# Patient Record
Sex: FEMALE | Race: BLACK OR AFRICAN AMERICAN | NOT HISPANIC OR LATINO | URBAN - METROPOLITAN AREA
[De-identification: names, ages, dates, MRNs, and addresses within clinical notes are randomized per-mention and may not be internally consistent; named-entity substitution may affect disease eponyms.]

---

## 2024-01-01 ENCOUNTER — HOSPITAL ENCOUNTER (INPATIENT)
Facility: HOSPITAL | Age: 74
LOS: 17 days | Discharge: HOSPICE | DRG: 028 | End: 2024-11-18
Attending: STUDENT IN AN ORGANIZED HEALTH CARE EDUCATION/TRAINING PROGRAM | Admitting: STUDENT IN AN ORGANIZED HEALTH CARE EDUCATION/TRAINING PROGRAM
Payer: COMMERCIAL

## 2024-01-01 ENCOUNTER — HOSPITAL ENCOUNTER (INPATIENT)
Facility: HOSPITAL | Age: 74
LOS: 2 days | DRG: 951 | End: 2024-11-20
Attending: HOSPITALIST | Admitting: HOSPITALIST
Payer: COMMERCIAL

## 2024-01-01 VITALS
RESPIRATION RATE: 16 BRPM | HEART RATE: 56 BPM | OXYGEN SATURATION: 93 % | DIASTOLIC BLOOD PRESSURE: 41 MMHG | TEMPERATURE: 96 F | SYSTOLIC BLOOD PRESSURE: 86 MMHG

## 2024-01-01 VITALS
RESPIRATION RATE: 8 BRPM | TEMPERATURE: 98 F | HEART RATE: 67 BPM | WEIGHT: 185.88 LBS | HEIGHT: 67 IN | DIASTOLIC BLOOD PRESSURE: 48 MMHG | OXYGEN SATURATION: 99 % | BODY MASS INDEX: 29.17 KG/M2 | SYSTOLIC BLOOD PRESSURE: 85 MMHG

## 2024-01-01 DIAGNOSIS — G95.9 MYELOPATHY: ICD-10-CM

## 2024-01-01 DIAGNOSIS — G82.50 QUADRIPARESIS: ICD-10-CM

## 2024-01-01 DIAGNOSIS — I48.0 PAROXYSMAL ATRIAL FIBRILLATION: ICD-10-CM

## 2024-01-01 DIAGNOSIS — R07.9 CHEST PAIN: ICD-10-CM

## 2024-01-01 DIAGNOSIS — J96.11 CHRONIC RESPIRATORY FAILURE WITH HYPOXIA: ICD-10-CM

## 2024-01-01 DIAGNOSIS — Z98.1 S/P CERVICAL SPINAL FUSION: ICD-10-CM

## 2024-01-01 DIAGNOSIS — S14.129A CENTRAL CORD SYNDROME: ICD-10-CM

## 2024-01-01 DIAGNOSIS — S14.129D CENTRAL CORD SYNDROME, SUBSEQUENT ENCOUNTER: ICD-10-CM

## 2024-01-01 DIAGNOSIS — N18.4 CKD (CHRONIC KIDNEY DISEASE), STAGE IV: ICD-10-CM

## 2024-01-01 DIAGNOSIS — R55 SYNCOPE AND COLLAPSE: ICD-10-CM

## 2024-01-01 DIAGNOSIS — I48.91 ATRIAL FIBRILLATION: ICD-10-CM

## 2024-01-01 DIAGNOSIS — I27.20 PULMONARY HYPERTENSION: ICD-10-CM

## 2024-01-01 DIAGNOSIS — S14.129A CENTRAL CORD SYNDROME, INITIAL ENCOUNTER: Primary | ICD-10-CM

## 2024-01-01 DIAGNOSIS — Z51.5 END OF LIFE CARE: ICD-10-CM

## 2024-01-01 DIAGNOSIS — I63.9 STROKE: ICD-10-CM

## 2024-01-01 DIAGNOSIS — I63.89 CEREBROVASCULAR ACCIDENT (CVA) DUE TO OTHER MECHANISM: ICD-10-CM

## 2024-01-01 DIAGNOSIS — G93.89 BRAIN MASS: ICD-10-CM

## 2024-01-01 DIAGNOSIS — I48.91 ATRIAL FIBRILLATION WITH RAPID VENTRICULAR RESPONSE: ICD-10-CM

## 2024-01-01 DIAGNOSIS — R55 SYNCOPE: ICD-10-CM

## 2024-01-01 DIAGNOSIS — S14.129A CENTRAL CORD SYNDROME, INITIAL ENCOUNTER: ICD-10-CM

## 2024-01-01 DIAGNOSIS — I49.9 CARDIAC RHYTHM DISORDER OR DISTURBANCE OR CHANGE: ICD-10-CM

## 2024-01-01 LAB
ALBUMIN SERPL BCP-MCNC: 2.2 G/DL (ref 3.5–5.2)
ALBUMIN SERPL BCP-MCNC: 2.3 G/DL (ref 3.5–5.2)
ALBUMIN SERPL BCP-MCNC: 2.4 G/DL (ref 3.5–5.2)
ALBUMIN SERPL BCP-MCNC: 2.5 G/DL (ref 3.5–5.2)
ALBUMIN SERPL BCP-MCNC: 2.6 G/DL (ref 3.5–5.2)
ALBUMIN SERPL BCP-MCNC: 2.7 G/DL (ref 3.5–5.2)
ALBUMIN SERPL BCP-MCNC: 2.8 G/DL (ref 3.5–5.2)
ALBUMIN SERPL BCP-MCNC: 2.8 G/DL (ref 3.5–5.2)
ALBUMIN SERPL BCP-MCNC: 2.9 G/DL (ref 3.5–5.2)
ALLENS TEST: ABNORMAL
ALP SERPL-CCNC: 101 U/L (ref 55–135)
ALP SERPL-CCNC: 103 U/L (ref 55–135)
ALP SERPL-CCNC: 107 U/L (ref 55–135)
ALP SERPL-CCNC: 110 U/L (ref 55–135)
ALP SERPL-CCNC: 115 U/L (ref 55–135)
ALP SERPL-CCNC: 76 U/L (ref 40–150)
ALP SERPL-CCNC: 79 U/L (ref 55–135)
ALP SERPL-CCNC: 81 U/L (ref 55–135)
ALP SERPL-CCNC: 81 U/L (ref 55–135)
ALP SERPL-CCNC: 85 U/L (ref 40–150)
ALP SERPL-CCNC: 90 U/L (ref 55–135)
ALP SERPL-CCNC: 90 U/L (ref 55–135)
ALT SERPL W/O P-5'-P-CCNC: 11 U/L (ref 10–44)
ALT SERPL W/O P-5'-P-CCNC: 11 U/L (ref 10–44)
ALT SERPL W/O P-5'-P-CCNC: 14 U/L (ref 10–44)
ALT SERPL W/O P-5'-P-CCNC: 14 U/L (ref 10–44)
ALT SERPL W/O P-5'-P-CCNC: 26 U/L (ref 10–44)
ALT SERPL W/O P-5'-P-CCNC: 37 U/L (ref 10–44)
ALT SERPL W/O P-5'-P-CCNC: 39 U/L (ref 10–44)
ALT SERPL W/O P-5'-P-CCNC: 5 U/L (ref 10–44)
ALT SERPL W/O P-5'-P-CCNC: 51 U/L (ref 10–44)
ALT SERPL W/O P-5'-P-CCNC: 6 U/L (ref 10–44)
ALT SERPL W/O P-5'-P-CCNC: 62 U/L (ref 10–44)
ALT SERPL W/O P-5'-P-CCNC: 7 U/L (ref 10–44)
AMPHET+METHAMPHET UR QL: NEGATIVE
ANION GAP SERPL CALC-SCNC: 11 MMOL/L (ref 8–16)
ANION GAP SERPL CALC-SCNC: 11 MMOL/L (ref 8–16)
ANION GAP SERPL CALC-SCNC: 5 MMOL/L (ref 8–16)
ANION GAP SERPL CALC-SCNC: 6 MMOL/L (ref 8–16)
ANION GAP SERPL CALC-SCNC: 7 MMOL/L (ref 8–16)
ANION GAP SERPL CALC-SCNC: 8 MMOL/L (ref 8–16)
AORTIC ROOT ANNULUS: 3.02 CM
AORTIC VALVE CUSP SEPERATION: 2.17 CM
APICAL FOUR CHAMBER EJECTION FRACTION: 53 %
APICAL FOUR CHAMBER EJECTION FRACTION: 67 %
APICAL TWO CHAMBER EJECTION FRACTION: 47 %
APICAL TWO CHAMBER EJECTION FRACTION: 71 %
APTT PPP: 27.7 SEC (ref 21–32)
ASCENDING AORTA: 3.32 CM
AST SERPL-CCNC: 10 U/L (ref 10–40)
AST SERPL-CCNC: 108 U/L (ref 10–40)
AST SERPL-CCNC: 13 U/L (ref 10–40)
AST SERPL-CCNC: 16 U/L (ref 10–40)
AST SERPL-CCNC: 189 U/L (ref 10–40)
AST SERPL-CCNC: 25 U/L (ref 10–40)
AST SERPL-CCNC: 53 U/L (ref 10–40)
AST SERPL-CCNC: 57 U/L (ref 10–40)
AST SERPL-CCNC: 57 U/L (ref 10–40)
AST SERPL-CCNC: 67 U/L (ref 10–40)
AST SERPL-CCNC: 87 U/L (ref 10–40)
AST SERPL-CCNC: 9 U/L (ref 10–40)
AV INDEX (PROSTH): 0.81
AV MEAN GRADIENT: 4 MMHG
AV PEAK GRADIENT: 6.8 MMHG
AV VALVE AREA BY VELOCITY RATIO: 2.7 CM²
AV VALVE AREA: 2.8 CM²
AV VELOCITY RATIO: 0.77
BACTERIA BLD CULT: NORMAL
BARBITURATES UR QL SCN>200 NG/ML: NEGATIVE
BASOPHILS # BLD AUTO: 0.01 K/UL (ref 0–0.2)
BASOPHILS # BLD AUTO: 0.02 K/UL (ref 0–0.2)
BASOPHILS # BLD AUTO: 0.02 K/UL (ref 0–0.2)
BASOPHILS # BLD AUTO: 0.03 K/UL (ref 0–0.2)
BASOPHILS # BLD AUTO: 0.04 K/UL (ref 0–0.2)
BASOPHILS NFR BLD: 0.3 % (ref 0–1.9)
BASOPHILS NFR BLD: 0.3 % (ref 0–1.9)
BASOPHILS NFR BLD: 0.5 % (ref 0–1.9)
BASOPHILS NFR BLD: 0.6 % (ref 0–1.9)
BASOPHILS NFR BLD: 0.7 % (ref 0–1.9)
BASOPHILS NFR BLD: 0.7 % (ref 0–1.9)
BASOPHILS NFR BLD: 0.8 % (ref 0–1.9)
BENZODIAZ UR QL SCN>200 NG/ML: NEGATIVE
BILIRUB SERPL-MCNC: 0.2 MG/DL (ref 0.1–1)
BILIRUB SERPL-MCNC: 0.4 MG/DL (ref 0.1–1)
BILIRUB SERPL-MCNC: 0.5 MG/DL (ref 0.1–1)
BILIRUB SERPL-MCNC: 0.6 MG/DL (ref 0.1–1)
BILIRUB SERPL-MCNC: 0.6 MG/DL (ref 0.1–1)
BILIRUB UR QL STRIP: NEGATIVE
BNP SERPL-MCNC: 39 PG/ML (ref 0–99)
BSA FOR ECHO PROCEDURE: 1.83 M2
BSA FOR ECHO PROCEDURE: 1.85 M2
BUN SERPL-MCNC: 28 MG/DL (ref 8–23)
BUN SERPL-MCNC: 29 MG/DL (ref 8–23)
BUN SERPL-MCNC: 30 MG/DL (ref 8–23)
BUN SERPL-MCNC: 31 MG/DL (ref 8–23)
BUN SERPL-MCNC: 32 MG/DL (ref 8–23)
BUN SERPL-MCNC: 33 MG/DL (ref 8–23)
BUN SERPL-MCNC: 34 MG/DL (ref 8–23)
BUN SERPL-MCNC: 39 MG/DL (ref 8–23)
BUN SERPL-MCNC: 43 MG/DL (ref 8–23)
BZE UR QL SCN: NEGATIVE
CALCIUM SERPL-MCNC: 7.2 MG/DL (ref 8.7–10.5)
CALCIUM SERPL-MCNC: 7.4 MG/DL (ref 8.7–10.5)
CALCIUM SERPL-MCNC: 7.5 MG/DL (ref 8.7–10.5)
CALCIUM SERPL-MCNC: 7.5 MG/DL (ref 8.7–10.5)
CALCIUM SERPL-MCNC: 7.6 MG/DL (ref 8.7–10.5)
CALCIUM SERPL-MCNC: 7.7 MG/DL (ref 8.7–10.5)
CALCIUM SERPL-MCNC: 7.7 MG/DL (ref 8.7–10.5)
CALCIUM SERPL-MCNC: 7.8 MG/DL (ref 8.7–10.5)
CALCIUM SERPL-MCNC: 8.1 MG/DL (ref 8.7–10.5)
CALCIUM SERPL-MCNC: 8.2 MG/DL (ref 8.7–10.5)
CALCIUM SERPL-MCNC: 8.2 MG/DL (ref 8.7–10.5)
CALCIUM SERPL-MCNC: 8.3 MG/DL (ref 8.7–10.5)
CALCIUM SERPL-MCNC: 8.5 MG/DL (ref 8.7–10.5)
CANNABINOIDS UR QL SCN: NEGATIVE
CHLORIDE SERPL-SCNC: 103 MMOL/L (ref 95–110)
CHLORIDE SERPL-SCNC: 103 MMOL/L (ref 95–110)
CHLORIDE SERPL-SCNC: 104 MMOL/L (ref 95–110)
CHLORIDE SERPL-SCNC: 105 MMOL/L (ref 95–110)
CHLORIDE SERPL-SCNC: 105 MMOL/L (ref 95–110)
CHLORIDE SERPL-SCNC: 106 MMOL/L (ref 95–110)
CHLORIDE SERPL-SCNC: 106 MMOL/L (ref 95–110)
CHLORIDE SERPL-SCNC: 107 MMOL/L (ref 95–110)
CHLORIDE SERPL-SCNC: 108 MMOL/L (ref 95–110)
CHLORIDE SERPL-SCNC: 108 MMOL/L (ref 95–110)
CHLORIDE SERPL-SCNC: 109 MMOL/L (ref 95–110)
CHLORIDE SERPL-SCNC: 110 MMOL/L (ref 95–110)
CHLORIDE SERPL-SCNC: 111 MMOL/L (ref 95–110)
CHLORIDE SERPL-SCNC: 112 MMOL/L (ref 95–110)
CHLORIDE SERPL-SCNC: 97 MMOL/L (ref 95–110)
CHLORIDE SERPL-SCNC: 98 MMOL/L (ref 95–110)
CHLORIDE SERPL-SCNC: 99 MMOL/L (ref 95–110)
CHOLEST SERPL-MCNC: 125 MG/DL (ref 120–199)
CHOLEST/HDLC SERPL: 2.5 {RATIO} (ref 2–5)
CLARITY UR: CLEAR
CO2 SERPL-SCNC: 20 MMOL/L (ref 23–29)
CO2 SERPL-SCNC: 21 MMOL/L (ref 23–29)
CO2 SERPL-SCNC: 22 MMOL/L (ref 23–29)
CO2 SERPL-SCNC: 23 MMOL/L (ref 23–29)
CO2 SERPL-SCNC: 25 MMOL/L (ref 23–29)
COLOR UR: COLORLESS
CORTIS SERPL-MCNC: 12.9 UG/DL
CREAT SERPL-MCNC: 1.5 MG/DL (ref 0.5–1.4)
CREAT SERPL-MCNC: 1.6 MG/DL (ref 0.5–1.4)
CREAT SERPL-MCNC: 1.7 MG/DL (ref 0.5–1.4)
CREAT SERPL-MCNC: 1.7 MG/DL (ref 0.5–1.4)
CREAT SERPL-MCNC: 1.8 MG/DL (ref 0.5–1.4)
CREAT SERPL-MCNC: 1.9 MG/DL (ref 0.5–1.4)
CREAT SERPL-MCNC: 2 MG/DL (ref 0.5–1.4)
CREAT SERPL-MCNC: 2.2 MG/DL (ref 0.5–1.4)
CREAT SERPL-MCNC: 2.3 MG/DL (ref 0.5–1.4)
CREAT UR-MCNC: 33.2 MG/DL (ref 15–325)
CV ECHO LV RWT: 0.34 CM
CV ECHO LV RWT: 0.36 CM
DELSYS: ABNORMAL
DIFFERENTIAL METHOD BLD: ABNORMAL
DOP CALC AO PEAK VEL: 1.3 M/S
DOP CALC AO VTI: 26.6 CM
DOP CALC LVOT AREA: 3.1 CM2
DOP CALC LVOT AREA: 3.5 CM2
DOP CALC LVOT DIAMETER: 2 CM
DOP CALC LVOT DIAMETER: 2.1 CM
DOP CALC LVOT PEAK VEL: 1 M/S
DOP CALC LVOT STROKE VOLUME: 74.8 CM3
DOP CALC MV VTI: 18.9 CM
DOP CALCLVOT PEAK VEL VTI: 21.6 CM
E WAVE DECELERATION TIME: 223.26 MSEC
E/E' RATIO: 7.47 M/S
ECHO LV POSTERIOR WALL: 0.8 CM (ref 0.6–1.1)
ECHO LV POSTERIOR WALL: 0.8 CM (ref 0.6–1.1)
EOSINOPHIL # BLD AUTO: 0 K/UL (ref 0–0.5)
EOSINOPHIL # BLD AUTO: 0.1 K/UL (ref 0–0.5)
EOSINOPHIL # BLD AUTO: 0.2 K/UL (ref 0–0.5)
EOSINOPHIL NFR BLD: 0.6 % (ref 0–8)
EOSINOPHIL NFR BLD: 0.9 % (ref 0–8)
EOSINOPHIL NFR BLD: 1.3 % (ref 0–8)
EOSINOPHIL NFR BLD: 1.4 % (ref 0–8)
EOSINOPHIL NFR BLD: 1.5 % (ref 0–8)
EOSINOPHIL NFR BLD: 1.7 % (ref 0–8)
EOSINOPHIL NFR BLD: 1.8 % (ref 0–8)
EOSINOPHIL NFR BLD: 1.9 % (ref 0–8)
EOSINOPHIL NFR BLD: 2.6 % (ref 0–8)
EOSINOPHIL NFR BLD: 4.6 % (ref 0–8)
EOSINOPHIL NFR BLD: 4.7 % (ref 0–8)
EOSINOPHIL NFR BLD: 5.8 % (ref 0–8)
ERYTHROCYTE [DISTWIDTH] IN BLOOD BY AUTOMATED COUNT: 14.5 % (ref 11.5–14.5)
ERYTHROCYTE [DISTWIDTH] IN BLOOD BY AUTOMATED COUNT: 14.6 % (ref 11.5–14.5)
ERYTHROCYTE [DISTWIDTH] IN BLOOD BY AUTOMATED COUNT: 14.7 % (ref 11.5–14.5)
ERYTHROCYTE [DISTWIDTH] IN BLOOD BY AUTOMATED COUNT: 14.8 % (ref 11.5–14.5)
ERYTHROCYTE [DISTWIDTH] IN BLOOD BY AUTOMATED COUNT: 15 % (ref 11.5–14.5)
ERYTHROCYTE [DISTWIDTH] IN BLOOD BY AUTOMATED COUNT: 15.3 % (ref 11.5–14.5)
ERYTHROCYTE [DISTWIDTH] IN BLOOD BY AUTOMATED COUNT: 15.4 % (ref 11.5–14.5)
ERYTHROCYTE [DISTWIDTH] IN BLOOD BY AUTOMATED COUNT: 15.5 % (ref 11.5–14.5)
ERYTHROCYTE [DISTWIDTH] IN BLOOD BY AUTOMATED COUNT: 15.6 % (ref 11.5–14.5)
ERYTHROCYTE [DISTWIDTH] IN BLOOD BY AUTOMATED COUNT: 15.8 % (ref 11.5–14.5)
ERYTHROCYTE [DISTWIDTH] IN BLOOD BY AUTOMATED COUNT: 15.8 % (ref 11.5–14.5)
ERYTHROCYTE [DISTWIDTH] IN BLOOD BY AUTOMATED COUNT: 15.9 % (ref 11.5–14.5)
ERYTHROCYTE [DISTWIDTH] IN BLOOD BY AUTOMATED COUNT: 16.6 % (ref 11.5–14.5)
ERYTHROCYTE [DISTWIDTH] IN BLOOD BY AUTOMATED COUNT: 16.7 % (ref 11.5–14.5)
ERYTHROCYTE [DISTWIDTH] IN BLOOD BY AUTOMATED COUNT: 16.8 % (ref 11.5–14.5)
ERYTHROCYTE [DISTWIDTH] IN BLOOD BY AUTOMATED COUNT: 16.8 % (ref 11.5–14.5)
ERYTHROCYTE [DISTWIDTH] IN BLOOD BY AUTOMATED COUNT: 17 % (ref 11.5–14.5)
ERYTHROCYTE [DISTWIDTH] IN BLOOD BY AUTOMATED COUNT: 17.1 % (ref 11.5–14.5)
ERYTHROCYTE [SEDIMENTATION RATE] IN BLOOD BY WESTERGREN METHOD: 13 MM/H
ERYTHROCYTE [SEDIMENTATION RATE] IN BLOOD BY WESTERGREN METHOD: 15 MM/H
EST. GFR  (NO RACE VARIABLE): 21.9 ML/MIN/1.73 M^2
EST. GFR  (NO RACE VARIABLE): 23.1 ML/MIN/1.73 M^2
EST. GFR  (NO RACE VARIABLE): 25.9 ML/MIN/1.73 M^2
EST. GFR  (NO RACE VARIABLE): 27.5 ML/MIN/1.73 M^2
EST. GFR  (NO RACE VARIABLE): 29.4 ML/MIN/1.73 M^2
EST. GFR  (NO RACE VARIABLE): 31 ML/MIN/1.73 M^2
EST. GFR  (NO RACE VARIABLE): 31 ML/MIN/1.73 M^2
EST. GFR  (NO RACE VARIABLE): 33.8 ML/MIN/1.73 M^2
EST. GFR  (NO RACE VARIABLE): 36.6 ML/MIN/1.73 M^2
ESTIMATED AVG GLUCOSE: 103 MG/DL (ref 68–131)
ETHANOL SERPL-MCNC: <10 MG/DL
FIO2: 100
FIO2: 75
FIO2: 75
FIO2: 85
FIO2: 85
FLOW: 10
FLOW: 10
FLOW: 14
FLOW: 14
FLOW: 16
FLOW: 25
FLOW: 25
FLOW: 3
FLOW: 30
FLOW: 40
FLOW: 5
FRACTIONAL SHORTENING: 27.7 % (ref 28–44)
FRACTIONAL SHORTENING: 31.1 % (ref 28–44)
GIANT PLATELETS BLD QL SMEAR: PRESENT
GLUCOSE SERPL-MCNC: 100 MG/DL (ref 70–110)
GLUCOSE SERPL-MCNC: 101 MG/DL (ref 70–110)
GLUCOSE SERPL-MCNC: 102 MG/DL (ref 70–110)
GLUCOSE SERPL-MCNC: 104 MG/DL (ref 70–110)
GLUCOSE SERPL-MCNC: 106 MG/DL (ref 70–110)
GLUCOSE SERPL-MCNC: 109 MG/DL (ref 70–110)
GLUCOSE SERPL-MCNC: 149 MG/DL (ref 70–110)
GLUCOSE SERPL-MCNC: 171 MG/DL (ref 70–110)
GLUCOSE SERPL-MCNC: 63 MG/DL (ref 70–110)
GLUCOSE SERPL-MCNC: 72 MG/DL (ref 70–110)
GLUCOSE SERPL-MCNC: 72 MG/DL (ref 70–110)
GLUCOSE SERPL-MCNC: 73 MG/DL (ref 70–110)
GLUCOSE SERPL-MCNC: 74 MG/DL (ref 70–110)
GLUCOSE SERPL-MCNC: 74 MG/DL (ref 70–110)
GLUCOSE SERPL-MCNC: 75 MG/DL (ref 70–110)
GLUCOSE SERPL-MCNC: 78 MG/DL (ref 70–110)
GLUCOSE SERPL-MCNC: 78 MG/DL (ref 70–110)
GLUCOSE SERPL-MCNC: 80 MG/DL (ref 70–110)
GLUCOSE SERPL-MCNC: 83 MG/DL (ref 70–110)
GLUCOSE SERPL-MCNC: 84 MG/DL (ref 70–110)
GLUCOSE SERPL-MCNC: 92 MG/DL (ref 70–110)
GLUCOSE SERPL-MCNC: 92 MG/DL (ref 70–110)
GLUCOSE SERPL-MCNC: 93 MG/DL (ref 70–110)
GLUCOSE SERPL-MCNC: 94 MG/DL (ref 70–110)
GLUCOSE SERPL-MCNC: 95 MG/DL (ref 70–110)
GLUCOSE SERPL-MCNC: 95 MG/DL (ref 70–110)
GLUCOSE SERPL-MCNC: 96 MG/DL (ref 70–110)
GLUCOSE SERPL-MCNC: 97 MG/DL (ref 70–110)
GLUCOSE SERPL-MCNC: 99 MG/DL (ref 70–110)
GLUCOSE UR QL STRIP: NEGATIVE
HBA1C MFR BLD: 5.2 % (ref 4.5–6.2)
HCO3 UR-SCNC: 19.2 MMOL/L (ref 24–28)
HCO3 UR-SCNC: 20 MMOL/L (ref 24–28)
HCO3 UR-SCNC: 20.7 MMOL/L (ref 24–28)
HCO3 UR-SCNC: 21.2 MMOL/L (ref 24–28)
HCO3 UR-SCNC: 21.6 MMOL/L (ref 24–28)
HCO3 UR-SCNC: 21.8 MMOL/L (ref 24–28)
HCO3 UR-SCNC: 21.9 MMOL/L (ref 24–28)
HCO3 UR-SCNC: 22.1 MMOL/L (ref 24–28)
HCO3 UR-SCNC: 22.3 MMOL/L (ref 24–28)
HCO3 UR-SCNC: 22.5 MMOL/L (ref 24–28)
HCO3 UR-SCNC: 22.8 MMOL/L (ref 24–28)
HCO3 UR-SCNC: 22.9 MMOL/L (ref 24–28)
HCO3 UR-SCNC: 23.1 MMOL/L (ref 24–28)
HCO3 UR-SCNC: 23.4 MMOL/L (ref 24–28)
HCO3 UR-SCNC: 24.8 MMOL/L (ref 24–28)
HCT VFR BLD AUTO: 25.4 % (ref 37–48.5)
HCT VFR BLD AUTO: 26.8 % (ref 37–48.5)
HCT VFR BLD AUTO: 27 % (ref 37–48.5)
HCT VFR BLD AUTO: 27.2 % (ref 37–48.5)
HCT VFR BLD AUTO: 27.5 % (ref 37–48.5)
HCT VFR BLD AUTO: 27.9 % (ref 37–48.5)
HCT VFR BLD AUTO: 28.2 % (ref 37–48.5)
HCT VFR BLD AUTO: 28.2 % (ref 37–48.5)
HCT VFR BLD AUTO: 28.6 % (ref 37–48.5)
HCT VFR BLD AUTO: 29.1 % (ref 37–48.5)
HCT VFR BLD AUTO: 30.7 % (ref 37–48.5)
HCT VFR BLD AUTO: 30.8 % (ref 37–48.5)
HCT VFR BLD AUTO: 31.1 % (ref 37–48.5)
HCT VFR BLD AUTO: 31.5 % (ref 37–48.5)
HCT VFR BLD AUTO: 33.4 % (ref 37–48.5)
HCT VFR BLD AUTO: 33.8 % (ref 37–48.5)
HCT VFR BLD AUTO: 34.4 % (ref 37–48.5)
HCT VFR BLD AUTO: 38.3 % (ref 37–48.5)
HCT VFR BLD AUTO: 41.9 % (ref 37–48.5)
HCT VFR BLD AUTO: 43.3 % (ref 37–48.5)
HCT VFR BLD CALC: 29 %PCV (ref 36–54)
HCT VFR BLD CALC: 32 %PCV (ref 36–54)
HCT VFR BLD CALC: 34 %PCV (ref 36–54)
HCT VFR BLD CALC: 34 %PCV (ref 36–54)
HCT VFR BLD CALC: 35 %PCV (ref 36–54)
HCT VFR BLD CALC: 35 %PCV (ref 36–54)
HCT VFR BLD CALC: 36 %PCV (ref 36–54)
HCT VFR BLD CALC: 37 %PCV (ref 36–54)
HCT VFR BLD CALC: 41 %PCV (ref 36–54)
HDLC SERPL-MCNC: 51 MG/DL (ref 40–75)
HDLC SERPL: 40.8 % (ref 20–50)
HGB BLD-MCNC: 10.2 G/DL (ref 12–16)
HGB BLD-MCNC: 10.2 G/DL (ref 12–16)
HGB BLD-MCNC: 10.3 G/DL (ref 12–16)
HGB BLD-MCNC: 10.8 G/DL (ref 12–16)
HGB BLD-MCNC: 11 G/DL (ref 12–16)
HGB BLD-MCNC: 11.2 G/DL (ref 12–16)
HGB BLD-MCNC: 12 G/DL (ref 12–16)
HGB BLD-MCNC: 13.1 G/DL (ref 12–16)
HGB BLD-MCNC: 13.4 G/DL (ref 12–16)
HGB BLD-MCNC: 8.9 G/DL (ref 12–16)
HGB BLD-MCNC: 9 G/DL (ref 12–16)
HGB BLD-MCNC: 9.2 G/DL (ref 12–16)
HGB BLD-MCNC: 9.2 G/DL (ref 12–16)
HGB BLD-MCNC: 9.3 G/DL (ref 12–16)
HGB BLD-MCNC: 9.4 G/DL (ref 12–16)
HGB BLD-MCNC: 9.5 G/DL (ref 12–16)
HGB BLD-MCNC: 9.7 G/DL (ref 12–16)
HGB UR QL STRIP: NEGATIVE
IMM GRANULOCYTES # BLD AUTO: 0.03 K/UL (ref 0–0.04)
IMM GRANULOCYTES # BLD AUTO: 0.04 K/UL (ref 0–0.04)
IMM GRANULOCYTES # BLD AUTO: 0.05 K/UL (ref 0–0.04)
IMM GRANULOCYTES # BLD AUTO: 0.06 K/UL (ref 0–0.04)
IMM GRANULOCYTES # BLD AUTO: 0.06 K/UL (ref 0–0.04)
IMM GRANULOCYTES # BLD AUTO: 0.07 K/UL (ref 0–0.04)
IMM GRANULOCYTES NFR BLD AUTO: 0.4 % (ref 0–0.5)
IMM GRANULOCYTES NFR BLD AUTO: 0.5 % (ref 0–0.5)
IMM GRANULOCYTES NFR BLD AUTO: 0.5 % (ref 0–0.5)
IMM GRANULOCYTES NFR BLD AUTO: 0.6 % (ref 0–0.5)
IMM GRANULOCYTES NFR BLD AUTO: 0.7 % (ref 0–0.5)
IMM GRANULOCYTES NFR BLD AUTO: 0.7 % (ref 0–0.5)
IMM GRANULOCYTES NFR BLD AUTO: 0.8 % (ref 0–0.5)
IMM GRANULOCYTES NFR BLD AUTO: 0.9 % (ref 0–0.5)
IMM GRANULOCYTES NFR BLD AUTO: 1 % (ref 0–0.5)
IMM GRANULOCYTES NFR BLD AUTO: 1.1 % (ref 0–0.5)
IMM GRANULOCYTES NFR BLD AUTO: 1.1 % (ref 0–0.5)
IMM GRANULOCYTES NFR BLD AUTO: 1.2 % (ref 0–0.5)
INR PPP: 1.2 (ref 0.8–1.2)
INTERVENTRICULAR SEPTUM: 0.8 CM (ref 0.6–1.1)
INTERVENTRICULAR SEPTUM: 1 CM (ref 0.6–1.1)
IVC DIAMETER: 2.7 CM
IVRT: 129.4 MSEC
KETONES UR QL STRIP: NEGATIVE
LA MAJOR: 5.58 CM
LACTATE SERPL-SCNC: 0.6 MMOL/L (ref 0.5–1.9)
LACTATE SERPL-SCNC: 0.8 MMOL/L (ref 0.5–1.9)
LACTATE SERPL-SCNC: 2 MMOL/L (ref 0.5–1.9)
LACTATE SERPL-SCNC: 2.4 MMOL/L (ref 0.5–1.9)
LACTATE SERPL-SCNC: 2.9 MMOL/L (ref 0.5–1.9)
LACTATE SERPL-SCNC: 3.4 MMOL/L (ref 0.5–2.2)
LDLC SERPL CALC-MCNC: 56.6 MG/DL (ref 63–159)
LEFT ATRIUM AREA SYSTOLIC (APICAL 2 CHAMBER): 21.57 CM2
LEFT ATRIUM AREA SYSTOLIC (APICAL 4 CHAMBER): 28.9 CM2
LEFT ATRIUM AREA SYSTOLIC (APICAL 4 CHAMBER): 30.1 CM2
LEFT ATRIUM SIZE: 4.1 CM
LEFT ATRIUM VOLUME INDEX MOD: 48.2 ML/M2
LEFT ATRIUM VOLUME MOD: 87.79 ML
LEFT INTERNAL DIMENSION IN SYSTOLE: 3.1 CM (ref 2.1–4)
LEFT INTERNAL DIMENSION IN SYSTOLE: 3.4 CM (ref 2.1–4)
LEFT VENTRICLE DIASTOLIC VOLUME INDEX: 50.22 ML/M2
LEFT VENTRICLE DIASTOLIC VOLUME INDEX: 56.96 ML/M2
LEFT VENTRICLE DIASTOLIC VOLUME: 103.67 ML
LEFT VENTRICLE DIASTOLIC VOLUME: 92.4 ML
LEFT VENTRICLE END DIASTOLIC VOLUME APICAL 2 CHAMBER: 47.16 ML
LEFT VENTRICLE END DIASTOLIC VOLUME APICAL 2 CHAMBER: 60.6 ML
LEFT VENTRICLE END DIASTOLIC VOLUME APICAL 4 CHAMBER: 59.3 ML
LEFT VENTRICLE END DIASTOLIC VOLUME APICAL 4 CHAMBER: 68.98 ML
LEFT VENTRICLE END SYSTOLIC VOLUME APICAL 2 CHAMBER: 62.86 ML
LEFT VENTRICLE END SYSTOLIC VOLUME APICAL 4 CHAMBER: 122.15 ML
LEFT VENTRICLE END SYSTOLIC VOLUME APICAL 4 CHAMBER: 88.7 ML
LEFT VENTRICLE MASS INDEX: 67.2 G/M2
LEFT VENTRICLE MASS INDEX: 72.2 G/M2
LEFT VENTRICLE SYSTOLIC VOLUME INDEX: 20.6 ML/M2
LEFT VENTRICLE SYSTOLIC VOLUME INDEX: 25.4 ML/M2
LEFT VENTRICLE SYSTOLIC VOLUME: 37.9 ML
LEFT VENTRICLE SYSTOLIC VOLUME: 46.18 ML
LEFT VENTRICULAR INTERNAL DIMENSION IN DIASTOLE: 4.5 CM (ref 3.5–6)
LEFT VENTRICULAR INTERNAL DIMENSION IN DIASTOLE: 4.7 CM (ref 3.5–6)
LEFT VENTRICULAR MASS: 122.3 G
LEFT VENTRICULAR MASS: 132.8 G
LEUKOCYTE ESTERASE UR QL STRIP: NEGATIVE
LV LATERAL E/E' RATIO: 5.92 M/S
LV SEPTAL E/E' RATIO: 10.14 M/S
LVED V (TEICH): 103.67 ML
LVED V (TEICH): 92.4 ML
LVES V (TEICH): 37.9 ML
LVES V (TEICH): 46.18 ML
LVOT MG: 2.45 MMHG
LVOT MV: 0.74 CM/S
LYMPHOCYTES # BLD AUTO: 0.5 K/UL (ref 1–4.8)
LYMPHOCYTES # BLD AUTO: 0.6 K/UL (ref 1–4.8)
LYMPHOCYTES # BLD AUTO: 0.7 K/UL (ref 1–4.8)
LYMPHOCYTES # BLD AUTO: 0.7 K/UL (ref 1–4.8)
LYMPHOCYTES # BLD AUTO: 0.8 K/UL (ref 1–4.8)
LYMPHOCYTES # BLD AUTO: 0.8 K/UL (ref 1–4.8)
LYMPHOCYTES # BLD AUTO: 1.1 K/UL (ref 1–4.8)
LYMPHOCYTES NFR BLD: 10.1 % (ref 18–48)
LYMPHOCYTES NFR BLD: 12.5 % (ref 18–48)
LYMPHOCYTES NFR BLD: 12.6 % (ref 18–48)
LYMPHOCYTES NFR BLD: 13.4 % (ref 18–48)
LYMPHOCYTES NFR BLD: 14.7 % (ref 18–48)
LYMPHOCYTES NFR BLD: 15 % (ref 18–48)
LYMPHOCYTES NFR BLD: 31 % (ref 18–48)
LYMPHOCYTES NFR BLD: 7.8 % (ref 18–48)
LYMPHOCYTES NFR BLD: 8 % (ref 18–48)
LYMPHOCYTES NFR BLD: 9.3 % (ref 18–48)
MAGNESIUM SERPL-MCNC: 1.7 MG/DL (ref 1.6–2.6)
MAGNESIUM SERPL-MCNC: 1.8 MG/DL (ref 1.6–2.6)
MAGNESIUM SERPL-MCNC: 1.9 MG/DL (ref 1.6–2.6)
MAGNESIUM SERPL-MCNC: 1.9 MG/DL (ref 1.6–2.6)
MAGNESIUM SERPL-MCNC: 2 MG/DL (ref 1.6–2.6)
MAGNESIUM SERPL-MCNC: 2.1 MG/DL (ref 1.6–2.6)
MCH RBC QN AUTO: 26.7 PG (ref 27–31)
MCH RBC QN AUTO: 26.8 PG (ref 27–31)
MCH RBC QN AUTO: 27 PG (ref 27–31)
MCH RBC QN AUTO: 27.1 PG (ref 27–31)
MCH RBC QN AUTO: 27.3 PG (ref 27–31)
MCH RBC QN AUTO: 27.5 PG (ref 27–31)
MCH RBC QN AUTO: 27.6 PG (ref 27–31)
MCH RBC QN AUTO: 27.7 PG (ref 27–31)
MCH RBC QN AUTO: 27.7 PG (ref 27–31)
MCH RBC QN AUTO: 27.8 PG (ref 27–31)
MCH RBC QN AUTO: 27.8 PG (ref 27–31)
MCH RBC QN AUTO: 27.9 PG (ref 27–31)
MCH RBC QN AUTO: 27.9 PG (ref 27–31)
MCH RBC QN AUTO: 28.1 PG (ref 27–31)
MCH RBC QN AUTO: 28.2 PG (ref 27–31)
MCHC RBC AUTO-ENTMCNC: 30.9 G/DL (ref 32–36)
MCHC RBC AUTO-ENTMCNC: 31.3 G/DL (ref 32–36)
MCHC RBC AUTO-ENTMCNC: 31.3 G/DL (ref 32–36)
MCHC RBC AUTO-ENTMCNC: 31.5 G/DL (ref 32–36)
MCHC RBC AUTO-ENTMCNC: 32 G/DL (ref 32–36)
MCHC RBC AUTO-ENTMCNC: 32.3 G/DL (ref 32–36)
MCHC RBC AUTO-ENTMCNC: 32.4 G/DL (ref 32–36)
MCHC RBC AUTO-ENTMCNC: 32.6 G/DL (ref 32–36)
MCHC RBC AUTO-ENTMCNC: 32.7 G/DL (ref 32–36)
MCHC RBC AUTO-ENTMCNC: 32.9 G/DL (ref 32–36)
MCHC RBC AUTO-ENTMCNC: 32.9 G/DL (ref 32–36)
MCHC RBC AUTO-ENTMCNC: 33.1 G/DL (ref 32–36)
MCHC RBC AUTO-ENTMCNC: 33.2 G/DL (ref 32–36)
MCHC RBC AUTO-ENTMCNC: 33.3 G/DL (ref 32–36)
MCHC RBC AUTO-ENTMCNC: 33.3 G/DL (ref 32–36)
MCHC RBC AUTO-ENTMCNC: 34.1 G/DL (ref 32–36)
MCHC RBC AUTO-ENTMCNC: 34.1 G/DL (ref 32–36)
MCHC RBC AUTO-ENTMCNC: 34.2 G/DL (ref 32–36)
MCHC RBC AUTO-ENTMCNC: 34.3 G/DL (ref 32–36)
MCHC RBC AUTO-ENTMCNC: 35 G/DL (ref 32–36)
MCV RBC AUTO: 78 FL (ref 82–98)
MCV RBC AUTO: 79 FL (ref 82–98)
MCV RBC AUTO: 80 FL (ref 82–98)
MCV RBC AUTO: 81 FL (ref 82–98)
MCV RBC AUTO: 81 FL (ref 82–98)
MCV RBC AUTO: 83 FL (ref 82–98)
MCV RBC AUTO: 83 FL (ref 82–98)
MCV RBC AUTO: 84 FL (ref 82–98)
MCV RBC AUTO: 85 FL (ref 82–98)
MCV RBC AUTO: 86 FL (ref 82–98)
MCV RBC AUTO: 87 FL (ref 82–98)
METHADONE UR QL SCN>300 NG/ML: NEGATIVE
MODE: ABNORMAL
MONOCYTES # BLD AUTO: 0.3 K/UL (ref 0.3–1)
MONOCYTES # BLD AUTO: 0.3 K/UL (ref 0.3–1)
MONOCYTES # BLD AUTO: 0.5 K/UL (ref 0.3–1)
MONOCYTES # BLD AUTO: 0.5 K/UL (ref 0.3–1)
MONOCYTES # BLD AUTO: 0.6 K/UL (ref 0.3–1)
MONOCYTES # BLD AUTO: 0.7 K/UL (ref 0.3–1)
MONOCYTES # BLD AUTO: 0.8 K/UL (ref 0.3–1)
MONOCYTES NFR BLD: 10 % (ref 4–15)
MONOCYTES NFR BLD: 10.3 % (ref 4–15)
MONOCYTES NFR BLD: 10.6 % (ref 4–15)
MONOCYTES NFR BLD: 10.8 % (ref 4–15)
MONOCYTES NFR BLD: 11.1 % (ref 4–15)
MONOCYTES NFR BLD: 11.3 % (ref 4–15)
MONOCYTES NFR BLD: 8.5 % (ref 4–15)
MONOCYTES NFR BLD: 8.8 % (ref 4–15)
MONOCYTES NFR BLD: 9 % (ref 4–15)
MONOCYTES NFR BLD: 9.3 % (ref 4–15)
MONOCYTES NFR BLD: 9.8 % (ref 4–15)
MONOCYTES NFR BLD: 9.9 % (ref 4–15)
MV MEAN GRADIENT: 1 MMHG
MV PEAK E VEL: 0.71 M/S
MV PEAK GRADIENT: 4 MMHG
MV STENOSIS PRESSURE HALF TIME: 41.79 MS
MV VALVE AREA BY CONTINUITY EQUATION: 3.96 CM2
MV VALVE AREA P 1/2 METHOD: 5.26 CM2
NEUTROPHILS # BLD AUTO: 2 K/UL (ref 1.8–7.7)
NEUTROPHILS # BLD AUTO: 2.2 K/UL (ref 1.8–7.7)
NEUTROPHILS # BLD AUTO: 2.9 K/UL (ref 1.8–7.7)
NEUTROPHILS # BLD AUTO: 3.9 K/UL (ref 1.8–7.7)
NEUTROPHILS # BLD AUTO: 4.4 K/UL (ref 1.8–7.7)
NEUTROPHILS # BLD AUTO: 4.5 K/UL (ref 1.8–7.7)
NEUTROPHILS # BLD AUTO: 4.8 K/UL (ref 1.8–7.7)
NEUTROPHILS # BLD AUTO: 4.8 K/UL (ref 1.8–7.7)
NEUTROPHILS # BLD AUTO: 4.9 K/UL (ref 1.8–7.7)
NEUTROPHILS # BLD AUTO: 5 K/UL (ref 1.8–7.7)
NEUTROPHILS # BLD AUTO: 5.4 K/UL (ref 1.8–7.7)
NEUTROPHILS # BLD AUTO: 5.6 K/UL (ref 1.8–7.7)
NEUTROPHILS NFR BLD: 53.8 % (ref 38–73)
NEUTROPHILS NFR BLD: 67.9 % (ref 38–73)
NEUTROPHILS NFR BLD: 67.9 % (ref 38–73)
NEUTROPHILS NFR BLD: 72.2 % (ref 38–73)
NEUTROPHILS NFR BLD: 74.4 % (ref 38–73)
NEUTROPHILS NFR BLD: 75.8 % (ref 38–73)
NEUTROPHILS NFR BLD: 76.7 % (ref 38–73)
NEUTROPHILS NFR BLD: 77.2 % (ref 38–73)
NEUTROPHILS NFR BLD: 77.7 % (ref 38–73)
NEUTROPHILS NFR BLD: 79 % (ref 38–73)
NEUTROPHILS NFR BLD: 79.2 % (ref 38–73)
NEUTROPHILS NFR BLD: 79.2 % (ref 38–73)
NITRITE UR QL STRIP: NEGATIVE
NONHDLC SERPL-MCNC: 74 MG/DL
NRBC BLD-RTO: 0 /100 WBC
OHS CV RV/LV RATIO: 0.85 CM
OHS CV RV/LV RATIO: 0.96 CM
OHS LV EJECTION FRACTION SIMPSONS BIPLANE MOD: 53 %
OHS LV EJECTION FRACTION SIMPSONS BIPLANE MOD: 68 %
OHS QRS DURATION: 76 MS
OHS QRS DURATION: 78 MS
OHS QTC CALCULATION: 460 MS
OHS QTC CALCULATION: 486 MS
OPIATES UR QL SCN: ABNORMAL
OVALOCYTES BLD QL SMEAR: ABNORMAL
PCO2 BLDA: 37.5 MMHG (ref 35–45)
PCO2 BLDA: 38.2 MMHG (ref 35–45)
PCO2 BLDA: 38.8 MMHG (ref 35–45)
PCO2 BLDA: 39.2 MMHG (ref 35–45)
PCO2 BLDA: 39.4 MMHG (ref 35–45)
PCO2 BLDA: 39.5 MMHG (ref 35–45)
PCO2 BLDA: 39.5 MMHG (ref 35–45)
PCO2 BLDA: 39.8 MMHG (ref 35–45)
PCO2 BLDA: 40.2 MMHG (ref 35–45)
PCO2 BLDA: 40.5 MMHG (ref 35–45)
PCO2 BLDA: 41.2 MMHG (ref 35–45)
PCO2 BLDA: 41.4 MMHG (ref 35–45)
PCO2 BLDA: 42.6 MMHG (ref 35–45)
PCO2 BLDA: 44.4 MMHG (ref 35–45)
PCO2 BLDA: 46.7 MMHG (ref 35–45)
PCP UR QL SCN>25 NG/ML: NEGATIVE
PH SMN: 7.31 [PH] (ref 7.35–7.45)
PH SMN: 7.33 [PH] (ref 7.35–7.45)
PH SMN: 7.33 [PH] (ref 7.35–7.45)
PH SMN: 7.34 [PH] (ref 7.35–7.45)
PH SMN: 7.35 [PH] (ref 7.35–7.45)
PH SMN: 7.35 [PH] (ref 7.35–7.45)
PH SMN: 7.36 [PH] (ref 7.35–7.45)
PH UR STRIP: 5 [PH] (ref 5–8)
PHOSPHATE SERPL-MCNC: 2.7 MG/DL (ref 2.7–4.5)
PHOSPHATE SERPL-MCNC: 3.1 MG/DL (ref 2.7–4.5)
PHOSPHATE SERPL-MCNC: 3.6 MG/DL (ref 2.7–4.5)
PHOSPHATE SERPL-MCNC: 3.6 MG/DL (ref 2.7–4.5)
PHOSPHATE SERPL-MCNC: 3.8 MG/DL (ref 2.7–4.5)
PHOSPHATE SERPL-MCNC: 3.8 MG/DL (ref 2.7–4.5)
PHOSPHATE SERPL-MCNC: 4 MG/DL (ref 2.7–4.5)
PISA MRMAX VEL: 5 M/S
PISA TR MAX VEL: 3.14 M/S
PISA TR MAX VEL: 3.87 M/S
PLATELET # BLD AUTO: 109 K/UL (ref 150–450)
PLATELET # BLD AUTO: 113 K/UL (ref 150–450)
PLATELET # BLD AUTO: 119 K/UL (ref 150–450)
PLATELET # BLD AUTO: 121 K/UL (ref 150–450)
PLATELET # BLD AUTO: 134 K/UL (ref 150–450)
PLATELET # BLD AUTO: 139 K/UL (ref 150–450)
PLATELET # BLD AUTO: 144 K/UL (ref 150–450)
PLATELET # BLD AUTO: 147 K/UL (ref 150–450)
PLATELET # BLD AUTO: 148 K/UL (ref 150–450)
PLATELET # BLD AUTO: 153 K/UL (ref 150–450)
PLATELET # BLD AUTO: 158 K/UL (ref 150–450)
PLATELET # BLD AUTO: 160 K/UL (ref 150–450)
PLATELET # BLD AUTO: 163 K/UL (ref 150–450)
PLATELET # BLD AUTO: 165 K/UL (ref 150–450)
PLATELET # BLD AUTO: 172 K/UL (ref 150–450)
PLATELET # BLD AUTO: 172 K/UL (ref 150–450)
PLATELET # BLD AUTO: 185 K/UL (ref 150–450)
PLATELET # BLD AUTO: 187 K/UL (ref 150–450)
PLATELET # BLD AUTO: 198 K/UL (ref 150–450)
PLATELET # BLD AUTO: 97 K/UL (ref 150–450)
PLATELET BLD QL SMEAR: ABNORMAL
PLATELET BLD QL SMEAR: NORMAL
PMV BLD AUTO: 10.4 FL (ref 9.2–12.9)
PMV BLD AUTO: 10.4 FL (ref 9.2–12.9)
PMV BLD AUTO: 10.5 FL (ref 9.2–12.9)
PMV BLD AUTO: 10.5 FL (ref 9.2–12.9)
PMV BLD AUTO: 10.7 FL (ref 9.2–12.9)
PMV BLD AUTO: 10.9 FL (ref 9.2–12.9)
PMV BLD AUTO: 10.9 FL (ref 9.2–12.9)
PMV BLD AUTO: 11 FL (ref 9.2–12.9)
PMV BLD AUTO: 11.1 FL (ref 9.2–12.9)
PMV BLD AUTO: 11.3 FL (ref 9.2–12.9)
PMV BLD AUTO: 11.4 FL (ref 9.2–12.9)
PMV BLD AUTO: 11.4 FL (ref 9.2–12.9)
PMV BLD AUTO: 11.5 FL (ref 9.2–12.9)
PMV BLD AUTO: 11.5 FL (ref 9.2–12.9)
PMV BLD AUTO: 11.6 FL (ref 9.2–12.9)
PMV BLD AUTO: 11.6 FL (ref 9.2–12.9)
PMV BLD AUTO: 12 FL (ref 9.2–12.9)
PO2 BLDA: 100 MMHG (ref 80–100)
PO2 BLDA: 43 MMHG (ref 80–100)
PO2 BLDA: 44 MMHG (ref 80–100)
PO2 BLDA: 48 MMHG (ref 80–100)
PO2 BLDA: 53 MMHG (ref 80–100)
PO2 BLDA: 54 MMHG (ref 80–100)
PO2 BLDA: 58 MMHG (ref 80–100)
PO2 BLDA: 60 MMHG (ref 80–100)
PO2 BLDA: 60 MMHG (ref 80–100)
PO2 BLDA: 65 MMHG (ref 80–100)
PO2 BLDA: 68 MMHG (ref 80–100)
PO2 BLDA: 71 MMHG (ref 80–100)
PO2 BLDA: 74 MMHG (ref 80–100)
PO2 BLDA: 79 MMHG (ref 80–100)
PO2 BLDA: 86 MMHG (ref 80–100)
POC BE: -1 MMOL/L
POC BE: -2 MMOL/L
POC BE: -3 MMOL/L
POC BE: -4 MMOL/L
POC BE: -4 MMOL/L
POC BE: -5 MMOL/L
POC BE: -6 MMOL/L
POC BE: -7 MMOL/L
POC IONIZED CALCIUM: 1.11 MMOL/L (ref 1.06–1.42)
POC IONIZED CALCIUM: 1.17 MMOL/L (ref 1.06–1.42)
POC IONIZED CALCIUM: 1.2 MMOL/L (ref 1.06–1.42)
POC IONIZED CALCIUM: 1.21 MMOL/L (ref 1.06–1.42)
POC IONIZED CALCIUM: 1.23 MMOL/L (ref 1.06–1.42)
POC IONIZED CALCIUM: 1.24 MMOL/L (ref 1.06–1.42)
POC IONIZED CALCIUM: 1.25 MMOL/L (ref 1.06–1.42)
POC SATURATED O2: 76 % (ref 95–100)
POC SATURATED O2: 78 % (ref 95–100)
POC SATURATED O2: 79 % (ref 95–100)
POC SATURATED O2: 85 % (ref 95–100)
POC SATURATED O2: 86 % (ref 95–100)
POC SATURATED O2: 88 % (ref 95–100)
POC SATURATED O2: 89 % (ref 95–100)
POC SATURATED O2: 90 % (ref 95–100)
POC SATURATED O2: 91 % (ref 95–100)
POC SATURATED O2: 93 % (ref 95–100)
POC SATURATED O2: 93 % (ref 95–100)
POC SATURATED O2: 94 % (ref 95–100)
POC SATURATED O2: 94 % (ref 95–100)
POC SATURATED O2: 96 % (ref 95–100)
POC SATURATED O2: 97 % (ref 95–100)
POC TCO2: 20 MMOL/L (ref 23–27)
POC TCO2: 21 MMOL/L (ref 23–27)
POC TCO2: 22 MMOL/L (ref 23–27)
POC TCO2: 22 MMOL/L (ref 23–27)
POC TCO2: 23 MMOL/L (ref 23–27)
POC TCO2: 24 MMOL/L (ref 23–27)
POC TCO2: 25 MMOL/L (ref 23–27)
POC TCO2: 26 MMOL/L (ref 23–27)
POCT GLUCOSE: 106 MG/DL (ref 70–110)
POCT GLUCOSE: 115 MG/DL (ref 70–110)
POIKILOCYTOSIS BLD QL SMEAR: SLIGHT
POTASSIUM BLD-SCNC: 3.4 MMOL/L (ref 3.5–5.1)
POTASSIUM BLD-SCNC: 3.7 MMOL/L (ref 3.5–5.1)
POTASSIUM BLD-SCNC: 3.9 MMOL/L (ref 3.5–5.1)
POTASSIUM BLD-SCNC: 3.9 MMOL/L (ref 3.5–5.1)
POTASSIUM BLD-SCNC: 4 MMOL/L (ref 3.5–5.1)
POTASSIUM BLD-SCNC: 4 MMOL/L (ref 3.5–5.1)
POTASSIUM BLD-SCNC: 4.1 MMOL/L (ref 3.5–5.1)
POTASSIUM BLD-SCNC: 4.2 MMOL/L (ref 3.5–5.1)
POTASSIUM SERPL-SCNC: 3.8 MMOL/L (ref 3.5–5.1)
POTASSIUM SERPL-SCNC: 3.8 MMOL/L (ref 3.5–5.1)
POTASSIUM SERPL-SCNC: 3.9 MMOL/L (ref 3.5–5.1)
POTASSIUM SERPL-SCNC: 4 MMOL/L (ref 3.5–5.1)
POTASSIUM SERPL-SCNC: 4.1 MMOL/L (ref 3.5–5.1)
POTASSIUM SERPL-SCNC: 4.2 MMOL/L (ref 3.5–5.1)
POTASSIUM SERPL-SCNC: 4.3 MMOL/L (ref 3.5–5.1)
POTASSIUM SERPL-SCNC: 4.3 MMOL/L (ref 3.5–5.1)
POTASSIUM SERPL-SCNC: 4.6 MMOL/L (ref 3.5–5.1)
PROCALCITONIN SERPL IA-MCNC: 0.17 NG/ML (ref 0–0.5)
PROT SERPL-MCNC: 4.7 G/DL (ref 6–8.4)
PROT SERPL-MCNC: 4.8 G/DL (ref 6–8.4)
PROT SERPL-MCNC: 4.8 G/DL (ref 6–8.4)
PROT SERPL-MCNC: 5 G/DL (ref 6–8.4)
PROT SERPL-MCNC: 5.2 G/DL (ref 6–8.4)
PROT SERPL-MCNC: 5.2 G/DL (ref 6–8.4)
PROT SERPL-MCNC: 5.3 G/DL (ref 6–8.4)
PROT SERPL-MCNC: 5.3 G/DL (ref 6–8.4)
PROT SERPL-MCNC: 5.6 G/DL (ref 6–8.4)
PROT SERPL-MCNC: 5.6 G/DL (ref 6–8.4)
PROT SERPL-MCNC: 5.7 G/DL (ref 6–8.4)
PROT SERPL-MCNC: 5.8 G/DL (ref 6–8.4)
PROT UR QL STRIP: NEGATIVE
PROTHROMBIN TIME: 13 SEC (ref 9–12.5)
PV MV: 0.75 M/S
PV PEAK GRADIENT: 4 MMHG
PV PEAK VELOCITY: 0.94 M/S
RA MAJOR: 5.87 CM
RA MAJOR: 6.3 CM
RA PRESSURE ESTIMATED: 15 MMHG
RA PRESSURE ESTIMATED: 3 MMHG
RA WIDTH: 4.3 CM
RBC # BLD AUTO: 3.2 M/UL (ref 4–5.4)
RBC # BLD AUTO: 3.26 M/UL (ref 4–5.4)
RBC # BLD AUTO: 3.34 M/UL (ref 4–5.4)
RBC # BLD AUTO: 3.36 M/UL (ref 4–5.4)
RBC # BLD AUTO: 3.4 M/UL (ref 4–5.4)
RBC # BLD AUTO: 3.4 M/UL (ref 4–5.4)
RBC # BLD AUTO: 3.42 M/UL (ref 4–5.4)
RBC # BLD AUTO: 3.43 M/UL (ref 4–5.4)
RBC # BLD AUTO: 3.44 M/UL (ref 4–5.4)
RBC # BLD AUTO: 3.45 M/UL (ref 4–5.4)
RBC # BLD AUTO: 3.53 M/UL (ref 4–5.4)
RBC # BLD AUTO: 3.67 M/UL (ref 4–5.4)
RBC # BLD AUTO: 3.69 M/UL (ref 4–5.4)
RBC # BLD AUTO: 3.71 M/UL (ref 4–5.4)
RBC # BLD AUTO: 3.87 M/UL (ref 4–5.4)
RBC # BLD AUTO: 3.91 M/UL (ref 4–5.4)
RBC # BLD AUTO: 3.97 M/UL (ref 4–5.4)
RBC # BLD AUTO: 4.43 M/UL (ref 4–5.4)
RBC # BLD AUTO: 4.86 M/UL (ref 4–5.4)
RBC # BLD AUTO: 5 M/UL (ref 4–5.4)
RIGHT ATRIUM VOLUME AREA LENGTH APICAL 4 CHAMBER: 35.9 ML
RIGHT VENTRICLE DIASTOLIC BASEL DIMENSION: 4 CM
RIGHT VENTRICLE DIASTOLIC BASEL DIMENSION: 4.3 CM
RIGHT VENTRICLE DIASTOLIC LENGTH: 5.8 CM
RIGHT VENTRICLE DIASTOLIC LENGTH: 8.2 CM
RIGHT VENTRICLE DIASTOLIC MID DIMENSION: 3.7 CM
RIGHT VENTRICLE FREE WALL STRAIN: -23.1 %
RIGHT VENTRICULAR END-DIASTOLIC DIMENSION: 3.7 CM
RIGHT VENTRICULAR END-DIASTOLIC DIMENSION: 3.97 CM
RIGHT VENTRICULAR LENGTH IN DIASTOLE (APICAL 4-CHAMBER VIEW): 5.78 CM
RIGHT VENTRICULAR LENGTH IN DIASTOLE (APICAL 4-CHAMBER VIEW): 8.2 CM
RV MID DIAMA: 3.7 CM
RV TB RVSP: 18 MMHG
RV TB RVSP: 7 MMHG
RV TISSUE DOPPLER FREE WALL SYSTOLIC VELOCITY 1 (APICAL 4 CHAMBER VIEW): 10.9 CM/S
SAMPLE: ABNORMAL
SITE: ABNORMAL
SODIUM BLD-SCNC: 127 MMOL/L (ref 136–145)
SODIUM BLD-SCNC: 128 MMOL/L (ref 136–145)
SODIUM BLD-SCNC: 130 MMOL/L (ref 136–145)
SODIUM BLD-SCNC: 132 MMOL/L (ref 136–145)
SODIUM BLD-SCNC: 133 MMOL/L (ref 136–145)
SODIUM BLD-SCNC: 134 MMOL/L (ref 136–145)
SODIUM BLD-SCNC: 134 MMOL/L (ref 136–145)
SODIUM BLD-SCNC: 135 MMOL/L (ref 136–145)
SODIUM BLD-SCNC: 136 MMOL/L (ref 136–145)
SODIUM SERPL-SCNC: 126 MMOL/L (ref 136–145)
SODIUM SERPL-SCNC: 127 MMOL/L (ref 136–145)
SODIUM SERPL-SCNC: 128 MMOL/L (ref 136–145)
SODIUM SERPL-SCNC: 128 MMOL/L (ref 136–145)
SODIUM SERPL-SCNC: 131 MMOL/L (ref 136–145)
SODIUM SERPL-SCNC: 133 MMOL/L (ref 136–145)
SODIUM SERPL-SCNC: 133 MMOL/L (ref 136–145)
SODIUM SERPL-SCNC: 134 MMOL/L (ref 136–145)
SODIUM SERPL-SCNC: 134 MMOL/L (ref 136–145)
SODIUM SERPL-SCNC: 135 MMOL/L (ref 136–145)
SODIUM SERPL-SCNC: 137 MMOL/L (ref 136–145)
SODIUM SERPL-SCNC: 138 MMOL/L (ref 136–145)
SODIUM SERPL-SCNC: 141 MMOL/L (ref 136–145)
SODIUM SERPL-SCNC: 142 MMOL/L (ref 136–145)
SODIUM SERPL-SCNC: 142 MMOL/L (ref 136–145)
SODIUM SERPL-SCNC: 143 MMOL/L (ref 136–145)
SP GR UR STRIP: 1.01 (ref 1–1.03)
SP02: 100
SP02: 91
SP02: 92
SP02: 92
SP02: 96
SP02: 96
STJ: 2.92 CM
TARGETS BLD QL SMEAR: ABNORMAL
TDI LATERAL: 0.12 M/S
TDI SEPTAL: 0.07 M/S
TDI: 0.1 M/S
TOXICOLOGY INFORMATION: ABNORMAL
TR MAX PG: 39 MMHG
TR MAX PG: 60 MMHG
TRICUSPID ANNULAR PLANE SYSTOLIC EXCURSION: 1.82 CM
TRICUSPID ANNULAR PLANE SYSTOLIC EXCURSION: 2.15 CM
TRIGL SERPL-MCNC: 87 MG/DL (ref 30–150)
TROPONIN I SERPL DL<=0.01 NG/ML-MCNC: 0.01 NG/ML (ref 0–0.03)
TROPONIN I SERPL DL<=0.01 NG/ML-MCNC: 0.02 NG/ML (ref 0–0.03)
TROPONIN I SERPL DL<=0.01 NG/ML-MCNC: 0.03 NG/ML (ref 0–0.03)
TSH SERPL DL<=0.005 MIU/L-ACNC: 0.47 UIU/ML (ref 0.4–4)
TV REST PULMONARY ARTERY PRESSURE: 54 MMHG
TV REST PULMONARY ARTERY PRESSURE: 63 MMHG
URN SPEC COLLECT METH UR: ABNORMAL
UROBILINOGEN UR STRIP-ACNC: NEGATIVE EU/DL
WBC # BLD AUTO: 3.27 K/UL (ref 3.9–12.7)
WBC # BLD AUTO: 3.68 K/UL (ref 3.9–12.7)
WBC # BLD AUTO: 3.78 K/UL (ref 3.9–12.7)
WBC # BLD AUTO: 4.23 K/UL (ref 3.9–12.7)
WBC # BLD AUTO: 4.23 K/UL (ref 3.9–12.7)
WBC # BLD AUTO: 5.36 K/UL (ref 3.9–12.7)
WBC # BLD AUTO: 5.49 K/UL (ref 3.9–12.7)
WBC # BLD AUTO: 5.67 K/UL (ref 3.9–12.7)
WBC # BLD AUTO: 5.68 K/UL (ref 3.9–12.7)
WBC # BLD AUTO: 5.83 K/UL (ref 3.9–12.7)
WBC # BLD AUTO: 6.02 K/UL (ref 3.9–12.7)
WBC # BLD AUTO: 6.25 K/UL (ref 3.9–12.7)
WBC # BLD AUTO: 6.33 K/UL (ref 3.9–12.7)
WBC # BLD AUTO: 6.58 K/UL (ref 3.9–12.7)
WBC # BLD AUTO: 6.78 K/UL (ref 3.9–12.7)
WBC # BLD AUTO: 7.05 K/UL (ref 3.9–12.7)
WBC # BLD AUTO: 7.26 K/UL (ref 3.9–12.7)
WBC # BLD AUTO: 7.7 K/UL (ref 3.9–12.7)
WBC # BLD AUTO: 7.87 K/UL (ref 3.9–12.7)
WBC # BLD AUTO: 8.94 K/UL (ref 3.9–12.7)
Z-SCORE OF LEFT VENTRICULAR DIMENSION IN END DIASTOLE: -0.7
Z-SCORE OF LEFT VENTRICULAR DIMENSION IN END DIASTOLE: -1.26
Z-SCORE OF LEFT VENTRICULAR DIMENSION IN END SYSTOLE: -0.13
Z-SCORE OF LEFT VENTRICULAR DIMENSION IN END SYSTOLE: 0.7

## 2024-01-01 PROCEDURE — 12000002 HC ACUTE/MED SURGE SEMI-PRIVATE ROOM

## 2024-01-01 PROCEDURE — 20000000 HC ICU ROOM

## 2024-01-01 PROCEDURE — 27100171 HC OXYGEN HIGH FLOW UP TO 24 HOURS

## 2024-01-01 PROCEDURE — 84295 ASSAY OF SERUM SODIUM: CPT

## 2024-01-01 PROCEDURE — 82330 ASSAY OF CALCIUM: CPT

## 2024-01-01 PROCEDURE — 63600175 PHARM REV CODE 636 W HCPCS: Performed by: INTERNAL MEDICINE

## 2024-01-01 PROCEDURE — 99232 SBSQ HOSP IP/OBS MODERATE 35: CPT | Mod: ,,, | Performed by: INTERNAL MEDICINE

## 2024-01-01 PROCEDURE — 85027 COMPLETE CBC AUTOMATED: CPT

## 2024-01-01 PROCEDURE — 94761 N-INVAS EAR/PLS OXIMETRY MLT: CPT | Mod: XB

## 2024-01-01 PROCEDURE — 99900035 HC TECH TIME PER 15 MIN (STAT)

## 2024-01-01 PROCEDURE — 25000003 PHARM REV CODE 250: Performed by: NEUROLOGICAL SURGERY

## 2024-01-01 PROCEDURE — 92610 EVALUATE SWALLOWING FUNCTION: CPT

## 2024-01-01 PROCEDURE — 63600175 PHARM REV CODE 636 W HCPCS: Mod: JG | Performed by: INTERNAL MEDICINE

## 2024-01-01 PROCEDURE — 93005 ELECTROCARDIOGRAM TRACING: CPT | Performed by: INTERNAL MEDICINE

## 2024-01-01 PROCEDURE — 95819 EEG AWAKE AND ASLEEP: CPT

## 2024-01-01 PROCEDURE — 82803 BLOOD GASES ANY COMBINATION: CPT

## 2024-01-01 PROCEDURE — 80048 BASIC METABOLIC PNL TOTAL CA: CPT | Performed by: HOSPITALIST

## 2024-01-01 PROCEDURE — 94664 DEMO&/EVAL PT USE INHALER: CPT

## 2024-01-01 PROCEDURE — 25000003 PHARM REV CODE 250: Performed by: INTERNAL MEDICINE

## 2024-01-01 PROCEDURE — 97530 THERAPEUTIC ACTIVITIES: CPT | Mod: CQ

## 2024-01-01 PROCEDURE — 37799 UNLISTED PX VASCULAR SURGERY: CPT

## 2024-01-01 PROCEDURE — 99900031 HC PATIENT EDUCATION (STAT)

## 2024-01-01 PROCEDURE — 97167 OT EVAL HIGH COMPLEX 60 MIN: CPT

## 2024-01-01 PROCEDURE — 84132 ASSAY OF SERUM POTASSIUM: CPT

## 2024-01-01 PROCEDURE — C1751 CATH, INF, PER/CENT/MIDLINE: HCPCS

## 2024-01-01 PROCEDURE — 99291 CRITICAL CARE FIRST HOUR: CPT | Mod: ,,, | Performed by: INTERNAL MEDICINE

## 2024-01-01 PROCEDURE — 99223 1ST HOSP IP/OBS HIGH 75: CPT | Mod: ,,, | Performed by: INTERNAL MEDICINE

## 2024-01-01 PROCEDURE — 83605 ASSAY OF LACTIC ACID: CPT | Performed by: STUDENT IN AN ORGANIZED HEALTH CARE EDUCATION/TRAINING PROGRAM

## 2024-01-01 PROCEDURE — 87040 BLOOD CULTURE FOR BACTERIA: CPT | Performed by: STUDENT IN AN ORGANIZED HEALTH CARE EDUCATION/TRAINING PROGRAM

## 2024-01-01 PROCEDURE — 25000003 PHARM REV CODE 250: Performed by: HEALTH CARE PROVIDER

## 2024-01-01 PROCEDURE — 85014 HEMATOCRIT: CPT

## 2024-01-01 PROCEDURE — 22614 ARTHRD PST TQ 1NTRSPC EA ADD: CPT | Mod: ,,, | Performed by: NEUROLOGICAL SURGERY

## 2024-01-01 PROCEDURE — 25000003 PHARM REV CODE 250: Performed by: FAMILY MEDICINE

## 2024-01-01 PROCEDURE — 27000221 HC OXYGEN, UP TO 24 HOURS

## 2024-01-01 PROCEDURE — A4216 STERILE WATER/SALINE, 10 ML: HCPCS | Performed by: NEUROLOGICAL SURGERY

## 2024-01-01 PROCEDURE — 84100 ASSAY OF PHOSPHORUS: CPT | Performed by: INTERNAL MEDICINE

## 2024-01-01 PROCEDURE — 99285 EMERGENCY DEPT VISIT HI MDM: CPT | Mod: 25

## 2024-01-01 PROCEDURE — 97110 THERAPEUTIC EXERCISES: CPT | Mod: CQ

## 2024-01-01 PROCEDURE — 63600175 PHARM REV CODE 636 W HCPCS: Performed by: NEUROLOGICAL SURGERY

## 2024-01-01 PROCEDURE — 85730 THROMBOPLASTIN TIME PARTIAL: CPT

## 2024-01-01 PROCEDURE — 25500020 PHARM REV CODE 255: Performed by: INTERNAL MEDICINE

## 2024-01-01 PROCEDURE — 94799 UNLISTED PULMONARY SVC/PX: CPT

## 2024-01-01 PROCEDURE — 97110 THERAPEUTIC EXERCISES: CPT

## 2024-01-01 PROCEDURE — 25000003 PHARM REV CODE 250: Performed by: HOSPITALIST

## 2024-01-01 PROCEDURE — 25000003 PHARM REV CODE 250: Performed by: STUDENT IN AN ORGANIZED HEALTH CARE EDUCATION/TRAINING PROGRAM

## 2024-01-01 PROCEDURE — 99233 SBSQ HOSP IP/OBS HIGH 50: CPT | Mod: ,,, | Performed by: INTERNAL MEDICINE

## 2024-01-01 PROCEDURE — 63600175 PHARM REV CODE 636 W HCPCS: Performed by: HOSPITALIST

## 2024-01-01 PROCEDURE — 97164 PT RE-EVAL EST PLAN CARE: CPT

## 2024-01-01 PROCEDURE — C1713 ANCHOR/SCREW BN/BN,TIS/BN: HCPCS | Performed by: NEUROLOGICAL SURGERY

## 2024-01-01 PROCEDURE — 93010 ELECTROCARDIOGRAM REPORT: CPT | Mod: ,,, | Performed by: INTERNAL MEDICINE

## 2024-01-01 PROCEDURE — 97530 THERAPEUTIC ACTIVITIES: CPT

## 2024-01-01 PROCEDURE — 36415 COLL VENOUS BLD VENIPUNCTURE: CPT | Performed by: STUDENT IN AN ORGANIZED HEALTH CARE EDUCATION/TRAINING PROGRAM

## 2024-01-01 PROCEDURE — 99024 POSTOP FOLLOW-UP VISIT: CPT | Mod: ,,, | Performed by: HEALTH CARE PROVIDER

## 2024-01-01 PROCEDURE — 25000003 PHARM REV CODE 250

## 2024-01-01 PROCEDURE — 63600175 PHARM REV CODE 636 W HCPCS: Performed by: FAMILY MEDICINE

## 2024-01-01 PROCEDURE — 20930 SP BONE ALGRFT MORSEL ADD-ON: CPT | Mod: ,,, | Performed by: NEUROLOGICAL SURGERY

## 2024-01-01 PROCEDURE — 81003 URINALYSIS AUTO W/O SCOPE: CPT | Mod: 59 | Performed by: STUDENT IN AN ORGANIZED HEALTH CARE EDUCATION/TRAINING PROGRAM

## 2024-01-01 PROCEDURE — 51798 US URINE CAPACITY MEASURE: CPT

## 2024-01-01 PROCEDURE — 94799 UNLISTED PULMONARY SVC/PX: CPT | Mod: XB

## 2024-01-01 PROCEDURE — 36415 COLL VENOUS BLD VENIPUNCTURE: CPT | Performed by: FAMILY MEDICINE

## 2024-01-01 PROCEDURE — 36415 COLL VENOUS BLD VENIPUNCTURE: CPT

## 2024-01-01 PROCEDURE — 80053 COMPREHEN METABOLIC PANEL: CPT

## 2024-01-01 PROCEDURE — 63015 REMOVE SPINE LAMINA >2 CRVCL: CPT | Mod: ,,, | Performed by: NEUROLOGICAL SURGERY

## 2024-01-01 PROCEDURE — 37000009 HC ANESTHESIA EA ADD 15 MINS: Performed by: NEUROLOGICAL SURGERY

## 2024-01-01 PROCEDURE — 83036 HEMOGLOBIN GLYCOSYLATED A1C: CPT

## 2024-01-01 PROCEDURE — 82533 TOTAL CORTISOL: CPT | Performed by: INTERNAL MEDICINE

## 2024-01-01 PROCEDURE — 94761 N-INVAS EAR/PLS OXIMETRY MLT: CPT

## 2024-01-01 PROCEDURE — 80053 COMPREHEN METABOLIC PANEL: CPT | Performed by: INTERNAL MEDICINE

## 2024-01-01 PROCEDURE — 85025 COMPLETE CBC W/AUTO DIFF WBC: CPT | Performed by: INTERNAL MEDICINE

## 2024-01-01 PROCEDURE — 36000711: Performed by: NEUROLOGICAL SURGERY

## 2024-01-01 PROCEDURE — 85610 PROTHROMBIN TIME: CPT

## 2024-01-01 PROCEDURE — 20936 SP BONE AGRFT LOCAL ADD-ON: CPT | Mod: ,,, | Performed by: NEUROLOGICAL SURGERY

## 2024-01-01 PROCEDURE — 25000003 PHARM REV CODE 250: Performed by: NURSE PRACTITIONER

## 2024-01-01 PROCEDURE — 80048 BASIC METABOLIC PNL TOTAL CA: CPT | Performed by: INTERNAL MEDICINE

## 2024-01-01 PROCEDURE — 85025 COMPLETE CBC W/AUTO DIFF WBC: CPT | Performed by: STUDENT IN AN ORGANIZED HEALTH CARE EDUCATION/TRAINING PROGRAM

## 2024-01-01 PROCEDURE — 83735 ASSAY OF MAGNESIUM: CPT | Mod: 91 | Performed by: INTERNAL MEDICINE

## 2024-01-01 PROCEDURE — 63600175 PHARM REV CODE 636 W HCPCS: Performed by: STUDENT IN AN ORGANIZED HEALTH CARE EDUCATION/TRAINING PROGRAM

## 2024-01-01 PROCEDURE — 99233 SBSQ HOSP IP/OBS HIGH 50: CPT | Mod: 57,,, | Performed by: NEUROLOGICAL SURGERY

## 2024-01-01 PROCEDURE — 85027 COMPLETE CBC AUTOMATED: CPT | Performed by: INTERNAL MEDICINE

## 2024-01-01 PROCEDURE — 84100 ASSAY OF PHOSPHORUS: CPT | Performed by: STUDENT IN AN ORGANIZED HEALTH CARE EDUCATION/TRAINING PROGRAM

## 2024-01-01 PROCEDURE — 80307 DRUG TEST PRSMV CHEM ANLYZR: CPT | Performed by: STUDENT IN AN ORGANIZED HEALTH CARE EDUCATION/TRAINING PROGRAM

## 2024-01-01 PROCEDURE — 83735 ASSAY OF MAGNESIUM: CPT | Performed by: INTERNAL MEDICINE

## 2024-01-01 PROCEDURE — 83605 ASSAY OF LACTIC ACID: CPT | Mod: 91 | Performed by: FAMILY MEDICINE

## 2024-01-01 PROCEDURE — G0378 HOSPITAL OBSERVATION PER HR: HCPCS

## 2024-01-01 PROCEDURE — 83735 ASSAY OF MAGNESIUM: CPT | Performed by: HOSPITALIST

## 2024-01-01 PROCEDURE — 84295 ASSAY OF SERUM SODIUM: CPT | Performed by: INTERNAL MEDICINE

## 2024-01-01 PROCEDURE — 97535 SELF CARE MNGMENT TRAINING: CPT

## 2024-01-01 PROCEDURE — 83605 ASSAY OF LACTIC ACID: CPT | Performed by: INTERNAL MEDICINE

## 2024-01-01 PROCEDURE — 85027 COMPLETE CBC AUTOMATED: CPT | Performed by: NEUROLOGICAL SURGERY

## 2024-01-01 PROCEDURE — 00NW0ZZ RELEASE CERVICAL SPINAL CORD, OPEN APPROACH: ICD-10-PCS | Performed by: NEUROLOGICAL SURGERY

## 2024-01-01 PROCEDURE — 99232 SBSQ HOSP IP/OBS MODERATE 35: CPT | Mod: ,,, | Performed by: NURSE PRACTITIONER

## 2024-01-01 PROCEDURE — 36573 INSJ PICC RS&I 5 YR+: CPT

## 2024-01-01 PROCEDURE — 37000008 HC ANESTHESIA 1ST 15 MINUTES: Performed by: NEUROLOGICAL SURGERY

## 2024-01-01 PROCEDURE — 94760 N-INVAS EAR/PLS OXIMETRY 1: CPT

## 2024-01-01 PROCEDURE — 3E043XZ INTRODUCTION OF VASOPRESSOR INTO CENTRAL VEIN, PERCUTANEOUS APPROACH: ICD-10-PCS | Performed by: INTERNAL MEDICINE

## 2024-01-01 PROCEDURE — 80061 LIPID PANEL: CPT

## 2024-01-01 PROCEDURE — 22600 ARTHRD PST TQ 1NTRSPC CRV: CPT | Mod: ,,, | Performed by: NEUROLOGICAL SURGERY

## 2024-01-01 PROCEDURE — 99223 1ST HOSP IP/OBS HIGH 75: CPT | Mod: ,,, | Performed by: NEUROLOGICAL SURGERY

## 2024-01-01 PROCEDURE — 83735 ASSAY OF MAGNESIUM: CPT

## 2024-01-01 PROCEDURE — 93005 ELECTROCARDIOGRAM TRACING: CPT | Performed by: GENERAL PRACTICE

## 2024-01-01 PROCEDURE — 80048 BASIC METABOLIC PNL TOTAL CA: CPT | Mod: 91 | Performed by: INTERNAL MEDICINE

## 2024-01-01 PROCEDURE — 93010 ELECTROCARDIOGRAM REPORT: CPT | Mod: 76,,, | Performed by: INTERNAL MEDICINE

## 2024-01-01 PROCEDURE — 84484 ASSAY OF TROPONIN QUANT: CPT | Performed by: STUDENT IN AN ORGANIZED HEALTH CARE EDUCATION/TRAINING PROGRAM

## 2024-01-01 PROCEDURE — 83735 ASSAY OF MAGNESIUM: CPT | Performed by: STUDENT IN AN ORGANIZED HEALTH CARE EDUCATION/TRAINING PROGRAM

## 2024-01-01 PROCEDURE — 82077 ASSAY SPEC XCP UR&BREATH IA: CPT | Performed by: STUDENT IN AN ORGANIZED HEALTH CARE EDUCATION/TRAINING PROGRAM

## 2024-01-01 PROCEDURE — 97168 OT RE-EVAL EST PLAN CARE: CPT

## 2024-01-01 PROCEDURE — 27201423 OPTIME MED/SURG SUP & DEVICES STERILE SUPPLY: Performed by: NEUROLOGICAL SURGERY

## 2024-01-01 PROCEDURE — 93010 ELECTROCARDIOGRAM REPORT: CPT | Mod: ,,, | Performed by: GENERAL PRACTICE

## 2024-01-01 PROCEDURE — 80048 BASIC METABOLIC PNL TOTAL CA: CPT | Performed by: NEUROLOGICAL SURGERY

## 2024-01-01 PROCEDURE — 85025 COMPLETE CBC W/AUTO DIFF WBC: CPT | Performed by: NEUROLOGICAL SURGERY

## 2024-01-01 PROCEDURE — 84484 ASSAY OF TROPONIN QUANT: CPT | Mod: 91

## 2024-01-01 PROCEDURE — 84100 ASSAY OF PHOSPHORUS: CPT

## 2024-01-01 PROCEDURE — 85027 COMPLETE CBC AUTOMATED: CPT | Performed by: HOSPITALIST

## 2024-01-01 PROCEDURE — 85025 COMPLETE CBC W/AUTO DIFF WBC: CPT | Performed by: HOSPITALIST

## 2024-01-01 PROCEDURE — 84443 ASSAY THYROID STIM HORMONE: CPT

## 2024-01-01 PROCEDURE — 80053 COMPREHEN METABOLIC PANEL: CPT | Performed by: STUDENT IN AN ORGANIZED HEALTH CARE EDUCATION/TRAINING PROGRAM

## 2024-01-01 PROCEDURE — 36410 VNPNXR 3YR/> PHY/QHP DX/THER: CPT

## 2024-01-01 PROCEDURE — 99231 SBSQ HOSP IP/OBS SF/LOW 25: CPT | Mod: GT,,, | Performed by: NURSE PRACTITIONER

## 2024-01-01 PROCEDURE — 93005 ELECTROCARDIOGRAM TRACING: CPT

## 2024-01-01 PROCEDURE — 27800903 OPTIME MED/SURG SUP & DEVICES OTHER IMPLANTS: Performed by: NEUROLOGICAL SURGERY

## 2024-01-01 PROCEDURE — 02HV33Z INSERTION OF INFUSION DEVICE INTO SUPERIOR VENA CAVA, PERCUTANEOUS APPROACH: ICD-10-PCS | Performed by: INTERNAL MEDICINE

## 2024-01-01 PROCEDURE — C1769 GUIDE WIRE: HCPCS | Performed by: NEUROLOGICAL SURGERY

## 2024-01-01 PROCEDURE — 97162 PT EVAL MOD COMPLEX 30 MIN: CPT

## 2024-01-01 PROCEDURE — 84145 PROCALCITONIN (PCT): CPT | Performed by: FAMILY MEDICINE

## 2024-01-01 PROCEDURE — 83880 ASSAY OF NATRIURETIC PEPTIDE: CPT | Performed by: STUDENT IN AN ORGANIZED HEALTH CARE EDUCATION/TRAINING PROGRAM

## 2024-01-01 PROCEDURE — 99497 ADVNCD CARE PLAN 30 MIN: CPT | Mod: 25,,, | Performed by: INTERNAL MEDICINE

## 2024-01-01 PROCEDURE — 80048 BASIC METABOLIC PNL TOTAL CA: CPT | Performed by: STUDENT IN AN ORGANIZED HEALTH CARE EDUCATION/TRAINING PROGRAM

## 2024-01-01 PROCEDURE — 36000710: Performed by: NEUROLOGICAL SURGERY

## 2024-01-01 PROCEDURE — 0RG2071 FUSION OF 2 OR MORE CERVICAL VERTEBRAL JOINTS WITH AUTOLOGOUS TISSUE SUBSTITUTE, POSTERIOR APPROACH, POSTERIOR COLUMN, OPEN APPROACH: ICD-10-PCS | Performed by: NEUROLOGICAL SURGERY

## 2024-01-01 PROCEDURE — 22842 INSERT SPINE FIXATION DEVICE: CPT | Mod: ,,, | Performed by: NEUROLOGICAL SURGERY

## 2024-01-01 PROCEDURE — 92523 SPEECH SOUND LANG COMPREHEN: CPT

## 2024-01-01 DEVICE — TAP SYMPHONY STD THRD 3.5MM: Type: IMPLANTABLE DEVICE | Site: SPINE CERVICAL | Status: FUNCTIONAL

## 2024-01-01 DEVICE — SCREW SYMPHONY PA 3.5X14MM: Type: IMPLANTABLE DEVICE | Site: SPINE CERVICAL | Status: FUNCTIONAL

## 2024-01-01 DEVICE — PUTTY DBX 5CC: Type: IMPLANTABLE DEVICE | Site: SPINE CERVICAL | Status: FUNCTIONAL

## 2024-01-01 DEVICE — ROD SYMPHONY PRE-CUT 3.5X50MM: Type: IMPLANTABLE DEVICE | Site: SPINE CERVICAL | Status: FUNCTIONAL

## 2024-01-01 DEVICE — SET SYMPHONY SCREW NS: Type: IMPLANTABLE DEVICE | Site: SPINE CERVICAL | Status: FUNCTIONAL

## 2024-01-01 RX ORDER — MIDODRINE HYDROCHLORIDE 10 MG/1
10 TABLET ORAL 3 TIMES DAILY
Status: DISCONTINUED | OUTPATIENT
Start: 2024-01-01 | End: 2024-01-01

## 2024-01-01 RX ORDER — MIDODRINE HYDROCHLORIDE 10 MG/1
20 TABLET ORAL
Status: DISCONTINUED | OUTPATIENT
Start: 2024-01-01 | End: 2024-01-01

## 2024-01-01 RX ORDER — SCOLOPAMINE TRANSDERMAL SYSTEM 1 MG/1
1 PATCH, EXTENDED RELEASE TRANSDERMAL
Status: DISCONTINUED | OUTPATIENT
Start: 2024-01-01 | End: 2024-01-01 | Stop reason: HOSPADM

## 2024-01-01 RX ORDER — BISACODYL 10 MG/1
10 SUPPOSITORY RECTAL DAILY
Status: DISCONTINUED | OUTPATIENT
Start: 2024-01-01 | End: 2024-01-01

## 2024-01-01 RX ORDER — GABAPENTIN 100 MG/1
200 CAPSULE ORAL EVERY 12 HOURS
Status: DISCONTINUED | OUTPATIENT
Start: 2024-01-01 | End: 2024-01-01

## 2024-01-01 RX ORDER — SODIUM,POTASSIUM PHOSPHATES 280-250MG
2 POWDER IN PACKET (EA) ORAL
Status: DISCONTINUED | OUTPATIENT
Start: 2024-01-01 | End: 2024-01-01

## 2024-01-01 RX ORDER — NALOXONE HCL 0.4 MG/ML
0.02 VIAL (ML) INJECTION
Status: DISCONTINUED | OUTPATIENT
Start: 2024-01-01 | End: 2024-01-01 | Stop reason: HOSPADM

## 2024-01-01 RX ORDER — SODIUM CHLORIDE 0.9 % (FLUSH) 0.9 %
10 SYRINGE (ML) INJECTION
Status: CANCELLED | OUTPATIENT
Start: 2024-01-01

## 2024-01-01 RX ORDER — METHOCARBAMOL 500 MG/1
500 TABLET, FILM COATED ORAL 3 TIMES DAILY PRN
Status: CANCELLED | OUTPATIENT
Start: 2024-01-01

## 2024-01-01 RX ORDER — NALOXONE HCL 0.4 MG/ML
0.02 VIAL (ML) INJECTION
Status: CANCELLED | OUTPATIENT
Start: 2024-01-01

## 2024-01-01 RX ORDER — AMOXICILLIN 250 MG
2 CAPSULE ORAL NIGHTLY PRN
Status: CANCELLED | OUTPATIENT
Start: 2024-01-01

## 2024-01-01 RX ORDER — BUPIVACAINE HCL/EPINEPHRINE 0.25-.0005
VIAL (ML) INJECTION
Status: DISCONTINUED | OUTPATIENT
Start: 2024-01-01 | End: 2024-01-01 | Stop reason: HOSPADM

## 2024-01-01 RX ORDER — SODIUM CHLORIDE 0.9 % (FLUSH) 0.9 %
10 SYRINGE (ML) INJECTION
Status: DISCONTINUED | OUTPATIENT
Start: 2024-01-01 | End: 2024-01-01

## 2024-01-01 RX ORDER — SODIUM,POTASSIUM PHOSPHATES 280-250MG
2 POWDER IN PACKET (EA) ORAL
Status: CANCELLED | OUTPATIENT
Start: 2024-01-01

## 2024-01-01 RX ORDER — LANOLIN ALCOHOL/MO/W.PET/CERES
800 CREAM (GRAM) TOPICAL
Status: DISCONTINUED | OUTPATIENT
Start: 2024-01-01 | End: 2024-01-01

## 2024-01-01 RX ORDER — PROCHLORPERAZINE EDISYLATE 5 MG/ML
5 INJECTION INTRAMUSCULAR; INTRAVENOUS EVERY 6 HOURS PRN
Status: DISCONTINUED | OUTPATIENT
Start: 2024-01-01 | End: 2024-01-01 | Stop reason: HOSPADM

## 2024-01-01 RX ORDER — TIZANIDINE 4 MG/1
4 TABLET ORAL EVERY 12 HOURS PRN
Status: DISCONTINUED | OUTPATIENT
Start: 2024-01-01 | End: 2024-01-01 | Stop reason: HOSPADM

## 2024-01-01 RX ORDER — SELEXIPAG 1600 UG/1
1 TABLET, COATED ORAL 2 TIMES DAILY
COMMUNITY

## 2024-01-01 RX ORDER — METHOCARBAMOL 500 MG/1
500 TABLET, FILM COATED ORAL 3 TIMES DAILY PRN
Status: DISCONTINUED | OUTPATIENT
Start: 2024-01-01 | End: 2024-01-01

## 2024-01-01 RX ORDER — MONTELUKAST SODIUM 10 MG/1
10 TABLET ORAL DAILY
COMMUNITY
Start: 2024-01-01

## 2024-01-01 RX ORDER — ALBUTEROL SULFATE 0.83 MG/ML
2.5 SOLUTION RESPIRATORY (INHALATION) EVERY 6 HOURS PRN
Status: DISCONTINUED | OUTPATIENT
Start: 2024-01-01 | End: 2024-01-01 | Stop reason: HOSPADM

## 2024-01-01 RX ORDER — ALLOPURINOL 100 MG/1
100 TABLET ORAL NIGHTLY
Status: DISCONTINUED | OUTPATIENT
Start: 2024-01-01 | End: 2024-01-01 | Stop reason: HOSPADM

## 2024-01-01 RX ORDER — NOREPINEPHRINE BITARTRATE/D5W 4MG/250ML
0-3 PLASTIC BAG, INJECTION (ML) INTRAVENOUS CONTINUOUS
Status: DISCONTINUED | OUTPATIENT
Start: 2024-01-01 | End: 2024-01-01

## 2024-01-01 RX ORDER — LORAZEPAM 2 MG/ML
2 INJECTION INTRAMUSCULAR
Status: DISCONTINUED | OUTPATIENT
Start: 2024-01-01 | End: 2024-01-01 | Stop reason: HOSPADM

## 2024-01-01 RX ORDER — PROCHLORPERAZINE EDISYLATE 5 MG/ML
5 INJECTION INTRAMUSCULAR; INTRAVENOUS EVERY 6 HOURS PRN
Status: CANCELLED | OUTPATIENT
Start: 2024-01-01

## 2024-01-01 RX ORDER — MACITENTAN 10 MG/1
10 TABLET, FILM COATED ORAL DAILY
COMMUNITY

## 2024-01-01 RX ORDER — IBUPROFEN 200 MG
24 TABLET ORAL
Status: DISCONTINUED | OUTPATIENT
Start: 2024-01-01 | End: 2024-01-01 | Stop reason: HOSPADM

## 2024-01-01 RX ORDER — ENOXAPARIN SODIUM 100 MG/ML
30 INJECTION SUBCUTANEOUS EVERY 24 HOURS
Status: DISCONTINUED | OUTPATIENT
Start: 2024-01-01 | End: 2024-01-01

## 2024-01-01 RX ORDER — BACLOFEN 5 MG/1
5 TABLET ORAL EVERY 8 HOURS PRN
Status: DISCONTINUED | OUTPATIENT
Start: 2024-01-01 | End: 2024-01-01 | Stop reason: HOSPADM

## 2024-01-01 RX ORDER — TORSEMIDE 10 MG/1
40 TABLET ORAL DAILY
Status: CANCELLED | OUTPATIENT
Start: 2024-01-01

## 2024-01-01 RX ORDER — FUROSEMIDE 10 MG/ML
40 INJECTION INTRAMUSCULAR; INTRAVENOUS DAILY
Status: DISCONTINUED | OUTPATIENT
Start: 2024-01-01 | End: 2024-01-01

## 2024-01-01 RX ORDER — IBUPROFEN 200 MG
16 TABLET ORAL
Status: CANCELLED | OUTPATIENT
Start: 2024-01-01

## 2024-01-01 RX ORDER — GABAPENTIN 100 MG/1
200 CAPSULE ORAL EVERY 12 HOURS
Status: CANCELLED | OUTPATIENT
Start: 2024-01-01

## 2024-01-01 RX ORDER — TADALAFIL 20 MG/1
40 TABLET ORAL DAILY
Status: DISCONTINUED | OUTPATIENT
Start: 2024-01-01 | End: 2024-01-01

## 2024-01-01 RX ORDER — IBUPROFEN 200 MG
16 TABLET ORAL
Status: DISCONTINUED | OUTPATIENT
Start: 2024-01-01 | End: 2024-01-01

## 2024-01-01 RX ORDER — METHOCARBAMOL 750 MG/1
750 TABLET, FILM COATED ORAL 3 TIMES DAILY
Status: DISCONTINUED | OUTPATIENT
Start: 2024-01-01 | End: 2024-01-01

## 2024-01-01 RX ORDER — PANTOPRAZOLE SODIUM 40 MG/1
40 TABLET, DELAYED RELEASE ORAL DAILY
Status: CANCELLED | OUTPATIENT
Start: 2024-01-01

## 2024-01-01 RX ORDER — ATORVASTATIN CALCIUM 40 MG/1
40 TABLET, FILM COATED ORAL NIGHTLY
Status: DISCONTINUED | OUTPATIENT
Start: 2024-01-01 | End: 2024-01-01

## 2024-01-01 RX ORDER — ERGOCALCIFEROL (VITAMIN D2) 200 MCG/ML
2000 DROPS ORAL DAILY
Status: DISCONTINUED | OUTPATIENT
Start: 2024-01-01 | End: 2024-01-01

## 2024-01-01 RX ORDER — DULOXETIN HYDROCHLORIDE 60 MG/1
60 CAPSULE, DELAYED RELEASE ORAL DAILY
COMMUNITY

## 2024-01-01 RX ORDER — ENOXAPARIN SODIUM 100 MG/ML
40 INJECTION SUBCUTANEOUS EVERY 24 HOURS
Status: DISCONTINUED | OUTPATIENT
Start: 2024-01-01 | End: 2024-01-01

## 2024-01-01 RX ORDER — NAPROXEN SODIUM 220 MG/1
81 TABLET, FILM COATED ORAL DAILY
Status: DISCONTINUED | OUTPATIENT
Start: 2024-01-01 | End: 2024-01-01

## 2024-01-01 RX ORDER — MUPIROCIN 20 MG/G
OINTMENT TOPICAL 2 TIMES DAILY
Status: DISPENSED | OUTPATIENT
Start: 2024-01-01 | End: 2024-01-01

## 2024-01-01 RX ORDER — DICLOFENAC SODIUM 10 MG/G
2 GEL TOPICAL 3 TIMES DAILY
COMMUNITY
Start: 2024-01-01

## 2024-01-01 RX ORDER — CLOPIDOGREL BISULFATE 75 MG/1
75 TABLET ORAL DAILY
Status: DISCONTINUED | OUTPATIENT
Start: 2024-01-01 | End: 2024-01-01 | Stop reason: HOSPADM

## 2024-01-01 RX ORDER — ONDANSETRON HYDROCHLORIDE 2 MG/ML
4 INJECTION, SOLUTION INTRAVENOUS EVERY 8 HOURS PRN
Status: CANCELLED | OUTPATIENT
Start: 2024-01-01

## 2024-01-01 RX ORDER — AMIODARONE HYDROCHLORIDE 200 MG/1
200 TABLET ORAL 2 TIMES DAILY
Status: DISCONTINUED | OUTPATIENT
Start: 2024-01-01 | End: 2024-01-01

## 2024-01-01 RX ORDER — LINACLOTIDE 290 UG/1
290 CAPSULE, GELATIN COATED ORAL
COMMUNITY
Start: 2024-01-01

## 2024-01-01 RX ORDER — MIDODRINE HYDROCHLORIDE 10 MG/1
20 TABLET ORAL
Status: CANCELLED | OUTPATIENT
Start: 2024-01-01

## 2024-01-01 RX ORDER — TORSEMIDE 20 MG/1
40 TABLET ORAL DAILY
COMMUNITY
Start: 2024-01-01

## 2024-01-01 RX ORDER — DIAZEPAM 2 MG/1
2 TABLET ORAL ONCE
Status: COMPLETED | OUTPATIENT
Start: 2024-01-01 | End: 2024-01-01

## 2024-01-01 RX ORDER — HYDROMORPHONE HYDROCHLORIDE 2 MG/1
2 TABLET ORAL EVERY 4 HOURS PRN
Status: DISCONTINUED | OUTPATIENT
Start: 2024-01-01 | End: 2024-01-01 | Stop reason: HOSPADM

## 2024-01-01 RX ORDER — BISACODYL 10 MG/1
10 SUPPOSITORY RECTAL DAILY
Status: CANCELLED | OUTPATIENT
Start: 2024-01-01

## 2024-01-01 RX ORDER — TADALAFIL 20 MG/1
40 TABLET ORAL DAILY
Status: DISCONTINUED | OUTPATIENT
Start: 2024-01-01 | End: 2024-01-01 | Stop reason: HOSPADM

## 2024-01-01 RX ORDER — ACETAMINOPHEN 325 MG/1
650 TABLET ORAL EVERY 4 HOURS PRN
Status: DISCONTINUED | OUTPATIENT
Start: 2024-01-01 | End: 2024-01-01 | Stop reason: HOSPADM

## 2024-01-01 RX ORDER — CEFAZOLIN SODIUM 1 G/3ML
INJECTION, POWDER, FOR SOLUTION INTRAMUSCULAR; INTRAVENOUS
Status: DISCONTINUED | OUTPATIENT
Start: 2024-01-01 | End: 2024-01-01 | Stop reason: HOSPADM

## 2024-01-01 RX ORDER — MONTELUKAST SODIUM 10 MG/1
10 TABLET ORAL DAILY
Status: DISCONTINUED | OUTPATIENT
Start: 2024-01-01 | End: 2024-01-01

## 2024-01-01 RX ORDER — CLOPIDOGREL BISULFATE 75 MG/1
75 TABLET ORAL DAILY
Status: DISCONTINUED | OUTPATIENT
Start: 2024-01-01 | End: 2024-01-01

## 2024-01-01 RX ORDER — LANOLIN ALCOHOL/MO/W.PET/CERES
800 CREAM (GRAM) TOPICAL
Status: CANCELLED | OUTPATIENT
Start: 2024-01-01

## 2024-01-01 RX ORDER — TORSEMIDE 20 MG/1
40 TABLET ORAL DAILY
Status: DISCONTINUED | OUTPATIENT
Start: 2024-01-01 | End: 2024-01-01

## 2024-01-01 RX ORDER — FLUTICASONE PROPIONATE 50 MCG
1 SPRAY, SUSPENSION (ML) NASAL DAILY
COMMUNITY
Start: 2024-01-01

## 2024-01-01 RX ORDER — HYDROMORPHONE HYDROCHLORIDE 2 MG/1
2 TABLET ORAL EVERY 4 HOURS PRN
Status: CANCELLED | OUTPATIENT
Start: 2024-01-01

## 2024-01-01 RX ORDER — TADALAFIL 20 MG/1
20 TABLET ORAL EVERY OTHER DAY
Status: DISCONTINUED | OUTPATIENT
Start: 2024-01-01 | End: 2024-01-01

## 2024-01-01 RX ORDER — DICLOFENAC SODIUM 10 MG/G
2 GEL TOPICAL 3 TIMES DAILY
Status: CANCELLED | OUTPATIENT
Start: 2024-01-01

## 2024-01-01 RX ORDER — ENOXAPARIN SODIUM 100 MG/ML
1 INJECTION SUBCUTANEOUS EVERY 24 HOURS
Status: DISCONTINUED | OUTPATIENT
Start: 2024-01-01 | End: 2024-01-01

## 2024-01-01 RX ORDER — COLCHICINE 0.6 MG/1
0.6 TABLET, FILM COATED ORAL 2 TIMES DAILY
Status: DISCONTINUED | OUTPATIENT
Start: 2024-01-01 | End: 2024-01-01

## 2024-01-01 RX ORDER — AMIODARONE HYDROCHLORIDE 200 MG/1
200 TABLET ORAL 3 TIMES DAILY
Status: DISPENSED | OUTPATIENT
Start: 2024-01-01 | End: 2024-01-01

## 2024-01-01 RX ORDER — CLOPIDOGREL BISULFATE 75 MG/1
75 TABLET ORAL DAILY
Status: CANCELLED | OUTPATIENT
Start: 2024-01-01

## 2024-01-01 RX ORDER — SODIUM,POTASSIUM PHOSPHATES 280-250MG
2 POWDER IN PACKET (EA) ORAL
Status: DISCONTINUED | OUTPATIENT
Start: 2024-01-01 | End: 2024-01-01 | Stop reason: HOSPADM

## 2024-01-01 RX ORDER — ERGOCALCIFEROL (VITAMIN D2) 200 MCG/ML
2000 DROPS ORAL DAILY
Status: CANCELLED | OUTPATIENT
Start: 2024-01-01

## 2024-01-01 RX ORDER — GLUCAGON 1 MG
1 KIT INJECTION
Status: CANCELLED | OUTPATIENT
Start: 2024-01-01

## 2024-01-01 RX ORDER — TADALAFIL 20 MG/1
20 TABLET ORAL EVERY OTHER DAY
Status: CANCELLED | OUTPATIENT
Start: 2024-01-01

## 2024-01-01 RX ORDER — HYDROMORPHONE HYDROCHLORIDE 1 MG/ML
1 INJECTION, SOLUTION INTRAMUSCULAR; INTRAVENOUS; SUBCUTANEOUS
Status: DISCONTINUED | OUTPATIENT
Start: 2024-01-01 | End: 2024-01-01 | Stop reason: HOSPADM

## 2024-01-01 RX ORDER — AMOXICILLIN 250 MG
2 CAPSULE ORAL NIGHTLY PRN
Status: DISCONTINUED | OUTPATIENT
Start: 2024-01-01 | End: 2024-01-01 | Stop reason: HOSPADM

## 2024-01-01 RX ORDER — SODIUM CHLORIDE, SODIUM LACTATE, POTASSIUM CHLORIDE, CALCIUM CHLORIDE 600; 310; 30; 20 MG/100ML; MG/100ML; MG/100ML; MG/100ML
INJECTION, SOLUTION INTRAVENOUS CONTINUOUS
Status: DISCONTINUED | OUTPATIENT
Start: 2024-01-01 | End: 2024-01-01

## 2024-01-01 RX ORDER — ALUMINUM HYDROXIDE, MAGNESIUM HYDROXIDE, AND SIMETHICONE 1200; 120; 1200 MG/30ML; MG/30ML; MG/30ML
30 SUSPENSION ORAL EVERY 4 HOURS PRN
Status: DISCONTINUED | OUTPATIENT
Start: 2024-01-01 | End: 2024-01-01 | Stop reason: HOSPADM

## 2024-01-01 RX ORDER — ATORVASTATIN CALCIUM 40 MG/1
40 TABLET, FILM COATED ORAL DAILY
Status: CANCELLED | OUTPATIENT
Start: 2024-01-01

## 2024-01-01 RX ORDER — ALUMINUM HYDROXIDE, MAGNESIUM HYDROXIDE, AND SIMETHICONE 1200; 120; 1200 MG/30ML; MG/30ML; MG/30ML
30 SUSPENSION ORAL EVERY 4 HOURS PRN
Status: CANCELLED | OUTPATIENT
Start: 2024-01-01

## 2024-01-01 RX ORDER — AMIODARONE HYDROCHLORIDE 200 MG/1
200 TABLET ORAL 2 TIMES DAILY
Status: CANCELLED | OUTPATIENT
Start: 2024-01-01 | End: 2024-12-14

## 2024-01-01 RX ORDER — HYDROCODONE BITARTRATE AND ACETAMINOPHEN 10; 325 MG/1; MG/1
1 TABLET ORAL
COMMUNITY
Start: 2024-01-01

## 2024-01-01 RX ORDER — ALBUTEROL SULFATE 0.83 MG/ML
2.5 SOLUTION RESPIRATORY (INHALATION) EVERY 6 HOURS PRN
Status: CANCELLED | OUTPATIENT
Start: 2024-01-01

## 2024-01-01 RX ORDER — ALLOPURINOL 100 MG/1
100 TABLET ORAL NIGHTLY
COMMUNITY
Start: 2024-01-01

## 2024-01-01 RX ORDER — ENOXAPARIN SODIUM 100 MG/ML
1 INJECTION SUBCUTANEOUS EVERY 24 HOURS
Status: DISCONTINUED | OUTPATIENT
Start: 2024-01-01 | End: 2024-01-01 | Stop reason: HOSPADM

## 2024-01-01 RX ORDER — DULOXETIN HYDROCHLORIDE 30 MG/1
60 CAPSULE, DELAYED RELEASE ORAL DAILY
Status: CANCELLED | OUTPATIENT
Start: 2024-01-01

## 2024-01-01 RX ORDER — METHOCARBAMOL 750 MG/1
750 TABLET, FILM COATED ORAL ONCE
Status: COMPLETED | OUTPATIENT
Start: 2024-01-01 | End: 2024-01-01

## 2024-01-01 RX ORDER — LORAZEPAM 2 MG/ML
1 INJECTION INTRAMUSCULAR EVERY 4 HOURS PRN
Status: DISCONTINUED | OUTPATIENT
Start: 2024-01-01 | End: 2024-01-01 | Stop reason: HOSPADM

## 2024-01-01 RX ORDER — DULOXETIN HYDROCHLORIDE 30 MG/1
60 CAPSULE, DELAYED RELEASE ORAL DAILY
Status: DISCONTINUED | OUTPATIENT
Start: 2024-01-01 | End: 2024-01-01

## 2024-01-01 RX ORDER — SODIUM CHLORIDE 9 MG/ML
INJECTION, SOLUTION INTRAVENOUS CONTINUOUS
Status: DISCONTINUED | OUTPATIENT
Start: 2024-01-01 | End: 2024-01-01

## 2024-01-01 RX ORDER — COLCHICINE 0.6 MG/1
0.6 TABLET, FILM COATED ORAL 2 TIMES DAILY
Status: CANCELLED | OUTPATIENT
Start: 2024-01-01

## 2024-01-01 RX ORDER — ALLOPURINOL 100 MG/1
100 TABLET ORAL NIGHTLY
Status: CANCELLED | OUTPATIENT
Start: 2024-01-01

## 2024-01-01 RX ORDER — ENOXAPARIN SODIUM 100 MG/ML
1 INJECTION SUBCUTANEOUS EVERY 12 HOURS
Status: DISCONTINUED | OUTPATIENT
Start: 2024-01-01 | End: 2024-01-01

## 2024-01-01 RX ORDER — IBUPROFEN 200 MG
24 TABLET ORAL
Status: DISCONTINUED | OUTPATIENT
Start: 2024-01-01 | End: 2024-01-01

## 2024-01-01 RX ORDER — FUROSEMIDE 10 MG/ML
40 INJECTION INTRAMUSCULAR; INTRAVENOUS ONCE
Status: COMPLETED | OUTPATIENT
Start: 2024-01-01 | End: 2024-01-01

## 2024-01-01 RX ORDER — SODIUM CHLORIDE 0.9 % (FLUSH) 0.9 %
10 SYRINGE (ML) INJECTION
Status: DISCONTINUED | OUTPATIENT
Start: 2024-01-01 | End: 2024-01-01 | Stop reason: HOSPADM

## 2024-01-01 RX ORDER — TIZANIDINE 4 MG/1
4 TABLET ORAL EVERY 12 HOURS PRN
Status: DISCONTINUED | OUTPATIENT
Start: 2024-01-01 | End: 2024-01-01

## 2024-01-01 RX ORDER — CLOTRIMAZOLE AND BETAMETHASONE DIPROPIONATE 10; .64 MG/G; MG/G
1 CREAM TOPICAL DAILY
COMMUNITY
Start: 2024-01-01

## 2024-01-01 RX ORDER — COLCHICINE 0.6 MG/1
0.6 TABLET ORAL 2 TIMES DAILY
COMMUNITY
Start: 2024-01-01

## 2024-01-01 RX ORDER — HYDROCODONE BITARTRATE AND ACETAMINOPHEN 5; 325 MG/1; MG/1
1 TABLET ORAL EVERY 6 HOURS PRN
Status: DISCONTINUED | OUTPATIENT
Start: 2024-01-01 | End: 2024-01-01

## 2024-01-01 RX ORDER — FLUTICASONE PROPIONATE 50 MCG
1 SPRAY, SUSPENSION (ML) NASAL DAILY
Status: DISCONTINUED | OUTPATIENT
Start: 2024-01-01 | End: 2024-01-01

## 2024-01-01 RX ORDER — GABAPENTIN 100 MG/1
2 CAPSULE ORAL EVERY 12 HOURS
COMMUNITY
Start: 2024-01-01

## 2024-01-01 RX ORDER — ASPIRIN 325 MG
325 TABLET ORAL DAILY
Status: DISCONTINUED | OUTPATIENT
Start: 2024-01-01 | End: 2024-01-01

## 2024-01-01 RX ORDER — ACETAMINOPHEN 500 MG
1000 TABLET ORAL
Status: COMPLETED | OUTPATIENT
Start: 2024-01-01 | End: 2024-01-01

## 2024-01-01 RX ORDER — ACETAMINOPHEN 325 MG/1
650 TABLET ORAL EVERY 4 HOURS PRN
Status: CANCELLED | OUTPATIENT
Start: 2024-01-01

## 2024-01-01 RX ORDER — HYDROMORPHONE HYDROCHLORIDE 1 MG/ML
2 INJECTION, SOLUTION INTRAMUSCULAR; INTRAVENOUS; SUBCUTANEOUS
Status: DISCONTINUED | OUTPATIENT
Start: 2024-01-01 | End: 2024-01-01 | Stop reason: HOSPADM

## 2024-01-01 RX ORDER — MIDODRINE HYDROCHLORIDE 10 MG/1
20 TABLET ORAL
Status: DISCONTINUED | OUTPATIENT
Start: 2024-01-01 | End: 2024-01-01 | Stop reason: HOSPADM

## 2024-01-01 RX ORDER — FLUTICASONE PROPIONATE 50 MCG
1 SPRAY, SUSPENSION (ML) NASAL DAILY
Status: CANCELLED | OUTPATIENT
Start: 2024-01-01

## 2024-01-01 RX ORDER — FLUDROCORTISONE ACETATE 0.1 MG/1
100 TABLET ORAL DAILY
Status: DISCONTINUED | OUTPATIENT
Start: 2024-01-01 | End: 2024-01-01

## 2024-01-01 RX ORDER — HYDROCODONE BITARTRATE AND ACETAMINOPHEN 10; 325 MG/1; MG/1
1 TABLET ORAL EVERY 4 HOURS PRN
Status: DISCONTINUED | OUTPATIENT
Start: 2024-01-01 | End: 2024-01-01 | Stop reason: HOSPADM

## 2024-01-01 RX ORDER — HYDROMORPHONE HYDROCHLORIDE 1 MG/ML
1 INJECTION, SOLUTION INTRAMUSCULAR; INTRAVENOUS; SUBCUTANEOUS EVERY 6 HOURS PRN
Status: DISCONTINUED | OUTPATIENT
Start: 2024-01-01 | End: 2024-01-01 | Stop reason: HOSPADM

## 2024-01-01 RX ORDER — ATORVASTATIN CALCIUM 20 MG/1
20 TABLET, FILM COATED ORAL DAILY
Status: DISCONTINUED | OUTPATIENT
Start: 2024-01-01 | End: 2024-01-01

## 2024-01-01 RX ORDER — CLOTRIMAZOLE AND BETAMETHASONE DIPROPIONATE 10; .64 MG/G; MG/G
1 CREAM TOPICAL DAILY
Status: CANCELLED | OUTPATIENT
Start: 2024-01-01

## 2024-01-01 RX ORDER — TIZANIDINE 2 MG/1
4 TABLET ORAL EVERY 12 HOURS PRN
Status: CANCELLED | OUTPATIENT
Start: 2024-01-01

## 2024-01-01 RX ORDER — DICLOFENAC SODIUM 10 MG/G
2 GEL TOPICAL 3 TIMES DAILY
Status: DISCONTINUED | OUTPATIENT
Start: 2024-01-01 | End: 2024-01-01

## 2024-01-01 RX ORDER — HYDROMORPHONE HYDROCHLORIDE 1 MG/ML
1 INJECTION, SOLUTION INTRAMUSCULAR; INTRAVENOUS; SUBCUTANEOUS EVERY 6 HOURS PRN
Status: CANCELLED | OUTPATIENT
Start: 2024-01-01

## 2024-01-01 RX ORDER — OMEPRAZOLE 40 MG/1
40 CAPSULE, DELAYED RELEASE ORAL DAILY
COMMUNITY
Start: 2024-01-01

## 2024-01-01 RX ORDER — DIPHENHYDRAMINE HCL 25 MG
25 CAPSULE ORAL EVERY 6 HOURS PRN
Status: CANCELLED | OUTPATIENT
Start: 2024-01-01

## 2024-01-01 RX ORDER — DIPHENHYDRAMINE HCL 25 MG
25 CAPSULE ORAL EVERY 6 HOURS PRN
Status: DISCONTINUED | OUTPATIENT
Start: 2024-01-01 | End: 2024-01-01 | Stop reason: HOSPADM

## 2024-01-01 RX ORDER — ALLOPURINOL 100 MG/1
100 TABLET ORAL NIGHTLY
Status: DISCONTINUED | OUTPATIENT
Start: 2024-01-01 | End: 2024-01-01

## 2024-01-01 RX ORDER — ERGOCALCIFEROL 1.25 MG/1
50000 CAPSULE ORAL
COMMUNITY

## 2024-01-01 RX ORDER — LANOLIN ALCOHOL/MO/W.PET/CERES
800 CREAM (GRAM) TOPICAL
Status: DISCONTINUED | OUTPATIENT
Start: 2024-01-01 | End: 2024-01-01 | Stop reason: HOSPADM

## 2024-01-01 RX ORDER — HYDROCODONE BITARTRATE AND ACETAMINOPHEN 10; 325 MG/1; MG/1
1 TABLET ORAL EVERY 6 HOURS
Status: DISCONTINUED | OUTPATIENT
Start: 2024-01-01 | End: 2024-01-01

## 2024-01-01 RX ORDER — SODIUM CHLORIDE 0.9 % (FLUSH) 0.9 %
10 SYRINGE (ML) INJECTION EVERY 12 HOURS PRN
Status: CANCELLED | OUTPATIENT
Start: 2024-01-01

## 2024-01-01 RX ORDER — IBUPROFEN 200 MG
16 TABLET ORAL
Status: DISCONTINUED | OUTPATIENT
Start: 2024-01-01 | End: 2024-01-01 | Stop reason: HOSPADM

## 2024-01-01 RX ORDER — FLUDROCORTISONE ACETATE 0.1 MG/1
100 TABLET ORAL DAILY
Status: CANCELLED | OUTPATIENT
Start: 2024-01-01

## 2024-01-01 RX ORDER — OXYCODONE AND ACETAMINOPHEN 7.5; 325 MG/1; MG/1
1 TABLET ORAL EVERY 6 HOURS
Status: DISCONTINUED | OUTPATIENT
Start: 2024-01-01 | End: 2024-01-01

## 2024-01-01 RX ORDER — METHOCARBAMOL 750 MG/1
750 TABLET, FILM COATED ORAL 3 TIMES DAILY
Status: CANCELLED | OUTPATIENT
Start: 2024-01-01

## 2024-01-01 RX ORDER — ATORVASTATIN CALCIUM 40 MG/1
40 TABLET, FILM COATED ORAL DAILY
Status: DISCONTINUED | OUTPATIENT
Start: 2024-01-01 | End: 2024-01-01 | Stop reason: HOSPADM

## 2024-01-01 RX ORDER — HYDROCODONE BITARTRATE AND ACETAMINOPHEN 10; 325 MG/1; MG/1
1 TABLET ORAL EVERY 6 HOURS PRN
Status: DISCONTINUED | OUTPATIENT
Start: 2024-01-01 | End: 2024-01-01

## 2024-01-01 RX ORDER — TIZANIDINE 4 MG/1
4 TABLET ORAL EVERY 12 HOURS PRN
COMMUNITY
Start: 2024-01-01

## 2024-01-01 RX ORDER — PANTOPRAZOLE SODIUM 40 MG/1
40 TABLET, DELAYED RELEASE ORAL DAILY
Status: DISCONTINUED | OUTPATIENT
Start: 2024-01-01 | End: 2024-01-01

## 2024-01-01 RX ORDER — HYDROCODONE BITARTRATE AND ACETAMINOPHEN 10; 325 MG/1; MG/1
1 TABLET ORAL EVERY 6 HOURS PRN
Status: CANCELLED | OUTPATIENT
Start: 2024-01-01

## 2024-01-01 RX ORDER — SODIUM CHLORIDE 0.9 % (FLUSH) 0.9 %
10 SYRINGE (ML) INJECTION EVERY 12 HOURS PRN
Status: DISCONTINUED | OUTPATIENT
Start: 2024-01-01 | End: 2024-01-01 | Stop reason: HOSPADM

## 2024-01-01 RX ORDER — SILDENAFIL CITRATE 20 MG/1
20 TABLET ORAL 2 TIMES DAILY
Status: DISCONTINUED | OUTPATIENT
Start: 2024-01-01 | End: 2024-01-01

## 2024-01-01 RX ORDER — CLOPIDOGREL BISULFATE 75 MG/1
300 TABLET ORAL ONCE
Status: COMPLETED | OUTPATIENT
Start: 2024-01-01 | End: 2024-01-01

## 2024-01-01 RX ORDER — METHOCARBAMOL 750 MG/1
750 TABLET, FILM COATED ORAL 3 TIMES DAILY
Status: DISCONTINUED | OUTPATIENT
Start: 2024-01-01 | End: 2024-01-01 | Stop reason: HOSPADM

## 2024-01-01 RX ORDER — IBUPROFEN 200 MG
24 TABLET ORAL
Status: CANCELLED | OUTPATIENT
Start: 2024-01-01

## 2024-01-01 RX ORDER — HYDROMORPHONE HYDROCHLORIDE 2 MG/1
2 TABLET ORAL EVERY 4 HOURS PRN
Status: DISCONTINUED | OUTPATIENT
Start: 2024-01-01 | End: 2024-01-01

## 2024-01-01 RX ORDER — TADALAFIL 20 MG/1
40 TABLET ORAL DAILY
Status: CANCELLED | OUTPATIENT
Start: 2024-01-01

## 2024-01-01 RX ORDER — AMIODARONE HYDROCHLORIDE 200 MG/1
200 TABLET ORAL 2 TIMES DAILY
Status: DISCONTINUED | OUTPATIENT
Start: 2024-01-01 | End: 2024-01-01 | Stop reason: HOSPADM

## 2024-01-01 RX ORDER — GLUCAGON 1 MG
1 KIT INJECTION
Status: DISCONTINUED | OUTPATIENT
Start: 2024-01-01 | End: 2024-01-01 | Stop reason: HOSPADM

## 2024-01-01 RX ORDER — PANTOPRAZOLE SODIUM 40 MG/1
40 TABLET, DELAYED RELEASE ORAL DAILY
Status: DISCONTINUED | OUTPATIENT
Start: 2024-01-01 | End: 2024-01-01 | Stop reason: HOSPADM

## 2024-01-01 RX ORDER — HYDROMORPHONE HYDROCHLORIDE 1 MG/ML
1 INJECTION, SOLUTION INTRAMUSCULAR; INTRAVENOUS; SUBCUTANEOUS EVERY 6 HOURS PRN
Status: DISCONTINUED | OUTPATIENT
Start: 2024-01-01 | End: 2024-01-01

## 2024-01-01 RX ORDER — ALBUTEROL SULFATE 2.5 MG/.5ML
2.5 SOLUTION RESPIRATORY (INHALATION) EVERY 6 HOURS PRN
Status: DISCONTINUED | OUTPATIENT
Start: 2024-01-01 | End: 2024-01-01 | Stop reason: HOSPADM

## 2024-01-01 RX ORDER — PANTOPRAZOLE SODIUM 40 MG/1
40 TABLET, DELAYED RELEASE ORAL
Status: DISCONTINUED | OUTPATIENT
Start: 2024-01-01 | End: 2024-01-01

## 2024-01-01 RX ORDER — ONDANSETRON HYDROCHLORIDE 2 MG/ML
4 INJECTION, SOLUTION INTRAVENOUS EVERY 8 HOURS PRN
Status: DISCONTINUED | OUTPATIENT
Start: 2024-01-01 | End: 2024-01-01 | Stop reason: HOSPADM

## 2024-01-01 RX ORDER — METHOCARBAMOL 500 MG/1
500 TABLET, FILM COATED ORAL 3 TIMES DAILY PRN
Status: DISCONTINUED | OUTPATIENT
Start: 2024-01-01 | End: 2024-01-01 | Stop reason: HOSPADM

## 2024-01-01 RX ORDER — MONTELUKAST SODIUM 10 MG/1
10 TABLET ORAL DAILY
Status: DISCONTINUED | OUTPATIENT
Start: 2024-01-01 | End: 2024-01-01 | Stop reason: HOSPADM

## 2024-01-01 RX ORDER — FLUDROCORTISONE ACETATE 0.1 MG/1
100 TABLET ORAL DAILY
Status: DISCONTINUED | OUTPATIENT
Start: 2024-01-01 | End: 2024-01-01 | Stop reason: HOSPADM

## 2024-01-01 RX ORDER — TADALAFIL 20 MG/1
20 TABLET ORAL EVERY OTHER DAY
COMMUNITY

## 2024-01-01 RX ORDER — BISACODYL 10 MG/1
10 SUPPOSITORY RECTAL DAILY
Status: DISCONTINUED | OUTPATIENT
Start: 2024-01-01 | End: 2024-01-01 | Stop reason: HOSPADM

## 2024-01-01 RX ORDER — MONTELUKAST SODIUM 10 MG/1
10 TABLET ORAL DAILY
Status: CANCELLED | OUTPATIENT
Start: 2024-01-01

## 2024-01-01 RX ORDER — METHOCARBAMOL 750 MG/1
750 TABLET, FILM COATED ORAL 4 TIMES DAILY
Status: DISCONTINUED | OUTPATIENT
Start: 2024-01-01 | End: 2024-01-01

## 2024-01-01 RX ORDER — HYDROCODONE BITARTRATE AND ACETAMINOPHEN 10; 325 MG/1; MG/1
1 TABLET ORAL EVERY 4 HOURS PRN
Status: CANCELLED | OUTPATIENT
Start: 2024-01-01

## 2024-01-01 RX ORDER — CLOTRIMAZOLE AND BETAMETHASONE DIPROPIONATE 10; .64 MG/G; MG/G
1 CREAM TOPICAL DAILY
Status: DISCONTINUED | OUTPATIENT
Start: 2024-01-01 | End: 2024-01-01

## 2024-01-01 RX ORDER — ENOXAPARIN SODIUM 100 MG/ML
1 INJECTION SUBCUTANEOUS EVERY 24 HOURS
Status: CANCELLED | OUTPATIENT
Start: 2024-01-01

## 2024-01-01 RX ORDER — SIMVASTATIN 40 MG/1
40 TABLET, FILM COATED ORAL DAILY
COMMUNITY
Start: 2024-01-01

## 2024-01-01 RX ORDER — ATORVASTATIN CALCIUM 40 MG/1
40 TABLET, FILM COATED ORAL NIGHTLY
Status: CANCELLED | OUTPATIENT
Start: 2024-01-01

## 2024-01-01 RX ADMIN — AMIODARONE HYDROCHLORIDE 200 MG: 200 TABLET ORAL at 02:11

## 2024-01-01 RX ADMIN — HYDROMORPHONE HYDROCHLORIDE 1 MG: 1 INJECTION, SOLUTION INTRAMUSCULAR; INTRAVENOUS; SUBCUTANEOUS at 10:11

## 2024-01-01 RX ADMIN — METHOCARBAMOL 750 MG: 750 TABLET ORAL at 09:11

## 2024-01-01 RX ADMIN — MIDODRINE HYDROCHLORIDE 15 MG: 10 TABLET ORAL at 10:11

## 2024-01-01 RX ADMIN — DEXTROSE MONOHYDRATE 3 MG/HR: 50 INJECTION, SOLUTION INTRAVENOUS at 10:11

## 2024-01-01 RX ADMIN — HYDROCODONE BITARTRATE AND ACETAMINOPHEN 1 TABLET: 10; 325 TABLET ORAL at 07:11

## 2024-01-01 RX ADMIN — AMIODARONE HYDROCHLORIDE 200 MG: 200 TABLET ORAL at 08:11

## 2024-01-01 RX ADMIN — PIPERACILLIN SODIUM AND TAZOBACTAM SODIUM 3.38 G: 3; .375 INJECTION, POWDER, LYOPHILIZED, FOR SOLUTION INTRAVENOUS at 06:11

## 2024-01-01 RX ADMIN — SELEXIPAG 1600 MCG: 1600 TABLET, COATED ORAL at 09:11

## 2024-01-01 RX ADMIN — HYDROCODONE BITARTRATE AND ACETAMINOPHEN 1 TABLET: 10; 325 TABLET ORAL at 11:11

## 2024-01-01 RX ADMIN — NOREPINEPHRINE BITARTRATE 0.22 MCG/KG/MIN: 1 INJECTION, SOLUTION, CONCENTRATE INTRAVENOUS at 09:11

## 2024-01-01 RX ADMIN — NOREPINEPHRINE BITARTRATE 0.06 MCG/KG/MIN: 4 INJECTION, SOLUTION INTRAVENOUS at 03:11

## 2024-01-01 RX ADMIN — COLCHICINE 0.3 MG: 0.6 TABLET, FILM COATED ORAL at 08:11

## 2024-01-01 RX ADMIN — MONTELUKAST 10 MG: 10 TABLET, FILM COATED ORAL at 09:11

## 2024-01-01 RX ADMIN — HYDROMORPHONE HYDROCHLORIDE 1 MG: 1 INJECTION, SOLUTION INTRAMUSCULAR; INTRAVENOUS; SUBCUTANEOUS at 04:11

## 2024-01-01 RX ADMIN — SELEXIPAG 1600 MCG: 1600 TABLET, COATED ORAL at 08:11

## 2024-01-01 RX ADMIN — BISACODYL 10 MG: 10 SUPPOSITORY RECTAL at 07:11

## 2024-01-01 RX ADMIN — HYDROMORPHONE HYDROCHLORIDE 2 MG: 2 TABLET ORAL at 12:11

## 2024-01-01 RX ADMIN — ATORVASTATIN CALCIUM 40 MG: 40 TABLET, FILM COATED ORAL at 09:11

## 2024-01-01 RX ADMIN — NOREPINEPHRINE BITARTRATE 0.04 MCG/KG/MIN: 4 INJECTION, SOLUTION INTRAVENOUS at 08:11

## 2024-01-01 RX ADMIN — HYDROCODONE BITARTRATE AND ACETAMINOPHEN 1 TABLET: 10; 325 TABLET ORAL at 05:11

## 2024-01-01 RX ADMIN — NOREPINEPHRINE BITARTRATE 0.28 MCG/KG/MIN: 1 INJECTION, SOLUTION, CONCENTRATE INTRAVENOUS at 09:11

## 2024-01-01 RX ADMIN — AMIODARONE HYDROCHLORIDE 0.5 MG/MIN: 1.8 INJECTION, SOLUTION INTRAVENOUS at 03:11

## 2024-01-01 RX ADMIN — HYDROMORPHONE HYDROCHLORIDE 2 MG: 2 TABLET ORAL at 07:11

## 2024-01-01 RX ADMIN — CLOPIDOGREL BISULFATE 75 MG: 75 TABLET, FILM COATED ORAL at 09:11

## 2024-01-01 RX ADMIN — ALLOPURINOL 100 MG: 100 TABLET ORAL at 09:11

## 2024-01-01 RX ADMIN — NOREPINEPHRINE BITARTRATE 0.1 MCG/KG/MIN: 4 INJECTION, SOLUTION INTRAVENOUS at 05:11

## 2024-01-01 RX ADMIN — MIDODRINE HYDROCHLORIDE 15 MG: 10 TABLET ORAL at 05:11

## 2024-01-01 RX ADMIN — NOREPINEPHRINE BITARTRATE 0.36 MCG/KG/MIN: 1 INJECTION, SOLUTION, CONCENTRATE INTRAVENOUS at 07:11

## 2024-01-01 RX ADMIN — COLCHICINE 0.3 MG: 0.6 TABLET, FILM COATED ORAL at 10:11

## 2024-01-01 RX ADMIN — ALLOPURINOL 100 MG: 100 TABLET ORAL at 08:11

## 2024-01-01 RX ADMIN — MACITENTAN 10 MG: 10 TABLET, FILM COATED ORAL at 08:11

## 2024-01-01 RX ADMIN — COLCHICINE 0.6 MG: 0.6 TABLET, FILM COATED ORAL at 09:11

## 2024-01-01 RX ADMIN — AMIODARONE HYDROCHLORIDE 200 MG: 200 TABLET ORAL at 03:11

## 2024-01-01 RX ADMIN — PIPERACILLIN SODIUM AND TAZOBACTAM SODIUM 3.38 G: 3; .375 INJECTION, POWDER, LYOPHILIZED, FOR SOLUTION INTRAVENOUS at 03:11

## 2024-01-01 RX ADMIN — MUPIROCIN 1 G: 20 OINTMENT TOPICAL at 08:11

## 2024-01-01 RX ADMIN — VASOPRESSIN 0.04 UNITS/MIN: 20 INJECTION INTRAVENOUS at 05:11

## 2024-01-01 RX ADMIN — ACETAMINOPHEN 1000 MG: 500 TABLET ORAL at 03:10

## 2024-01-01 RX ADMIN — TADALAFIL 40 MG: 20 TABLET ORAL at 08:11

## 2024-01-01 RX ADMIN — MUPIROCIN 1 G: 20 OINTMENT TOPICAL at 07:11

## 2024-01-01 RX ADMIN — MIDODRINE HYDROCHLORIDE 15 MG: 10 TABLET ORAL at 12:11

## 2024-01-01 RX ADMIN — PANTOPRAZOLE SODIUM 40 MG: 40 TABLET, DELAYED RELEASE ORAL at 08:11

## 2024-01-01 RX ADMIN — MORPHINE SULFATE 1 MG/HR: 10 INJECTION INTRAVENOUS at 09:11

## 2024-01-01 RX ADMIN — COLCHICINE 0.3 MG: 0.6 TABLET, FILM COATED ORAL at 09:11

## 2024-01-01 RX ADMIN — HYDROMORPHONE HYDROCHLORIDE 2 MG: 2 TABLET ORAL at 01:11

## 2024-01-01 RX ADMIN — ENOXAPARIN SODIUM 70 MG: 80 INJECTION SUBCUTANEOUS at 05:11

## 2024-01-01 RX ADMIN — CLOPIDOGREL BISULFATE 300 MG: 75 TABLET, FILM COATED ORAL at 11:10

## 2024-01-01 RX ADMIN — CLOPIDOGREL BISULFATE 75 MG: 75 TABLET, FILM COATED ORAL at 08:11

## 2024-01-01 RX ADMIN — HYDROMORPHONE HYDROCHLORIDE 2 MG: 2 TABLET ORAL at 10:11

## 2024-01-01 RX ADMIN — TADALAFIL 40 MG: 20 TABLET, FILM COATED ORAL at 09:11

## 2024-01-01 RX ADMIN — HYDROMORPHONE HYDROCHLORIDE 1 MG: 1 INJECTION, SOLUTION INTRAMUSCULAR; INTRAVENOUS; SUBCUTANEOUS at 09:11

## 2024-01-01 RX ADMIN — MACITENTAN 10 MG: 10 TABLET, FILM COATED ORAL at 10:11

## 2024-01-01 RX ADMIN — MIDODRINE HYDROCHLORIDE 10 MG: 10 TABLET ORAL at 05:11

## 2024-01-01 RX ADMIN — HYDROCODONE BITARTRATE AND ACETAMINOPHEN 1 TABLET: 10; 325 TABLET ORAL at 04:11

## 2024-01-01 RX ADMIN — METHOCARBAMOL 750 MG: 750 TABLET ORAL at 04:11

## 2024-01-01 RX ADMIN — PIPERACILLIN SODIUM AND TAZOBACTAM SODIUM 3.38 G: 3; .375 INJECTION, POWDER, LYOPHILIZED, FOR SOLUTION INTRAVENOUS at 07:11

## 2024-01-01 RX ADMIN — AMIODARONE HYDROCHLORIDE 200 MG: 200 TABLET ORAL at 09:11

## 2024-01-01 RX ADMIN — MIDODRINE HYDROCHLORIDE 15 MG: 10 TABLET ORAL at 11:11

## 2024-01-01 RX ADMIN — METHOCARBAMOL 750 MG: 750 TABLET ORAL at 10:11

## 2024-01-01 RX ADMIN — HYDROMORPHONE HYDROCHLORIDE 1 MG: 1 INJECTION, SOLUTION INTRAMUSCULAR; INTRAVENOUS; SUBCUTANEOUS at 02:11

## 2024-01-01 RX ADMIN — MONTELUKAST 10 MG: 10 TABLET, FILM COATED ORAL at 08:11

## 2024-01-01 RX ADMIN — DEXTROSE MONOHYDRATE 0.04 UNITS/MIN: 50 INJECTION, SOLUTION INTRAVENOUS at 10:11

## 2024-01-01 RX ADMIN — PIPERACILLIN SODIUM AND TAZOBACTAM SODIUM 3.38 G: 3; .375 INJECTION, POWDER, LYOPHILIZED, FOR SOLUTION INTRAVENOUS at 12:11

## 2024-01-01 RX ADMIN — IOHEXOL 100 ML: 350 INJECTION, SOLUTION INTRAVENOUS at 10:11

## 2024-01-01 RX ADMIN — HYDROMORPHONE HYDROCHLORIDE 1 MG: 1 INJECTION, SOLUTION INTRAMUSCULAR; INTRAVENOUS; SUBCUTANEOUS at 01:11

## 2024-01-01 RX ADMIN — PANTOPRAZOLE SODIUM 40 MG: 40 TABLET, DELAYED RELEASE ORAL at 09:11

## 2024-01-01 RX ADMIN — METHOCARBAMOL 750 MG: 750 TABLET ORAL at 02:11

## 2024-01-01 RX ADMIN — MIDODRINE HYDROCHLORIDE 15 MG: 10 TABLET ORAL at 03:11

## 2024-01-01 RX ADMIN — NOREPINEPHRINE BITARTRATE 0.03 MCG/KG/MIN: 4 INJECTION, SOLUTION INTRAVENOUS at 05:11

## 2024-01-01 RX ADMIN — DEXTROSE MONOHYDRATE 0.04 UNITS/MIN: 50 INJECTION, SOLUTION INTRAVENOUS at 07:11

## 2024-01-01 RX ADMIN — DEXTROSE MONOHYDRATE 5 MG/HR: 50 INJECTION, SOLUTION INTRAVENOUS at 12:11

## 2024-01-01 RX ADMIN — ATORVASTATIN CALCIUM 40 MG: 40 TABLET, FILM COATED ORAL at 08:11

## 2024-01-01 RX ADMIN — HYDROMORPHONE HYDROCHLORIDE 2 MG: 2 TABLET ORAL at 08:11

## 2024-01-01 RX ADMIN — FUROSEMIDE 40 MG: 10 INJECTION, SOLUTION INTRAVENOUS at 10:11

## 2024-01-01 RX ADMIN — MIDODRINE HYDROCHLORIDE 10 MG: 10 TABLET ORAL at 11:11

## 2024-01-01 RX ADMIN — METHOCARBAMOL 750 MG: 750 TABLET ORAL at 08:11

## 2024-01-01 RX ADMIN — ENOXAPARIN SODIUM 30 MG: 30 INJECTION SUBCUTANEOUS at 05:11

## 2024-01-01 RX ADMIN — FUROSEMIDE 40 MG: 10 INJECTION, SOLUTION INTRAVENOUS at 08:11

## 2024-01-01 RX ADMIN — AMIODARONE HYDROCHLORIDE 0.5 MG/MIN: 1.8 INJECTION, SOLUTION INTRAVENOUS at 07:11

## 2024-01-01 RX ADMIN — SILDENAFIL 20 MG: 20 TABLET ORAL at 04:11

## 2024-01-01 RX ADMIN — MIDODRINE HYDROCHLORIDE 10 MG: 10 TABLET ORAL at 12:11

## 2024-01-01 RX ADMIN — MIDODRINE HYDROCHLORIDE 15 MG: 10 TABLET ORAL at 04:11

## 2024-01-01 RX ADMIN — MIDODRINE HYDROCHLORIDE 10 MG: 10 TABLET ORAL at 06:11

## 2024-01-01 RX ADMIN — DIPHENHYDRAMINE HYDROCHLORIDE 25 MG: 25 CAPSULE ORAL at 09:11

## 2024-01-01 RX ADMIN — FUROSEMIDE 40 MG: 10 INJECTION, SOLUTION INTRAVENOUS at 09:11

## 2024-01-01 RX ADMIN — MIDODRINE HYDROCHLORIDE 15 MG: 10 TABLET ORAL at 06:11

## 2024-01-01 RX ADMIN — NOREPINEPHRINE BITARTRATE 0.3 MCG/KG/MIN: 1 INJECTION, SOLUTION, CONCENTRATE INTRAVENOUS at 09:11

## 2024-01-01 RX ADMIN — SODIUM CHLORIDE 1000 ML: 9 INJECTION, SOLUTION INTRAVENOUS at 12:11

## 2024-01-01 RX ADMIN — HYDROCODONE BITARTRATE AND ACETAMINOPHEN 1 TABLET: 10; 325 TABLET ORAL at 12:11

## 2024-01-01 RX ADMIN — DIAZEPAM 2 MG: 2 TABLET ORAL at 10:11

## 2024-01-01 RX ADMIN — METHOCARBAMOL 750 MG: 750 TABLET ORAL at 05:11

## 2024-01-01 RX ADMIN — DEXTROSE MONOHYDRATE 0.04 UNITS/MIN: 50 INJECTION, SOLUTION INTRAVENOUS at 03:11

## 2024-01-01 RX ADMIN — ENOXAPARIN SODIUM 70 MG: 80 INJECTION SUBCUTANEOUS at 01:11

## 2024-01-01 RX ADMIN — METHOCARBAMOL 500 MG: 750 TABLET ORAL at 04:11

## 2024-01-01 RX ADMIN — HYDROMORPHONE HYDROCHLORIDE 2 MG: 2 TABLET ORAL at 04:11

## 2024-01-01 RX ADMIN — DEXTROSE MONOHYDRATE 0.04 UNITS/MIN: 50 INJECTION, SOLUTION INTRAVENOUS at 05:11

## 2024-01-01 RX ADMIN — HYDROCODONE BITARTRATE AND ACETAMINOPHEN 1 TABLET: 10; 325 TABLET ORAL at 06:11

## 2024-01-01 RX ADMIN — METHOCARBAMOL 500 MG: 750 TABLET ORAL at 09:11

## 2024-01-01 RX ADMIN — HYDROCODONE BITARTRATE AND ACETAMINOPHEN 1 TABLET: 10; 325 TABLET ORAL at 02:11

## 2024-01-01 RX ADMIN — MIDODRINE HYDROCHLORIDE 10 MG: 10 TABLET ORAL at 04:11

## 2024-01-01 RX ADMIN — PIPERACILLIN SODIUM AND TAZOBACTAM SODIUM 3.38 G: 3; .375 INJECTION, POWDER, LYOPHILIZED, FOR SOLUTION INTRAVENOUS at 11:11

## 2024-01-01 RX ADMIN — HYDROMORPHONE HYDROCHLORIDE 1 MG: 1 INJECTION, SOLUTION INTRAMUSCULAR; INTRAVENOUS; SUBCUTANEOUS at 12:11

## 2024-01-01 RX ADMIN — PANTOPRAZOLE SODIUM 40 MG: 40 TABLET, DELAYED RELEASE ORAL at 10:11

## 2024-01-01 RX ADMIN — HYDROMORPHONE HYDROCHLORIDE 2 MG: 2 TABLET ORAL at 09:11

## 2024-01-01 RX ADMIN — ENOXAPARIN SODIUM 70 MG: 80 INJECTION SUBCUTANEOUS at 04:11

## 2024-01-01 RX ADMIN — MUPIROCIN 1 G: 20 OINTMENT TOPICAL at 09:11

## 2024-01-01 RX ADMIN — SELEXIPAG 1600 MCG: 1600 TABLET, COATED ORAL at 07:11

## 2024-01-01 RX ADMIN — PIPERACILLIN SODIUM AND TAZOBACTAM SODIUM 3.38 G: 3; .375 INJECTION, POWDER, LYOPHILIZED, FOR SOLUTION INTRAVENOUS at 02:11

## 2024-01-01 RX ADMIN — BISACODYL 10 MG: 10 SUPPOSITORY RECTAL at 08:11

## 2024-01-01 RX ADMIN — MACITENTAN 10 MG: 10 TABLET, FILM COATED ORAL at 09:11

## 2024-01-01 RX ADMIN — ENOXAPARIN SODIUM 70 MG: 80 INJECTION SUBCUTANEOUS at 02:11

## 2024-01-01 RX ADMIN — MIDODRINE HYDROCHLORIDE 15 MG: 10 TABLET ORAL at 02:11

## 2024-01-01 RX ADMIN — METHOCARBAMOL 750 MG: 750 TABLET ORAL at 12:11

## 2024-01-01 RX ADMIN — HYDROCODONE BITARTRATE AND ACETAMINOPHEN 1 TABLET: 10; 325 TABLET ORAL at 03:11

## 2024-01-01 RX ADMIN — METHOCARBAMOL 750 MG: 750 TABLET ORAL at 03:11

## 2024-01-01 RX ADMIN — MIDODRINE HYDROCHLORIDE 15 MG: 10 TABLET ORAL at 07:11

## 2024-01-01 RX ADMIN — SODIUM CHLORIDE, POTASSIUM CHLORIDE, SODIUM LACTATE AND CALCIUM CHLORIDE: 600; 310; 30; 20 INJECTION, SOLUTION INTRAVENOUS at 06:11

## 2024-01-01 RX ADMIN — ENOXAPARIN SODIUM 70 MG: 80 INJECTION SUBCUTANEOUS at 03:11

## 2024-01-01 RX ADMIN — SODIUM CHLORIDE, POTASSIUM CHLORIDE, SODIUM LACTATE AND CALCIUM CHLORIDE: 600; 310; 30; 20 INJECTION, SOLUTION INTRAVENOUS at 10:11

## 2024-01-01 RX ADMIN — SODIUM CHLORIDE, POTASSIUM CHLORIDE, SODIUM LACTATE AND CALCIUM CHLORIDE: 600; 310; 30; 20 INJECTION, SOLUTION INTRAVENOUS at 05:11

## 2024-01-01 RX ADMIN — DEXTROSE MONOHYDRATE 0.04 UNITS/MIN: 50 INJECTION, SOLUTION INTRAVENOUS at 12:11

## 2024-01-01 RX ADMIN — HYDROMORPHONE HYDROCHLORIDE 2 MG: 2 TABLET ORAL at 02:11

## 2024-01-01 RX ADMIN — ASPIRIN 81 MG CHEWABLE TABLET 81 MG: 81 TABLET CHEWABLE at 08:11

## 2024-01-01 RX ADMIN — HYDROMORPHONE HYDROCHLORIDE 2 MG: 2 TABLET ORAL at 03:11

## 2024-01-01 RX ADMIN — MIDODRINE HYDROCHLORIDE 15 MG: 10 TABLET ORAL at 09:11

## 2024-01-01 RX ADMIN — AMIODARONE HYDROCHLORIDE 200 MG: 200 TABLET ORAL at 04:11

## 2024-01-01 RX ADMIN — HYDROCODONE BITARTRATE AND ACETAMINOPHEN 1 TABLET: 10; 325 TABLET ORAL at 08:11

## 2024-01-01 RX ADMIN — MIDODRINE HYDROCHLORIDE 10 MG: 10 TABLET ORAL at 03:11

## 2024-01-01 RX ADMIN — METHOCARBAMOL 500 MG: 750 TABLET ORAL at 02:11

## 2024-01-01 RX ADMIN — METHOCARBAMOL 750 MG: 750 TABLET ORAL at 01:11

## 2024-01-01 RX ADMIN — COLCHICINE 0.6 MG: 0.6 TABLET, FILM COATED ORAL at 08:11

## 2024-01-01 RX ADMIN — SCOPOLAMINE 1 PATCH: 1.5 PATCH, EXTENDED RELEASE TRANSDERMAL at 09:11

## 2024-01-01 RX ADMIN — ATORVASTATIN CALCIUM 40 MG: 40 TABLET, FILM COATED ORAL at 10:11

## 2024-01-01 RX ADMIN — GABAPENTIN 200 MG: 100 CAPSULE ORAL at 09:11

## 2024-01-01 RX ADMIN — ENOXAPARIN SODIUM 70 MG: 80 INJECTION SUBCUTANEOUS at 12:11

## 2024-01-01 RX ADMIN — HYDROCODONE BITARTRATE AND ACETAMINOPHEN 1 TABLET: 10; 325 TABLET ORAL at 09:11

## 2024-01-01 RX ADMIN — HYDROCODONE BITARTRATE AND ACETAMINOPHEN 1 TABLET: 10; 325 TABLET ORAL at 01:11

## 2024-01-01 RX ADMIN — TADALAFIL 40 MG: 20 TABLET, FILM COATED ORAL at 07:11

## 2024-01-01 RX ADMIN — HYDROMORPHONE HYDROCHLORIDE 1 MG: 1 INJECTION, SOLUTION INTRAMUSCULAR; INTRAVENOUS; SUBCUTANEOUS at 07:11

## 2024-01-01 RX ADMIN — POTASSIUM BICARBONATE 35 MEQ: 391 TABLET, EFFERVESCENT ORAL at 05:11

## 2024-01-01 RX ADMIN — NOREPINEPHRINE BITARTRATE 0.2 MCG/KG/MIN: 1 INJECTION, SOLUTION, CONCENTRATE INTRAVENOUS at 11:11

## 2024-01-01 RX ADMIN — SODIUM CHLORIDE: 9 INJECTION, SOLUTION INTRAVENOUS at 01:11

## 2024-01-01 RX ADMIN — HYDROMORPHONE HYDROCHLORIDE 1 MG: 1 INJECTION, SOLUTION INTRAMUSCULAR; INTRAVENOUS; SUBCUTANEOUS at 03:11

## 2024-01-01 RX ADMIN — MACITENTAN 10 MG: 10 TABLET, FILM COATED ORAL at 07:11

## 2024-01-01 RX ADMIN — ENOXAPARIN SODIUM 70 MG: 80 INJECTION SUBCUTANEOUS at 06:11

## 2024-01-01 RX ADMIN — NOREPINEPHRINE BITARTRATE 0.18 MCG/KG/MIN: 4 INJECTION, SOLUTION INTRAVENOUS at 02:11

## 2024-01-01 RX ADMIN — AMIODARONE HYDROCHLORIDE 1 MG/MIN: 1.8 INJECTION, SOLUTION INTRAVENOUS at 01:11

## 2024-01-01 RX ADMIN — SODIUM CHLORIDE, PRESERVATIVE FREE 10 ML: 5 INJECTION INTRAVENOUS at 01:11

## 2024-01-01 RX ADMIN — NOREPINEPHRINE BITARTRATE 0.37 MCG/KG/MIN: 1 INJECTION, SOLUTION, CONCENTRATE INTRAVENOUS at 12:11

## 2024-01-01 RX ADMIN — SELEXIPAG 1600 MCG: 1600 TABLET, COATED ORAL at 10:11

## 2024-01-01 RX ADMIN — AMIODARONE HYDROCHLORIDE 0.5 MG/MIN: 1.8 INJECTION, SOLUTION INTRAVENOUS at 04:11

## 2024-01-01 RX ADMIN — DEXTROSE MONOHYDRATE 0.04 UNITS/MIN: 50 INJECTION, SOLUTION INTRAVENOUS at 02:11

## 2024-01-01 RX ADMIN — Medication 800 MG: at 05:11

## 2024-01-01 RX ADMIN — HYDROMORPHONE HYDROCHLORIDE 1 MG: 1 INJECTION, SOLUTION INTRAMUSCULAR; INTRAVENOUS; SUBCUTANEOUS at 11:11

## 2024-01-01 RX ADMIN — MACITENTAN 10 MG: 10 TABLET, FILM COATED ORAL at 11:11

## 2024-01-01 RX ADMIN — OXYCODONE HYDROCHLORIDE AND ACETAMINOPHEN 1 TABLET: 7.5; 325 TABLET ORAL at 12:11

## 2024-01-01 RX ADMIN — Medication 0.02 MCG/KG/MIN: at 05:11

## 2024-01-01 RX ADMIN — SILDENAFIL 20 MG: 20 TABLET ORAL at 09:11

## 2024-01-01 RX ADMIN — TADALAFIL 40 MG: 20 TABLET ORAL at 09:11

## 2024-01-01 RX ADMIN — SELEXIPAG 1600 MCG: 1600 TABLET, COATED ORAL at 11:11

## 2024-01-01 RX ADMIN — TADALAFIL 40 MG: 20 TABLET, FILM COATED ORAL at 10:11

## 2024-01-01 RX ADMIN — MONTELUKAST 10 MG: 10 TABLET, FILM COATED ORAL at 10:11

## 2024-01-01 RX ADMIN — NOREPINEPHRINE BITARTRATE 0.14 MCG/KG/MIN: 1 INJECTION, SOLUTION, CONCENTRATE INTRAVENOUS at 09:11

## 2024-01-01 RX ADMIN — NOREPINEPHRINE BITARTRATE 0.26 MCG/KG/MIN: 1 INJECTION, SOLUTION, CONCENTRATE INTRAVENOUS at 08:11

## 2024-01-01 RX ADMIN — DEXTROSE MONOHYDRATE 0.04 UNITS/MIN: 50 INJECTION, SOLUTION INTRAVENOUS at 04:11

## 2024-01-01 RX ADMIN — NOREPINEPHRINE BITARTRATE 0.2 MCG/KG/MIN: 4 INJECTION, SOLUTION INTRAVENOUS at 07:11

## 2024-01-01 RX ADMIN — NOREPINEPHRINE BITARTRATE 0.06 MCG/KG/MIN: 4 INJECTION, SOLUTION INTRAVENOUS at 12:11

## 2024-01-01 RX ADMIN — LORAZEPAM 1 MG/HR: 2 INJECTION INTRAMUSCULAR; INTRAVENOUS at 11:11

## 2024-01-01 RX ADMIN — COLCHICINE 0.3 MG: 0.6 TABLET, FILM COATED ORAL at 12:11

## 2024-01-01 RX ADMIN — ATORVASTATIN CALCIUM 40 MG: 40 TABLET, FILM COATED ORAL at 07:11

## 2024-01-01 RX ADMIN — POTASSIUM BICARBONATE 35 MEQ: 391 TABLET, EFFERVESCENT ORAL at 04:11

## 2024-01-01 RX ADMIN — NOREPINEPHRINE BITARTRATE 0.4 MCG/KG/MIN: 1 INJECTION, SOLUTION, CONCENTRATE INTRAVENOUS at 02:11

## 2024-01-01 RX ADMIN — HYDROMORPHONE HYDROCHLORIDE 2 MG: 2 TABLET ORAL at 06:11

## 2024-01-01 RX ADMIN — METHOCARBAMOL TABLETS 750 MG: 750 TABLET, COATED ORAL at 01:11

## 2024-01-01 RX ADMIN — Medication: at 09:11

## 2024-01-01 RX ADMIN — ASPIRIN 325 MG: 325 TABLET ORAL at 11:10

## 2024-01-01 RX ADMIN — NOREPINEPHRINE BITARTRATE 0.2 MCG/KG/MIN: 1 INJECTION, SOLUTION, CONCENTRATE INTRAVENOUS at 08:11

## 2024-01-01 RX ADMIN — FUROSEMIDE 40 MG: 10 INJECTION, SOLUTION INTRAMUSCULAR; INTRAVENOUS at 08:11

## 2024-01-01 RX ADMIN — ASPIRIN 81 MG CHEWABLE TABLET 81 MG: 81 TABLET CHEWABLE at 09:11

## 2024-01-01 RX ADMIN — DEXTROSE MONOHYDRATE 0.04 UNITS/MIN: 50 INJECTION, SOLUTION INTRAVENOUS at 08:11

## 2024-01-01 RX ADMIN — MONTELUKAST 10 MG: 10 TABLET, FILM COATED ORAL at 07:11

## 2024-01-01 RX ADMIN — HYDROMORPHONE HYDROCHLORIDE 1 MG: 1 INJECTION, SOLUTION INTRAMUSCULAR; INTRAVENOUS; SUBCUTANEOUS at 06:11

## 2024-01-01 RX ADMIN — SODIUM CHLORIDE 1000 ML: 9 INJECTION, SOLUTION INTRAVENOUS at 03:11

## 2024-01-01 RX ADMIN — PANTOPRAZOLE SODIUM 40 MG: 40 TABLET, DELAYED RELEASE ORAL at 07:11

## 2024-10-31 PROBLEM — N25.81 SECONDARY HYPERPARATHYROIDISM: Status: ACTIVE | Noted: 2024-10-31

## 2024-10-31 PROBLEM — G93.89 BRAIN MASS: Status: ACTIVE | Noted: 2024-10-31

## 2024-10-31 PROBLEM — J96.11 CHRONIC HYPOXIC RESPIRATORY FAILURE, ON HOME OXYGEN THERAPY: Status: ACTIVE | Noted: 2024-10-31

## 2024-10-31 PROBLEM — R29.898 WEAKNESS OF BOTH LOWER EXTREMITIES: Status: ACTIVE | Noted: 2024-10-31

## 2024-10-31 PROBLEM — R33.8 ACUTE URINARY RETENTION: Status: ACTIVE | Noted: 2024-10-31

## 2024-10-31 PROBLEM — R26.9 GAIT ABNORMALITY: Status: ACTIVE | Noted: 2024-10-31

## 2024-10-31 PROBLEM — I63.9 STROKE: Status: ACTIVE | Noted: 2024-10-31

## 2024-10-31 PROBLEM — I27.20 PULMONARY HYPERTENSION: Status: ACTIVE | Noted: 2024-10-31

## 2024-10-31 PROBLEM — R29.898 WEAKNESS OF BOTH ARMS: Status: ACTIVE | Noted: 2024-10-31

## 2024-10-31 PROBLEM — N18.5 CKD (CHRONIC KIDNEY DISEASE), STAGE V: Status: ACTIVE | Noted: 2024-10-31

## 2024-10-31 PROBLEM — R20.8 DECREASED SENSATION: Status: ACTIVE | Noted: 2024-10-31

## 2024-10-31 PROBLEM — R55 SYNCOPE AND COLLAPSE: Status: ACTIVE | Noted: 2024-10-31

## 2024-10-31 PROBLEM — Z99.81 CHRONIC HYPOXIC RESPIRATORY FAILURE, ON HOME OXYGEN THERAPY: Status: ACTIVE | Noted: 2024-10-31

## 2024-10-31 NOTE — ED PROVIDER NOTES
"Encounter Date: 10/31/2024       History     Chief Complaint   Patient presents with    Loss of Consciousness     Ems Reports patient "Passed out" and had an unwitnessed fall outside of the Pain management clinic, when fire arrived they stated she seemed sleepy and altered mental status , when ems arrived she was given 1mg narcan and patient is now aox4      HPI    Shanell Mahmood is a 74 y.o. female with a past medical history of COPD, pulmonary arterial hypertension, CKD 5 and chronic back pain that presents emergency department following a syncopal episode.  Patient was currently undergoing evaluation for dialysis fistula in the left upper extremity.  She did have a recent CT scan on 08/16 which showed a 1 x 1.7 cm pleural-based nodule in the periphery of the right lung as well as a follow up MRI on 10/08 which showed a dural-based mass over the left frontal lobe.  There is some concern for possible malignancy with metastatic disease.  Patient has chronic back pain and was seen at pain management clinic today.  She did take her home pain medication earlier in the day.  She did not receive any treatment at the pain management clinic today but while at the clinic, she had an unwitnessed fall.  She has no recollection of the events.  When she was initially evaluated, her blood pressure was in the 60s over 40s.  She did require constant stimulation to remain awake.  Fell and hit her head.  EMS evaluated her and gave her 1 mg of Narcan as well as 250 cc of fluid.  Her pressure improved and she had become more alert.  Patient was complaining of facial pain, left knee pain, left wrist pain.    Review of patient's allergies indicates:  Not on File  No past medical history on file.  No past surgical history on file.  No family history on file.     Review of Systems   Constitutional:  Negative for fever.   HENT:  Negative for sore throat.    Respiratory:  Negative for cough and shortness of breath.    Cardiovascular:  Negative " for chest pain.   Gastrointestinal:  Negative for abdominal pain, diarrhea, nausea and vomiting.   Genitourinary:  Negative for dysuria, frequency and hematuria.   Musculoskeletal:  Positive for back pain and neck pain.   Skin:  Negative for rash.   Neurological:  Positive for syncope, weakness and numbness. Negative for dizziness and headaches.   Hematological:  Does not bruise/bleed easily.       Physical Exam     Initial Vitals   BP Pulse Resp Temp SpO2   10/31/24 1453 10/31/24 1453 10/31/24 1453 10/31/24 1458 10/31/24 1453   101/67 67 18 97.7 °F (36.5 °C) 100 %      MAP       --                Physical Exam    Nursing note and vitals reviewed.  Constitutional: She appears well-developed and well-nourished.   HENT:   Head: Normocephalic.   Swelling over the left eyelid.    Eyes: EOM are normal. Pupils are equal, round, and reactive to light.   Neck:   Normal range of motion.  Cardiovascular:  Normal rate, regular rhythm and normal heart sounds.           Pulmonary/Chest: Breath sounds normal. No respiratory distress. She has no wheezes. She has no rhonchi. She has no rales.   Abdominal: Abdomen is soft. She exhibits no distension. There is no abdominal tenderness. There is no rebound.   Musculoskeletal:         General: Normal range of motion.      Cervical back: Normal range of motion.     Neurological: She is alert and oriented to person, place, and time. A sensory deficit is present. No cranial nerve deficit. GCS score is 15. GCS eye subscore is 4. GCS verbal subscore is 5. GCS motor subscore is 6.   4/5 strength in bilateral lower extremities. SILT in bilateral lower lower extremities.  No saddle anesthesia.  Moving upper extremities well.   Skin: Capillary refill takes less than 2 seconds. No rash noted.   Psychiatric: She has a normal mood and affect. Thought content normal.         ED Course   Procedures  Labs Reviewed   CBC W/ AUTO DIFFERENTIAL - Abnormal       Result Value    WBC 3.68 (*)     RBC 3.53  (*)     Hemoglobin 9.7 (*)     Hematocrit 30.8 (*)     MCV 87      MCH 27.5      MCHC 31.5 (*)     RDW 17.0 (*)     Platelets 109 (*)     MPV 11.5      Immature Granulocytes 0.8 (*)     Gran # (ANC) 2.0      Immature Grans (Abs) 0.03      Lymph # 1.1      Mono # 0.3      Eos # 0.2      Baso # 0.03      nRBC 0      Gran % 53.8      Lymph % 31.0      Mono % 9.0      Eosinophil % 4.6      Basophil % 0.8      Platelet Estimate Decreased (*)     Poik Slight      Ovalocytes Occasional      Target Cells Occasional      Large/Giant Platelets Present      Differential Method Automated     COMPREHENSIVE METABOLIC PANEL - Abnormal    Sodium 142      Potassium 3.8      Chloride 112 (*)     CO2 22 (*)     Glucose 96      BUN 31 (*)     Creatinine 1.7 (*)     Calcium 8.3 (*)     Total Protein 5.8 (*)     Albumin 2.8 (*)     Total Bilirubin 0.2      Alkaline Phosphatase 76      AST 13      ALT 11      eGFR 31 (*)     Anion Gap 8     URINALYSIS, REFLEX TO URINE CULTURE - Abnormal    Specimen UA Urine, Catheterized      Color, UA Colorless (*)     Appearance, UA Clear      pH, UA 5.0      Specific Gravity, UA 1.010      Protein, UA Negative      Glucose, UA Negative      Ketones, UA Negative      Bilirubin (UA) Negative      Occult Blood UA Negative      Nitrite, UA Negative      Urobilinogen, UA Negative      Leukocytes, UA Negative      Narrative:     Specimen Source->Urine   DRUG SCREEN PANEL, URINE EMERGENCY - Abnormal    Benzodiazepines Negative      Methadone metabolites Negative      Cocaine (Metab.) Negative      Opiate Scrn, Ur Presumptive Positive (*)     Barbiturate Screen, Ur Negative      Amphetamine Screen, Ur Negative      THC Negative      Phencyclidine Negative      Creatinine, Urine 33.2      Toxicology Information SEE COMMENT      Narrative:     Specimen Source->Urine   TROPONIN I    Troponin I 0.011     B-TYPE NATRIURETIC PEPTIDE    BNP 39     ALCOHOL,MEDICAL (ETHANOL)    Alcohol, Serum <10            Imaging  Results              MRI Brain Without Contrast (In process)                      US Carotid Bilateral (In process)                      X-Ray Wrist Complete Left (Final result)  Result time 10/31/24 16:40:18      Final result by Saeid Pérez MD (10/31/24 16:40:18)                   Narrative:    EXAMINATION:  XR WRIST COMPLETE 3 VIEWS LEFT    CLINICAL HISTORY:  Syncope and collapse    TECHNIQUE:  PA, lateral, and oblique views of the left wrist were performed.    COMPARISON:  None    FINDINGS:  No displaced fracture or traumatic malalignment.  Mild carpal degenerative change.  Unremarkable soft tissues.      Electronically signed by: Saeid Pérez  Date:    10/31/2024  Time:    16:40                                     X-Ray Knee 1 or 2 View Left (Final result)  Result time 10/31/24 16:39:40      Final result by Saeid Pérez MD (10/31/24 16:39:40)                   Narrative:    EXAMINATION:  XR KNEE 1 OR 2 VIEW LEFT    CLINICAL HISTORY:  Syncope and Fall;    TECHNIQUE:  One or two views of the left knee were performed.    COMPARISON:  None    FINDINGS:  Left knee total arthroplasty hardware with no periprosthetic fracture or abnormal lucency to suggest loosening or infection.  No malalignment.  Trace knee joint fluid.  Subcutaneous soft tissue edema about the knee and proximal calf.      Electronically signed by: Saeid Pérez  Date:    10/31/2024  Time:    16:39                                     X-Ray Chest AP Portable (Final result)  Result time 10/31/24 16:38:38      Final result by Saeid Pérez MD (10/31/24 16:38:38)                   Impression:      1. Pleuroparenchymal nodule in the right lung base and several other nodules in the right lung apex.  Findings better seen at CT.  These are nonspecific with neoplasm not excluded.  2. Borderline heart enlargement.  3. Prominence of the pulmonary vasculature raising the possibility for elevated pulmonary  pressures.      Electronically signed by: Saeid Pérez  Date:    10/31/2024  Time:    16:38               Narrative:    EXAMINATION:  XR CHEST AP PORTABLE    CLINICAL HISTORY:  Syncope and collapse    TECHNIQUE:  Single frontal view of the chest was performed.    COMPARISON:  Same-day CT    FINDINGS:  There is a nodular airspace opacity at the periphery of the right lung base.  Remaining lungs clear.  Borderline cardiac enlargement with metallic device projecting over the right heart border possibly an intra atrial septal occlusion device.  Prominence of the pulmonary arterial vasculature aortic arch atherosclerosis.  No pleural effusion or pneumothorax.  There are multiple surgical clips and staple lines over the upper abdomen.                                       CT Entire Spine Without Contrast (Final result)  Result time 10/31/24 16:26:15      Final result by Nomi Vasquez MD (10/31/24 16:26:15)                   Impression:      There is degenerative change throughout the spine, most significant in the lower lumbar spine as well as in the cervical spine.  There is however no obvious acute fracture or traumatic malalignment.  The entire spine was obtained in the large field of view.      Electronically signed by: Nomi Vasquez MD  Date:    10/31/2024  Time:    16:26               Narrative:    EXAMINATION:  CT ENTIRE SPINE WITHOUT CONTRAST (XPD)    CLINICAL HISTORY:  Fall, pan spinal tenderness;    TECHNIQUE:  Axial images were obtained through the entire spine.  Sagittal and coronal reformatted images were created.    COMPARISON:  Lumbar spine MRI dated 03/15/2016    FINDINGS:  There is no recent study for comparison.  On the screening study obtained with a large field of view there is no obvious acute fracture.  There is extensive chronic change throughout the cervical spine.  The odontoid process is intact.  The anterior and posterior arches of C1 are intact as well.  There is severe disc  space narrowing at the C2-3, C3-4, C4-5 levels with moderate to marked disc space narrowing at the C5-6 level.  There is trace retrolisthesis of C4 on C5.  There is chronic anterior wedging of C3 and C4.  There is multilevel foraminal stenosis throughout the cervical region due to uncovertebral spurring and/or facet joint arthropathy.  Also, there is spinal stenosis most apparent at the C4-5 level.    In the thoracic spine, there is mild chronic anterior wedging of several midthoracic vertebral bodies but there is no obvious acute fracture or traumatic malalignment.  The facet joints maintain normal articulation.    In the lumbar spine there is a vacuum disc at the L3-4 and L5-S1 levels.  There is severe disc space narrowing at L5-S1.  The lumbar vertebral bodies maintain normal height without obvious acute fracture.  Alignment is grossly normal.  There is extensive facet joint arthropathy in the mid to lower lumbar spine.    There is a dextrocurvature of the lower lumbar spine with gentle broad levocurvature at the thoracolumbar junction.    There is no displaced or depressed rib fracture identified on this limited field of view.  There is no obvious spinous process fracture.    The sacrum is intact.    There is atherosclerosis.  There is no pneumothorax.  There are several scattered nodules in the lungs which are suboptimally evaluated.                                       CT Maxillofacial Without Contrast (Final result)  Result time 10/31/24 16:12:17      Final result by Nomi Vasquez MD (10/31/24 16:12:17)                   Impression:      1. There is mild irregularity of the interior midline mandible with small adjacent bone fragments which could represent acute fracture with overlying soft tissue swelling.  There is no other acute maxillofacial or orbital fracture.  There is suspected old deformities of the anterior nasal bones.      Electronically signed by: Nomi Vasquez  MD  Date:    10/31/2024  Time:    16:12               Narrative:    EXAMINATION:  CT MAXILLOFACIAL WITHOUT CONTRAST    CLINICAL HISTORY:  Facial trauma, blunt;    TECHNIQUE:  Low dose axial images, sagittal and coronal reformations were obtained through the face.  Contrast was not administered.    COMPARISON:  None    FINDINGS:  There is suspected soft tissue swelling over the chin.  There is very subtle cortical irregularity involving the anterior paramedian mandible which could represent subtle acute fracture with minimal adjacent bone fragments.  There is beam hardening artifact related to hardware in the region of the left maxilla.  There is no dislocation at the TMJ.  There are changes of torus mandibularis.    There is old deformity of the anterior nasal bones.  There is no acute displaced or depressed nasal bone or nasal septum fracture.    There is no acute orbital fracture.                                       CT Head Without Contrast (Final result)  Result time 10/31/24 16:07:44      Final result by Nomi Vasquez MD (10/31/24 16:07:44)                   Impression:      There is left temporoparietal soft tissue prominence with demineralization of the underlying bone and suspected extra-axial mass in this region which is nonspecific and incompletely evaluated.  These could potentially represent an atypical meningioma but further evaluation with brain MRI without and with contrast could be obtained.  This is not acute.  There is no hemorrhage.  There is no acute skull fracture.      Electronically signed by: Nomi Vasquez MD  Date:    10/31/2024  Time:    16:07               Narrative:    EXAMINATION:  CT HEAD WITHOUT CONTRAST    CLINICAL HISTORY:  Head trauma, minor (Age >= 65y);    TECHNIQUE:  Routine unenhanced axial images were obtained through the head.  Sagittal and coronal reformatted images were created.  The study is reviewed in bone and soft tissue  windows.    COMPARISON:  None    FINDINGS:  Intracranial contents: There is no acute intracranial abnormality.  There is mild nonspecific white matter change.  There is no hemorrhage, parenchymal mass or mass effect.  The gray-white interface is preserved without acute infarction.  The basilar cisterns are open.  There is a remote lacunar infarction in the right caudate nucleus.  The cerebellar tonsils are normal position.    Extracranial contents, calvarium, soft tissues: The included paranasal sinuses and mastoid air cells are clear.  There is no acute skull fracture.  There is soft tissue prominence of the left temporoparietal region.  There is demineralization of the underlying calvarium with in irregular inner bony margin.  Also, there is suggestion of possible extra-axial dural-based soft tissue mass in this region which could potentially represent an atypical meningioma and further evaluated with brain MRI without and with contrast.                                       Medications   sodium chloride 0.9% flush 10 mL (has no administration in time range)   naloxone 0.4 mg/mL injection 0.02 mg (has no administration in time range)   glucose chewable tablet 16 g (has no administration in time range)   glucose chewable tablet 24 g (has no administration in time range)   glucagon (human recombinant) injection 1 mg (has no administration in time range)   potassium bicarbonate disintegrating tablet 50 mEq (has no administration in time range)   potassium bicarbonate disintegrating tablet 35 mEq (has no administration in time range)   potassium bicarbonate disintegrating tablet 60 mEq (has no administration in time range)   magnesium oxide tablet 800 mg (has no administration in time range)   magnesium oxide tablet 800 mg (has no administration in time range)   potassium, sodium phosphates 280-160-250 mg packet 2 packet (has no administration in time range)   potassium, sodium phosphates 280-160-250 mg packet 2  packet (has no administration in time range)   potassium, sodium phosphates 280-160-250 mg packet 2 packet (has no administration in time range)   acetaminophen tablet 650 mg (has no administration in time range)   ondansetron injection 4 mg (has no administration in time range)   prochlorperazine injection Soln 5 mg (has no administration in time range)   dextrose 10% bolus 125 mL 125 mL (has no administration in time range)   dextrose 10% bolus 250 mL 250 mL (has no administration in time range)   acetaminophen tablet 1,000 mg (1,000 mg Oral Given 10/31/24 1568)     Medical Decision Making  Shanell Mahmood is a 74 y.o. female with a past medical history of COPD, pulmonary arterial hypertension, CKD 5 and chronic back pain that presents emergency department following a syncopal episode. Was initially hypotensive with EMS and received 500 cc of fluid with returned to normal blood pressure.  Exam with nontoxic female.  Drowsy but arousable.  Patient has tenderness in the lower back.  No pain is elicited with palpation of the cervical spine, but patient is complaining of neck pain.  Differential includes but isn't limited to arrhythmia, pneumonia, pneumothorax, dehydration, heart failure, symptomatic anemia, ICH, facial fracture, and electrolyte abnormality.  Considered CVA, but strength was symmetric on both sides as was her sensation to light touch.  She was weak in her bilateral lower extremities symmetrically which was more highly concerning for possible fracture rather than CVA.  Her neurologic exam did not fit a discrete vascular territory.  CT head did not show ICH.  CT max face showed mild irregularity of the anterior midline mandible with small adjacent bone fragments which could represented an acute fracture.  This was examined again following the CT and the point with which she had this midline mandible irregularity was nontender.  Clinically, it was not consistent with a fracture.  CT of the entire spine did  not show acute fracture or traumatic malalignment.  It did show chronic changes throughout the entire spine.  X-rays of the affected extremities were negative.  EKG showed sinus arrhythmia with frequent PVCs.  Troponin was negative.  BNP within normal limits.  Patient was straight cath quickly to facilitate workup and UA did not show evidence of infection.  Drug screen was opiate positive.  Given her history is syncope and new weakness, we will admit to Hospital Medicine for further evaluation.    Amount and/or Complexity of Data Reviewed  Labs: ordered.  Radiology: ordered.    Risk  OTC drugs.                                      Clinical Impression:  Final diagnoses:  [R55] Syncope  [R55] Syncope and collapse          ED Disposition Condition    Observation                 Jose J Sheikh MD  10/31/24 8534

## 2024-11-01 ENCOUNTER — TELEPHONE (OUTPATIENT)
Facility: CLINIC | Age: 74
End: 2024-11-01

## 2024-11-01 PROBLEM — D64.9 ANEMIA: Status: ACTIVE | Noted: 2024-11-01

## 2024-11-01 PROBLEM — I49.8 SINUS ARRHYTHMIA SEEN ON ELECTROCARDIOGRAM: Status: ACTIVE | Noted: 2024-11-01

## 2024-11-01 PROBLEM — I95.9 HYPOTENSION: Status: ACTIVE | Noted: 2024-11-01

## 2024-11-01 PROBLEM — Z87.74 S/P PATENT FORAMEN OVALE CLOSURE: Chronic | Status: ACTIVE | Noted: 2024-11-01

## 2024-11-01 PROBLEM — D69.6 THROMBOCYTOPENIA: Status: ACTIVE | Noted: 2024-11-01

## 2024-11-01 PROBLEM — N18.4 CKD (CHRONIC KIDNEY DISEASE), STAGE IV: Status: ACTIVE | Noted: 2024-10-31

## 2024-11-01 NOTE — ASSESSMENT & PLAN NOTE
EKG read states atrial fibrillation but P waves are present. Repeat EKG after admission confirms this.

## 2024-11-01 NOTE — ASSESSMENT & PLAN NOTE
See stroke  Orthostatic vital signs ordered and patient unable to stand due weakness in bilateral legs

## 2024-11-01 NOTE — ASSESSMENT & PLAN NOTE
The patients 3 most recent labs are listed below.  Recent Labs     10/31/24  1532 11/01/24  0551   * 148*       Plan  - Will transfuse if platelet count is <100k (if undergoing neurosurgery), or otherwise less than 10 K  -trend daily

## 2024-11-01 NOTE — ED NOTES
Shanell Mahmood with MRN#:  [id number] going to room 205 A placed on monitor box [id number] and called to monitor room.  ([phone])

## 2024-11-01 NOTE — NURSING
Alerted by Aishwarya WHITE, pt's blood pressure 68/38 Manually. Dr Zaman notified verbally. Orders placed for 1000 ml bolus of normal saline. Resource nurse Sharee notified, and in room attempting to place 2nd IV. Patient Awake alert and oriented.

## 2024-11-01 NOTE — PT/OT/SLP EVAL
"Physical Therapy Evaluation    Patient Name:  Shanell Mahmood   MRN:  [id number]    Recommendations:     Discharge Recommendations: High Intensity Therapy   Discharge Equipment Recommendations: to be determined by next level of care   Barriers to discharge:  requiring very high levels of assist for any mobility    Assessment:     Shanell Mahmood is a 74 y.o. female admitted with a medical diagnosis of Stroke.  She presents with the following impairments/functional limitations: weakness, impaired endurance, impaired sensation, impaired self care skills, impaired functional mobility, gait instability, impaired balance, decreased coordination, decreased upper extremity function, decreased lower extremity function, decreased safety awareness, pain, decreased ROM, impaired cardiopulmonary response to activity.  Patient found up in chair, alert, and agreeable to PT this morning. She has decreased sensation and muscle activation globally to limbs R>L. BUEs very weak with little muscle activation R>L. She is total assistance of 2-3 for bed mobility including sit to supine, rolling L/R. Total assist of 2-3 for transfers STS and bed to chair with no AD. Patient able to maintain standing with maxA of 2 persons, but limited by orthostatic hypotensive response to being upright. Blood pressure was 98/54 at conclusion of therapeutic activity.  Patient to benefit from skilled care to improve function and safety for all mobility tasks.    Rehab Prognosis: Fair; patient would benefit from acute skilled PT services to address these deficits and reach maximum level of function.    Recent Surgery: * No surgery found *      Plan:     During this hospitalization, patient to be seen daily to address the identified rehab impairments via gait training, therapeutic activities, therapeutic exercises and progress toward the following goals:    Plan of Care Expires:  12/06/24    Subjective     Chief Complaint: weakness  Patient/Family Comments/goals: "to " "get stronger"  Pain/Comfort:  Pain Rating 1:  (not numerically rated, she describes a soreness in L calf musculature)    Patients cultural, spiritual, Anglican conflicts given the current situation:      Living Environment:  Patient currently lives with  in one story home with 3 steps to enter. Patient's  currently undergoing chemotherapy treatment, limited on how much he will be able to assist her physically.  Prior to admission, patients level of function was independent with all mobility.  Equipment used at home: none.  DME owned (not currently used): none.  Upon discharge, patient will have assistance from family/facility.    Objective:     Communicated with MARYA Willard prior to session.  Patient found up in chair with telemetry, chair check, PureWick, oxygen, SCD, blood pressure cuff  upon PT entry to room.    General Precautions: Standard, fall  Orthopedic Precautions:N/A   Braces: N/A  Respiratory Status: Nasal cannula, flow 4 L/min    Exams:  Fine Motor Coordination:    -       Impaired  unable to squeeze PT's fingers when assessing  strength  Gross Motor Coordination:  unable to perform finger to nose or heel to shin coordination testing 2/2 weakness and poor motor activation/control  Sensation:    -       Impaired  light/touch able to feel BLE however states that it does not feel "right"/feels diminished  RLE ROM: PROM WFL, minimal AROM  RLE Strength: grossly 1/5  LLE ROM: PROM WFL, AROM lacking ~10 degrees knee extension  LLE Strength: 2/5    Functional Mobility:  Bed Mobility:     Rolling Left:  maximal assistance and of 2 persons  Rolling Right: maximal assistance and of 2 persons  Sit to Supine: maximal assistance and of 3 persons  Transfers:     Sit to Stand:  total assistance and of 2 persons with no AD  Bed to Chair: total assistance and of 3 persons with  no AD  using  Stand Pivot  Gait: unable to take any steps  Balance: maxA 2-3 persons to maintain standing      AM-PAC 6 CLICK " MOBILITY  Total Score:10       Treatment & Education:  Transfer training as indicated above.    Patient left supine with all lines intact, bed alarm on, nurse notified, and hospital personnel transferring her to [facility] present.    GOALS:   Multidisciplinary Problems       Physical Therapy Goals          Problem: Physical Therapy    Goal Priority Disciplines Outcome Interventions   Physical Therapy Goal     PT, PT/OT Progressing    Description: Goals to be met by: 24     Patient will increase functional independence with mobility by performin. Supine to sit with Moderate Assistance  2. Sit to supine with Moderate Assistance  3. Sit to stand transfer with Moderate Assistance  4. Bed to chair transfer with Moderate Assistance using Rolling Walker  5. Gait  x 50 feet with Moderate Assistance using Rolling Walker.                          History:     No past medical history on file.    No past surgical history on file.    Time Tracking:     PT Received On: 24  PT Start Time: 1054     PT Stop Time: 1122  PT Total Time (min): 28 min     Billable Minutes: Evaluation 15 and Therapeutic Activity 13      2024

## 2024-11-01 NOTE — ASSESSMENT & PLAN NOTE
Patient with Hypoxic Respiratory failure which is Chronic.  she is on home oxygen at 4 LPM. Maintaining oxygen saturation on 4 liters nasal cannula which has been continued.

## 2024-11-01 NOTE — NURSING
1535-pt lying supine in bed, HOB flat. Bruising noted around right eye, slight droop left mouth. Pt states she has a headache, denies c/o nausea. Noted to have cataracts bilaterally but pupils 3mm round and reactive. Able to correctly state name, , open and close eyes to command. Pt with gross motor movement upper extremities but unable to squeeze with hands. Pt unable to move lower extremities to command- moving feet slightly to command. Noted to have PIV right antecubital site. Lungs clear bilaterally- O2 NC 4L/min in use. IV bolus started as ordered to right antecubital site. Pt's sister at bedside.  1620-transferred to room 223 for closer monitoring in Step Down. Bolus continues. Pt without change in c/o headache. Continues to have headache without change. Unable to obtain pulse reading from pt's hands. Pt's hands and feet cold to touch, palpable pulses +.Pulse ox probe placed on pt's earlobe.  170-Dr Zaman updated on pt's bp response after saline bolus completed. Unable to obtain pulse ox reading on pt's earlobe.   - Report called by AP Juares RN- pt transferring to [facility] . Levophed drip infusing to right antecubital site at 0.02 mcg/kg/min. Blood cultures drawn by lab. Medium sized soft cervical collar put into place prophylactically.

## 2024-11-01 NOTE — PLAN OF CARE
Problem: Physical Therapy  Goal: Physical Therapy Goal  Description: Goals to be met by: 24     Patient will increase functional independence with mobility by performin. Supine to sit with Moderate Assistance  2. Sit to supine with Moderate Assistance  3. Sit to stand transfer with Moderate Assistance  4. Bed to chair transfer with Moderate Assistance using Rolling Walker  5. Gait  x 50 feet with Moderate Assistance using Rolling Walker.     Outcome: Progressing

## 2024-11-01 NOTE — ASSESSMENT & PLAN NOTE
Chronic condition that is stable.   -Please restart home medications when medically appropriate. They are currently on hold due to permissive hypertension.

## 2024-11-01 NOTE — ASSESSMENT & PLAN NOTE
Acute CVA right caudate  MRI, MRA, CT, carotid U/S reports reviewed  -DAPT and statin  -neurology following  -neurosurgery and oncology consulted for the brain mass  -PT/OT/SLP  -echo bubble report is positive, follow up Neurology recommendation

## 2024-11-01 NOTE — SUBJECTIVE & OBJECTIVE
No past medical history on file.    No past surgical history on file.    Review of patient's allergies indicates:  Not on File    No current facility-administered medications on file prior to encounter.     Current Outpatient Medications on File Prior to Encounter   Medication Sig    allopurinoL (ZYLOPRIM) 100 MG tablet Take 100 mg by mouth every evening.    clotrimazole-betamethasone 1-0.05% (LOTRISONE) cream Apply 1 g topically Daily.    colchicine (COLCRYS) 0.6 mg tablet Take 0.6 mg by mouth 2 (two) times daily.    diclofenac sodium (VOLTAREN) 1 % Gel Apply 2 g topically 3 (three) times daily.    DULoxetine (CYMBALTA) 60 MG capsule Take 60 mg by mouth once daily.    ergocalciferol (ERGOCALCIFEROL) 50,000 unit Cap Take 50,000 Units by mouth every 7 days.    fluticasone propionate (FLONASE) 50 mcg/actuation nasal spray 1 spray by Each Nostril route once daily.    gabapentin (NEURONTIN) 100 MG capsule Take 2 capsules by mouth every 12 (twelve) hours.    HYDROcodone-acetaminophen (NORCO)  mg per tablet Take 1 tablet by mouth every 4 to 6 hours as needed for Pain.    LINZESS 290 mcg Cap capsule Take 290 mcg by mouth before breakfast.    montelukast (SINGULAIR) 10 mg tablet Take 10 mg by mouth once daily.    omeprazole (PRILOSEC) 40 MG capsule Take 40 mg by mouth Daily.    OPSUMIT 10 mg Tab Take 10 mg by mouth once daily.    simvastatin (ZOCOR) 40 MG tablet Take 40 mg by mouth Daily.    tiZANidine (ZANAFLEX) 4 MG tablet Take 4 mg by mouth every 12 (twelve) hours as needed.    torsemide (DEMADEX) 20 MG Tab Take 40 mg by mouth once daily.    UPTRAVI 1,600 mcg Tab Take 1 tablet by mouth 2 (two) times a day.    tadalafil (ADCIRCA) 20 mg Tab Take 20 mg by mouth every other day.     Family History    None       Tobacco Use    Smoking status: Not on file    Smokeless tobacco: Not on file   Substance and Sexual Activity    Alcohol use: Not on file    Drug use: Not on file    Sexual activity: Not on file     Review of  Systems   Respiratory:  Positive for cough and shortness of breath.         Chronic cough and shortness of breath with exertion only   Genitourinary:  Positive for difficulty urinating.   Musculoskeletal:  Positive for back pain, gait problem and neck pain.   Neurological:  Positive for syncope, weakness and numbness.   All other systems reviewed and are negative.    Objective:     Vital Signs (Most Recent):  Temp: 98.3 °F (36.8 °C) (10/31/24 2257)  Pulse: (!) 55 (10/31/24 2257)  Resp: 17 (10/31/24 2257)  BP: 136/73 (10/31/24 2257)  SpO2: 98 % (10/31/24 2350) Vital Signs (24h Range):  Temp:  [97.7 °F (36.5 °C)-98.7 °F (37.1 °C)] 98.3 °F (36.8 °C)  Pulse:  [55-76] 55  Resp:  [17-18] 17  SpO2:  [96 %-100 %] 98 %  BP: (101-161)/(62-73) 136/73     Weight: 71.2 kg (156 lb 15.5 oz)  Body mass index is 24.58 kg/m².     Physical Exam  Vitals reviewed.   HENT:      Head: Normocephalic and atraumatic.      Nose: Nose normal.      Mouth/Throat:      Mouth: Mucous membranes are dry.      Pharynx: Oropharynx is clear.   Eyes:      Extraocular Movements: Extraocular movements intact.      Pupils: Pupils are equal, round, and reactive to light.   Neck:        Comments: Area of tenderness  Cardiovascular:      Rate and Rhythm: Normal rate. Rhythm irregular.      Pulses: Normal pulses.   Pulmonary:      Effort: Pulmonary effort is normal.      Breath sounds: Normal breath sounds.   Abdominal:      General: Bowel sounds are normal.      Palpations: Abdomen is soft.      Tenderness: There is no abdominal tenderness.   Musculoskeletal:      Cervical back: Normal range of motion. Tenderness present.      Comments: Arthritic changes noted in bilateral hands   Skin:     General: Skin is warm and dry.      Capillary Refill: Capillary refill takes less than 2 seconds.   Neurological:      Mental Status: She is alert and oriented to person, place, and time.      GCS: GCS eye subscore is 4. GCS verbal subscore is 5. GCS motor subscore is 6.  "     Cranial Nerves: Cranial nerves 2-12 are intact.      Sensory: Sensory deficit present.      Motor: Weakness present.      Comments: Patient unable to perform finger to nose and heel to shin due to weakness in all extremities.    Patient unable to stand stating "I feel like I can't get my feet underneath me"   Psychiatric:         Mood and Affect: Mood normal.         Behavior: Behavior normal.         Thought Content: Thought content normal.         Judgment: Judgment normal.              CRANIAL NERVES     CN III, IV, VI   Pupils are equal, round, and reactive to light.       Significant Labs: All pertinent labs within the past 24 hours have been reviewed.    Bilirubin:   Recent Labs   Lab 10/31/24  1532   BILITOT 0.2       BMP:   Recent Labs   Lab 10/31/24  1532   GLU 96      K 3.8   *   CO2 22*   BUN 31*   CREATININE 1.7*   CALCIUM 8.3*     CBC:   Recent Labs   Lab 10/31/24  1532   WBC 3.68*   HGB 9.7*   HCT 30.8*   *     CMP:   Recent Labs   Lab 10/31/24  1532      K 3.8   *   CO2 22*   GLU 96   BUN 31*   CREATININE 1.7*   CALCIUM 8.3*   PROT 5.8*   ALBUMIN 2.8*   BILITOT 0.2   ALKPHOS 76   AST 13   ALT 11   ANIONGAP 8     Cardiac Markers:   Recent Labs   Lab 10/31/24  1532   BNP 39       Troponin:   Recent Labs   Lab 10/31/24  1532   TROPONINI 0.011       Urine Studies:   Recent Labs   Lab 10/31/24  1537   COLORU Colorless*   APPEARANCEUA Clear   PHUR 5.0   SPECGRAV 1.010   PROTEINUA Negative   GLUCUA Negative   KETONESU Negative   BILIRUBINUA Negative   OCCULTUA Negative   NITRITE Negative   UROBILINOGEN Negative   LEUKOCYTESUR Negative       Significant Imaging: I have reviewed all pertinent imaging results/findings within the past 24 hours.  EXAMINATION:  MRI BRAIN WITHOUT CONTRAST     CLINICAL HISTORY:  Neuro deficit, acute, stroke suspected;     TECHNIQUE:  Multiplanar multisequence MR imaging of the brain was performed without intravenous contrast.     COMPARISON:  CT " head from earlier the same date.     FINDINGS:  There is restricted diffusion in the right caudate head, compatible with an acute infarct.  There is associated T2/FLAIR hyperintense signal.  There is a mass centered within the left temporoparietal calvarium measuring approximately 5.0 x 2.9 x 4.0 cm, with extension into the left cerebral convexity extra-axial space and extension into the left temporal scalp.  There is abutment of the left temporal lobe, without evidence of vasogenic edema or evidence of significant mass effect.  There is no midline shift.  Ventricles are normal in size for age without evidence of hydrocephalus.  There is T2/FLAIR hyperintense signal throughout the supratentorial white matter, compatible with chronic microvascular ischemic changes.  There is a small focus of encephalomalacia within the right medial parietal lobe, likely related to a remote infarct.  There is no intracranial hemorrhage.  No extra-axial blood or fluid collections. Normal vascular flow voids.     Left common rim mass as above.  The remaining bone marrow signal intensity is normal. Paranasal sinuses and mastoid air cells are clear.     Impression:     1. Acute infarct in the right caudate head.  2. 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  The mass abuts the left temporal lobe, without resulting in significant mass effect or vasogenic edema.  Differential includes a primary or metastatic osseous lesion, an atypical meningioma, or additional etiologies.  3. Chronic microvascular ischemic changes.  This report was flagged in Epic as abnormal.     Dr. Barrios discussed critical findings with TATIANNA Morales. by [facility] secure chat at 22:23 on 10/31/2024.

## 2024-11-01 NOTE — ASSESSMENT & PLAN NOTE
Straight cath performed when patient arrived in ED.     Bladder scan performed during admit exam and approximately 402 shown and patient reported she felt as if she needed to urinate. Placed on bedpan and unable to urinate. Patient was transported to admit room and then reported she did not have to urinate at that time. Upon return from MRI approximately 528 in bladder and patient still unable to void    -Bladder scan Q 6 hours with PRN straight cath   -Lumbosacral MRI did not read any acute processes

## 2024-11-01 NOTE — ASSESSMENT & PLAN NOTE
Chronic condition that is stable. Followed by nephrology last visit in May 2024 and endocrinology (Last visit 9/4/24)

## 2024-11-01 NOTE — PROGRESS NOTES
[facility] Medicine  Progress Note    Patient Name: Shanell Mahmood  MRN: [id number]  Patient Class: IP- Inpatient   Admission Date: 10/31/2024  Length of Stay: 0 days  Attending Physician: Joe Zaman MD  Primary Care Provider: Nicole, Primary Doctor        Subjective:     Principal Problem:Stroke        HPI:  Shanell Mahmood is a 74 year old female with a previous medical history of anemia, Pulmonary hypertension on 4 liters continuous oxygen at home, arthritis, CKD V, Osteoporosis, secondary hyperprathyroidism, S/P closure of patent foramen ovale in 2011, CVA in 2011 with no residuals, chronic back pain, HLD, Gout, Brain Mass and thyroid diease who presented to the ED after unwitnessed syncopal episode. The patient was at her pain management office for re certification only. There were no procedures performed. She reports taking one hydrocodone 10mg today.  The last thing she remembers was walking down the hallway of the office and looking for something to cover her head from the rain and did not have any adverse symptoms or feelings at that time.  She has no recollection of the syncopal events. When she was initially evaluated by EMS, her blood pressure was in the 60s over 40s. She did require constant stimulation to remain awake. Fell and hit her head. Does not take any blood thinners. EMS evaluated her and gave her 1 mg of Narcan as well as 250 cc of fluid. Her pressure improved and she had become more alert. Initial ED workup was thorough and all imaging showing that included CT of neck, head and entire spine showed no acute processes. CBC showed anemia and CMP showed elevated creatinine consistent with history of CKD V. Drug screen positive for opioids but patient has a hydrocodone prescription. The patient is currently in the process of have a left sided brain mass visualized on MRI 10/8/24 evaluated that was an incidental finding after undergoing a PET scan for a pulmonary nodule on  "9/6/24 with abnormal uptake noted in brain. She has her first appointment on 11/4/24. She is followed by nephrology who is currently monitoring her and there has been one attempt at AV fistual placement but it was unsuccessful due sclerotic changes. Patient reports she awoke this morning feeling well showered and dressed herself. She performs all of her own ADL's. She had one syncopal event in March 2024 and was evaluated by cardiology and informed it was an orthostatic episode. Patient was unable to complete orthostatic vital signs upon admission due to inability to stand stating " I feel as if I can't get my legs under me". When evaluated for admit exam the patient endorses that after the syncopal episode she endorses bilateral leg weakness with decreased sensation in both legs. She reports bilateral  weakness being unable to use her phone and difficulty raising both of her arms. NIH scale performed and total of 9 noted. Patient noted have 400ml of urine in bladder and unable to void. Patient reached a total of 528ml of urine without being able to void.  MRI/MRA of brain and MRI of lumbosacral spine ordered upon admit. MRA and MRI lower back showed no acute process. MRI brain showed Acute infarct in the right caudate head. Dr. Maldonado was called and he recommended MRA of neck in morning and load with aspirin and plavix. Initial EKG read as atrial fibrillation but upon review p waves were noted and this was discussed with the ED. Repeat EKG shows sinus arrhthymias with PACs. Patient admitted by hospital medicine for further evaluation and management.       Overview/Hospital Course:  74-year-old female with history of brain mass, CKD, back pain, pulmonary hypertension on 4 L oxygen is admitted after syncope and collapse found to have a acute CVA in the right caudate on MRI brain.  Has global weakness and decreased sensation to the lower legs.  Multiple CT and x-rays done to rule out trauma ultimately negative other " than the maxillofacial CT reporting mild irregularity of the interior midline mandible with small adjacent bone fragments which could represent acute fracture with overlying soft tissue swelling.  Carotid ultrasound and MRA brain and neck without acute finding.  Neurology is consulted.  Also with underlying brain mass.  Consult Neurosurgery and Oncology.  Given DAPT and statin.  Had hypotension given IVF and midodrine.  Echo shows right heart failure.  BNP is within normal.  Lactic acid pending.  No other sign of infection.    Interval History:  Seen in afternoon after MRI is completed.  She has global weakness of the arms and legs and decreased sensation of the legs.  She is visiting with her sisters.  Nursing report low blood pressure throughout the day.      Objective:     Vital Signs (Most Recent):  Temp: 97.6 °F (36.4 °C) (11/01/24 1500)  Pulse: 71 (11/01/24 1558)  Resp: 15 (11/01/24 1500)  BP: (!) 84/51 (11/01/24 1558)  SpO2: 96 % (11/01/24 1100) Vital Signs (24h Range):  Temp:  [96.5 °F (35.8 °C)-98.7 °F (37.1 °C)] 97.6 °F (36.4 °C)  Pulse:  [53-81] 71  Resp:  [15-18] 15  SpO2:  [96 %-100 %] 96 %  BP: ()/(40-73) 84/51     Weight: 70.8 kg (156 lb)  Body mass index is 24.43 kg/m².    Intake/Output Summary (Last 24 hours) at 11/1/2024 1616  Last data filed at 11/1/2024 1253  Gross per 24 hour   Intake 740 ml   Output 650 ml   Net 90 ml         Physical Exam  Vitals reviewed.   Constitutional:       General: She is not in acute distress.  HENT:      Head: Normocephalic and atraumatic.   Cardiovascular:      Rate and Rhythm: Normal rate and regular rhythm.      Heart sounds: Normal heart sounds.   Pulmonary:      Effort: Pulmonary effort is normal. No respiratory distress.      Breath sounds: Normal breath sounds.   Neurological:      Mental Status: She is alert and oriented to person, place, and time.      Comments: Able to lift arms minimally, unable to make fist  Able to wiggle toes but unable to lift  legs off bed  Decreased sensation bilateral lower extremity   Psychiatric:         Mood and Affect: Affect normal.         Behavior: Behavior normal.         Thought Content: Thought content normal.             Significant Labs: All pertinent labs within the past 24 hours have been reviewed.    Significant Imaging: I have reviewed all pertinent imaging results/findings within the past 24 hours.    Assessment/Plan:      * Stroke  Acute CVA right caudate  MRI, MRA, CT, carotid U/S reports reviewed  -DAPT and statin  -neurology following  -neurosurgery and oncology consulted for the brain mass  -PT/OT/SLP  -echo bubble report is positive, follow up Neurology recommendation    Hypotension  Asymptomatic.  Etiology is unclear but suspected to be related to the stroke or underlying cervical cord pathology  Echo reviewed.  Do not suspect cardiogenic shock.  Do not suspect sepsis  -hold any BP meds or diuretics  -dc gabapentin  -check lactate  -NS 1 L bolus  -start midodrine 10 mg t.i.d.  -MRI cervical spine when able  -we will move to step-down here and monitor blood pressure.  If unable to maintain a map over 60 then will need to be transferred for ICU    Thrombocytopenia  The patients 3 most recent labs are listed below.  Recent Labs     10/31/24  1532 11/01/24  0551   * 148*       Plan  - Will transfuse if platelet count is <100k (if undergoing neurosurgery), or otherwise less than 10 K  -trend daily    Anemia  Anemia is likely due to chronic disease due to Chronic Kidney Disease. Most recent hemoglobin and hematocrit are listed below.  Recent Labs     10/31/24  1532 11/01/24  0551   HGB 9.7* 12.0   HCT 30.8* 38.3       Plan  - Monitor serial CBC: Daily  - Transfuse PRBC if patient becomes hemodynamically unstable, symptomatic or H/H drops below 7/21.  - Patient has not received any PRBC transfusions to date  - Patient's anemia is currently improving    Sinus arrhythmia seen on electrocardiogram  EKG read states  "atrial fibrillation but P waves are present. Repeat EKG after admission confirms this.     Brain mass  Discovered after PET scan of lung  on 9/6/24 revealed abnormal uptake in brain. 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  Unclear if contributing to CVA  She will have her first appointment with Dr. Ray Mcintyre at  [facility]  on 11/4/24  -neurosurgery and oncology follow-up    Secondary hyperparathyroidism  Chronic condition that is stable. Followed by nephrology last visit in May 2024 and endocrinology (Last visit 9/4/24)    Pulmonary hypertension  Chronic condition that is stable.   -holding her torsemide and pulmonary hypertension meds due to the hypotension    Chronic hypoxic respiratory failure, on home oxygen therapy  Patient with Hypoxic Respiratory failure which is Chronic.  she is on home oxygen at 4 LPM. Maintaining oxygen saturation on 4 liters nasal cannula which has been continued.  Due to pulmonary hypertension    CKD (chronic kidney disease), stage IV  Creatine stable for now. BMP reviewed- noted Estimated Creatinine Clearance: 28.7 mL/min (A) (based on SCr of 1.7 mg/dL (H)). according to latest data. Based on current GFR, CKD stage is stage 4 - GFR 15-29.  Monitor UOP and serial BMP and adjust therapy as needed. Renally dose meds. Avoid nephrotoxic medications and procedures.    Followed by nephrology Nayely Clements-FNP at [facility]. Initial attempt at AV fistula placement unsuccessful and currently just being monitored. Last visit 5/2/24    Syncope and collapse  See stroke  Orthostatic vital signs ordered and patient unable to stand due weakness in bilateral legs    Acute urinary retention  Possibly neurogenic from the CVA  -straight cath per protocol   -may need Yeung      VTE Risk Mitigation (From admission, onward)           Ordered     Reason for No " Pharmacological VTE Prophylaxis  Once        Question:  Reasons:  Answer:  Risk of Bleeding    10/31/24 1849     IP VTE HIGH RISK PATIENT  Once         10/31/24 1849     Place sequential compression device  Until discontinued         10/31/24 1849                    Discharge Planning   ELBERT: 11/4/2024     Code Status: Full Code   Is the patient medically ready for discharge?:     Reason for patient still in hospital (select all that apply): Patient new problem, Patient trending condition, Treatment, Imaging, and Consult recommendations  Discharge Plan A: Home                  Joe Zaman MD  Department of Hospital Medicine   [facility]/Surg

## 2024-11-01 NOTE — ASSESSMENT & PLAN NOTE
Anemia is likely due to chronic disease due to Chronic Kidney Disease. Most recent hemoglobin and hematocrit are listed below.  Recent Labs     10/31/24  1532   HGB 9.7*   HCT 30.8*     Plan  - Monitor serial CBC: Daily  - Transfuse PRBC if patient becomes hemodynamically unstable, symptomatic or H/H drops below 7/21.  - Patient has not received any PRBC transfusions to date  - Patient's anemia is currently stable

## 2024-11-01 NOTE — ASSESSMENT & PLAN NOTE
Patient with Hypoxic Respiratory failure which is Chronic.  she is on home oxygen at 4 LPM. Maintaining oxygen saturation on 4 liters nasal cannula which has been continued.  Due to pulmonary hypertension

## 2024-11-01 NOTE — PT/OT/SLP EVAL
Occupational Therapy   Evaluation    Name: Shanell Mahmood  MRN: [id number]  Admitting Diagnosis: Stroke  Recent Surgery: * No surgery found *      Recommendations:     Discharge Recommendations: High Intensity Therapy  Discharge Equipment Recommendations:  to be determined by next level of care  Barriers to discharge:  Decreased caregiver support    Assessment:     Shanell Mahmood is a 74 y.o. female with a medical diagnosis of Stroke.  She presents with global weakness d/t stroke diagnosis. Patient's weakness has significantly impaired patient ADL independence and general mobility. Patient sat EOB requiring Max with support to trunk and BLE. Patient was able to sit EOB requiring intermittent Min A to maintain sit balance. Noted minimal posterior leaning at EOB. Patient transferred bed to chair requiring Max to Total A with no AD. Patient would benefit from inpatient rehab placement to maximize independence and functional mobility. Performance deficits affecting function: weakness, impaired endurance, impaired self care skills, impaired functional mobility, gait instability, impaired balance, decreased lower extremity function, decreased upper extremity function, decreased coordination, decreased ROM, impaired coordination, impaired fine motor, impaired cardiopulmonary response to activity.      Rehab Prognosis: Good; patient would benefit from acute skilled OT services to address these deficits and reach maximum level of function.       Plan:     Patient to be seen 6 x/week to address the above listed problems via self-care/home management, therapeutic activities, therapeutic exercises  Plan of Care Expires: 11/22/24  Plan of Care Reviewed with: patient, spouse    Subjective     Chief Complaint: global weakness  Patient/Family Comments/goals: none    Occupational Profile:  Living Environment: Patient lives with  in a [facility].   Previous level of function: Patient was independent with ADLs and mobility.   Roles and  Routines: Patient was still driving.   Equipment Used at Home: none  Assistance upon Discharge: Patient will receive assistance from , but would benefit from inpatient rehab to return to [facility].     Pain/Comfort:  Pain Rating 1: 0/10  Pain Rating Post-Intervention 1: 0/10    Patients cultural, spiritual, Restorationist conflicts given the current situation:      Objective:     Communicated with: nurse Willard prior to session.  Patient found HOB elevated with blood pressure cuff, bed alarm, oxygen, PureWick, telemetry upon OT entry to room.    General Precautions: Standard, fall  Orthopedic Precautions: N/A  Braces: N/A  Respiratory Status: Nasal cannula, flow 4 L/min    Occupational Performance:    Bed Mobility:    Patient completed Scooting/Bridging with maximal assistance  Patient completed Supine to Sit with maximal assistance    Functional Mobility/Transfers:  Patient completed Bed <> Chair Transfer using Squat Pivot technique with maximal assistance and total assistance with no assistive device    Activities of Daily Living:  Feeding:  total assistance with eating breakfast while seated upright in bed  Grooming: maximal assistance   Lower Body Dressing: total assistance with donning socks in bed  Toileting: dependence with Purewick and adult brief in place    Cognitive/Visual Perceptual:  Cognitive/Psychosocial Skills:     -       Oriented to: Person, Place, Time, and Situation   -       Follows Commands/attention:Follows multistep  commands  -       Communication: clear/fluent  -       Safety awareness/insight to disability: intact   -       Mood/Affect/Coping skills/emotional control: Appropriate to situation and Cooperative  Visual/Perceptual:      -Intact     Physical Exam:  Postural examination/scapula alignment:    -       Rounded shoulders  -       Forward head  Dominant hand:    -       Right  Upper Extremity Range of Motion:     -       Right Upper Extremity: < 45 degrees at shldr; WFL at elbow  -        Left Upper Extremity: < 45 degrees at shldr; WFL at elbow  Upper Extremity Strength:    -       Right Upper Extremity: 2+/5 at shldr; 3/5 at elbow  -       Left Upper Extremity: 2+/5 at shldr; 3/5 at elbow   Strength:    -       Right Upper Extremity: 2-/5  -       Left Upper Extremity: 2-/5  Fine Motor Coordination:    -       Severe impairment due to global weakness  Gross motor coordination:   Severe impairment due to global weakness    AMPAC 6 Click ADL:  AMPAC Total Score: 10    Treatment & Education:  Pt educated on role of OT/POC, importance of time EOB/OOB, safety with ADLs, importance of intermittent mobility, safe techniques for transfers/ambulation, discharge recommendations/options, and use of call light for assistance and fall prevention.       Patient left up in chair with all lines intact, call button in reach, chair alarm on, and nurse and  present    GOALS:   Multidisciplinary Problems       Occupational Therapy Goals          Problem: Occupational Therapy    Goal Priority Disciplines Outcome Interventions   Occupational Therapy Goal     OT, PT/OT Progressing    Description: Goals to be met by: 11/22/2024     Patient will increase functional independence with ADLs by performing:    Feeding with Minimal Assistance.  UE Dressing with Minimal Assistance.  LE Dressing with Moderate Assistance.  Grooming while EOB with Minimal Assistance.  Toileting from bedside commode with Minimal Assistance for hygiene and clothing management.   Sitting at edge of bed x15 minutes with Stand-by Assistance.  Supine to sit with Minimal Assistance.  Toilet transfer to bedside commode with Minimal Assistance.  Upper extremity exercise program, with assistance as needed.                         History:     No past medical history on file.    No past surgical history on file.    Time Tracking:     OT Date of Treatment: 11/01/24  OT Start Time: 0910  OT Stop Time: 0945  OT Total Time (min): 35 min    Billable  Minutes:Evaluation 10  Self Care/Home Management 25    11/1/2024

## 2024-11-01 NOTE — ASSESSMENT & PLAN NOTE
See stroke  Orthostatic vital signs ordered and patient unable to stand due weakness in bilateral legs   Zyprexa Zyprexa Zyprexa for mood stabilization; see summary and interval data for updates. Zyprexa Zyprexa Zyprexa for mood stabilization; see summary and interval data for updates. Zyprexa

## 2024-11-01 NOTE — ASSESSMENT & PLAN NOTE
Chronic condition that is stable.   -holding her torsemide and pulmonary hypertension meds due to the hypotension

## 2024-11-01 NOTE — PLAN OF CARE
[facility] - Med/Surg  Initial Discharge Assessment       Primary Care Provider: Nicole, Primary Doctor    Admission Diagnosis: Syncope [R55]    Admission Date: 10/31/2024  Expected Discharge Date: 11/4/2024    Transition of Care Barriers: None    Payor: [employer] / Plan: [employer] MEDICARE HMO / Product Type: Medicare Advantage /     Extended Emergency Contact Information  Primary Emergency Contact: Shelia Olson  Home Phone: [phone]  Mobile Phone: [phone]  Relation: Sister   needed? No  Secondary Emergency Contact: EneDiane (niece)  Mobile Phone: [phone]  Relation: Relative   needed? No    Discharge Plan A: Home  Discharge Plan B: Home with family      [facility] [place] [address] [place] [address]  Phone: [phone] Fax: [phone]    SW met with patient at bedside to complete discharge planning assessment.  Patient alert and oriented xs 4.  Patient verified all demographic information on facesheet is correct.  Patient verified PCP is Dr. Kaylene Middleton.  Patient verified primary health insurance is [employer].  Patient with NO home health but has listed DME.  Patient has home oxygen with portable tank 4 L through [employer].  Patient with NO POA or Living Will.  Patient not on dialysis or medication coumadin.  Patient with no 30 day admission.  Patient with no financial issues at this time.  Patient family will provide transportation upon discharge from facility.  Patient independent with ADLs, live with spouse, drives self.        Initial Assessment (most recent)       Adult Discharge Assessment - 11/01/24 1517          Discharge Assessment    Assessment Type Discharge Planning Assessment     Confirmed/corrected address, phone number and insurance Yes     Confirmed Demographics Correct on Facesheet     Source of Information patient;family     Communicated ELBERT with patient/caregiver Date not  available/Unable to determine     People in Home spouse     Facility Arrived From: home     Do you expect to return to your current living situation? Yes     Do you have help at home or someone to help you manage your care at home? Yes     Who are your caregiver(s) and their phone number(s)? spouse     Prior to hospitilization cognitive status: Alert/Oriented     Current cognitive status: Alert/Oriented     Walking or Climbing Stairs Difficulty no     Dressing/Bathing Difficulty no     Equipment Currently Used at Home oxygen     Readmission within 30 days? No     Patient currently being followed by outpatient case management? No     Do you currently have service(s) that help you manage your care at home? No     Do you take prescription medications? Yes     Do you have prescription coverage? Yes     Do you have any problems affording any of your prescribed medications? No     Is the patient taking medications as prescribed? yes     Who is going to help you get home at discharge? spouse     How do you get to doctors appointments? car, drives self     Are you on dialysis? No     Do you take coumadin? No     Discharge Plan A Home     Discharge Plan B Home with family     DME Needed Upon Discharge  none     Discharge Plan discussed with: Patient;Sibling     Transition of Care Barriers None

## 2024-11-01 NOTE — CONSULTS
[facility]  Department of Neurology  Neurology Consultation Note        PATIENT NAME: Shanell Mahmood  MRN: [id number]  CSN: [id number]      TODAY'S DATE: 11/01/2024  ADMIT DATE: 10/31/2024                            CONSULTING PROVIDER: Maegan Jesus NP  CONSULT REQUESTED BY: Joe Zaman MD      Reason for consult: Syncope      History obtained from chart review.    HPI per EMR: Shanell Mahmood is a 74 y.o. female with a previous medical history of anemia, Pulmonary hypertension on 4 liters continuous oxygen at home, arthritis, CKD V, Osteoporosis, secondary hyperprathyroidism, S/P closure of patent foramen ovale in 2011, CVA in 2011 with no residuals, chronic back pain, HLD, Gout, Brain Mass and thyroid diease who presented to the ED after unwitnessed syncopal episode. The patient was at her pain management office for re certification only. There were no procedures performed. She reports taking one hydrocodone 10mg today.  The last thing she remembers was walking down the hallway of the office and looking for something to cover her head from the rain and did not have any adverse symptoms or feelings at that time.  She has no recollection of the syncopal events. When she was initially evaluated by EMS, her blood pressure was in the 60s over 40s. She did require constant stimulation to remain awake. Fell and hit her head. Does not take any blood thinners. EMS evaluated her and gave her 1 mg of Narcan as well as 250 cc of fluid. Her pressure improved and she had become more alert. Initial ED workup was thorough and all imaging showing that included CT of neck, head and entire spine showed no acute processes. CBC showed anemia and CMP showed elevated creatinine consistent with history of CKD V. Drug screen positive for opioids but patient has a hydrocodone prescription. The patient is currently in the process of have a left sided brain mass visualized on MRI 10/8/24 evaluated that was an incidental  "finding after undergoing a PET scan for a pulmonary nodule on 9/6/24 with abnormal uptake noted in brain. She has her first appointment on 11/4/24. She is followed by nephrology who is currently monitoring her and there has been one attempt at AV fistual placement but it was unsuccessful due sclerotic changes. Patient reports she awoke this morning feeling well showered and dressed herself. She performs all of her own ADL's. She had one syncopal event in March 2024 and was evaluated by cardiology and informed it was an orthostatic episode. Patient was unable to complete orthostatic vital signs upon admission due to inability to stand stating " I feel as if I can't get my legs under me". When evaluated for admit exam the patient endorses that after the syncopal episode she endorses bilateral leg weakness with decreased sensation in both legs. She reports bilateral  weakness being unable to use her phone and difficulty raising both of her arms. NIH scale performed and total of 9 noted. Patient noted have 400ml of urine in bladder and unable to void. Patient reached a total of 528ml of urine without being able to void.  MRI/MRA of brain and MRI of lumbosacral spine ordered upon admit. MRA and MRI lower back showed no acute process. MRI brain showed Acute infarct in the right caudate head. Dr. Maldonado was called and he recommended MRA of neck in morning and load with aspirin and plavix. Initial EKG read as atrial fibrillation but upon review p waves were noted and this was discussed with the ED. Repeat EKG shows sinus arrhthymias with PACs. Patient admitted by hospital medicine for further evaluation and management.     Neurology consult: Patient seen and examined with DR. Bryant; POC discussed. Patient sitting up in the bed eating breakfast. She reports knowledge of the brain mass and that her legs just gave out beneath her. She states that she was at her pain clinic when this occurred. CT of head with left " temporoparietal soft tissue prominence with demineralization of the underlying bone and suspected extra-axial mass in this region which is nonspecific and incompletely evaluated. She denies any prodromal symptoms.      PREVIOUS MEDICAL HISTORY:  No past medical history on file.  PREVIOUS SURGICAL HISTORY:  No past surgical history on file.  FAMILY MEDICAL HISTORY:  No family history on file.  ALLERGIES:  Review of patient's allergies indicates:  Not on File  HOME MEDICATIONS:  Prior to Admission medications    Medication Sig Start Date End Date Taking? Authorizing Provider   allopurinoL (ZYLOPRIM) 100 MG tablet Take 100 mg by mouth every evening. 10/14/24  Yes Provider, Historical   clotrimazole-betamethasone 1-0.05% (LOTRISONE) cream Apply 1 g topically Daily. 10/14/24  Yes Provider, Historical   colchicine (COLCRYS) 0.6 mg tablet Take 0.6 mg by mouth 2 (two) times daily. 10/14/24  Yes Provider, Historical   diclofenac sodium (VOLTAREN) 1 % Gel Apply 2 g topically 3 (three) times daily. 9/12/24  Yes Provider, Historical   DULoxetine (CYMBALTA) 60 MG capsule Take 60 mg by mouth once daily.   Yes Provider, Historical   ergocalciferol (ERGOCALCIFEROL) 50,000 unit Cap Take 50,000 Units by mouth every 7 days.   Yes Provider, Historical   fluticasone propionate (FLONASE) 50 mcg/actuation nasal spray 1 spray by Each Nostril route once daily. 8/12/24  Yes Provider, Historical   gabapentin (NEURONTIN) 100 MG capsule Take 2 capsules by mouth every 12 (twelve) hours. 8/12/24  Yes Provider, Historical   HYDROcodone-acetaminophen (NORCO)  mg per tablet Take 1 tablet by mouth every 4 to 6 hours as needed for Pain. 10/12/24  Yes Provider, Historical   LINZESS 290 mcg Cap capsule Take 290 mcg by mouth before breakfast. 10/14/24  Yes Provider, Historical   montelukast (SINGULAIR) 10 mg tablet Take 10 mg by mouth once daily. 10/14/24  Yes Provider, Historical   omeprazole (PRILOSEC) 40 MG capsule Take 40 mg by mouth Daily.  10/14/24  Yes Provider, Historical   OPSUMIT 10 mg Tab Take 10 mg by mouth once daily.   Yes Provider, Historical   simvastatin (ZOCOR) 40 MG tablet Take 40 mg by mouth Daily. 8/12/24  Yes Provider, Historical   tiZANidine (ZANAFLEX) 4 MG tablet Take 4 mg by mouth every 12 (twelve) hours as needed. 10/12/24  Yes Provider, Historical   torsemide (DEMADEX) 20 MG Tab Take 40 mg by mouth once daily. 10/12/24  Yes Provider, Historical   UPTRAVI 1,600 mcg Tab Take 1 tablet by mouth 2 (two) times a day.   Yes Provider, Historical   tadalafil (ADCIRCA) 20 mg Tab Take 20 mg by mouth every other day.    Provider, Historical     CURRENT SCHEDULED MEDICATIONS:   aspirin  81 mg Oral Daily    atorvastatin  40 mg Oral Daily    clopidogreL  75 mg Oral Daily     CURRENT INFUSIONS:    CURRENT PRN MEDICATIONS:    Current Facility-Administered Medications:     acetaminophen, 650 mg, Oral, Q4H PRN    albuterol sulfate, 2.5 mg, Nebulization, Q6H PRN    dextrose 10%, 12.5 g, Intravenous, PRN    dextrose 10%, 25 g, Intravenous, PRN    glucagon (human recombinant), 1 mg, Intramuscular, PRN    glucose, 16 g, Oral, PRN    glucose, 24 g, Oral, PRN    magnesium oxide, 800 mg, Oral, PRN    magnesium oxide, 800 mg, Oral, PRN    naloxone, 0.02 mg, Intravenous, PRN    ondansetron, 4 mg, Intravenous, Q8H PRN    potassium bicarbonate, 35 mEq, Oral, PRN    potassium bicarbonate, 50 mEq, Oral, PRN    potassium bicarbonate, 60 mEq, Oral, PRN    potassium, sodium phosphates, 2 packet, Oral, PRN    potassium, sodium phosphates, 2 packet, Oral, PRN    potassium, sodium phosphates, 2 packet, Oral, PRN    prochlorperazine, 5 mg, Intravenous, Q6H PRN    sodium chloride 0.9%, 500 mL, Intravenous, PRN    sodium chloride 0.9%, 10 mL, Intravenous, Q12H PRN    sodium chloride 0.9%, 10 mL, Intravenous, PRN    REVIEW OF SYSTEMS:  Please refer to the HPI for all pertinent positive and negative findings. A comprehensive review of all other systems was  "negative.    PHYSICAL EXAM:  Patient Vitals for the past 24 hrs:   BP Temp Temp src Pulse Resp SpO2 Height Weight   11/01/24 0500 99/64 98.2 °F (36.8 °C) Oral 72 17 99 % -- --   11/01/24 0200 100/72 98.6 °F (37 °C) Oral 69 18 98 % -- --   10/31/24 2350 -- -- -- -- -- 98 % -- --   10/31/24 2257 136/73 98.3 °F (36.8 °C) Oral (!) 55 17 100 % -- --   10/31/24 2104 128/73 98.7 °F (37.1 °C) Oral 66 18 100 % 5' 7" (1.702 m) 71.2 kg (156 lb 15.5 oz)   10/31/24 2022 (!) 145/67 -- -- 69 -- 100 % -- --   10/31/24 2002 (!) 161/69 -- -- (!) 57 -- 100 % -- --   10/31/24 1935 130/70 -- -- 71 -- 99 % -- --   10/31/24 1932 126/71 -- -- 76 -- -- -- --   10/31/24 1930 130/70 -- -- 71 -- -- -- --   10/31/24 1843 134/67 -- -- 67 -- 100 % -- --   10/31/24 1822 135/68 -- -- 66 -- 100 % -- --   10/31/24 1801 136/72 -- -- 71 -- 100 % -- --   10/31/24 1742 138/65 -- -- 65 -- 98 % -- --   10/31/24 1655 124/72 -- -- 68 -- 100 % -- --   10/31/24 1531 106/62 -- -- 63 -- 96 % -- --   10/31/24 1458 -- 97.7 °F (36.5 °C) -- -- -- -- -- --   10/31/24 1455 -- -- -- -- -- -- 5' 7" (1.702 m) 71.7 kg (158 lb)   10/31/24 1453 101/67 -- -- 67 18 100 % -- --       GENERAL APPEARANCE: Alert, well-developed, well-nourished female in no acute distress.  HEENT: Normocephalic and atraumatic. PERRL. Oropharynx unremarkable.  PULM: Normal respiratory effort. No accessory muscle use.  CV: RRR.  ABDOMEN: Soft, nontender.  EXTREMITIES: No obvious signs of vascular compromise. Pulses present. No cyanosis, clubbing or edema.  SKIN: Clear; no rashes, lesions or skin breaks in exposed areas.    NEURO:  MENTAL STATUS: Patient awake and oriented to time, place, and person, recent/remote memory normal, attention span/concentration normal, speech fluent without paraphasic errors, good comprehension with appropriate thought content, and fund of knowledge appropriate for patient's level of education.  Affect euthymic.    CRANIAL NERVES:  CN I: Not tested.  CN II: Fundoscopic " exam deferred.  CN III, IV, VI: Pupils equal, round and reactive to light.  Extraocular movements full and intact.  CN V: Facial sensation normal.  CN VII: Facial asymmetry absent.  CN VIII: Hearing grossly normal and equal bilaterally.  No skew deviation or pathologic nystagmus.  CN IX, X: Palate elevates symmetrically. Speech/articulation is clear without dysarthria.  CN XI: Shoulder shrug and chin rotation equal with good strength.  CN XII: Tongue protrusion midline.    MOTOR:  Bulk normal. Tone normal and symmetric throughout.  Abnormal movements absent.  Tremor: none present.  Strength 5/5 throughout except/unless specified below:.    REFLEXES:  not tested.  Plantar response downgoing bilaterally.  SENSATION: grossly intact throughout.  COORDINATION: normal finger-to-nose.  STATION: not tested.  GAIT: not tested.      NIHSS:  1a      Level of Consciousness (alert, drowsy, etc.):   0=alert; keenly responsive  1b.     Level of Consciousness Questions (month, age):  able to answer both questions  1c.     Level of Consciousness Commands (open, close eyes, make fist, let go):  0=Answers both tasks correctly  2.      Best Gaze (eyes open - patient follows examiner's finger or face):      0=normal  3.      Visual (introduce visual stimulus/threat to patient's visual field quadrants):  0=No visual loss  4.      Facial Palsy (show teeth, raise eyebrows and squeeze eyes shut):        0=Normal symmetric movement  5a.     Motor Arm - Left (elevate extremity 90 degrees and score drift/movement):       0=No drift, limb holds 90 (or 45) degrees for full 10 seconds  5b.     Motor Arm - Right (elevate extremity 90 degrees and score drift/movement):      0=No drift, limb holds 90 (or 45) degrees for full 10 seconds  6a.     Motor Leg - Left (elevate extremity 30 degrees and score drift/movement):       0=No drift, limb holds 90 (or 45) degrees for full 10 seconds  6b      Motor Leg - Right (elevate extremity 30 degrees and score  "drift/movement):      0=No drift, limb holds 90 (or 45) degrees for full 10 seconds  7.      Limb Ataxia (finger-nose, heel down shin):      0=Absent  8.      Sensory (pin prick to face, arm, trunk, and leg - compare side to side):        0=Normal; no sensory loss  9.      Best Language (name items, describe a picture and read sentences):      0=No aphasia, normal  10.     Dysarthria (evaluate speech clarity by patient repeating listed words): 0=Normal  11.     Extinction and Inattention (use prior testing to identify neglect or double simultaneous stimuli testing):      0=No abnormality          NIH Stroke Scale Total:         0      Labs:  Recent Labs   Lab 10/31/24  1532 11/01/24  0551    143   K 3.8 4.3   * 111*   CO2 22* 21*   BUN 31* 30*   CREATININE 1.7* 1.7*   GLU 96 78   CALCIUM 8.3* 8.5*   PHOS  --  3.8   MG  --  1.9     Recent Labs   Lab 10/31/24  1532 11/01/24  0551   WBC 3.68* 3.78*   HGB 9.7* 12.0   HCT 30.8* 38.3   *  --      Recent Labs   Lab 10/31/24  1532 11/01/24  0551   ALBUMIN 2.8* 2.6*   PROT 5.8* 5.7*   BILITOT 0.2 0.4   ALKPHOS 76 85   ALT 11 11   AST 13 16     Lab Results   Component Value Date    INR 1.2 11/01/2024     Lab Results   Component Value Date    TRIG 87 11/01/2024    HDL 51 11/01/2024    CHOLHDL 40.8 11/01/2024     Lab Results   Component Value Date    HGBA1C 5.2 11/01/2024     No results found for: "PROTEINCSF", "GLUCCSF", "WBCCSF"    Imaging:  I have reviewed and interpreted the pertinent imaging and lab results.      US Carotid Bilateral  Narrative: EXAMINATION:  US CAROTID BILATERAL    CLINICAL HISTORY:  syncope;    TECHNIQUE:  Grayscale and color Doppler ultrasound examination of the carotid and vertebral artery systems bilaterally.  Stenosis estimates are per the NASCET measurement criteria.    COMPARISON:  None.    FINDINGS:  Right:    Internal Carotid Artery (ICA) peak systolic velocity 60.6 cm/sec    ICA/CCA peak systolic velocity ratio: 0.9    Plaque " formation: Heterogeneous    Vertebral artery: Antegrade flow and normal waveform.    Left:    Internal Carotid Artery (ICA)  peak systolic velocity 98.1 cm/sec    ICA/CCA peak systolic velocity ratio: 1.4    Plaque formation: Heterogeneous    Vertebral artery: Antegrade flow and normal waveform.  Impression: No evidence of a hemodynamically significant carotid bifurcation stenosis.    Electronically signed by: Dawit Barrios  Date:    10/31/2024  Time:    23:55  MRI Lumbar Spine Without Contrast  Narrative: EXAMINATION:  MRI LUMBAR SPINE WITHOUT CONTRAST    CLINICAL HISTORY:  Low back pain, trauma;    TECHNIQUE:  Multiplanar, multisequence MR images were acquired from the thoracolumbar junction to the sacrum without contrast.    COMPARISON:  CT total spine from 10/31/2024. MRI lumbar spine from 09/15/2016    FINDINGS:  Alignment: Normal.    Vertebrae: Normal marrow signal. No fracture.    Discs: There is moderate disc height loss at L5-S1 and mild disc height loss and disc desiccation at L3-L4 and L4-L5.  Remaining disc heights are preserved.    Cord: Normal.  Conus terminates at L2.    Degenerative findings:    T12-L1: There is mild facet arthropathy bilaterally.  There is no spinal canal stenosis or neural foraminal narrowing.    L1-L2: There is mild facet arthropathy and ligamentum flavum hypertrophy.  There is no spinal canal stenosis or neural foraminal narrowing.    L2-L3: There is mild bilateral facet arthropathy and ligamentum flavum hypertrophy and a small circumferential disc bulge, resulting in mild spinal canal stenosis and moderate bilateral neural foraminal narrowing.    L3-L4: There is moderate bilateral facet arthropathy and ligamentum flavum hypertrophy and a circumferential disc bulge, resulting in moderate spinal canal stenosis and moderate bilateral neural foraminal narrowing.    L4-L5: There is a circumferential disc bulge with moderate bilateral facet arthropathy and ligamentum flavum  hypertrophy resulting in moderate spinal canal stenosis and severe bilateral neural foraminal narrowing.    L5-S1: There is bilateral facet arthropathy, moderate with a posterior disc bulge and an annular fissure.  There is no spinal canal stenosis.  There is moderate bilateral neural narrowing.    Paraspinal muscles & soft tissues: Unremarkable.  Impression: Multilevel degenerative changes of the lumbar spine as detailed above.  There is moderate spinal canal stenosis at L3-L4 and L4-L5 and there is severe bilateral neural foraminal narrowing at L4-L5.    Electronically signed by: Dawit Barrios  Date:    10/31/2024  Time:    22:38  MRA Brain without contrast  Narrative: EXAMINATION:  MRA BRAIN WITHOUT CONTRAST    CLINICAL HISTORY:  Syncope, recurrent;    TECHNIQUE:  Non-contrast 3-D time-of-flight intracranial MR angiography was performed through the [place] with MIP reformatting.    COMPARISON:  None    FINDINGS:  Anterior circulation: The bilateral intracranial ICAs, bilateral ACAs, and bilateral MCAs are patent and unremarkable without high-grade stenosis or occlusion.    Posterior circulation: The bilateral intracranial vertebral arteries, the basilar artery, and the bilateral PCAs are patent and unremarkable without high-grade stenosis or occlusion.    There is no intracranial aneurysm visualized.  Impression: Unremarkable MRA brain.    Electronically signed by: Dawit Barrios  Date:    10/31/2024  Time:    22:29  MRI Brain Without Contrast  Narrative: EXAMINATION:  MRI BRAIN WITHOUT CONTRAST    CLINICAL HISTORY:  Neuro deficit, acute, stroke suspected;    TECHNIQUE:  Multiplanar multisequence MR imaging of the brain was performed without intravenous contrast.    COMPARISON:  CT head from earlier the same date.    FINDINGS:  There is restricted diffusion in the right caudate head, compatible with an acute infarct.  There is associated T2/FLAIR hyperintense signal.  There is a mass centered within the  left temporoparietal calvarium measuring approximately 5.0 x 2.9 x 4.0 cm, with extension into the left cerebral convexity extra-axial space and extension into the left temporal scalp.  There is abutment of the left temporal lobe, without evidence of vasogenic edema or evidence of significant mass effect.  There is no midline shift.  Ventricles are normal in size for age without evidence of hydrocephalus.  There is T2/FLAIR hyperintense signal throughout the supratentorial white matter, compatible with chronic microvascular ischemic changes.  There is a small focus of encephalomalacia within the right medial parietal lobe, likely related to a remote infarct.  There is no intracranial hemorrhage.  No extra-axial blood or fluid collections. Normal vascular flow voids.    Left common rim mass as above.  The remaining bone marrow signal intensity is normal. Paranasal sinuses and mastoid air cells are clear.  Impression: 1. Acute infarct in the right caudate head.  2. 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  The mass abuts the left temporal lobe, without resulting in significant mass effect or vasogenic edema.  Differential includes a primary or metastatic osseous lesion, an atypical meningioma, or additional etiologies.  3. Chronic microvascular ischemic changes.  This report was flagged in Epic as abnormal.    Dr. Barrios discussed critical findings with TATIANNA Morales. by [facility] secure chat at 22:23 on 10/31/2024.    Electronically signed by: Dawit Barrios  Date:    10/31/2024  Time:    22:27  X-Ray Wrist Complete Left  EXAMINATION:  XR WRIST COMPLETE 3 VIEWS LEFT    CLINICAL HISTORY:  Syncope and collapse    TECHNIQUE:  PA, lateral, and oblique views of the left wrist were performed.    COMPARISON:  None    FINDINGS:  No displaced fracture or traumatic malalignment.  Mild carpal degenerative change.  Unremarkable soft tissues.    Electronically signed by: Saeid  Pérez  Date:    10/31/2024  Time:    16:40  X-Ray Knee 1 or 2 View Left  EXAMINATION:  XR KNEE 1 OR 2 VIEW LEFT    CLINICAL HISTORY:  Syncope and Fall;    TECHNIQUE:  One or two views of the left knee were performed.    COMPARISON:  None    FINDINGS:  Left knee total arthroplasty hardware with no periprosthetic fracture or abnormal lucency to suggest loosening or infection.  No malalignment.  Trace knee joint fluid.  Subcutaneous soft tissue edema about the knee and proximal calf.    Electronically signed by: Saeid Pérez  Date:    10/31/2024  Time:    16:39  X-Ray Chest AP Portable  Narrative: EXAMINATION:  XR CHEST AP PORTABLE    CLINICAL HISTORY:  Syncope and collapse    TECHNIQUE:  Single frontal view of the chest was performed.    COMPARISON:  Same-day CT    FINDINGS:  There is a nodular airspace opacity at the periphery of the right lung base.  Remaining lungs clear.  Borderline cardiac enlargement with metallic device projecting over the right heart border possibly an intra atrial septal occlusion device.  Prominence of the pulmonary arterial vasculature aortic arch atherosclerosis.  No pleural effusion or pneumothorax.  There are multiple surgical clips and staple lines over the upper abdomen.  Impression: 1. Pleuroparenchymal nodule in the right lung base and several other nodules in the right lung apex.  Findings better seen at CT.  These are nonspecific with neoplasm not excluded.  2. Borderline heart enlargement.  3. Prominence of the pulmonary vasculature raising the possibility for elevated pulmonary pressures.    Electronically signed by: Saeid Pérez  Date:    10/31/2024  Time:    16:38  CT Entire Spine Without Contrast  Narrative: EXAMINATION:  CT ENTIRE SPINE WITHOUT CONTRAST (XPD)    CLINICAL HISTORY:  Fall, pan spinal tenderness;    TECHNIQUE:  Axial images were obtained through the entire spine.  Sagittal and coronal reformatted images were created.    COMPARISON:  Lumbar spine MRI dated  03/15/2016    FINDINGS:  There is no recent study for comparison.  On the screening study obtained with a large field of view there is no obvious acute fracture.  There is extensive chronic change throughout the cervical spine.  The odontoid process is intact.  The anterior and posterior arches of C1 are intact as well.  There is severe disc space narrowing at the C2-3, C3-4, C4-5 levels with moderate to marked disc space narrowing at the C5-6 level.  There is trace retrolisthesis of C4 on C5.  There is chronic anterior wedging of C3 and C4.  There is multilevel foraminal stenosis throughout the cervical region due to uncovertebral spurring and/or facet joint arthropathy.  Also, there is spinal stenosis most apparent at the C4-5 level.    In the thoracic spine, there is mild chronic anterior wedging of several midthoracic vertebral bodies but there is no obvious acute fracture or traumatic malalignment.  The facet joints maintain normal articulation.    In the lumbar spine there is a vacuum disc at the L3-4 and L5-S1 levels.  There is severe disc space narrowing at L5-S1.  The lumbar vertebral bodies maintain normal height without obvious acute fracture.  Alignment is grossly normal.  There is extensive facet joint arthropathy in the mid to lower lumbar spine.    There is a dextrocurvature of the lower lumbar spine with gentle broad levocurvature at the thoracolumbar junction.    There is no displaced or depressed rib fracture identified on this limited field of view.  There is no obvious spinous process fracture.    The sacrum is intact.    There is atherosclerosis.  There is no pneumothorax.  There are several scattered nodules in the lungs which are suboptimally evaluated.  Impression: There is degenerative change throughout the spine, most significant in the lower lumbar spine as well as in the cervical spine.  There is however no obvious acute fracture or traumatic malalignment.  The entire spine was obtained  in the large field of view.    Electronically signed by: Nomi Vasquez MD  Date:    10/31/2024  Time:    16:26  CT Maxillofacial Without Contrast  Narrative: EXAMINATION:  CT MAXILLOFACIAL WITHOUT CONTRAST    CLINICAL HISTORY:  Facial trauma, blunt;    TECHNIQUE:  Low dose axial images, sagittal and coronal reformations were obtained through the face.  Contrast was not administered.    COMPARISON:  None    FINDINGS:  There is suspected soft tissue swelling over the chin.  There is very subtle cortical irregularity involving the anterior paramedian mandible which could represent subtle acute fracture with minimal adjacent bone fragments.  There is beam hardening artifact related to hardware in the region of the left maxilla.  There is no dislocation at the TMJ.  There are changes of torus mandibularis.    There is old deformity of the anterior nasal bones.  There is no acute displaced or depressed nasal bone or nasal septum fracture.    There is no acute orbital fracture.  Impression: 1. There is mild irregularity of the interior midline mandible with small adjacent bone fragments which could represent acute fracture with overlying soft tissue swelling.  There is no other acute maxillofacial or orbital fracture.  There is suspected old deformities of the anterior nasal bones.    Electronically signed by: Nomi Vasquez MD  Date:    10/31/2024  Time:    16:12  CT Head Without Contrast  Narrative: EXAMINATION:  CT HEAD WITHOUT CONTRAST    CLINICAL HISTORY:  Head trauma, minor (Age >= 65y);    TECHNIQUE:  Routine unenhanced axial images were obtained through the head.  Sagittal and coronal reformatted images were created.  The study is reviewed in bone and soft tissue windows.    COMPARISON:  None    FINDINGS:  Intracranial contents: There is no acute intracranial abnormality.  There is mild nonspecific white matter change.  There is no hemorrhage, parenchymal mass or mass effect.  The gray-white interface is  preserved without acute infarction.  The basilar cisterns are open.  There is a remote lacunar infarction in the right caudate nucleus.  The cerebellar tonsils are normal position.    Extracranial contents, calvarium, soft tissues: The included paranasal sinuses and mastoid air cells are clear.  There is no acute skull fracture.  There is soft tissue prominence of the left temporoparietal region.  There is demineralization of the underlying calvarium with in irregular inner bony margin.  Also, there is suggestion of possible extra-axial dural-based soft tissue mass in this region which could potentially represent an atypical meningioma and further evaluated with brain MRI without and with contrast.  Impression: There is left temporoparietal soft tissue prominence with demineralization of the underlying bone and suspected extra-axial mass in this region which is nonspecific and incompletely evaluated.  These could potentially represent an atypical meningioma but further evaluation with brain MRI without and with contrast could be obtained.  This is not acute.  There is no hemorrhage.  There is no acute skull fracture.    Electronically signed by: Nomi Vasquez MD  Date:    10/31/2024  Time:    16:07         ASSESSMENT & PLAN:    No problems updated.  Neuro  Brain mass  Assessment & Plan  Consult neurosurgery  Consult Heme-Onc    * Stroke  Assessment & Plan  Patient given loading dose of ASA and Plavix onset of initial stroke symptoms  Initiate stroke protocol  Trend NIH stroke scale  STAT CT of head performed left temporoparietal soft tissue prominence with demineralization of the underlying bone and suspected extra-axial mass in this region which is nonspecific and incompletely evaluated.   MRI brain acute infarct of the right caudate head.  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  Mass abuts the left temporal lobe without resulting in significant mass effect or  vasogenic edema.  Chronic microvascular ischemic changes.  MRA unremarkable MRI of brain  Bilateral carotid ultrasound-No evidence of a hemodynamically significant carotid bifurcation stenosis.  2-D echo with bubble study if not done previously  Obtain fasting lipids  Statin therapy: Atorvastatin- 40 mg oral daily  TSH, FT4, RPR, HgA1c, B12, Folate  Continue ASA 81 mg  Seizures precautions  Ativan 1 mg PRN Seizures / call neurologist after first dose  PT/OT/SLP to assess and treat  Hold ACEi, beta-blocker, etc while allowing permissive hypertension per stroke protocol    Orthopedic  Weakness of both lower extremities  Assessment & Plan  MRI lumbar- Multilevel degenerative changes of the lumbar spine as detailed above.  There is moderate spinal canal stenosis at L3-L4 and L4-L5 and there is severe bilateral neural foraminal narrowing at L4-L5.  CT cervical spine- There is degenerative change throughout the spine, most significant in the lower lumbar spine as well as in the cervical spine.  There is however no obvious acute fracture or traumatic malalignment.  The entire spine was obtained in the large field of view.  PT/OT to assess and treat        Thank you kindly for including us in the care of this patient. Please do not hesitate to contact us with any questions.      Maegan Jesus NP  Neurology/vascular Neurology  Date of Service: 11/01/2024  7:26 AM    --------------------------------------------------------------------------------------------------------------------------------------------------------------------------------------------------------------------------------------------------------------  Please note: This note was transcribed using voice recognition software. Because of this technology there are often uinintended grammatical, spelling, and other transcription errors. Please disregard these errors.     I, Dr. Jamil Lopez, have personally seen and examined the patient with my advanced provider  and agree with above. I personally did a focused exam, and reviewed all necessary clinical information. I discussed my management plan with my NP and agree with above.   All side effects of medications were discussed with the patient and/or next of kin including severe mood changes, organ failure, lab abnormalities, rash, passing out, low blood pressure, glaucoma, cognitive changes, life threatening bleeding, passing out, glaucoma, rash, fatigue, weight gain and or weight loss, and death.  No driving until follow up at [facility].  Follow up at [facility] 3 days post discharge. Telehealth available.   Stroke (Including any acute neurological symptoms to return to the ER immediately and call 911, like acute weakness, severe headaches, sensory, visual or speech changes)  and seizure education provided.  My initial exam was generalized weakness in all extremities more pronounced on the Left UE. Patient was eating in bed.  Reflexes and sensation were normal.  CT spine no fracture. Complete stroke work up.  Diego Lopez MD. FAAN.    [facility]  [phone]

## 2024-11-01 NOTE — ASSESSMENT & PLAN NOTE
The patients 3 most recent labs are listed below.  Recent Labs     10/31/24  1532   *     Plan  - Will transfuse if platelet count is <100k (if undergoing neurosurgery).  - Patient loaded with aspirin and plavix after speaking with neurology. Please confirm that this should be continued

## 2024-11-01 NOTE — PT/OT/SLP EVAL
"Speech Language Pathology  Speech, Cognitive Linguistic, and Swallowing Evaluation     Patient Name:  Shanell Mahmood   MRN:  [id number]  Admitting Diagnosis: Syncope [R55]  Final Diagnosis: Acute infarct in the right caudate head  Current length of stay: 0   Expected Discharge:  11/4/2024   Admitting Diet: Cardiac Regular       Recommendations:     General Recommendations: Follow for diet tolerance with upgrade/downgrade as needed  Diet recommendations:  Solid Diet Level: Regular Diet - IDDSI Level 7  Aspiration Precautions:   Standard Precautions  General Precautions: General Precautions: fall   Communication strategies:  none    Assessment:   SPEECH THERAPY ASSESSMENT: Speech Therapy consult received on on: 10/31/2024  for evaluation of Speech, Cognitive Linguistic, and Swallowing due to  Acute infarct in the right caudate head .     Shanell Mahmood presents with functional swallow, no s/s of air way compromise but mildly reduced oral efficiency. Cognitive linguistic evaluation revealed intact speech, memory and language although response time was mildly reduced 2° not feeling well.  Orally bilateral buccal weakness and right sided labial retraction observed, otherwise oral motor exam WFL. Speech Therapy to follow for diet tolerance x 1 session.     Recommend Regular Diet, IDDSI Level 7 textures with thin liquids (IDDSI 0). Medications whole with liquid.  Diet recs communicated to patient's nurse Montse at 1049 in person. Speech Therapy to follow patient for Diet tolerance. Recommendations at discharge:  no therapy indicated. .  History of Present Illness Relevant to Speech Therapy Evaluation     Shanell Mahmood is a 74 y.o. female with a chief complaint of Loss of Consciousness (Ems Reports patient "Passed out" and had an unwitnessed fall outside of the Pain management clinic, when fire arrived they stated she seemed sleepy and altered mental status , when ems arrived she was given 1mg narcan and patient is now aox4 )  Previous " medical history of anemia, Pulmonary hypertension on 4 liters continuous oxygen at home, arthritis, CKD V, Osteoporosis, secondary hyperprathyroidism, S/P closure of patent foramen ovale in 2011, CVA in 2011 with no residuals, chronic back pain, HLD, Gout, Brain Mass and thyroid diease who presented to the ED after unwitnessed syncopal episode. When she was initially evaluated by EMS, her blood pressure was in the 60s over 40s. She did require constant stimulation to remain awake. Fell and hit her head. Does not take any blood thinners. EMS evaluated her and gave her 1 mg of Narcan as well as 250 cc of fluid. Her pressure improved and she had become more alert. Initial ED workup  CT of neck, head and entire spine showed no acute processes. CBC showed anemia and CMP showed elevated creatinine consistent with history of CKD V.  The patient is currently in the process of have a left sided brain mass visualized on MRI 10/8/24 evaluated that was an incidental finding after undergoing a PET scan for a pulmonary nodule on 9/6/24 with abnormal uptake noted in brain. She has her first appointment on 11/4/24.  Patient reports she awoke this morning feeling well showered and dressed herself. She performs all of her own ADL's. She had one syncopal event in March 2024 and was evaluated by cardiology and informed it was an orthostatic episode. Patient admitted by hospital medicine for further evaluation and management.        INITIAL VALUES MOST RECENT      Nasal cannula, flow 4 L/min, O2 Sats: 100 % baseline at home Nasal cannula, flow 4 L/min, O2 Sats: 96 %   97.7 °F (36.5 °C) 98.1 °F (36.7 °C)    Pulse: 67 Pulse: 65   Respiratory Rate:   Respiratory Rate:18    WBC (!) 3.68   WBC (!) 3.78    Hgb: (!) 9.7 Hgb: 12.0 (A 2nd draw was used to confirm Delta H&H/Platelet.)   Albumin (!) 2.8 Albumin (!) 2.6   Creatinine: (!) 1.7 Creatinine: (!) 1.7   .50-1.35)    IMAGING:   MRI Brain (without contrast):  1. Acute infarct in the right  caudate head.  2. 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  The mass abuts the left temporal lobe, without resulting in significant mass effect or vasogenic edema.  Differential includes a primary or metastatic osseous lesion, an atypical meningioma, or additional etiologies.  3. Chronic microvascular ischemic changes.    CHEST:   MRI LUMBER SPINE: Multilevel degenerative changes of the lumbar spine as detailed above. There is moderate spinal canal stenosis at L3-L4 and L4-L5 and there is severe bilateral neural foraminal narrowing at L4-L5.       MODIFIED BARIUM SWALLOW STUDY:  None in [facility]  PRIOR SPEECH THERAPY:  None in Epic     PRIOR DIET:  Regular      SOCIAL HISTORY/HOBBIES: Ms. Mahmood is Unknown and lives in [place] [id number].  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 Upon entering the room Ms. Mahmood was upright in bedside chair.  She was pleasant, cooperative, and lethargic.  Patient reports many hardships lately:  starting chemo Wed, her diagnosis of brain tumbor a few months ago, daughter in law  recently. 3 sons in [place] with no assistance here        Objective:   Orientation:  Person:    WNL-able to recall personal info including medical history/course of hospitalization  Place:    WN- [facility]  Time:    WNL- oriented to month/date/day/year/season  Situation:    WNL- all aspects of hospitalization including diagnosis and course of events    Cognition:  MoCA (Version 8.3) administered with pt achieving a score of 24/25 (could not administer first 3 questions due to bilateral arm weakness). Pt earned na of 5 points on visuospatial/executive functions, 3 of 3 points on naming, 6 of 6 points for attention, 2 of 3 points for language, 2 of 2 points for abstraction, 5 of 5 points for delayed recall and 6 of 6 points  for orientation.  Point missed for language for naming words that start with B. Patient with decreased sustained attention due to not feeling well and being lethargic given abnormal creatinine/abnormal labs. Subjective observations indicate intact cognitive linguistic skills.  Additional questions related to remote and short term memory: no impairments observed  Visual-Spatial:  WFL- no deficits observed in conversation or scanning room  LANGUAGE:   Comprehension:  WNL      Expression:    Conversational speech:  WNL,       Pragmatics:    flat affect    SPEECH:   Motor Speech:    WFL  Speech is slow but precise with no slurring      Voice:   WFL- some impairment in intensity but adequate and believed as a result of overall feeling of ill  Oral Musculature Evaluation  Oral Musculature Evaluation  Oral Musculature: WFL  Dentition: upper dentures  Secretion Management: adequate  Mucosal Quality: good  Mandibular Strength and Mobility: WFL  Oral Labial Strength and Mobility: WFL, impaired retraction (on the right)  Lingual Strength and Mobility: WFL  Velar Elevation: WFL  Buccal Strength and Mobility: WFL, impaired (strength, difficulty bilaterally)  Volitional Cough: weak  Volitional Swallow: rise and tilt palpated  Voice Prior to PO Intake: weak, pt generally weak    Bedside Swallow Eval:   Consistencies Assessed:  Thin liquids .  Mixed consistencies .  Solids .      Oral Phase:   WFL. May be mildly reduced efficiency/slow but probably related to feeling ill    Pharyngeal Phase:   no overt clinical signs/symptoms of aspiration  no overt clinical signs/symptoms of pharyngeal dysphagia    Compensatory Strategies  None    Treatment: education provided re: role of SLP results of evaluation; the patient was receptive to the information        Goals:   Multidisciplinary Problems       SLP Goals          Problem: SLP    Goal Priority Disciplines Outcome   SLP Goal     SLP Progressing   Description: 1) The patient will consume  a IDDSI level 7 regular thin liquids level 0 diet with improved oral efficiency and continued safety.                                  Plan:     Patient to be seen: 2 x/week   Plan of Care expires:     Plan of Care reviewed with:  patient  SLP Follow-Up:  Yes      Discharge recommendations:  Therapy Intensity Recommendations at Discharge: No Therapy Indicated (as of this date)     Barriers to Discharge:  Decreased Care Giver Support    Time Tracking:     SLP Treatment Date: 11/01/24  Speech Start Time:  1020  Speech Stop Time:   1047  Speech Total Time (min):  Speech Total (min): 27 min    Billable Minutes: Eval 17 , Eval Swallow and Oral Function 10, and Total Time 27    11/01/2024

## 2024-11-01 NOTE — ASSESSMENT & PLAN NOTE
Anemia is likely due to chronic disease due to Chronic Kidney Disease. Most recent hemoglobin and hematocrit are listed below.  Recent Labs     10/31/24  1532 11/01/24  0551   HGB 9.7* 12.0   HCT 30.8* 38.3       Plan  - Monitor serial CBC: Daily  - Transfuse PRBC if patient becomes hemodynamically unstable, symptomatic or H/H drops below 7/21.  - Patient has not received any PRBC transfusions to date  - Patient's anemia is currently improving   no

## 2024-11-01 NOTE — HPI
74-year-old female with history of brain mass, CKD, back pain, pulmonary artery hypertension on 4 L oxygen is admitted after syncope and collapse found to have a acute CVA in the right caudate on MRI brain.  She was found to have underlying brain mass.  MRI of the cervical spine shows severe cord compression at C4-5 and C5-6 likely acute with edema.  Patient had surgical decompression  and  patient underwent surgery on : 11/3/24 multilevel C-spine laminectomy.  Later patient continued to have hypotension and needed vasopressor and another vasopressor vasopressin is added  Patient also her DVT of the the proximal right superficial femoral vein. And nonocclusive thrombus within the right popliteal vein,  Also patient developed new onset AFib and started amiodarone drip and in and out of a fib  and later changed to PO amiodarone.   Later patient was more hypoxic and not able to wean vasopressor and PE study was done and found to have segmental PE with right heart strain - no surgical intervention as per vascular surgery.     Patient had acute CVA, remains quadriplegic after cervical cord decompression surgery, also developed pulmonary embolism/ acute DVT, patient developed urinary retention most likely secondary to neurogenic bladder, patient has progressive acute on chronic hypoxic respiratory failure due to pulmonary artery hypertension, patient remains on high-flow oxygen after surgery.   Patient and family decided to move forward to inpatient hospice 11/18.

## 2024-11-01 NOTE — PLAN OF CARE
Patient arrived in ICU stable condition. Patient is on Levophed and BP is improved. NO clear source for hypotension. Will continue antibiotics for now.

## 2024-11-01 NOTE — ASSESSMENT & PLAN NOTE
"Discovered after PET scan of lung  on 9/6/24 revealed abnormal uptake in brain. 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  She will have her first appointment with Dr. Ray Mcintyre at  [place] [facility]  on 11/4/24    "

## 2024-11-01 NOTE — NURSING
Report given to Brigette MALHOTRA, patient transferred to stepdown room 223. Sister and daughter at bedside.

## 2024-11-01 NOTE — ASSESSMENT & PLAN NOTE
"Discovered after PET scan of lung  on 9/6/24 revealed abnormal uptake in brain. 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  Unclear if contributing to CVA  She will have her first appointment with Dr. Ray Mcintyre at  [place] [facility]  on 11/4/24  -neurosurgery and oncology follow-up  "

## 2024-11-01 NOTE — ASSESSMENT & PLAN NOTE
Creatine stable for now. BMP reviewed- noted Estimated Creatinine Clearance: 28.7 mL/min (A) (based on SCr of 1.7 mg/dL (H)). according to latest data. Based on current GFR, CKD stage is stage 5 - GFR < 15.  Monitor UOP and serial BMP and adjust therapy as needed. Renally dose meds. Avoid nephrotoxic medications and procedures.    Followed by nephrology Nayely Jain at [facility]. Initial attempt at AV fistula placement unsuccessful and currently just being monitored. Last visit 5/2/24

## 2024-11-01 NOTE — NURSING
Pt placed on stretcher with O2, CM and soft cervical collar in use. Remains on levophed as documented. Family in attendance- updated on pt's room assignment at [facility]. To [facility] with EMS.

## 2024-11-01 NOTE — PLAN OF CARE
Problem: SLP  Goal: SLP Goal  Description: 1) The patient will consume a IDDSI level 7 regular thin liquids level 0 diet with improved oral efficiency and continued safety.   Outcome: Progressing     Clinical Swallow Exam completed. Patient with functional swallow, no s/s of air way compromise but mildly reduced oral efficiency. Cognitive linguistic evaluation revealed functional speech, memory and language although response time was mildly reduced 2° not feeling well.  Orally bilateral buccal weakness and right sided labial retraction observed, otherwise oral motor exam WFL. Speech Therapy to follow for diet tolerance x 1 session.

## 2024-11-01 NOTE — CARE UPDATE
Patient continues to be hypotensive of unclear etiology despite fluid bolus and midodrine.  We will initiate transfer to ICU.  Start Levophed.  Follow-up lactate and blood culture.  Get MRI C-spine once BP stable. Place in C-collar now out of caution. Start empiric zosyn for now. Discussed with on-call hospitalist at [facility] main Dr. Banks.

## 2024-11-01 NOTE — NURSING
Bladder scanned at the bedside 650ml. In & out cath done and put out 570ml of urine output, patient tolerated well.

## 2024-11-01 NOTE — SUBJECTIVE & OBJECTIVE
Interval History:  Seen in afternoon after MRI is completed.  She has global weakness of the arms and legs and decreased sensation of the legs.  She is visiting with her sisters.  Nursing report low blood pressure throughout the day.      Objective:     Vital Signs (Most Recent):  Temp: 97.6 °F (36.4 °C) (11/01/24 1500)  Pulse: 71 (11/01/24 1558)  Resp: 15 (11/01/24 1500)  BP: (!) 84/51 (11/01/24 1558)  SpO2: 96 % (11/01/24 1100) Vital Signs (24h Range):  Temp:  [96.5 °F (35.8 °C)-98.7 °F (37.1 °C)] 97.6 °F (36.4 °C)  Pulse:  [53-81] 71  Resp:  [15-18] 15  SpO2:  [96 %-100 %] 96 %  BP: ()/(40-73) 84/51     Weight: 70.8 kg (156 lb)  Body mass index is 24.43 kg/m².    Intake/Output Summary (Last 24 hours) at 11/1/2024 1616  Last data filed at 11/1/2024 1253  Gross per 24 hour   Intake 740 ml   Output 650 ml   Net 90 ml         Physical Exam  Vitals reviewed.   Constitutional:       General: She is not in acute distress.  HENT:      Head: Normocephalic and atraumatic.   Cardiovascular:      Rate and Rhythm: Normal rate and regular rhythm.      Heart sounds: Normal heart sounds.   Pulmonary:      Effort: Pulmonary effort is normal. No respiratory distress.      Breath sounds: Normal breath sounds.   Neurological:      Mental Status: She is alert and oriented to person, place, and time.      Comments: Able to lift arms minimally, unable to make fist  Able to wiggle toes but unable to lift legs off bed  Decreased sensation bilateral lower extremity   Psychiatric:         Mood and Affect: Affect normal.         Behavior: Behavior normal.         Thought Content: Thought content normal.             Significant Labs: All pertinent labs within the past 24 hours have been reviewed.    Significant Imaging: I have reviewed all pertinent imaging results/findings within the past 24 hours.

## 2024-11-01 NOTE — H&P
"  [facility] Medicine  History & Physical    Patient Name: Shanell Mahmood  MRN: [id number]  Patient Class: OP- Observation  Admission Date: 10/31/2024  Attending Physician: Joe Zaman MD   Primary Care Provider: No primary care provider on file.         Patient information was obtained from patient, past medical records, and ER records.     Subjective:     Principal Problem:Stroke    Chief Complaint:   Chief Complaint   Patient presents with    Loss of Consciousness     Ems Reports patient "Passed out" and had an unwitnessed fall outside of the Pain management clinic, when fire arrived they stated she seemed sleepy and altered mental status , when ems arrived she was given 1mg narcan and patient is now aox4         HPI: Shanell Mahmood is a 74 year old female with a previous medical history of anemia, Pulmonary hypertension on 4 liters continuous oxygen at home, arthritis, CKD V, Osteoporosis, secondary hyperprathyroidism, S/P closure of patent foramen ovale in 2011, CVA in 2011 with no residuals, chronic back pain, HLD, Gout, Brain Mass and thyroid diease who presented to the ED after unwitnessed syncopal episode. The patient was at her pain management office for re certification only. There were no procedures performed. She reports taking one hydrocodone 10mg today.  The last thing she remembers was walking down the hallway of the office and looking for something to cover her head from the rain and did not have any adverse symptoms or feelings at that time.  She has no recollection of the syncopal events. When she was initially evaluated by EMS, her blood pressure was in the 60s over 40s. She did require constant stimulation to remain awake. Fell and hit her head. Does not take any blood thinners. EMS evaluated her and gave her 1 mg of Narcan as well as 250 cc of fluid. Her pressure improved and she had become more alert. Initial ED workup was thorough and all imaging showing that " "included CT of neck, head and entire spine showed no acute processes. CBC showed anemia and CMP showed elevated creatinine consistent with history of CKD V. Drug screen positive for opioids but patient has a hydrocodone prescription. The patient is currently in the process of have a left sided brain mass visualized on MRI 10/8/24 evaluated that was an incidental finding after undergoing a PET scan for a pulmonary nodule on 9/6/24 with abnormal uptake noted in brain. She has her first appointment on 11/4/24. She is followed by nephrology who is currently monitoring her and there has been one attempt at AV fistual placement but it was unsuccessful due sclerotic changes. Patient reports she awoke this morning feeling well showered and dressed herself. She performs all of her own ADL's. She had one syncopal event in March 2024 and was evaluated by cardiology and informed it was an orthostatic episode. Patient was unable to complete orthostatic vital signs upon admission due to inability to stand stating " I feel as if I can't get my legs under me". When evaluated for admit exam the patient endorses that after the syncopal episode she endorses bilateral leg weakness with decreased sensation in both legs. She reports bilateral  weakness being unable to use her phone and difficulty raising both of her arms. NIH scale performed and total of 9 noted. Patient noted have 400ml of urine in bladder and unable to void. Patient reached a total of 528ml of urine without being able to void.  MRI/MRA of brain and MRI of lumbosacral spine ordered upon admit. MRA and MRI lower back showed no acute process. MRI brain showed Acute infarct in the right caudate head. Dr. Maldonado was called and he recommended MRA of neck in morning and load with aspirin and plavix. Initial EKG read as atrial fibrillation but upon review p waves were noted and this was discussed with the ED. Repeat EKG shows sinus arrhthymias with PACs. Patient admitted by " [facility] medicine for further evaluation and management.       No past medical history on file.    No past surgical history on file.    Review of patient's allergies indicates:  Not on File    No current facility-administered medications on file prior to encounter.     Current Outpatient Medications on File Prior to Encounter   Medication Sig    allopurinoL (ZYLOPRIM) 100 MG tablet Take 100 mg by mouth every evening.    clotrimazole-betamethasone 1-0.05% (LOTRISONE) cream Apply 1 g topically Daily.    colchicine (COLCRYS) 0.6 mg tablet Take 0.6 mg by mouth 2 (two) times daily.    diclofenac sodium (VOLTAREN) 1 % Gel Apply 2 g topically 3 (three) times daily.    DULoxetine (CYMBALTA) 60 MG capsule Take 60 mg by mouth once daily.    ergocalciferol (ERGOCALCIFEROL) 50,000 unit Cap Take 50,000 Units by mouth every 7 days.    fluticasone propionate (FLONASE) 50 mcg/actuation nasal spray 1 spray by Each Nostril route once daily.    gabapentin (NEURONTIN) 100 MG capsule Take 2 capsules by mouth every 12 (twelve) hours.    HYDROcodone-acetaminophen (NORCO)  mg per tablet Take 1 tablet by mouth every 4 to 6 hours as needed for Pain.    LINZESS 290 mcg Cap capsule Take 290 mcg by mouth before breakfast.    montelukast (SINGULAIR) 10 mg tablet Take 10 mg by mouth once daily.    omeprazole (PRILOSEC) 40 MG capsule Take 40 mg by mouth Daily.    OPSUMIT 10 mg Tab Take 10 mg by mouth once daily.    simvastatin (ZOCOR) 40 MG tablet Take 40 mg by mouth Daily.    tiZANidine (ZANAFLEX) 4 MG tablet Take 4 mg by mouth every 12 (twelve) hours as needed.    torsemide (DEMADEX) 20 MG Tab Take 40 mg by mouth once daily.    UPTRAVI 1,600 mcg Tab Take 1 tablet by mouth 2 (two) times a day.    tadalafil (ADCIRCA) 20 mg Tab Take 20 mg by mouth every other day.     Family History    None       Tobacco Use    Smoking status: Not on file    Smokeless tobacco: Not on file   Substance and Sexual Activity    Alcohol use: Not on file    Drug  use: Not on file    Sexual activity: Not on file     Review of Systems   Respiratory:  Positive for cough and shortness of breath.         Chronic cough and shortness of breath with exertion only   Genitourinary:  Positive for difficulty urinating.   Musculoskeletal:  Positive for back pain, gait problem and neck pain.   Neurological:  Positive for syncope, weakness and numbness.   All other systems reviewed and are negative.    Objective:     Vital Signs (Most Recent):  Temp: 98.3 °F (36.8 °C) (10/31/24 2257)  Pulse: (!) 55 (10/31/24 2257)  Resp: 17 (10/31/24 2257)  BP: 136/73 (10/31/24 2257)  SpO2: 98 % (10/31/24 2350) Vital Signs (24h Range):  Temp:  [97.7 °F (36.5 °C)-98.7 °F (37.1 °C)] 98.3 °F (36.8 °C)  Pulse:  [55-76] 55  Resp:  [17-18] 17  SpO2:  [96 %-100 %] 98 %  BP: (101-161)/(62-73) 136/73     Weight: 71.2 kg (156 lb 15.5 oz)  Body mass index is 24.58 kg/m².     Physical Exam  Vitals reviewed.   HENT:      Head: Normocephalic and atraumatic.      Nose: Nose normal.      Mouth/Throat:      Mouth: Mucous membranes are dry.      Pharynx: Oropharynx is clear.   Eyes:      Extraocular Movements: Extraocular movements intact.      Pupils: Pupils are equal, round, and reactive to light.   Neck:        Comments: Area of tenderness  Cardiovascular:      Rate and Rhythm: Normal rate. Rhythm irregular.      Pulses: Normal pulses.   Pulmonary:      Effort: Pulmonary effort is normal.      Breath sounds: Normal breath sounds.   Abdominal:      General: Bowel sounds are normal.      Palpations: Abdomen is soft.      Tenderness: There is no abdominal tenderness.   Musculoskeletal:      Cervical back: Normal range of motion. Tenderness present.      Comments: Arthritic changes noted in bilateral hands   Skin:     General: Skin is warm and dry.      Capillary Refill: Capillary refill takes less than 2 seconds.   Neurological:      Mental Status: She is alert and oriented to person, place, and time.      GCS: GCS eye  "subscore is 4. GCS verbal subscore is 5. GCS motor subscore is 6.      Cranial Nerves: Cranial nerves 2-12 are intact.      Sensory: Sensory deficit present.      Motor: Weakness present.      Comments: Patient unable to perform finger to nose and heel to shin due to weakness in all extremities.    Patient unable to stand stating "I feel like I can't get my feet underneath me"   Psychiatric:         Mood and Affect: Mood normal.         Behavior: Behavior normal.         Thought Content: Thought content normal.         Judgment: Judgment normal.              CRANIAL NERVES     CN III, IV, VI   Pupils are equal, round, and reactive to light.       Significant Labs: All pertinent labs within the past 24 hours have been reviewed.    Bilirubin:   Recent Labs   Lab 10/31/24  1532   BILITOT 0.2       BMP:   Recent Labs   Lab 10/31/24  1532   GLU 96      K 3.8   *   CO2 22*   BUN 31*   CREATININE 1.7*   CALCIUM 8.3*     CBC:   Recent Labs   Lab 10/31/24  1532   WBC 3.68*   HGB 9.7*   HCT 30.8*   *     CMP:   Recent Labs   Lab 10/31/24  1532      K 3.8   *   CO2 22*   GLU 96   BUN 31*   CREATININE 1.7*   CALCIUM 8.3*   PROT 5.8*   ALBUMIN 2.8*   BILITOT 0.2   ALKPHOS 76   AST 13   ALT 11   ANIONGAP 8     Cardiac Markers:   Recent Labs   Lab 10/31/24  1532   BNP 39       Troponin:   Recent Labs   Lab 10/31/24  1532   TROPONINI 0.011       Urine Studies:   Recent Labs   Lab 10/31/24  1537   COLORU Colorless*   APPEARANCEUA Clear   PHUR 5.0   SPECGRAV 1.010   PROTEINUA Negative   GLUCUA Negative   KETONESU Negative   BILIRUBINUA Negative   OCCULTUA Negative   NITRITE Negative   UROBILINOGEN Negative   LEUKOCYTESUR Negative       Significant Imaging: I have reviewed all pertinent imaging results/findings within the past 24 hours.  EXAMINATION:  MRI BRAIN WITHOUT CONTRAST     CLINICAL HISTORY:  Neuro deficit, acute, stroke suspected;     TECHNIQUE:  Multiplanar multisequence MR imaging of the brain " was performed without intravenous contrast.     COMPARISON:  CT head from earlier the same date.     FINDINGS:  There is restricted diffusion in the right caudate head, compatible with an acute infarct.  There is associated T2/FLAIR hyperintense signal.  There is a mass centered within the left temporoparietal calvarium measuring approximately 5.0 x 2.9 x 4.0 cm, with extension into the left cerebral convexity extra-axial space and extension into the left temporal scalp.  There is abutment of the left temporal lobe, without evidence of vasogenic edema or evidence of significant mass effect.  There is no midline shift.  Ventricles are normal in size for age without evidence of hydrocephalus.  There is T2/FLAIR hyperintense signal throughout the supratentorial white matter, compatible with chronic microvascular ischemic changes.  There is a small focus of encephalomalacia within the right medial parietal lobe, likely related to a remote infarct.  There is no intracranial hemorrhage.  No extra-axial blood or fluid collections. Normal vascular flow voids.     Left common rim mass as above.  The remaining bone marrow signal intensity is normal. Paranasal sinuses and mastoid air cells are clear.     Impression:     1. Acute infarct in the right caudate head.  2. 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  The mass abuts the left temporal lobe, without resulting in significant mass effect or vasogenic edema.  Differential includes a primary or metastatic osseous lesion, an atypical meningioma, or additional etiologies.  3. Chronic microvascular ischemic changes.  This report was flagged in Epic as abnormal.     Dr. Barrios discussed critical findings with TATIANNA Morales. by [facility] secure chat at 22:23 on 10/31/2024.     Assessment/Plan:     * Stroke  -Neurology consulted   -On stroke pathway  -Neurochecks Q4 hours  -PT/OT/SLP consulted  -ASA 325mg loading dose and ASA 81mg  daily  -Plavix 300mg loading dose and 75mg daily starting tomorrow  -Permissive HTN  -MRI shows Acute infarct in the right caudate head.   -MRA of neck ordered  -ultrasound of bilateral carotids  -ECHO ordered  -statin continued  -fall precautions        Current NIH Stroke Score Values      Flowsheet Row Most Recent Value    Admit assessment at 2020 by TATIANNA Morales   1a. Level of Consciousness 0-->Alert, keenly responsive   1b. LOC Questions 0-->Answers both questions correctly   1c. LOC Commands 0-->Performs both tasks correctly   2. Best Gaze 0-->Normal   3. Visual 0-->No visual loss   4. Facial Palsy 0-->Normal symmetrical movements   5a. Motor Arm, Left 0-->No drift, limb holds 90 (or 45) degrees for full 10 secs   5b. Motor Arm, Right 0-->No drift, limb holds 90 (or 45) degrees for full 10 secs   6a. Motor Leg, Left 3-->No effort against gravity, leg falls to bed immediately   6b. Motor Leg, Right 3-->No effort against gravity, leg falls to bed immediately   7. Limb Ataxia 2-->Present in two limbs   8. Sensory 1-->Mild-to-moderate sensory loss, patient feels pinprick is less sharp or is dull on the affected side, or there is a loss of superficial pain with pinprick, but patient is aware of being touched   9. Best Language 0-->No aphasia, normal   10. Dysarthria 0-->Normal   11. Extinction and Inattention (formerly Neglect) 0-->No abnormality   Total (NIH Stroke Scale) 9              Thrombocytopenia  The patients 3 most recent labs are listed below.  Recent Labs     10/31/24  1532   *     Plan  - Will transfuse if platelet count is <100k (if undergoing neurosurgery).  - Patient loaded with aspirin and plavix after speaking with neurology. Please confirm that this should be continued      Anemia  Anemia is likely due to chronic disease due to Chronic Kidney Disease. Most recent hemoglobin and hematocrit are listed below.  Recent Labs     10/31/24  1532   HGB 9.7*   HCT 30.8*     Plan  - Monitor serial  "CBC: Daily  - Transfuse PRBC if patient becomes hemodynamically unstable, symptomatic or H/H drops below 7/21.  - Patient has not received any PRBC transfusions to date  - Patient's anemia is currently stable      Sinus arrhythmia seen on electrocardiogram  EKG read states atrial fibrillation but P waves are present. Repeat EKG after admission confirms this.       Brain mass  Discovered after PET scan of lung  on 9/6/24 revealed abnormal uptake in brain. 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  She will have her first appointment with Dr. Ray Mcintyre at  [facility]  on 11/4/24      Secondary hyperparathyroidism  Chronic condition that is stable. Followed by nephrology last visit in May 2024 and endocrinology (Last visit 9/4/24)      Pulmonary hypertension  Chronic condition that is stable.   -Please restart home medications when medically appropriate. They are currently on hold due to permissive hypertension.       Chronic hypoxic respiratory failure, on home oxygen therapy  Patient with Hypoxic Respiratory failure which is Chronic.  she is on home oxygen at 4 LPM. Maintaining oxygen saturation on 4 liters nasal cannula which has been continued.      CKD (chronic kidney disease), stage V  Creatine stable for now. BMP reviewed- noted Estimated Creatinine Clearance: 28.7 mL/min (A) (based on SCr of 1.7 mg/dL (H)). according to latest data. Based on current GFR, CKD stage is stage 5 - GFR < 15.  Monitor UOP and serial BMP and adjust therapy as needed. Renally dose meds. Avoid nephrotoxic medications and procedures.    Followed by nephrology Nayely Clements-MEREDITHP at [facility]. Initial attempt at AV fistula placement unsuccessful and currently just being monitored. Last visit 5/2/24      Syncope and collapse  See stroke  Orthostatic vital signs ordered and patient unable to stand due " weakness in bilateral legs    Acute urinary retention  Straight cath performed when patient arrived in ED.     Bladder scan performed during admit exam and approximately 402 shown and patient reported she felt as if she needed to urinate. Placed on bedpan and unable to urinate. Patient was transported to admit room and then reported she did not have to urinate at that time. Upon return from MRI approximately 528 in bladder and patient still unable to void    -Bladder scan Q 6 hours with PRN straight cath   -Lumbosacral MRI did not read any acute processes      VTE Risk Mitigation (From admission, onward)           Ordered     Reason for No Pharmacological VTE Prophylaxis  Once        Question:  Reasons:  Answer:  Risk of Bleeding    10/31/24 1849     IP VTE HIGH RISK PATIENT  Once         10/31/24 1849     Place sequential compression device  Until discontinued         10/31/24 1849                         On 11/01/2024, patient should be placed in hospital observation services under my care in collaboration with Dr. Joe Zaman MD.           Tiff Morales NP  Department of Hospital Medicine  [facility]/Surg

## 2024-11-01 NOTE — PLAN OF CARE
Problem: Adult Inpatient Plan of Care  Goal: Plan of Care Review  Outcome: Progressing  Goal: Patient-Specific Goal (Individualized)  Outcome: Progressing  Goal: Absence of Hospital-Acquired Illness or Injury  Outcome: Progressing  Goal: Optimal Comfort and Wellbeing  Outcome: Progressing     Problem: Stroke, Ischemic (Includes Transient Ischemic Attack)  Goal: Optimal Coping  Outcome: Progressing  Goal: Effective Bowel Elimination  Outcome: Progressing  Goal: Optimal Cerebral Tissue Perfusion  Outcome: Progressing  Goal: Effective Oxygenation and Ventilation  Outcome: Progressing  Goal: Improved Sensorimotor Function  Outcome: Progressing     Problem: Fall Injury Risk  Goal: Absence of Fall and Fall-Related Injury  Outcome: Progressing

## 2024-11-01 NOTE — PLAN OF CARE
Problem: Adult Inpatient Plan of Care  Goal: Plan of Care Review  Outcome: Progressing  Goal: Patient-Specific Goal (Individualized)  Outcome: Progressing  Goal: Absence of Hospital-Acquired Illness or Injury  Outcome: Progressing  Goal: Optimal Comfort and Wellbeing  Outcome: Progressing  Goal: Readiness for Transition of Care  Outcome: Progressing     Problem: Skin Injury Risk Increased  Goal: Skin Health and Integrity  Outcome: Progressing     Problem: Stroke, Ischemic (Includes Transient Ischemic Attack)  Goal: Optimal Functional Ability  Outcome: Progressing  Goal: Optimal Nutrition Intake  Outcome: Progressing  Goal: Effective Oxygenation and Ventilation  Outcome: Progressing  Goal: Effective Urinary Elimination  Outcome: Progressing

## 2024-11-01 NOTE — ASSESSMENT & PLAN NOTE
Asymptomatic.  Etiology is unclear but suspected to be related to the stroke or underlying cervical cord pathology  Echo reviewed.  Do not suspect cardiogenic shock.  Do not suspect sepsis  -hold any BP meds or diuretics  -check lactate  -NS 1 L bolus  -start midodrine 10 mg t.i.d.  -MRI cervical spine when able  -we will move to step-down here and monitor blood pressure.  If unable to maintain a map over 60 then will need to be transferred for ICU

## 2024-11-01 NOTE — PROGRESS NOTES
Neurosurgery consulted for this patient presenting with acute right caudate ischemic stroke.  Patient also has a posterior left frontal dural-based extra-axial mass without brain edema and without significant mass effect.  The mass extends towards the adjacent calvarium and overlying scalp.  This mass was shown on a recent brain MRI done at [facility] on 10/08/24.    Differential diagnosis include meningioma versus dural-based metastasis.    No emergent surgical intervention needed for the dural-based mass.  Full consult to follow    Kobi Corona MD

## 2024-11-01 NOTE — HOSPITAL COURSE
74-year-old female with history of brain mass, CKD, back pain, pulmonary hypertension on 4 L oxygen is admitted after syncope and collapse found to have a acute CVA in the right caudate on MRI brain.   She Has global weakness and decreased sensation to the lower legs.  Multiple CT and x-rays done to rule out trauma ultimately negative other than the maxillofacial CT reporting mild irregularity of the interior midline mandible with small adjacent bone fragments which could represent acute fracture with overlying soft tissue swelling.    Carotid ultrasound and MRA brain and neck without acute finding.  Neurology is consulted.  Also with underlying brain mass.  Consult Neurosurgery and Oncology.  Given DAPT and statin.  Had hypotension given IVF and midodrine.  Echo shows right heart failure.  BNP is within normal.  Lactic acid pending.  No other sign of infection.  Placed in cervical collar as precaution and MRI of the cervical spine shows severe cord compression at C4-5 and C5-6 likely acute with edema.  Neurosurgery discussed with patient  and plan to proceed with surgical decompression  and  patient underwent surgery on : 11/3/24 multilevel C-spine laminectomy.  Later patient continued to have hypotension and needed vasopressor and another vasopressor vasopressin is added  Patient also her DVT of the the proximal right superficial femoral vein. And nonocclusive thrombus within the right popliteal vein,  Also patient developed new onset AFib and started amiodarone drip and in and out of a fib  and later changed to PO amiodarone   Later patient was more hypoxic and not able to wean vasopressor and PE study was done and found to have segmental PE with right heart strain . Patient was on full dose lovenox . Vascular surgery consulted and reviewed the film and did not recommend thrombectomy intervention .  Patient and the family decided move forward to inpatient hospice on 11/18.

## 2024-11-01 NOTE — PLAN OF CARE
Problem: Occupational Therapy  Goal: Occupational Therapy Goal  Description: Goals to be met by: 11/22/2024     Patient will increase functional independence with ADLs by performing:    Feeding with Minimal Assistance.  UE Dressing with Minimal Assistance.  LE Dressing with Moderate Assistance.  Grooming while EOB with Minimal Assistance.  Toileting from bedside commode with Minimal Assistance for hygiene and clothing management.   Sitting at edge of bed x15 minutes with Stand-by Assistance.  Supine to sit with Minimal Assistance.  Toilet transfer to bedside commode with Minimal Assistance.  Upper extremity exercise program, with assistance as needed.    Outcome: Progressing

## 2024-11-01 NOTE — ASSESSMENT & PLAN NOTE
Creatine stable for now. BMP reviewed- noted Estimated Creatinine Clearance: 28.7 mL/min (A) (based on SCr of 1.7 mg/dL (H)). according to latest data. Based on current GFR, CKD stage is stage 4 - GFR 15-29.  Monitor UOP and serial BMP and adjust therapy as needed. Renally dose meds. Avoid nephrotoxic medications and procedures.    Followed by nephrology Nayely Jain at [facility]. Initial attempt at AV fistula placement unsuccessful and currently just being monitored. Last visit 5/2/24

## 2024-11-01 NOTE — ASSESSMENT & PLAN NOTE
-Neurology consulted   -On stroke pathway  -Neurochecks Q4 hours  -PT/OT/SLP consulted  -ASA 325mg loading dose and ASA 81mg daily  -Plavix 300mg loading dose and 75mg daily starting tomorrow  -Permissive HTN  -MRI shows Acute infarct in the right caudate head.   -MRA of neck ordered  -ultrasound of bilateral carotids  -ECHO ordered  -statin continued  -fall precautions        Current NIH Stroke Score Values      Flowsheet Row Most Recent Value    Admit assessment at 2020 by TATIANNA Morales   1a. Level of Consciousness 0-->Alert, keenly responsive   1b. LOC Questions 0-->Answers both questions correctly   1c. LOC Commands 0-->Performs both tasks correctly   2. Best Gaze 0-->Normal   3. Visual 0-->No visual loss   4. Facial Palsy 0-->Normal symmetrical movements   5a. Motor Arm, Left 0-->No drift, limb holds 90 (or 45) degrees for full 10 secs   5b. Motor Arm, Right 0-->No drift, limb holds 90 (or 45) degrees for full 10 secs   6a. Motor Leg, Left 3-->No effort against gravity, leg falls to bed immediately   6b. Motor Leg, Right 3-->No effort against gravity, leg falls to bed immediately   7. Limb Ataxia 2-->Present in two limbs   8. Sensory 1-->Mild-to-moderate sensory loss, patient feels pinprick is less sharp or is dull on the affected side, or there is a loss of superficial pain with pinprick, but patient is aware of being touched   9. Best Language 0-->No aphasia, normal   10. Dysarthria 0-->Normal   11. Extinction and Inattention (formerly Neglect) 0-->No abnormality   Total (NIH Stroke Scale) 9

## 2024-11-02 NOTE — ASSESSMENT & PLAN NOTE
Asymptomatic.  Etiology is unclear but suspected to be related to the stroke or underlying cervical cord pathology  Echo reviewed.  Do not suspect cardiogenic shock.  Do not suspect sepsis  -hold any BP meds or diuretics  -check lactate  -NS 1 L bolus  -start midodrine 10 mg t.i.d.  -MRI cervical spine: severe cord compression at C4-5 and C5-6 , plan for neurosurgical decompression 11/3.  Hold ASA and Plavix and NPO at midnight.  -transferred to ICU, goal MAP 80 or higher

## 2024-11-02 NOTE — PLAN OF CARE
Patient found lying in bed. Awakes easily to voice. Orientated x4, calm, pleasant. She has generalized weakness. She can move bilat feet and bend at the knees weakly but unable to pick them straight up or hold them up. She has some altered sensation to the right leg she says. She is weak to bilat arms as well. She says the right arm feels weaker. In general she can move her fingers and lift her arms up momentarily (but not with fine control), but can't keep them up or really  her hands.    The cervical collar is in place.    Was given diet, had to feed her, had a few bites of grits and eggs but not very much. Took pills without any issues.    She was on some levophed which is infusing via right midline with no obvious site complications. I was able to titrate it down but now have had to restart it and am higher , as I was told goal map 80 now.    The bed is low and alarm on. Clinical alarms reviewed and armed. Monitor strip printed and reviewed, afib 60s rare pvc. SCDs in place / on. Mepilex bilat heels and sacrum. Turning q2h, lines and extremities padded.    In MRI now (tolerated).  ..  Levophed requirements have basically remained the same. Have not been able to get it off. Especially now that goal is map 80.    Educated patient - npo at midnight.    Family is aware of procedure tomorrow.    Dr Corona got consent over the phone with her and I witnessed it and coworker Melissa penn since she can't sign her name.    She didn't eat much breakfast but she had a fair amount of breakfast and lunch.    I changed the cervical collar to [place] type.    Encourage pulmonary toilet. She has an intermittent fair cough. Lung sounds were clear. Remains on ~2 liters nasal cannula. Denies shortness of breath.    She thought she needed to have a bowel movement and placed on bedpan, but just flatus. With active bowel sounds.

## 2024-11-02 NOTE — ASSESSMENT & PLAN NOTE
Anemia is likely due to chronic disease due to Chronic Kidney Disease. Most recent hemoglobin and hematocrit are listed below.  Recent Labs     10/31/24  1532 11/01/24  0551 11/02/24  0505   HGB 9.7* 12.0 13.4   HCT 30.8* 38.3 43.3       Plan  - Monitor serial CBC: Daily  - Transfuse PRBC if patient becomes hemodynamically unstable, symptomatic or H/H drops below 7/21.  - Patient has not received any PRBC transfusions to date  - Patient's anemia is currently improving

## 2024-11-02 NOTE — NURSING
"Pt supine on YIN bed; AAOx4. Pleasant, cooperative. New midline started on RUE. Alonso well.    Pt able to move BUE, but unable to squeeze hands. States, I haven't been able to since I came to the hospital." BLE weakness with RLE slightly weaker than LUE. Able to perform tiffanie foot dorsi- and plantar fle      "

## 2024-11-02 NOTE — CONSULTS
NEUROSURGICAL INPATIENT CONSULTATION NOTE    DATE OF SERVICE:  11/02/2024    ATTENDING PHYSICIAN:  Kobi Corona MD    CONSULT REQUESTED BY:  ICU    REASON FOR CONSULT:  Brain mass, motor weakness    SUBJECTIVE:    HISTORY OF PRESENT ILLNESS:  This is a very pleasant 74 y.o. female, she has a history of pulmonary hypertension chronic kidney disease stage 5, requiring evaluation for dialysis, osteoporosis status post closure of patent foramen ovale in 2011, CVA in 2011, chronic low back pain, who had a fall from standing and was transferred from [facility] to [facility] for hypotension.  She is followed in [place] for a left posterior frontal/temporal extra-axial mass.  In light of the newly discovered brain mass, a PET scan was ordered.  Differential diagnosis was atypical meningioma versus dural-based metastasis.  Patient had a CT chest without contrast in August 20, 2024 showing pleural-based nodule right lower lobe.  She also had a PET scan showing the long lesion.  The long lesion was not PET avid.    Patient reports that she has been having bilateral upper extremity and lower extremity weakness since the fall.  She is unable to raise her arms or legs against gravity.  She also reports numbness in her upper and lower extremities.              PAST MEDICAL HISTORY:  Active Ambulatory Problems     Diagnosis Date Noted    Gait abnormality 10/31/2024    Weakness of both arms 10/31/2024    Decreased sensation 10/31/2024    S/P patent foramen ovale closure 11/01/2024     Resolved Ambulatory Problems     Diagnosis Date Noted    No Resolved Ambulatory Problems     No Additional Past Medical History       PAST SURGICAL HISTORY:  No past surgical history on file.    SOCIAL HISTORY:   Social History     Socioeconomic History    Marital status: Unknown     Social Drivers of Health     Financial Resource Strain: Patient Declined (11/1/2024)    Overall Financial Resource Strain (CARDIA)      Difficulty of Paying Living Expenses: Patient declined   Food Insecurity: Patient Declined (11/1/2024)    Hunger Vital Sign     Worried About Running Out of Food in the Last Year: Patient declined     Ran Out of Food in the Last Year: Patient declined   Transportation Needs: Patient Unable To Answer (11/1/2024)    TRANSPORTATION NEEDS     Transportation : Patient unable to answer   Stress: Patient Declined (11/1/2024)    [employer] - Occupational Stress Questionnaire     Feeling of Stress : Patient declined   Housing Stability: Patient Declined (11/1/2024)    Housing Stability Vital Sign     Unable to Pay for Housing in the Last Year: Patient declined     Homeless in the Last Year: Patient declined       FAMILY HISTORY:  No family history on file.    CURRENTS MEDICATIONS:    No current facility-administered medications on file prior to encounter.     Current Outpatient Medications on File Prior to Encounter   Medication Sig Dispense Refill    allopurinoL (ZYLOPRIM) 100 MG tablet Take 100 mg by mouth every evening.      clotrimazole-betamethasone 1-0.05% (LOTRISONE) cream Apply 1 g topically Daily.      colchicine (COLCRYS) 0.6 mg tablet Take 0.6 mg by mouth 2 (two) times daily.      diclofenac sodium (VOLTAREN) 1 % Gel Apply 2 g topically 3 (three) times daily.      DULoxetine (CYMBALTA) 60 MG capsule Take 60 mg by mouth once daily.      ergocalciferol (ERGOCALCIFEROL) 50,000 unit Cap Take 50,000 Units by mouth every 7 days.      fluticasone propionate (FLONASE) 50 mcg/actuation nasal spray 1 spray by Each Nostril route once daily.      gabapentin (NEURONTIN) 100 MG capsule Take 2 capsules by mouth every 12 (twelve) hours.      HYDROcodone-acetaminophen (NORCO)  mg per tablet Take 1 tablet by mouth every 4 to 6 hours as needed for Pain.      LINZESS 290 mcg Cap capsule Take 290 mcg by mouth before breakfast.      montelukast (SINGULAIR) 10 mg tablet Take 10 mg by mouth once daily.       omeprazole (PRILOSEC) 40 MG capsule Take 40 mg by mouth Daily.      OPSUMIT 10 mg Tab Take 10 mg by mouth once daily.      simvastatin (ZOCOR) 40 MG tablet Take 40 mg by mouth Daily.      tiZANidine (ZANAFLEX) 4 MG tablet Take 4 mg by mouth every 12 (twelve) hours as needed.      torsemide (DEMADEX) 20 MG Tab Take 40 mg by mouth once daily.      UPTRAVI 1,600 mcg Tab Take 1 tablet by mouth 2 (two) times a day.      tadalafil (ADCIRCA) 20 mg Tab Take 20 mg by mouth every other day.          allopurinoL  100 mg Oral QHS    aspirin  81 mg Oral Daily    atorvastatin  40 mg Oral Daily    clopidogreL  75 mg Oral Daily    colchicine  0.6 mg Oral BID    midodrine  10 mg Oral TID    montelukast  10 mg Oral Daily    pantoprazole  40 mg Oral Daily    piperacillin-tazobactam (Zosyn) IV (PEDS and ADULTS) (extended infusion is not appropriate)  3.375 g Intravenous Q8H       ALLERGIES:  Review of patient's allergies indicates:  Not on File    REVIEW OF SYSTEMS:  Review of Systems   All other systems reviewed and are negative.      OBJECTIVE:    PHYSICAL EXAMINATION:   Vitals:    11/02/24 0845   BP: (!) 98/54   Pulse: 64   Resp: 16   Temp:        Physical Exam:  Vitals reviewed.    Constitutional: She appears well-developed and well-nourished.     Eyes: Pupils are equal, round, and reactive to light. Conjunctivae and EOM are normal.     Cardiovascular: Decreased distal pulses and edema.     Abdominal: Soft.     Skin: Skin displays no rash on trunk and no rash on extremities. Skin displays no lesions on trunk and no lesions on extremities.     Psych/Behavior: She is alert. She is oriented to person, place, and time. She has a normal mood and affect.     Musculoskeletal:        Neck: Range of motion is limited. ROM severity is Rigid collar in place.     Neurological:        DTRs: Tricep reflexes are 0 on the right side and 0 on the left side. Bicep reflexes are 0 on the right side and 0 on the left side. Brachioradialis reflexes  are 0 on the right side and 0 on the left side. Patellar reflexes are 0 on the right side and 0 on the left side. Achilles reflexes are 0 on the right side and 0 on the left side.       Back Exam     Muscle Strength   Right Quadriceps:  2/5   Left Quadriceps:  3/5                 Neurological Exam  Mental Status  Alert. Oriented to person, place, and time. Speech is normal.    Cranial Nerves  CN III, IV, VI: Extraocular movements intact bilaterally. Pupils equal round and reactive to light bilaterally.    Motor   Normal muscle tone.                                               Right                     Left  Deltoid                                   2                          2   Biceps                                   2                          2   Triceps                                  0                          0   Interossei                              0                          0   Iliopsoas                               3                          3   Quadriceps                           2                          3   Gastrocnemius                     4                           4   Anterior tibialis                      4                          4   Posterior tibialis                    4                          4   Peroneal                               4                          4    Sensory  Light touch is normal in upper and lower extremities. Pinprick is normal in upper and lower extremities.     Reflexes                                            Right                      Left  Brachioradialis                    0                         0  Biceps                                 0                         0  Triceps                                0                         0  Patellar                                0                         0  Achilles                                0                         0  Right Plantar: normal  Left Plantar: normal    Right pathological reflexes: Zechariah's  absent. Ankle clonus absent.  Left pathological reflexes: Zechariah's absent. Ankle clonus absent.      DIAGNOSTIC DATA:    Recent Labs     10/31/24  1532 11/01/24  0551 11/02/24  0505   HGB 9.7* 12.0 13.4   HCT 30.8* 38.3 43.3   MCV 87 87 87   WBC 3.68* 3.78* 5.68   * 148* 165    143 142   K 3.8 4.3 4.2   * 111* 110   GLU 96 78 99   BUN 31* 30* 31*   CREATININE 1.7* 1.7* 1.8*   CALCIUM 8.3* 8.5* 8.1*   MG  --  1.9 2.0   ALT 11 11 6*   AST 13 16 10   ALBUMIN 2.8* 2.6* 2.8*   BILITOT 0.2 0.4 0.4   INR  --  1.2  --        Microbiology Results (last 7 days)       Procedure Component Value Units Date/Time    Blood culture [[id number]] Collected: 11/01/24 1729    Order Status: Completed Specimen: Blood from Peripheral, Hand, Right Updated: 11/02/24 0158     Blood Culture, Routine No Growth to date            I personally interpreted the following imaging:  10/31/2024 MRI of the brain without contrast showing an acute right head of the caudate CVA, left posterior frontal/temporal  extra-axial mass involving the skull and scalp, no significant mass effect or brain edema  Lumbar spine MRI reviewed showing moderate-to-severe bilateral L4-5 foraminal stenosis, severe bilateral L5-S1 foraminal stenosis  MRA of the neck is showing severe central canal stenosis at C4-5 and C5-6, no vascular compromise      ASSESSMENT:  This is a 74 y.o. female with known pulmonary nodule and left posterior frontal/temporal extra-axial mass.  Since the patient has fallen 2 days ago she has been having bilateral upper extremity and lower extremity severe weakness.  She also has a right caudate acute CVA.  She appears to have a central cord syndrome.  She did not received a dedicated cervical spine MRI yet but her MRA of the neck is showing severe stenosis at C4-5 and C5-6.  She has multiple comorbidities including chronic kidney disease stage 5.  She is currently in atrial fibrillation.  She also has pulmonary  hypertension.    Problem List Items Addressed This Visit          Neuro    Brain mass    Current Assessment & Plan     Consult neurosurgery  Consult Heme-Onc         * (Principal) Stroke    Current Assessment & Plan     Patient given loading dose of ASA and Plavix onset of initial stroke symptoms  Initiate stroke protocol  Trend NIH stroke scale  STAT CT of head performed left temporoparietal soft tissue prominence with demineralization of the underlying bone and suspected extra-axial mass in this region which is nonspecific and incompletely evaluated.   MRI brain acute infarct of the right caudate head.  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  Mass abuts the left temporal lobe without resulting in significant mass effect or vasogenic edema.  Chronic microvascular ischemic changes.  MRA unremarkable MRI of brain  Bilateral carotid ultrasound-No evidence of a hemodynamically significant carotid bifurcation stenosis.  2-D echo with bubble study if not done previously  Obtain fasting lipids  Statin therapy: Atorvastatin- 40 mg oral daily  TSH, FT4, RPR, HgA1c, B12, Folate  Continue ASA 81 mg  Seizures precautions  Ativan 1 mg PRN Seizures / call neurologist after first dose  PT/OT/SLP to assess and treat  Hold ACEi, beta-blocker, etc while allowing permissive hypertension per stroke protocol            Cardiac/Vascular    Pulmonary hypertension - Primary       Other    Syncope and collapse    Relevant Orders    X-Ray Wrist Complete Left (Completed)    Echo (Completed)     Other Visit Diagnoses       Syncope        Relevant Orders    EKG 12-lead (Completed)    X-Ray Chest AP Portable (Completed)    Chest pain        Relevant Orders    EKG 12-lead    Atrial fibrillation        Relevant Orders    EKG 12-lead (Completed)            PLAN:  Complete workup with cervical spine MRI.  Surgical decompression instrumented fusion is usually indicated to treat a central cord syndrome,  however the patient has multiple comorbidities including acute stroke, atrial fibrillation, pulmonary hypertension and chronic kidney disease stage 5.  The patient had a transthoracic echo yesterday showing right ventricle dilation and reduced function.     We usually recommend to keep the mean arterial pressure above 80-85 following a central cord syndrome to allow increased spinal cord perfusion.   I do not recommend starting steroid.    I would let the ICU team and Cardiology decide whether or not she can tolerate a map above 80.  At this time I do not think that the patient is medically stable to tolerate a cervical decompression and fusion procedure.  I will discuss this further with the primary team    Keep cervical collar  [place] collar ordered      Kobi Corona MD  Cell: [phone]

## 2024-11-02 NOTE — PLAN OF CARE
YORDY noticed a consult for DME ([place] [employer]). YORDY called the [facility] ([phone]) and spoke to Santa. YORDY provided Santa with pt's room number and MRN. Santa shared that she will deliver pt's equipment today and will get a nurse to sign (proof of delivery).

## 2024-11-02 NOTE — PROGRESS NOTES
[facility] Medicine  Progress Note    Patient Name: Shanell Mahmood  MRN: [id number]  Patient Class: IP- Inpatient   Admission Date: 10/31/2024  Length of Stay: 1 days  Attending Physician: Reyna Lopez DO  Primary Care Provider: Nicole, Primary Doctor        Subjective:     Principal Problem:Stroke        HPI:  Shanell Mahmood is a 74 year old female with a previous medical history of anemia, Pulmonary hypertension on 4 liters continuous oxygen at home, arthritis, CKD V, Osteoporosis, secondary hyperprathyroidism, S/P closure of patent foramen ovale in 2011, CVA in 2011 with no residuals, chronic back pain, HLD, Gout, Brain Mass and thyroid diease who presented to the ED after unwitnessed syncopal episode. The patient was at her pain management office for re certification only. There were no procedures performed. She reports taking one hydrocodone 10mg today.  The last thing she remembers was walking down the hallway of the office and looking for something to cover her head from the rain and did not have any adverse symptoms or feelings at that time.  She has no recollection of the syncopal events. When she was initially evaluated by EMS, her blood pressure was in the 60s over 40s. She did require constant stimulation to remain awake. Fell and hit her head. Does not take any blood thinners. EMS evaluated her and gave her 1 mg of Narcan as well as 250 cc of fluid. Her pressure improved and she had become more alert. Initial ED workup was thorough and all imaging showing that included CT of neck, head and entire spine showed no acute processes. CBC showed anemia and CMP showed elevated creatinine consistent with history of CKD V. Drug screen positive for opioids but patient has a hydrocodone prescription. The patient is currently in the process of have a left sided brain mass visualized on MRI 10/8/24 evaluated that was an incidental finding after undergoing a PET scan for a pulmonary nodule on 9/6/24  "with abnormal uptake noted in brain. She has her first appointment on 11/4/24. She is followed by nephrology who is currently monitoring her and there has been one attempt at AV fistual placement but it was unsuccessful due sclerotic changes. Patient reports she awoke this morning feeling well showered and dressed herself. She performs all of her own ADL's. She had one syncopal event in March 2024 and was evaluated by cardiology and informed it was an orthostatic episode. Patient was unable to complete orthostatic vital signs upon admission due to inability to stand stating " I feel as if I can't get my legs under me". When evaluated for admit exam the patient endorses that after the syncopal episode she endorses bilateral leg weakness with decreased sensation in both legs. She reports bilateral  weakness being unable to use her phone and difficulty raising both of her arms. NIH scale performed and total of 9 noted. Patient noted have 400ml of urine in bladder and unable to void. Patient reached a total of 528ml of urine without being able to void.  MRI/MRA of brain and MRI of lumbosacral spine ordered upon admit. MRA and MRI lower back showed no acute process. MRI brain showed Acute infarct in the right caudate head. Dr. Maldonado was called and he recommended MRA of neck in morning and load with aspirin and plavix. Initial EKG read as atrial fibrillation but upon review p waves were noted and this was discussed with the ED. Repeat EKG shows sinus arrhthymias with PACs. Patient admitted by hospital medicine for further evaluation and management.       Overview/Hospital Course:  74-year-old female with history of brain mass, CKD, back pain, pulmonary hypertension on 4 L oxygen is admitted after syncope and collapse found to have a acute CVA in the right caudate on MRI brain.  Has global weakness and decreased sensation to the lower legs.  Multiple CT and x-rays done to rule out trauma ultimately negative other than " the maxillofacial CT reporting mild irregularity of the interior midline mandible with small adjacent bone fragments which could represent acute fracture with overlying soft tissue swelling.  Carotid ultrasound and MRA brain and neck without acute finding.  Neurology is consulted.  Also with underlying brain mass.  Consult Neurosurgery and Oncology.  Given DAPT and statin.  Had hypotension given IVF and midodrine.  Echo shows right heart failure.  BNP is within normal.  Lactic acid pending.  No other sign of infection.  Placed in cervical collar as precaution and MRI of the cervical spine shows severe cord compression at C4-5 and C5-6 likely acute with edema.  Neurosurgery discussed with patient and given her RCRI is 2 and she is a controlled diabetic but has had recent stroke and given Plavix and ASA plan to proceed with surgical decompression tomorrow 11/3.  Patient was NPO at midnight and ASA and Plavix are on hold.    Interval History:  Seen in afternoon after MRI is completed.  She has global weakness of the arms and legs and decreased sensation of the legs.  She is visiting with her sisters.  Nursing report low blood pressure throughout the day.      Objective:     Vital Signs (Most Recent):  Temp: 98.6 °F (37 °C) (11/02/24 1245)  Pulse: 69 (11/02/24 1345)  Resp: (!) 21 (11/02/24 1345)  BP: (!) 93/55 (11/02/24 1345)  SpO2: 96 % (11/02/24 1345) Vital Signs (24h Range):  Temp:  [98 °F (36.7 °C)-98.6 °F (37 °C)] 98.6 °F (37 °C)  Pulse:  [59-89] 69  Resp:  [12-21] 21  SpO2:  [71 %-98 %] 96 %  BP: ()/(46-75) 93/55     Weight: 72.7 kg (160 lb 4.4 oz)  Body mass index is 25.1 kg/m².    Intake/Output Summary (Last 24 hours) at 11/2/2024 1607  Last data filed at 11/2/2024 1312  Gross per 24 hour   Intake 761.52 ml   Output 990 ml   Net -228.48 ml         Physical Exam  Vitals reviewed.   Constitutional:       General: She is not in acute distress.  HENT:      Head: Normocephalic and atraumatic.   Cardiovascular:       Rate and Rhythm: Normal rate and regular rhythm.      Heart sounds: Normal heart sounds.   Pulmonary:      Effort: Pulmonary effort is normal. No respiratory distress.      Breath sounds: Normal breath sounds.   Neurological:      Mental Status: She is alert and oriented to person, place, and time.      Comments: Able to lift arms minimally, unable to make fist  Able to wiggle toes but unable to lift legs off bed  Decreased sensation bilateral lower extremity   Psychiatric:         Mood and Affect: Affect normal.         Behavior: Behavior normal.         Thought Content: Thought content normal.             Significant Labs: All pertinent labs within the past 24 hours have been reviewed.  Recent Lab Results  (Last 5 results in the past 24 hours)        11/02/24  0823   11/02/24  0505   11/02/24  0250   11/01/24  1729   11/01/24  1633        Albumin   2.8             ALP   81             ALT   6             Anion Gap   11             AST   10             BILIRUBIN TOTAL   0.4  Comment: For infants and newborns, interpretation of results should be based  on gestational age, weight and in agreement with clinical  observations.    Premature Infant recommended reference ranges:  Up to 24 hours.............<8.0 mg/dL  Up to 48 hours............<12.0 mg/dL  3-5 days..................<15.0 mg/dL  6-29 days.................<15.0 mg/dL               Blood Culture, Routine       No Growth to date  [P]         BUN   31             Calcium   8.1             Chloride   110             CO2   21             Creatinine   1.8             eGFR   29.4             Glucose   99             Hematocrit   43.3             Hemoglobin   13.4             Lactic Acid Level     2.9  Comment: Falsely low lactic acid results can be found in samples   containing >=13.0 mg/dL total bilirubin and/or >=3.5 mg/dL   direct bilirubin.  Critical result LAC 2.9 mmol/L called to and read back by Asim Pollard RN ([id number]) at 02-Nov-2024 04:02 by  [facility].  Result Rechecked       3.4  Comment: Falsely low lactic acid results can be found in samples   containing >=13.0 mg/dL total bilirubin and/or >=3.5 mg/dL   direct bilirubin.         Magnesium    2.0             MCH   26.8             MCHC   30.9             MCV   87             MPV   11.0             Phosphorus Level   3.6             Platelet Count   165             POCT Glucose 115               Potassium   4.2             PROTEIN TOTAL   5.6             RBC   5.00             RDW   17.1             Sodium   142             WBC   5.68                                     [P] - Preliminary Result               Significant Imaging: I have reviewed all pertinent imaging results/findings within the past 24 hours.  Imaging Results               MRI Brain Without Contrast (Final result)  Result time 10/31/24 22:27:12      Final result by Dawit Barrios,  (10/31/24 22:27:12)                   Impression:      1. Acute infarct in the right caudate head.  2. 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  The mass abuts the left temporal lobe, without resulting in significant mass effect or vasogenic edema.  Differential includes a primary or metastatic osseous lesion, an atypical meningioma, or additional etiologies.  3. Chronic microvascular ischemic changes.  This report was flagged in Epic as abnormal.    Dr. Barrios discussed critical findings with TATIANNA Weaver by [facility] secure chat at 22:23 on 10/31/2024.      Electronically signed by: Dawit Barrios  Date:    10/31/2024  Time:    22:27               Narrative:    EXAMINATION:  MRI BRAIN WITHOUT CONTRAST    CLINICAL HISTORY:  Neuro deficit, acute, stroke suspected;    TECHNIQUE:  Multiplanar multisequence MR imaging of the brain was performed without intravenous contrast.    COMPARISON:  CT head from earlier the same date.    FINDINGS:  There is restricted diffusion in the right caudate head, compatible  with an acute infarct.  There is associated T2/FLAIR hyperintense signal.  There is a mass centered within the left temporoparietal calvarium measuring approximately 5.0 x 2.9 x 4.0 cm, with extension into the left cerebral convexity extra-axial space and extension into the left temporal scalp.  There is abutment of the left temporal lobe, without evidence of vasogenic edema or evidence of significant mass effect.  There is no midline shift.  Ventricles are normal in size for age without evidence of hydrocephalus.  There is T2/FLAIR hyperintense signal throughout the supratentorial white matter, compatible with chronic microvascular ischemic changes.  There is a small focus of encephalomalacia within the right medial parietal lobe, likely related to a remote infarct.  There is no intracranial hemorrhage.  No extra-axial blood or fluid collections. Normal vascular flow voids.    Left common rim mass as above.  The remaining bone marrow signal intensity is normal. Paranasal sinuses and mastoid air cells are clear.                                       US Carotid Bilateral (Final result)  Result time 10/31/24 23:55:49      Final result by Dawit Barrios DO (10/31/24 23:55:49)                   Impression:      No evidence of a hemodynamically significant carotid bifurcation stenosis.      Electronically signed by: Dawit Barrios  Date:    10/31/2024  Time:    23:55               Narrative:    EXAMINATION:  US CAROTID BILATERAL    CLINICAL HISTORY:  syncope;    TECHNIQUE:  Grayscale and color Doppler ultrasound examination of the carotid and vertebral artery systems bilaterally.  Stenosis estimates are per the NASCET measurement criteria.    COMPARISON:  None.    FINDINGS:  Right:    Internal Carotid Artery (ICA) peak systolic velocity 60.6 cm/sec    ICA/CCA peak systolic velocity ratio: 0.9    Plaque formation: Heterogeneous    Vertebral artery: Antegrade flow and normal waveform.    Left:    Internal Carotid Artery  (ICA)  peak systolic velocity 98.1 cm/sec    ICA/CCA peak systolic velocity ratio: 1.4    Plaque formation: Heterogeneous    Vertebral artery: Antegrade flow and normal waveform.                                       X-Ray Wrist Complete Left (Final result)  Result time 10/31/24 16:40:18      Final result by Saeid Pérez MD (10/31/24 16:40:18)                   Narrative:    EXAMINATION:  XR WRIST COMPLETE 3 VIEWS LEFT    CLINICAL HISTORY:  Syncope and collapse    TECHNIQUE:  PA, lateral, and oblique views of the left wrist were performed.    COMPARISON:  None    FINDINGS:  No displaced fracture or traumatic malalignment.  Mild carpal degenerative change.  Unremarkable soft tissues.      Electronically signed by: Saeid Pérez  Date:    10/31/2024  Time:    16:40                                     X-Ray Knee 1 or 2 View Left (Final result)  Result time 10/31/24 16:39:40      Final result by Saeid Pérez MD (10/31/24 16:39:40)                   Narrative:    EXAMINATION:  XR KNEE 1 OR 2 VIEW LEFT    CLINICAL HISTORY:  Syncope and Fall;    TECHNIQUE:  One or two views of the left knee were performed.    COMPARISON:  None    FINDINGS:  Left knee total arthroplasty hardware with no periprosthetic fracture or abnormal lucency to suggest loosening or infection.  No malalignment.  Trace knee joint fluid.  Subcutaneous soft tissue edema about the knee and proximal calf.      Electronically signed by: Saeid Pérez  Date:    10/31/2024  Time:    16:39                                     X-Ray Chest AP Portable (Final result)  Result time 10/31/24 16:38:38      Final result by Saeid Pérez MD (10/31/24 16:38:38)                   Impression:      1. Pleuroparenchymal nodule in the right lung base and several other nodules in the right lung apex.  Findings better seen at CT.  These are nonspecific with neoplasm not excluded.  2. Borderline heart enlargement.  3. Prominence of the pulmonary  vasculature raising the possibility for elevated pulmonary pressures.      Electronically signed by: Saeid Pérez  Date:    10/31/2024  Time:    16:38               Narrative:    EXAMINATION:  XR CHEST AP PORTABLE    CLINICAL HISTORY:  Syncope and collapse    TECHNIQUE:  Single frontal view of the chest was performed.    COMPARISON:  Same-day CT    FINDINGS:  There is a nodular airspace opacity at the periphery of the right lung base.  Remaining lungs clear.  Borderline cardiac enlargement with metallic device projecting over the right heart border possibly an intra atrial septal occlusion device.  Prominence of the pulmonary arterial vasculature aortic arch atherosclerosis.  No pleural effusion or pneumothorax.  There are multiple surgical clips and staple lines over the upper abdomen.                                       CT Entire Spine Without Contrast (Final result)  Result time 10/31/24 16:26:15      Final result by Nomi Vasquez MD (10/31/24 16:26:15)                   Impression:      There is degenerative change throughout the spine, most significant in the lower lumbar spine as well as in the cervical spine.  There is however no obvious acute fracture or traumatic malalignment.  The entire spine was obtained in the large field of view.      Electronically signed by: Nomi Vasquez MD  Date:    10/31/2024  Time:    16:26               Narrative:    EXAMINATION:  CT ENTIRE SPINE WITHOUT CONTRAST (XPD)    CLINICAL HISTORY:  Fall, pan spinal tenderness;    TECHNIQUE:  Axial images were obtained through the entire spine.  Sagittal and coronal reformatted images were created.    COMPARISON:  Lumbar spine MRI dated 03/15/2016    FINDINGS:  There is no recent study for comparison.  On the screening study obtained with a large field of view there is no obvious acute fracture.  There is extensive chronic change throughout the cervical spine.  The odontoid process is intact.  The anterior and posterior  arches of C1 are intact as well.  There is severe disc space narrowing at the C2-3, C3-4, C4-5 levels with moderate to marked disc space narrowing at the C5-6 level.  There is trace retrolisthesis of C4 on C5.  There is chronic anterior wedging of C3 and C4.  There is multilevel foraminal stenosis throughout the cervical region due to uncovertebral spurring and/or facet joint arthropathy.  Also, there is spinal stenosis most apparent at the C4-5 level.    In the thoracic spine, there is mild chronic anterior wedging of several midthoracic vertebral bodies but there is no obvious acute fracture or traumatic malalignment.  The facet joints maintain normal articulation.    In the lumbar spine there is a vacuum disc at the L3-4 and L5-S1 levels.  There is severe disc space narrowing at L5-S1.  The lumbar vertebral bodies maintain normal height without obvious acute fracture.  Alignment is grossly normal.  There is extensive facet joint arthropathy in the mid to lower lumbar spine.    There is a dextrocurvature of the lower lumbar spine with gentle broad levocurvature at the thoracolumbar junction.    There is no displaced or depressed rib fracture identified on this limited field of view.  There is no obvious spinous process fracture.    The sacrum is intact.    There is atherosclerosis.  There is no pneumothorax.  There are several scattered nodules in the lungs which are suboptimally evaluated.                                       CT Maxillofacial Without Contrast (Final result)  Result time 10/31/24 16:12:17      Final result by Nomi Vasquez MD (10/31/24 16:12:17)                   Impression:      1. There is mild irregularity of the interior midline mandible with small adjacent bone fragments which could represent acute fracture with overlying soft tissue swelling.  There is no other acute maxillofacial or orbital fracture.  There is suspected old deformities of the anterior nasal  bones.      Electronically signed by: Nomi Vasquez MD  Date:    10/31/2024  Time:    16:12               Narrative:    EXAMINATION:  CT MAXILLOFACIAL WITHOUT CONTRAST    CLINICAL HISTORY:  Facial trauma, blunt;    TECHNIQUE:  Low dose axial images, sagittal and coronal reformations were obtained through the face.  Contrast was not administered.    COMPARISON:  None    FINDINGS:  There is suspected soft tissue swelling over the chin.  There is very subtle cortical irregularity involving the anterior paramedian mandible which could represent subtle acute fracture with minimal adjacent bone fragments.  There is beam hardening artifact related to hardware in the region of the left maxilla.  There is no dislocation at the TMJ.  There are changes of torus mandibularis.    There is old deformity of the anterior nasal bones.  There is no acute displaced or depressed nasal bone or nasal septum fracture.    There is no acute orbital fracture.                                       CT Head Without Contrast (Final result)  Result time 10/31/24 16:07:44      Final result by Nomi Vasquez MD (10/31/24 16:07:44)                   Impression:      There is left temporoparietal soft tissue prominence with demineralization of the underlying bone and suspected extra-axial mass in this region which is nonspecific and incompletely evaluated.  These could potentially represent an atypical meningioma but further evaluation with brain MRI without and with contrast could be obtained.  This is not acute.  There is no hemorrhage.  There is no acute skull fracture.      Electronically signed by: Nomi Vasquez MD  Date:    10/31/2024  Time:    16:07               Narrative:    EXAMINATION:  CT HEAD WITHOUT CONTRAST    CLINICAL HISTORY:  Head trauma, minor (Age >= 65y);    TECHNIQUE:  Routine unenhanced axial images were obtained through the head.  Sagittal and coronal reformatted images were created.  The study is reviewed in  bone and soft tissue windows.    COMPARISON:  None    FINDINGS:  Intracranial contents: There is no acute intracranial abnormality.  There is mild nonspecific white matter change.  There is no hemorrhage, parenchymal mass or mass effect.  The gray-white interface is preserved without acute infarction.  The basilar cisterns are open.  There is a remote lacunar infarction in the right caudate nucleus.  The cerebellar tonsils are normal position.    Extracranial contents, calvarium, soft tissues: The included paranasal sinuses and mastoid air cells are clear.  There is no acute skull fracture.  There is soft tissue prominence of the left temporoparietal region.  There is demineralization of the underlying calvarium with in irregular inner bony margin.  Also, there is suggestion of possible extra-axial dural-based soft tissue mass in this region which could potentially represent an atypical meningioma and further evaluated with brain MRI without and with contrast.                                         Review of Systems   Respiratory:  Positive for cough and shortness of breath.         Chronic cough and shortness of breath with exertion only   Genitourinary:  Positive for difficulty urinating.   Musculoskeletal:  Positive for back pain, gait problem and neck pain.   Neurological:  Positive for syncope, weakness and numbness.   All other systems reviewed and are negative.      Assessment/Plan:      * Stroke  Acute CVA right caudate  MRI, MRA, CT, carotid U/S reports reviewed  -DAPT and statin  -neurology following  -neurosurgery and oncology consulted for the brain mass  -PT/OT/SLP  -echo bubble report is positive, follow up Neurology recommendation    Hypotension  Asymptomatic.  Etiology is unclear but suspected to be related to the stroke or underlying cervical cord pathology  Echo reviewed.  Do not suspect cardiogenic shock.  Do not suspect sepsis  -hold any BP meds or diuretics  -check lactate  -NS 1 L  "bolus  -start midodrine 10 mg t.i.d.  -MRI cervical spine: severe cord compression at C4-5 and C5-6 , plan for neurosurgical decompression 11/3.  Hold ASA and Plavix and NPO at midnight.  -transferred to ICU    Thrombocytopenia  The patients 3 most recent labs are listed below.  Recent Labs     10/31/24  1532 11/01/24  0551   * 148*       Plan  - Will transfuse if platelet count is <100k (if undergoing neurosurgery), or otherwise less than 10 K  -trend daily    Anemia  Anemia is likely due to chronic disease due to Chronic Kidney Disease. Most recent hemoglobin and hematocrit are listed below.  Recent Labs     10/31/24  1532 11/01/24  0551 11/02/24  0505   HGB 9.7* 12.0 13.4   HCT 30.8* 38.3 43.3       Plan  - Monitor serial CBC: Daily  - Transfuse PRBC if patient becomes hemodynamically unstable, symptomatic or H/H drops below 7/21.  - Patient has not received any PRBC transfusions to date  - Patient's anemia is currently improving    Sinus arrhythmia seen on electrocardiogram  EKG read states atrial fibrillation but P waves are present. Repeat EKG after admission confirms this.     Brain mass  Discovered after PET scan of lung  on 9/6/24 revealed abnormal uptake in brain. 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  Unclear if contributing to CVA  She will have her first appointment with Dr. Ray Mcintyre at  [place] [facility]  on 11/4/24  -neurosurgery and oncology follow-up    Secondary hyperparathyroidism  Chronic condition that is stable. Followed by nephrology last visit in May 2024 and endocrinology (Last visit 9/4/24)    Pulmonary hypertension  Chronic condition that is stable.   -holding her torsemide and pulmonary hypertension meds due to the hypotension    Chronic hypoxic respiratory failure, on home oxygen therapy  Patient with Hypoxic Respiratory failure which is Chronic.  she is on " home oxygen at 4 LPM. Maintaining oxygen saturation on 4 liters nasal cannula which has been continued.  Due to pulmonary hypertension    CKD (chronic kidney disease), stage IV  Creatine stable for now. BMP reviewed- noted Estimated Creatinine Clearance: 28.7 mL/min (A) (based on SCr of 1.7 mg/dL (H)). according to latest data. Based on current GFR, CKD stage is stage 4 - GFR 15-29.  Monitor UOP and serial BMP and adjust therapy as needed. Renally dose meds. Avoid nephrotoxic medications and procedures.    Followed by nephrology Nayely Jain at [facility]. Initial attempt at AV fistula placement unsuccessful and currently just being monitored. Last visit 5/2/24    Syncope and collapse  See stroke  Orthostatic vital signs ordered and patient unable to stand due weakness in bilateral legs    Acute urinary retention  Possibly neurogenic from the CVA or from cervical spine cord compression  -straight cath per protocol   -may need Yeung      VTE Risk Mitigation (From admission, onward)           Ordered     Reason for No Pharmacological VTE Prophylaxis  Once        Question:  Reasons:  Answer:  Risk of Bleeding    10/31/24 1849     IP VTE HIGH RISK PATIENT  Once         10/31/24 1849     Place sequential compression device  Until discontinued         10/31/24 1849                    Discharge Planning   ELBERT: 11/4/2024     Code Status: Full Code   Is the patient medically ready for discharge?:     Reason for patient still in hospital (select all that apply): Treatment, Consult recommendations, PT / OT recommendations, and Pending disposition  Discharge Plan A: Rehab            Critical care time spent on the evaluation and treatment of severe organ dysfunction, review of pertinent labs and imaging studies, discussions with consulting providers and discussions with patient/family: 38 minutes.      Reyna Lopez DO  Department of Hospital Medicine   [facility]

## 2024-11-02 NOTE — NURSING
Pt arrived to unit via stretcher via EMS. Pt alert and talking a little bit. C collar opened to help pt talk. Levo continues at 0.02 mcg/kg.

## 2024-11-02 NOTE — ASSESSMENT & PLAN NOTE
Asymptomatic.  Etiology is unclear but suspected to be related to the stroke or underlying cervical cord pathology  Echo reviewed.  Do not suspect cardiogenic shock.  Do not suspect sepsis  -hold any BP meds or diuretics  -check lactate  -NS 1 L bolus  -start midodrine 10 mg t.i.d.  -MRI cervical spine: severe cord compression at C4-5 and C5-6 , plan for neurosurgical decompression 11/3.  Hold ASA and Plavix and NPO at midnight.  -transferred to ICU

## 2024-11-02 NOTE — ASSESSMENT & PLAN NOTE
MRI lumbar- Multilevel degenerative changes of the lumbar spine as detailed above.  There is moderate spinal canal stenosis at L3-L4 and L4-L5 and there is severe bilateral neural foraminal narrowing at L4-L5.  CT entire spine- There is degenerative change throughout the spine, most significant in the lower lumbar spine as well as in the cervical spine.  There is however no obvious acute fracture or traumatic malalignment.  The entire spine was obtained in the large field of view.  PT/OT to assess and treat  11/02/2024 MRI cervical spine Disc bulging and spondylosis at C4-C5 and C5-C6, with severe spinal canal stenosis, cervical cord compression and cord signal alteration suggesting edema and or myelopathy 2. Broad-based disc bulging producing moderate spinal canal stenosis at C3-C4.  11/012/2024 MRA Negative limited noncontrast MRA of the neck.   Critical care consult  Patient for neurosurgery today

## 2024-11-02 NOTE — PT/OT/SLP PROGRESS
Occupational Therapy      Patient Name:  Shanell Mahmood   MRN:  [id number]    Patient not seen today secondary to being off the floor for MRI; will follow-up.   11/2/2024

## 2024-11-02 NOTE — ASSESSMENT & PLAN NOTE
Possibly neurogenic from the CVA or from cervical spine cord compression  -straight cath per protocol   -may need Yeung

## 2024-11-02 NOTE — SUBJECTIVE & OBJECTIVE
Interval History:  Seen in afternoon after MRI is completed.  She has global weakness of the arms and legs and decreased sensation of the legs.  She is visiting with her sisters.  Nursing report low blood pressure throughout the day.      Objective:     Vital Signs (Most Recent):  Temp: 98.6 °F (37 °C) (11/02/24 1245)  Pulse: 69 (11/02/24 1345)  Resp: (!) 21 (11/02/24 1345)  BP: (!) 93/55 (11/02/24 1345)  SpO2: 96 % (11/02/24 1345) Vital Signs (24h Range):  Temp:  [98 °F (36.7 °C)-98.6 °F (37 °C)] 98.6 °F (37 °C)  Pulse:  [59-89] 69  Resp:  [12-21] 21  SpO2:  [71 %-98 %] 96 %  BP: ()/(46-75) 93/55     Weight: 72.7 kg (160 lb 4.4 oz)  Body mass index is 25.1 kg/m².    Intake/Output Summary (Last 24 hours) at 11/2/2024 1607  Last data filed at 11/2/2024 1312  Gross per 24 hour   Intake 761.52 ml   Output 990 ml   Net -228.48 ml         Physical Exam  Vitals reviewed.   Constitutional:       General: She is not in acute distress.  HENT:      Head: Normocephalic and atraumatic.   Cardiovascular:      Rate and Rhythm: Normal rate and regular rhythm.      Heart sounds: Normal heart sounds.   Pulmonary:      Effort: Pulmonary effort is normal. No respiratory distress.      Breath sounds: Normal breath sounds.   Neurological:      Mental Status: She is alert and oriented to person, place, and time.      Comments: Able to lift arms minimally, unable to make fist  Able to wiggle toes but unable to lift legs off bed  Decreased sensation bilateral lower extremity   Psychiatric:         Mood and Affect: Affect normal.         Behavior: Behavior normal.         Thought Content: Thought content normal.             Significant Labs: All pertinent labs within the past 24 hours have been reviewed.  Recent Lab Results  (Last 5 results in the past 24 hours)        11/02/24  0823   11/02/24  0505   11/02/24  0250   11/01/24  1729   11/01/24  1633        Albumin   2.8             ALP   81             ALT   6             Anion Gap    11             AST   10             BILIRUBIN TOTAL   0.4  Comment: For infants and newborns, interpretation of results should be based  on gestational age, weight and in agreement with clinical  observations.    Premature Infant recommended reference ranges:  Up to 24 hours.............<8.0 mg/dL  Up to 48 hours............<12.0 mg/dL  3-5 days..................<15.0 mg/dL  6-29 days.................<15.0 mg/dL               Blood Culture, Routine       No Growth to date  [P]         BUN   31             Calcium   8.1             Chloride   110             CO2   21             Creatinine   1.8             eGFR   29.4             Glucose   99             Hematocrit   43.3             Hemoglobin   13.4             Lactic Acid Level     2.9  Comment: Falsely low lactic acid results can be found in samples   containing >=13.0 mg/dL total bilirubin and/or >=3.5 mg/dL   direct bilirubin.  Critical result LAC 2.9 mmol/L called to and read back by Asim Pollard RN ([facility]) at 02[facility]Nov[facility]2024 04[facility].  Result Rechecked       3.4  Comment: Falsely low lactic acid results can be found in samples   containing >=13.0 mg/dL total bilirubin and/or >=3.5 mg/dL   direct bilirubin.         Magnesium    2.0             MCH   26.8             MCHC   30.9             MCV   87             MPV   11.0             Phosphorus Level   3.6             Platelet Count   165             POCT Glucose 115               Potassium   4.2             PROTEIN TOTAL   5.6             RBC   5.00             RDW   17.1             Sodium   142             WBC   5.68                                     [P] - Preliminary Result               Significant Imaging: I have reviewed all pertinent imaging results/findings within the past 24 hours.  Imaging Results               MRI Brain Without Contrast (Final result)  Result time 10/31/24 22:27:12      Final result by Dawit Barrios DO (10/31/24 22:27:12)                   Impression:       1. Acute infarct in the right caudate head.  2. 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  The mass abuts the left temporal lobe, without resulting in significant mass effect or vasogenic edema.  Differential includes a primary or metastatic osseous lesion, an atypical meningioma, or additional etiologies.  3. Chronic microvascular ischemic changes.  This report was flagged in Epic as abnormal.    Dr. Barrios discussed critical findings with TATIANNA Morales. by [facility] secure chat at 22:23 on 10/31/2024.      Electronically signed by: Dawit Barrios  Date:    10/31/2024  Time:    22:27               Narrative:    EXAMINATION:  MRI BRAIN WITHOUT CONTRAST    CLINICAL HISTORY:  Neuro deficit, acute, stroke suspected;    TECHNIQUE:  Multiplanar multisequence MR imaging of the brain was performed without intravenous contrast.    COMPARISON:  CT head from earlier the same date.    FINDINGS:  There is restricted diffusion in the right caudate head, compatible with an acute infarct.  There is associated T2/FLAIR hyperintense signal.  There is a mass centered within the left temporoparietal calvarium measuring approximately 5.0 x 2.9 x 4.0 cm, with extension into the left cerebral convexity extra-axial space and extension into the left temporal scalp.  There is abutment of the left temporal lobe, without evidence of vasogenic edema or evidence of significant mass effect.  There is no midline shift.  Ventricles are normal in size for age without evidence of hydrocephalus.  There is T2/FLAIR hyperintense signal throughout the supratentorial white matter, compatible with chronic microvascular ischemic changes.  There is a small focus of encephalomalacia within the right medial parietal lobe, likely related to a remote infarct.  There is no intracranial hemorrhage.  No extra-axial blood or fluid collections. Normal vascular flow voids.    Left common rim mass as above.  The remaining  bone marrow signal intensity is normal. Paranasal sinuses and mastoid air cells are clear.                                       US Carotid Bilateral (Final result)  Result time 10/31/24 23:55:49      Final result by Dawit Barrios DO (10/31/24 23:55:49)                   Impression:      No evidence of a hemodynamically significant carotid bifurcation stenosis.      Electronically signed by: Dawit Barrios  Date:    10/31/2024  Time:    23:55               Narrative:    EXAMINATION:  US CAROTID BILATERAL    CLINICAL HISTORY:  syncope;    TECHNIQUE:  Grayscale and color Doppler ultrasound examination of the carotid and vertebral artery systems bilaterally.  Stenosis estimates are per the NASCET measurement criteria.    COMPARISON:  None.    FINDINGS:  Right:    Internal Carotid Artery (ICA) peak systolic velocity 60.6 cm/sec    ICA/CCA peak systolic velocity ratio: 0.9    Plaque formation: Heterogeneous    Vertebral artery: Antegrade flow and normal waveform.    Left:    Internal Carotid Artery (ICA)  peak systolic velocity 98.1 cm/sec    ICA/CCA peak systolic velocity ratio: 1.4    Plaque formation: Heterogeneous    Vertebral artery: Antegrade flow and normal waveform.                                       X-Ray Wrist Complete Left (Final result)  Result time 10/31/24 16:40:18      Final result by Saeid Pérez MD (10/31/24 16:40:18)                   Narrative:    EXAMINATION:  XR WRIST COMPLETE 3 VIEWS LEFT    CLINICAL HISTORY:  Syncope and collapse    TECHNIQUE:  PA, lateral, and oblique views of the left wrist were performed.    COMPARISON:  None    FINDINGS:  No displaced fracture or traumatic malalignment.  Mild carpal degenerative change.  Unremarkable soft tissues.      Electronically signed by: Saeid Pérez  Date:    10/31/2024  Time:    16:40                                     X-Ray Knee 1 or 2 View Left (Final result)  Result time 10/31/24 16:39:40      Final result by Saeid Pérez  MD (10/31/24 16:39:40)                   Narrative:    EXAMINATION:  XR KNEE 1 OR 2 VIEW LEFT    CLINICAL HISTORY:  Syncope and Fall;    TECHNIQUE:  One or two views of the left knee were performed.    COMPARISON:  None    FINDINGS:  Left knee total arthroplasty hardware with no periprosthetic fracture or abnormal lucency to suggest loosening or infection.  No malalignment.  Trace knee joint fluid.  Subcutaneous soft tissue edema about the knee and proximal calf.      Electronically signed by: Saeid Pérez  Date:    10/31/2024  Time:    16:39                                     X-Ray Chest AP Portable (Final result)  Result time 10/31/24 16:38:38      Final result by Saeid Pérez MD (10/31/24 16:38:38)                   Impression:      1. Pleuroparenchymal nodule in the right lung base and several other nodules in the right lung apex.  Findings better seen at CT.  These are nonspecific with neoplasm not excluded.  2. Borderline heart enlargement.  3. Prominence of the pulmonary vasculature raising the possibility for elevated pulmonary pressures.      Electronically signed by: Saeid Pérez  Date:    10/31/2024  Time:    16:38               Narrative:    EXAMINATION:  XR CHEST AP PORTABLE    CLINICAL HISTORY:  Syncope and collapse    TECHNIQUE:  Single frontal view of the chest was performed.    COMPARISON:  Same-day CT    FINDINGS:  There is a nodular airspace opacity at the periphery of the right lung base.  Remaining lungs clear.  Borderline cardiac enlargement with metallic device projecting over the right heart border possibly an intra atrial septal occlusion device.  Prominence of the pulmonary arterial vasculature aortic arch atherosclerosis.  No pleural effusion or pneumothorax.  There are multiple surgical clips and staple lines over the upper abdomen.                                       CT Entire Spine Without Contrast (Final result)  Result time 10/31/24 16:26:15      Final result by  Nomi Vasquez MD (10/31/24 16:26:15)                   Impression:      There is degenerative change throughout the spine, most significant in the lower lumbar spine as well as in the cervical spine.  There is however no obvious acute fracture or traumatic malalignment.  The entire spine was obtained in the large field of view.      Electronically signed by: Nomi Vasquez MD  Date:    10/31/2024  Time:    16:26               Narrative:    EXAMINATION:  CT ENTIRE SPINE WITHOUT CONTRAST (XPD)    CLINICAL HISTORY:  Fall, pan spinal tenderness;    TECHNIQUE:  Axial images were obtained through the entire spine.  Sagittal and coronal reformatted images were created.    COMPARISON:  Lumbar spine MRI dated 03/15/2016    FINDINGS:  There is no recent study for comparison.  On the screening study obtained with a large field of view there is no obvious acute fracture.  There is extensive chronic change throughout the cervical spine.  The odontoid process is intact.  The anterior and posterior arches of C1 are intact as well.  There is severe disc space narrowing at the C2-3, C3-4, C4-5 levels with moderate to marked disc space narrowing at the C5-6 level.  There is trace retrolisthesis of C4 on C5.  There is chronic anterior wedging of C3 and C4.  There is multilevel foraminal stenosis throughout the cervical region due to uncovertebral spurring and/or facet joint arthropathy.  Also, there is spinal stenosis most apparent at the C4-5 level.    In the thoracic spine, there is mild chronic anterior wedging of several midthoracic vertebral bodies but there is no obvious acute fracture or traumatic malalignment.  The facet joints maintain normal articulation.    In the lumbar spine there is a vacuum disc at the L3-4 and L5-S1 levels.  There is severe disc space narrowing at L5-S1.  The lumbar vertebral bodies maintain normal height without obvious acute fracture.  Alignment is grossly normal.  There is extensive facet  joint arthropathy in the mid to lower lumbar spine.    There is a dextrocurvature of the lower lumbar spine with gentle broad levocurvature at the thoracolumbar junction.    There is no displaced or depressed rib fracture identified on this limited field of view.  There is no obvious spinous process fracture.    The sacrum is intact.    There is atherosclerosis.  There is no pneumothorax.  There are several scattered nodules in the lungs which are suboptimally evaluated.                                       CT Maxillofacial Without Contrast (Final result)  Result time 10/31/24 16:12:17      Final result by Nomi Vasquez MD (10/31/24 16:12:17)                   Impression:      1. There is mild irregularity of the interior midline mandible with small adjacent bone fragments which could represent acute fracture with overlying soft tissue swelling.  There is no other acute maxillofacial or orbital fracture.  There is suspected old deformities of the anterior nasal bones.      Electronically signed by: Nomi Vasquez MD  Date:    10/31/2024  Time:    16:12               Narrative:    EXAMINATION:  CT MAXILLOFACIAL WITHOUT CONTRAST    CLINICAL HISTORY:  Facial trauma, blunt;    TECHNIQUE:  Low dose axial images, sagittal and coronal reformations were obtained through the face.  Contrast was not administered.    COMPARISON:  None    FINDINGS:  There is suspected soft tissue swelling over the chin.  There is very subtle cortical irregularity involving the anterior paramedian mandible which could represent subtle acute fracture with minimal adjacent bone fragments.  There is beam hardening artifact related to hardware in the region of the left maxilla.  There is no dislocation at the TMJ.  There are changes of torus mandibularis.    There is old deformity of the anterior nasal bones.  There is no acute displaced or depressed nasal bone or nasal septum fracture.    There is no acute orbital fracture.                                        CT Head Without Contrast (Final result)  Result time 10/31/24 16:07:44      Final result by Nomi Vasquez MD (10/31/24 16:07:44)                   Impression:      There is left temporoparietal soft tissue prominence with demineralization of the underlying bone and suspected extra-axial mass in this region which is nonspecific and incompletely evaluated.  These could potentially represent an atypical meningioma but further evaluation with brain MRI without and with contrast could be obtained.  This is not acute.  There is no hemorrhage.  There is no acute skull fracture.      Electronically signed by: Nomi Vasquez MD  Date:    10/31/2024  Time:    16:07               Narrative:    EXAMINATION:  CT HEAD WITHOUT CONTRAST    CLINICAL HISTORY:  Head trauma, minor (Age >= 65y);    TECHNIQUE:  Routine unenhanced axial images were obtained through the head.  Sagittal and coronal reformatted images were created.  The study is reviewed in bone and soft tissue windows.    COMPARISON:  None    FINDINGS:  Intracranial contents: There is no acute intracranial abnormality.  There is mild nonspecific white matter change.  There is no hemorrhage, parenchymal mass or mass effect.  The gray-white interface is preserved without acute infarction.  The basilar cisterns are open.  There is a remote lacunar infarction in the right caudate nucleus.  The cerebellar tonsils are normal position.    Extracranial contents, calvarium, soft tissues: The included paranasal sinuses and mastoid air cells are clear.  There is no acute skull fracture.  There is soft tissue prominence of the left temporoparietal region.  There is demineralization of the underlying calvarium with in irregular inner bony margin.  Also, there is suggestion of possible extra-axial dural-based soft tissue mass in this region which could potentially represent an atypical meningioma and further evaluated with brain MRI without and with  contrast.                                         Review of Systems   Respiratory:  Positive for cough and shortness of breath.         Chronic cough and shortness of breath with exertion only   Genitourinary:  Positive for difficulty urinating.   Musculoskeletal:  Positive for back pain, gait problem and neck pain.   Neurological:  Positive for syncope, weakness and numbness.   All other systems reviewed and are negative.

## 2024-11-02 NOTE — PROGRESS NOTES
[facility]  Department of Neurology  Neurology Consultation Note        PATIENT NAME: Shanell Mahmood  MRN: [id number]  CSN: [id number]      TODAY'S DATE: 11/02/2024  ADMIT DATE: 10/31/2024                            CONSULTING PROVIDER: Maegan Jesus NP  CONSULT REQUESTED BY: Joe Zaman MD      Reason for consult: Syncope      History obtained from chart review.    HPI per EMR: Shanell Mahmood is a 74 y.o. female with a previous medical history of anemia, Pulmonary hypertension on 4 liters continuous oxygen at home, arthritis, CKD V, Osteoporosis, secondary hyperprathyroidism, S/P closure of patent foramen ovale in 2011, CVA in 2011 with no residuals, chronic back pain, HLD, Gout, Brain Mass and thyroid diease who presented to the ED after unwitnessed syncopal episode. The patient was at her pain management office for re certification only. There were no procedures performed. She reports taking one hydrocodone 10mg today.  The last thing she remembers was walking down the hallway of the office and looking for something to cover her head from the rain and did not have any adverse symptoms or feelings at that time.  She has no recollection of the syncopal events. When she was initially evaluated by EMS, her blood pressure was in the 60s over 40s. She did require constant stimulation to remain awake. Fell and hit her head. Does not take any blood thinners. EMS evaluated her and gave her 1 mg of Narcan as well as 250 cc of fluid. Her pressure improved and she had become more alert. Initial ED workup was thorough and all imaging showing that included CT of neck, head and entire spine showed no acute processes. CBC showed anemia and CMP showed elevated creatinine consistent with history of CKD V. Drug screen positive for opioids but patient has a hydrocodone prescription. The patient is currently in the process of have a left sided brain mass visualized on MRI 10/8/24 evaluated that was an incidental finding  "after undergoing a PET scan for a pulmonary nodule on 9/6/24 with abnormal uptake noted in brain. She has her first appointment on 11/4/24. She is followed by nephrology who is currently monitoring her and there has been one attempt at AV fistual placement but it was unsuccessful due sclerotic changes. Patient reports she awoke this morning feeling well showered and dressed herself. She performs all of her own ADL's. She had one syncopal event in March 2024 and was evaluated by cardiology and informed it was an orthostatic episode. Patient was unable to complete orthostatic vital signs upon admission due to inability to stand stating " I feel as if I can't get my legs under me". When evaluated for admit exam the patient endorses that after the syncopal episode she endorses bilateral leg weakness with decreased sensation in both legs. She reports bilateral  weakness being unable to use her phone and difficulty raising both of her arms. NIH scale performed and total of 9 noted. Patient noted have 400ml of urine in bladder and unable to void. Patient reached a total of 528ml of urine without being able to void.  MRI/MRA of brain and MRI of lumbosacral spine ordered upon admit. MRA and MRI lower back showed no acute process. MRI brain showed Acute infarct in the right caudate head. Dr. Maldonado was called and he recommended MRA of neck in morning and load with aspirin and plavix. Initial EKG read as atrial fibrillation but upon review p waves were noted and this was discussed with the ED. Repeat EKG shows sinus arrhthymias with PACs. Patient admitted by hospital medicine for further evaluation and management.     Neurology consult: Patient seen and examined with DR. Bryant; POC discussed. Patient sitting up in the bed eating breakfast. She reports knowledge of the brain mass and that her legs just gave out beneath her. She states that she was at her pain clinic when this occurred. CT of head with left temporoparietal " soft tissue prominence with demineralization of the underlying bone and suspected extra-axial mass in this region which is nonspecific and incompletely evaluated. She denies any prodromal symptoms.      11/02/2024: Patient seen and examined with Dr. Bryant; POC discussed. The patient was transferred after her initial Neurological evaluation to [facility] after she began with hypotension of unclear etiology. Patient SBP dropped to 68 at one point so she was placed on Levophed and her Midodrine continued per primary team.Today on assessment the patient was with a C-collar and her neurological exam had change.  Yesterday patient was with some left-sided weakness but sitting up and eating breakfast without difficulties.  Today patient's left arm is much weaker along with her left leg.  Patient's right side as weaker than left.  Discussion with RN who did patient evaluation on arrival to the facility who reports patient was completely flaccid on her right side on arrival yesterday.  Patient is with drift in both arms.  She reports that she can barely move her right leg due to the pain in her spine however she is able to wiggle toes on both feet and move both lower extremities.  Dr. Jamil Lopez made decision to order MRA neck without contrast and an MRI cervical spine without contrast stat and hopes to identify possible causes worsening neurological exam.      PREVIOUS MEDICAL HISTORY:  No past medical history on file.  PREVIOUS SURGICAL HISTORY:  No past surgical history on file.  FAMILY MEDICAL HISTORY:  No family history on file.  ALLERGIES:  Review of patient's allergies indicates:  Not on File  HOME MEDICATIONS:  Prior to Admission medications    Medication Sig Start Date End Date Taking? Authorizing Provider   allopurinoL (ZYLOPRIM) 100 MG tablet Take 100 mg by mouth every evening. 10/14/24  Yes Provider, Historical   clotrimazole-betamethasone 1-0.05% (LOTRISONE) cream Apply 1 g topically Daily. 10/14/24  Yes  Provider, Historical   colchicine (COLCRYS) 0.6 mg tablet Take 0.6 mg by mouth 2 (two) times daily. 10/14/24  Yes Provider, Historical   diclofenac sodium (VOLTAREN) 1 % Gel Apply 2 g topically 3 (three) times daily. 9/12/24  Yes Provider, Historical   DULoxetine (CYMBALTA) 60 MG capsule Take 60 mg by mouth once daily.   Yes Provider, Historical   ergocalciferol (ERGOCALCIFEROL) 50,000 unit Cap Take 50,000 Units by mouth every 7 days.   Yes Provider, Historical   fluticasone propionate (FLONASE) 50 mcg/actuation nasal spray 1 spray by Each Nostril route once daily. 8/12/24  Yes Provider, Historical   gabapentin (NEURONTIN) 100 MG capsule Take 2 capsules by mouth every 12 (twelve) hours. 8/12/24  Yes Provider, Historical   HYDROcodone-acetaminophen (NORCO)  mg per tablet Take 1 tablet by mouth every 4 to 6 hours as needed for Pain. 10/12/24  Yes Provider, Historical   LINZESS 290 mcg Cap capsule Take 290 mcg by mouth before breakfast. 10/14/24  Yes Provider, Historical   montelukast (SINGULAIR) 10 mg tablet Take 10 mg by mouth once daily. 10/14/24  Yes Provider, Historical   omeprazole (PRILOSEC) 40 MG capsule Take 40 mg by mouth Daily. 10/14/24  Yes Provider, Historical   OPSUMIT 10 mg Tab Take 10 mg by mouth once daily.   Yes Provider, Historical   simvastatin (ZOCOR) 40 MG tablet Take 40 mg by mouth Daily. 8/12/24  Yes Provider, Historical   tiZANidine (ZANAFLEX) 4 MG tablet Take 4 mg by mouth every 12 (twelve) hours as needed. 10/12/24  Yes Provider, Historical   torsemide (DEMADEX) 20 MG Tab Take 40 mg by mouth once daily. 10/12/24  Yes Provider, Historical   UPTRAVI 1,600 mcg Tab Take 1 tablet by mouth 2 (two) times a day.   Yes Provider, Historical   tadalafil (ADCIRCA) 20 mg Tab Take 20 mg by mouth every other day.    Provider, Historical     CURRENT SCHEDULED MEDICATIONS:   allopurinoL  100 mg Oral QHS    aspirin  81 mg Oral Daily    atorvastatin  40 mg Oral Daily    clopidogreL  75 mg Oral Daily     colchicine  0.6 mg Oral BID    midodrine  10 mg Oral TID    montelukast  10 mg Oral Daily    pantoprazole  40 mg Oral Daily    piperacillin-tazobactam (Zosyn) IV (PEDS and ADULTS) (extended infusion is not appropriate)  3.375 g Intravenous Q8H     CURRENT INFUSIONS:   NORepinephrine bitartrate-D5W  0-3 mcg/kg/min Intravenous Continuous 10.6 mL/hr at 11/02/24 0713 0.04 mcg/kg/min at 11/02/24 0713     CURRENT PRN MEDICATIONS:    Current Facility-Administered Medications:     acetaminophen, 650 mg, Oral, Q4H PRN    albuterol sulfate, 2.5 mg, Nebulization, Q6H PRN    dextrose 10%, 12.5 g, Intravenous, PRN    dextrose 10%, 25 g, Intravenous, PRN    glucagon (human recombinant), 1 mg, Intramuscular, PRN    glucose, 16 g, Oral, PRN    glucose, 24 g, Oral, PRN    magnesium oxide, 800 mg, Oral, PRN    magnesium oxide, 800 mg, Oral, PRN    naloxone, 0.02 mg, Intravenous, PRN    ondansetron, 4 mg, Intravenous, Q8H PRN    potassium bicarbonate, 35 mEq, Oral, PRN    potassium bicarbonate, 50 mEq, Oral, PRN    potassium bicarbonate, 60 mEq, Oral, PRN    potassium, sodium phosphates, 2 packet, Oral, PRN    potassium, sodium phosphates, 2 packet, Oral, PRN    potassium, sodium phosphates, 2 packet, Oral, PRN    prochlorperazine, 5 mg, Intravenous, Q6H PRN    sodium chloride 0.9%, 500 mL, Intravenous, PRN    sodium chloride 0.9%, 10 mL, Intravenous, Q12H PRN    sodium chloride 0.9%, 10 mL, Intravenous, PRN    REVIEW OF SYSTEMS:  Please refer to the HPI for all pertinent positive and negative findings. A comprehensive review of all other systems was negative.    PHYSICAL EXAM:  Patient Vitals for the past 24 hrs:   BP Temp Temp src Pulse Resp SpO2 Height Weight   11/02/24 0700 (!) 111/59 -- -- 60 14 98 % -- --   11/02/24 0645 (!) 102/50 -- -- 63 15 97 % -- --   11/02/24 0630 (!) 80/53 -- -- 73 16 (!) 87 % -- --   11/02/24 0345 (!) 84/52 -- -- 62 15 97 % -- --   11/01/24 2315 110/69 98.1 °F (36.7 °C) Oral 68 14 98 % -- --   11/01/24  "2300 (!) 79/52 -- -- 61 15 96 % -- --   11/01/24 2245 (!) 93/59 -- -- 66 15 96 % -- --   11/01/24 2200 95/61 -- -- 66 15 97 % -- --   11/01/24 1930 (!) 104/57 98 °F (36.7 °C) Oral 70 16 (!) 92 % -- --   11/01/24 1900 (!) 90/55 -- -- 75 17 (!) 91 % -- --   11/01/24 1845 -- -- -- 75 18 (!) 90 % -- --   11/01/24 1825 (!) 78/50 -- Oral 87 20 (!) 87 % -- 72.7 kg (160 lb 4.4 oz)   11/01/24 1800 -- -- -- 72 18 (!) 79 % -- --   11/01/24 1745 (!) 100/52 -- -- 69 18 (!) 71 % -- --   11/01/24 1730 (!) 97/53 -- -- 74 20 95 % -- --   11/01/24 1715 (!) 93/55 -- -- 89 18 (!) 74 % -- --   11/01/24 1704 (!) 79/53 -- -- 89 17 -- -- --   11/01/24 1703 (!) 85/46 -- -- 89 16 -- -- --   11/01/24 1645 (!) 89/54 -- -- 73 16 -- -- --   11/01/24 1630 (!) 98/54 -- -- 74 18 (!) 91 % -- --   11/01/24 1558 (!) 84/51 -- -- 71 -- -- -- --   11/01/24 1536 (!) 79/49 -- -- 73 -- -- -- --   11/01/24 1500 (!) 62/40 97.6 °F (36.4 °C) Oral 69 15 -- -- --   11/01/24 1333 -- -- -- 65 18 -- -- --   11/01/24 1100 (!) 84/53 98.1 °F (36.7 °C) Oral 81 17 96 % -- --   11/01/24 0733 (!) 84/60 96.5 °F (35.8 °C) -- (!) 53 16 97 % 5' 7" (1.702 m) 70.8 kg (156 lb)       GENERAL APPEARANCE: Alert, well-developed, well-nourished female in no acute distress.  HEENT: Normocephalic and atraumatic. PERRL. Oropharynx unremarkable.  PULM: Normal respiratory effort. No accessory muscle use.  CV: RRR.  ABDOMEN: Soft, nontender.  EXTREMITIES: No obvious signs of vascular compromise. Pulses present. No cyanosis, clubbing or edema.  SKIN: Clear; no rashes, lesions or skin breaks in exposed areas.    NEURO:  MENTAL STATUS: Patient awake and oriented to time, place, and person, recent/remote memory normal, attention span/concentration normal, speech fluent without paraphasic errors, good comprehension with appropriate thought content, and fund of knowledge appropriate for patient's level of education.  Affect euthymic.    CRANIAL NERVES:  CN I: Not tested.  CN II: Fundoscopic exam " deferred.  CN III, IV, VI: Pupils equal, round and reactive to light.  Extraocular movements full and intact.  CN V: Facial sensation normal.  CN VII: Facial asymmetry absent.  CN VIII: Hearing grossly normal and equal bilaterally.  No skew deviation or pathologic nystagmus.  CN IX, X: Palate elevates symmetrically. Speech/articulation is clear without dysarthria.  CN XI: Shoulder shrug and chin rotation equal with good strength.  CN XII: Tongue protrusion midline.    MOTOR:  Bulk normal. Tone normal and symmetric throughout.  Abnormal movements absent.  Tremor: none present.  Strength 5/5 throughout except/unless specified below:.    REFLEXES:  not tested.  Plantar response downgoing bilaterally.  SENSATION: grossly intact throughout.  COORDINATION: normal finger-to-nose.  STATION: not tested.  GAIT: not tested.      NIHSS:  1a      Level of Consciousness (alert, drowsy, etc.):   0=alert; keenly responsive  1b.     Level of Consciousness Questions (month, age):  able to answer both questions  1c.     Level of Consciousness Commands (open, close eyes, make fist, let go):  0=Answers both tasks correctly  2.      Best Gaze (eyes open - patient follows examiner's finger or face):      0=normal  3.      Visual (introduce visual stimulus/threat to patient's visual field quadrants):  0=No visual loss  4.      Facial Palsy (show teeth, raise eyebrows and squeeze eyes shut):        0=Normal symmetric movement  5a.     Motor Arm - Left (elevate extremity 90 degrees and score drift/movement):       2=Some effort against gravity, limb cannot get to or maintain (if cured) 90 (or 45) degrees, drifts down to bed, but has some effort against gravity  5b.     Motor Arm - Right (elevate extremity 90 degrees and score drift/movement):      2=Some effort against gravity, limb cannot get to or maintain (if cured) 90 (or 45) degrees, drifts down to bed, but has some effort against gravity  6a.     Motor Leg - Left (elevate extremity 30  degrees and score drift/movement):       2=Some effort against gravity, limb cannot get to or maintain (if cured) 90 (or 45) degrees, drifts down to bed, but has some effort against gravity  6b      Motor Leg - Right (elevate extremity 30 degrees and score drift/movement):      2=Some effort against gravity, limb cannot get to or maintain (if cured) 90 (or 45) degrees, drifts down to bed, but has some effort against gravity  7.      Limb Ataxia (finger-nose, heel down shin):      0=Absent  8.      Sensory (pin prick to face, arm, trunk, and leg - compare side to side):        1=Mild to moderate sensory loss; patient feels pinprick is less sharp or is dull on the affected side; there is a loss of superficial pain with pinprick but patient is aware She is being touched  9.      Best Language (name items, describe a picture and read sentences):      0=No aphasia, normal  10.     Dysarthria (evaluate speech clarity by patient repeating listed words): 0=Normal  11.     Extinction and Inattention (use prior testing to identify neglect or double simultaneous stimuli testing):      0=No abnormality           NIH Stroke Scale Total:         9      Labs:  Recent Labs   Lab 10/31/24  1532 11/01/24  0551 11/02/24  0505    143 142   K 3.8 4.3 4.2   * 111* 110   CO2 22* 21* 21*   BUN 31* 30* 31*   CREATININE 1.7* 1.7* 1.8*   GLU 96 78 99   CALCIUM 8.3* 8.5* 8.1*   PHOS  --  3.8 3.6   MG  --  1.9 2.0     Recent Labs   Lab 10/31/24  1532 11/01/24  0551 11/02/24  0505   WBC 3.68* 3.78* 5.68   HGB 9.7* 12.0 13.4   HCT 30.8* 38.3 43.3   * 148* 165     Recent Labs   Lab 10/31/24  1532 11/01/24  0551 11/02/24  0505   ALBUMIN 2.8* 2.6* 2.8*   PROT 5.8* 5.7* 5.6*   BILITOT 0.2 0.4 0.4   ALKPHOS 76 85 81   ALT 11 11 6*   AST 13 16 10     Lab Results   Component Value Date    INR 1.2 11/01/2024     Lab Results   Component Value Date    TRIG 87 11/01/2024    HDL 51 11/01/2024    CHOLHDL 40.8 11/01/2024     Lab Results  "  Component Value Date    HGBA1C 5.2 11/01/2024     No results found for: "PROTEINCSF", "GLUCCSF", "WBCCSF"    Imaging:  I have reviewed and interpreted the pertinent imaging and lab results.      Echo    Left Ventricle: The left ventricle is normal in size. Normal wall   thickness. Unable to assess wall motion. There is normal systolic function   with a visually estimated ejection fraction of 60 - 65%.    Right Ventricle: Right ventricle was not well visualized due to poor   acoustic window.  Grossly appears to be dilated with reduced function.    There appears to be some concern of flattening of the interventricular   septum which could indicate right ventricular pressure and volume   overload.    Left Atrium: Left atrium is severely dilated.    IVC/SVC: Elevated venous pressure at 15 mmHg.  MRA Neck without contrast  Narrative: EXAMINATION:  MRA NECK WITHOUT CONTRAST    CLINICAL HISTORY:  Stroke/TIA, determine embolic source;    TECHNIQUE:  Time of flight MRA of the carotid and vertebral arteries was performed with 3D reconstructions according to the standard neck MRA protocol.    COMPARISON:  Carotid ultrasound 10/31/2024.  MRI/MRA of the head 10/31/2024.    FINDINGS:  Somewhat motion limited exam.    The right common carotid artery demonstrates no hemodynamically significant stenosis. The cervical right internal carotid artery demonstrates no hemodynamically significant stenosis.    The left common carotid artery demonstrates no hemodynamically significant stenosis. The cervical left internal carotid artery demonstrates no hemodynamically significant stenosis.    Extracranial vertebral arteries: Vertebral arteries are codominant.  Vertebral arteries are normal to the level of the foramen magnum.     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  Impression: 1. Negative limited " noncontrast MRA of the neck.  No hemodynamically significant stenosis identified.    Electronically signed by: Kendall Lee  Date:    11/01/2024  Time:    12:35         ASSESSMENT & PLAN:    Problem   Weakness of Both Lower Extremities   Brain Mass   Stroke     Neuro  Brain mass  Assessment & Plan  Consult neurosurgery  Consult Heme-Onc    * Stroke  Assessment & Plan  Patient given loading dose of ASA and Plavix onset of initial stroke symptoms  Initiate stroke protocol  Trend NIH stroke scale  STAT CT of head performed left temporoparietal soft tissue prominence with demineralization of the underlying bone and suspected extra-axial mass in this region which is nonspecific and incompletely evaluated.   MRI brain acute infarct of the right caudate head.  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  Mass abuts the left temporal lobe without resulting in significant mass effect or vasogenic edema.  Chronic microvascular ischemic changes.  MRA unremarkable MRI of brain  Bilateral carotid ultrasound-No evidence of a hemodynamically significant carotid bifurcation stenosis.  2-D echo with bubble study if not done previously  Obtain fasting lipids  Statin therapy: Atorvastatin- 40 mg oral daily  TSH, FT4, RPR, HgA1c, B12, Folate  Continue ASA 81 mg  Seizures precautions  Ativan 1 mg PRN Seizures / call neurologist after first dose  PT/OT/SLP to assess and treat  Hold ACEi, beta-blocker, etc while allowing permissive hypertension per stroke protocol    Orthopedic  Weakness of both lower extremities  Assessment & Plan  MRI lumbar- Multilevel degenerative changes of the lumbar spine as detailed above.  There is moderate spinal canal stenosis at L3-L4 and L4-L5 and there is severe bilateral neural foraminal narrowing at L4-L5.  CT entire spine- There is degenerative change throughout the spine, most significant in the lower lumbar spine as well as in the cervical spine.  There is however no  obvious acute fracture or traumatic malalignment.  The entire spine was obtained in the large field of view.  PT/OT to assess and treat  11/02/2024 MRI cervical spine Disc bulging and spondylosis at C4-C5 and C5-C6, with severe spinal canal stenosis, cervical cord compression and cord signal alteration suggesting edema and or myelopathy 2. Broad-based disc bulging producing moderate spinal canal stenosis at C3-C4.  11/012/2024 MRA Negative limited noncontrast MRA of the neck.     Other  Syncope and collapse  Assessment & Plan  Routine EEG ordered        Thank you kindly for including us in the care of this patient. Please do not hesitate to contact us with any questions.      Maegan Jesus NP  Neurology/vascular Neurology  Date of Service: 11/02/2024  7:26 AM    --------------------------------------------------------------------------------------------------------------------------------------------------------------------------------------------------------------------------------------------------------------  Please note: This note was transcribed using voice recognition software. Because of this technology there are often uinintended grammatical, spelling, and other transcription errors. Please disregard these errors.     I, Dr. Jamil Lopez, have personally seen and examined the patient with my advanced provider and agree with above. I personally did a focused exam, and reviewed all necessary clinical information. I discussed my management plan with my NP and agree with above.   Today with significant quadraparesis on my exam (4- in all extremities), ordered stat MRI C spine. Neurosurgery have been following. I communicated with Neurosugery. Close neurological monitoring.    Diego Lopez MD. FAAN.    [facility]  [phone]

## 2024-11-02 NOTE — CONSULTS
[facility]  Hematology/Oncology  Consult Note    Patient Name: Shanell Mahmood  MRN: [id number]  Admission Date: 10/31/2024  Hospital Length of Stay: 0 days  Code Status: Full Code   Attending Provider: Joe Zaman MD  Consulting Provider: Therese Broussard MD  Primary Care Physician: Nicole, Primary Doctor  Principal Problem:Stroke    Inpatient consult to Hematology/Oncology  Consult performed by: Therese Broussard MD  Consult ordered by: Joe Zaman MD        Subjective:     HPI:  74-year-old woman CKD stage 5 history of CVA in 2011 closure of foramina ovale in 2011 pulmonary hypertension with continuous oxygen at home and history of anemia.  Patient had acute onset of possible syncopal episode while she was at pain management clinic office she does take prescription hydrocodone for back pain and arthritis patient was hypotensive was drowsy EMS gave a fluid challenge and Narcan at ER patient was more awake and alert patient has a known brain mass that was being worked up in outpatient setting.  She lives in [facility].  Patient had similar episode earlier this year and cardiology had worked patient up she was orthostatic at that time as well.  Patient reported feeling weak to upper extremities could not raise her arms she had residual urine in her bladder.  MRI of brain done in ED showed acute infarct right caudate head.  Patient started on aspirin and Plavix also found to have AFib.  Neurology and cardiology following patient due to brain mass Oncology has been consulted patient is in [place] visiting family      Medications:  Continuous Infusions:   NORepinephrine bitartrate-D5W  0-3 mcg/kg/min Intravenous Continuous 10.6 mL/hr at 11/01/24 1847 0.04 mcg/kg/min at 11/01/24 1847     Scheduled Meds:   allopurinoL  100 mg Oral QHS    aspirin  81 mg Oral Daily    atorvastatin  40 mg Oral Daily    clopidogreL  75 mg Oral Daily    colchicine  0.6 mg Oral BID    midodrine  10 mg Oral TID     montelukast  10 mg Oral Daily    pantoprazole  40 mg Oral Daily    [START ON 11/2/2024] piperacillin-tazobactam (Zosyn) IV (PEDS and ADULTS) (extended infusion is not appropriate)  3.375 g Intravenous Q8H     PRN Meds:  Current Facility-Administered Medications:     acetaminophen, 650 mg, Oral, Q4H PRN    albuterol sulfate, 2.5 mg, Nebulization, Q6H PRN    dextrose 10%, 12.5 g, Intravenous, PRN    dextrose 10%, 25 g, Intravenous, PRN    glucagon (human recombinant), 1 mg, Intramuscular, PRN    glucose, 16 g, Oral, PRN    glucose, 24 g, Oral, PRN    magnesium oxide, 800 mg, Oral, PRN    magnesium oxide, 800 mg, Oral, PRN    naloxone, 0.02 mg, Intravenous, PRN    ondansetron, 4 mg, Intravenous, Q8H PRN    potassium bicarbonate, 35 mEq, Oral, PRN    potassium bicarbonate, 50 mEq, Oral, PRN    potassium bicarbonate, 60 mEq, Oral, PRN    potassium, sodium phosphates, 2 packet, Oral, PRN    potassium, sodium phosphates, 2 packet, Oral, PRN    potassium, sodium phosphates, 2 packet, Oral, PRN    prochlorperazine, 5 mg, Intravenous, Q6H PRN    sodium chloride 0.9%, 500 mL, Intravenous, PRN    sodium chloride 0.9%, 10 mL, Intravenous, Q12H PRN    sodium chloride 0.9%, 10 mL, Intravenous, PRN         Family History    None       Tobacco Use    Smoking status: Not on file    Smokeless tobacco: Not on file   Substance and Sexual Activity    Alcohol use: Not on file    Drug use: Not on file    Sexual activity: Not on file       Review of Systems  As above patient reports generalized weakness of upper extremities and lower extremities poor sensations to both lower extremities usually she is able to perform all activities of normal daily living she denies chest pain, palpitations, nausea, vomiting or headaches   Denies cough with sputum production fever chills rigors   Denies abdominal pain nausea vomiting or diarrhea  Denies skin rashes  Objective:     Vital Signs (Most Recent):  Temp: 98 °F (36.7 °C) (11/01/24 1930)  Pulse: 70  (11/01/24 1930)  Resp: 16 (11/01/24 1930)  BP: (!) 104/57 (11/01/24 1930)  SpO2: (!) 92 % (11/01/24 1930) Vital Signs (24h Range):  Temp:  [96.5 °F (35.8 °C)-98.7 °F (37.1 °C)] 98 °F (36.7 °C)  Pulse:  [53-89] 70  Resp:  [15-20] 16  SpO2:  [71 %-100 %] 92 %  BP: ()/(40-73) 104/57     Weight: 72.7 kg (160 lb 4.4 oz)  Body mass index is 25.1 kg/m².  Body surface area is 1.85 meters squared.      Intake/Output Summary (Last 24 hours) at 11/1/2024 2044  Last data filed at 11/1/2024 1253  Gross per 24 hour   Intake 740 ml   Output 650 ml   Net 90 ml       Physical Exam  VITAL SIGNS:  as above   GENERAL: appears well-built, well-nourished.  No anxiety, no agitation, and in no distress.  Patient is awake, alert, oriented and cooperative.  HEENT:  Showed no congestion. Trachea is central no obvious icterus or pallor noted no hoarseness. no obvious JVD   NECK:  Supple.  No JVD. No obvious cervical submental or supraclavicular adenopathy.  RS:the visualized portion of  Chest expands well. chest appears symmetric, no audible wheezes.  No dyspnea recognized  ABDOMEN:  abdomen appears undistended.  EXTREMITIES:  Without edema.  NEUROLOGICAL:  Patient is not actively participating in discussion in the room she seems drowsy she does have difficulty lifting her arms all legs off the bed  SKIN MUSCULOSKELETAL: no joint or skeletal deformity, no clubbing of nails.  No visible rash ecchymosis or petechiae   Significant Labs:   Lab Results   Component Value Date    WBC 3.78 (L) 11/01/2024    HGB 12.0 11/01/2024    HCT 38.3 11/01/2024    MCV 87 11/01/2024     (L) 11/01/2024      CMP  Sodium   Date Value Ref Range Status   11/01/2024 143 136 - 145 mmol/L Final     Potassium   Date Value Ref Range Status   11/01/2024 4.3 3.5 - 5.1 mmol/L Final     Chloride   Date Value Ref Range Status   11/01/2024 111 (H) 95 - 110 mmol/L Final     CO2   Date Value Ref Range Status   11/01/2024 21 (L) 23 - 29 mmol/L Final     Glucose   Date  Value Ref Range Status   11/01/2024 78 70 - 110 mg/dL Final     BUN   Date Value Ref Range Status   11/01/2024 30 (H) 8 - 23 mg/dL Final     Creatinine   Date Value Ref Range Status   11/01/2024 1.7 (H) 0.5 - 1.4 mg/dL Final     Calcium   Date Value Ref Range Status   11/01/2024 8.5 (L) 8.7 - 10.5 mg/dL Final     Total Protein   Date Value Ref Range Status   11/01/2024 5.7 (L) 6.0 - 8.4 g/dL Final     Albumin   Date Value Ref Range Status   11/01/2024 2.6 (L) 3.5 - 5.2 g/dL Final     Total Bilirubin   Date Value Ref Range Status   11/01/2024 0.4 0.1 - 1.0 mg/dL Final     Comment:     For infants and newborns, interpretation of results should be based  on gestational age, weight and in agreement with clinical  observations.    Premature Infant recommended reference ranges:  Up to 24 hours.............<8.0 mg/dL  Up to 48 hours............<12.0 mg/dL  3-5 days..................<15.0 mg/dL  6-29 days.................<15.0 mg/dL       Alkaline Phosphatase   Date Value Ref Range Status   11/01/2024 85 40 - 150 U/L Final     AST   Date Value Ref Range Status   11/01/2024 16 10 - 40 U/L Final     ALT   Date Value Ref Range Status   11/01/2024 11 10 - 44 U/L Final     Anion Gap   Date Value Ref Range Status   11/01/2024 11 8 - 16 mmol/L Final     eGFR   Date Value Ref Range Status   11/01/2024 31 (A) >60 mL/min/1.73 m^2 Final        Diagnostic Results:    Impression:     1. Negative limited noncontrast MRA of the neck.  No hemodynamically significant stenosis identified.  MRAof brain unremarkable    Impression:MRI brain     1. Acute infarct in the right caudate head.  2. 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  The mass abuts the left temporal lobe, without resulting in significant mass effect or vasogenic edema.  Differential includes a primary or metastatic osseous lesion, an atypical meningioma, or additional etiologies.  3. Chronic microvascular ischemic changes.  This  report was flagged in Epic as abnormal.    FINDINGS:  CT chest August 16, 2024  Solid 4.1 x 1.7 cm pleural-based soft tissue nodular density in the lateral periphery of the right lower lobe. Numerous 4 mm or smaller groundglass and solid nodular densities are noted scattered throughout the right lung.   Assessment/Plan:     Active Diagnoses:    Diagnosis Date Noted POA    PRINCIPAL PROBLEM:  Stroke [I63.9] 10/31/2024 Yes    Sinus arrhythmia seen on electrocardiogram [I49.8] 11/01/2024 Yes    Anemia [D64.9] 11/01/2024 Yes    Thrombocytopenia [D69.6] 11/01/2024 Yes    Hypotension [I95.9] 11/01/2024 No    Acute urinary retention [R33.8] 10/31/2024 Yes    Syncope and collapse [R55] 10/31/2024 Yes    CKD (chronic kidney disease), stage IV [N18.4] 10/31/2024 Yes    Chronic hypoxic respiratory failure, on home oxygen therapy [J96.11, Z99.81] 10/31/2024 Not Applicable    Pulmonary hypertension [I27.20] 10/31/2024 Yes    Secondary hyperparathyroidism [N25.81] 10/31/2024 Yes    Brain mass [G93.89] 10/31/2024 Yes    Weakness of both lower extremities [R29.898] 10/31/2024 Yes      Problems Resolved During this Admission:   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  Patient lives in [facility] and has appointments for PET scan outpatient  Will need tissue diagnosis of lung mass and all brain lesion for further Oncology input  Patient may wish to go back to [facility] to pursue above workup  If she chooses to stay here please pursue tissue diagnosis Neurosurgery consult Pulmonary consult for biopsies oncology will follow along  She will have her first appointment with Dr. Ray Mcintyre at  [facility]  on 11/4/24     Stage 5 CKD follows with Nephrology at [facility]     Syncopal episode with acute stroke patient has had echocardiogram done cardiology following neurology following patient had DA PT and stat    Thrombocytopenia which is mild could occur in stroke  symptoms will need to follow  Therese Broussard MD  Hematology/Oncology  [facility]

## 2024-11-02 NOTE — PLAN OF CARE
SW received a secure chat to inform of rehab placement.  YORDY reached out to Ivanna CHAN from [facility] who shared that pt is appropriate for Rehab.  Pt is currently getting imaging. During rounds, MD shared transfer is depending on imaging. If pt does not transfer, pt choice (rehab and SNF) to be presented.     11/02/24 1305   Post-Acute Status   Post-Acute Authorization Placement   Discharge Plan   Discharge Plan A Rehab   Discharge Plan B   (vs. pt transferring depending on imaging.)

## 2024-11-02 NOTE — PT/OT/SLP PROGRESS
Physical Therapy      Patient Name:  Shanell Mahmood   MRN:  [id number]    Patient not seen today secondary to Testing/imaging (xray/CT/MRI) (attempted x2. off floor to MRI). Will follow-up 11/3/24.

## 2024-11-02 NOTE — PLAN OF CARE
Problem: Stroke, Ischemic (Includes Transient Ischemic Attack)  Goal: Optimal Nutrition Intake  Outcome: Progressing  Intervention: Promote and Optimize Fluid and Food Intake  Flowsheets (Taken 11/2/2024 7993)  Oral Nutrition Promotion:   calorie-dense foods provided   nutrition counseling provided     Recommendations  1.) Continue cardiac diet restrictions.   2.) RD provided Heart Health Diet education with handouts and contact information.   3.) sHPT: suggest updating PTH level. If PTH >300, suggest calcitriol 0.25mcg QD.   4.) Menu  to aid in food preferences.    Goals:   1.) Pt to consume/tolerate >75% of meals by RD follow up.   2.) PTH level to be updated by RD follow up.  Nutrition Goal Status: new

## 2024-11-02 NOTE — CONSULTS
[facility]  Adult Nutrition   Consult Note (Nutrition Education)     SUMMARY     Recommendations  1.) Continue cardiac diet restrictions.   2.) RD provided Heart Health Diet education with handouts and contact information.   3.) sHPT: suggest updating PTH level. If PTH >300, suggest calcitriol 0.25mcg QD.   4.) Menu  to aid in food preferences.    Goals:   1.) Pt to consume/tolerate >75% of meals by RD follow up.   2.) PTH level to be updated by RD follow up.  Nutrition Goal Status: new    Nutrition Diagnosis PES Statement: Food- and nutrition-related knowledge deficit related to use for new education as evidenced by admit for stoke requiring additional diet modifications.    Dietitian Rounds Brief  Consult received for stroke pathway. 74 year old female with a previous medical history of anemia, Pulmonary hypertension on 4 liters continuous oxygen at home, arthritis, CKD V, Osteoporosis, secondary hyperprathyroidism, S/P closure of patent foramen ovale in 2011, CVA in 2011 with no residuals, chronic back pain, HLD, Gout, Brain Mass and thyroid diease who presented to the ED after unwitnessed syncopal episode. Pt sitting up in bed reports feeling ok. Her appetite is fair, consuming 50% of meals. She endorses following a low Na diet at home and monitors her fluid intake.  She reports consuming 2-3 meals daily. No ONS at home and not interested at this time. She denies chew/swallowing issues. Wt reviewed. UBW 112kg (12/2016), CBW 72.7kg reflecting 35% total weight loss x 8 years (average of 4% per year), not significant. No skin breakdown per chart. NFPE completed with no s/s of wasting.    Educated pt on heart healthy diet. Education focused on including healthy fats- gave examples and lowering LDL levels by excluding saturated/trans fat in the diet. Went over types of foods that have saturated/trans fat. Encouraged cooking with olive oil instead of butter. Choosing whole grains over refined  grains, choosing canned foods with no salt added and plain frozen veggies instead of veggies cooked in butter and cream sauces. Encouraged patient to choose leaner cuts of meat and decreasing high fat meats such as turner, sausage, hot dogs, etc. Educated pt on decreasing sodium in diet. To try not to cook with salt and use herbs and spices to make food more flavorful. To limit eating out-if patient chooses to eat out, informed patient to discuss with  to cook meats and veggies with no salt and olive oil instead of butter. Provided handouts on all of these topics for pt to use in the future. Also provided RD contact information for pt to call with future questions.       Nutrition Related Social Determinants of Health:   Food Insecurity: Patient Declined (11/1/2024)    Hunger Vital Sign     Worried About Running Out of Food in the Last Year: Patient declined     Ran Out of Food in the Last Year: Patient declined         Malnutrition Assessment     Skin (Micronutrient): none  Musculoskeletal/Lower Extremities: none       Energy Intake (Malnutrition): less than 75% for greater than or equal to 3 months   Orbital Region (Subcutaneous Fat Loss): well nourished  Upper Arm Region (Subcutaneous Fat Loss): well nourished  Thoracic and Lumbar Region: well nourished   Confucianism Region (Muscle Loss): well nourished  Clavicle Bone Region (Muscle Loss): well nourished  Clavicle and Acromion Bone Region (Muscle Loss): well nourished  Scapular Bone Region (Muscle Loss): well nourished  Dorsal Hand (Muscle Loss): well nourished  Patellar Region (Muscle Loss): well nourished  Anterior Thigh Region (Muscle Loss): well nourished  Posterior Calf Region (Muscle Loss): well nourished       Diet order:   Cardiac    Evaluation of Received Nutrient/Fluid Intake  Energy Calories Required: not meeting needs  Protein Required: not meeting needs  Fluid Required: meeting needs  Tolerance: tolerating     % Intake of Estimated Energy Needs:  "50%  % Meal Intake: 50 - 75 %      Intake/Output Summary (Last 24 hours) at 2024 0927  Last data filed at 2024 0816  Gross per 24 hour   Intake 765 ml   Output 840 ml   Net -75 ml        Anthropometrics  Temp: 98.2 °F (36.8 °C)  Height Method: Stated  Height: 5' 7" (170.2 cm)  Height (inches): 67 in  Weight Method: Bed Scale  Weight: 72.7 kg (160 lb 4.4 oz)  Weight (lb): 160.28 lb  Ideal Body Weight (IBW), Female: 135 lb  % Ideal Body Weight, Female (lb): 115.56 %  BMI (Calculated): 25.1  BMI Grade: 25 - 29.9 - overweight  Usual Body Weight (UBW), k kg (2016)  % Usual Body Weight: 65.05       Estimated/Assessed Needs  Weight Used For Calorie Calculations: 72.7 kg (160 lb 4.4 oz)  Energy Calorie Requirements (kcal): 3776-3964  Energy Need Method: Kcal/kg (25-30)  Protein Requirements: 58-73 (0.8-1.0 (moderate protein))  Weight Used For Protein Calculations: 72.7 kg (160 lb 4.4 oz)     Estimated Fluid Requirement Method: RDA Method  RDA Method (mL): 1818       Reason for Assessment  Reason For Assessment: consult (stroke pathway)  Diagnosis: stroke/CVA  Nutrition Discharge Planning: Cardiac, moderate protein intake (0.8-1.0 g/kg)    Nutrition/Diet History  Patient Reported Diet/Restrictions/Preferences: heart healthy  Spiritual, Cultural Beliefs, Synagogue Practices, Values that Affect Care: no  Factors Affecting Nutritional Intake: None identified at this time    Nutrition Risk Screen  Nutrition Risk Screen: no indicators present     MST Score: 0  Have you recently lost weight without trying?: No  Weight loss score: 0  Have you been eating poorly because of a decreased appetite?: No  Appetite score: 0       Weight History:  Wt Readings from Last 10 Encounters:   24 72.7 kg (160 lb 4.4 oz)      Lab/Procedures/Meds: Pertinent Labs/Meds Reviewed    Medications:Pertinent Medications Reviewed  Scheduled Meds:   allopurinoL  100 mg Oral QHS    aspirin  81 mg Oral Daily    atorvastatin  40 mg Oral " Daily    clopidogreL  75 mg Oral Daily    colchicine  0.6 mg Oral BID    midodrine  10 mg Oral TID    montelukast  10 mg Oral Daily    pantoprazole  40 mg Oral Daily    piperacillin-tazobactam (Zosyn) IV (PEDS and ADULTS) (extended infusion is not appropriate)  3.375 g Intravenous Q8H     Continuous Infusions:   NORepinephrine bitartrate-D5W  0-3 mcg/kg/min Intravenous Continuous 2.7 mL/hr at 11/02/24 0849 0.01 mcg/kg/min at 11/02/24 0849     PRN Meds:.  Current Facility-Administered Medications:     acetaminophen, 650 mg, Oral, Q4H PRN    albuterol sulfate, 2.5 mg, Nebulization, Q6H PRN    dextrose 10%, 12.5 g, Intravenous, PRN    dextrose 10%, 25 g, Intravenous, PRN    glucagon (human recombinant), 1 mg, Intramuscular, PRN    glucose, 16 g, Oral, PRN    glucose, 24 g, Oral, PRN    magnesium oxide, 800 mg, Oral, PRN    magnesium oxide, 800 mg, Oral, PRN    naloxone, 0.02 mg, Intravenous, PRN    ondansetron, 4 mg, Intravenous, Q8H PRN    potassium bicarbonate, 35 mEq, Oral, PRN    potassium bicarbonate, 50 mEq, Oral, PRN    potassium bicarbonate, 60 mEq, Oral, PRN    potassium, sodium phosphates, 2 packet, Oral, PRN    potassium, sodium phosphates, 2 packet, Oral, PRN    potassium, sodium phosphates, 2 packet, Oral, PRN    prochlorperazine, 5 mg, Intravenous, Q6H PRN    sodium chloride 0.9%, 500 mL, Intravenous, PRN    sodium chloride 0.9%, 10 mL, Intravenous, Q12H PRN    sodium chloride 0.9%, 10 mL, Intravenous, PRN    Labs: Pertinent Labs Reviewed  Clinical Chemistry:  Recent Labs   Lab 10/31/24  1532 10/31/24  1532 11/01/24  0551 11/02/24  0505     --  143 142   K 3.8  --  4.3 4.2   *  --  111* 110   CO2 22*  --  21* 21*   GLU 96  --  78 99   BUN 31*  --  30* 31*   CREATININE 1.7*  --  1.7* 1.8*   CALCIUM 8.3*  --  8.5* 8.1*   PROT 5.8*  --  5.7* 5.6*   ALBUMIN 2.8*  --  2.6* 2.8*   BILITOT 0.2  --  0.4 0.4   ALKPHOS 76  --  85 81   AST 13  --  16 10   ALT 11  --  11 6*   ANIONGAP 8  --  11 11   MG   --    < > 1.9 2.0   PHOS  --   --  3.8 3.6    < > = values in this interval not displayed.     CBC:   Recent Labs   Lab 11/02/24  0505   WBC 5.68   RBC 5.00   HGB 13.4   HCT 43.3      MCV 87   MCH 26.8*   MCHC 30.9*     Lipid Panel:  Recent Labs   Lab 11/01/24  0551   CHOL 125   HDL 51   LDLCALC 56.6*   TRIG 87   CHOLHDL 40.8     Cardiac Profile:  Recent Labs   Lab 10/31/24  1532 11/01/24  0145 11/01/24  0551   BNP 39  --   --    TROPONINI 0.011 0.019 0.026     Diabetes:  Recent Labs   Lab 11/01/24  0551 11/02/24  0823   HGBA1C 5.2  --    POCTGLUCOSE  --  115*     Thyroid & Parathyroid:  Recent Labs   Lab 11/01/24  0551   TSH 0.474       Monitor and Evaluation  Food and Nutrient Intake: energy intake, food and beverage intake  Food and Nutrient Adminstration: diet order  Knowledge/Beliefs/Attitudes: food and nutrition knowledge/skill  Physical Activity and Function: nutrition-related ADLs and IADLs  Anthropometric Measurements: weight, weight change  Biochemical Data, Medical Tests and Procedures: electrolyte and renal panel, gastrointestinal profile, glucose/endocrine profile  Nutrition-Focused Physical Findings: overall appearance     Nutrition Risk  Level of Risk/Frequency of Follow-up:  (1x/week)     Nutrition Follow-Up  RD Follow-up?: Yes    Portia Otero, FAIZA 11/02/2024 9:27 AM

## 2024-11-02 NOTE — PROGRESS NOTES
MRI cervical spine reviewed showing severe cord compression at C4-5 and C5-6, intramedullary signal change.     Central cord syndrome    Discussed with cardiology and neurology    OK to hold ASA and Plavix.     I recommended a C3-6 laminectomy and posterior instrumented fusion to decompress the spinal cord and to prevent worsening myelopathy symptoms and possibly improving her functional outcome.     Will discuss risks of surgery including death, coma, bleeding, infection, failure of surgery, CSF leak, nerve root injury, spinal cord injury, ureter injury, weakness, paralysis, peripheral neuropathy, malplaced hardware, migration of hardware, non-union, need for reoperation. Patient understands the risks and would like to proceed with surgery.    Tentative surgery is planned for tomorrow morning    Keep NPO at midnight  Keep [place] collar on  Keep MAPs at 80 or higher

## 2024-11-02 NOTE — CARE UPDATE
11/02/24 0741   Patient Assessment/Suction   Level of Consciousness (AVPU) responds to voice   Respiratory Effort Normal;Unlabored   All Lung Fields Breath Sounds diminished   PRE-TX-O2   Device (Oxygen Therapy) nasal cannula   $ Is the patient on Low Flow Oxygen? Yes   Flow (L/min) (Oxygen Therapy) 2   SpO2 97 %   Pulse Oximetry Type Continuous   $ Pulse Oximetry - Multiple Charge Pulse Oximetry - Multiple   Pulse 62   Resp 15   Aerosol Therapy   $ Aerosol Therapy Charges PRN treatment not required

## 2024-11-02 NOTE — ASSESSMENT & PLAN NOTE
Patient given loading dose of ASA and Plavix onset of initial stroke symptoms  Initiate stroke protocol  Trend NIH stroke scale  STAT CT of head performed left temporoparietal soft tissue prominence with demineralization of the underlying bone and suspected extra-axial mass in this region which is nonspecific and incompletely evaluated.   MRI brain acute infarct of the right caudate head.  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  Mass abuts the left temporal lobe without resulting in significant mass effect or vasogenic edema.  Chronic microvascular ischemic changes.  MRA unremarkable MRI of brain  Bilateral carotid ultrasound-No evidence of a hemodynamically significant carotid bifurcation stenosis.  2-D echo with bubble study if not done previously  Obtain fasting lipids  Statin therapy: Atorvastatin- 40 mg oral daily  TSH, FT4, RPR, HgA1c, B12, Folate  Continue ASA 81 mg  Seizures precautions  Ativan 1 mg PRN Seizures / call neurologist after first dose  PT/OT/SLP to assess and treat  Hold ACEi, beta-blocker, etc while allowing permissive hypertension per stroke protocol

## 2024-11-03 ENCOUNTER — ANESTHESIA (OUTPATIENT)
Dept: SURGERY | Facility: HOSPITAL | Age: 74
End: 2024-11-03
Payer: COMMERCIAL

## 2024-11-03 ENCOUNTER — ANESTHESIA EVENT (OUTPATIENT)
Dept: SURGERY | Facility: HOSPITAL | Age: 74
End: 2024-11-03
Payer: COMMERCIAL

## 2024-11-03 PROBLEM — S14.129A CENTRAL CORD SYNDROME: Status: ACTIVE | Noted: 2024-11-03

## 2024-11-03 PROCEDURE — D9220A PRA ANESTHESIA: Mod: ,,, | Performed by: STUDENT IN AN ORGANIZED HEALTH CARE EDUCATION/TRAINING PROGRAM

## 2024-11-03 PROCEDURE — 63600175 PHARM REV CODE 636 W HCPCS: Performed by: NURSE ANESTHETIST, CERTIFIED REGISTERED

## 2024-11-03 PROCEDURE — 25000003 PHARM REV CODE 250: Performed by: NURSE ANESTHETIST, CERTIFIED REGISTERED

## 2024-11-03 RX ORDER — LIDOCAINE HYDROCHLORIDE 20 MG/ML
INJECTION, SOLUTION EPIDURAL; INFILTRATION; INTRACAUDAL; PERINEURAL
Status: DISCONTINUED | OUTPATIENT
Start: 2024-11-03 | End: 2024-11-03

## 2024-11-03 RX ORDER — FAMOTIDINE 10 MG/ML
INJECTION INTRAVENOUS
Status: DISCONTINUED | OUTPATIENT
Start: 2024-11-03 | End: 2024-11-03

## 2024-11-03 RX ORDER — FENTANYL CITRATE 50 UG/ML
INJECTION, SOLUTION INTRAMUSCULAR; INTRAVENOUS
Status: DISCONTINUED | OUTPATIENT
Start: 2024-11-03 | End: 2024-11-03

## 2024-11-03 RX ORDER — ROCURONIUM BROMIDE 10 MG/ML
INJECTION, SOLUTION INTRAVENOUS
Status: DISCONTINUED | OUTPATIENT
Start: 2024-11-03 | End: 2024-11-03

## 2024-11-03 RX ORDER — PROPOFOL 10 MG/ML
VIAL (ML) INTRAVENOUS CONTINUOUS PRN
Status: DISCONTINUED | OUTPATIENT
Start: 2024-11-03 | End: 2024-11-03

## 2024-11-03 RX ORDER — PROPOFOL 10 MG/ML
VIAL (ML) INTRAVENOUS
Status: DISCONTINUED | OUTPATIENT
Start: 2024-11-03 | End: 2024-11-03

## 2024-11-03 RX ORDER — PHENYLEPHRINE HYDROCHLORIDE 10 MG/ML
INJECTION INTRAVENOUS
Status: DISCONTINUED | OUTPATIENT
Start: 2024-11-03 | End: 2024-11-03

## 2024-11-03 RX ORDER — CEFAZOLIN SODIUM 1 G/50ML
SOLUTION INTRAVENOUS
Status: DISCONTINUED | OUTPATIENT
Start: 2024-11-03 | End: 2024-11-03

## 2024-11-03 RX ORDER — ACETAMINOPHEN 10 MG/ML
INJECTION, SOLUTION INTRAVENOUS
Status: DISCONTINUED | OUTPATIENT
Start: 2024-11-03 | End: 2024-11-03

## 2024-11-03 RX ORDER — ONDANSETRON HYDROCHLORIDE 2 MG/ML
INJECTION, SOLUTION INTRAVENOUS
Status: DISCONTINUED | OUTPATIENT
Start: 2024-11-03 | End: 2024-11-03

## 2024-11-03 RX ADMIN — FENTANYL CITRATE 25 MCG: 50 INJECTION INTRAMUSCULAR; INTRAVENOUS at 08:11

## 2024-11-03 RX ADMIN — FENTANYL CITRATE 50 MCG: 50 INJECTION INTRAMUSCULAR; INTRAVENOUS at 11:11

## 2024-11-03 RX ADMIN — SUGAMMADEX 200 MG: 100 INJECTION, SOLUTION INTRAVENOUS at 09:11

## 2024-11-03 RX ADMIN — FENTANYL CITRATE 50 MCG: 50 INJECTION INTRAMUSCULAR; INTRAVENOUS at 09:11

## 2024-11-03 RX ADMIN — ONDANSETRON 4 MG: 2 INJECTION INTRAMUSCULAR; INTRAVENOUS at 09:11

## 2024-11-03 RX ADMIN — SODIUM CHLORIDE: 0.9 INJECTION, SOLUTION INTRAVENOUS at 08:11

## 2024-11-03 RX ADMIN — PROPOFOL 100 MG: 10 INJECTION, EMULSION INTRAVENOUS at 08:11

## 2024-11-03 RX ADMIN — FAMOTIDINE 20 MG: 10 INJECTION, SOLUTION INTRAVENOUS at 09:11

## 2024-11-03 RX ADMIN — ACETAMINOPHEN 1000 MG: 10 INJECTION, SOLUTION INTRAVENOUS at 09:11

## 2024-11-03 RX ADMIN — PHENYLEPHRINE HYDROCHLORIDE 100 MCG: 10 INJECTION INTRAVENOUS at 09:11

## 2024-11-03 RX ADMIN — LIDOCAINE HYDROCHLORIDE 50 MG: 20 INJECTION, SOLUTION INTRAVENOUS at 08:11

## 2024-11-03 RX ADMIN — ROCURONIUM BROMIDE 20 MG: 10 INJECTION, SOLUTION INTRAVENOUS at 08:11

## 2024-11-03 RX ADMIN — PROPOFOL 50 MCG/KG/MIN: 10 INJECTION, EMULSION INTRAVENOUS at 09:11

## 2024-11-03 RX ADMIN — FENTANYL CITRATE 50 MCG: 50 INJECTION INTRAMUSCULAR; INTRAVENOUS at 08:11

## 2024-11-03 RX ADMIN — CEFAZOLIN SODIUM 2 G: 1 SOLUTION INTRAVENOUS at 08:11

## 2024-11-03 NOTE — PT/OT/SLP PROGRESS
Occupational Therapy      Patient Name:  Shanell Mahmood   MRN:  [id number]    Patient not seen today secondary to Off the floor for procedure/surgery (laminectomy); current OT order discontinued; please place new orders when pt is appropriate to resume participation.    11/3/2024

## 2024-11-03 NOTE — OP NOTE
Date of surgery 11/03/2024    Preop diagnosis   1. Central cord syndrome   2. Cervical spondylosis with myelopathy and radiculopathy    Postop diagnosis   Same    Surgery   C2-3, C3-4, C4-5, C5-6, C6-7 laminectomy  C3 through 6 posterior segmental instrumentation using DePuy Symphony system  C3-4, C4-5, C5-6 posterolateral arthrodesis using autograft harvested from the same incision and allograft DBM  Neuro monitoring  Fluoroscopy    Surgeon   Kobi Corona MD    Indication   This is a very pleasant 74 y.o. female, she has a history of pulmonary hypertension chronic kidney disease stage 5, requiring evaluation for dialysis, osteoporosis status post closure of patent foramen ovale in 2011, CVA in 2011, chronic low back pain, who had a fall from standing and was transferred from [facility] to [facility], [place] for hypotension.  She is followed in [place] for a left posterior frontal/temporal extra-axial mass.  In light of the newly discovered brain mass, a PET scan was ordered.  Differential diagnosis was atypical meningioma versus dural-based metastasis.  Patient had a CT chest without contrast in August 20, 2024 showing pleural-based nodule right lower lobe.  She also had a PET scan showing the long lesion.  The long lesion was not PET avid.     Patient reports that she has been having bilateral upper extremity and lower extremity weakness since the fall.  She is unable to raise her arms or legs against gravity.  She also reports numbness in her upper and lower extremities.             MRI cervical spine reviewed showing severe cord compression at C4-5 and C5-6, intramedullary signal change.      Central cord syndrome     Discussed with cardiology and neurology     OK to hold ASA and Plavix.      I recommended a C3-6 laminectomy and posterior instrumented fusion to decompress the spinal cord and to prevent worsening myelopathy symptoms and possibly improving her functional outcome.       Will discuss risks of surgery including death, coma, bleeding, infection, failure of surgery, CSF leak, nerve root injury, spinal cord injury, ureter injury, weakness, paralysis, peripheral neuropathy, malplaced hardware, migration of hardware, non-union, need for reoperation. Patient understands the risks and would like to proceed with surgery.             Procedure   The patient was intubated under general anesthesia and positioned prone on bolsters, all pressure points were carefully padded.  The head was fixated in a neutral position using the Parada 3 pins head de guzman.  The posterior hairline was shaved and the posterior cervical area was prepped and draped in a typical sterile fashion.  Using fluoroscopy we planned a midline incision from C2-C7.  Local anesthesia with 1% lidocaine with epi.  The skin was incised and hemostasis was carried out.  Subperiosteal dissection with the Bovie.  Placement of orthostatic retractor.  The posterior elements including C3, C4, C5, C6 and the superior lamina of C7 and inferior lamina of C2 were exposed.  The lateral masses of C3, C4, C5, C6 were also exposed.  Irrigation hemostasis.  Using the high-speed drill with the drilled full laminar struts at C3, C4, C5, C6 bilaterally.  The yellow ligament between C2-3, C3-4, C4-5, C5-6 and C6-7 was dissected and the whole posterior elements including the spinous processes and lamina of C3, C4, C5, C6 were removed.  The bone was kept as autograft.  The dura appeared well decompressed.  Neuro monitoring signal remained at baseline following the decompression.  Using fluoroscopy we then cannulated the lateral mass of C3, C4, C5, C6 bilaterally using a inferior medial to superior lateral trajectory.  We used a drill guide at 14 mm of length, the trajectory was verified with a ball-tip Feeler, tapped and bilateral 3.5 x 14 mm screws were positioned at C3, C4, C5, C6 bilaterally except on the left side at C6 we placed a 4.0 x 14 mm  screw.  Good placement of the screws was confirmed with fluoroscopy.  We then reduced precontoured titanium rods over the screw tulips with caps.  All caps were torqued.  Irrigation hemostasis.  The exposed bone including the facet joints at C3-4, C4-5, C5-6 and the lateral masses of C3, C4, C5 and C6 bilaterally were decorticated.  The bone dust was left in place.  We then used our mixture of autograft and allograft and placed it laterally to the instrumentation to achieve the posterolateral arthrodesis at C3-4, C4-5, C5-6 bilaterally.  We left a 10 [place] in the epidural space and tunneled a drain inferiorly through the skin.  The drain was fixated with 2-0 nylon.  The muscular fascia was closed with interrupted 0 Vicryl.  The dermal layer was closed with inverted interrupted 2-0 Vicryl.  The skin was closed with staples.  Blood loss was estimated at 500 cc.  No complication.

## 2024-11-03 NOTE — ASSESSMENT & PLAN NOTE
S/p (11/3/24)  C2-3, C3-4, C4-5, C5-6, C6-7 laminectomy  C3 through 6 posterior segmental instrumentation using [employer] system  C3-4, C4-5, C5-6 posterolateral arthrodesis using autograft harvested from the same incision and allograft DBM    Cervical collar in place

## 2024-11-03 NOTE — PROGRESS NOTES
[facility]  Department of Neurology  Neurology Progress Note        PATIENT NAME: Shanell Mahmood  MRN: [id number]  CSN: [id number]      TODAY'S DATE: 11/03/2024  ADMIT DATE: 10/31/2024                            CONSULTING PROVIDER: Maegan Jesus NP  CONSULT REQUESTED BY: Reyna Lopez DO      Reason for consult: Syncope      History obtained from chart review.    HPI per EMR: Shanell Mahmood is a 74 y.o. female with a previous medical history of anemia, Pulmonary hypertension on 4 liters continuous oxygen at home, arthritis, CKD V, Osteoporosis, secondary hyperprathyroidism, S/P closure of patent foramen ovale in 2011, CVA in 2011 with no residuals, chronic back pain, HLD, Gout, Brain Mass and thyroid diease who presented to the ED after unwitnessed syncopal episode. The patient was at her pain management office for re certification only. There were no procedures performed. She reports taking one hydrocodone 10mg today.  The last thing she remembers was walking down the hallway of the office and looking for something to cover her head from the rain and did not have any adverse symptoms or feelings at that time.  She has no recollection of the syncopal events. When she was initially evaluated by EMS, her blood pressure was in the 60s over 40s. She did require constant stimulation to remain awake. Fell and hit her head. Does not take any blood thinners. EMS evaluated her and gave her 1 mg of Narcan as well as 250 cc of fluid. Her pressure improved and she had become more alert. Initial ED workup was thorough and all imaging showing that included CT of neck, head and entire spine showed no acute processes. CBC showed anemia and CMP showed elevated creatinine consistent with history of CKD V. Drug screen positive for opioids but patient has a hydrocodone prescription. The patient is currently in the process of have a left sided brain mass visualized on MRI 10/8/24 evaluated that was an incidental finding  "after undergoing a PET scan for a pulmonary nodule on 9/6/24 with abnormal uptake noted in brain. She has her first appointment on 11/4/24. She is followed by nephrology who is currently monitoring her and there has been one attempt at AV fistual placement but it was unsuccessful due sclerotic changes. Patient reports she awoke this morning feeling well showered and dressed herself. She performs all of her own ADL's. She had one syncopal event in March 2024 and was evaluated by cardiology and informed it was an orthostatic episode. Patient was unable to complete orthostatic vital signs upon admission due to inability to stand stating " I feel as if I can't get my legs under me". When evaluated for admit exam the patient endorses that after the syncopal episode she endorses bilateral leg weakness with decreased sensation in both legs. She reports bilateral  weakness being unable to use her phone and difficulty raising both of her arms. NIH scale performed and total of 9 noted. Patient noted have 400ml of urine in bladder and unable to void. Patient reached a total of 528ml of urine without being able to void.  MRI/MRA of brain and MRI of lumbosacral spine ordered upon admit. MRA and MRI lower back showed no acute process. MRI brain showed Acute infarct in the right caudate head. Dr. Maldonado was called and he recommended MRA of neck in morning and load with aspirin and plavix. Initial EKG read as atrial fibrillation but upon review p waves were noted and this was discussed with the ED. Repeat EKG shows sinus arrhthymias with PACs. Patient admitted by hospital medicine for further evaluation and management.     Neurology consult: Patient seen and examined with DR. Bryant; POC discussed. Patient sitting up in the bed eating breakfast. She reports knowledge of the brain mass and that her legs just gave out beneath her. She states that she was at her pain clinic when this occurred. CT of head with left temporoparietal " soft tissue prominence with demineralization of the underlying bone and suspected extra-axial mass in this region which is nonspecific and incompletely evaluated. She denies any prodromal symptoms.      11/02/2024: Patient seen and examined with Dr. Bryant; POC discussed. The patient was transferred after her initial Neurological evaluation to [facility] after she began with hypotension of unclear etiology. Patient SBP dropped to 68 at one point so she was placed on Levophed and her Midodrine continued per primary team.Today on assessment the patient was with a C-collar and her neurological exam had change.  Yesterday patient was with some left-sided weakness but sitting up and eating breakfast without difficulties.  Today patient's left arm is much weaker along with her left leg.  Patient's right side as weaker than left.  Discussion with RN who did patient evaluation on arrival to the facility who reports patient was completely flaccid on her right side on arrival yesterday.  Patient is with drift in both arms.  She reports that she can barely move her right leg due to the pain in her spine however she is able to wiggle toes on both feet and move both lower extremities.  Dr. Jamil Lopez made decision to order MRA neck without contrast and an MRI cervical spine without contrast stat and hopes to identify possible causes worsening neurological exam.      11/03/2024:  Patient is seen and examined with Dr. Jamil Lopez; plan of care discussed.  Patient awake alert and oriented with cervical collar in place very family at the bedside she is being taken for surgery right now.  Critical care consult.    PREVIOUS MEDICAL HISTORY:  No past medical history on file.  PREVIOUS SURGICAL HISTORY:  No past surgical history on file.  FAMILY MEDICAL HISTORY:  No family history on file.  ALLERGIES:  Review of patient's allergies indicates:   Allergen Reactions    Codeine Palpitations     HOME MEDICATIONS:  Prior to Admission  medications    Medication Sig Start Date End Date Taking? Authorizing Provider   allopurinoL (ZYLOPRIM) 100 MG tablet Take 100 mg by mouth every evening. 10/14/24  Yes Provider, Historical   clotrimazole-betamethasone 1-0.05% (LOTRISONE) cream Apply 1 g topically Daily. 10/14/24  Yes Provider, Historical   colchicine (COLCRYS) 0.6 mg tablet Take 0.6 mg by mouth 2 (two) times daily. 10/14/24  Yes Provider, Historical   diclofenac sodium (VOLTAREN) 1 % Gel Apply 2 g topically 3 (three) times daily. 9/12/24  Yes Provider, Historical   DULoxetine (CYMBALTA) 60 MG capsule Take 60 mg by mouth once daily.   Yes Provider, Historical   ergocalciferol (ERGOCALCIFEROL) 50,000 unit Cap Take 50,000 Units by mouth every 7 days.   Yes Provider, Historical   fluticasone propionate (FLONASE) 50 mcg/actuation nasal spray 1 spray by Each Nostril route once daily. 8/12/24  Yes Provider, Historical   gabapentin (NEURONTIN) 100 MG capsule Take 2 capsules by mouth every 12 (twelve) hours. 8/12/24  Yes Provider, Historical   HYDROcodone-acetaminophen (NORCO)  mg per tablet Take 1 tablet by mouth every 4 to 6 hours as needed for Pain. 10/12/24  Yes Provider, Historical   LINZESS 290 mcg Cap capsule Take 290 mcg by mouth before breakfast. 10/14/24  Yes Provider, Historical   montelukast (SINGULAIR) 10 mg tablet Take 10 mg by mouth once daily. 10/14/24  Yes Provider, Historical   omeprazole (PRILOSEC) 40 MG capsule Take 40 mg by mouth Daily. 10/14/24  Yes Provider, Historical   OPSUMIT 10 mg Tab Take 10 mg by mouth once daily.   Yes Provider, Historical   simvastatin (ZOCOR) 40 MG tablet Take 40 mg by mouth Daily. 8/12/24  Yes Provider, Historical   tiZANidine (ZANAFLEX) 4 MG tablet Take 4 mg by mouth every 12 (twelve) hours as needed. 10/12/24  Yes Provider, Historical   torsemide (DEMADEX) 20 MG Tab Take 40 mg by mouth once daily. 10/12/24  Yes Provider, Historical   UPTRAVI 1,600 mcg Tab Take 1 tablet by mouth 2 (two) times a day.    Yes Provider, Historical   tadalafil (ADCIRCA) 20 mg Tab Take 20 mg by mouth every other day.    Provider, Historical     CURRENT SCHEDULED MEDICATIONS:   allopurinoL  100 mg Oral QHS    atorvastatin  40 mg Oral Daily    colchicine  0.6 mg Oral BID    midodrine  10 mg Oral TID    montelukast  10 mg Oral Daily    mupirocin   Nasal BID    pantoprazole  40 mg Oral Daily    piperacillin-tazobactam (Zosyn) IV (PEDS and ADULTS) (extended infusion is not appropriate)  3.375 g Intravenous Q8H     CURRENT INFUSIONS:   NORepinephrine bitartrate-D5W  0-3 mcg/kg/min Intravenous Continuous 26.6 mL/hr at 11/03/24 0545 0.1 mcg/kg/min at 11/03/24 0545     CURRENT PRN MEDICATIONS:    Current Facility-Administered Medications:     acetaminophen, 650 mg, Oral, Q4H PRN    albuterol sulfate, 2.5 mg, Nebulization, Q6H PRN    dextrose 10%, 12.5 g, Intravenous, PRN    dextrose 10%, 25 g, Intravenous, PRN    glucagon (human recombinant), 1 mg, Intramuscular, PRN    glucose, 16 g, Oral, PRN    glucose, 24 g, Oral, PRN    HYDROcodone-acetaminophen, 1 tablet, Oral, Q6H PRN    magnesium oxide, 800 mg, Oral, PRN    magnesium oxide, 800 mg, Oral, PRN    naloxone, 0.02 mg, Intravenous, PRN    ondansetron, 4 mg, Intravenous, Q8H PRN    potassium bicarbonate, 35 mEq, Oral, PRN    potassium bicarbonate, 50 mEq, Oral, PRN    potassium bicarbonate, 60 mEq, Oral, PRN    potassium, sodium phosphates, 2 packet, Oral, PRN    potassium, sodium phosphates, 2 packet, Oral, PRN    potassium, sodium phosphates, 2 packet, Oral, PRN    prochlorperazine, 5 mg, Intravenous, Q6H PRN    sodium chloride 0.9%, 500 mL, Intravenous, PRN    sodium chloride 0.9%, 10 mL, Intravenous, Q12H PRN    sodium chloride 0.9%, 10 mL, Intravenous, PRN    REVIEW OF SYSTEMS:  Please refer to the HPI for all pertinent positive and negative findings. A comprehensive review of all other systems was negative.    PHYSICAL EXAM:  Patient Vitals for the past 24 hrs:   BP Temp Temp src Pulse  Resp SpO2   11/03/24 0700 134/72 -- -- 64 16 99 %   11/03/24 0101 -- -- -- 62 17 99 %   11/03/24 0000 119/80 -- -- 64 15 99 %   11/02/24 2330 (!) 111/57 98.7 °F (37.1 °C) Oral 64 16 100 %   11/02/24 2300 112/64 -- -- 63 15 99 %   11/02/24 2230 116/72 -- -- 69 16 99 %   11/02/24 2200 124/64 -- -- (!) 57 17 99 %   11/02/24 2130 (!) 92/54 -- -- 63 19 99 %   11/02/24 2100 (!) 107/58 -- -- 64 18 98 %   11/02/24 2030 (!) 90/57 -- -- 62 20 98 %   11/02/24 2000 (!) 97/53 -- -- 67 (!) 28 96 %   11/02/24 1930 (!) 100/58 -- -- 61 17 97 %   11/02/24 1915 -- 98.5 °F (36.9 °C) Oral -- -- --   11/02/24 1900 108/61 -- -- 67 17 95 %   11/02/24 1845 107/65 -- -- -- -- --   11/02/24 1830 112/65 -- -- -- -- --   11/02/24 1815 119/61 -- -- -- -- --   11/02/24 1800 138/68 -- -- -- -- --   11/02/24 1749 121/61 -- -- 79 18 100 %   11/02/24 1745 121/61 -- -- 73 (!) 23 99 %   11/02/24 1730 -- -- -- 76 (!) 23 99 %   11/02/24 1645 -- -- -- 65 20 99 %   11/02/24 1630 -- 98.4 °F (36.9 °C) Oral 64 17 99 %   11/02/24 1621 106/63 -- -- 64 (!) 23 98 %   11/02/24 1615 -- -- -- 67 (!) 21 97 %   11/02/24 1600 -- -- -- 68 20 97 %   11/02/24 1545 -- -- -- (!) 59 16 (!) 94 %   11/02/24 1530 -- -- -- 64 19 97 %   11/02/24 1445 108/64 -- -- 65 17 96 %   11/02/24 1430 (!) 109/56 -- -- 68 (!) 21 96 %   11/02/24 1415 101/62 -- -- 69 16 95 %   11/02/24 1400 (!) 100/50 -- -- 68 18 96 %   11/02/24 1345 (!) 93/55 -- -- 69 (!) 21 96 %   11/02/24 1330 (!) 108/56 -- -- 67 20 98 %   11/02/24 1315 129/70 -- -- 70 19 98 %   11/02/24 1300 -- -- -- 74 (!) 21 98 %   11/02/24 1245 -- 98.6 °F (37 °C) Oral 73 (!) 33 98 %   11/02/24 1230 -- -- -- 71 (!) 28 99 %   11/02/24 1215 -- -- -- (!) 57 17 96 %   11/02/24 1200 -- -- -- 73 18 97 %   11/02/24 1145 (!) 94/52 -- -- -- -- --   11/02/24 1105 -- -- -- 64 -- 95 %   11/02/24 1015 (!) 89/56 -- -- 64 12 97 %   11/02/24 1013 (!) 90/55 -- -- -- -- --   11/02/24 1000 (!) 86/54 -- -- 68 15 (!) 94 %   11/02/24 0945 (!) 83/50 -- -- 78 15  (!) 94 %   11/02/24 0944 (!) 86/55 -- -- -- -- --   11/02/24 0930 (!) 86/53 -- -- 66 15 95 %   11/02/24 0915 102/64 -- -- 70 18 95 %   11/02/24 0900 (!) 96/51 -- -- 62 17 (!) 94 %   11/02/24 0850 -- -- -- 66 17 (!) 93 %   11/02/24 0845 (!) 98/54 -- -- 64 16 (!) 93 %   11/02/24 0830 (!) 100/58 -- -- 62 16 (!) 93 %   11/02/24 0816 (!) 93/57 -- -- 65 15 (!) 90 %   11/02/24 0815 (!) 93/57 -- -- 61 15 (!) 91 %   11/02/24 0800 107/62 -- -- (!) 59 15 (!) 93 %   11/02/24 0745 120/75 -- -- 63 16 (!) 94 %   11/02/24 0741 -- -- -- 62 15 97 %   11/02/24 0730 (!) 110/55 98.2 °F (36.8 °C) Oral 66 17 97 %       GENERAL APPEARANCE: Alert, well-developed, well-nourished female in no acute distress.  HEENT: Normocephalic and atraumatic. PERRL. Oropharynx unremarkable.  PULM: Normal respiratory effort. No accessory muscle use.  CV: RRR.  ABDOMEN: Soft, nontender.  EXTREMITIES: No obvious signs of vascular compromise. Pulses present. No cyanosis, clubbing or edema.  SKIN: Clear; no rashes, lesions or skin breaks in exposed areas.    NEURO:  MENTAL STATUS: Patient awake and oriented to time, place, and person, recent/remote memory normal, attention span/concentration normal, speech fluent without paraphasic errors, good comprehension with appropriate thought content, and fund of knowledge appropriate for patient's level of education.  Affect euthymic.    CRANIAL NERVES:  CN I: Not tested.  CN II: Fundoscopic exam deferred.  CN III, IV, VI: Pupils equal, round and reactive to light.  Extraocular movements full and intact.  CN V: Facial sensation normal.  CN VII: Facial asymmetry absent.  CN VIII: Hearing grossly normal and equal bilaterally.  No skew deviation or pathologic nystagmus.  CN IX, X: Palate elevates symmetrically. Speech/articulation is clear without dysarthria.  CN XI: Shoulder shrug and chin rotation equal with good strength.  CN XII: Tongue protrusion midline.    MOTOR:  Bulk normal. Tone normal and symmetric  throughout.  Abnormal movements absent.  Tremor: none present.  Strength 5/5 throughout except/unless specified below:.    REFLEXES:  not tested.  Plantar response downgoing bilaterally.  SENSATION: grossly intact throughout.  COORDINATION: normal finger-to-nose.  STATION: not tested.  GAIT: not tested.      NIHSS:  1a      Level of Consciousness (alert, drowsy, etc.):   0=alert; keenly responsive  1b.     Level of Consciousness Questions (month, age):  able to answer both questions  1c.     Level of Consciousness Commands (open, close eyes, make fist, let go):  0=Answers both tasks correctly  2.      Best Gaze (eyes open - patient follows examiner's finger or face):      0=normal  3.      Visual (introduce visual stimulus/threat to patient's visual field quadrants):  0=No visual loss  4.      Facial Palsy (show teeth, raise eyebrows and squeeze eyes shut):        0=Normal symmetric movement  5a.     Motor Arm - Left (elevate extremity 90 degrees and score drift/movement):       2=Some effort against gravity, limb cannot get to or maintain (if cured) 90 (or 45) degrees, drifts down to bed, but has some effort against gravity  5b.     Motor Arm - Right (elevate extremity 90 degrees and score drift/movement):      2=Some effort against gravity, limb cannot get to or maintain (if cured) 90 (or 45) degrees, drifts down to bed, but has some effort against gravity  6a.     Motor Leg - Left (elevate extremity 30 degrees and score drift/movement):       2=Some effort against gravity, limb cannot get to or maintain (if cured) 90 (or 45) degrees, drifts down to bed, but has some effort against gravity  6b      Motor Leg - Right (elevate extremity 30 degrees and score drift/movement):      2=Some effort against gravity, limb cannot get to or maintain (if cured) 90 (or 45) degrees, drifts down to bed, but has some effort against gravity  7.      Limb Ataxia (finger-nose, heel down shin):      0=Absent  8.      Sensory (pin  "prick to face, arm, trunk, and leg - compare side to side):        1=Mild to moderate sensory loss; patient feels pinprick is less sharp or is dull on the affected side; there is a loss of superficial pain with pinprick but patient is aware She is being touched  9.      Best Language (name items, describe a picture and read sentences):      0=No aphasia, normal  10.     Dysarthria (evaluate speech clarity by patient repeating listed words): 0=Normal  11.     Extinction and Inattention (use prior testing to identify neglect or double simultaneous stimuli testing):      0=No abnormality           NIH Stroke Scale Total:         9      Labs:  Recent Labs   Lab 11/01/24  0551 11/02/24  0505 11/03/24  0256    142 141   K 4.3 4.2 4.2   * 110 108   CO2 21* 21* 25   BUN 30* 31* 30*   CREATININE 1.7* 1.8* 1.9*   GLU 78 99 93   CALCIUM 8.5* 8.1* 8.2*   PHOS 3.8 3.6 3.8   MG 1.9 2.0 2.1     Recent Labs   Lab 11/01/24  0551 11/02/24  0505 11/03/24  0256   WBC 3.78* 5.68 5.49   HGB 12.0 13.4 13.1   HCT 38.3 43.3 41.9   * 165 158     Recent Labs   Lab 11/01/24  0551 11/02/24  0505 11/03/24  0256   ALBUMIN 2.6* 2.8* 2.9*   PROT 5.7* 5.6* 5.6*   BILITOT 0.4 0.4 0.4   ALKPHOS 85 81 79   ALT 11 6* 5*   AST 16 10 9*     Lab Results   Component Value Date    INR 1.2 11/01/2024     Lab Results   Component Value Date    TRIG 87 11/01/2024    HDL 51 11/01/2024    CHOLHDL 40.8 11/01/2024     Lab Results   Component Value Date    HGBA1C 5.2 11/01/2024     No results found for: "PROTEINCSF", "GLUCCSF", "WBCCSF"    Imaging:  I have reviewed and interpreted the pertinent imaging and lab results.      MRI Cervical Spine Without Contrast  Narrative: EXAMINATION:  MRI CERVICAL SPINE WITHOUT CONTRAST    CLINICAL HISTORY:  Myelopathy, acute, cervical spine; fall with head and neck trauma    TECHNIQUE:  Routine MRI cervical spine without contrast.    COMPARISON:  Multiple prior exams.    FINDINGS:  Motion artifact limits the exam. "  There is exaggeration of the normal cervical spinal curvature, with normal vertebral body alignment, and no acute fractures or destructive osseous lesions.  Chronic vertebral height loss involves C3 and C4, with degenerative loss of disc height and signal most pronounced at C3-C4 and C4-C5, and heterogeneous degenerative type marrow endplate signal alteration.  No findings of a diffuse marrow replacement process.    There are stippled T2 hyperintensities in the sandy suggesting chronic ischemic changes, with the visualized posterior fossa and cervicomedullary junction otherwise unremarkable.  There is intramedullary non fluid like T2 hyperintense signal alteration in the cervical cord as detailed below.    At C2-C3, there is broad-based disc bulging and spondylosis, without significant spinal canal stenosis.  There is bilateral foraminal narrowing.    At C3-C4, there is broad-based disc bulging and spondylosis, contributing to moderate to severe spinal canal stenosis.  There is severe bilateral foraminal narrowing.    At C4-C5, there is broad-based disc bulging and spondylosis, which produces severe spinal canal stenosis with cervical cord compression.  There is increased non fluid-like T2 signal in the cord suggesting edema and or myelopathy, with severe bilateral foraminal narrowing.    At C5-C6, there is broad-based disc bulging and spondylosis, producing severe spinal canal stenosis with mass effect upon the cervical cord.  There is increased non fluid-like T2 signal in the cord suggesting edema and or myelopathy.  There is severe bilateral foraminal narrowing.    At C6-C7, there is broad-based disc bulging, without significant spinal canal stenosis.  There is mild left neural foraminal narrowing.    C7-T1 is unremarkable.    There are no signal abnormalities of the paraspinal ligaments, with nonspecific STIR hyperintense edema within the posterior paraspinal musculature and subcutaneous fat.  Multiple T2  hyperintensities in the thyroid gland are nonspecific.  Impression: 1. Disc bulging and spondylosis at C4-C5 and C5-C6, with severe spinal canal stenosis, cervical cord compression and cord signal alteration suggesting edema and or myelopathy.  2. Broad-based disc bulging producing moderate spinal canal stenosis at C3-C4.    Electronically signed by: Nitin Mayo  Date:    11/02/2024  Time:    12:28         ASSESSMENT & PLAN:    Problem   Syncope and Collapse   Weakness of Both Lower Extremities   Brain Mass   Stroke     Neuro  Brain mass  Assessment & Plan  Consult neurosurgery  Consult Heme-Onc    * Stroke  Assessment & Plan  Patient given loading dose of ASA and Plavix onset of initial stroke symptoms  Initiate stroke protocol  Trend NIH stroke scale  STAT CT of head performed left temporoparietal soft tissue prominence with demineralization of the underlying bone and suspected extra-axial mass in this region which is nonspecific and incompletely evaluated.   MRI brain acute infarct of the right caudate head.  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  Mass abuts the left temporal lobe without resulting in significant mass effect or vasogenic edema.  Chronic microvascular ischemic changes.  MRA unremarkable MRI of brain  Bilateral carotid ultrasound-No evidence of a hemodynamically significant carotid bifurcation stenosis.  2-D echo with bubble study if not done previously  Obtain fasting lipids  Statin therapy: Atorvastatin- 40 mg oral daily  TSH, FT4, RPR, HgA1c, B12, Folate  Continue ASA 81 mg  Seizures precautions  Ativan 1 mg PRN Seizures / call neurologist after first dose  PT/OT/SLP to assess and treat  Hold ACEi, beta-blocker, etc while allowing permissive hypertension per stroke protocol    Orthopedic  Weakness of both lower extremities  Assessment & Plan  MRI lumbar- Multilevel degenerative changes of the lumbar spine as detailed above.  There is moderate spinal  canal stenosis at L3-L4 and L4-L5 and there is severe bilateral neural foraminal narrowing at L4-L5.  CT entire spine- There is degenerative change throughout the spine, most significant in the lower lumbar spine as well as in the cervical spine.  There is however no obvious acute fracture or traumatic malalignment.  The entire spine was obtained in the large field of view.  PT/OT to assess and treat  11/02/2024 MRI cervical spine Disc bulging and spondylosis at C4-C5 and C5-C6, with severe spinal canal stenosis, cervical cord compression and cord signal alteration suggesting edema and or myelopathy 2. Broad-based disc bulging producing moderate spinal canal stenosis at C3-C4.  11/012/2024 MRA Negative limited noncontrast MRA of the neck.   Critical care consult  Patient for neurosurgery today    Other  Syncope and collapse  Assessment & Plan  Routine EEG without epileptiform        Thank you kindly for including us in the care of this patient. Please do not hesitate to contact us with any questions.      Maegan Jesus NP  Neurology/vascular Neurology  Date of Service: 11/03/2024  7:26 AM    --------------------------------------------------------------------------------------------------------------------------------------------------------------------------------------------------------------------------------------------------------------  Please note: This note was transcribed using voice recognition software. Because of this technology there are often uinintended grammatical, spelling, and other transcription errors. Please disregard these errors.     I, Dr. Jamil Lopez, have personally seen and examined the patient with my advanced provider and agree with above. I personally did a focused exam, and reviewed all necessary clinical information. I discussed my management plan with my NP and agree with above.   Neurologically unchanged. Heading for surgery. Updated family at bedside.    Diego Lopez MD.  SHA.    [facility]  [phone]

## 2024-11-03 NOTE — PT/OT/SLP PROGRESS
Physical Therapy      Patient Name:  Shanell Mahmood   MRN:  [id number]    Patient not seen today secondary to Other (Comment) (pt off floor for laminectomy). Will follow-up 11/4/24.

## 2024-11-03 NOTE — TRANSFER OF CARE
"Anesthesia Transfer of Care Note    Patient: Shanell Mahmood    Procedure(s) Performed: Procedure(s) (LRB):  LAMINECTOMY, SPINE, CERVICAL, POSTERIOR APPROACH (N/A)    Patient location: ICU    Anesthesia Type: general    Transport from OR: Transported from OR on 2-3 L/min O2 by NC with adequate spontaneous ventilation    Post pain: adequate analgesia    Post assessment: no apparent anesthetic complications and tolerated procedure well    Post vital signs: stable    Level of consciousness: awake    Nausea/Vomiting: no nausea/vomiting    Complications: none    Transfer of care protocol was followed      Last vitals: Visit Vitals  BP (!) 146/69   Pulse 67   Temp 37.1 °C (98.8 °F)   Resp (!) 21   Ht 5' 7" (1.702 m)   Wt 72.7 kg (160 lb 4.4 oz)   SpO2 99%   BMI 25.10 kg/m²     "

## 2024-11-03 NOTE — SUBJECTIVE & OBJECTIVE
Interval History: return from surgery still sedated    Review of Systems   Unable to perform ROS: Acuity of condition     Objective:     Vital Signs (Most Recent):  Temp: 96.7 °F (35.9 °C) (warmed blankets applied) (11/03/24 1200)  Pulse: (!) 53 (11/03/24 1215)  Resp: 11 (11/03/24 1215)  BP: 127/60 (11/03/24 1215)  SpO2: 99 % (11/03/24 1215) Vital Signs (24h Range):  Temp:  [96.7 °F (35.9 °C)-98.8 °F (37.1 °C)] 96.7 °F (35.9 °C)  Pulse:  [53-79] 53  Resp:  [11-28] 11  SpO2:  [94 %-100 %] 99 %  BP: ()/(50-80) 127/60  Arterial Line BP: (129-131)/(68-69) 131/68     Weight: 72.7 kg (160 lb 4.4 oz)  Body mass index is 25.1 kg/m².    Intake/Output Summary (Last 24 hours) at 11/3/2024 1320  Last data filed at 11/3/2024 1200  Gross per 24 hour   Intake 1236.13 ml   Output 1213 ml   Net 23.13 ml         Physical Exam  Constitutional:       General: She is not in acute distress.     Comments: Sleeping    Neck:      Comments: Cervical collar in place   Cardiovascular:      Rate and Rhythm: Regular rhythm. Bradycardia present.   Pulmonary:      Effort: Pulmonary effort is normal. No respiratory distress.      Breath sounds: No wheezing or rhonchi.   Abdominal:      General: Bowel sounds are normal. There is no distension.   Skin:     General: Skin is warm and dry.      Capillary Refill: Capillary refill takes less than 2 seconds.             Significant Labs: All pertinent labs within the past 24 hours have been reviewed.  Recent Lab Results         11/03/24  0715   11/03/24  0256   11/02/24  2121   11/02/24  1633        Procalcitonin   0.169  Comment: The Procalcitonin test is intended to aid in the risk assessment of   critically ill patients on their first day of ICU admission for   progression   to severe sepsis and septic shock.    A result of <0.50 ng/mL is associated with a low risk of severe   sepsis   and/or septic shock.  This does not exclude localized infection.    A result between 0.50 ng/mL and 2.0 ng/mL  should be interpreted with   consideration of the patient's history and is recommended to retest   within 6   to 24 hours.        A result of >2.0 ng/mL is associated with a high risk of severe   sepsis   and/or septic shock.    Procalcitonin may not be accurate among patients with   localized  infection, recent trauma or major surgery, immunosuppressed   state,  invasive fungal infection, renal dysfunction. Decisions   regarding  initiation or continuation of antibiotic therapy should not be   based  solely on procalcitonin levels.             Albumin   2.9           ALP   79           ALT   5           Anion Gap   8           AST   9           BILIRUBIN TOTAL   0.4  Comment: For infants and newborns, interpretation of results should be based  on gestational age, weight and in agreement with clinical  observations.    Premature Infant recommended reference ranges:  Up to 24 hours.............<8.0 mg/dL  Up to 48 hours............<12.0 mg/dL  3-5 days..................<15.0 mg/dL  6-29 days.................<15.0 mg/dL             BUN   30           Calcium   8.2           Chloride   108           CO2   25           Creatinine   1.9           eGFR   27.5           Glucose   93           Hematocrit   41.9           Hemoglobin   13.1           Lactic Acid Level 0.8  Comment: Falsely low lactic acid results can be found in samples   containing >=13.0 mg/dL total bilirubin and/or >=3.5 mg/dL   direct bilirubin.     2.0  Comment: Falsely low lactic acid results can be found in samples   containing >=13.0 mg/dL total bilirubin and/or >=3.5 mg/dL   direct bilirubin.  Critical result LAC 2.0 mmol/L called to and read back by Abdiel Henley at  03-Nov-2024 03:[facility].  Result Rechecked     2.4  Comment: Falsely low lactic acid results can be found in samples   containing >=13.0 mg/dL total bilirubin and/or >=3.5 mg/dL   direct bilirubin.  Critical result LAC 2.4 mmol/L called to and read back by Garima Zhou  at  02-Nov-2024 22:14 [facility].  Result Rechecked           Magnesium    2.1           MCH   27.0           MCHC   31.3           MCV   86           MPV   10.4           Phosphorus Level   3.8           Platelet Count   158           POCT Glucose       106       Potassium   4.2           PROTEIN TOTAL   5.6           RBC   4.86           RDW   16.8           Sodium   141           WBC   5.49                   Significant Imaging: I have reviewed all pertinent imaging results/findings within the past 24 hours.

## 2024-11-03 NOTE — PROGRESS NOTES
[facility] Medicine  Progress Note    Patient Name: Shanell Mahmood  MRN: [id number]  Patient Class: IP- Inpatient   Admission Date: 10/31/2024  Length of Stay: 2 days  Attending Physician: Gypsy Bailey MD  Primary Care Provider: Nicole, Primary Doctor        Subjective:     Principal Problem:Central cord syndrome        HPI:  Shanell Mahmood is a 74 year old female with a previous medical history of anemia, Pulmonary hypertension on 4 liters continuous oxygen at home, arthritis, CKD V, Osteoporosis, secondary hyperprathyroidism, S/P closure of patent foramen ovale in 2011, CVA in 2011 with no residuals, chronic back pain, HLD, Gout, Brain Mass and thyroid diease who presented to the ED after unwitnessed syncopal episode. The patient was at her pain management office for re certification only. There were no procedures performed. She reports taking one hydrocodone 10mg today.  The last thing she remembers was walking down the hallway of the office and looking for something to cover her head from the rain and did not have any adverse symptoms or feelings at that time.  She has no recollection of the syncopal events. When she was initially evaluated by EMS, her blood pressure was in the 60s over 40s. She did require constant stimulation to remain awake. Fell and hit her head. Does not take any blood thinners. EMS evaluated her and gave her 1 mg of Narcan as well as 250 cc of fluid. Her pressure improved and she had become more alert. Initial ED workup was thorough and all imaging showing that included CT of neck, head and entire spine showed no acute processes. CBC showed anemia and CMP showed elevated creatinine consistent with history of CKD V. Drug screen positive for opioids but patient has a hydrocodone prescription. The patient is currently in the process of have a left sided brain mass visualized on MRI 10/8/24 evaluated that was an incidental finding after undergoing a PET scan for a pulmonary  "nodule on 9/6/24 with abnormal uptake noted in brain. She has her first appointment on 11/4/24. She is followed by nephrology who is currently monitoring her and there has been one attempt at AV fistual placement but it was unsuccessful due sclerotic changes. Patient reports she awoke this morning feeling well showered and dressed herself. She performs all of her own ADL's. She had one syncopal event in March 2024 and was evaluated by cardiology and informed it was an orthostatic episode. Patient was unable to complete orthostatic vital signs upon admission due to inability to stand stating " I feel as if I can't get my legs under me". When evaluated for admit exam the patient endorses that after the syncopal episode she endorses bilateral leg weakness with decreased sensation in both legs. She reports bilateral  weakness being unable to use her phone and difficulty raising both of her arms. NIH scale performed and total of 9 noted. Patient noted have 400ml of urine in bladder and unable to void. Patient reached a total of 528ml of urine without being able to void.  MRI/MRA of brain and MRI of lumbosacral spine ordered upon admit. MRA and MRI lower back showed no acute process. MRI brain showed Acute infarct in the right caudate head. Dr. Maldonado was called and he recommended MRA of neck in morning and load with aspirin and plavix. Initial EKG read as atrial fibrillation but upon review p waves were noted and this was discussed with the ED. Repeat EKG shows sinus arrhthymias with PACs. Patient admitted by hospital medicine for further evaluation and management.       Overview/Hospital Course:  74-year-old female with history of brain mass, CKD, back pain, pulmonary hypertension on 4 L oxygen is admitted after syncope and collapse found to have a acute CVA in the right caudate on MRI brain.  Has global weakness and decreased sensation to the lower legs.  Multiple CT and x-rays done to rule out trauma ultimately " negative other than the maxillofacial CT reporting mild irregularity of the interior midline mandible with small adjacent bone fragments which could represent acute fracture with overlying soft tissue swelling.  Carotid ultrasound and MRA brain and neck without acute finding.  Neurology is consulted.  Also with underlying brain mass.  Consult Neurosurgery and Oncology.  Given DAPT and statin.  Had hypotension given IVF and midodrine.  Echo shows right heart failure.  BNP is within normal.  Lactic acid pending.  No other sign of infection.  Placed in cervical collar as precaution and MRI of the cervical spine shows severe cord compression at C4-5 and C5-6 likely acute with edema.  Neurosurgery discussed with patient and given her RCRI is 2 and she is a controlled diabetic but has had recent stroke and given Plavix and ASA plan to proceed with surgical decompression tomorrow 11/3.  Patient was NPO at midnight and ASA and Plavix are on hold.    Patient underwent: 11/3/24  Surgery   C2-3, C3-4, C4-5, C5-6, C6-7 laminectomy  C3 through 6 posterior segmental instrumentation using [employer] system  C3-4, C4-5, C5-6 posterolateral arthrodesis using autograft harvested from the same incision and allograft DBM        Interval History: return from surgery still sedated    Review of Systems   Unable to perform ROS: Acuity of condition     Objective:     Vital Signs (Most Recent):  Temp: 96.7 °F (35.9 °C) (warmed blankets applied) (11/03/24 1200)  Pulse: (!) 53 (11/03/24 1215)  Resp: 11 (11/03/24 1215)  BP: 127/60 (11/03/24 1215)  SpO2: 99 % (11/03/24 1215) Vital Signs (24h Range):  Temp:  [96.7 °F (35.9 °C)-98.8 °F (37.1 °C)] 96.7 °F (35.9 °C)  Pulse:  [53-79] 53  Resp:  [11-28] 11  SpO2:  [94 %-100 %] 99 %  BP: ()/(50-80) 127/60  Arterial Line BP: (129-131)/(68-69) 131/68     Weight: 72.7 kg (160 lb 4.4 oz)  Body mass index is 25.1 kg/m².    Intake/Output Summary (Last 24 hours) at 11/3/2024 1320  Last data filed at  11/3/2024 1200  Gross per 24 hour   Intake 1236.13 ml   Output 1213 ml   Net 23.13 ml         Physical Exam  Constitutional:       General: She is not in acute distress.     Comments: Sleeping    Neck:      Comments: Cervical collar in place   Cardiovascular:      Rate and Rhythm: Regular rhythm. Bradycardia present.   Pulmonary:      Effort: Pulmonary effort is normal. No respiratory distress.      Breath sounds: No wheezing or rhonchi.   Abdominal:      General: Bowel sounds are normal. There is no distension.   Skin:     General: Skin is warm and dry.      Capillary Refill: Capillary refill takes less than 2 seconds.             Significant Labs: All pertinent labs within the past 24 hours have been reviewed.  Recent Lab Results         11/03/24  0715   11/03/24  0256   11/02/24  2121   11/02/24  1633        Procalcitonin   0.169  Comment: The Procalcitonin test is intended to aid in the risk assessment of   critically ill patients on their first day of ICU admission for   progression   to severe sepsis and septic shock.    A result of <0.50 ng/mL is associated with a low risk of severe   sepsis   and/or septic shock.  This does not exclude localized infection.    A result between 0.50 ng/mL and 2.0 ng/mL should be interpreted with   consideration of the patient's history and is recommended to retest   within 6   to 24 hours.        A result of >2.0 ng/mL is associated with a high risk of severe   sepsis   and/or septic shock.    Procalcitonin may not be accurate among patients with   localized  infection, recent trauma or major surgery, immunosuppressed   state,  invasive fungal infection, renal dysfunction. Decisions   regarding  initiation or continuation of antibiotic therapy should not be   based  solely on procalcitonin levels.             Albumin   2.9           ALP   79           ALT   5           Anion Gap   8           AST   9           BILIRUBIN TOTAL   0.4  Comment: For infants and newborns,  interpretation of results should be based  on gestational age, weight and in agreement with clinical  observations.    Premature Infant recommended reference ranges:  Up to 24 hours.............<8.0 mg/dL  Up to 48 hours............<12.0 mg/dL  3-5 days..................<15.0 mg/dL  6-29 days.................<15.0 mg/dL             BUN   30           Calcium   8.2           Chloride   108           CO2   25           Creatinine   1.9           eGFR   27.5           Glucose   93           Hematocrit   41.9           Hemoglobin   13.1           Lactic Acid Level 0.8  Comment: Falsely low lactic acid results can be found in samples   containing >=13.0 mg/dL total bilirubin and/or >=3.5 mg/dL   direct bilirubin.     2.0  Comment: Falsely low lactic acid results can be found in samples   containing >=13.0 mg/dL total bilirubin and/or >=3.5 mg/dL   direct bilirubin.  Critical result LAC 2.0 mmol/L called to and read back by Abdiel Henley at  03-Nov-2024 03:48 by [facility].  Result Rechecked     2.4  Comment: Falsely low lactic acid results can be found in samples   containing >=13.0 mg/dL total bilirubin and/or >=3.5 mg/dL   direct bilirubin.  Critical result LAC 2.4 mmol/L called to and read back by Garima Zhou at  02-Nov-2024 22:14 by [facility].  Result Rechecked           Magnesium    2.1           MCH   27.0           MCHC   31.3           MCV   86           MPV   10.4           Phosphorus Level   3.8           Platelet Count   158           POCT Glucose       106       Potassium   4.2           PROTEIN TOTAL   5.6           RBC   4.86           RDW   16.8           Sodium   141           WBC   5.49                   Significant Imaging: I have reviewed all pertinent imaging results/findings within the past 24 hours.    Assessment/Plan:      * Central cord syndrome  S/p (11/3/24)  C2-3, C3-4, C4-5, C5-6, C6-7 laminectomy  C3 through 6 posterior segmental instrumentation using [employer] system  C3-4,  "C4-5, C5-6 posterolateral arthrodesis using autograft harvested from the same incision and allograft DBM    Cervical collar in place       Hypotension  Asymptomatic.  Etiology is unclear but suspected to be related to the stroke or underlying cervical cord pathology  Echo reviewed.  Do not suspect cardiogenic shock.  Do not suspect sepsis  -hold any BP meds or diuretics  -check lactate  -NS 1 L bolus  -start midodrine 10 mg t.i.d.  -MRI cervical spine: severe cord compression at C4-5 and C5-6 , plan for neurosurgical decompression 11/3.  Hold ASA and Plavix and NPO at midnight.  -transferred to ICU, goal MAP 80 or higher    Thrombocytopenia  The patients 3 most recent labs are listed below.  Recent Labs     10/31/24  1532 11/01/24  0551   * 148*       Plan  - Will transfuse if platelet count is <100k (if undergoing neurosurgery), or otherwise less than 10 K  -trend daily    Anemia  Anemia is likely due to chronic disease due to Chronic Kidney Disease. Most recent hemoglobin and hematocrit are listed below.  Recent Labs     10/31/24  1532 11/01/24  0551 11/02/24  0505   HGB 9.7* 12.0 13.4   HCT 30.8* 38.3 43.3       Plan  - Monitor serial CBC: Daily  - Transfuse PRBC if patient becomes hemodynamically unstable, symptomatic or H/H drops below 7/21.  - Patient has not received any PRBC transfusions to date  - Patient's anemia is currently improving    Sinus arrhythmia seen on electrocardiogram  EKG read states atrial fibrillation but P waves are present. Repeat EKG after admission confirms this.     Stroke  Acute CVA right caudate  MRI, MRA, CT, carotid U/S reports reviewed  -DAPT and statin  -neurology following  -neurosurgery and oncology consulted for the brain mass  -PT/OT/SLP  -echo bubble report is positive, follow up Neurology recommendation    Brain mass  Discovered after PET scan of lung  on 9/6/24 revealed abnormal uptake in brain. 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  Unclear if contributing to CVA  She will have her first appointment with Dr. Ray Mcintyre at  [facility]  on 11/4/24  -neurosurgery and oncology follow-up    Secondary hyperparathyroidism  Chronic condition that is stable. Followed by nephrology last visit in May 2024 and endocrinology (Last visit 9/4/24)    Pulmonary hypertension  Chronic condition that is stable.   -holding her torsemide and pulmonary hypertension meds due to the hypotension    Chronic hypoxic respiratory failure, on home oxygen therapy  Patient with Hypoxic Respiratory failure which is Chronic.  she is on home oxygen at 4 LPM. Maintaining oxygen saturation on 4 liters nasal cannula which has been continued.  Due to pulmonary hypertension    CKD (chronic kidney disease), stage IV  Creatine stable for now. BMP reviewed- noted Estimated Creatinine Clearance: 28.7 mL/min (A) (based on SCr of 1.7 mg/dL (H)). according to latest data. Based on current GFR, CKD stage is stage 4 - GFR 15-29.  Monitor UOP and serial BMP and adjust therapy as needed. Renally dose meds. Avoid nephrotoxic medications and procedures.    Followed by nephrology Nayely Jain at [facility]. Initial attempt at AV fistula placement unsuccessful and currently just being monitored. Last visit 5/2/24    Weakness of both lower extremities  PT/OT       Syncope and collapse  See stroke  Orthostatic vital signs ordered and patient unable to stand due weakness in bilateral legs    Acute urinary retention  Possibly neurogenic from the CVA or from cervical spine cord compression  -straight cath per protocol   -may need Yeung      VTE Risk Mitigation (From admission, onward)           Ordered     Reason for No Pharmacological VTE Prophylaxis  Once        Question:  Reasons:  Answer:  Risk of Bleeding    10/31/24 4049     IP VTE HIGH RISK PATIENT  Once         10/31/24 " 1849     Place sequential compression device  Until discontinued         10/31/24 1849                    Discharge Planning   ELBERT: 11/8/2024     Code Status: Full Code   Is the patient medically ready for discharge?:     Reason for patient still in hospital (select all that apply): Patient trending condition  Discharge Plan A: Rehab            Critical care time spent on the evaluation and treatment of severe organ dysfunction, review of pertinent labs and imaging studies, discussions with consulting providers and discussions with patient/family: 15 minutes.      Gypsy Bailey MD  Department of Hospital Medicine   [facility]

## 2024-11-03 NOTE — ANESTHESIA POSTPROCEDURE EVALUATION
Anesthesia Post Evaluation    Patient: Shanell Mahmood    Procedure(s) Performed: Procedure(s) (LRB):  LAMINECTOMY, SPINE, CERVICAL, POSTERIOR APPROACH (N/A)    Final Anesthesia Type: general      Patient location during evaluation: PACU  Patient participation: Yes- Able to Participate  Level of consciousness: awake and alert  Post-procedure vital signs: reviewed and stable  Pain management: adequate  Airway patency: patent    PONV status at discharge: No PONV  Anesthetic complications: no      Cardiovascular status: hemodynamically stable  Respiratory status: unassisted, spontaneous ventilation and room air  Hydration status: euvolemic  Follow-up not needed.              Vitals Value Taken Time   BP 95/57 11/03/24 1530   Temp 35.9 °C (96.7 °F) 11/03/24 1200   Pulse 55 11/03/24 1542   Resp 14 11/03/24 1542   SpO2 95 % 11/03/24 1542   Vitals shown include unfiled device data.      No case tracking events are documented in the log.      Pain/Allegra Score: Pain Rating Prior to Med Admin: 7 (11/3/2024  3:31 PM)

## 2024-11-03 NOTE — ANESTHESIA PREPROCEDURE EVALUATION
11/03/2024  Shanell Mahmood is a 74 y.o., female.    Patient Active Problem List   Diagnosis    Acute urinary retention    Syncope and collapse    Gait abnormality    Weakness of both lower extremities    Weakness of both arms    Decreased sensation    CKD (chronic kidney disease), stage IV    Chronic hypoxic respiratory failure, on home oxygen therapy    Pulmonary hypertension    Secondary hyperparathyroidism    Brain mass    Stroke    S/P patent foramen ovale closure    Sinus arrhythmia seen on electrocardiogram    Anemia    Thrombocytopenia    Hypotension       No past surgical history on file.     Tobacco Use:  The patient  has no history on file for tobacco use.     No results found for this or any previous visit.     Imaging Results               MRI Brain Without Contrast (Final result)  Result time 10/31/24 22:27:12      Final result by Dawit Barrios,  (10/31/24 22:27:12)                   Impression:      1. Acute infarct in the right caudate head.  2. 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  The mass abuts the left temporal lobe, without resulting in significant mass effect or vasogenic edema.  Differential includes a primary or metastatic osseous lesion, an atypical meningioma, or additional etiologies.  3. Chronic microvascular ischemic changes.  This report was flagged in Epic as abnormal.    Dr. Barrios discussed critical findings with TATIANNA Morales. by [facility] secure chat at 22:23 on 10/31/2024.      Electronically signed by: Dawit Barrios  Date:    10/31/2024  Time:    22:27               Narrative:    EXAMINATION:  MRI BRAIN WITHOUT CONTRAST    CLINICAL HISTORY:  Neuro deficit, acute, stroke suspected;    TECHNIQUE:  Multiplanar multisequence MR imaging of the brain was performed without intravenous contrast.    COMPARISON:  CT head from earlier  the same date.    FINDINGS:  There is restricted diffusion in the right caudate head, compatible with an acute infarct.  There is associated T2/FLAIR hyperintense signal.  There is a mass centered within the left temporoparietal calvarium measuring approximately 5.0 x 2.9 x 4.0 cm, with extension into the left cerebral convexity extra-axial space and extension into the left temporal scalp.  There is abutment of the left temporal lobe, without evidence of vasogenic edema or evidence of significant mass effect.  There is no midline shift.  Ventricles are normal in size for age without evidence of hydrocephalus.  There is T2/FLAIR hyperintense signal throughout the supratentorial white matter, compatible with chronic microvascular ischemic changes.  There is a small focus of encephalomalacia within the right medial parietal lobe, likely related to a remote infarct.  There is no intracranial hemorrhage.  No extra-axial blood or fluid collections. Normal vascular flow voids.    Left common rim mass as above.  The remaining bone marrow signal intensity is normal. Paranasal sinuses and mastoid air cells are clear.                                       US Carotid Bilateral (Final result)  Result time 10/31/24 23:55:49      Final result by Dawit Barrios DO (10/31/24 23:55:49)                   Impression:      No evidence of a hemodynamically significant carotid bifurcation stenosis.      Electronically signed by: Dawit Barrios  Date:    10/31/2024  Time:    23:55               Narrative:    EXAMINATION:  US CAROTID BILATERAL    CLINICAL HISTORY:  syncope;    TECHNIQUE:  Grayscale and color Doppler ultrasound examination of the carotid and vertebral artery systems bilaterally.  Stenosis estimates are per the NASCET measurement criteria.    COMPARISON:  None.    FINDINGS:  Right:    Internal Carotid Artery (ICA) peak systolic velocity 60.6 cm/sec    ICA/CCA peak systolic velocity ratio: 0.9    Plaque formation:  Heterogeneous    Vertebral artery: Antegrade flow and normal waveform.    Left:    Internal Carotid Artery (ICA)  peak systolic velocity 98.1 cm/sec    ICA/CCA peak systolic velocity ratio: 1.4    Plaque formation: Heterogeneous    Vertebral artery: Antegrade flow and normal waveform.                                       X-Ray Wrist Complete Left (Final result)  Result time 10/31/24 16:40:18      Final result by Saeid Pérez MD (10/31/24 16:40:18)                   Narrative:    EXAMINATION:  XR WRIST COMPLETE 3 VIEWS LEFT    CLINICAL HISTORY:  Syncope and collapse    TECHNIQUE:  PA, lateral, and oblique views of the left wrist were performed.    COMPARISON:  None    FINDINGS:  No displaced fracture or traumatic malalignment.  Mild carpal degenerative change.  Unremarkable soft tissues.      Electronically signed by: Saeid Pérez  Date:    10/31/2024  Time:    16:40                                     X-Ray Knee 1 or 2 View Left (Final result)  Result time 10/31/24 16:39:40      Final result by Saeid Pérez MD (10/31/24 16:39:40)                   Narrative:    EXAMINATION:  XR KNEE 1 OR 2 VIEW LEFT    CLINICAL HISTORY:  Syncope and Fall;    TECHNIQUE:  One or two views of the left knee were performed.    COMPARISON:  None    FINDINGS:  Left knee total arthroplasty hardware with no periprosthetic fracture or abnormal lucency to suggest loosening or infection.  No malalignment.  Trace knee joint fluid.  Subcutaneous soft tissue edema about the knee and proximal calf.      Electronically signed by: Saeid Pérez  Date:    10/31/2024  Time:    16:39                                     X-Ray Chest AP Portable (Final result)  Result time 10/31/24 16:38:38      Final result by Saeid Pérez MD (10/31/24 16:38:38)                   Impression:      1. Pleuroparenchymal nodule in the right lung base and several other nodules in the right lung apex.  Findings better seen at CT.  These are  nonspecific with neoplasm not excluded.  2. Borderline heart enlargement.  3. Prominence of the pulmonary vasculature raising the possibility for elevated pulmonary pressures.      Electronically signed by: Saeid Pérez  Date:    10/31/2024  Time:    16:38               Narrative:    EXAMINATION:  XR CHEST AP PORTABLE    CLINICAL HISTORY:  Syncope and collapse    TECHNIQUE:  Single frontal view of the chest was performed.    COMPARISON:  Same-day CT    FINDINGS:  There is a nodular airspace opacity at the periphery of the right lung base.  Remaining lungs clear.  Borderline cardiac enlargement with metallic device projecting over the right heart border possibly an intra atrial septal occlusion device.  Prominence of the pulmonary arterial vasculature aortic arch atherosclerosis.  No pleural effusion or pneumothorax.  There are multiple surgical clips and staple lines over the upper abdomen.                                       CT Entire Spine Without Contrast (Final result)  Result time 10/31/24 16:26:15      Final result by Nomi Vasquez MD (10/31/24 16:26:15)                   Impression:      There is degenerative change throughout the spine, most significant in the lower lumbar spine as well as in the cervical spine.  There is however no obvious acute fracture or traumatic malalignment.  The entire spine was obtained in the large field of view.      Electronically signed by: Nomi Vasquez MD  Date:    10/31/2024  Time:    16:26               Narrative:    EXAMINATION:  CT ENTIRE SPINE WITHOUT CONTRAST (XPD)    CLINICAL HISTORY:  Fall, pan spinal tenderness;    TECHNIQUE:  Axial images were obtained through the entire spine.  Sagittal and coronal reformatted images were created.    COMPARISON:  Lumbar spine MRI dated 03/15/2016    FINDINGS:  There is no recent study for comparison.  On the screening study obtained with a large field of view there is no obvious acute fracture.  There is extensive  chronic change throughout the cervical spine.  The odontoid process is intact.  The anterior and posterior arches of C1 are intact as well.  There is severe disc space narrowing at the C2-3, C3-4, C4-5 levels with moderate to marked disc space narrowing at the C5-6 level.  There is trace retrolisthesis of C4 on C5.  There is chronic anterior wedging of C3 and C4.  There is multilevel foraminal stenosis throughout the cervical region due to uncovertebral spurring and/or facet joint arthropathy.  Also, there is spinal stenosis most apparent at the C4-5 level.    In the thoracic spine, there is mild chronic anterior wedging of several midthoracic vertebral bodies but there is no obvious acute fracture or traumatic malalignment.  The facet joints maintain normal articulation.    In the lumbar spine there is a vacuum disc at the L3-4 and L5-S1 levels.  There is severe disc space narrowing at L5-S1.  The lumbar vertebral bodies maintain normal height without obvious acute fracture.  Alignment is grossly normal.  There is extensive facet joint arthropathy in the mid to lower lumbar spine.    There is a dextrocurvature of the lower lumbar spine with gentle broad levocurvature at the thoracolumbar junction.    There is no displaced or depressed rib fracture identified on this limited field of view.  There is no obvious spinous process fracture.    The sacrum is intact.    There is atherosclerosis.  There is no pneumothorax.  There are several scattered nodules in the lungs which are suboptimally evaluated.                                       CT Maxillofacial Without Contrast (Final result)  Result time 10/31/24 16:12:17      Final result by Nomi Vasquez MD (10/31/24 16:12:17)                   Impression:      1. There is mild irregularity of the interior midline mandible with small adjacent bone fragments which could represent acute fracture with overlying soft tissue swelling.  There is no other acute  maxillofacial or orbital fracture.  There is suspected old deformities of the anterior nasal bones.      Electronically signed by: Nomi Vasquez MD  Date:    10/31/2024  Time:    16:12               Narrative:    EXAMINATION:  CT MAXILLOFACIAL WITHOUT CONTRAST    CLINICAL HISTORY:  Facial trauma, blunt;    TECHNIQUE:  Low dose axial images, sagittal and coronal reformations were obtained through the face.  Contrast was not administered.    COMPARISON:  None    FINDINGS:  There is suspected soft tissue swelling over the chin.  There is very subtle cortical irregularity involving the anterior paramedian mandible which could represent subtle acute fracture with minimal adjacent bone fragments.  There is beam hardening artifact related to hardware in the region of the left maxilla.  There is no dislocation at the TMJ.  There are changes of torus mandibularis.    There is old deformity of the anterior nasal bones.  There is no acute displaced or depressed nasal bone or nasal septum fracture.    There is no acute orbital fracture.                                       CT Head Without Contrast (Final result)  Result time 10/31/24 16:07:44      Final result by Nomi Vasquez MD (10/31/24 16:07:44)                   Impression:      There is left temporoparietal soft tissue prominence with demineralization of the underlying bone and suspected extra-axial mass in this region which is nonspecific and incompletely evaluated.  These could potentially represent an atypical meningioma but further evaluation with brain MRI without and with contrast could be obtained.  This is not acute.  There is no hemorrhage.  There is no acute skull fracture.      Electronically signed by: Nomi Vasquez MD  Date:    10/31/2024  Time:    16:07               Narrative:    EXAMINATION:  CT HEAD WITHOUT CONTRAST    CLINICAL HISTORY:  Head trauma, minor (Age >= 65y);    TECHNIQUE:  Routine unenhanced axial images were obtained through  the head.  Sagittal and coronal reformatted images were created.  The study is reviewed in bone and soft tissue windows.    COMPARISON:  None    FINDINGS:  Intracranial contents: There is no acute intracranial abnormality.  There is mild nonspecific white matter change.  There is no hemorrhage, parenchymal mass or mass effect.  The gray-white interface is preserved without acute infarction.  The basilar cisterns are open.  There is a remote lacunar infarction in the right caudate nucleus.  The cerebellar tonsils are normal position.    Extracranial contents, calvarium, soft tissues: The included paranasal sinuses and mastoid air cells are clear.  There is no acute skull fracture.  There is soft tissue prominence of the left temporoparietal region.  There is demineralization of the underlying calvarium with in irregular inner bony margin.  Also, there is suggestion of possible extra-axial dural-based soft tissue mass in this region which could potentially represent an atypical meningioma and further evaluated with brain MRI without and with contrast.                                       Lab Results   Component Value Date    WBC 5.49 11/03/2024    HGB 13.1 11/03/2024    HCT 41.9 11/03/2024    MCV 86 11/03/2024     11/03/2024     BMP  Lab Results   Component Value Date     11/03/2024    K 4.2 11/03/2024     11/03/2024    CO2 25 11/03/2024    BUN 30 (H) 11/03/2024    CREATININE 1.9 (H) 11/03/2024    CALCIUM 8.2 (L) 11/03/2024    ANIONGAP 8 11/03/2024    GLU 93 11/03/2024    GLU 99 11/02/2024    GLU 78 11/01/2024       Results for orders placed during the hospital encounter of 10/31/24    Echo    Interpretation Summary    Left Ventricle: The left ventricle is normal in size. Normal wall thickness. Unable to assess wall motion. There is normal systolic function with a visually estimated ejection fraction of 60 - 65%.    Right Ventricle: Right ventricle was not well visualized due to poor acoustic  window.  Grossly appears to be dilated with reduced function.  There appears to be some concern of flattening of the interventricular septum which could indicate right ventricular pressure and volume overload.    Left Atrium: Left atrium is severely dilated.    IVC/SVC: Elevated venous pressure at 15 mmHg.           Pre-op Assessment    I have reviewed the Patient Summary Reports.     I have reviewed the Nursing Notes. I have reviewed the NPO Status.   I have reviewed the Medications.     Review of Systems  Anesthesia Hx:  No problems with previous Anesthesia   Neg history of prior surgery.          Denies Family Hx of Anesthesia complications.    Denies Personal Hx of Anesthesia complications.                    Social:  No Alcohol Use       Hematology/Oncology:    Oncology Normal    -- Anemia:                                  EENT/Dental:  EENT/Dental Normal           Cardiovascular:  Cardiovascular Normal                  ECG has been reviewed.                            Pulmonary:  Pulmonary Normal        Pulmonary hypertension  Home O2               Renal/:  Chronic Renal Disease, CKD                Hepatic/GI:  Hepatic/GI Normal                    Musculoskeletal:     Severe cervical stenosis       Spine Disorders: cervical and lumbar Degenerative disease, Disc disease and Chronic Pain           Neurological:   CVA (acute right caudate cva)        Syncopal episode  Bilateral lower and upper weakness    Brain mass    Central cord syndrome                            Endocrine:  Endocrine Normal    hyperparathyroid        Dermatological:  Skin Normal    Psych:  Psychiatric Normal                    Physical Exam  General: Well nourished, Cooperative, Alert and Oriented    Airway:  Mallampati: II   Mouth Opening: Normal  TM Distance: Normal  Tongue: Normal  Neck ROM: Normal ROM    Dental:  Edentulous    Chest/Lungs:  Clear to auscultation    Heart:  Rate: Normal  Rhythm: Regular Rhythm  Sounds:  Normal        Anesthesia Plan  Type of Anesthesia, risks & benefits discussed:    Anesthesia Type: Gen ETT  Intra-op Monitoring Plan: Standard ASA Monitors and Art Line  Post Op Pain Control Plan: multimodal analgesia  Induction:  IV  Airway Plan: Video, Post-Induction with cervical collar  Informed Consent: Patient consented to blood products? Yes  ASA Score: 4 Emergent  Anesthesia Plan Notes: No Sux  A line    Ready For Surgery From Anesthesia Perspective.     .

## 2024-11-03 NOTE — ANESTHESIA PROCEDURE NOTES
Intubation    Date/Time: 11/3/2024 8:33 AM    Performed by: Sony Peterson CRNA  Authorized by: Bryan Baker MD    Intubation:     Induction:  Intravenous    Intubated:  Postinduction    Mask Ventilation:  Easy mask    Attempts:  1    Attempted By:  CRNA    Method of Intubation:  Video laryngoscopy    Blade:  [place] 3    Laryngeal View Grade: Grade I - full view of cords      Difficult Airway Encountered?: No      Complications:  None    Airway Device:  Oral endotracheal tube    Airway Device Size:  7.0    Style/Cuff Inflation:  Cuffed    Inflation Amount (mL):  7    Tube secured:  21    Secured at:  The lips    Placement Verified By:  Capnometry    DIFFICULT INTUBATION DESCRIPTOR: unstable neck.    Findings Post-Intubation:  BS equal bilateral and atraumatic/condition of teeth unchanged

## 2024-11-03 NOTE — ANESTHESIA PROCEDURE NOTES
Arterial    Diagnosis: Spinal stenosis    Patient location during procedure: done in OR  Timeout: 11/3/2024 8:30 AM  Procedure end time: 11/3/2024 8:35 AM    Staffing  Authorizing Provider: Bryan Baker MD  Performing Provider: Bryan Baker MD    Staffing  Performed by: Bryan Baker MD  Authorized by: Bryan Baker MD    Anesthesiologist was present at the time of the procedure.    Preanesthetic Checklist  Completed: patient identified, IV checked, site marked, risks and benefits discussed, surgical consent, monitors and equipment checked, pre-op evaluation, timeout performed and anesthesia consent givenArterial  Skin Prep: chlorhexidine gluconate  Local Infiltration: lidocaine  Orientation: right  Location: radial    Catheter Size: 20 G  Catheter placement by Ultrasound guidance. Heme positive aspiration all ports.   Vessel Caliber: medium, patent, compressibility normal  Needle advanced into vessel with real time Ultrasound guidance.  Sterile sheath used.Insertion Attempts: 1  Assessment  Dressing: secured with tape and tegaderm and sutured in place and taped  Patient: Tolerated well

## 2024-11-03 NOTE — PT/OT/SLP DISCHARGE
Physical Therapy Discharge Summary    Name: Shanell Mahmood  MRN: [id number]   Principal Problem: Central cord syndrome     Patient Discharged from acute Physical Therapy on 11/3/2024.  Please refer to prior PT noted date on 24 for functional status.     Assessment:     Patient underwent surgery for cervical laminectomy. PT orders discontinued. Patient will need new therapy orders post-op.     Objective:     GOALS:   Multidisciplinary Problems       Physical Therapy Goals          Problem: Physical Therapy    Goal Priority Disciplines Outcome Interventions   Physical Therapy Goal     PT, PT/OT Progressing    Description: Goals to be met by: 24     Patient will increase functional independence with mobility by performin. Supine to sit with Moderate Assistance  2. Sit to supine with Moderate Assistance  3. Sit to stand transfer with Moderate Assistance  4. Bed to chair transfer with Moderate Assistance using Rolling Walker  5. Gait  x 50 feet with Moderate Assistance using Rolling Walker.                          Reasons for Discontinuation of Therapy Services  Surgery       Plan:     Patient Discharged to: In house with nursing care      11/3/2024

## 2024-11-04 PROBLEM — Z98.1 STATUS POST CERVICAL SPINAL FUSION: Status: ACTIVE | Noted: 2024-01-01

## 2024-11-04 NOTE — CONSULTS
Pulmonary/Critical Care Consult      PATIENT NAME: Shanell Mahmood  MRN: [id number]  TODAY'S DATE: 2024  8:00 AM  ADMIT DATE: 10/31/2024  AGE: 74 y.o. : 1950    CONSULT REQUESTED BY: Gypsy Bailey MD    REASON FOR CONSULT:   Critical care management    HPI:  The patient is a 74 year old female who had a syncopal event on 10/31.  She was weak, myelopathic.  CT of the head showed an acute infarct in the right caudate head.  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  The MRI of her cervical spine showed disc bulging and spondylolysis at C4-C5 and C5-C6 with severe spinal canal stenosis, cervical cord compression and cord signal alteration there was also broad-based disc bulging producing moderate spinal canal stenosis at C3-C4.  She was brought to the OR yesterday where she underwent C2- 3, 3-4, 4-5,5-6,  and 6- 7 laminectomies with C3 through 6 posterior segmental instrumentation and C3-4, 4- 5, 5- 6 posterolateral arthrodesis using autograft harvested from the incision and allograft of DBM.  This morning she remains profoundly myelopathic.  She is on Levophed at 0.06 mics per kilos per minute to maintain a mean of 80.    The patient has significant pulmonary hypertension in his managed with Opsumit 10 mg daily, Adcirca 40 mg daily, and Uptravi 1600 ug bid.  She is on 4 L continuous oxygen and has dyspnea with any exertion.  She is also on Advair 115/21 b.i.d.    REVIEW OF SYSTEMS  GENERAL: Feeling Weak  EYES: Vision is good.  ENT: No sinusitis or pharyngitis.   HEART: No chest pain or palpitations.  LUNGS:  Patient has 4 L continuous oxygen at home for her pulmonary hypertension  GI: No Nausea, vomiting, constipation, diarrhea, or reflux.  : No dysuria, hesitancy, or nocturia.  SKIN: No lesions or rashes.  MUSCULOSKELETAL: No joint pain or myalgias.  NEURO:  She has been progressively  weaker since her fall  LYMPH: No edema or adenopathy.  PSYCH: No anxiety or depression.  ENDO: No weight change.    ALLERGIES  Review of patient's allergies indicates:   Allergen Reactions    Codeine Palpitations       INPATIENT SCHEDULED MEDICATIONS   allopurinoL  100 mg Oral QHS    atorvastatin  40 mg Oral Daily    bisacodyL  10 mg Rectal Daily    colchicine  0.6 mg Oral BID    midodrine  10 mg Oral TID    montelukast  10 mg Oral Daily    mupirocin   Nasal BID    pantoprazole  40 mg Oral Daily    piperacillin-tazobactam (Zosyn) IV (PEDS and ADULTS) (extended infusion is not appropriate)  3.375 g Intravenous Q8H      lactated ringers   Intravenous Continuous 100 mL/hr at 11/03/24 2224 New Bag at 11/03/24 2224    NORepinephrine bitartrate-D5W  0-3 mcg/kg/min Intravenous Continuous 2.7 mL/hr at 11/03/24 1740 0.01 mcg/kg/min at 11/03/24 1740       MEDICAL AND SURGICAL HISTORY  No past medical history on file.  No past surgical history on file.    ALCOHOL, TOBACCO AND DRUG USE  Social History     Tobacco Use   Smoking Status Not on file   Smokeless Tobacco Not on file     Social History     Substance and Sexual Activity   Alcohol Use Not on file     Social History     Substance and Sexual Activity   Drug Use Not on file       FAMILY HISTORY  No family history on file.    VITAL SIGNS (MOST RECENT)  Temp: 98.8 °F (37.1 °C) (11/04/24 0045)  Pulse: 79 (11/04/24 0600)  Resp: 15 (11/04/24 0600)  BP: (!) 110/58 (11/04/24 0600)  SpO2: 98 % (11/04/24 0600)    INTAKE AND OUTPUT (LAST 24 HOURS):  Intake/Output Summary (Last 24 hours) at 11/4/2024 0800  Last data filed at 11/4/2024 0506  Gross per 24 hour   Intake 753.8 ml   Output 1315 ml   Net -561.2 ml       WEIGHT  Wt Readings from Last 1 Encounters:   11/01/24 72.7 kg (160 lb 4.4 oz)       PHYSICAL EXAM  GENERAL: Older patient in no distress.  HEENT: Pupils equal and reactive. Extraocular movements intact. Nose intact. Pharynx moist.  NECK: Supple.  Aspen collar in place.  DEYSI  drain with serosanguineous drainage  HEART: Regular rate and rhythm. No murmur or gallop auscultated.  LUNGS: Clear to auscultation and percussion. Lung excursion symmetrical. No change in fremitus. No adventitial noises.  ABDOMEN: Bowel sounds present. Non-tender, no masses palpated.  : Normal anatomy.  Yeung catheter with yellow urine  EXTREMITIES:  Minimal movement to the right hand.  There is a bit of a squeeze to the left hand.  She can wiggle both sets of toes.  She can not do anything against gravity.  Arterial line right radial.  Right midline.  Two peripherals.  LYMPHATICS: No adenopathy palpated, no edema.  SKIN: Dry, intact, no lesions.   NEURO: Cranial nerves II-XII intact. Motor strength 1/5 bilaterally.  The right hand is the weakest.  PSYCH: Appropriate affect      CBC LAST (LAST 24 HOURS)  Recent Labs   Lab 11/03/24 2020 11/04/24  0358   WBC 6.33 7.05   RBC 3.97* 3.87*   HGB 11.2* 10.8*   HCT 34.4* 33.8*   MCV 87 87   MCH 28.2 27.9   MCHC 32.6 32.0   RDW 16.7* 16.6*   * 113*   MPV 10.7 11.6   GRAN 75.8*  4.8  --    LYMPH 12.6*  0.8*  --    MONO 8.8  0.6  --    BASO 0.04  --    NRBC 0  --        CHEMISTRY LAST (LAST 24 HOURS)  Recent Labs   Lab 11/04/24  0358      K 4.2      CO2 23   ANIONGAP 6*   BUN 30*   CREATININE 1.9*   GLU 95   CALCIUM 7.6*         CARDIAC PROFILE (LAST 24 HOURS)  Recent Labs   Lab 10/31/24  1532 11/01/24  0145 11/01/24  0551   BNP 39  --   --    TROPONINI 0.011   < > 0.026    < > = values in this interval not displayed.       LAST 7 DAYS MICROBIOLOGY   Microbiology Results (last 7 days)       Procedure Component Value Units Date/Time    Blood culture [[id number]] Collected: 11/01/24 1829    Order Status: Completed Specimen: Blood from Peripheral, Hand, Right Updated: 11/03/24 2032     Blood Culture, Routine No Growth to date      No Growth to date      No Growth to date            MOST RECENT IMAGING  X-Ray Cervical Spine AP And Lateral  Narrative:  CLINICAL HISTORY:  ([id number])75 y/o  (1950) F    C3-6 laminectomy and fusion; Central cord syndrome at unspecified level of cervical spinal cord, initial encounter    TECHNIQUE:  (MARIA#[id number], exam time 11/4/2024 5:48)    XR CERVICAL SPINE AP LATERAL IMG56    2 view(s) obtained.    COMPARISON:  MRI from 11/02/2024.    FINDINGS:  C1 through C7 are visualized on the lateral radiograph.  There is straightening/reversal the normal lordotic curvature likely secondary to a combination of positioning/postoperative change, degenerative change and/or muscle spasm.    There has been interval posterior laminectomy from C3-C6, with bilateral posterior trans-facet screw and saurabh fixation.  There is a midline posterior paraspinal drain in the laminectomy bed with scattered subcutaneous emphysema and edema over the dorsal aspect of the neck.    Again noted is moderate to severe disc height loss at C2-C3, C3-C4 and C4-C5 with moderate disc height loss at C5-C6.  There is oblique lucency through the anterior inferior vertebral body of C2, and C5, suspicious for occult nondisplaced fractures, these could be better characterized with CT.  There is minimal prevertebral soft tissue swelling.    There is mild retrolisthesis of C5 on C6 (3 mm).  The visualized lung apices are clear.  The patient is edentulous.  Impression: 1.  Posterior postoperative fusion from C3-C6 as above.    2.  Suspected tiny nondisplaced oblique fractures through the anterior inferior endplate of C2 and C5.    This report was flagged in Epic as abnormal.    Electronically signed by: Asim Davis  Date:    11/04/2024  Time:    06:42      CURRENT VISIT EKG  No results found for this visit on 10/31/24.    ECHOCARDIOGRAM RESULTS  Results for orders placed during the hospital encounter of 10/31/24    Echo    Interpretation Summary    Left Ventricle: The left ventricle is normal in size. Normal wall thickness. Unable to assess wall motion. There is normal  systolic function with a visually estimated ejection fraction of 60 - 65%.    Right Ventricle: Right ventricle was not well visualized due to poor acoustic window.  Grossly appears to be dilated with reduced function.  There appears to be some concern of flattening of the interventricular septum which could indicate right ventricular pressure and volume overload.    Left Atrium: Left atrium is severely dilated.    IVC/SVC: Elevated venous pressure at 15 mmHg.    Oxygen INFORMATION   3 L        IMPRESSION AND PLAN  Severe central cord compression now status post multilevel laminectomy and instrumentation  Quadriparesis persisting  - will maintain a map of 80 for 72 hours  Severe pulmonary hypertension  - patient needs her Opsumit, Uptravi, and Adcirca.  Her  will bring these in.  Chronic hypoxemic respiratory failure  - on 4 L of oxygen at home  - takes Advair but is not obstructed  - will continue p.r.n. DuoNebs  Anemia  - chronic disease  Thrombocytopenia  - consumption?  Chronic kidney disease  Moderate hypoalbuminemia  Intracranial mass  Pulmonary nodules, 1 greater than 1 cm  - patient workup underway    Discussed with nursing and Neurosurgery, the patient and her .    Critical care time spent reviewing the chart, examining the patient, reviewing the labs, reviewing the radiological findings, discussing care with nursing, physicians, and respiratory and creating the note and  has been 35 minutes.  Gaye Ramos MD  Date of Service: 11/04/2024  8:00 AM

## 2024-11-04 NOTE — SUBJECTIVE & OBJECTIVE
Interval History: 11/4:  Patient is status post C3-6 posterior cervical fusion with instrumentation performed on 11/03/2024.  POD 1.  No acute events overnight.  Temperature 100.2°.  Blood pressure 112/74.  Pulse 71.  Respirations 17.  WBC 7.05.  Hemoglobin 10.8.  Hematocrit 33.8.  Platelets 113.  DEYSI drain x1 in place with 90 mL of output in the last 12 hours.  Patient was seen at 745 this morning.  She was found in bed, awake and alert with collar in place.  Nursing staff at bedside.  She reports anticipated posterior cervical and bilateral shoulder pain.  She did request pain medication prior to encounter.  She does have movement in bilateral hands and legs.  Yeung catheter in place.    Medications:  Continuous Infusions:   lactated ringers   Intravenous Continuous 100 mL/hr at 11/03/24 2224 New Bag at 11/03/24 2224    NORepinephrine bitartrate-D5W  0-3 mcg/kg/min Intravenous Continuous 10.6 mL/hr at 11/04/24 0811 0.04 mcg/kg/min at 11/04/24 0811     Scheduled Meds:   allopurinoL  100 mg Oral QHS    atorvastatin  40 mg Oral Daily    bisacodyL  10 mg Rectal Daily    colchicine  0.6 mg Oral BID    midodrine  10 mg Oral TID    montelukast  10 mg Oral Daily    mupirocin   Nasal BID    pantoprazole  40 mg Oral Daily    piperacillin-tazobactam (Zosyn) IV (PEDS and ADULTS) (extended infusion is not appropriate)  3.375 g Intravenous Q8H     PRN Meds:  Current Facility-Administered Medications:     acetaminophen, 650 mg, Oral, Q4H PRN    albuterol sulfate, 2.5 mg, Nebulization, Q6H PRN    aluminum-magnesium hydroxide-simethicone, 30 mL, Oral, Q4H PRN    dextrose 50%, 12.5 g, Intravenous, PRN    dextrose 50%, 25 g, Intravenous, PRN    glucagon (human recombinant), 1 mg, Intramuscular, PRN    glucose, 16 g, Oral, PRN    glucose, 24 g, Oral, PRN    HYDROmorphone, 1 mg, Intravenous, Q6H PRN    HYDROmorphone, 2 mg, Oral, Q4H PRN    magnesium oxide, 800 mg, Oral, PRN    magnesium oxide, 800 mg, Oral, PRN    methocarbamoL, 500  mg, Oral, TID PRN    naloxone, 0.02 mg, Intravenous, PRN    ondansetron, 4 mg, Intravenous, Q8H PRN    potassium bicarbonate, 35 mEq, Oral, PRN    potassium bicarbonate, 50 mEq, Oral, PRN    potassium bicarbonate, 60 mEq, Oral, PRN    potassium, sodium phosphates, 2 packet, Oral, PRN    potassium, sodium phosphates, 2 packet, Oral, PRN    potassium, sodium phosphates, 2 packet, Oral, PRN    prochlorperazine, 5 mg, Intravenous, Q6H PRN    senna-docusate 8.6-50 mg, 2 tablet, Oral, Nightly PRN    sodium chloride 0.9%, 500 mL, Intravenous, PRN    sodium chloride 0.9%, 10 mL, Intravenous, Q12H PRN    sodium chloride 0.9%, 10 mL, Intravenous, PRN       Objective:     Weight: 72.7 kg (160 lb 4.4 oz)  Body mass index is 25.1 kg/m².  Vital Signs (Most Recent):  Temp: 100.2 °F (37.9 °C) (11/04/24 0800)  Pulse: 71 (11/04/24 1015)  Resp: 17 (11/04/24 1015)  BP: 112/74 (11/04/24 1000)  SpO2: (!) 94 % (11/04/24 1015) Vital Signs (24h Range):  Temp:  [96.7 °F (35.9 °C)-100.2 °F (37.9 °C)] 100.2 °F (37.9 °C)  Pulse:  [49-88] 71  Resp:  [11-22] 17  SpO2:  [90 %-99 %] 94 %  BP: ()/(50-78) 112/74  Arterial Line BP: ()/() 123/70     Date 11/04/24 0700 - 11/05/24 0659   Shift 4554-6109 6439-3596 0050-4274 24 Hour Total   INTAKE   P.O. 100   100   I.V.(mL/kg) 897.7(12.3)   897.7(12.3)   IV Piggyback 200   200   Shift Total(mL/kg) 1197.7(16.5)   1197.7(16.5)   OUTPUT   Urine(mL/kg/hr) 175   175   Drains 25   25   Shift Total(mL/kg) 200(2.8)   200(2.8)   Weight (kg) 72.7 72.7 72.7 72.7                            Closed/Suction Drain 11/03/24 1108 Tube - 1 Posterior Neck Accordion 10 Fr. (Active)   Site Description Unable to view 11/04/24 0901   Dressing Type Gauze;Transparent (Tegaderm) 11/04/24 0901   Dressing Status Clean;Dry;Intact 11/04/24 0901   Dressing Intervention Integrity maintained 11/04/24 0901   Drainage Sanguineous;Serosanguineous 11/04/24 0901   Status To bulb suction 11/04/24 0901   Output (mL) 15 mL  "11/04/24 0901            Urethral Catheter 11/01/24 1731 Non-latex 16 Fr. (Active)   Site Assessment Clean;Intact 11/04/24 0901   Collection Container Urimeter 11/04/24 0901   Securement Method secured to top of thigh w/ adhesive device 11/04/24 0901   Catheter Care Performed yes 11/04/24 0901   Reason for Continuing Urinary Catheterization Critically ill in ICU and requiring hourly monitoring of intake/output 11/04/24 0901   CAUTI Prevention Bundle Securement Device in place with 1" slack;Intact seal between catheter & drainage tubing;Drainage bag/urimeter off the floor;Sheeting clip in use;No dependent loops or kinks;Drainage bag/urimeter not overfilled (<2/3 full);Drainage bag/urimeter below bladder 11/04/24 0701   Output (mL) 120 mL 11/04/24 0901       Neurosurgery Physical Exam  General:  Very pleasant.  No acute distress..   Neck:  Aspen collar in place.    Neurologic: Alert and oriented. Thought content appropriate.  GCS: E4 V5 M6; Total: 15  Pulmonary: normal respirations, no signs of respiratory distress  Skin: Skin is warm, dry and intact.    Motor Strength:  No abnormal movements seen.  Mild spontaneous movement noted in distal upper extremities   Right lower extremity strength iliopsoas 4/5, TA/EHL 4/5, gastrocnemius 4/5   Left lower extremity strength iliopsoas 4/5, TA/EHL 5/5, gastrocnemius 5/5     Posterior cervical Incision: Covered with clean, dry bandage. No surrounding erythema or edema. No drainage from incision. No palpable hematoma or underlying fluid collection.  DEYSI drain x 1 in place     Significant Labs:  Recent Labs   Lab 11/03/24 0256 11/03/24 2020 11/04/24  0358   GLU 93 104 95    138 137   K 4.2 4.6 4.2    109 108   CO2 25 23 23   BUN 30* 30* 30*   CREATININE 1.9* 1.8* 1.9*   CALCIUM 8.2* 7.6* 7.6*   MG 2.1  --   --      Recent Labs   Lab 11/03/24 0256 11/03/24 2020 11/04/24  0358   WBC 5.49 6.33 7.05   HGB 13.1 11.2* 10.8*   HCT 41.9 34.4* 33.8*    119* 113* " "    No results for input(s): "LABPT", "INR", "APTT" in the last 48 hours.  Microbiology Results (last 7 days)       Procedure Component Value Units Date/Time    Blood culture [[id number]] Collected: 11/01/24 1829    Order Status: Completed Specimen: Blood from Peripheral, Hand, Right Updated: 11/03/24 2032     Blood Culture, Routine No Growth to date      No Growth to date      No Growth to date            "

## 2024-11-04 NOTE — PROGRESS NOTES
Former[facility]  Department of Neurology  Progress Note  Date: 2024 10:24 AM          Patient Name: Shanell Mahmood   MRN: [id number]   : 1950    AGE: 74 y.o.    LOS: 3 days Hospital Day: 5  Admit date: 10/31/2024  2:56 PM         2024: No acute events overnight. Patient was seen and examined by me this morning.  She is awake and oriented x3.  She status post C3-6 posterior cervical fusion with instrumentation performed on 2024.  She has a C-collar in place.    Vitals:  Patient Vitals for the past 24 hrs:   BP Temp Temp src Pulse Resp SpO2   24 1000 112/74 -- -- 68 (!) 22 95 %   24 0945 110/65 -- -- 69 17 95 %   2430 -- -- -- 65 16 (!) 93 %   2415 -- -- -- 65 16 96 %   24 0907 -- -- -- -- 18 --   24 09 (!) 101/56 -- -- 65 14 95 %   2445 -- -- -- 69 17 96 %   24 -- -- -- 76 18 (!) 94 %   2415 -- -- -- 71 17 (!) 90 %   24 08 (!) 93/50 100.2 °F (37.9 °C) Oral 71 16 (!) 92 %   2445 -- -- -- 76 18 (!) 91 %   2430 -- -- -- 70 13 (!) 94 %   2415 -- -- -- 73 13 (!) 92 %   24 0704 123/60 -- -- 65 15 97 %   24 07 -- -- -- 69 13 (!) 92 %   24 0600 (!) 110/58 -- -- 79 15 98 %   24 0500 (!) 119/59 -- -- 88 18 (!) 91 %   24 0458 -- -- -- -- 18 --   24 0400 (!) 99/58 -- -- 72 17 (!) 92 %   24 0100 (!) 100/57 -- -- 79 17 95 %   24 0048 -- -- -- -- 19 --   24 0045 -- 98.8 °F (37.1 °C) Oral -- -- --   24 0030 113/69 -- -- 82 14 (!) 94 %   24 2330 (!) 109/57 -- -- 76 12 (!) 91 %   24 2300 (!) 96/54 -- -- 75 12 (!) 94 %   24 -- -- -- -- 20 --   240 (!) 101/56 -- -- 73 16 95 %   240 (!) 109/57 -- -- 66 18 95 %   24 -- -- -- 71 15 97 %   24 -- -- -- -- 17 --   24 1930 (!) 121/56 98 °F (36.7 °C) Oral 64 14 98 %   24 1900 (!) 118/53 -- -- (!) 58 13 99 %   24  1830 (!) 101/58 -- -- 78 12 (!) 92 %   11/03/24 1800 114/78 97.9 °F (36.6 °C) Oral (!) 59 13 95 %   11/03/24 1730 (!) 113/56 -- -- 66 11 97 %   11/03/24 1700 103/72 -- -- 61 13 (!) 94 %   11/03/24 1630 (!) 112/55 -- -- (!) 56 12 (!) 94 %   11/03/24 1615 -- -- -- (!) 52 11 96 %   11/03/24 1600 107/73 -- -- (!) 57 13 97 %   11/03/24 1545 -- -- -- (!) 55 14 96 %   11/03/24 1531 (!) 95/57 -- -- (!) 50 15 97 %   11/03/24 1530 (!) 95/57 -- -- (!) 56 14 95 %   11/03/24 1425 -- -- -- (!) 52 12 95 %   11/03/24 1400 -- -- -- (!) 49 12 97 %   11/03/24 1300 115/60 -- -- (!) 51 11 95 %   11/03/24 1215 127/60 -- -- (!) 53 11 99 %   11/03/24 1200 124/62 96.7 °F (35.9 °C) Oral (!) 53 11 98 %     PHYSICAL EXAM:     GENERAL APPEARANCE: Well-developed, well-nourished female in no acute distress.  HEENT: Normocephalic and atraumatic. PERRL. Oropharynx unremarkable.  PULM: Comfortable on room air.  CV: RRR.  ABDOMEN: Soft, nontender.  EXTREMITIES: No signs of vascular compromise. Pulses present. No cyanosis, clubbing or edema.  SKIN: Clear; no rashes, lesions or skin breaks in exposed areas.      NEURO:   MENTAL STATUS: Patient awake and oriented to time, place, and person. Affect normal.  CRANIAL NERVES II-XII: Pupils equal, round and reactive to light. Extraocular movements full and intact. No facial asymmetry.  MOTOR: Normal bulk. Tone normal and symmetrical throughout.  No abnormal movements. No tremor.   Strength:  3/5 proximally and distally bilateral upper extremities.  Bilateral lower extremities proximally 3/5, distal 4/5  REFLEXES: DTRs 1+   SENSATION: Sensation grossly intact to fine touch.  COORDINATION:  Did not assess  STATION: Romberg deferred.  GAIT: Deferred.    CURRENT SCHEDULED MEDICATIONS:   allopurinoL  100 mg Oral QHS    atorvastatin  40 mg Oral Daily    bisacodyL  10 mg Rectal Daily    colchicine  0.6 mg Oral BID    midodrine  10 mg Oral TID    montelukast  10 mg Oral Daily    mupirocin   Nasal BID    pantoprazole  " 40 mg Oral Daily    piperacillin-tazobactam (Zosyn) IV (PEDS and ADULTS) (extended infusion is not appropriate)  3.375 g Intravenous Q8H     CURRENT INFUSIONS:   lactated ringers   Intravenous Continuous 100 mL/hr at 11/03/24 2224 New Bag at 11/03/24 2224    NORepinephrine bitartrate-D5W  0-3 mcg/kg/min Intravenous Continuous 10.6 mL/hr at 11/04/24 0811 0.04 mcg/kg/min at 11/04/24 0811     DATA:  Recent Labs   Lab 10/31/24  1532 11/01/24  0551 11/02/24  0505 11/03/24  0256 11/03/24 2020 11/04/24  0358    143 142 141 138 137   K 3.8 4.3 4.2 4.2 4.6 4.2   * 111* 110 108 109 108   CO2 22* 21* 21* 25 23 23   BUN 31* 30* 31* 30* 30* 30*   CREATININE 1.7* 1.7* 1.8* 1.9* 1.8* 1.9*   GLU 96 78 99 93 104 95   CALCIUM 8.3* 8.5* 8.1* 8.2* 7.6* 7.6*   PHOS  --  3.8 3.6 3.8  --   --    MG  --  1.9 2.0 2.1  --   --    AST 13 16 10 9*  --   --    ALT 11 11 6* 5*  --   --      Recent Labs   Lab 10/31/24  1532 11/01/24  0551 11/02/24  0505 11/03/24  0256 11/03/24 2020 11/04/24  0358   WBC 3.68* 3.78* 5.68 5.49 6.33 7.05   HGB 9.7* 12.0 13.4 13.1 11.2* 10.8*   HCT 30.8* 38.3 43.3 41.9 34.4* 33.8*   * 148* 165 158 119* 113*     No results found for: "PROTEINCSF", "GLUCCSF", "WBCCSF", "RBCCSF", "LYMPHCSF", "PMNCSF"  Hemoglobin A1C   Date Value Ref Range Status   11/01/2024 5.2 4.5 - 6.2 % Final     Comment:     ADA Screening Guidelines:  5.7-6.4%  Consistent with prediabetes  >or=6.5%  Consistent with diabetes    High levels of fetal hemoglobin interfere with the HbA1C  assay. Heterozygous hemoglobin variants (HbS, HgC, etc)do  not significantly interfere with this assay.   However, presence of multiple variants may affect accuracy.     09/22/2022 6.0 4.2 - 6.0 % Final     Comment:     For evaluation of glycemic control in known diabetic non-pregnant adults:      A1C <7% lower or higher target A1C values maybe appropriate for individual patients.  For the diagnosis of diabetes mellitus: A1C >/=6.5%  Increased risk " for diabetes mellitus:   A1C 5.7-6.4%            I have personally reviewed and interpreted the pertinent imaging and lab results.  Imaging Results               MRI Brain Without Contrast (Final result)  Result time 10/31/24 22:27:12      Final result by Dawit Barrios DO (10/31/24 22:27:12)                   Impression:      1. Acute infarct in the right caudate head.  2. 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  The mass abuts the left temporal lobe, without resulting in significant mass effect or vasogenic edema.  Differential includes a primary or metastatic osseous lesion, an atypical meningioma, or additional etiologies.  3. Chronic microvascular ischemic changes.  This report was flagged in Epic as abnormal.    Dr. Barrios discussed critical findings with TATIANNA Weaver by [facility] secure chat at 22:23 on 10/31/2024.      Electronically signed by: Dawit Barrios  Date:    10/31/2024  Time:    22:27               Narrative:    EXAMINATION:  MRI BRAIN WITHOUT CONTRAST    CLINICAL HISTORY:  Neuro deficit, acute, stroke suspected;    TECHNIQUE:  Multiplanar multisequence MR imaging of the brain was performed without intravenous contrast.    COMPARISON:  CT head from earlier the same date.    FINDINGS:  There is restricted diffusion in the right caudate head, compatible with an acute infarct.  There is associated T2/FLAIR hyperintense signal.  There is a mass centered within the left temporoparietal calvarium measuring approximately 5.0 x 2.9 x 4.0 cm, with extension into the left cerebral convexity extra-axial space and extension into the left temporal scalp.  There is abutment of the left temporal lobe, without evidence of vasogenic edema or evidence of significant mass effect.  There is no midline shift.  Ventricles are normal in size for age without evidence of hydrocephalus.  There is T2/FLAIR hyperintense signal throughout the supratentorial white matter,  compatible with chronic microvascular ischemic changes.  There is a small focus of encephalomalacia within the right medial parietal lobe, likely related to a remote infarct.  There is no intracranial hemorrhage.  No extra-axial blood or fluid collections. Normal vascular flow voids.    Left common rim mass as above.  The remaining bone marrow signal intensity is normal. Paranasal sinuses and mastoid air cells are clear.                                       US Carotid Bilateral (Final result)  Result time 10/31/24 23:55:49      Final result by Dawit Barrios DO (10/31/24 23:55:49)                   Impression:      No evidence of a hemodynamically significant carotid bifurcation stenosis.      Electronically signed by: Dawit Barrios  Date:    10/31/2024  Time:    23:55               Narrative:    EXAMINATION:  US CAROTID BILATERAL    CLINICAL HISTORY:  syncope;    TECHNIQUE:  Grayscale and color Doppler ultrasound examination of the carotid and vertebral artery systems bilaterally.  Stenosis estimates are per the NASCET measurement criteria.    COMPARISON:  None.    FINDINGS:  Right:    Internal Carotid Artery (ICA) peak systolic velocity 60.6 cm/sec    ICA/CCA peak systolic velocity ratio: 0.9    Plaque formation: Heterogeneous    Vertebral artery: Antegrade flow and normal waveform.    Left:    Internal Carotid Artery (ICA)  peak systolic velocity 98.1 cm/sec    ICA/CCA peak systolic velocity ratio: 1.4    Plaque formation: Heterogeneous    Vertebral artery: Antegrade flow and normal waveform.                                       X-Ray Wrist Complete Left (Final result)  Result time 10/31/24 16:40:18      Final result by Saeid Pérez MD (10/31/24 16:40:18)                   Narrative:    EXAMINATION:  XR WRIST COMPLETE 3 VIEWS LEFT    CLINICAL HISTORY:  Syncope and collapse    TECHNIQUE:  PA, lateral, and oblique views of the left wrist were performed.    COMPARISON:  None    FINDINGS:  No displaced  fracture or traumatic malalignment.  Mild carpal degenerative change.  Unremarkable soft tissues.      Electronically signed by: Saeid Pérez  Date:    10/31/2024  Time:    16:40                                     X-Ray Knee 1 or 2 View Left (Final result)  Result time 10/31/24 16:39:40      Final result by Saeid Pérez MD (10/31/24 16:39:40)                   Narrative:    EXAMINATION:  XR KNEE 1 OR 2 VIEW LEFT    CLINICAL HISTORY:  Syncope and Fall;    TECHNIQUE:  One or two views of the left knee were performed.    COMPARISON:  None    FINDINGS:  Left knee total arthroplasty hardware with no periprosthetic fracture or abnormal lucency to suggest loosening or infection.  No malalignment.  Trace knee joint fluid.  Subcutaneous soft tissue edema about the knee and proximal calf.      Electronically signed by: Saeid Pérez  Date:    10/31/2024  Time:    16:39                                     X-Ray Chest AP Portable (Final result)  Result time 10/31/24 16:38:38      Final result by Saeid Pérez MD (10/31/24 16:38:38)                   Impression:      1. Pleuroparenchymal nodule in the right lung base and several other nodules in the right lung apex.  Findings better seen at CT.  These are nonspecific with neoplasm not excluded.  2. Borderline heart enlargement.  3. Prominence of the pulmonary vasculature raising the possibility for elevated pulmonary pressures.      Electronically signed by: Saeid Pérez  Date:    10/31/2024  Time:    16:38               Narrative:    EXAMINATION:  XR CHEST AP PORTABLE    CLINICAL HISTORY:  Syncope and collapse    TECHNIQUE:  Single frontal view of the chest was performed.    COMPARISON:  Same-day CT    FINDINGS:  There is a nodular airspace opacity at the periphery of the right lung base.  Remaining lungs clear.  Borderline cardiac enlargement with metallic device projecting over the right heart border possibly an intra atrial septal occlusion device.   Prominence of the pulmonary arterial vasculature aortic arch atherosclerosis.  No pleural effusion or pneumothorax.  There are multiple surgical clips and staple lines over the upper abdomen.                                       CT Entire Spine Without Contrast (Final result)  Result time 10/31/24 16:26:15      Final result by Nomi Vasquez MD (10/31/24 16:26:15)                   Impression:      There is degenerative change throughout the spine, most significant in the lower lumbar spine as well as in the cervical spine.  There is however no obvious acute fracture or traumatic malalignment.  The entire spine was obtained in the large field of view.      Electronically signed by: Nomi Vasquez MD  Date:    10/31/2024  Time:    16:26               Narrative:    EXAMINATION:  CT ENTIRE SPINE WITHOUT CONTRAST (XPD)    CLINICAL HISTORY:  Fall, pan spinal tenderness;    TECHNIQUE:  Axial images were obtained through the entire spine.  Sagittal and coronal reformatted images were created.    COMPARISON:  Lumbar spine MRI dated 03/15/2016    FINDINGS:  There is no recent study for comparison.  On the screening study obtained with a large field of view there is no obvious acute fracture.  There is extensive chronic change throughout the cervical spine.  The odontoid process is intact.  The anterior and posterior arches of C1 are intact as well.  There is severe disc space narrowing at the C2-3, C3-4, C4-5 levels with moderate to marked disc space narrowing at the C5-6 level.  There is trace retrolisthesis of C4 on C5.  There is chronic anterior wedging of C3 and C4.  There is multilevel foraminal stenosis throughout the cervical region due to uncovertebral spurring and/or facet joint arthropathy.  Also, there is spinal stenosis most apparent at the C4-5 level.    In the thoracic spine, there is mild chronic anterior wedging of several midthoracic vertebral bodies but there is no obvious acute fracture or  traumatic malalignment.  The facet joints maintain normal articulation.    In the lumbar spine there is a vacuum disc at the L3-4 and L5-S1 levels.  There is severe disc space narrowing at L5-S1.  The lumbar vertebral bodies maintain normal height without obvious acute fracture.  Alignment is grossly normal.  There is extensive facet joint arthropathy in the mid to lower lumbar spine.    There is a dextrocurvature of the lower lumbar spine with gentle broad levocurvature at the thoracolumbar junction.    There is no displaced or depressed rib fracture identified on this limited field of view.  There is no obvious spinous process fracture.    The sacrum is intact.    There is atherosclerosis.  There is no pneumothorax.  There are several scattered nodules in the lungs which are suboptimally evaluated.                                       CT Maxillofacial Without Contrast (Final result)  Result time 10/31/24 16:12:17      Final result by Nomi Vasquez MD (10/31/24 16:12:17)                   Impression:      1. There is mild irregularity of the interior midline mandible with small adjacent bone fragments which could represent acute fracture with overlying soft tissue swelling.  There is no other acute maxillofacial or orbital fracture.  There is suspected old deformities of the anterior nasal bones.      Electronically signed by: Nomi Vasquez MD  Date:    10/31/2024  Time:    16:12               Narrative:    EXAMINATION:  CT MAXILLOFACIAL WITHOUT CONTRAST    CLINICAL HISTORY:  Facial trauma, blunt;    TECHNIQUE:  Low dose axial images, sagittal and coronal reformations were obtained through the face.  Contrast was not administered.    COMPARISON:  None    FINDINGS:  There is suspected soft tissue swelling over the chin.  There is very subtle cortical irregularity involving the anterior paramedian mandible which could represent subtle acute fracture with minimal adjacent bone fragments.  There is beam  hardening artifact related to hardware in the region of the left maxilla.  There is no dislocation at the TMJ.  There are changes of torus mandibularis.    There is old deformity of the anterior nasal bones.  There is no acute displaced or depressed nasal bone or nasal septum fracture.    There is no acute orbital fracture.                                       CT Head Without Contrast (Final result)  Result time 10/31/24 16:07:44      Final result by Nomi Vasquez MD (10/31/24 16:07:44)                   Impression:      There is left temporoparietal soft tissue prominence with demineralization of the underlying bone and suspected extra-axial mass in this region which is nonspecific and incompletely evaluated.  These could potentially represent an atypical meningioma but further evaluation with brain MRI without and with contrast could be obtained.  This is not acute.  There is no hemorrhage.  There is no acute skull fracture.      Electronically signed by: Nomi Vasquez MD  Date:    10/31/2024  Time:    16:07               Narrative:    EXAMINATION:  CT HEAD WITHOUT CONTRAST    CLINICAL HISTORY:  Head trauma, minor (Age >= 65y);    TECHNIQUE:  Routine unenhanced axial images were obtained through the head.  Sagittal and coronal reformatted images were created.  The study is reviewed in bone and soft tissue windows.    COMPARISON:  None    FINDINGS:  Intracranial contents: There is no acute intracranial abnormality.  There is mild nonspecific white matter change.  There is no hemorrhage, parenchymal mass or mass effect.  The gray-white interface is preserved without acute infarction.  The basilar cisterns are open.  There is a remote lacunar infarction in the right caudate nucleus.  The cerebellar tonsils are normal position.    Extracranial contents, calvarium, soft tissues: The included paranasal sinuses and mastoid air cells are clear.  There is no acute skull fracture.  There is soft tissue  prominence of the left temporoparietal region.  There is demineralization of the underlying calvarium with in irregular inner bony margin.  Also, there is suggestion of possible extra-axial dural-based soft tissue mass in this region which could potentially represent an atypical meningioma and further evaluated with brain MRI without and with contrast.                                           ASSESSMENT AND PLAN:     Cervical spine cord compression   Acute ischemic stroke  Quadriparesis    Plan  Acute ischemic stroke in the right caudate head noted on MRI brain.  Etiology-small-vessel disease.    Aspirin 81 mg, Plavix 75 mg and Lipitor 40 mg nightly for stroke prevention.  Dual antiplatelet therapy for 21 days followed by monotherapy with aspirin alone.  Antiplatelet therapy held for surgery-restart when cleared by Neurosurgery.  S/p C3-6 posterior cervical fusion with instrumentation performed on 11/03/2024.  Neurosurgery following.  Postop care per primary team and Neurosurgery  PT OT and speech therapy evaluate and treat.    Normalize blood pressure.    Risk factor stratification with 2D echo, hemoglobin A1c and lipid panel.    Workup for metabolic derangements and infectious abnormalities per primary team.    Outpatient neurology follow-up        Duncan Maldonado MD  Neurology/vascular Neurology  Date of Service: 11/04/2024  10:24 AM    Please note: This note was transcribed using voice recognition software. Because of this technology there are often uinintended grammatical, spelling, and other transcription errors. Please disregard these errors.

## 2024-11-04 NOTE — PLAN OF CARE
Problem: Occupational Therapy  Goal: Occupational Therapy Goal  Description: Goals to be met by: 12/2/2024  - revised target date, feeding and g/hygiene goals, all other goals remain appropriate.  Patient will increase functional independence with ADLs by performing:    Feeding with Minimal Assistance with adaptive device PRN.  UE Dressing with Minimal Assistance.  LE Dressing with Moderate Assistance.  Grooming while EOB with Minimal Assistance with adaptive device PRN.  Toileting from bedside commode with Minimal Assistance for hygiene and clothing management.   Sitting at edge of bed x15 minutes with Stand-by Assistance.  Supine to sit with Minimal Assistance.  Toilet transfer to bedside commode with Minimal Assistance.  Upper extremity exercise program, with assistance as needed.    11/4/2024 1037 by Dary Bhat OT  Outcome: Progressing      Patient will increase functional independence with ADLs by performing:    Feeding with Minimal Assistance with adaptive device PRN.  UE Dressing with Minimal Assistance.  LE Dressing with Moderate Assistance.  Grooming while EOB with Minimal Assistance with adaptive device PRN.  Toileting from bedside commode with Minimal Assistance for hygiene and clothing management.   Sitting at edge of bed x15 minutes with Stand-by Assistance.  Supine to sit with Minimal Assistance.  Toilet transfer to bedside commode with Minimal Assistance.  Upper extremity exercise program, with assistance as needed.    Outcome: Progressing   Received new orders for OT re-eval due to pt underwent s/p cervical fusion and laminectomy on 11/3/2024. OT re-eval completed and fx status change. Pt is Ox 4, follows commands, pleasant and motivated to work with OT to get stronger. She was found lying in bed with cervical collar in place. She has spinal precautions post op. She presents with profound UE weakness and poor functional UE use for ADLS. She requires Total A-Dep assist with ADLs and fx  mobility. Pt would benefit from IPR. Continue with OT POC.

## 2024-11-04 NOTE — PT/OT/SLP RE-EVAL
Physical Therapy Re-evaluation    Patient Name:  Shanell Mahmood   MRN:  [id number]    Recommendations:     Discharge Recommendations: High Intensity Therapy  Discharge Equipment Recommendations:  (to be determined at next level of care)   Barriers to discharge: Inaccessible home, Decreased caregiver support, and requiring A of two for mobility    Assessment:     Shanell Mahmood is a 74 y.o. female admitted with a medical diagnosis of Central cord syndrome. Now s/p C 2-6 lami/fusion. She presents with the following impairments/functional limitations: weakness, impaired endurance, impaired sensation, impaired self care skills, impaired functional mobility, gait instability, impaired balance, decreased upper extremity function, decreased lower extremity function, pain, impaired coordination, impaired fine motor, impaired skin, impaired cardiopulmonary response to activity Pt found awake in bed. Agreeable to PT. She requires max A x 2 for bed mobility. She is able to sit on EOB and maintain static posture with SBA. She is able to stand with max A x 2 with B knees blocked to avoid buckling. Tolerates ~ 20 seconds of standing x 2 trials. She tolerated sitting on EOB~ 15 minutes, working on core activation. Pt demonstrates gross UE/LE  weakness, R > L. Pt was independent with mobility PTA and will require intensive inpatient rehabilitation in order to achieve highest level of functional independence and reduce burden of care    Rehab Prognosis:  good; patient would benefit from acute skilled PT services to address these deficits and reach maximum level of function.      Recent Surgery: Procedure(s) (LRB):  LAMINECTOMY, SPINE, CERVICAL, POSTERIOR APPROACH (N/A) 1 Day Post-Op    Plan:     During this hospitalization, patient to be seen daily to address the above listed problems via gait training, therapeutic activities, therapeutic exercises, neuromuscular re-education  Plan of Care Expires:  12/04/24  Plan of Care Reviewed with:  patient    Subjective     Communicated with nurse, Clary,  prior to session.  Patient found HOB elevated with blood pressure cuff, serna catheter, telemetry, DEYSI drain, cervical collar, arterial line, oxygen, pulse ox (continuous), SCD upon PT entry to room, agreeable to evaluation.      Chief Complaint: mild pain  Patient comments/goals: get stronger  Pain/Comfort:  Pain Rating 1: 4/10  Location 1: neck  Pain Addressed 1: Reposition, Distraction, Pre-medicate for activity  Pain Rating Post-Intervention 1: 4/10    Patients cultural, spiritual, Moravian conflicts given the current situation: no      Objective:     Patient found with: blood pressure cuff, serna catheter, telemetry, DEYSI drain, cervical collar, arterial line, oxygen, pulse ox (continuous), SCD     General Precautions: Standard, fall, aspiration  Orthopedic Precautions: spinal precautions  Braces: [place] J collar  Respiratory Status: Nasal cannula, flow 3 L/min    Exams:  Cognitive Exam:  Patient is oriented to Person, Place, and Time  Fine Motor Coordination:    -       Impaired     Gross Motor Coordination:  impaired  Sensation:    -       Intact  light/touch B UE/LE's  Skin Integrity/Edema:      -       neck incision dressed  RLE ROM: WFL  RLE Strength: grossly 2+  LLE ROM: WFL  LLE Strength: grossly 2    Functional Mobility:  Bed Mobility:     Scooting: total assistance and of 2 persons  Supine to Sit: maximal assistance and of 2 persons  Sit to Supine: maximal assistance and of 2 persons  Transfers:     Sit to Stand:  maximal assistance and of 2 persons with no AD and B knees buckling, facilitating hip and knee extension  Balance: fair static sitting balance    AM-PAC 6 CLICK MOBILITY  Total Score:9       Treatment and Education:   Pt and spouse educated in PT roles and goals, DC recommendations, fall prevention, spinal precautions,     Patient left HOB elevated with all lines intact and OT present.    GOALS:   Multidisciplinary Problems        Physical Therapy Goals          Problem: Physical Therapy    Goal Priority Disciplines Outcome Interventions   Physical Therapy Goal     PT, PT/OT Progressing    Description: Goals to be met by: 24     Patient will increase functional independence with mobility by performin. Supine to sit with Moderate Assistance  2. Sit to supine with Moderate Assistance  3. Sit to stand transfer with Moderate Assistance  4. Bed to chair transfer with Moderate Assistance using Rolling Walker  5. Gait  x 25 feet with Moderate Assistance using Rolling Walker.                          History:     No past medical history on file.    Past Surgical History:   Procedure Laterality Date    POSTERIOR CERVICAL LAMINECTOMY N/A 11/3/2024    Procedure: LAMINECTOMY, SPINE, CERVICAL, POSTERIOR APPROACH;  Surgeon: Kobi Corona MD;  Location: [facility];  Service: Neurosurgery;  Laterality: N/A;  C3-6 laminectomy and posterior instrumented fusion, prone, Chest roll, Parada, neuromonitoring, DePuy rep       Time Tracking:     PT Received On: 24  PT Start Time: 0925     PT Stop Time: 0950  PT Total Time (min): 25 min     Billable Minutes: Evaluation 15 and Therapeutic Activity 10      2024

## 2024-11-04 NOTE — PLAN OF CARE
Problem: Adult Inpatient Plan of Care  Goal: Plan of Care Review  Outcome: Progressing  Goal: Patient-Specific Goal (Individualized)  Outcome: Progressing  Goal: Absence of Hospital-Acquired Illness or Injury  Outcome: Progressing  Goal: Optimal Comfort and Wellbeing  Outcome: Progressing     Problem: Stroke, Ischemic (Includes Transient Ischemic Attack)  Goal: Optimal Functional Ability  Outcome: Progressing  Goal: Optimal Nutrition Intake  Outcome: Progressing     Problem: Fall Injury Risk  Goal: Absence of Fall and Fall-Related Injury  Outcome: Progressing     Problem: Infection  Goal: Absence of Infection Signs and Symptoms  Outcome: Progressing     Problem: Wound  Goal: Optimal Coping  Outcome: Progressing  Goal: Optimal Functional Ability  Outcome: Progressing  Goal: Absence of Infection Signs and Symptoms  Outcome: Progressing  Goal: Improved Oral Intake  Outcome: Progressing  Goal: Optimal Pain Control and Function  Outcome: Progressing  Goal: Skin Health and Integrity  Outcome: Progressing  Goal: Optimal Wound Healing  Outcome: Progressing

## 2024-11-04 NOTE — PLAN OF CARE
YORDY met with patients and sisters at bedside to discuss placement due to SW receiving a secure chat stating the family is requesting placement in [place], [place].     YORDY provided the family with a list of facilities within a 50 miles radius of patients home. Sister's and patient informed YORDY to contact daughter Tariq Calloway [phone] to go over placement options. YORDY did educate the family on the difference between IPR and SNF and as well as having to have a back up plan in the case IPR is denied. Family and patient understood.    YORDY contacted Tariq Calloway [phone], left voicemail.       11/04/24 1432   Post-Acute Status   Post-Acute Authorization Placement   Post-Acute Placement Status Patient List Provided   Discharge Delays None known at this time   Discharge Plan   Discharge Plan A Rehab   Discharge Plan B Skilled Nursing Facility

## 2024-11-04 NOTE — PROGRESS NOTES
[facility]  Neurosurgery  Progress Note    Subjective:     Post-Op Info:  Procedure(s) (LRB):  LAMINECTOMY, SPINE, CERVICAL, POSTERIOR APPROACH (N/A)   1 Day Post-Op     Interval History: 11/4:  Patient is status post C3-6 posterior cervical fusion with instrumentation performed on 11/03/2024.  POD 1.  No acute events overnight.  Temperature 100.2°.  Blood pressure 112/74.  Pulse 71.  Respirations 17.  WBC 7.05.  Hemoglobin 10.8.  Hematocrit 33.8.  Platelets 113.  DEYSI drain x1 in place with 90 mL of output in the last 12 hours.  Patient was seen at 745 this morning.  She was found in bed, awake and alert with collar in place.  Nursing staff at bedside.  She reports anticipated posterior cervical and bilateral shoulder pain.  She did request pain medication prior to encounter.  She does have movement in bilateral hands and legs.  Yeung catheter in place.    Medications:  Continuous Infusions:   lactated ringers   Intravenous Continuous 100 mL/hr at 11/03/24 2224 New Bag at 11/03/24 2224    NORepinephrine bitartrate-D5W  0-3 mcg/kg/min Intravenous Continuous 10.6 mL/hr at 11/04/24 0811 0.04 mcg/kg/min at 11/04/24 0811     Scheduled Meds:   allopurinoL  100 mg Oral QHS    atorvastatin  40 mg Oral Daily    bisacodyL  10 mg Rectal Daily    colchicine  0.6 mg Oral BID    midodrine  10 mg Oral TID    montelukast  10 mg Oral Daily    mupirocin   Nasal BID    pantoprazole  40 mg Oral Daily    piperacillin-tazobactam (Zosyn) IV (PEDS and ADULTS) (extended infusion is not appropriate)  3.375 g Intravenous Q8H     PRN Meds:  Current Facility-Administered Medications:     acetaminophen, 650 mg, Oral, Q4H PRN    albuterol sulfate, 2.5 mg, Nebulization, Q6H PRN    aluminum-magnesium hydroxide-simethicone, 30 mL, Oral, Q4H PRN    dextrose 50%, 12.5 g, Intravenous, PRN    dextrose 50%, 25 g, Intravenous, PRN    glucagon (human recombinant), 1 mg, Intramuscular, PRN    glucose, 16 g, Oral, PRN    glucose, 24 g, Oral,  PRN    HYDROmorphone, 1 mg, Intravenous, Q6H PRN    HYDROmorphone, 2 mg, Oral, Q4H PRN    magnesium oxide, 800 mg, Oral, PRN    magnesium oxide, 800 mg, Oral, PRN    methocarbamoL, 500 mg, Oral, TID PRN    naloxone, 0.02 mg, Intravenous, PRN    ondansetron, 4 mg, Intravenous, Q8H PRN    potassium bicarbonate, 35 mEq, Oral, PRN    potassium bicarbonate, 50 mEq, Oral, PRN    potassium bicarbonate, 60 mEq, Oral, PRN    potassium, sodium phosphates, 2 packet, Oral, PRN    potassium, sodium phosphates, 2 packet, Oral, PRN    potassium, sodium phosphates, 2 packet, Oral, PRN    prochlorperazine, 5 mg, Intravenous, Q6H PRN    senna-docusate 8.6-50 mg, 2 tablet, Oral, Nightly PRN    sodium chloride 0.9%, 500 mL, Intravenous, PRN    sodium chloride 0.9%, 10 mL, Intravenous, Q12H PRN    sodium chloride 0.9%, 10 mL, Intravenous, PRN       Objective:     Weight: 72.7 kg (160 lb 4.4 oz)  Body mass index is 25.1 kg/m².  Vital Signs (Most Recent):  Temp: 100.2 °F (37.9 °C) (11/04/24 0800)  Pulse: 71 (11/04/24 1015)  Resp: 17 (11/04/24 1015)  BP: 112/74 (11/04/24 1000)  SpO2: (!) 94 % (11/04/24 1015) Vital Signs (24h Range):  Temp:  [96.7 °F (35.9 °C)-100.2 °F (37.9 °C)] 100.2 °F (37.9 °C)  Pulse:  [49-88] 71  Resp:  [11-22] 17  SpO2:  [90 %-99 %] 94 %  BP: ()/(50-78) 112/74  Arterial Line BP: ()/() 123/70     Date 11/04/24 0700 - 11/05/24 0659   Shift 6289-8802 9517-8806 5383-2413 24 Hour Total   INTAKE   P.O. 100   100   I.V.(mL/kg) 897.7(12.3)   897.7(12.3)   IV Piggyback 200   200   Shift Total(mL/kg) 1197.7(16.5)   1197.7(16.5)   OUTPUT   Urine(mL/kg/hr) 175   175   Drains 25   25   Shift Total(mL/kg) 200(2.8)   200(2.8)   Weight (kg) 72.7 72.7 72.7 72.7                            Closed/Suction Drain 11/03/24 1108 Tube - 1 Posterior Neck Accordion 10 Fr. (Active)   Site Description Unable to view 11/04/24 0901   Dressing Type Gauze;Transparent (Tegaderm) 11/04/24 0901   Dressing Status Clean;Dry;Intact  "11/04/24 0901   Dressing Intervention Integrity maintained 11/04/24 0901   Drainage Sanguineous;Serosanguineous 11/04/24 0901   Status To bulb suction 11/04/24 0901   Output (mL) 15 mL 11/04/24 0901            Urethral Catheter 11/01/24 1731 Non-latex 16 Fr. (Active)   Site Assessment Clean;Intact 11/04/24 0901   Collection Container Urimeter 11/04/24 0901   Securement Method secured to top of thigh w/ adhesive device 11/04/24 0901   Catheter Care Performed yes 11/04/24 0901   Reason for Continuing Urinary Catheterization Critically ill in ICU and requiring hourly monitoring of intake/output 11/04/24 0901   CAUTI Prevention Bundle Securement Device in place with 1" slack;Intact seal between catheter & drainage tubing;Drainage bag/urimeter off the floor;Sheeting clip in use;No dependent loops or kinks;Drainage bag/urimeter not overfilled (<2/3 full);Drainage bag/urimeter below bladder 11/04/24 0701   Output (mL) 120 mL 11/04/24 0901       Neurosurgery Physical Exam  General:  Very pleasant.  No acute distress..   Neck:  Aspen collar in place.    Neurologic: Alert and oriented. Thought content appropriate.  GCS: E4 V5 M6; Total: 15  Pulmonary: normal respirations, no signs of respiratory distress  Skin: Skin is warm, dry and intact.    Motor Strength:  No abnormal movements seen.  Mild spontaneous movement noted in distal upper extremities   Right lower extremity strength iliopsoas 4/5, TA/EHL 4/5, gastrocnemius 4/5   Left lower extremity strength iliopsoas 4/5, TA/EHL 5/5, gastrocnemius 5/5     Posterior cervical Incision: Covered with clean, dry bandage. No surrounding erythema or edema. No drainage from incision. No palpable hematoma or underlying fluid collection.  DEYSI drain x 1 in place     Significant Labs:  Recent Labs   Lab 11/03/24  0256 11/03/24 2020 11/04/24  0358   GLU 93 104 95    138 137   K 4.2 4.6 4.2    109 108   CO2 25 23 23   BUN 30* 30* 30*   CREATININE 1.9* 1.8* 1.9*   CALCIUM 8.2* " "7.6* 7.6*   MG 2.1  --   --      Recent Labs   Lab 11/03/24  0256 11/03/24 2020 11/04/24  0358   WBC 5.49 6.33 7.05   HGB 13.1 11.2* 10.8*   HCT 41.9 34.4* 33.8*    119* 113*     No results for input(s): "LABPT", "INR", "APTT" in the last 48 hours.  Microbiology Results (last 7 days)       Procedure Component Value Units Date/Time    Blood culture [[id number]] Collected: 11/01/24 1829    Order Status: Completed Specimen: Blood from Peripheral, Hand, Right Updated: 11/03/24 2032     Blood Culture, Routine No Growth to date      No Growth to date      No Growth to date            Assessment/Plan:     Status post cervical spinal fusion  Patient is status post C3-6 posterior cervical fusion with instrumentation performed on 11/03/2024 4 central cord syndrome.      --Q1h neurochecks in ICU, q2h neurochecks in stepdown  --All labs and diagnostics reviewed  --MAP goals >80 at this time   --Maintain collar at all times   --DEYSI drains to remain at this time on suction  --PT/OT/OOB  --ADAT  --Pain control with multimodal pain regimen   --TEDs/SCDs  --Continue to monitor clinically, notify NSGY immediately with any changes in neuro status    Dispo:  After drain removal, anticipate discharged to [facility].  Patient will follow-up with Dr. Corona office after discharge from [facility].            SULY JARRETT PA-C  Neurosurgery  [facility]  "

## 2024-11-04 NOTE — PT/OT/SLP RE-EVAL
Occupational Therapy   Re-evaluation, tx    Name: Shanell Mahmood  MRN: [id number]  Admitting Diagnosis:  Central cord syndrome  Recent Surgery: Procedure(s) (LRB):  LAMINECTOMY, SPINE, CERVICAL, POSTERIOR APPROACH (N/A) 1 Day Post-Op  The primary encounter diagnosis was Central cord syndrome, initial encounter. Diagnoses of Central cord syndrome, subsequent encounter, Syncope, Syncope and collapse, Chest pain, Atrial fibrillation, Pulmonary hypertension, Stroke [I63.9], Brain mass [G93.89], CKD (chronic kidney disease), stage IV, and Cerebrovascular accident (CVA) due to other mechanism were also pertinent to this visit.    Recommendations:     Discharge Recommendations: High Intensity Therapy  Discharge Equipment Recommendations: to be determined by next level of care  Barriers to discharge:  Decreased caregiver support (increased level of assist for ADLS and mobilty, not near baseline functioning)    Assessment:     Shanell Mahmood is a 74 y.o. female with a medical diagnosis of Central cord syndrome.  She presents with  s/p cervical fusion and laminectomy on 11/3/2024. OT re-eval completed and fx status change noted. Pt is Ox 4, follows commands, pleasant and motivated to work with OT to get stronger. She was found lying in bed with cervical collar in place. She has spinal precautions post op. She presents with profound UE and LE weakness (quadriparesis) and poor functional UE use for ADLS. She requires Total A-Dep assist with ADLs and fx mobility. Pt would benefit from IPR. Continue with OT POC. Performance deficits affecting function are weakness, impaired endurance, impaired sensation, impaired self care skills, impaired functional mobility, gait instability, impaired balance, decreased upper extremity function, decreased coordination, decreased lower extremity function, decreased safety awareness, pain, abnormal tone, decreased ROM, impaired coordination, impaired fine motor, impaired skin, impaired cardiopulmonary  response to activity, impaired joint extensibility, orthopedic precautions.      Rehab Prognosis:  fair/good; patient would benefit from acute skilled OT services to address these deficits and reach maximum level of function.       Plan:     Patient to be seen 6 x/week to address the above listed problems via self-care/home management, therapeutic activities, therapeutic exercises, neuromuscular re-education  Plan of Care Expires: 12/04/24  Plan of Care Reviewed with: patient    Subjective     Chief Complaint: upper back /shld pain 7/10  Patient/Family stated goals: pt agreeable. Will I be able to walk again Dary?  Communicated with: nurse, OT prior to session.  Pain/Comfort:  Pain Rating 1: 7/10  Location - Side 1: Bilateral  Location - Orientation 1: upper  Location 1: back (shld blades)  Pain Addressed 1: Reposition, Distraction, Cessation of Activity  Pain Rating Post-Intervention 1:  (not rated)    Objective:     Communicated with: nurse prior to session.  Patient found HOB elevated with: blood pressure cuff, serna catheter, telemetry, oxygen, DEYSI drain, peripheral IV, cervical collar, arterial line upon OT entry to room.    General Precautions: Standard, fall, aspiration (clear liquid)  Orthopedic Precautions: spinal precautions  Braces: [place] J collar  Respiratory Status:  3L NC    Occupational Performance:    Bed Mobility:    See PT eval. Pt just put back in bed after PT eval finishing up as OT entered room.     Activities of Daily Living:  Feeding:  dependence   Grooming: total assistance using [place] assist and support at elbow to wash face with cloth draped over hand and held in place by OT due to poor grasp in R hand and UE weakness.   Upper Body Dressing: total assistance   Lower Body Dressing: dependence   Toileting: dependence pt has daphne    Cognitive/Visual Perceptual:  Cognitive/Psychosocial Skills:     -       Oriented to: Person, Place, Time, and Situation   -       Follows  Commands/attention:Follows two-step commands  -       Communication: clear/fluent  -       Memory: Poor immediate recall  -       Safety awareness/insight to disability: impaired   -       Mood/Affect/Coping skills/emotional control: Appropriate to situation  Visual/Perceptual:      -Intact grossly    Physical Exam:  Balance:    -       see PT eval  Postural examination/scapula alignment:    -       NT  Sensation:    -       Impaired  light/touch BUE especially palms of hands and finger tips >on R than L and sharp/dull   Motor Planning:    -       intact as able due to profound weakness  Dominant hand:    -       right  Upper Extremity Range of Motion:     -       Right Upper Extremity: AROM WFL in elbow flexion, sup and pronation. except shld ~20 abd/flexion; elbow extension ~45, wrist none, finger flexion none, extension none  -       Left Upper Extremity: WFL except shld flexion ~45, abd ~45; elbow flex/ext wfl, sup/pro wfl, wrist none, finger flexion ~20 flexion, no extension  Upper Extremity Strength:    -       Right Upper Extremity: Deficits: shld 2+/5, elbow flexion 3-/5, triceps 2+/5, sup/pro 3/5,  wrist 0/5, finger flexion 1/5-2-/5; finger extension 0/5  -       Left Upper Extremity: Deficits: shld 2+/5, elbow flexion 3/5, triceps 3-/5, sup/pro 3-/5, wrist 0/5, finger flexion 2+/5, extension 0/5   Strength:    -       Right Upper Extremity: Deficits: absent   -       Left Upper Extremity: Deficits: poor  Fine Motor Coordination:    -       Impaired  Left hand, manipulation of objects absent and Right hand, manipulation of objects absent  Gross motor coordination:   paresis  Neurological:    -       generally hypotonia   -       noted ability of pt to perform active ankle pumps and moved LLE  in bed    AMPA 6 Click:  AMPA Total Score: 7    Treatment & Education:  Pt seen for OT re-eval due to spinal sx. on 11/3/2024.  OT POC revised.  Pt performed BUE AAROM ex 10 reps in all major joints and  planes.  Pt was able to squeeze with both hands after stretch and AAROM ex with noted increased  strength- greater on L than R however not able to release .  Propping positioning of arms on pillows to reduce stress on cervical spine and for pain control.  Pt educated in adaptive device that could potentially be used for helping to increase Indep with self care act. Ongoing assessment of appropriate devices.  All questions/concerns addressed within scope.    Patient left HOB elevated with all lines intact, call button in reach, and bed alarm on    GOALS:   Multidisciplinary Problems       Occupational Therapy Goals          Problem: Occupational Therapy    Goal Priority Disciplines Outcome Interventions   Occupational Therapy Goal     OT, PT/OT Progressing    Description: Goals to be met by: 12/2/2024  - revised target date, feeding and g/hygiene goals, all other goals remain appropriate.  Patient will increase functional independence with ADLs by performing:    Feeding with Minimal Assistance with adaptive device PRN.  UE Dressing with Minimal Assistance.  LE Dressing with Moderate Assistance.  Grooming while EOB with Minimal Assistance with adaptive device PRN.  Toileting from bedside commode with Minimal Assistance for hygiene and clothing management.   Sitting at edge of bed x15 minutes with Stand-by Assistance.  Supine to sit with Minimal Assistance.  Toilet transfer to bedside commode with Minimal Assistance.  Upper extremity exercise program, with assistance as needed.                         History:     No past medical history on file.      Past Surgical History:   Procedure Laterality Date    POSTERIOR CERVICAL LAMINECTOMY N/A 11/3/2024    Procedure: LAMINECTOMY, SPINE, CERVICAL, POSTERIOR APPROACH;  Surgeon: Kobi Corona MD;  Location: [facility];  Service: Neurosurgery;  Laterality: N/A;  C3-6 laminectomy and posterior instrumented fusion, prone, Chest roll, Parada, neuromonitoring, DePuy rep        Time Tracking:     OT Date of Treatment: 11/04/24  OT Start Time: 0951  OT Stop Time: 1026  OT Total Time (min): 35 min    Billable Minutes:Re-eval 10  Therapeutic Exercise 25  Total Time 35    11/4/2024

## 2024-11-04 NOTE — PT/OT/SLP PROGRESS
Speech Language Pathology      Shanell HARSHAD Mahmood  MRN: [id number]    Patient not seen today secondary to  (Pt s/p laminectomy with transfer to ICU. Please reconsult SLP if indicated.).

## 2024-11-04 NOTE — PLAN OF CARE
SW received call from daughter/Tariq [phone], regarding placement. SW went over the recommendation of IPR and also the process of working other levels of care as back up.    Daughter verbally consented for rehab referral to go to [facility], [facility], [facility], and [facility].    Daughter verbally consented to other level of care referrals to be sent to [place] and [facility] locations.    Patient choice obtained and uploaded to media manager.       11/04/24 2490   Post-Acute Status   Post-Acute Authorization Placement   Post-Acute Placement Status Referrals Sent   Patient choice form signed by patient/caregiver List with quality metrics by geographic area provided   Discharge Delays None known at this time   Discharge Plan   Discharge Plan A Rehab   Discharge Plan B Skilled Nursing Facility

## 2024-11-04 NOTE — ASSESSMENT & PLAN NOTE
S/p (11/3/24)  C2-3, C3-4, C4-5, C5-6, C6-7 laminectomy  C3 through 6 posterior segmental instrumentation using DePuy [employer] system  C3-4, C4-5, C5-6 posterolateral arthrodesis using autograft harvested from the same incision and allograft DBM    Cervical collar in place      12-Nov-2023 10:08

## 2024-11-04 NOTE — ASSESSMENT & PLAN NOTE
Patient is status post C3-6 posterior cervical fusion with instrumentation performed on 11/03/2024 4 central cord syndrome.      --Q1h neurochecks in ICU, q2h neurochecks in stepdown  --All labs and diagnostics reviewed  --MAP goals >80 at this time   --Maintain collar at all times   --DEYSI drains to remain at this time on suction  --PT/OT/OOB  --ADAT  --Pain control with multimodal pain regimen   --TEDs/SCDs  --Continue to monitor clinically, notify NSGY immediately with any changes in neuro status    Dispo:  After drain removal, anticipate discharged to [facility].  Patient will follow-up with Dr. Corona office after discharge from [facility].

## 2024-11-04 NOTE — HOSPITAL COURSE
11/4:  Patient is status post C3-6 posterior cervical fusion with instrumentation performed on 11/03/2024.  POD 1.  No acute events overnight.  Temperature 100.2°.  Blood pressure 112/74.  Pulse 71.  Respirations 17.  WBC 7.05.  Hemoglobin 10.8.  Hematocrit 33.8.  Platelets 113.  DEYSI drain x1 in place with 90 mL of output in the last 12 hours.  Patient was seen at 745 this morning.  She was found in bed, awake and alert with collar in place.  Nursing staff at bedside.  She reports anticipated posterior cervical and bilateral shoulder pain.  She did request pain medication prior to encounter.  She does have movement in bilateral hands and legs.  Yeung catheter in place.  11/5:  POD 2.  Afebrile.  Blood pressure 116/56.  Pulse 92.  Respirations 17.  WBC 7.8 7.  Hemoglobin 10.2.  Hematocrit 31.5.  Platelets 97.  DEYSI drain x1 in place with 64 mL of serosanguineous output in last 12 hours.  Overnight, patient had multiple episodes of desaturation prompting stat chest x-ray.  Orders for IS and Acapella provided.  On initiation of encounter, patient was found in bed, awake and alert.  No family was present at time of encounter.  Patient reports anticipated incisional pain which does not improve with current pain regimen.  She also reported at home uses Norco 10 mg chronically.  She also reports improvement in bilateral upper extremity arm movement.  She denies new upper or lower extremity pain or paresthesias.  11/6:  POD 3.  Afebrile.  Blood pressure 123/60.  Pulse 75.  Respirations 10.  WBC 8.94.  Hemoglobin 11.0.  Hematocrit 33.4.  Platelets 121.  Creatinine 2.2 up trend.  Overnight, patient had an episode of ventricular tachycardia which prompted chest x-ray and echo.  On initiation of encounter, patient was found in bed, awake and alert.  No family was present at time of encounter.  Patient reports improvement in posterior incisional pain with new pain regimen.  She also reports no change in bilateral upper extremity  movement.  She denies new extremity pain or paresthesias.  11/7:  No acute events overnight.  Afebrile.  Blood pressure 95/52, pulse 72.  Respirations 15.  WBC 7.7.  Hemoglobin 10.3.  Hematocrit 31.1.  Creatinine 1.9.  Sodium 134.  Patient was seen around 815 a.m. and was found in bed, awake and alert with family at bedside.  She continues to report improve posterior cervical pain.  She denies new extremity pain or paresthesias.

## 2024-11-05 PROBLEM — I82.431 ACUTE DEEP VEIN THROMBOSIS (DVT) OF POPLITEAL VEIN OF RIGHT LOWER EXTREMITY: Status: ACTIVE | Noted: 2024-01-01

## 2024-11-05 PROBLEM — G95.9 MYELOPATHY: Status: ACTIVE | Noted: 2024-11-05

## 2024-11-05 PROBLEM — G82.50 QUADRIPARESIS: Status: ACTIVE | Noted: 2024-01-01

## 2024-11-05 NOTE — CARE UPDATE
11/04/24 1953   Patient Assessment/Suction   Level of Consciousness (AVPU) alert   Respiratory Effort Normal   Expansion/Accessory Muscles/Retractions no use of accessory muscles   All Lung Fields Breath Sounds clear   Rhythm/Pattern, Respiratory unlabored   Cough Frequency no cough   Skin Integrity   $ Wound Care Tech Time 15 min   Area Observed Left;Right;Cheek;Behind ear;Nares   Skin Appearance without discoloration   PRE-TX-O2   Device (Oxygen Therapy) nasal cannula   $ Is the patient on Low Flow Oxygen? Yes   Flow (L/min) (Oxygen Therapy) 4   SpO2 (!) 94 %   Pulse Oximetry Type Continuous   $ Pulse Oximetry - Multiple Charge Pulse Oximetry - Multiple   Pulse 71   Resp 16   Aerosol Therapy   $ Aerosol Therapy Charges PRN treatment not required   Incentive Spirometer   $ Incentive Spirometer Charges unable to perform   Education   $ Education Bronchodilator;Oxygen;15 min   Respiratory Evaluation   $ Care Plan Tech Time 15 min

## 2024-11-05 NOTE — PROGRESS NOTES
[facility]  Neurosurgery  Progress Note    Subjective:     Post-Op Info:  Procedure(s) (LRB):  LAMINECTOMY, SPINE, CERVICAL, POSTERIOR APPROACH (N/A)   2 Days Post-Op     Interval History: 11/5:  POD 2.  Afebrile.  Blood pressure 116/56.  Pulse 92.  Respirations 17.  WBC 7.8 7.  Hemoglobin 10.2.  Hematocrit 31.5.  Platelets 97.  DEYSI drain x1 in place with 64 mL of serosanguineous output in last 12 hours.  Overnight, patient had multiple episodes of desaturation prompting stat chest x-ray.  Orders for IS and Acapella provided.  On initiation of encounter, patient was found in bed, awake and alert.  No family was present at time of encounter.  Patient reports anticipated incisional pain which does not improve with current pain regimen.  She also reported at home uses Norco 10 mg chronically.  She also reports improvement in bilateral upper extremity arm movement.  She denies new upper or lower extremity pain or paresthesias.    Medications:  Continuous Infusions:   lactated ringers   Intravenous Continuous 100 mL/hr at 11/05/24 0601 New Bag at 11/05/24 0601    NORepinephrine bitartrate-D5W  0-3 mcg/kg/min Intravenous Continuous 15.9 mL/hr at 11/05/24 0545 0.06 mcg/kg/min at 11/05/24 0545     Scheduled Meds:   allopurinoL  100 mg Oral QHS    atorvastatin  40 mg Oral Daily    bisacodyL  10 mg Rectal Daily    colchicine  0.3 mg Oral Daily    macitentan  10 mg Oral Daily    methocarbamoL  750 mg Oral QID    midodrine  10 mg Oral TID    montelukast  10 mg Oral Daily    mupirocin   Nasal BID    oxyCODONE-acetaminophen  1 tablet Oral Q6H    pantoprazole  40 mg Oral Daily    selexipag  1,600 mcg Oral BID    tadalafil  40 mg Oral Daily     PRN Meds:  Current Facility-Administered Medications:     acetaminophen, 650 mg, Oral, Q4H PRN    albuterol sulfate, 2.5 mg, Nebulization, Q6H PRN    aluminum-magnesium hydroxide-simethicone, 30 mL, Oral, Q4H PRN    dextrose 50%, 12.5 g, Intravenous, PRN    dextrose 50%, 25 g,  Intravenous, PRN    diphenhydrAMINE, 25 mg, Oral, Q6H PRN    glucagon (human recombinant), 1 mg, Intramuscular, PRN    glucose, 16 g, Oral, PRN    glucose, 24 g, Oral, PRN    HYDROmorphone, 1 mg, Intravenous, Q6H PRN    HYDROmorphone, 2 mg, Oral, Q4H PRN    magnesium oxide, 800 mg, Oral, PRN    magnesium oxide, 800 mg, Oral, PRN    methocarbamoL, 500 mg, Oral, TID PRN    naloxone, 0.02 mg, Intravenous, PRN    ondansetron, 4 mg, Intravenous, Q8H PRN    potassium bicarbonate, 35 mEq, Oral, PRN    potassium bicarbonate, 50 mEq, Oral, PRN    potassium bicarbonate, 60 mEq, Oral, PRN    potassium, sodium phosphates, 2 packet, Oral, PRN    potassium, sodium phosphates, 2 packet, Oral, PRN    potassium, sodium phosphates, 2 packet, Oral, PRN    prochlorperazine, 5 mg, Intravenous, Q6H PRN    senna-docusate 8.6-50 mg, 2 tablet, Oral, Nightly PRN    sodium chloride 0.9%, 500 mL, Intravenous, PRN    sodium chloride 0.9%, 10 mL, Intravenous, Q12H PRN    sodium chloride 0.9%, 10 mL, Intravenous, PRN       Objective:     Weight: 72.7 kg (160 lb 4.4 oz)  Body mass index is 25.1 kg/m².  Vital Signs (Most Recent):  Temp: 99.5 °F (37.5 °C) (11/05/24 0400)  Pulse: 92 (11/05/24 0913)  Resp: 17 (11/05/24 0913)  BP: (!) 116/56 (11/05/24 0913)  SpO2: (!) 93 % (11/05/24 0913) Vital Signs (24h Range):  Temp:  [98.1 °F (36.7 °C)-99.5 °F (37.5 °C)] 99.5 °F (37.5 °C)  Pulse:  [63-92] 92  Resp:  [8-24] 17  SpO2:  [75 %-98 %] 93 %  BP: ()/(52-80) 116/56  Arterial Line BP: ()/(-3-223) 130/65                              Closed/Suction Drain 11/03/24 1108 Tube - 1 Posterior Neck Accordion 10 Fr. (Active)   Site Description Unable to view 11/05/24 0501   Dressing Type Gauze;Transparent (Tegaderm) 11/05/24 0501   Dressing Status Clean;Dry;Intact 11/05/24 0501   Dressing Intervention Integrity maintained 11/05/24 0501   Drainage Serosanguineous 11/05/24 0501   Status To bulb suction 11/05/24 0501   Output (mL) 20 mL 11/05/24 0601           "  Urethral Catheter 11/01/24 1731 Non-latex 16 Fr. (Active)   Site Assessment Clean;Intact 11/05/24 0501   Collection Container Urimeter 11/05/24 0501   Securement Method secured to top of thigh w/ adhesive device 11/05/24 0501   Catheter Care Performed no 11/05/24 0501   Reason for Continuing Urinary Catheterization Critically ill in ICU and requiring hourly monitoring of intake/output 11/05/24 0501   CAUTI Prevention Bundle Securement Device in place with 1" slack;Intact seal between catheter & drainage tubing;Drainage bag/urimeter off the floor;Sheeting clip in use;No dependent loops or kinks;Drainage bag/urimeter not overfilled (<2/3 full);Drainage bag/urimeter below bladder 11/04/24 2301   Output (mL) 45 mL 11/05/24 0601         Neurosurgery Physical Exam  General:  Mild acute distress.  Neck:  Has been collar in place.  Neurologic: Alert and oriented. Thought content appropriate.  GCS: E4 V5 M6; Total: 15  Pulmonary: normal respirations, no signs of respiratory distress  Skin: Skin is warm, dry and intact.    Sensory: intact to light touch throughout  Motor Strength: Moves all extremities spontaneously with good tone.  No abnormal movements seen.  Bilateral upper extremity strength deltoid/biceps/triceps/hand  3/5   Right lower extremity strength iliopsoas 4/5, TA/EHL 4/5, gastrocnemius 4/5   Left lower extremity strength iliopsoas 4/5, TA/EHL 5/5, gastrocnemius 5/5     Posterior cervical Incision: Covered with clean, dry steri strip. No surrounding erythema or edema. No drainage from incision. No palpable hematoma or underlying fluid collection.  DEYSI drain x 1 in place     Significant Labs:  Recent Labs   Lab 11/03/24 2020 11/04/24 0358 11/05/24 0317    95 94    137 135*   K 4.6 4.2 3.9    108 106   CO2 23 23 23   BUN 30* 30* 30*   CREATININE 1.8* 1.9* 1.9*   CALCIUM 7.6* 7.6* 7.4*   MG  --   --  1.8     Recent Labs   Lab 11/03/24 2020 11/04/24 0358 11/05/24 0317 11/05/24  0446 " "  WBC 6.33 7.05 7.87  --    HGB 11.2* 10.8* 10.2*  --    HCT 34.4* 33.8* 31.5* 37   * 113* 97*  --      No results for input(s): "LABPT", "INR", "APTT" in the last 48 hours.  Microbiology Results (last 7 days)       Procedure Component Value Units Date/Time    Blood culture [[id number]] Collected: 11/01/24 1829    Order Status: Completed Specimen: Blood from Peripheral, Hand, Right Updated: 11/04/24 2032     Blood Culture, Routine No Growth to date      No Growth to date      No Growth to date      No Growth to date            Assessment/Plan:     Status post cervical spinal fusion  Patient is status post C3-6 posterior cervical fusion with instrumentation performed on 11/03/2024 4 central cord syndrome.    Upper extremity motor improvement noted.    --Q1h neurochecks in ICU  --All labs and diagnostics reviewed  --Maintain collar at all times   --DEYSI drains to remain at this time on suction  --Started scheduled Percocet 7.5 mg p.o. q.6 hours.    --Started scheduled Robaxin 750 mg p.o. q.6 hours.  --PT/OT/OOB  --ADAT  --TEDs/SCDs  --Continue to monitor clinically, notify NSGY immediately with any changes in neuro status    Dispo:  Pending pain control and drain removal.          SULY JARRETT PA-C  Neurosurgery  [facility]  "

## 2024-11-05 NOTE — PROGRESS NOTES
Pulmonary/Critical Care Consult      PATIENT NAME: Shanell Mahmood  MRN: [id number]  TODAY'S DATE: 2024  8:00 AM  ADMIT DATE: 10/31/2024  AGE: 74 y.o. : 1950    CONSULT REQUESTED BY: Gypsy Bailey MD    REASON FOR CONSULT:   Critical care management    HPI:  The patient is a 74 year old female who had a syncopal event on 10/31.  She was weak, myelopathic.  CT of the head showed an acute infarct in the right caudate head.  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  The MRI of her cervical spine showed disc bulging and spondylolysis at C4-C5 and C5-C6 with severe spinal canal stenosis, cervical cord compression and cord signal alteration there was also broad-based disc bulging producing moderate spinal canal stenosis at C3-C4.  She was brought to the OR yesterday where she underwent C2- 3, 3-4, 4-5,5-6,  and 6- 7 laminectomies with C3 through 6 posterior segmental instrumentation and C3-4, 4- 5, 5- 6 posterolateral arthrodesis using autograft harvested from the incision and allograft of DBM.  This morning she remains profoundly myelopathic.  She is on Levophed at 0.06 mics per kilos per minute to maintain a mean of 80.    The patient has significant pulmonary hypertension in his managed with Opsumit 10 mg daily, Adcirca 40 mg daily, and Uptravi 1600 ug bid.  She is on 4 L continuous oxygen and has dyspnea with any exertion.  She is also on Advair 115/21 b.i.d.     the patient's oxygenation dropped dramatically this morning.  She is now on more levophed to maintain a mean of 80.  She is in a lot of pain.    REVIEW OF SYSTEMS  GENERAL: Feeling Weak  EYES: Vision is good.  ENT: No sinusitis or pharyngitis.   HEART: No chest pain or palpitations.  LUNGS:  Patient has 4 L continuous oxygen at home for her pulmonary hypertension  GI: No Nausea, vomiting, constipation, diarrhea, or  reflux.  : No dysuria, hesitancy, or nocturia.  SKIN: No lesions or rashes.  MUSCULOSKELETAL: No joint pain or myalgias.  NEURO:  She has been progressively weaker since her fall  LYMPH: No edema or adenopathy.  PSYCH: No anxiety or depression.  ENDO: No weight change.    No change in the patient's Past Medical History, Past Surgical History, Social History or Family History since admission.    VITAL SIGNS (MOST RECENT)  Temp: 99.5 °F (37.5 °C) (11/05/24 0400)  Pulse: 89 (11/05/24 0657)  Resp: 12 (11/05/24 0718)  BP: 123/66 (11/05/24 0600)  SpO2: (!) 90 % (11/05/24 0657)    INTAKE AND OUTPUT (LAST 24 HOURS):  Intake/Output Summary (Last 24 hours) at 11/5/2024 0842  Last data filed at 11/5/2024 0601  Gross per 24 hour   Intake 1202.75 ml   Output 1259 ml   Net -56.25 ml       WEIGHT  Wt Readings from Last 1 Encounters:   11/01/24 72.7 kg (160 lb 4.4 oz)       PHYSICAL EXAM  GENERAL: Older patient looking uncomfortable.  HEENT: Pupils equal and reactive. Extraocular movements intact. Nose intact. Pharynx moist.  NECK: Supple.  Aspen collar in place.  DEYSI drain with serosanguineous drainage  HEART: Regular rate and rhythm. No murmur or gallop auscultated.  LUNGS:  There crackles in both bases.  Lung excursion symmetrical. No change in fremitus.   ABDOMEN: Bowel sounds present. Non-tender, no masses palpated.  : Normal anatomy.  Yeung catheter with yellow urine  EXTREMITIES:  Minimal movement to the right hand.  There is a bit of a squeeze to the left hand.  She can wiggle both sets of toes.  She can not do anything against gravity.  Arterial line right radial.  Right midline.  Two peripherals.  LYMPHATICS: No adenopathy palpated, no edema.  SKIN: Dry, intact, no lesions.   NEURO: Cranial nerves II-XII intact. Motor strength 1/5 bilaterally.  The right hand is the weakest.  The left quadricep tightens better than the right quadricep.  PSYCH: Appropriate affect      CBC LAST (LAST 24 HOURS)  Recent Labs   Lab  11/05/24 0317 11/05/24 0446   WBC 7.87  --    RBC 3.71*  --    HGB 10.2*  --    HCT 31.5* 37   MCV 85  --    MCH 27.5  --    MCHC 32.4  --    RDW 15.9*  --    PLT 97*  --    MPV 10.9  --        CHEMISTRY LAST (LAST 24 HOURS)  Recent Labs   Lab 11/05/24 0317 11/05/24 0446   *  --    K 3.9  --      --    CO2 23  --    ANIONGAP 6*  --    BUN 30*  --    CREATININE 1.9*  --    GLU 94  --    CALCIUM 7.4*  --    PH  --  7.309*   MG 1.8  --          CARDIAC PROFILE (LAST 24 HOURS)  Recent Labs   Lab 10/31/24  1532 11/01/24  0145 11/01/24  0551   BNP 39  --   --    TROPONINI 0.011   < > 0.026    < > = values in this interval not displayed.       LAST 7 DAYS MICROBIOLOGY   Microbiology Results (last 7 days)       Procedure Component Value Units Date/Time    Blood culture [[id number]] Collected: 11/01/24 1829    Order Status: Completed Specimen: Blood from Peripheral, Hand, Right Updated: 11/04/24 2032     Blood Culture, Routine No Growth to date      No Growth to date      No Growth to date      No Growth to date            MOST RECENT IMAGING  X-Ray Chest AP Portable  Narrative: EXAMINATION:  XR CHEST AP PORTABLE    CLINICAL HISTORY:  Pulmonary HTN;    COMPARISON:  10/31/2024.    FINDINGS:  The heart is enlarged but stable.  Prominence of the central pulmonary vasculature appears similar to prior examination.  There is mild mass effect observed upon the rightward aspect of the trachea.    There are mild airspace opacities or volume loss within the lung bases.  There are mildly diminished lung volumes.  No significant effusion demonstrated.    Multiple monitoring electrodes overlie the chest.  Impression: Diminished lung volumes.    Mild basilar airspace opacities or volume loss.    Cardiomegaly and marked prominence of the central pulmonary vasculature, stable.    Mild mass effect upon the rightward aspect of the trachea.    Electronically signed by: Waqas  Tree  Date:    11/05/2024  Time:    07:04      CURRENT VISIT EKG  No results found for this visit on 10/31/24.    ECHOCARDIOGRAM RESULTS  Results for orders placed during the hospital encounter of 10/31/24    Echo    Interpretation Summary    Left Ventricle: The left ventricle is normal in size. Normal wall thickness. Unable to assess wall motion. There is normal systolic function with a visually estimated ejection fraction of 60 - 65%.    Right Ventricle: Right ventricle was not well visualized due to poor acoustic window.  Grossly appears to be dilated with reduced function.  There appears to be some concern of flattening of the interventricular septum which could indicate right ventricular pressure and volume overload.    Left Atrium: Left atrium is severely dilated.    IVC/SVC: Elevated venous pressure at 15 mmHg.    Oxygen INFORMATION   10 L        IMPRESSION AND PLAN  Severe central cord compression now status post multilevel laminectomy and instrumentation  Quadriparesis persisting  - will maintain a map of 80 for 72 hours  Severe pulmonary hypertension  - patient needs her Opsumit, Uptravi, and Adcirca.  Her  will bring these in.  Acute on chronic hypoxemic respiratory failure  - on 4 L of oxygen at home  - now requiring 10  - I's and O's even  - pulmonary hypertension meds just restarted  - worry about PE  - ultrasound legs  - asking Neurosurgery how they feel about anticoagulation  - CTA likely to worsen her renal function significantly  - I do not think the patient could tolerate a V/Q scan  - D-dimer is obviously positive  - takes Advair but is not obstructed  - will continue p.r.n. DuoNebs  Atelectasis  - chest x-ray does not seem bad enough to account for the change in oxygenation  Anemia  - chronic disease  Thrombocytopenia  - consumption?  - worsening  - continue to trend  Chronic kidney disease  - creatinine inching up  - continue LR until she is taking better p.o. intake  Moderate  hypoalbuminemia  Intracranial mass  Pulmonary nodules, 1 greater than 1 cm  - patient workup underway  On Zosyn  - no idea why  - will DC    Discussed with nursing and Respiratory, Neurosurgery, the patient and her .    Critical care time spent reviewing the chart, examining the patient, reviewing the labs, reviewing the radiological findings, discussing care with nursing, physicians, and respiratory and creating the note and  has been 40 minutes.    Gaye Ramos MD  Date of Service: 11/05/2024  8:00 AM

## 2024-11-05 NOTE — PLAN OF CARE
"    [facility] care plan note    Dx: Central cord syndrome    Shift Events: No acute events. Patient AAO X 4, moves all extremities but is weak in her upper extremities. Patient still c/o numbness to upper extremities. Cervical collar and DEYSI drain in place.     Goals of Care: Encourage ADLs     Neuro: AAO x4, Follows Commands, and Moves All Extremities    Vital Signs: BP (!) 105/59   Pulse 78   Temp 98.1 °F (36.7 °C) (Oral)   Resp 11   Ht 5' 7" (1.702 m)   Wt 72.7 kg (160 lb 4.4 oz)   SpO2 (!) 93%   BMI 25.10 kg/m²     Respiratory: Nasal Cannula    GI: proton pump inhibitor per orders no change in bowel habits    Thrombus: DVT prophylaxis.    Diet: Clear Liquid    Gtts: Norepinephrine    Urine Output: Urinary Catheter see charting cc/hour    Drains:   DEYSI Drain, total output (see charting) cc / shift       Labs/Accuchecks: see results.    Skin: Intact        Plan of care reviewed with patient, Shanell Mahmood , verbalized understanding. Patient progressing toward goals of care. NACHELLE. Safety measures in place and maintained throughout shift.    "

## 2024-11-05 NOTE — CARE UPDATE
11/05/24 0643   Patient Assessment/Suction   Level of Consciousness (AVPU) alert   Respiratory Effort Normal;Unlabored   Expansion/Accessory Muscles/Retractions expansion symmetric;no retractions;no use of accessory muscles   All Lung Fields Breath Sounds clear;diminished   Skin Integrity   $ Wound Care Tech Time 15 min   Area Observed Left;Right;Cheek   Skin Appearance without discoloration   PRE-TX-O2   Device (Oxygen Therapy) high flow nasal cannula   $ Is the patient on Low Flow Oxygen? Yes   Flow (L/min) (Oxygen Therapy) 9   SpO2 (!) 94 %   Pulse Oximetry Type Continuous   $ Pulse Oximetry - Multiple Charge Pulse Oximetry - Multiple   Pulse 77   Resp 14   Aerosol Therapy   $ Aerosol Therapy Charges PRN treatment not required   Incentive Spirometer   $ Incentive Spirometer Charges done with encouragement   Administration (IS) instruction provided, follow-up   Number of Repetitions (IS) 10   Level Incentive Spirometer (mL) 1500   Patient Tolerance (IS) good;no adverse signs/symptoms present   Education   $ Education Oxygen;15 min

## 2024-11-05 NOTE — PLAN OF CARE
Problem: Occupational Therapy  Goal: Occupational Therapy Goal  Description: Goals to be met by: 12/2/2024  - revised target date, feeding and g/hygiene goals, all other goals remain appropriate.  Patient will increase functional independence with ADLs by performing:    Feeding with Minimal Assistance with adaptive device PRN.  UE Dressing with Minimal Assistance.  LE Dressing with Moderate Assistance.  Grooming while EOB with Minimal Assistance with adaptive device PRN.  Toileting from bedside commode with Minimal Assistance for hygiene and clothing management.   Sitting at edge of bed x15 minutes with Stand-by Assistance.  Supine to sit with Minimal Assistance.  Toilet transfer to bedside commode with Minimal Assistance.  Upper extremity exercise program, with assistance as needed.    Outcome: Progressing

## 2024-11-05 NOTE — PT/OT/SLP PROGRESS
Occupational Therapy   Treatment    Name: Shanell Mahmood  MRN: [id number]  Admitting Diagnosis:  Central cord syndrome  2 Days Post-Op    Recommendations:     Discharge Recommendations: High Intensity Therapy  Discharge Equipment Recommendations:  to be determined by next level of care  Barriers to discharge:   (Increased physical assistance with ADLs and functional mobility.)    Assessment:     Shanell Mahmood is a 74 y.o. female with a medical diagnosis of Central cord syndrome.  She presents with general weakness. Patient participated in bed mobility, unsupported sitting EOB, BUE/BLE therex bed level and neck/trunk control exercises sitting EOB. Performance deficits affecting function are weakness, impaired endurance, impaired self care skills, impaired functional mobility, gait instability, impaired balance, decreased safety awareness, decreased lower extremity function, decreased upper extremity function, orthopedic precautions.     Rehab Prognosis:  Good; patient would benefit from acute skilled OT services to address these deficits and reach maximum level of function.       Plan:     Patient to be seen 6 x/week to address the above listed problems via self-care/home management, therapeutic activities, therapeutic exercises  Plan of Care Expires: 12/04/24  Plan of Care Reviewed with: patient    Subjective     Chief Complaint: General weakness  Patient/Family Comments/goals: Improved functional mobility.   Pain/Comfort:  Pain Rating 1: 4/10  Location 1:  (All Over)  Pain Addressed 1: Reposition, Distraction  Pain Rating Post-Intervention 1: 4/10    Objective:     Communicated with: nurse prior to session.  Patient found HOB elevated with telemetry, pulse ox (continuous), oxygen, arterial line, serna catheter, DEYSI drain upon OT entry to room.    General Precautions: Standard, fall, aspiration    Orthopedic Precautions:spinal precautions  Braces: [place] J collar  Respiratory Status:  9L O2 via HFNC     Occupational  Performance:     Bed Mobility:    Patient completed Scooting/Bridging with maximal assistance  Patient completed Supine to Sit with maximal assistance  Patient completed Sit to Supine with maximal assistance   Performed unsupported sitting EOB with maximal assist.    Functional Mobility/Transfers:  Performed BUE/BLE therex x10 reps bed level with maximal assistance  Performed Neck/Trunk control exercises x10 reps with maximal assistance sitting EOB.    [facility] 6 Click ADL: 12    Treatment & Education:  Patient motivated and eager to work with therapy. Patient educated on proper fit of [place]. Collar.     Patient left HOB elevated with all lines intact, call button in reach, and bed alarm on    GOALS:   Multidisciplinary Problems       Occupational Therapy Goals          Problem: Occupational Therapy    Goal Priority Disciplines Outcome Interventions   Occupational Therapy Goal     OT, PT/OT Progressing    Description: Goals to be met by: 12/2/2024  - revised target date, feeding and g/hygiene goals, all other goals remain appropriate.  Patient will increase functional independence with ADLs by performing:    Feeding with Minimal Assistance with adaptive device PRN.  UE Dressing with Minimal Assistance.  LE Dressing with Moderate Assistance.  Grooming while EOB with Minimal Assistance with adaptive device PRN.  Toileting from bedside commode with Minimal Assistance for hygiene and clothing management.   Sitting at edge of bed x15 minutes with Stand-by Assistance.  Supine to sit with Minimal Assistance.  Toilet transfer to bedside commode with Minimal Assistance.  Upper extremity exercise program, with assistance as needed.                         Time Tracking:     OT Date of Treatment: 11/05/24  OT Start Time: 1055  OT Stop Time: 1121  OT Total Time (min): 26 min    Billable Minutes:Therapeutic Activity 13  Therapeutic Exercise 13    OT/MARYELLEN: OT          11/5/2024

## 2024-11-05 NOTE — PROGRESS NOTES
[facility]  Hematology/Oncology  Progress Note    Patient Name: Shanell Mahmood  MRN: [id number]  Admission Date: 10/31/2024  Hospital Length of Stay: 4 days  Code Status: Full Code   Attending Provider: Gypsy Bailey MD  Consulting Provider: Pepper Forte NP  Primary Care Physician: Nicole, Primary Doctor  Principal Problem:Central cord syndrome    Consults  Subjective:     HPI:  74-year-old woman CKD stage 5 history of CVA in 2011 closure of [place]am[place] in 2011 pulmonary hypertension with continuous oxygen at home and history of anemia.  Patient had acute onset of possible syncopal episode while she was at pain management clinic office she does take prescription hydrocodone for back pain and arthritis patient was hypotensive was drowsy EMS gave a fluid challenge and Narcan at ER patient was more awake and alert patient has a known brain mass that was being worked up in outpatient setting.  She lives in [facility].  Patient had similar episode earlier this year and cardiology had worked patient up she was orthostatic at that time as well.  Patient reported feeling weak to upper extremities could not raise her arms she had residual urine in her bladder.  MRI of brain done in ED showed acute infarct right caudate head.  Patient started on aspirin and Plavix also found to have AFib.  Neurology and cardiology following patient due to brain mass Oncology has been consulted patient is in [place] visiting family    11/5/2024:  Patient seen and examined by me.  Sitting up in bed with C-collar in place.  She is complaining of neck pain      Medications:  Continuous Infusions:   lactated ringers   Intravenous Continuous 100 mL/hr at 11/05/24 0601 New Bag at 11/05/24 0601    NORepinephrine bitartrate-D5W  0-3 mcg/kg/min Intravenous Continuous 47.8 mL/hr at 11/05/24 1412 0.18 mcg/kg/min at 11/05/24 1412     Scheduled Meds:   allopurinoL  100 mg Oral QHS    atorvastatin  40 mg Oral Daily    bisacodyL   10 mg Rectal Daily    colchicine  0.3 mg Oral Daily    enoxparin  30 mg Subcutaneous Q24H (prophylaxis, 1700)    HYDROcodone-acetaminophen  1 tablet Oral Q6H    macitentan  10 mg Oral Daily    methocarbamoL  750 mg Oral QID    midodrine  10 mg Oral TID    montelukast  10 mg Oral Daily    mupirocin   Nasal BID    pantoprazole  40 mg Oral Daily    selexipag  1,600 mcg Oral BID    tadalafil  40 mg Oral Daily     PRN Meds:  Current Facility-Administered Medications:     acetaminophen, 650 mg, Oral, Q4H PRN    albuterol sulfate, 2.5 mg, Nebulization, Q6H PRN    aluminum-magnesium hydroxide-simethicone, 30 mL, Oral, Q4H PRN    dextrose 50%, 12.5 g, Intravenous, PRN    dextrose 50%, 25 g, Intravenous, PRN    diphenhydrAMINE, 25 mg, Oral, Q6H PRN    glucagon (human recombinant), 1 mg, Intramuscular, PRN    glucose, 16 g, Oral, PRN    glucose, 24 g, Oral, PRN    HYDROmorphone, 1 mg, Intravenous, Q6H PRN    HYDROmorphone, 2 mg, Oral, Q4H PRN    magnesium oxide, 800 mg, Oral, PRN    magnesium oxide, 800 mg, Oral, PRN    methocarbamoL, 500 mg, Oral, TID PRN    naloxone, 0.02 mg, Intravenous, PRN    ondansetron, 4 mg, Intravenous, Q8H PRN    potassium bicarbonate, 35 mEq, Oral, PRN    potassium bicarbonate, 50 mEq, Oral, PRN    potassium bicarbonate, 60 mEq, Oral, PRN    potassium, sodium phosphates, 2 packet, Oral, PRN    potassium, sodium phosphates, 2 packet, Oral, PRN    potassium, sodium phosphates, 2 packet, Oral, PRN    prochlorperazine, 5 mg, Intravenous, Q6H PRN    senna-docusate 8.6-50 mg, 2 tablet, Oral, Nightly PRN    sodium chloride 0.9%, 500 mL, Intravenous, PRN    sodium chloride 0.9%, 10 mL, Intravenous, Q12H PRN    sodium chloride 0.9%, 10 mL, Intravenous, PRN         Family History    None       Tobacco Use    Smoking status: Not on file    Smokeless tobacco: Not on file   Substance and Sexual Activity    Alcohol use: Not on file    Drug use: Not on file    Sexual activity: Not on file       Review of  Systems  As above patient reports generalized weakness of upper extremities and lower extremities poor sensations to both lower extremities usually she is able to perform all activities of normal daily living she denies chest pain, palpitations, nausea, vomiting or headaches   Denies cough with sputum production fever chills rigors   Denies abdominal pain nausea vomiting or diarrhea  Denies skin rashes  Positive for neck pain  Objective:     Vital Signs (Most Recent):  Temp: 98.3 °F (36.8 °C) (11/05/24 1101)  Pulse: 76 (11/05/24 1500)  Resp: (!) 22 (11/05/24 1243)  BP: 107/71 (11/05/24 1500)  SpO2: 95 % (11/05/24 1500) Vital Signs (24h Range):  Temp:  [98.1 °F (36.7 °C)-99.5 °F (37.5 °C)] 98.3 °F (36.8 °C)  Pulse:  [65-92] 76  Resp:  [8-29] 22  SpO2:  [81 %-97 %] 95 %  BP: ()/(52-80) 107/71  Arterial Line BP: ()/(55-80) 130/65     Weight: 72.7 kg (160 lb 4.4 oz)  Body mass index is 25.1 kg/m².  Body surface area is 1.85 meters squared.      Intake/Output Summary (Last 24 hours) at 11/5/2024 1559  Last data filed at 11/5/2024 0601  Gross per 24 hour   Intake 898.51 ml   Output 694 ml   Net 204.51 ml       Physical Exam  VITAL SIGNS:  as above   GENERAL: appears well-built, well-nourished.  No anxiety, no agitation, and in no distress.  Patient is awake, alert, oriented and cooperative.  HEENT:  Showed no congestion. Trachea is central no obvious icterus or pallor noted no hoarseness. no obvious JVD   NECK:  Supple.  No JVD. No obvious cervical submental or supraclavicular adenopathy.  RS:the visualized portion of  Chest expands well. chest appears symmetric, no audible wheezes.  No dyspnea recognized  ABDOMEN:  abdomen appears undistended.  EXTREMITIES:  Without edema.  NEUROLOGICAL:  Patient is not actively participating in discussion in the room she seems drowsy she does have difficulty lifting her arms all legs off the bed  SKIN MUSCULOSKELETAL: no joint or skeletal deformity, no clubbing of nails.  No  visible rash ecchymosis or petechiae   Significant Labs:   Lab Results   Component Value Date    WBC 7.87 11/05/2024    HGB 10.2 (L) 11/05/2024    HCT 37 11/05/2024    MCV 85 11/05/2024    PLT 97 (L) 11/05/2024      CMP  Sodium   Date Value Ref Range Status   11/05/2024 135 (L) 136 - 145 mmol/L Final     Potassium   Date Value Ref Range Status   11/05/2024 3.9 3.5 - 5.1 mmol/L Final     Chloride   Date Value Ref Range Status   11/05/2024 106 95 - 110 mmol/L Final     CO2   Date Value Ref Range Status   11/05/2024 23 23 - 29 mmol/L Final     Glucose   Date Value Ref Range Status   11/05/2024 94 70 - 110 mg/dL Final     BUN   Date Value Ref Range Status   11/05/2024 30 (H) 8 - 23 mg/dL Final     Creatinine   Date Value Ref Range Status   11/05/2024 1.9 (H) 0.5 - 1.4 mg/dL Final     Calcium   Date Value Ref Range Status   11/05/2024 7.4 (L) 8.7 - 10.5 mg/dL Final     Total Protein   Date Value Ref Range Status   11/03/2024 5.6 (L) 6.0 - 8.4 g/dL Final     Albumin   Date Value Ref Range Status   11/03/2024 2.9 (L) 3.5 - 5.2 g/dL Final     Total Bilirubin   Date Value Ref Range Status   11/03/2024 0.4 0.1 - 1.0 mg/dL Final     Comment:     For infants and newborns, interpretation of results should be based  on gestational age, weight and in agreement with clinical  observations.    Premature Infant recommended reference ranges:  Up to 24 hours.............<8.0 mg/dL  Up to 48 hours............<12.0 mg/dL  3-5 days..................<15.0 mg/dL  6-29 days.................<15.0 mg/dL       Alkaline Phosphatase   Date Value Ref Range Status   11/03/2024 79 55 - 135 U/L Final     AST   Date Value Ref Range Status   11/03/2024 9 (L) 10 - 40 U/L Final     ALT   Date Value Ref Range Status   11/03/2024 5 (L) 10 - 44 U/L Final     Anion Gap   Date Value Ref Range Status   11/05/2024 6 (L) 8 - 16 mmol/L Final     eGFR   Date Value Ref Range Status   11/05/2024 27.5 (A) >60 mL/min/1.73 m^2 Final        Diagnostic  Results:    Impression:     1. Negative limited noncontrast MRA of the neck.  No hemodynamically significant stenosis identified.  MRAof brain unremarkable    Impression:MRI brain     1. Acute infarct in the right caudate head.  2. 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  The mass abuts the left temporal lobe, without resulting in significant mass effect or vasogenic edema.  Differential includes a primary or metastatic osseous lesion, an atypical meningioma, or additional etiologies.  3. Chronic microvascular ischemic changes.  This report was flagged in Epic as abnormal.    FINDINGS:  CT chest August 16, 2024  Solid 4.1 x 1.7 cm pleural-based soft tissue nodular density in the lateral periphery of the right lower lobe. Numerous 4 mm or smaller groundglass and solid nodular densities are noted scattered throughout the right lung.   Assessment/Plan:     Active Diagnoses:    Diagnosis Date Noted POA    PRINCIPAL PROBLEM:  Central cord syndrome [S14.129A] 11/03/2024 Yes    Myelopathy [G95.9] 11/05/2024 Yes    Quadriparesis [G82.50] 11/05/2024 Yes    Status post cervical spinal fusion [Z98.1] 11/04/2024 Not Applicable    Sinus arrhythmia seen on electrocardiogram [I49.8] 11/01/2024 Yes    Anemia [D64.9] 11/01/2024 Yes    Thrombocytopenia [D69.6] 11/01/2024 Yes    Hypotension [I95.9] 11/01/2024 No    Acute urinary retention [R33.8] 10/31/2024 Yes    Syncope and collapse [R55] 10/31/2024 Yes    CKD (chronic kidney disease), stage IV [N18.4] 10/31/2024 Yes    Chronic respiratory failure with hypoxia [J96.11] 10/31/2024 Yes    Pulmonary hypertension [I27.20] 10/31/2024 Yes    Secondary hyperparathyroidism [N25.81] 10/31/2024 Yes    Brain mass [G93.89] 10/31/2024 Yes    Stroke [I63.9] 10/31/2024 Yes    Weakness of both lower extremities [R29.898] 10/31/2024 Yes      Problems Resolved During this Admission:   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    -patient will need to follow-up with her oncologist in [place] for tissue diagnosis    Central cord syndrome:   -status post laminectomy  -neurosurgery on board    CVA:   -follow-up with Neurology    Heme-Onc will sign off at this time.  Please call with any questions or concerns.  She will need to follow-up with her oncologist in [place] for tissue diagnosis.  Re-consult as needed  Pepper Forte NP  Hematology/Oncology  [facility]

## 2024-11-05 NOTE — SUBJECTIVE & OBJECTIVE
Interval History: 11/5:  POD 2.  Afebrile.  Blood pressure 116/56.  Pulse 92.  Respirations 17.  WBC 7.8 7.  Hemoglobin 10.2.  Hematocrit 31.5.  Platelets 97.  DEYSI drain x1 in place with 64 mL of serosanguineous output in last 12 hours.  Overnight, patient had multiple episodes of desaturation prompting stat chest x-ray.  Orders for IS and Acapella provided.  On initiation of encounter, patient was found in bed, awake and alert.  No family was present at time of encounter.  Patient reports anticipated incisional pain which does not improve with current pain regimen.  She also reported at home uses Norco 10 mg chronically.  She also reports improvement in bilateral upper extremity arm movement.  She denies new upper or lower extremity pain or paresthesias.    Medications:  Continuous Infusions:   lactated ringers   Intravenous Continuous 100 mL/hr at 11/05/24 0601 New Bag at 11/05/24 0601    NORepinephrine bitartrate-D5W  0-3 mcg/kg/min Intravenous Continuous 15.9 mL/hr at 11/05/24 0545 0.06 mcg/kg/min at 11/05/24 0545     Scheduled Meds:   allopurinoL  100 mg Oral QHS    atorvastatin  40 mg Oral Daily    bisacodyL  10 mg Rectal Daily    colchicine  0.3 mg Oral Daily    macitentan  10 mg Oral Daily    methocarbamoL  750 mg Oral QID    midodrine  10 mg Oral TID    montelukast  10 mg Oral Daily    mupirocin   Nasal BID    oxyCODONE-acetaminophen  1 tablet Oral Q6H    pantoprazole  40 mg Oral Daily    selexipag  1,600 mcg Oral BID    tadalafil  40 mg Oral Daily     PRN Meds:  Current Facility-Administered Medications:     acetaminophen, 650 mg, Oral, Q4H PRN    albuterol sulfate, 2.5 mg, Nebulization, Q6H PRN    aluminum-magnesium hydroxide-simethicone, 30 mL, Oral, Q4H PRN    dextrose 50%, 12.5 g, Intravenous, PRN    dextrose 50%, 25 g, Intravenous, PRN    diphenhydrAMINE, 25 mg, Oral, Q6H PRN    glucagon (human recombinant), 1 mg, Intramuscular, PRN    glucose, 16 g, Oral, PRN    glucose, 24 g, Oral, PRN     HYDROmorphone, 1 mg, Intravenous, Q6H PRN    HYDROmorphone, 2 mg, Oral, Q4H PRN    magnesium oxide, 800 mg, Oral, PRN    magnesium oxide, 800 mg, Oral, PRN    methocarbamoL, 500 mg, Oral, TID PRN    naloxone, 0.02 mg, Intravenous, PRN    ondansetron, 4 mg, Intravenous, Q8H PRN    potassium bicarbonate, 35 mEq, Oral, PRN    potassium bicarbonate, 50 mEq, Oral, PRN    potassium bicarbonate, 60 mEq, Oral, PRN    potassium, sodium phosphates, 2 packet, Oral, PRN    potassium, sodium phosphates, 2 packet, Oral, PRN    potassium, sodium phosphates, 2 packet, Oral, PRN    prochlorperazine, 5 mg, Intravenous, Q6H PRN    senna-docusate 8.6-50 mg, 2 tablet, Oral, Nightly PRN    sodium chloride 0.9%, 500 mL, Intravenous, PRN    sodium chloride 0.9%, 10 mL, Intravenous, Q12H PRN    sodium chloride 0.9%, 10 mL, Intravenous, PRN       Objective:     Weight: 72.7 kg (160 lb 4.4 oz)  Body mass index is 25.1 kg/m².  Vital Signs (Most Recent):  Temp: 99.5 °F (37.5 °C) (11/05/24 0400)  Pulse: 92 (11/05/24 0913)  Resp: 17 (11/05/24 0913)  BP: (!) 116/56 (11/05/24 0913)  SpO2: (!) 93 % (11/05/24 0913) Vital Signs (24h Range):  Temp:  [98.1 °F (36.7 °C)-99.5 °F (37.5 °C)] 99.5 °F (37.5 °C)  Pulse:  [63-92] 92  Resp:  [8-24] 17  SpO2:  [75 %-98 %] 93 %  BP: ()/(52-80) 116/56  Arterial Line BP: ()/(-3-223) 130/65                              Closed/Suction Drain 11/03/24 1108 Tube - 1 Posterior Neck Accordion 10 Fr. (Active)   Site Description Unable to view 11/05/24 0501   Dressing Type Gauze;Transparent (Tegaderm) 11/05/24 0501   Dressing Status Clean;Dry;Intact 11/05/24 0501   Dressing Intervention Integrity maintained 11/05/24 0501   Drainage Serosanguineous 11/05/24 0501   Status To bulb suction 11/05/24 0501   Output (mL) 20 mL 11/05/24 0601            Urethral Catheter 11/01/24 1731 Non-latex 16 Fr. (Active)   Site Assessment Clean;Intact 11/05/24 0501   Collection Container Urimeter 11/05/24 0501   Securement Method  "secured to top of thigh w/ adhesive device 11/05/24 0501   Catheter Care Performed no 11/05/24 0501   Reason for Continuing Urinary Catheterization Critically ill in ICU and requiring hourly monitoring of intake/output 11/05/24 0501   CAUTI Prevention Bundle Securement Device in place with 1" slack;Intact seal between catheter & drainage tubing;Drainage bag/urimeter off the floor;Sheeting clip in use;No dependent loops or kinks;Drainage bag/urimeter not overfilled (<2/3 full);Drainage bag/urimeter below bladder 11/04/24 2301   Output (mL) 45 mL 11/05/24 0601         Neurosurgery Physical Exam  General:  Mild acute distress.  Neck:  Has been collar in place.  Neurologic: Alert and oriented. Thought content appropriate.  GCS: E4 V5 M6; Total: 15  Pulmonary: normal respirations, no signs of respiratory distress  Skin: Skin is warm, dry and intact.    Sensory: intact to light touch throughout  Motor Strength: Moves all extremities spontaneously with good tone.  No abnormal movements seen.  Bilateral upper extremity strength deltoid/biceps/triceps/hand  3/5   Right lower extremity strength iliopsoas 4/5, TA/EHL 4/5, gastrocnemius 4/5   Left lower extremity strength iliopsoas 4/5, TA/EHL 5/5, gastrocnemius 5/5     Posterior cervical Incision: Covered with clean, dry steri strip. No surrounding erythema or edema. No drainage from incision. No palpable hematoma or underlying fluid collection.  DEYSI drain x 1 in place     Significant Labs:  Recent Labs   Lab 11/03/24 2020 11/04/24 0358 11/05/24 0317    95 94    137 135*   K 4.6 4.2 3.9    108 106   CO2 23 23 23   BUN 30* 30* 30*   CREATININE 1.8* 1.9* 1.9*   CALCIUM 7.6* 7.6* 7.4*   MG  --   --  1.8     Recent Labs   Lab 11/03/24 2020 11/04/24 0358 11/05/24 0317 11/05/24 0446   WBC 6.33 7.05 7.87  --    HGB 11.2* 10.8* 10.2*  --    HCT 34.4* 33.8* 31.5* 37   * 113* 97*  --      No results for input(s): "LABPT", "INR", "APTT" in the last 48 " hours.  Microbiology Results (last 7 days)       Procedure Component Value Units Date/Time    Blood culture [[id number]] Collected: 11/01/24 1829    Order Status: Completed Specimen: Blood from Peripheral, Hand, Right Updated: 11/04/24 2032     Blood Culture, Routine No Growth to date      No Growth to date      No Growth to date      No Growth to date

## 2024-11-05 NOTE — PROCEDURES
"Shanell Mahmood is a 74 y.o. female patient.    Temp: 98.1 °F (36.7 °C) (11/05/24 0701)  Pulse: 92 (11/05/24 0945)  Resp: 15 (11/05/24 0945)  BP: (!) 107/59 (11/05/24 0945)  SpO2: (!) 92 % (11/05/24 0945)  Weight: 72.7 kg (160 lb 4.4 oz) (11/01/24 1825)  Height: 5' 7" (170.2 cm) (11/01/24 0733)    PICC  Date/Time: 11/5/2024 10:50 AM  Performed by: Jose Patel RN  Assisting provider: Panda Feldman RN  Consent Done: Yes  Time out: Immediately prior to procedure a time out was called to verify the correct patient, procedure, equipment, support staff and site/side marked as required  Indications: med administration and vascular access  Anesthesia: local infiltration  Local anesthetic: lidocaine 1% without epinephrine  Anesthetic Total (mL): 3  Description of findings: PICC  Preparation: skin prepped with ChloraPrep  Skin prep agent dried: skin prep agent completely dried prior to procedure  Sterile barriers: all five maximum sterile barriers used - cap, mask, sterile gown, sterile gloves, and large sterile sheet  Hand hygiene: hand hygiene performed prior to central venous catheter insertion  Location details: right basilic  Catheter type: double lumen  Catheter size: 5 Fr  Catheter Length: 37cm    Ultrasound guidance: yes  Vessel Caliber: medium and patent, compressibility normal  Vascular Doppler: not done  Needle advanced into vessel with real time Ultrasound guidance.  Guidewire confirmed in vessel.  Image recorded and saved.  Sterile sheath used.  Number of attempts: 1  Post-procedure: blood return through all ports, chlorhexidine patch and sterile dressing applied  Technical procedures used: 3CG  Specimens: No  Implants: No  Assessment: placement verified by x-ray          Name   Jose Patel RN  11/5/2024    "

## 2024-11-05 NOTE — PT/OT/SLP PROGRESS
Physical Therapy Treatment    Patient Name:  Shanell Mahmood   MRN:  [id number]    Recommendations:     Discharge Recommendations: High Intensity Therapy  Discharge Equipment Recommendations:  (to be determined at next level of care)  Barriers to discharge:  Decreased functional endurance, Inability to ambulate, O2 requirements    Assessment:     Shanell Mahmood is a 74 y.o. female admitted with a medical diagnosis of Central cord syndrome.  She presents with the following impairments/functional limitations: weakness, impaired endurance, impaired self care skills, impaired functional mobility, decreased upper extremity function, decreased lower extremity function, impaired cardiopulmonary response to activity, orthopedic precautions . Pt seen up in cardiac chair and agreeable to PT. RN increased O2 to 15L prior to mobility. Pt completed B LE exercises requiring AAROM further into repetitions. O2 sats in low 90's throughout tx.     Rehab Prognosis: Fair; patient would benefit from acute skilled PT services to address these deficits and reach maximum level of function.    Recent Surgery: Procedure(s) (LRB):  LAMINECTOMY, SPINE, CERVICAL, POSTERIOR APPROACH (N/A) 2 Days Post-Op    Plan:     During this hospitalization, patient to be seen daily to address the identified rehab impairments via gait training, therapeutic activities, therapeutic exercises, neuromuscular re-education and progress toward the following goals:    Plan of Care Expires:  12/04/24    Subjective     Chief Complaint: None stated  Patient/Family Comments/goals: Pt was very appreciative of all of the staff  Pain/Comfort:  Pain Rating 1: 0/10  Pain Rating Post-Intervention 1: 0/10      Objective:     Communicated with RN prior to session.  Patient found up in chair with telemetry, pulse ox (continuous), blood pressure cuff, oxygen, serna catheter, DEYSI drain upon PT entry to room.     General Precautions: Standard, fall, aspiration  Orthopedic Precautions: spinal  precautions  Braces: Maury J collar  Respiratory Status: Nasal cannula, flow 15 L/min           AM-PAC 6 CLICK MOBILITY          Treatment & Education:  Pt performed B LE there ex sitting x 15 reps with vc for proper execution and joint protection: ap, laq, marching, hip abd/add.    Patient left up in chair with all lines intact and call button in reach..    GOALS:   Multidisciplinary Problems       Physical Therapy Goals          Problem: Physical Therapy    Goal Priority Disciplines Outcome Interventions   Physical Therapy Goal     PT, PT/OT Progressing    Description: Goals to be met by: 24     Patient will increase functional independence with mobility by performin. Supine to sit with Moderate Assistance  2. Sit to supine with Moderate Assistance  3. Sit to stand transfer with Moderate Assistance  4. Bed to chair transfer with Moderate Assistance using Rolling Walker  5. Gait  x 25 feet with Moderate Assistance using Rolling Walker.                          Time Tracking:     PT Received On: 24  PT Start Time: 1428     PT Stop Time: 1443  PT Total Time (min): 15 min     Billable Minutes: Therapeutic Exercise 15    Treatment Type: Treatment  PT/PTA: PTA     Number of PTA visits since last PT visit: 2024

## 2024-11-05 NOTE — NURSING
Shortly after patient got a bed bath, SPO2 was reading 79-80%. Pulse ox changed multiple times and RT notified. ABG done, pH 7.309, pCO2 46.7, PO2 48, HCO3 23.4.on 5L HFNC Dr. Ramos notified of patient condition, labs, and abg. STAT CXR ordered and reviewed by Dr. Ramos. Orders given to use IS and acapella. Dr. Ramos would like patient up to a chair as soon as possible. Orders RBV.

## 2024-11-05 NOTE — CONSULTS
Double lumen PICC to right basilic vein.  37 cm in length, 30 cm arm circumference and 0 cm exposed.   Lot # [id number].

## 2024-11-05 NOTE — PLAN OF CARE
CM attempted to call locet but they are closed for election day. Pt with DEYSI drain, daphne, PICC.  [facility] here to see pt today and do accept pt insurance. They continue to review to see if appropriate. Will continue to follow.

## 2024-11-05 NOTE — ASSESSMENT & PLAN NOTE
Patient is status post C3-6 posterior cervical fusion with instrumentation performed on 11/03/2024 4 central cord syndrome.    Upper extremity motor improvement noted.    --Q1h neurochecks in ICU  --All labs and diagnostics reviewed  --Maintain collar at all times   --DEYSI drains to remain at this time on suction  --Started scheduled Percocet 7.5 mg q.6 hours.    --Started scheduled Robaxin 750 mg q.6 hours.  --PT/OT/OOB  --ADAT  --TEDs/SCDs  --Continue to monitor clinically, notify NSGY immediately with any changes in neuro status    Dispo:  Pending pain control and drain removal.

## 2024-11-05 NOTE — PROGRESS NOTES
Pharmacist Renal Dose Adjustment Note    Shanell Mahmood is a 74 y.o. female being treated with the medication Enoxaparin.    Patient Data:    Vital Signs (Most Recent):  Temp: 98.3 °F (36.8 °C) (11/05/24 1101)  Pulse: 83 (11/05/24 1109)  Resp: (!) 22 (11/05/24 1243)  BP: (!) 112/59 (11/05/24 1101)  SpO2: (!) 93 % (11/05/24 1109) Vital Signs (72h Range):  Temp:  [96.7 °F (35.9 °C)-100.2 °F (37.9 °C)]   Pulse:  [49-92]   Resp:  [8-29]   BP: ()/(50-80)   SpO2:  [75 %-100 %]   Arterial Line BP: ()/(-3-223)      Recent Labs   Lab 11/03/24  2020 11/04/24  0358 11/05/24  0317   CREATININE 1.8* 1.9* 1.9*     Serum creatinine: 1.9 mg/dL (H) 11/05/24 0317  Estimated creatinine clearance: 25.6 mL/min (A)    Enoxaparin 40 mg subq every 24 hours will be changed to Enoxaparin 30 mg subq every 24 hours due to CrCl < 30 ml/min.    Pharmacist's Name: Afsaneh Holman  Pharmacist's Extension: [phone]

## 2024-11-06 ENCOUNTER — CLINICAL SUPPORT (OUTPATIENT)
Dept: CARDIOLOGY | Facility: HOSPITAL | Age: 74
End: 2024-11-06
Attending: INTERNAL MEDICINE
Payer: COMMERCIAL

## 2024-11-06 VITALS — WEIGHT: 160.25 LBS | HEIGHT: 67 IN | BODY MASS INDEX: 25.15 KG/M2

## 2024-11-06 PROBLEM — I48.0 PAROXYSMAL ATRIAL FIBRILLATION: Status: ACTIVE | Noted: 2024-01-01

## 2024-11-06 PROBLEM — Z98.1 S/P CERVICAL SPINAL FUSION: Status: ACTIVE | Noted: 2024-11-06

## 2024-11-06 PROCEDURE — 93308 TTE F-UP OR LMTD: CPT | Mod: 26,,, | Performed by: INTERNAL MEDICINE

## 2024-11-06 PROCEDURE — 93325 DOPPLER ECHO COLOR FLOW MAPG: CPT | Mod: 26,,, | Performed by: INTERNAL MEDICINE

## 2024-11-06 PROCEDURE — 93321 DOPPLER ECHO F-UP/LMTD STD: CPT | Mod: 26,,, | Performed by: INTERNAL MEDICINE

## 2024-11-06 PROCEDURE — 93325 DOPPLER ECHO COLOR FLOW MAPG: CPT

## 2024-11-06 NOTE — PROGRESS NOTES
[facility]  Neurosurgery  Progress Note    Subjective:     Post-Op Info:  Procedure(s) (LRB):  LAMINECTOMY, SPINE, CERVICAL, POSTERIOR APPROACH (N/A)   3 Days Post-Op     Interval History: 11/6:  POD 3.  Afebrile.  Blood pressure 123/60.  Pulse 75.  Respirations 10.  WBC 8.94.  Hemoglobin 11.0.  Hematocrit 33.4.  Platelets 121.  Creatinine 2.2 up trend.  Overnight, patient had an episode of ventricular tachycardia which prompted chest x-ray and echo.  On initiation of encounter, patient was found in bed, awake and alert.  No family was present at time of encounter.  Patient reports improvement in posterior incisional pain with new pain regimen.  She also reports no change in bilateral upper extremity movement.  She denies new extremity pain or paresthesias.    Medications:  Continuous Infusions:   lactated ringers   Intravenous Continuous 100 mL/hr at 11/05/24 1742 New Bag at 11/05/24 1742    NORepinephrine bitartrate-D5W  0-3 mcg/kg/min Intravenous Continuous 8.2 mL/hr at 11/06/24 0401 0.24 mcg/kg/min at 11/06/24 0401     Scheduled Meds:   allopurinoL  100 mg Oral QHS    atorvastatin  40 mg Oral Daily    bisacodyL  10 mg Rectal Daily    colchicine  0.3 mg Oral Daily    enoxparin  40 mg Subcutaneous Q24H (prophylaxis, 1700)    HYDROcodone-acetaminophen  1 tablet Oral Q6H    macitentan  10 mg Oral Daily    methocarbamoL  750 mg Oral TID    midodrine  10 mg Oral TID    montelukast  10 mg Oral Daily    mupirocin   Nasal BID    pantoprazole  40 mg Oral Daily    selexipag  1,600 mcg Oral BID    tadalafil  40 mg Oral Daily     PRN Meds:  Current Facility-Administered Medications:     acetaminophen, 650 mg, Oral, Q4H PRN    albuterol sulfate, 2.5 mg, Nebulization, Q6H PRN    aluminum-magnesium hydroxide-simethicone, 30 mL, Oral, Q4H PRN    dextrose 50%, 12.5 g, Intravenous, PRN    dextrose 50%, 25 g, Intravenous, PRN    diphenhydrAMINE, 25 mg, Oral, Q6H PRN    glucagon (human recombinant), 1 mg,  Intramuscular, PRN    glucose, 16 g, Oral, PRN    glucose, 24 g, Oral, PRN    HYDROmorphone, 1 mg, Intravenous, Q6H PRN    HYDROmorphone, 2 mg, Oral, Q4H PRN    magnesium oxide, 800 mg, Oral, PRN    magnesium oxide, 800 mg, Oral, PRN    methocarbamoL, 500 mg, Oral, TID PRN    naloxone, 0.02 mg, Intravenous, PRN    ondansetron, 4 mg, Intravenous, Q8H PRN    potassium bicarbonate, 35 mEq, Oral, PRN    potassium bicarbonate, 50 mEq, Oral, PRN    potassium bicarbonate, 60 mEq, Oral, PRN    potassium, sodium phosphates, 2 packet, Oral, PRN    potassium, sodium phosphates, 2 packet, Oral, PRN    potassium, sodium phosphates, 2 packet, Oral, PRN    prochlorperazine, 5 mg, Intravenous, Q6H PRN    senna-docusate 8.6-50 mg, 2 tablet, Oral, Nightly PRN    sodium chloride 0.9%, 500 mL, Intravenous, PRN    sodium chloride 0.9%, 10 mL, Intravenous, Q12H PRN    sodium chloride 0.9%, 10 mL, Intravenous, PRN       Objective:     Weight: 72.7 kg (160 lb 4.4 oz)  Body mass index is 25.1 kg/m².  Vital Signs (Most Recent):  Temp: 99.5 °F (37.5 °C) (11/06/24 0400)  Pulse: 75 (11/06/24 0956)  Resp: 10 (11/06/24 0956)  BP: 123/60 (11/06/24 0600)  SpO2: (!) 94 % (11/06/24 0956) Vital Signs (24h Range):  Temp:  [98.1 °F (36.7 °C)-99.5 °F (37.5 °C)] 99.5 °F (37.5 °C)  Pulse:  [] 75  Resp:  [9-24] 10  SpO2:  [87 %-99 %] 94 %  BP: ()/(51-79) 123/60  Arterial Line BP: ()/() 92/81     Date 11/06/24 0700 - 11/07/24 0659   Shift 2333-0931 9082-0324 8184-6822 24 Hour Total   INTAKE   Shift Total(mL/kg)       OUTPUT   Urine(mL/kg/hr) 250   250   Shift Total(mL/kg) 250(3.4)   250(3.4)   Weight (kg) 72.7 72.7 72.7 72.7                            Closed/Suction Drain 11/03/24 1108 Tube - 1 Posterior Neck Accordion 10 Fr. (Active)   Site Description Unable to view 11/06/24 0501   Dressing Type Gauze;Transparent (Tegaderm) 11/06/24 0501   Dressing Status Clean;Dry;Intact 11/06/24 0501   Dressing Intervention Integrity maintained  "11/06/24 0501   Drainage Serosanguineous 11/06/24 0501   Status To bulb suction 11/06/24 0501   Output (mL) 15 mL 11/06/24 0601            Urethral Catheter 11/01/24 1731 Non-latex 16 Fr. (Active)   Site Assessment Clean;Intact 11/06/24 0701   Collection Container Urimeter 11/06/24 0701   Securement Method secured to top of thigh w/ tape 11/06/24 0701   Catheter Care Performed no 11/06/24 0701   Reason for Continuing Urinary Catheterization Critically ill in ICU and requiring hourly monitoring of intake/output 11/06/24 0701   CAUTI Prevention Bundle Securement Device in place with 1" slack;Intact seal between catheter & drainage tubing;Sheeting clip in use;Drainage bag/urimeter off the floor;No dependent loops or kinks;Drainage bag/urimeter not overfilled (<2/3 full);Drainage bag/urimeter below bladder 11/05/24 2301   Output (mL) 125 mL 11/06/24 0914         Neurosurgery Physical Exam  General:  Mild acute distress.  Neck:  Has been collar in place.  Neurologic: Alert and oriented. Thought content appropriate.  GCS: E4 V5 M6; Total: 15  Pulmonary: normal respirations, no signs of respiratory distress  Skin: Skin is warm, dry and intact.     Sensory: intact to light touch throughout  Motor Strength: Moves all extremities spontaneously with good tone.  No abnormal movements seen.  Bilateral upper extremity strength deltoid/biceps/triceps/left hand  3/5, right hand  2/5   Right lower extremity strength iliopsoas 4/5, TA/EHL 4/5, gastrocnemius 4-/5   Left lower extremity strength iliopsoas 4/5, TA/EHL 5/5, gastrocnemius 5/5      Posterior cervical Incision: Covered with clean, dry steri strip. No surrounding erythema or edema. No drainage from incision. No palpable hematoma or underlying fluid collection.  DEYSI drain x 1 in place     Significant Labs:  Recent Labs   Lab 11/05/24 0317 11/06/24 0318   GLU 94 106   * 131*   K 3.9 4.3    103   CO2 23 22*   BUN 30* 34*   CREATININE 1.9* 2.2*   CALCIUM " "7.4* 7.5*   MG 1.8 2.0     Recent Labs   Lab 11/05/24 0317 11/05/24  0446 11/06/24 0318   WBC 7.87  --  8.94   HGB 10.2*  --  11.0*   HCT 31.5* 37 33.4*   PLT 97*  --  121*     No results for input(s): "LABPT", "INR", "APTT" in the last 48 hours.  Microbiology Results (last 7 days)       Procedure Component Value Units Date/Time    Blood culture [[id number]] Collected: 11/01/24 1829    Order Status: Completed Specimen: Blood from Peripheral, Hand, Right Updated: 11/05/24 2032     Blood Culture, Routine No Growth to date      No Growth to date      No Growth to date      No Growth to date      No Growth to date          Assessment/Plan:     Status post cervical spinal fusion  Patient is status post C3-6 posterior cervical fusion with instrumentation performed on 11/03/2024 for central cord syndrome.    --Q1h neurochecks in ICU  --All labs and diagnostics reviewed, creatinine has slightly increased, managed by primary team  --Maintain collar at all times   --Recommend passive range of motion exercises during PT session.  --Discontinue DEYSI drain.  --Discontinue vasopressors and map parameters.  --PT/OT/OOB  --Pain improved with current pain regimen.  --ADAT  --TEDs/SCDs  --Continue to monitor clinically, notify NSGY immediately with any changes in neuro status    Dispo:  From neurosurgical perspective, okay for full anticoagulation for suspicion of PE..        SULY JARRETT PA-C  Neurosurgery  [facility]  "

## 2024-11-06 NOTE — PROGRESS NOTES
[facility] Medicine  Progress Note    Patient Name: Shanell Mahmood  MRN: [id number]  Patient Class: IP- Inpatient   Admission Date: 10/31/2024  Length of Stay: 4 days  Attending Physician: Gypsy Bailey MD  Primary Care Provider: Nicole, Primary Doctor        Subjective:     Principal Problem:Central cord syndrome        HPI:  Shanell Mahmood is a 74 year old female with a previous medical history of anemia, Pulmonary hypertension on 4 liters continuous oxygen at home, arthritis, CKD V, Osteoporosis, secondary hyperprathyroidism, S/P closure of patent foramen ovale in 2011, CVA in 2011 with no residuals, chronic back pain, HLD, Gout, Brain Mass and thyroid diease who presented to the ED after unwitnessed syncopal episode. The patient was at her pain management office for re certification only. There were no procedures performed. She reports taking one hydrocodone 10mg today.  The last thing she remembers was walking down the hallway of the office and looking for something to cover her head from the rain and did not have any adverse symptoms or feelings at that time.  She has no recollection of the syncopal events. When she was initially evaluated by EMS, her blood pressure was in the 60s over 40s. She did require constant stimulation to remain awake. Fell and hit her head. Does not take any blood thinners. EMS evaluated her and gave her 1 mg of Narcan as well as 250 cc of fluid. Her pressure improved and she had become more alert. Initial ED workup was thorough and all imaging showing that included CT of neck, head and entire spine showed no acute processes. CBC showed anemia and CMP showed elevated creatinine consistent with history of CKD V. Drug screen positive for opioids but patient has a hydrocodone prescription. The patient is currently in the process of have a left sided brain mass visualized on MRI 10/8/24 evaluated that was an incidental finding after undergoing a PET scan for a pulmonary  "nodule on 9/6/24 with abnormal uptake noted in brain. She has her first appointment on 11/4/24. She is followed by nephrology who is currently monitoring her and there has been one attempt at AV fistual placement but it was unsuccessful due sclerotic changes. Patient reports she awoke this morning feeling well showered and dressed herself. She performs all of her own ADL's. She had one syncopal event in March 2024 and was evaluated by cardiology and informed it was an orthostatic episode. Patient was unable to complete orthostatic vital signs upon admission due to inability to stand stating " I feel as if I can't get my legs under me". When evaluated for admit exam the patient endorses that after the syncopal episode she endorses bilateral leg weakness with decreased sensation in both legs. She reports bilateral  weakness being unable to use her phone and difficulty raising both of her arms. NIH scale performed and total of 9 noted. Patient noted have 400ml of urine in bladder and unable to void. Patient reached a total of 528ml of urine without being able to void.  MRI/MRA of brain and MRI of lumbosacral spine ordered upon admit. MRA and MRI lower back showed no acute process. MRI brain showed Acute infarct in the right caudate head. Dr. Maldonado was called and he recommended MRA of neck in morning and load with aspirin and plavix. Initial EKG read as atrial fibrillation but upon review p waves were noted and this was discussed with the ED. Repeat EKG shows sinus arrhthymias with PACs. Patient admitted by hospital medicine for further evaluation and management.       Overview/Hospital Course:  74-year-old female with history of brain mass, CKD, back pain, pulmonary hypertension on 4 L oxygen is admitted after syncope and collapse found to have a acute CVA in the right caudate on MRI brain.  Has global weakness and decreased sensation to the lower legs.  Multiple CT and x-rays done to rule out trauma ultimately " negative other than the maxillofacial CT reporting mild irregularity of the interior midline mandible with small adjacent bone fragments which could represent acute fracture with overlying soft tissue swelling.  Carotid ultrasound and MRA brain and neck without acute finding.  Neurology is consulted.  Also with underlying brain mass.  Consult Neurosurgery and Oncology.  Given DAPT and statin.  Had hypotension given IVF and midodrine.  Echo shows right heart failure.  BNP is within normal.  Lactic acid pending.  No other sign of infection.  Placed in cervical collar as precaution and MRI of the cervical spine shows severe cord compression at C4-5 and C5-6 likely acute with edema.  Neurosurgery discussed with patient and given her RCRI is 2 and she is a controlled diabetic but has had recent stroke and given Plavix and ASA plan to proceed with surgical decompression tomorrow 11/3.  Patient was NPO at midnight and ASA and Plavix are on hold.    Patient underwent: 11/3/24  Surgery   C2-3, C3-4, C4-5, C5-6, C6-7 laminectomy  C3 through 6 posterior segmental instrumentation using [employer]ony system  C3-4, C4-5, C5-6 posterolateral arthrodesis using autograft harvested from the same incision and allograft DBM    Maintaining MAP 80 - attempt weaning levophed tomorrow.  PT/OT to follow. Patient wants to go to IR in [place], [place].   11/5: DVT study:  here is hypoechoic peripheral nonocclusive thrombus within the proximal right superficial femoral vein. There is also hypoechoic nonocclusive thrombus within the right popliteal vein, with some preserved color Doppler vascular flow.     Cannot give full dose anticoagulation, currently on lovenox 40mg daily     Interval History: no new complaints, tolerating liquids     Review of Systems   Unable to perform ROS: Acuity of condition   Constitutional:  Positive for activity change.   Respiratory: Negative.     Cardiovascular: Negative.    Gastrointestinal: Negative.     Musculoskeletal:  Positive for arthralgias and back pain.   Neurological:  Positive for weakness.     Objective:     Vital Signs (Most Recent):   Vital Signs (24h Range):  Temp:  [98.1 °F (36.7 °C)-99.5 °F (37.5 °C)] 99.2 °F (37.3 °C)  Pulse:  [72-92] 86  Resp:  [9-29] 11  SpO2:  [81 %-96 %] 96 %  BP: ()/(52-80) 109/58  Arterial Line BP: ()/(56-72) 103/61     Weight: 72.7 kg (160 lb 4.4 oz)  Body mass index is 25.1 kg/m².          Physical Exam  Constitutional:       General: She is not in acute distress.     Comments: Sleeping    Neck:      Comments: Cervical collar in place   Cardiovascular:      Rate and Rhythm: Regular rhythm. Bradycardia present.   Pulmonary:      Effort: Pulmonary effort is normal. No respiratory distress.      Breath sounds: No wheezing or rhonchi.   Abdominal:      General: Bowel sounds are normal. There is no distension.   Skin:     General: Skin is warm and dry.      Capillary Refill: Capillary refill takes less than 2 seconds.             Significant Labs: All pertinent labs within the past 24 hours have been reviewed.  Recent Lab Results         11/05/24  0913   11/05/24  0446   11/05/24  0317        Allens Test N/A   N/A         Anion Gap     6       Site Bonnie/UAC   Bonnie/UAC         BUN     30       Calcium     7.4       Chloride     106       CO2     23       Creatinine     1.9       DelSys Nasal Can   Nasal Can         eGFR     27.5       Flow 10   5         Glucose     94       Hematocrit     31.5       Hemoglobin     10.2       Magnesium      1.8       MCH     27.5       MCHC     32.4       MCV     85       Mode SPONT   SPONT         MPV     10.9       Platelet Count     97       POC BE -1   -3         POC Glucose   92         POC HCO3 24.8   23.4         POC Hematocrit   37         POC Ionized Calcium   1.11         POC PCO2 44.4   46.7         POC PH 7.355   7.309         POC PO2 60   48         POC Potassium   4.2         POC SATURATED O2 89   79         POC  Sodium   136         POC TCO2 26   25         Potassium     3.9       Rate   13         RBC     3.71       RDW     15.9       Sample ARTERIAL   ARTERIAL         Sodium     135       Sp02   92         WBC     7.87               Significant Imaging: I have reviewed all pertinent imaging results/findings within the past 24 hours.    Assessment/Plan:      * Central cord syndrome  S/p (11/3/24)  C2-3, C3-4, C4-5, C5-6, C6-7 laminectomy  C3 through 6 posterior segmental instrumentation using [employer] system  C3-4, C4-5, C5-6 posterolateral arthrodesis using autograft harvested from the same incision and allograft DBM    Cervical collar in place       Acute deep vein thrombosis (DVT) of popliteal vein of right lower extremity  Found on u/s 11/5/24  Cannot give full dose lovenox   Currently on lovenox 40mg daily       Quadriparesis  PT/OT      Status post cervical spinal fusion  PT/OT  Cervical collar in place       Hypotension  Asymptomatic.  Etiology is unclear but suspected to be related to the stroke or underlying cervical cord pathology  Echo reviewed.  Do not suspect cardiogenic shock.  Do not suspect sepsis  -hold any BP meds or diuretics  -check lactate  -NS 1 L bolus  -start midodrine 10 mg t.i.d.  -MRI cervical spine: severe cord compression at C4-5 and C5-6 , plan for neurosurgical decompression 11/3.  Hold ASA and Plavix and NPO at midnight.  -transferred to ICU, goal MAP 80 or higher    Thrombocytopenia  The patients 3 most recent labs are listed below.  Recent Labs     10/31/24  1532 11/01/24  0551   * 148*       Plan  - Will transfuse if platelet count is <100k (if undergoing neurosurgery), or otherwise less than 10 K  -trend daily    Anemia  Anemia is likely due to chronic disease due to Chronic Kidney Disease. Most recent hemoglobin and hematocrit are listed below.  Recent Labs     10/31/24  1532 11/01/24  0551 11/02/24  0505   HGB 9.7* 12.0 13.4   HCT 30.8* 38.3 43.3       Plan  - Monitor  "serial CBC: Daily  - Transfuse PRBC if patient becomes hemodynamically unstable, symptomatic or H/H drops below 7/21.  - Patient has not received any PRBC transfusions to date  - Patient's anemia is currently improving    Sinus arrhythmia seen on electrocardiogram  EKG read states atrial fibrillation but P waves are present. Repeat EKG after admission confirms this.     Stroke  Acute CVA right caudate  MRI, MRA, CT, carotid U/S reports reviewed  -DAPT and statin  -neurology following  -neurosurgery and oncology consulted for the brain mass  -PT/OT/SLP  -echo bubble report is positive, follow up Neurology recommendation    Brain mass  Discovered after PET scan of lung  on 9/6/24 revealed abnormal uptake in brain. 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  Unclear if contributing to CVA  She will have her first appointment with Dr. Ray Mcintyre at  [facility]  on 11/4/24  -neurosurgery and oncology follow-up    Secondary hyperparathyroidism  Chronic condition that is stable. Followed by nephrology last visit in May 2024 and endocrinology (Last visit 9/4/24)    Pulmonary hypertension  Chronic condition that is stable.   -holding her torsemide and pulmonary hypertension meds due to the hypotension    Chronic respiratory failure with hypoxia  Patient with Hypoxic Respiratory failure which is Chronic.  she is on home oxygen at 4 LPM. Maintaining oxygen saturation on 4 liters nasal cannula which has been continued.  Due to pulmonary hypertension    CKD (chronic kidney disease), stage IV  Creatine stable for now. BMP reviewed- noted Estimated Creatinine Clearance: 28.7 mL/min (A) (based on SCr of 1.7 mg/dL (H)). according to latest data. Based on current GFR, CKD stage is stage 4 - GFR 15-29.  Monitor UOP and serial BMP and adjust therapy as needed. Renally dose meds. Avoid nephrotoxic medications and " procedures.    Followed by nephrology Nayely Clements-CHENG at [facility]. Initial attempt at AV fistula placement unsuccessful and currently just being monitored. Last visit 5/2/24    Weakness of both lower extremities  PT/OT   Recommending inpatient rehab - referral sent       Syncope and collapse  See stroke  Orthostatic vital signs ordered and patient unable to stand due weakness in bilateral legs    Acute urinary retention  Possibly neurogenic from the CVA or from cervical spine cord compression  -straight cath per protocol   -may need Yeung      VTE Risk Mitigation (From admission, onward)           Ordered     enoxaparin injection 30 mg  Every 24 hours         11/05/24 1417     Reason for No Pharmacological VTE Prophylaxis  Once        Question:  Reasons:  Answer:  Risk of Bleeding    10/31/24 1849     IP VTE HIGH RISK PATIENT  Once         10/31/24 1849     Place sequential compression device  Until discontinued         10/31/24 1849                    Discharge Planning   ELBERT: 11/8/2024     Code Status: Full Code   Is the patient medically ready for discharge?:     Reason for patient still in hospital (select all that apply): Patient trending condition  Discharge Plan A: Rehab   Discharge Delays: None known at this time        Critical care time spent on the evaluation and treatment of severe organ dysfunction, review of pertinent labs and imaging studies, discussions with consulting providers and discussions with patient/family: 15 minutes.      Gypsy Bailey MD  Department of Hospital Medicine   [facility]

## 2024-11-06 NOTE — CONSULTS
[facility]  Department of Cardiology  Consult Note      PATIENT NAME: Shanell Mahmood  MRN: [id number]  TODAY'S DATE: 11/06/2024  ADMIT DATE: 10/31/2024                          CONSULT REQUESTED BY: Gypsy Bailey MD    SUBJECTIVE     PRINCIPAL PROBLEM: Central cord syndrome      REASON FOR CONSULT:  Afib RVR     HPI:  Ms. Mahmood is a 74-year-old female with a very extensive past medical history - see below.  She recently was admitted to the ICU after a syncopal episode on 10/31.  Abnormal CT of the head as well as abnormal MRI of cervical spine - she then underwent spinal surgery and is currently in the ICU recovering from the with neck brace in place.  Post surgery recommendations were to maintain a map greater than 80 and she was placed on Levophed - now BP is not tolerating weaning.  She is now in AFib with heart rates in the low 100s all the way up to 140s and placed on an amiodarone drip.  She is now a DNR DNI.  It is important to note that she also has significant pulmonary hypertension in his managed with Opsumit 10 mg daily, Adcirca 40 mg daily, and Uptravi 1600 ug bid as an outpatient.  It appears she follows with Dr. Tejada out of [facility] in managing her pulmonary hypertension.    Per hospital medicine notes:  Shanell Mahmood is a 74 year old female with a previous medical history of anemia, Pulmonary hypertension on 4 liters continuous oxygen at home, arthritis, CKD V, Osteoporosis, secondary hyperprathyroidism, S/P closure of patent foramen ovale in 2011, CVA in 2011 with no residuals, chronic back pain, HLD, Gout, Brain Mass and thyroid diease who presented to the ED after unwitnessed syncopal episode. The patient was at her pain management office for re certification only. There were no procedures performed. She reports taking one hydrocodone 10mg today.  The last thing she remembers was walking down the hallway of the office and looking for something to cover her head from the rain  "and did not have any adverse symptoms or feelings at that time.  She has no recollection of the syncopal events. When she was initially evaluated by EMS, her blood pressure was in the 60s over 40s. She did require constant stimulation to remain awake. Fell and hit her head. Does not take any blood thinners. EMS evaluated her and gave her 1 mg of Narcan as well as 250 cc of fluid. Her pressure improved and she had become more alert. Initial ED workup was thorough and all imaging showing that included CT of neck, head and entire spine showed no acute processes. CBC showed anemia and CMP showed elevated creatinine consistent with history of CKD V. Drug screen positive for opioids but patient has a hydrocodone prescription. The patient is currently in the process of have a left sided brain mass visualized on MRI 10/8/24 evaluated that was an incidental finding after undergoing a PET scan for a pulmonary nodule on 9/6/24 with abnormal uptake noted in brain. She has her first appointment on 11/4/24. She is followed by nephrology who is currently monitoring her and there has been one attempt at AV fistual placement but it was unsuccessful due sclerotic changes. Patient reports she awoke this morning feeling well showered and dressed herself. She performs all of her own ADL's. She had one syncopal event in March 2024 and was evaluated by cardiology and informed it was an orthostatic episode. Patient was unable to complete orthostatic vital signs upon admission due to inability to stand stating " I feel as if I can't get my legs under me". When evaluated for admit exam the patient endorses that after the syncopal episode she endorses bilateral leg weakness with decreased sensation in both legs. She reports bilateral  weakness being unable to use her phone and difficulty raising both of her arms. NIH scale performed and total of 9 noted. Patient noted have 400ml of urine in bladder and unable to void. Patient reached a " total of 528ml of urine without being able to void.  MRI/MRA of brain and MRI of lumbosacral spine ordered upon admit. MRA and MRI lower back showed no acute process. MRI brain showed Acute infarct in the right caudate head. Dr. Maldonado was called and he recommended MRA of neck in morning and load with aspirin and plavix. Initial EKG read as atrial fibrillation but upon review p waves were noted and this was discussed with the ED. Repeat EKG shows sinus arrhthymias with PACs. Patient admitted by hospital medicine for further evaluation and management.         Overview/Hospital Course:  74-year-old female with history of brain mass, CKD, back pain, pulmonary hypertension on 4 L oxygen is admitted after syncope and collapse found to have a acute CVA in the right caudate on MRI brain.  Has global weakness and decreased sensation to the lower legs.  Multiple CT and x-rays done to rule out trauma ultimately negative other than the maxillofacial CT reporting mild irregularity of the interior midline mandible with small adjacent bone fragments which could represent acute fracture with overlying soft tissue swelling.  Carotid ultrasound and MRA brain and neck without acute finding.  Neurology is consulted.  Also with underlying brain mass.  Consult Neurosurgery and Oncology.  Given DAPT and statin.  Had hypotension given IVF and midodrine.  Echo shows right heart failure.  BNP is within normal.  Lactic acid pending.  No other sign of infection.  Placed in cervical collar as precaution and MRI of the cervical spine shows severe cord compression at C4-5 and C5-6 likely acute with edema.  Neurosurgery discussed with patient and given her RCRI is 2 and she is a controlled diabetic but has had recent stroke and given Plavix and ASA plan to proceed with surgical decompression tomorrow 11/3.  Patient was NPO at midnight and ASA and Plavix are on hold.        Review of patient's allergies indicates:   Allergen Reactions    Codeine  Palpitations       No past medical history on file.  Past Surgical History:   Procedure Laterality Date    POSTERIOR CERVICAL LAMINECTOMY N/A 11/3/2024    Procedure: LAMINECTOMY, SPINE, CERVICAL, POSTERIOR APPROACH;  Surgeon: Kobi Corona MD;  Location: [facility];  Service: Neurosurgery;  Laterality: N/A;  C3-6 laminectomy and posterior instrumented fusion, prone, Chest roll, Parada, neuromonitoring, DePuy rep   history of PFO closure   Dr. Segal in [place]. He performed a right heart cath and found a mean PA pressure of 40, wedge of 12, with PVR of 10 Wood units. She is on Opsumit, Adcirca, and Uptravi.     history of pulmonary arterial hypertension, currently being treated with Opsumit, Adcirca, and Uptravi. She reports feeling about the same on these medications and experiences shortness of breath with minimal exertion.   The patient has previously discussed the possibility of continuous infusion medications with another physician but is not interested in pursuing that option at this time.       Date of surgery 11/03/2024     Preop diagnosis   1. Central cord syndrome   2. Cervical spondylosis with myelopathy and radiculopathy     Postop diagnosis   Same     Surgery   C2-3, C3-4, C4-5, C5-6, C6-7 laminectomy  C3 through 6 posterior segmental instrumentation using DePuy Symphony system  C3-4, C4-5, C5-6 posterolateral arthrodesis using autograft harvested from the same incision and allograft DBM  Neuro monitoring  Fluoroscopy     Surgeon   Kobi Corona MD     Indication   This is a very pleasant 74 y.o. female, she has a history of pulmonary hypertension chronic kidney disease stage 5, requiring evaluation for dialysis, osteoporosis status post closure of patent foramen ovale in 2011, CVA in 2011, chronic low back pain, who had a fall from standing and was transferred from [facility] to [facility] [place], operated by [facility] for hypotension.  She is followed in [place] for a left posterior  frontal/temporal extra-axial mass.  In light of the newly discovered brain mass, a PET scan was ordered.  Differential diagnosis was atypical meningioma versus dural-based metastasis.  Patient had a CT chest without contrast in August 20, 2024 showing pleural-based nodule right lower lobe.  She also had a PET scan showing the long lesion.  The long lesion was not PET avid.     Patient reports that she has been having bilateral upper extremity and lower extremity weakness since the fall.  She is unable to raise her arms or legs against gravity.  She also reports numbness in her upper and lower extremities.          REVIEW OF SYSTEMS    As mentioned in HPI    OBJECTIVE     VITAL SIGNS (Most Recent)  Temp: 99.5 °F (37.5 °C) (11/06/24 0400)  Pulse: (!) 142 (11/06/24 1324)  Resp: (!) 22 (11/06/24 1324)  BP: 106/60 (11/06/24 1324)  SpO2: (!) 89 % (11/06/24 1324)    VENTILATION STATUS  Resp: (!) 22 (11/06/24 1324)  SpO2: (!) 89 % (11/06/24 1324)           I & O (Last 24H):  Intake/Output Summary (Last 24 hours) at 11/6/2024 1544  Last data filed at 11/6/2024 1237  Gross per 24 hour   Intake 3941.86 ml   Output 959 ml   Net 2982.86 ml       WEIGHTS  Wt Readings from Last 3 Encounters:   11/01/24 1825 72.7 kg (160 lb 4.4 oz)   11/01/24 0733 70.8 kg (156 lb)   10/31/24 2104 71.2 kg (156 lb 15.5 oz)   10/31/24 1455 71.7 kg (158 lb)   11/06/24 0831 72.7 kg (160 lb 4.4 oz)       PHYSICAL EXAM    CONSTITUTIONAL:  Ill-appearing  elderly female lying in ICU with neck brace in place  HEENT: Normocephalic. No pallor, neck brace in place  NECK: no JVD  LUNGS: CTA b/l, diminished  HEART: irregular rate and rhythm - AFib, S1, S2 normal, no murmur   ABDOMEN: soft, non-tender, bowel sounds normal  EXTREMITIES:  Bilateral lower extremity edema  SKIN: No rash  NEURO: AAO X 3  PSYCH: normal affect, flat, quiet    HOME MEDICATIONS:  No current facility-administered medications on file prior to encounter.     Current Outpatient  Medications on File Prior to Encounter   Medication Sig Dispense Refill    allopurinoL (ZYLOPRIM) 100 MG tablet Take 100 mg by mouth every evening.      clotrimazole-betamethasone 1-0.05% (LOTRISONE) cream Apply 1 g topically Daily.      colchicine (COLCRYS) 0.6 mg tablet Take 0.6 mg by mouth 2 (two) times daily.      diclofenac sodium (VOLTAREN) 1 % Gel Apply 2 g topically 3 (three) times daily.      DULoxetine (CYMBALTA) 60 MG capsule Take 60 mg by mouth once daily.      ergocalciferol (ERGOCALCIFEROL) 50,000 unit Cap Take 50,000 Units by mouth every 7 days.      fluticasone propionate (FLONASE) 50 mcg/actuation nasal spray 1 spray by Each Nostril route once daily.      gabapentin (NEURONTIN) 100 MG capsule Take 2 capsules by mouth every 12 (twelve) hours.      HYDROcodone-acetaminophen (NORCO)  mg per tablet Take 1 tablet by mouth every 4 to 6 hours as needed for Pain.      LINZESS 290 mcg Cap capsule Take 290 mcg by mouth before breakfast.      montelukast (SINGULAIR) 10 mg tablet Take 10 mg by mouth once daily.      omeprazole (PRILOSEC) 40 MG capsule Take 40 mg by mouth Daily.      OPSUMIT 10 mg Tab Take 10 mg by mouth once daily.      simvastatin (ZOCOR) 40 MG tablet Take 40 mg by mouth Daily.      tiZANidine (ZANAFLEX) 4 MG tablet Take 4 mg by mouth every 12 (twelve) hours as needed.      torsemide (DEMADEX) 20 MG Tab Take 40 mg by mouth once daily.      UPTRAVI 1,600 mcg Tab Take 1 tablet by mouth 2 (two) times a day.      tadalafil (ADCIRCA) 20 mg Tab Take 20 mg by mouth every other day.         SCHEDULED MEDS:   allopurinoL  100 mg Oral QHS    atorvastatin  40 mg Oral Daily    bisacodyL  10 mg Rectal Daily    colchicine  0.3 mg Oral Daily    enoxparin  40 mg Subcutaneous Q24H (prophylaxis, 1700)    enoxparin  1 mg/kg Subcutaneous Q24H (treatment, non-standard time)    HYDROcodone-acetaminophen  1 tablet Oral Q6H    macitentan  10 mg Oral Daily    methocarbamoL  750 mg Oral TID    midodrine  10 mg  Oral TID    montelukast  10 mg Oral Daily    mupirocin   Nasal BID    pantoprazole  40 mg Oral Daily    selexipag  1,600 mcg Oral BID    tadalafil  40 mg Oral Daily       CONTINUOUS INFUSIONS:   0.9% NaCl   Intravenous Continuous 100 mL/hr at 11/06/24 1336 New Bag at 11/06/24 1336    amiodarone in dextrose 5%  1 mg/min Intravenous Continuous 33.3 mL/hr at 11/06/24 1345 1 mg/min at 11/06/24 1345    amiodarone in dextrose 5%  0.5 mg/min Intravenous Continuous        lactated ringers   Intravenous Continuous   Stopped at 11/06/24 1230    NORepinephrine bitartrate-D5W  0-3 mcg/kg/min Intravenous Continuous 8.2 mL/hr at 11/06/24 0401 0.24 mcg/kg/min at 11/06/24 0401       PRN MEDS:  Current Facility-Administered Medications:     acetaminophen, 650 mg, Oral, Q4H PRN    albuterol sulfate, 2.5 mg, Nebulization, Q6H PRN    aluminum-magnesium hydroxide-simethicone, 30 mL, Oral, Q4H PRN    dextrose 50%, 12.5 g, Intravenous, PRN    dextrose 50%, 25 g, Intravenous, PRN    diphenhydrAMINE, 25 mg, Oral, Q6H PRN    glucagon (human recombinant), 1 mg, Intramuscular, PRN    glucose, 16 g, Oral, PRN    glucose, 24 g, Oral, PRN    HYDROmorphone, 1 mg, Intravenous, Q6H PRN    HYDROmorphone, 2 mg, Oral, Q4H PRN    magnesium oxide, 800 mg, Oral, PRN    magnesium oxide, 800 mg, Oral, PRN    methocarbamoL, 500 mg, Oral, TID PRN    naloxone, 0.02 mg, Intravenous, PRN    ondansetron, 4 mg, Intravenous, Q8H PRN    potassium bicarbonate, 35 mEq, Oral, PRN    potassium bicarbonate, 50 mEq, Oral, PRN    potassium bicarbonate, 60 mEq, Oral, PRN    potassium, sodium phosphates, 2 packet, Oral, PRN    potassium, sodium phosphates, 2 packet, Oral, PRN    potassium, sodium phosphates, 2 packet, Oral, PRN    prochlorperazine, 5 mg, Intravenous, Q6H PRN    senna-docusate 8.6-50 mg, 2 tablet, Oral, Nightly PRN    sodium chloride 0.9%, 500 mL, Intravenous, PRN    sodium chloride 0.9%, 10 mL, Intravenous, Q12H PRN    sodium chloride 0.9%, 10 mL,  Intravenous, PRN    LABS AND DIAGNOSTICS     CBC LAST 3 DAYS  Recent Labs   Lab 10/31/24  1532 11/01/24  0551 11/03/24 2020 11/04/24 0358 11/05/24 0317 11/05/24 0446 11/06/24 0318 11/06/24  1324   WBC 3.68*   < > 6.33 7.05 7.87  --  8.94  --    RBC 3.53*   < > 3.97* 3.87* 3.71*  --  3.91*  --    HGB 9.7*   < > 11.2* 10.8* 10.2*  --  11.0*  --    HCT 30.8*   < > 34.4* 33.8* 31.5* 37 33.4* 41   MCV 87   < > 87 87 85  --  85  --    MCH 27.5   < > 28.2 27.9 27.5  --  28.1  --    MCHC 31.5*   < > 32.6 32.0 32.4  --  32.9  --    RDW 17.0*   < > 16.7* 16.6* 15.9*  --  15.8*  --    *   < > 119* 113* 97*  --  121*  --    MPV 11.5   < > 10.7 11.6 10.9  --  12.0  --    GRAN 53.8  2.0  --  75.8*  4.8  --   --   --   --   --    LYMPH 31.0  1.1  --  12.6*  0.8*  --   --   --   --   --    MONO 9.0  0.3  --  8.8  0.6  --   --   --   --   --    BASO 0.03  --  0.04  --   --   --   --   --    NRBC 0  --  0  --   --   --   --   --     < > = values in this interval not displayed.       COAGULATION LAST 3 DAYS  Recent Labs   Lab 11/01/24  0551   INR 1.2   APTT 27.7       CHEMISTRY LAST 3 DAYS  Recent Labs   Lab 11/01/24  0551 11/02/24  0505 11/03/24 0256 11/03/24 2020 11/05/24 0317 11/05/24 0446 11/05/24  0913 11/06/24 0318 11/06/24  1239 11/06/24  1324    142 141   < > 135*  --   --  131* 131*  --    K 4.3 4.2 4.2   < > 3.9  --   --  4.3 3.9  --    * 110 108   < > 106  --   --  103 104  --    CO2 21* 21* 25   < > 23  --   --  22* 20*  --    ANIONGAP 11 11 8   < > 6*  --   --  6* 7*  --    BUN 30* 31* 30*   < > 30*  --   --  34* 33*  --    CREATININE 1.7* 1.8* 1.9*   < > 1.9*  --   --  2.2* 1.9*  --    GLU 78 99 93   < > 94  --   --  106 171*  --    CALCIUM 8.5* 8.1* 8.2*   < > 7.4*  --   --  7.5* 7.2*  --    PH  --   --   --   --   --  7.309* 7.355  --   --  7.335*   MG 1.9 2.0 2.1  --  1.8  --   --  2.0 1.9  --    ALBUMIN 2.6* 2.8* 2.9*  --   --   --   --   --   --   --    PROT 5.7* 5.6* 5.6*  --   --    --   --   --   --   --    ALKPHOS 85 81 79  --   --   --   --   --   --   --    ALT 11 6* 5*  --   --   --   --   --   --   --    AST 16 10 9*  --   --   --   --   --   --   --    BILITOT 0.4 0.4 0.4  --   --   --   --   --   --   --     < > = values in this interval not displayed.       CARDIAC PROFILE LAST 3 DAYS  Recent Labs   Lab 10/31/24  1532 11/01/24  0145 11/01/24  0551   BNP 39  --   --    TROPONINI 0.011 0.019 0.026       ENDOCRINE LAST 3 DAYS  Recent Labs   Lab 11/01/24  0551 11/03/24  0256   TSH 0.474  --    PROCAL  --  0.169       LAST ARTERIAL BLOOD GAS  ABG  Recent Labs   Lab 11/06/24  1324   PH 7.335*   PO2 53*   PCO2 37.5   HCO3 20.0*   BE -6*       LAST 7 DAYS MICROBIOLOGY   Microbiology Results (last 7 days)       Procedure Component Value Units Date/Time    Blood culture [[id number]] Collected: 11/01/24 1829    Order Status: Completed Specimen: Blood from Peripheral, Hand, Right Updated: 11/05/24 2032     Blood Culture, Routine No Growth to date      No Growth to date      No Growth to date      No Growth to date      No Growth to date            MOST RECENT IMAGING  Echo Saline Bubble? No; Ultrasound enhancing contrast? No    Left Ventricle: The left ventricle is normal in size. Normal wall   thickness. There is normal systolic function with a visually estimated   ejection fraction of 65 - 70%. There is normal diastolic function.    Right Ventricle: Normal right ventricular cavity size. Wall thickness   is normal. Systolic function is normal.    Left Atrium: Left atrium is mildly dilated.    IVC/SVC: Normal venous pressure at 3 mmHg.  X-Ray Chest AP Portable  Narrative: EXAMINATION:  XR CHEST AP PORTABLE    CLINICAL HISTORY:  pulmonary hypertension;    TECHNIQUE:  Single frontal view of the chest was performed.    COMPARISON:  11/05/2024.    FINDINGS:  There is a right-sided PICC, unchanged in position.  The lungs are well expanded.  There is prominence of the central pulmonary vasculature,  stable from prior.  There are small bilateral pleural effusions.  The lungs are otherwise clear.  The cardiac silhouette is enlarged.  Osseous structures are intact.  There are calcifications of the aortic arch.  There are surgical clips overlying the left upper quadrant.  Impression: No significant detrimental change from prior.    Electronically signed by: Dawit Barrios  Date:    11/06/2024  Time:    05:05      ECHOCARDIOGRAM RESULTS (last 5)  Results for orders placed during the hospital encounter of 10/31/24    Echo Saline Bubble? No; Ultrasound enhancing contrast? No    Interpretation Summary    Left Ventricle: The left ventricle is normal in size. Normal wall thickness. There is normal systolic function with a visually estimated ejection fraction of 65 - 70%. There is normal diastolic function.    Right Ventricle: Normal right ventricular cavity size. Wall thickness is normal. Systolic function is normal.    Left Atrium: Left atrium is mildly dilated.    IVC/SVC: Normal venous pressure at 3 mmHg.      Echo    Interpretation Summary    Left Ventricle: The left ventricle is normal in size. Normal wall thickness. Unable to assess wall motion. There is normal systolic function with a visually estimated ejection fraction of 60 - 65%.    Right Ventricle: Right ventricle was not well visualized due to poor acoustic window.  Grossly appears to be dilated with reduced function.  There appears to be some concern of flattening of the interventricular septum which could indicate right ventricular pressure and volume overload.    Left Atrium: Left atrium is severely dilated.    IVC/SVC: Elevated venous pressure at 15 mmHg.      CURRENT/PREVIOUS VISIT EKG  Results for orders placed or performed during the hospital encounter of 10/31/24   EKG 12-lead    Collection Time: 11/06/24  1:13 PM   Result Value Ref Range    QRS Duration 68 ms    OHS QTC Calculation 415 ms    Narrative    Test Reason : I48.91,    Vent. Rate : 140 BPM      Atrial Rate : 000 BPM     P-R Int : 000 ms          QRS Dur : 068 ms      QT Int : 272 ms       P-R-T Axes : 000 118 019 degrees     QTc Int : 415 ms    Atrial fibrillation with rapid ventricular response  Low voltage QRS  Left posterior fascicular block  Nonspecific ST abnormality  Abnormal ECG  When compared with ECG of 31-OCT-2024 23:51,  Significant changes have occurred    Referred By: [email or web address]   SELF           Confirmed By:            ASSESSMENT/PLAN:     Active Hospital Problems    Diagnosis    *Central cord syndrome    S/P cervical spinal fusion    Myelopathy    Quadriparesis    Acute deep vein thrombosis (DVT) of popliteal vein of right lower extremity    Status post cervical spinal fusion    Sinus arrhythmia seen on electrocardiogram    Anemia    Thrombocytopenia    Hypotension    Acute urinary retention    Syncope and collapse    CKD (chronic kidney disease), stage IV    Chronic respiratory failure with hypoxia    Pulmonary hypertension    Secondary hyperparathyroidism    Brain mass    Stroke    Weakness of both lower extremities       ASSESSMENT & PLAN:     Severe Pulmonary hypertension  Severe central cord compression  Status post cervical spinal fusion  Quadriparesis  Acute DVT  Afib  Hypotension on pressors  Syncope and collapse  CKD stage 5  History of stroke  Acute on chronic hypoxemic respiratory failure  Anemia  Hyponatremia  Pulmonary nodules  15. post closure of patent foramen ovale in 2011, CVA in 2011,   16. left posterior frontal/temporal extra-axial mass.     RECOMMENDATIONS:    On amio gtt at this time.  Continue midodrine 10 mg TID.   Wean pressors as tolerated.  Reconsider anticoagulation because of surgery.  Need to consider transferring out to main campus for further management of severe pulmonary HTN. Further recommendations to follow from Dr. Fraser.   We will continue to follow at this time, thank you for this consult.       Maryan Fernandez NP  Department of Cardiology  Date  of Service: 11/06/2024      I have personally interviewed and examined the patient, I have reviewed the Nurse Practitioner's history and physical, assessment, and plan. I have personally evaluated the patient at bedside and agree with the findings and made appropriate changes as necessary in recommendations.    1. Patient has had pulmonary hypertension and she is being followed by her pulmonologist and she had a right heart catheterization.  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    Dr. Segal in [place]. He performed a right heart cath and found a mean PA pressure of 40, wedge of 12, with PVR of 10 Wood units. She is on Opsumit, Adcirca, and Uptravi.     2. Atrial fibrillation   She follows up with her primary cardiologist Dr. Segal.  At home she is not on any anticoagulation.    She was started on enoxaparin 1 milligram/kilogram subQ Q 24 hours.  Patient underwent surgery for central cord syndrome and cervical spondylosis with myelopathy and radiculopathy on 11/03/2024  Highly recommend to consult Neurosurgery in regards with anticoagulation of the patient as risk of bleeding into the area is significant.  If she is not adequately anticoagulated she is also at risk for a stroke.    3. History of recent DVT and not on any anticoagulation and at risk for pulmonary embolism.    4. Hypotension and syncope with acute on chronic hypoxemia, need to rule out pulmonary embolism.  She would need a CTA to rule out pulmonary embolus and given the fact that she has underlying chronic kidney disease stage 5 in the past, that would create a problem.    Currently she is on norepinephrine and midodrine, continue the same.    5. She also has a left frontoparietal and temporal extra-axial mass and would need further evaluation of the same at some point in time.      6. Atrial fibrillation   Currently her heart rate is controlled at 97 beats per minute she was  started on amiodarone drip may need to hold amiodarone as she is not adequately anticoagulate and not sure how long she had been in atrial fibrillation.  Patient was also started on enoxaparin need to get clearance from Neurosurgery also.    7. Anemia of chronic disease.      8.  Chronic hypoxemic respiratory failure with significant shortness a breath on exertion  And she had office pulmonary function tests done at Dr. Federico Tejada's office.  6/8/2021   7:36 AM 3/29/2022   3:22 PM 1/23/2023   2:35 PM   Office Spirometry/PFT/Diffusion Results   FVC (Liters) 2.69 Liters 2.76 Liters   FEV1 (Liters) 2.08 Liters 1.92 Liters   FEV1/FVC (%) 77 % 69 %   MVV (L/min) 69 L/min   O2 Satu 88 96   FiO2 21 28   TLC Liters Pre Kelly 5.34 Liters   RV/TLC (%) 49 %   VC (Liters) 2.72 Liters   DLCO (mL/mmHg/min) 16.3 ml/mmHg sec   DLCO/VA Pre Kelly 3.4   FVC PRE 2.94 Liters   FVC Pre%Pred 107 %   FEV1 PRE 2.15 Liters   FEV1 Pre%Pred 102 %   FEV1/FVC PRE 73.23 %     9. Patient has multiple complex medical problems and she is better served at a tertiary care center and would benefit from transferring her to the [facility] on [facility] in [place]..      10. Thank you for the consultation.    Ridge Fraser MD  Department of Cardiology  formerly [facility]  11/06/2024 8:38 AM 5:03 pm

## 2024-11-06 NOTE — NURSING
1230: patient working with PT with plan to ambulate to chair. Entered patient's room noticing heart rate in the 130s. Patient's rate had previously been 90s-110s. Patient's blood pressure at this time hypotensive on 0.20 of norepinephrine (had been weaning norepinephrine down), increased to 0.3mcg at this time. Patient moved back to laying position in bed. Blood pressure stabilizes, able to begin weaning down on pressors again, and plan is to move to cardiac chair.    1300: Patient moved to cardiac chair and briefly stabilized, but blood pressure begins to decrease and heart rate has increased significantly (140s-150s). At this time a CVP is gathered and found to be 3. MD notified and 1L fluid bolus is started. Norepinephrine increased at this time, patient's O2 increased as well. ABG ordered. Labs acquired. Amiodarone infusion started and cardiology consulted during this time.    1700: Blood pressure stabilized s/p fluid bolus and addition of amiodarone infusion. CVP at 10 on last check. Heart rate now in the 80s/90s. Slowly weaning down norepinephrine as able.

## 2024-11-06 NOTE — SUBJECTIVE & OBJECTIVE
Interval History: 11/6:  POD 3.  Afebrile.  Blood pressure 123/60.  Pulse 75.  Respirations 10.  WBC 8.94.  Hemoglobin 11.0.  Hematocrit 33.4.  Platelets 121.  Creatinine 2.2 up trend.  Overnight, patient had an episode of ventricular tachycardia which prompted chest x-ray and echo.  On initiation of encounter, patient was found in bed, awake and alert.  No family was present at time of encounter.  Patient reports improvement in posterior incisional pain with new pain regimen.  She also reports no change in bilateral upper extremity movement.  She denies new extremity pain or paresthesias.    Medications:  Continuous Infusions:   lactated ringers   Intravenous Continuous 100 mL/hr at 11/05/24 1742 New Bag at 11/05/24 1742    NORepinephrine bitartrate-D5W  0-3 mcg/kg/min Intravenous Continuous 8.2 mL/hr at 11/06/24 0401 0.24 mcg/kg/min at 11/06/24 0401     Scheduled Meds:   allopurinoL  100 mg Oral QHS    atorvastatin  40 mg Oral Daily    bisacodyL  10 mg Rectal Daily    colchicine  0.3 mg Oral Daily    enoxparin  40 mg Subcutaneous Q24H (prophylaxis, 1700)    HYDROcodone-acetaminophen  1 tablet Oral Q6H    macitentan  10 mg Oral Daily    methocarbamoL  750 mg Oral TID    midodrine  10 mg Oral TID    montelukast  10 mg Oral Daily    mupirocin   Nasal BID    pantoprazole  40 mg Oral Daily    selexipag  1,600 mcg Oral BID    tadalafil  40 mg Oral Daily     PRN Meds:  Current Facility-Administered Medications:     acetaminophen, 650 mg, Oral, Q4H PRN    albuterol sulfate, 2.5 mg, Nebulization, Q6H PRN    aluminum-magnesium hydroxide-simethicone, 30 mL, Oral, Q4H PRN    dextrose 50%, 12.5 g, Intravenous, PRN    dextrose 50%, 25 g, Intravenous, PRN    diphenhydrAMINE, 25 mg, Oral, Q6H PRN    glucagon (human recombinant), 1 mg, Intramuscular, PRN    glucose, 16 g, Oral, PRN    glucose, 24 g, Oral, PRN    HYDROmorphone, 1 mg, Intravenous, Q6H PRN    HYDROmorphone, 2 mg, Oral, Q4H PRN    magnesium oxide, 800 mg, Oral, PRN     magnesium oxide, 800 mg, Oral, PRN    methocarbamoL, 500 mg, Oral, TID PRN    naloxone, 0.02 mg, Intravenous, PRN    ondansetron, 4 mg, Intravenous, Q8H PRN    potassium bicarbonate, 35 mEq, Oral, PRN    potassium bicarbonate, 50 mEq, Oral, PRN    potassium bicarbonate, 60 mEq, Oral, PRN    potassium, sodium phosphates, 2 packet, Oral, PRN    potassium, sodium phosphates, 2 packet, Oral, PRN    potassium, sodium phosphates, 2 packet, Oral, PRN    prochlorperazine, 5 mg, Intravenous, Q6H PRN    senna-docusate 8.6-50 mg, 2 tablet, Oral, Nightly PRN    sodium chloride 0.9%, 500 mL, Intravenous, PRN    sodium chloride 0.9%, 10 mL, Intravenous, Q12H PRN    sodium chloride 0.9%, 10 mL, Intravenous, PRN       Objective:     Weight: 72.7 kg (160 lb 4.4 oz)  Body mass index is 25.1 kg/m².  Vital Signs (Most Recent):  Temp: 99.5 °F (37.5 °C) (11/06/24 0400)  Pulse: 75 (11/06/24 0956)  Resp: 10 (11/06/24 0956)  BP: 123/60 (11/06/24 0600)  SpO2: (!) 94 % (11/06/24 0956) Vital Signs (24h Range):  Temp:  [98.1 °F (36.7 °C)-99.5 °F (37.5 °C)] 99.5 °F (37.5 °C)  Pulse:  [] 75  Resp:  [9-24] 10  SpO2:  [87 %-99 %] 94 %  BP: ()/(51-79) 123/60  Arterial Line BP: ()/() 92/81     Date 11/06/24 0700 - 11/07/24 0659   Shift 1489-2597 6513-1007 3473-7499 24 Hour Total   INTAKE   Shift Total(mL/kg)       OUTPUT   Urine(mL/kg/hr) 250   250   Shift Total(mL/kg) 250(3.4)   250(3.4)   Weight (kg) 72.7 72.7 72.7 72.7                            Closed/Suction Drain 11/03/24 1108 Tube - 1 Posterior Neck Accordion 10 Fr. (Active)   Site Description Unable to view 11/06/24 0501   Dressing Type Gauze;Transparent (Tegaderm) 11/06/24 0501   Dressing Status Clean;Dry;Intact 11/06/24 0501   Dressing Intervention Integrity maintained 11/06/24 0501   Drainage Serosanguineous 11/06/24 0501   Status To bulb suction 11/06/24 0501   Output (mL) 15 mL 11/06/24 0601            Urethral Catheter 11/01/24 1731 Non-latex 16 Fr. (Active)  "  Site Assessment Clean;Intact 11/06/24 0701   Collection Container Urimeter 11/06/24 0701   Securement Method secured to top of thigh w/ tape 11/06/24 0701   Catheter Care Performed no 11/06/24 0701   Reason for Continuing Urinary Catheterization Critically ill in ICU and requiring hourly monitoring of intake/output 11/06/24 0701   CAUTI Prevention Bundle Securement Device in place with 1" slack;Intact seal between catheter & drainage tubing;Sheeting clip in use;Drainage bag/urimeter off the floor;No dependent loops or kinks;Drainage bag/urimeter not overfilled (<2/3 full);Drainage bag/urimeter below bladder 11/05/24 2301   Output (mL) 125 mL 11/06/24 0914         Neurosurgery Physical Exam  General:  Mild acute distress.  Neck:  Has been collar in place.  Neurologic: Alert and oriented. Thought content appropriate.  GCS: E4 V5 M6; Total: 15  Pulmonary: normal respirations, no signs of respiratory distress  Skin: Skin is warm, dry and intact.     Sensory: intact to light touch throughout  Motor Strength: Moves all extremities spontaneously with good tone.  No abnormal movements seen.  Bilateral upper extremity strength deltoid/biceps/triceps/left hand  3/5, right hand  2/5   Right lower extremity strength iliopsoas 4/5, TA/EHL 4/5, gastrocnemius 4-/5   Left lower extremity strength iliopsoas 4/5, TA/EHL 5/5, gastrocnemius 5/5      Posterior cervical Incision: Covered with clean, dry steri strip. No surrounding erythema or edema. No drainage from incision. No palpable hematoma or underlying fluid collection.  DEYSI drain x 1 in place     Significant Labs:  Recent Labs   Lab 11/05/24 0317 11/06/24 0318   GLU 94 106   * 131*   K 3.9 4.3    103   CO2 23 22*   BUN 30* 34*   CREATININE 1.9* 2.2*   CALCIUM 7.4* 7.5*   MG 1.8 2.0     Recent Labs   Lab 11/05/24 0317 11/05/24 0446 11/06/24 0318   WBC 7.87  --  8.94   HGB 10.2*  --  11.0*   HCT 31.5* 37 33.4*   PLT 97*  --  121*     No results for " "input(s): "LABPT", "INR", "APTT" in the last 48 hours.  Microbiology Results (last 7 days)       Procedure Component Value Units Date/Time    Blood culture [[id number]] Collected: 11/01/24 1829    Order Status: Completed Specimen: Blood from Peripheral, Hand, Right Updated: 11/05/24 2032     Blood Culture, Routine No Growth to date      No Growth to date      No Growth to date      No Growth to date      No Growth to date          "

## 2024-11-06 NOTE — PROGRESS NOTES
Pulmonary/Critical Care Consult      PATIENT NAME: Shanell Mahmood  MRN: [id number]  TODAY'S DATE: 2024  8:00 AM  ADMIT DATE: 10/31/2024  AGE: 74 y.o. : 1950    CONSULT REQUESTED BY: Gypsy Bailey MD    REASON FOR CONSULT:   Critical care management    HPI:  The patient is a 74 year old female who had a syncopal event on 10/31.  She was weak, myelopathic.  CT of the head showed an acute infarct in the right caudate head.  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  The MRI of her cervical spine showed disc bulging and spondylolysis at C4-C5 and C5-C6 with severe spinal canal stenosis, cervical cord compression and cord signal alteration there was also broad-based disc bulging producing moderate spinal canal stenosis at C3-C4.  She was brought to the OR yesterday where she underwent C2- 3, 3-4, 4-5,5-6,  and 6- 7 laminectomies with C3 through 6 posterior segmental instrumentation and C3-4, 4- 5, 5- 6 posterolateral arthrodesis using autograft harvested from the incision and allograft of DBM.  This morning she remains profoundly myelopathic.  She is on Levophed at 0.06 mics per kilos per minute to maintain a mean of 80.    The patient has significant pulmonary hypertension in his managed with Opsumit 10 mg daily, Adcirca 40 mg daily, and Uptravi 1600 ug bid.  She is on 4 L continuous oxygen and has dyspnea with any exertion.  She is also on Advair 115/21 b.i.d.     the patient's oxygenation dropped dramatically this morning.  She is now on more levophed to maintain a mean of 80.  She is in a lot of pain.     the patient is requiring a little less oxygen this morning.  However, she is in AFib and had a 25 beat run of V-tach overnight.  She is on 0.24 of Levophed.  I am concerned that her pulmonary hypertension is significantly worse and have an echo coming.  Will start walking the  Levophed down and off as her renal function is worse, she is in AFib and she had V-tach last night.    REVIEW OF SYSTEMS  GENERAL: Feeling ok.  EYES: Vision is good.  ENT: No sinusitis or pharyngitis.   HEART: No chest pain or palpitations.  LUNGS:  Patient has 4 L continuous oxygen at home for her pulmonary hypertension  GI: No Nausea, vomiting, constipation, diarrhea, or reflux.  : No dysuria, hesitancy, or nocturia.  SKIN: No lesions or rashes.  MUSCULOSKELETAL: No joint pain or myalgias.  NEURO:  She has been progressively weaker since her fall  LYMPH: No edema or adenopathy.  PSYCH: No anxiety or depression.  ENDO: No weight change.    No change in the patient's Past Medical History, Past Surgical History, Social History or Family History since admission.    VITAL SIGNS (MOST RECENT)  Temp: 99.5 °F (37.5 °C) (11/06/24 0400)  Pulse: 88 (11/06/24 0652)  Resp: 12 (11/06/24 0652)  BP: 123/60 (11/06/24 0600)  SpO2: 96 % (11/06/24 0652)    INTAKE AND OUTPUT (LAST 24 HOURS):  Intake/Output Summary (Last 24 hours) at 11/6/2024 0743  Last data filed at 11/6/2024 0601  Gross per 24 hour   Intake 3941.86 ml   Output 929 ml   Net 3012.86 ml       WEIGHT  Wt Readings from Last 1 Encounters:   11/01/24 72.7 kg (160 lb 4.4 oz)       PHYSICAL EXAM  GENERAL: Older patient looking uncomfortable.  HEENT: Pupils equal and reactive. Extraocular movements intact. Nose intact. Pharynx moist.  NECK: Supple.  Aspen collar in place.  DEYSI drain with serosanguineous drainage, 60 cc in the last 24 hours  HEART:  Irregularly irregular rate and rhythm. No murmur or gallop auscultated.  LUNGS:  The lungs are clear..  Lung excursion symmetrical. No change in fremitus.   ABDOMEN: Bowel sounds present. Non-tender, no masses palpated.  : Normal anatomy.  Yeung catheter with clear urine  EXTREMITIES:  Minimal movement to the right hand.  There is a bit of a squeeze to the left hand.  She can wiggle both sets of toes.  She can not do anything  against gravity.  Arterial line right radial.  Right midline.  Two peripherals.  LYMPHATICS: No adenopathy palpated, no edema.  SKIN: Dry, intact, no lesions.   NEURO: Cranial nerves II-XII intact. Motor strength 1/5 bilaterally, except left hand is a 2/5.  The right hand is the weakest.  The left quadricep tightens better than the right quadricep.  PSYCH: Appropriate affect      CBC LAST (LAST 24 HOURS)  Recent Labs   Lab 11/06/24  0318   WBC 8.94   RBC 3.91*   HGB 11.0*   HCT 33.4*   MCV 85   MCH 28.1   MCHC 32.9   RDW 15.8*   *   MPV 12.0       CHEMISTRY LAST (LAST 24 HOURS)  Recent Labs   Lab 11/05/24  0913 11/06/24  0318   NA  --  131*   K  --  4.3   CL  --  103   CO2  --  22*   ANIONGAP  --  6*   BUN  --  34*   CREATININE  --  2.2*   GLU  --  106   CALCIUM  --  7.5*   PH 7.355  --    MG  --  2.0         CARDIAC PROFILE (LAST 24 HOURS)  Recent Labs   Lab 10/31/24  1532 11/01/24  0145 11/01/24  0551   BNP 39  --   --    TROPONINI 0.011   < > 0.026    < > = values in this interval not displayed.       LAST 7 DAYS MICROBIOLOGY   Microbiology Results (last 7 days)       Procedure Component Value Units Date/Time    Blood culture [[id number]] Collected: 11/01/24 1829    Order Status: Completed Specimen: Blood from Peripheral, Hand, Right Updated: 11/05/24 2032     Blood Culture, Routine No Growth to date      No Growth to date      No Growth to date      No Growth to date      No Growth to date            MOST RECENT IMAGING  X-Ray Chest AP Portable  Narrative: EXAMINATION:  XR CHEST AP PORTABLE    CLINICAL HISTORY:  pulmonary hypertension;    TECHNIQUE:  Single frontal view of the chest was performed.    COMPARISON:  11/05/2024.    FINDINGS:  There is a right-sided PICC, unchanged in position.  The lungs are well expanded.  There is prominence of the central pulmonary vasculature, stable from prior.  There are small bilateral pleural effusions.  The lungs are otherwise clear.  The cardiac silhouette is  enlarged.  Osseous structures are intact.  There are calcifications of the aortic arch.  There are surgical clips overlying the left upper quadrant.  Impression: No significant detrimental change from prior.    Electronically signed by: Dawit Barrios  Date:    11/06/2024  Time:    05:05    Chest x-ray 11/6 shows better inspiratory effort.  Tiny pleural effusions and huge pulmonary arteries.  There is a PICC line on the right side.    CURRENT VISIT EKG  No results found for this visit on 10/31/24.    ECHOCARDIOGRAM RESULTS  Results for orders placed during the hospital encounter of 10/31/24    Echo    Interpretation Summary    Left Ventricle: The left ventricle is normal in size. Normal wall thickness. Unable to assess wall motion. There is normal systolic function with a visually estimated ejection fraction of 60 - 65%.    Right Ventricle: Right ventricle was not well visualized due to poor acoustic window.  Grossly appears to be dilated with reduced function.  There appears to be some concern of flattening of the interventricular septum which could indicate right ventricular pressure and volume overload.    Left Atrium: Left atrium is severely dilated.    IVC/SVC: Elevated venous pressure at 15 mmHg.    Oxygen INFORMATION   8 L        IMPRESSION AND PLAN  Severe central cord compression now status post multilevel laminectomy and instrumentation  Quadriparesis persisting  - will wean Levophed down and off as she has been on it since Saturday at noon  Severe pulmonary hypertension  - patient needs her Opsumit, Uptravi, and Adcirca.    - patient appears to have decreased cardiac output with worsening renal function.  I am concerned that her right ventricle maybe under greater pressure either from a PE or the fact that she was off her meds for several days.  She is also in atrial fib which is not helping either  - increase Lovenox back to 40, she truly needs to be on mg per kg daily for the AFib and DVT  DVT  -  nonocclusive to the right leg  Atrial fibrillation  - rate fairly controlled  Acute on chronic hypoxemic respiratory failure  - on 4 L of oxygen at home  - now requiring 8, better than yesterday  - I's and O's ahead 3 liters  - pulmonary hypertension meds just restarted yesterday  - worry about PE  - DVT seen in legs  - asking Neurosurgery if we can increase to full dose  - CTA likely to worsen her renal function significantly  - I do not think the patient could tolerate a V/Q scan  - D-dimer is obviously positive  - takes Advair but is not obstructed  - will continue p.r.n. DuoNebs  Atelectasis  - appears resolved  Anemia  - chronic disease  Thrombocytopenia  - consumption?  - worsening  - continue to trend  Chronic kidney disease  - urine output decreasing  - creatinine up again  - decrease IVF to 50 cc an hour  Hyponatremia  - worsening  - change LR to normal saline  Moderate hypoalbuminemia  Intracranial mass  Pulmonary nodules, 1 greater than 1 cm  - patient workup underway    Need to fully anticoagulate the patient with her history of strokes and the fact that she is now in AFib and possible PE  Will repeat the echo this morning to see what her right ventricle is looking like because I am afraid it is larger.  Continued to ensure that her sats are greater than 90.  Up to chair again today  Will need to find out from Neurosurgery the timing of rehab  Discontinuing Levophed, hopefully this will help her renal function.      Critical care time spent reviewing the chart, examining the patient, reviewing the labs, reviewing the radiological findings, discussing care with nursing, physicians, and respiratory and creating the note and  has been 40 minutes.    Gaye Ramos MD  Date of Service: 11/06/2024  8:00 AM

## 2024-11-06 NOTE — CARE UPDATE
11/06/24 0652   Patient Assessment/Suction   Level of Consciousness (AVPU) alert   Respiratory Effort Normal;Unlabored   PRE-TX-O2   Device (Oxygen Therapy) high flow nasal cannula   $ Is the patient on Low Flow Oxygen? Yes   Flow (L/min) (Oxygen Therapy) 9   SpO2 96 %   Pulse Oximetry Type Continuous   $ Pulse Oximetry - Multiple Charge Pulse Oximetry - Multiple   Pulse 88   Resp 12   Aerosol Therapy   $ Aerosol Therapy Charges PRN treatment not required   Incentive Spirometer   $ Incentive Spirometer Charges done with encouragement   Administration (IS) instruction provided, follow-up   Number of Repetitions (IS) 10   Level Incentive Spirometer (mL) 1500   Patient Tolerance (IS) good   Vibratory PEP Therapy   $ Vibratory PEP Charges Aerobika Therapy   $ Vibratory PEP Tech Time Charges 15 min   Daily Review of Necessity (PEP Therapy) completed   Type (PEP Therapy) vibratory/oscillatory   Device (PEP Therapy) flutter   Route (PEP Therapy) mouthpiece   Breaths per Cycle (PEP Therapy) 5   Cycles (PEP Therapy) 2   PEP Pressure (cm H2O) 5   PEP Duration (min) 5   Settings (PEP Therapy) PEP 5   Patient Position (PEP Therapy) HOB elevated   Signs of Intolerance (PEP Therapy) none   Education   $ Education Incentive Spirometry;15 min

## 2024-11-06 NOTE — PT/OT/SLP PROGRESS
Physical Therapy Treatment    Patient Name:  Shanell Mahmood   MRN:  [id number]    Recommendations:     Discharge Recommendations: High Intensity Therapy  Discharge Equipment Recommendations:  (to be determined at next level of care)  Barriers to discharge:  increased assist with mobility, decreased activity tolerance, balance deficits    Assessment:     Shanell Mahmood is a 74 y.o. female admitted with a medical diagnosis of Central cord syndrome.  She presents with the following impairments/functional limitations: weakness, impaired endurance, impaired self care skills, impaired functional mobility, decreased upper extremity function, decreased lower extremity function, impaired cardiopulmonary response to activity, orthopedic precautions.    Pt side right side lying with OT present having just finished cleaning pt up. Partial co-treat with OT due to pt benefiting from two skilled therapist for safety with functional mobility.    Pt transferred from right side lying to sitting EOB with max assist x2. OT exited while pt was seated EOB. Pt required mod to max assist progressing to min to mod assist to maintain sitting balance EOB. Pt on 9 L O2 but nurse increased to 14 L for mobility. Pt noted to have low BP sitting EOB with complaints of dizziness. Nurse present increasing medication through IV to get pt's blood pressure up.    Plan was to transfer to chair but plan deferred to due to safety concerns with pt having low blood pressure.    Pt transferred to supine with max assist x 2. Pt was dependent with assist x 3 for slide transfer to cardiac chair. Pt left up in cardiac chair with nurse present.    Rehab Prognosis: Fair; patient would benefit from acute skilled PT services to address these deficits and reach maximum level of function.    Recent Surgery: Procedure(s) (LRB):  LAMINECTOMY, SPINE, CERVICAL, POSTERIOR APPROACH (N/A) 3 Days Post-Op    Plan:     During this hospitalization, patient to be seen daily to address  the identified rehab impairments via gait training, therapeutic activities, therapeutic exercises, neuromuscular re-education and progress toward the following goals:    Plan of Care Expires:  24    Subjective     Chief Complaint: dizziness sitting EOB  Patient/Family Comments/goals: to get better  Pain/Comfort:  Pain Rating 1: 0/10      Objective:     Communicated with nurse prior to session.  Patient found right sidelying with telemetry, pulse ox (continuous), blood pressure cuff, oxygen, serna catheter, DEYSI drain, arterial line upon PT entry to room.     General Precautions: Standard, fall, aspiration  Orthopedic Precautions: spinal precautions  Braces: Supply J collar  Respiratory Status: Nasal cannula, flow 9 L/min increased to 14 L/min with mobility     Functional Mobility:  Bed Mobility:     Supine to Sit: maximal assistance and of 2 persons  Sit to Supine: maximal assistance and of 2 persons  Transfers:     Bed to Chair: dependence and of 3 persons with  no AD  using  Drawsheet  Balance: sitting EOB with mod-maxA > min-modA      AM-PAC 6 CLICK MOBILITY          Treatment & Education:  Pt educated on importance of time OOB, importance of intermittent mobility, safe techniques for transfers/ambulation, discharge recommendations/options, and use of call light for assistance and fall prevention.      Patient left  up in cardiac chair  with all lines intact, call button in reach, and nurse and family present..    GOALS:   Multidisciplinary Problems       Physical Therapy Goals          Problem: Physical Therapy    Goal Priority Disciplines Outcome Interventions   Physical Therapy Goal     PT, PT/OT Progressing    Description: Goals to be met by: 24     Patient will increase functional independence with mobility by performin. Supine to sit with Moderate Assistance  2. Sit to supine with Moderate Assistance  3. Sit to stand transfer with Moderate Assistance  4. Bed to chair transfer with Moderate  Assistance using Rolling Walker  5. Gait  x 25 feet with Moderate Assistance using Rolling Walker.                          Time Tracking:     PT Received On: 11/06/24  PT Start Time: 1044     PT Stop Time: 1122  PT Total Time (min): 38 min     Billable Minutes: Therapeutic Activity 38    Treatment Type: Treatment  PT/PTA: PTA     Number of PTA visits since last PT visit: 2     11/06/2024

## 2024-11-06 NOTE — PLAN OF CARE
"    MICU care plan note    Dx: Central cord syndrome    Shift Events: See notes    Goals of Care: Encourage movement and independence.     Neuro: AAO x4, Follows Commands, and Moves All Extremities    Vital Signs: /60   Pulse 83   Temp 99.5 °F (37.5 °C) (Oral)   Resp 16   Ht 5' 7" (1.702 m)   Wt 72.7 kg (160 lb 4.4 oz)   SpO2 97%   BMI 25.10 kg/m²     Respiratory: HFNC    GI: proton pump inhibitor per orders no change in bowel habits    Thrombus: DVT prophylaxis. Pulmonary toilet including IS. Early ambulation.    Diet: Full Liquid    Gtts: Norepinephrine    Urine Output: Urinary Catheter see charting cc/hour    Drains:   DEYSI Drain, total output see charting cc / shift    Labs/Accuchecks: see results.    Skin: intact        Plan of care reviewed with patient, Shanell Mahmood , verbalized understanding. Patient progressing toward goals of care. NAEON. Safety measures in place and maintained throughout shift.    "

## 2024-11-06 NOTE — PROGRESS NOTES
The patient's blood pressure is not tolerating weaning.  In fact it is dropping and requiring an increased dose of Levophed.  Have bolused a L of fluid.  She is making urine.  Her PA pressures on her repeat echo this morning are up to 60.  She is in AFib with a heart rate in the 140s.  Will start amiodarone and consult Cardiology.  Her art line is dampened and  we will replace this.  Discussed with her code status given her declining situation.  She does not wish to be intubated or have CPR should things to progress to that.

## 2024-11-06 NOTE — PLAN OF CARE
CM spoke with Janie with [facility] and she has reviewed but would like additional clinical sent following PT/OT notes in chart for day. CM met with pt to discuss discharge planning. Pt sister present and pt requested input from her. Discussed that [facility] and [facility] were declined due to pt not wanting these placements. Pt would like placement in [place] and prefers [facility] as first choice and [facility] as second.    Since conversation with [facility], pt with ongoing need for Levo when working with therapy. She will need to be off this when assessed for acceptance at rehab.  CM will continue to follow.  CM attempted to call in locet but calls too high. Spoke with Nadia who took name and number for a c/b. Will continue to follow.  1258 Received call from pt daughter who notes [facility] is first choice and she states that she would like to talk with pt this afternoon prior to committing to [facility] as second choice.       11/06/24 1225   Post-Acute Status   Post-Acute Authorization Placement   Post-Acute Placement Status Referrals Sent   Discharge Delays None known at this time   Discharge Plan   Discharge Plan A Rehab   Discharge Plan B Skilled Nursing Facility

## 2024-11-06 NOTE — SUBJECTIVE & OBJECTIVE
Interval History: no new complaints, tolerating liquids     Review of Systems   Unable to perform ROS: Acuity of condition   Constitutional:  Positive for activity change.   Respiratory: Negative.     Cardiovascular: Negative.    Gastrointestinal: Negative.    Musculoskeletal:  Positive for arthralgias and back pain.   Neurological:  Positive for weakness.     Objective:     Vital Signs (Most Recent):   Vital Signs (24h Range):  Temp:  [98.1 °F (36.7 °C)-99.5 °F (37.5 °C)] 99.2 °F (37.3 °C)  Pulse:  [72-92] 86  Resp:  [9-29] 11  SpO2:  [81 %-96 %] 96 %  BP: ()/(52-80) 109/58  Arterial Line BP: ()/(56-72) 103/61     Weight: 72.7 kg (160 lb 4.4 oz)  Body mass index is 25.1 kg/m².          Physical Exam  Constitutional:       General: She is not in acute distress.     Comments: Sleeping    Neck:      Comments: Cervical collar in place   Cardiovascular:      Rate and Rhythm: Regular rhythm. Bradycardia present.   Pulmonary:      Effort: Pulmonary effort is normal. No respiratory distress.      Breath sounds: No wheezing or rhonchi.   Abdominal:      General: Bowel sounds are normal. There is no distension.   Skin:     General: Skin is warm and dry.      Capillary Refill: Capillary refill takes less than 2 seconds.             Significant Labs: All pertinent labs within the past 24 hours have been reviewed.  Recent Lab Results         11/05/24  0913   11/05/24  0446   11/05/24  0317        Allens Test N/A   N/A         Anion Gap     6       Site Bonnie/UAC   Bonnie/UAC         BUN     30       Calcium     7.4       Chloride     106       CO2     23       Creatinine     1.9       DelSys Nasal Can   Nasal Can         eGFR     27.5       Flow 10   5         Glucose     94       Hematocrit     31.5       Hemoglobin     10.2       Magnesium      1.8       MCH     27.5       MCHC     32.4       MCV     85       Mode SPONT   SPONT         MPV     10.9       Platelet Count     97       POC BE -1   -3         POC  Glucose   92         POC HCO3 24.8   23.4         POC Hematocrit   37         POC Ionized Calcium   1.11         POC PCO2 44.4   46.7         POC PH 7.355   7.309         POC PO2 60   48         POC Potassium   4.2         POC SATURATED O2 89   79         POC Sodium   136         POC TCO2 26   25         Potassium     3.9       Rate   13         RBC     3.71       RDW     15.9       Sample ARTERIAL   ARTERIAL         Sodium     135       Sp02   92         WBC     7.87               Significant Imaging: I have reviewed all pertinent imaging results/findings within the past 24 hours.

## 2024-11-06 NOTE — CARE UPDATE
11/05/24 1942   Patient Assessment/Suction   Level of Consciousness (AVPU) alert   Respiratory Effort Unlabored   Expansion/Accessory Muscles/Retractions expansion symmetric   All Lung Fields Breath Sounds diminished   Rhythm/Pattern, Respiratory depth regular;pattern regular   Cough Frequency infrequent   Cough Type nonproductive   PRE-TX-O2   Device (Oxygen Therapy) high flow nasal cannula w/device   $ Is the patient on Low Flow Oxygen? Yes   Flow (L/min) (Oxygen Therapy) 9   SpO2 (!) 93 %   Pulse Oximetry Type Continuous   $ Pulse Oximetry - Multiple Charge Pulse Oximetry - Multiple   Pulse 86   Resp 16   Aerosol Therapy   $ Aerosol Therapy Charges PRN treatment not required   Incentive Spirometer   $ Incentive Spirometer Charges done with encouragement   Incentive Spirometer Predicted Level (mL) 1500   Administration (IS) instruction provided, follow-up   Number of Repetitions (IS) 10   Level Incentive Spirometer (mL) 1500   Patient Tolerance (IS) good   Vibratory PEP Therapy   $ Vibratory PEP Charges Aerobika Therapy   $ Vibratory PEP Equipment Aerobika Equipment   $ Vibratory PEP Tech Time Charges 15 min   Daily Review of Necessity (PEP Therapy) completed   Type (PEP Therapy) vibratory/oscillatory   Device (PEP Therapy) flutter   Route (PEP Therapy) mouthpiece   Breaths per Cycle (PEP Therapy) 5   Cycles (PEP Therapy) 2   PEP Pressure (cm H2O) 5   PEP Duration (min) 5   Settings (PEP Therapy) PEP 5   Patient Position (PEP Therapy) sitting in chair   Signs of Intolerance (PEP Therapy) none   Education   $ Education Incentive Spirometry;15 min

## 2024-11-06 NOTE — PROGRESS NOTES
[facility]  Progress Note    Patient Name: Shanell Mahmood  MRN: [id number]  Patient Class: IP- Inpatient   Admission Date: 10/31/2024  Length of Stay: 4 days  Attending Physician: Gypsy Bailey MD  Primary Care Provider: Nicole, Primary Doctor        Subjective:     Principal Problem:Central cord syndrome        HPI:  Shanell Mahmood is a 74 year old female with a previous medical history of anemia, Pulmonary hypertension on 4 liters continuous oxygen at home, arthritis, CKD V, Osteoporosis, secondary hyperprathyroidism, S/P closure of patent foramen ovale in 2011, CVA in 2011 with no residuals, chronic back pain, HLD, Gout, Brain Mass and thyroid diease who presented to the ED after unwitnessed syncopal episode. The patient was at her pain management office for re certification only. There were no procedures performed. She reports taking one hydrocodone 10mg today.  The last thing she remembers was walking down the hallway of the office and looking for something to cover her head from the rain and did not have any adverse symptoms or feelings at that time.  She has no recollection of the syncopal events. When she was initially evaluated by EMS, her blood pressure was in the 60s over 40s. She did require constant stimulation to remain awake. Fell and hit her head. Does not take any blood thinners. EMS evaluated her and gave her 1 mg of Narcan as well as 250 cc of fluid. Her pressure improved and she had become more alert. Initial ED workup was thorough and all imaging showing that included CT of neck, head and entire spine showed no acute processes. CBC showed anemia and CMP showed elevated creatinine consistent with history of CKD V. Drug screen positive for opioids but patient has a hydrocodone prescription. The patient is currently in the process of have a left sided brain mass visualized on MRI 10/8/24 evaluated that was an incidental finding after undergoing a PET scan for a pulmonary  "nodule on 9/6/24 with abnormal uptake noted in brain. She has her first appointment on 11/4/24. She is followed by nephrology who is currently monitoring her and there has been one attempt at AV fistual placement but it was unsuccessful due sclerotic changes. Patient reports she awoke this morning feeling well showered and dressed herself. She performs all of her own ADL's. She had one syncopal event in March 2024 and was evaluated by cardiology and informed it was an orthostatic episode. Patient was unable to complete orthostatic vital signs upon admission due to inability to stand stating " I feel as if I can't get my legs under me". When evaluated for admit exam the patient endorses that after the syncopal episode she endorses bilateral leg weakness with decreased sensation in both legs. She reports bilateral  weakness being unable to use her phone and difficulty raising both of her arms. NIH scale performed and total of 9 noted. Patient noted have 400ml of urine in bladder and unable to void. Patient reached a total of 528ml of urine without being able to void.  MRI/MRA of brain and MRI of lumbosacral spine ordered upon admit. MRA and MRI lower back showed no acute process. MRI brain showed Acute infarct in the right caudate head. Dr. Maldonado was called and he recommended MRA of neck in morning and load with aspirin and plavix. Initial EKG read as atrial fibrillation but upon review p waves were noted and this was discussed with the ED. Repeat EKG shows sinus arrhthymias with PACs. Patient admitted by hospital medicine for further evaluation and management.       Overview/Hospital Course:  74-year-old female with history of brain mass, CKD, back pain, pulmonary hypertension on 4 L oxygen is admitted after syncope and collapse found to have a acute CVA in the right caudate on MRI brain.  Has global weakness and decreased sensation to the lower legs.  Multiple CT and x-rays done to rule out trauma ultimately " negative other than the maxillofacial CT reporting mild irregularity of the interior midline mandible with small adjacent bone fragments which could represent acute fracture with overlying soft tissue swelling.  Carotid ultrasound and MRA brain and neck without acute finding.  Neurology is consulted.  Also with underlying brain mass.  Consult Neurosurgery and Oncology.  Given DAPT and statin.  Had hypotension given IVF and midodrine.  Echo shows right heart failure.  BNP is within normal.  Lactic acid pending.  No other sign of infection.  Placed in cervical collar as precaution and MRI of the cervical spine shows severe cord compression at C4-5 and C5-6 likely acute with edema.  Neurosurgery discussed with patient and given her RCRI is 2 and she is a controlled diabetic but has had recent stroke and given Plavix and ASA plan to proceed with surgical decompression tomorrow 11/3.  Patient was NPO at midnight and ASA and Plavix are on hold.    Patient underwent: 11/3/24  Surgery   C2-3, C3-4, C4-5, C5-6, C6-7 laminectomy  C3 through 6 posterior segmental instrumentation using [employer] system  C3-4, C4-5, C5-6 posterolateral arthrodesis using autograft harvested from the same incision and allograft DBM    Maintaining MAP 80 - attempt weaning levophed tomorrow.  PT/OT to follow. Patient wants to go to IR in [place], [place].       Interval History: no new complaints, tolerating liquids     Review of Systems   Unable to perform ROS: Acuity of condition   Constitutional:  Positive for activity change.   Respiratory: Negative.     Cardiovascular: Negative.    Gastrointestinal: Negative.    Musculoskeletal:  Positive for arthralgias and back pain.   Neurological:  Positive for weakness.     Objective:     Vital Signs (Most Recent):   Vital Signs (24h Range):  Temp:  [98.1 °F (36.7 °C)-99.5 °F (37.5 °C)] 99.2 °F (37.3 °C)  Pulse:  [72-92] 86  Resp:  [9-29] 11  SpO2:  [81 %-96 %] 96 %  BP: ()/(52-80) 109/58  Arterial  Line BP: ()/(56-72) 103/61     Weight: 72.7 kg (160 lb 4.4 oz)  Body mass index is 25.1 kg/m².          Physical Exam  Constitutional:       General: She is not in acute distress.     Comments: Sleeping    Neck:      Comments: Cervical collar in place   Cardiovascular:      Rate and Rhythm: Regular rhythm. Bradycardia present.   Pulmonary:      Effort: Pulmonary effort is normal. No respiratory distress.      Breath sounds: No wheezing or rhonchi.   Abdominal:      General: Bowel sounds are normal. There is no distension.   Skin:     General: Skin is warm and dry.      Capillary Refill: Capillary refill takes less than 2 seconds.             Significant Labs: All pertinent labs within the past 24 hours have been reviewed.  Recent Lab Results         11/05/24  0913   11/05/24  0446   11/05/24  0317        Allens Test N/A   N/A         Anion Gap     6       Site Bonnie/UAC   Bonnie/UAC         BUN     30       Calcium     7.4       Chloride     106       CO2     23       Creatinine     1.9       DelSys Nasal Can   Nasal Can         eGFR     27.5       Flow 10   5         Glucose     94       Hematocrit     31.5       Hemoglobin     10.2       Magnesium      1.8       MCH     27.5       MCHC     32.4       MCV     85       Mode SPONT   SPONT         MPV     10.9       Platelet Count     97       POC BE -1   -3         POC Glucose   92         POC HCO3 24.8   23.4         POC Hematocrit   37         POC Ionized Calcium   1.11         POC PCO2 44.4   46.7         POC PH 7.355   7.309         POC PO2 60   48         POC Potassium   4.2         POC SATURATED O2 89   79         POC Sodium   136         POC TCO2 26   25         Potassium     3.9       Rate   13         RBC     3.71       RDW     15.9       Sample ARTERIAL   ARTERIAL         Sodium     135       Sp02   92         WBC     7.87               Significant Imaging: I have reviewed all pertinent imaging results/findings within the past 24  hours.    Assessment/Plan:      * Central cord syndrome  S/p (11/3/24)  C2-3, C3-4, C4-5, C5-6, C6-7 laminectomy  C3 through 6 posterior segmental instrumentation using [employer] system  C3-4, C4-5, C5-6 posterolateral arthrodesis using autograft harvested from the same incision and allograft DBM    Cervical collar in place       Hypotension  Asymptomatic.  Etiology is unclear but suspected to be related to the stroke or underlying cervical cord pathology  Echo reviewed.  Do not suspect cardiogenic shock.  Do not suspect sepsis  -hold any BP meds or diuretics  -check lactate  -NS 1 L bolus  -start midodrine 10 mg t.i.d.  -MRI cervical spine: severe cord compression at C4-5 and C5-6 , plan for neurosurgical decompression 11/3.  Hold ASA and Plavix and NPO at midnight.  -transferred to ICU, goal MAP 80 or higher    Thrombocytopenia  The patients 3 most recent labs are listed below.  Recent Labs     10/31/24  1532 11/01/24  0551   * 148*       Plan  - Will transfuse if platelet count is <100k (if undergoing neurosurgery), or otherwise less than 10 K  -trend daily    Anemia  Anemia is likely due to chronic disease due to Chronic Kidney Disease. Most recent hemoglobin and hematocrit are listed below.  Recent Labs     10/31/24  1532 11/01/24  0551 11/02/24  0505   HGB 9.7* 12.0 13.4   HCT 30.8* 38.3 43.3       Plan  - Monitor serial CBC: Daily  - Transfuse PRBC if patient becomes hemodynamically unstable, symptomatic or H/H drops below 7/21.  - Patient has not received any PRBC transfusions to date  - Patient's anemia is currently improving    Sinus arrhythmia seen on electrocardiogram  EKG read states atrial fibrillation but P waves are present. Repeat EKG after admission confirms this.     Stroke  Acute CVA right caudate  MRI, MRA, CT, carotid U/S reports reviewed  -DAPT and statin  -neurology following  -neurosurgery and oncology consulted for the brain mass  -PT/OT/SLP  -echo bubble report is positive,  "follow up Neurology recommendation    Brain mass  Discovered after PET scan of lung  on 9/6/24 revealed abnormal uptake in brain. 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  Unclear if contributing to CVA  She will have her first appointment with Dr. Ray Mcintyre at  [facility]  on 11/4/24  -neurosurgery and oncology follow-up    Secondary hyperparathyroidism  Chronic condition that is stable. Followed by nephrology last visit in May 2024 and endocrinology (Last visit 9/4/24)    Pulmonary hypertension  Chronic condition that is stable.   -holding her torsemide and pulmonary hypertension meds due to the hypotension    Chronic respiratory failure with hypoxia  Patient with Hypoxic Respiratory failure which is Chronic.  she is on home oxygen at 4 LPM. Maintaining oxygen saturation on 4 liters nasal cannula which has been continued.  Due to pulmonary hypertension    CKD (chronic kidney disease), stage IV  Creatine stable for now. BMP reviewed- noted Estimated Creatinine Clearance: 28.7 mL/min (A) (based on SCr of 1.7 mg/dL (H)). according to latest data. Based on current GFR, CKD stage is stage 4 - GFR 15-29.  Monitor UOP and serial BMP and adjust therapy as needed. Renally dose meds. Avoid nephrotoxic medications and procedures.    Followed by nephrology Nayely Clements-MEREDITHP at [facility]. Initial attempt at AV fistula placement unsuccessful and currently just being monitored. Last visit 5/2/24    Weakness of both lower extremities  PT/OT   Recommending inpatient rehab - referral sent       Syncope and collapse  See stroke  Orthostatic vital signs ordered and patient unable to stand due weakness in bilateral legs    Acute urinary retention  Possibly neurogenic from the CVA or from cervical spine cord compression  -straight cath per protocol   -may need Yeung      VTE Risk Mitigation (From " admission, onward)           Ordered     enoxaparin injection 30 mg  Every 24 hours         11/05/24 1417     Reason for No Pharmacological VTE Prophylaxis  Once        Question:  Reasons:  Answer:  Risk of Bleeding    10/31/24 1849     IP VTE HIGH RISK PATIENT  Once         10/31/24 1849     Place sequential compression device  Until discontinued         10/31/24 1849                    Discharge Planning   ELBERT: 11/8/2024     Code Status: Full Code   Is the patient medically ready for discharge?:     Reason for patient still in hospital (select all that apply): Patient trending condition  Discharge Plan A: Rehab   Discharge Delays: None known at this time        Critical care time spent on the evaluation and treatment of severe organ dysfunction, review of pertinent labs and imaging studies, discussions with consulting providers and discussions with patient/family: 15 minutes.      Gypsy Bailey MD  Department of Hospital Medicine   [facility]

## 2024-11-06 NOTE — ASSESSMENT & PLAN NOTE
Patient is status post C3-6 posterior cervical fusion with instrumentation performed on 11/03/2024 for central cord syndrome.    --Q1h neurochecks in ICU  --All labs and diagnostics reviewed  --Maintain collar at all times   --Discontinue DEYSI drain.  --Discontinue vasopressors and map parameters.  --PT/OT/OOB  --Pain improved with current pain regimen.  --ADAT  --TEDs/SCDs  --Continue to monitor clinically, notify NSGY immediately with any changes in neuro status    Dispo:  From neurosurgical perspective, okay for full anticoagulation for suspicion of PE..

## 2024-11-06 NOTE — PT/OT/SLP PROGRESS
Occupational Therapy   Treatment    Name: Shanell Mahmood  MRN: [id number]  Admitting Diagnosis:  Central cord syndrome  3 Days Post-Op    Recommendations:     Discharge Recommendations: High Intensity Therapy  Discharge Equipment Recommendations:  to be determined by next level of care  Barriers to discharge:   (Increased physical assistance with ADLs and functional mobility.)    Assessment:     Shanell Mahmood is a 74 y.o. female with a medical diagnosis of Central cord syndrome.  She presents with BUE/BLE, neck and trunk control weakness. Patient participated in toileting and and BUE/BLE therex bed level. Performance deficits affecting function are weakness, impaired endurance, impaired self care skills, impaired functional mobility, gait instability, impaired balance, decreased safety awareness, decreased lower extremity function, decreased upper extremity function.     Rehab Prognosis:  Good; patient would benefit from acute skilled OT services to address these deficits and reach maximum level of function.       Plan:     Patient to be seen 6 x/week to address the above listed problems via self-care/home management, therapeutic activities, therapeutic exercises  Plan of Care Expires: 12/04/24  Plan of Care Reviewed with: patient    Subjective     Chief Complaint: General weakness  Patient/Family Comments/goals: Improved functional mobility and ADL independence.   Pain/Comfort:  Pain Rating 1: 0/10  Pain Rating Post-Intervention 1: 0/10    Objective:     Communicated with: nurse prior to session.  Patient found HOB elevated with telemetry, peripheral IV, arterial line, PICC line, serna catheter upon OT entry to room.    General Precautions: Standard, fall    Orthopedic Precautions:spinal precautions  Braces: [place] J collar  Respiratory Status:  9L O2 via HFNC     Occupational Performance:     Bed Mobility:    Patient completed Rolling/Turning to Left with  maximal assistance  Patient completed Rolling/Turning to Right  with maximal assistance     Functional Mobility/Transfers:  Performed BUE/BLE AAROM exercises x10 reps with maximal assistance bed level.    Activities of Daily Living:  Toileting: total assistance to perform toilet hygiene bed level.      [facility] 6 Click ADL:      Treatment & Education:  Patient able to move left UE a little better than yesterdays session. Continues to have difficulty with finger extension.     Patient left HOB elevated with all lines intact, call button in reach, and bed alarm on    GOALS:   Multidisciplinary Problems       Occupational Therapy Goals          Problem: Occupational Therapy    Goal Priority Disciplines Outcome Interventions   Occupational Therapy Goal     OT, PT/OT Progressing    Description: Goals to be met by: 12/2/2024  - revised target date, feeding and g/hygiene goals, all other goals remain appropriate.  Patient will increase functional independence with ADLs by performing:    Feeding with Minimal Assistance with adaptive device PRN.  UE Dressing with Minimal Assistance.  LE Dressing with Moderate Assistance.  Grooming while EOB with Minimal Assistance with adaptive device PRN.  Toileting from bedside commode with Minimal Assistance for hygiene and clothing management.   Sitting at edge of bed x15 minutes with Stand-by Assistance.  Supine to sit with Minimal Assistance.  Toilet transfer to bedside commode with Minimal Assistance.  Upper extremity exercise program, with assistance as needed.                         Time Tracking:     OT Date of Treatment: 11/06/24  OT Start Time: 1022  OT Stop Time: 1045  OT Total Time (min): 23 min    Billable Minutes:Self Care/Home Management 11  Therapeutic Exercise 12    OT/MARYELLEN: OT          11/6/2024

## 2024-11-06 NOTE — NURSING
11/5/24 2102 patient had a 29 beat run of VTACH. Patient was asymptomatic and converted back to Afib. Dr. Aranza Louise was notified and patient's history and current situation reviewed. Orders given to not titrate Levophed to MAP goal of 80 d/t risk of more VTACH. Doctor states if patient becomes hypotensive then titrate Levophed to a MAP goal of 60-65. Orders RBV.      11/6/24 0530- Labs, patient's condition, and patient's 29 beat run of VTACH reviewed with Dr. Ramos. Orders given for CXR and Echo for today. Orders RBV.

## 2024-11-07 NOTE — ASSESSMENT & PLAN NOTE
Patient has paroxysmal (<7 days) atrial fibrillation. Patient is currently in atrial fibrillation. [id number] Score: 3. The patients heart rate in the last 24 hours is as follows:  Pulse  Min: 71  Max: 142     Antiarrhythmics  amiodarone 360 mg/200 mL (1.8 mg/mL) infusion, Continuous, Intravenous    Anticoagulants  enoxaparin injection 70 mg, Every 24 hours, Subcutaneous    Plan  - Replete lytes with a goal of K>4, Mg >2  - Patient is anticoagulated, see medications listed above.  - Patient's afib is currently uncontrolled. Will continue current treatment

## 2024-11-07 NOTE — PT/OT/SLP PROGRESS
Occupational Therapy      Patient Name:  Shanell Mahmood   MRN:  [id number]    Patient not seen today secondary to  (Hold per RN, low BP.). Will follow-up tomorrow.    11/7/2024

## 2024-11-07 NOTE — PLAN OF CARE
Present during SIBR today. Pt son and daughter present in room.  Discussed that pt has had order placed for transfer and transfer facility working on acceptance. Pt with amio, levo drip. Discharge plan pending course of treatment.  CM will continue to follow for discharge needs.       11/07/24 1037   Discharge Reassessment   Assessment Type Discharge Planning Reassessment   Did the patient's condition or plan change since previous assessment? Yes   Discharge Plan discussed with: Patient;Adult children   Communicated ELBERT with patient/caregiver Date not available/Unable to determine   Discharge Plan A Hospital Transfer   Discharge Plan B Rehab;Skilled Nursing Facility   DME Needed Upon Discharge  none   Why the patient remains in the hospital Requires continued medical care   Post-Acute Status   Discharge Delays   (Accepting facility)

## 2024-11-07 NOTE — PT/OT/SLP PROGRESS
Physical Therapy Treatment    Patient Name:  Shanell Mahmood   MRN:  [id number]    Recommendations:     Discharge Recommendations: High Intensity Therapy  Discharge Equipment Recommendations:  (to be determined at next level of care)  Barriers to discharge:  Medical status    Assessment:     Shanell Mahmood is a 74 y.o. female admitted with a medical diagnosis of Central cord syndrome.  She presents with the following impairments/functional limitations: weakness, impaired endurance, impaired self care skills, impaired functional mobility, gait instability, impaired balance, decreased upper extremity function, decreased lower extremity function, pain, decreased ROM, impaired cardiopulmonary response to activity, orthopedic precautions Pt seen with HOB elevated and agreeable to PT. RN requested in bed there ex today secondary to pt's son request to not t/f to cardiac chair today. Pt on 30L vapotherm @ 80%, requiring an increased to 90% to remain above 90% SpO2. AAROM/AROM B LE there ex completed in bed requiring VI for correct muscle recruitment.     Rehab Prognosis: Fair; patient would benefit from acute skilled PT services to address these deficits and reach maximum level of function.    Recent Surgery: Procedure(s) (LRB):  LAMINECTOMY, SPINE, CERVICAL, POSTERIOR APPROACH (N/A) 4 Days Post-Op    Plan:     During this hospitalization, patient to be seen daily to address the identified rehab impairments via gait training, therapeutic activities, therapeutic exercises, neuromuscular re-education and progress toward the following goals:    Plan of Care Expires:  12/04/24    Subjective     Chief Complaint: Back pain  Patient/Family Comments/goals: Pt agreeable to PT.   Pain/Comfort:  Pain Rating 1: other (see comments) (DNQ)  Location - Orientation 1: generalized  Location 1: back  Pain Addressed 1: Cessation of Activity  Pain Rating Post-Intervention 1: other (see comments) (DNQ)      Objective:     Communicated with RN prior to  session.  Patient found HOB elevated with telemetry, pulse ox (continuous), blood pressure cuff, oxygen, serna catheter, DEYSI drain, arterial line upon PT entry to room.     General Precautions: Standard, fall, aspiration  Orthopedic Precautions: spinal precautions  Braces: [place] J collar  Respiratory Status: High flow, flow 30 L/min, concentration 80%, Increased to 90% to kwadwo. LE ex.          AM-PAC 6 CLICK MOBILITY          Treatment & Education:  Pt performed AAROM/AROM B LE there ex supine x 20 reps each with vc for proper execution: ap, hs, qs, saq, hip abd/add.     Patient left HOB elevated with all lines intact, call button in reach, RN notified, and family present..    GOALS:   Multidisciplinary Problems       Physical Therapy Goals          Problem: Physical Therapy    Goal Priority Disciplines Outcome Interventions   Physical Therapy Goal     PT, PT/OT Not Progressing    Description: Goals to be met by: 24     Patient will increase functional independence with mobility by performin. Supine to sit with Moderate Assistance  2. Sit to supine with Moderate Assistance  3. Sit to stand transfer with Moderate Assistance  4. Bed to chair transfer with Moderate Assistance using Rolling Walker  5. Gait  x 25 feet with Moderate Assistance using Rolling Walker.                          Time Tracking:     PT Received On: 24  PT Start Time: 0954     PT Stop Time: 1017  PT Total Time (min): 23 min     Billable Minutes: Therapeutic Exercise 23    Treatment Type: Treatment  PT/PTA: PTA     Number of PTA visits since last PT visit: 3     2024

## 2024-11-07 NOTE — SUBJECTIVE & OBJECTIVE
Interval History: c/o sore throat and itching on her back    Review of Systems   Unable to perform ROS: Acuity of condition   Constitutional:  Positive for activity change.   Respiratory: Negative.     Cardiovascular: Negative.    Gastrointestinal: Negative.    Musculoskeletal:  Positive for arthralgias and back pain.   Neurological:  Positive for weakness.     Objective:     Vital Signs (Most Recent):   Vital Signs (24h Range):  Temp:  [97.9 °F (36.6 °C)-99.5 °F (37.5 °C)] 98 °F (36.7 °C)  Pulse:  [] 79  Resp:  [9-23] 20  SpO2:  [89 %-99 %] 95 %  BP: ()/(49-74) 126/59  Arterial Line BP: ()/() 119/63     Weight: 72.7 kg (160 lb 4.4 oz)  Body mass index is 25.1 kg/m².          Physical Exam  Constitutional:       General: She is not in acute distress.     Comments: Sleeping    Neck:      Comments: Cervical collar in place   Cardiovascular:      Rate and Rhythm: Regular rhythm. Bradycardia present.   Pulmonary:      Effort: Pulmonary effort is normal. No respiratory distress.      Breath sounds: No wheezing or rhonchi.   Abdominal:      General: Bowel sounds are normal. There is no distension.   Skin:     General: Skin is warm and dry.      Capillary Refill: Capillary refill takes less than 2 seconds.             Significant Labs: All pertinent labs within the past 24 hours have been reviewed.  Recent Lab Results         11/06/24  1324   11/06/24  1239   11/06/24  0900   11/06/24  0318        A2C EF     71         A4C EF     67         Allens Test N/A             Anion Gap   7     6       Site Bonnie/UAC             BSA     1.85         BUN   33     34       Calcium   7.2     7.5       Chloride   104     103       CO2   20     22       Creatinine   1.9     2.2       Left Ventricle Relative Wall Thickness     0.36         DelSys Nasal Can             eGFR   27.5     23.1       Flow 14             FS     31.1         Glucose   171     106       Hematocrit       33.4       Hemoglobin       11.0        IVC diameter     2.70         IVSd     1.0         LA area A4C     28.90         LA size     4.10         LVOT area     3.1         LV EDV BP     92.40         LV Diastolic Volume Index     50.22         Left Ventricular End Diastolic Volume by Teichholz Method     92.40         LV EDV A2C     [id number]         LV EDV A4C     59.30         Left Ventricular End Systolic Volume by Teichholz Method     37.90         LV ESV A4C     88.70         LVIDd     4.5         LVIDs     3.1         LV mass     132.8         LV Mass Index     72.2         LVOT diameter     2.0         LV ESV BP     37.90         LV Systolic Volume Index     20.6         Magnesium    1.9     2.0       MCH       28.1       MCHC       32.9       MCV       85       Mode SPONT             MPV       12.0       Bazzi's Biplane MOD Ejection Fraction     68         Phosphorus Level   4.0           Platelet Count       121       POC BE -6             POC Glucose 149             POC HCO3 20.0             POC Hematocrit 41             POC Ionized Calcium 1.17             POC PCO2 37.5             POC PH 7.335             POC PO2 53             POC Potassium 4.0             POC SATURATED O2 85             POC Sodium 132             POC TCO2 21             Potassium   3.9     4.3       PW     0.8         RA Major Axis     6.30         Est. RA pres     3         RA area length vol     35.90         RA Width     4.30         RBC       3.91       RDW       15.8       RV mid diameter     3.70         RV TB RVSP     7         RV/LV Ratio     0.96         RVDD     3.70         RV- quarles basal diam     4.3         RV-quarles length     8.2         RV-quarles mid d     3.7         RV free wall strain     -23.1         RV Basal Diameter     8.20         Sample ARTERIAL             Sodium   131     131       TAPSE     2.15         Triscuspid Valve Regurgitation Peak Gradient     60         TR Max Basil     3.87         TV resting pulmonary artery pressure     63          WBC       8.94       ZLVIDD     -1.26         ZLVIDS     -0.13                 Significant Imaging: I have reviewed all pertinent imaging results/findings within the past 24 hours.

## 2024-11-07 NOTE — ASSESSMENT & PLAN NOTE
Possibly neurogenic from the CVA or from cervical spine cord compression  -straight cath per protocol

## 2024-11-07 NOTE — PLAN OF CARE
Problem: Physical Therapy  Goal: Physical Therapy Goal  Description: Goals to be met by: 24     Patient will increase functional independence with mobility by performin. Supine to sit with Moderate Assistance  2. Sit to supine with Moderate Assistance  3. Sit to stand transfer with Moderate Assistance  4. Bed to chair transfer with Moderate Assistance using Rolling Walker  5. Gait  x 25 feet with Moderate Assistance using Rolling Walker.     Outcome: Not Progressing

## 2024-11-07 NOTE — PROGRESS NOTES
[facility]  Neurosurgery  Progress Note    Subjective:     Post-Op Info:  Procedure(s) (LRB):  LAMINECTOMY, SPINE, CERVICAL, POSTERIOR APPROACH (N/A)   4 Days Post-Op     Interval History: 11/7:  No acute events overnight.  Afebrile.  Blood pressure 95/52, pulse 72.  Respirations 15.  WBC 7.7.  Hemoglobin 10.3.  Hematocrit 31.1.  Creatinine 1.9.  Sodium 134.  Patient was seen around 815 a.m. and was found in bed, awake and alert with family at bedside.  She continues to report improve posterior cervical pain.  She denies new extremity pain or paresthesias.    Medications:  Continuous Infusions:   amiodarone in dextrose 5%  0.5 mg/min Intravenous Continuous 16.7 mL/hr at 11/07/24 0302 0.5 mg/min at 11/07/24 0302    NORepinephrine bitartrate-D5W  0-3 mcg/kg/min Intravenous Continuous 10.2 mL/hr at 11/07/24 1218 0.3 mcg/kg/min at 11/07/24 1218     Scheduled Meds:   allopurinoL  100 mg Oral QHS    atorvastatin  40 mg Oral Daily    bisacodyL  10 mg Rectal Daily    colchicine  0.3 mg Oral Daily    enoxparin  1 mg/kg Subcutaneous Q24H (treatment, non-standard time)    HYDROcodone-acetaminophen  1 tablet Oral Q6H    macitentan  10 mg Oral Daily    methocarbamoL  750 mg Oral TID    midodrine  10 mg Oral TID    montelukast  10 mg Oral Daily    mupirocin   Nasal BID    pantoprazole  40 mg Oral Daily    selexipag  1,600 mcg Oral BID    tadalafil  40 mg Oral Daily     PRN Meds:  Current Facility-Administered Medications:     acetaminophen, 650 mg, Oral, Q4H PRN    albuterol sulfate, 2.5 mg, Nebulization, Q6H PRN    aluminum-magnesium hydroxide-simethicone, 30 mL, Oral, Q4H PRN    dextrose 50%, 12.5 g, Intravenous, PRN    dextrose 50%, 25 g, Intravenous, PRN    diphenhydrAMINE, 25 mg, Oral, Q6H PRN    diphenhydrAMINE-zinc acetate 1-0.1%, , Topical (Top), TID PRN    glucagon (human recombinant), 1 mg, Intramuscular, PRN    glucose, 16 g, Oral, PRN    glucose, 24 g, Oral, PRN    HYDROmorphone, 1 mg, Intravenous, Q6H  PRN    HYDROmorphone, 2 mg, Oral, Q4H PRN    magnesium oxide, 800 mg, Oral, PRN    magnesium oxide, 800 mg, Oral, PRN    methocarbamoL, 500 mg, Oral, TID PRN    naloxone, 0.02 mg, Intravenous, PRN    ondansetron, 4 mg, Intravenous, Q8H PRN    potassium bicarbonate, 35 mEq, Oral, PRN    potassium bicarbonate, 50 mEq, Oral, PRN    potassium bicarbonate, 60 mEq, Oral, PRN    potassium, sodium phosphates, 2 packet, Oral, PRN    potassium, sodium phosphates, 2 packet, Oral, PRN    potassium, sodium phosphates, 2 packet, Oral, PRN    prochlorperazine, 5 mg, Intravenous, Q6H PRN    senna-docusate 8.6-50 mg, 2 tablet, Oral, Nightly PRN    sodium chloride 0.9%, 500 mL, Intravenous, PRN    sodium chloride 0.9%, 10 mL, Intravenous, Q12H PRN    sodium chloride 0.9%, 10 mL, Intravenous, PRN       Objective:     Weight: 72.7 kg (160 lb 4.4 oz)  Body mass index is 25.1 kg/m².  Vital Signs (Most Recent):  Temp: 98.3 °F (36.8 °C) (11/07/24 1130)  Pulse: 72 (11/07/24 1135)  Resp: 15 (11/07/24 1135)  BP: (!) 95/52 (11/07/24 1130)  SpO2: 99 % (11/07/24 1135) Vital Signs (24h Range):  Temp:  [98 °F (36.7 °C)-99.6 °F (37.6 °C)] 98.3 °F (36.8 °C)  Pulse:  [] 72  Resp:  [0-23] 15  SpO2:  [89 %-100 %] 99 %  BP: ()/(52-74) 95/52  Arterial Line BP: ()/() 106/81     Date 11/07/24 0700 - 11/08/24 0659   Shift 4182-3039 1741-9897 3506-2807 24 Hour Total   INTAKE   I.V.(mL/kg) 367.9(5.1)   367.9(5.1)   Shift Total(mL/kg) 367.9(5.1)   367.9(5.1)   OUTPUT   Urine(mL/kg/hr) 425   425   Shift Total(mL/kg) 425(5.8)   425(5.8)   Weight (kg) 72.7 72.7 72.7 72.7              Oxygen Concentration (%):  [70-85] 70             Closed/Suction Drain 11/03/24 1108 Tube - 1 Posterior Neck Accordion 10 Fr. (Active)   Site Description Unable to view 11/07/24 0501   Dressing Type Gauze;Transparent (Tegaderm) 11/07/24 0501   Dressing Status Clean;Dry;Intact 11/07/24 0501   Dressing Intervention Integrity maintained 11/07/24 0501   Drainage  "Serosanguineous 11/07/24 0501   Status To bulb suction 11/07/24 0501   Output (mL) 25 mL 11/07/24 0601            Urethral Catheter 11/01/24 1731 Non-latex 16 Fr. (Active)   Site Assessment Clean;Intact 11/07/24 1101   Collection Container Urimeter 11/07/24 1101   Securement Method secured to top of thigh w/ tape 11/07/24 1101   Catheter Care Performed no 11/07/24 1101   Reason for Continuing Urinary Catheterization Critically ill in ICU and requiring hourly monitoring of intake/output 11/07/24 1101   CAUTI Prevention Bundle Securement Device in place with 1" slack;Intact seal between catheter & drainage tubing;Drainage bag/urimeter off the floor;Sheeting clip in use;No dependent loops or kinks;Drainage bag/urimeter not overfilled (<2/3 full);Drainage bag/urimeter below bladder 11/07/24 0501   Output (mL) 60 mL 11/07/24 1052           Neurosurgery Physical Exam  General:  No acute distress.  Neck:  Aspen collar in place.  Neurologic: Alert and oriented. Thought content appropriate.  GCS: E4 V5 M6; Total: 15  Pulmonary: normal respirations, no signs of respiratory distress  Skin: Skin is warm, dry and intact.     Motor Strength:  No abnormal movements seen.  Bilateral upper extremity strength deltoid/biceps/triceps/left hand  3/5, right hand  2/5     Posterior cervical Incision: Covered with clean, dry steri strip. No surrounding erythema or edema. No drainage from incision. No palpable hematoma or underlying fluid collection.  DEYSI drain x 1 in place     Significant Labs:  Recent Labs   Lab 11/06/24  0318 11/06/24  1239 11/07/24  0415    171* 109   * 131* 134*   K 4.3 3.9 4.0    104 107   CO2 22* 20* 22*   BUN 34* 33* 32*   CREATININE 2.2* 1.9* 1.9*   CALCIUM 7.5* 7.2* 7.5*   MG 2.0 1.9 2.0     Recent Labs   Lab 11/06/24  0318 11/06/24  1324 11/07/24  0415 11/07/24  0607   WBC 8.94  --  7.70  --    HGB 11.0*  --  10.3*  --    HCT 33.4* 41 31.1* 36   *  --  134*  --      No results for " "input(s): "LABPT", "INR", "APTT" in the last 48 hours.  Microbiology Results (last 7 days)       Procedure Component Value Units Date/Time    Blood culture [[id number]] Collected: 11/01/24 1829    Order Status: Completed Specimen: Blood from Peripheral, Hand, Right Updated: 11/06/24 2032     Blood Culture, Routine No growth after 5 days.            Assessment/Plan:     Status post cervical spinal fusion  Patient is status post C3-6 posterior cervical fusion with instrumentation performed on 11/03/2024 for central cord syndrome.    Exam is stable.    --Maintain collar at all times   --PT/OT/OOB  --Pain improved with current pain regimen.  --Remove bandages from posterior cervical incision. Clean incision with Hibiclens b.i.d. and cover with a nonocclusive dressing.  --TEDs/SCDs  --Continue to monitor clinically, notify NSGY immediately with any changes in neuro status    Dispo: Patient will follow-up with Dr. Corona after discharged with cervical x-rays.        SULY JARRETT PA-C  Neurosurgery  [facility]  "

## 2024-11-07 NOTE — CARE UPDATE
11/07/24 0724   Patient Assessment/Suction   Level of Consciousness (AVPU) alert   Respiratory Effort Normal;Unlabored   Expansion/Accessory Muscles/Retractions no use of accessory muscles;no retractions;expansion symmetric   All Lung Fields Breath Sounds clear   Rhythm/Pattern, Respiratory unlabored;pattern regular;depth regular   Cough Frequency infrequent   PRE-TX-O2   Device (Oxygen Therapy) high flow nasal cannula w/device   $ Is the patient on High Flow Oxygen? Yes   Flow (L/min) (Oxygen Therapy) 30   Oxygen Concentration (%) 75   SpO2 95 %   Pulse Oximetry Type Continuous   $ Pulse Oximetry - Multiple Charge Pulse Oximetry - Multiple   Pulse 74   Resp 11   Vibratory PEP Therapy   $ Vibratory PEP Charges Aerobika Therapy   $ Vibratory PEP Tech Time Charges 15 min   Daily Review of Necessity (PEP Therapy) completed   Type (PEP Therapy) vibratory/oscillatory   Device (PEP Therapy) flutter   Route (PEP Therapy) mouthpiece   Breaths per Cycle (PEP Therapy) 10   Cycles (PEP Therapy) 2   PEP Pressure (cm H2O) 5   PEP Duration (min) 5   Settings (PEP Therapy) PEP 5   Ready to Wean/Extubation Screen   FIO2<=50 (chart decimal) (!) 0.75

## 2024-11-07 NOTE — PROGRESS NOTES
[facility]  Department of Cardiology  Progress Note    PATIENT NAME: Shanell Mahmood  MRN: [id number]  TODAY'S DATE: 11/07/2024  ADMIT DATE: 10/31/2024    SUBJECTIVE     PRINCIPLE PROBLEM: Central cord syndrome    INTERVAL HISTORY:    11/7/2024  Is awake lying in bed not in any acute distress.  She had been on Levophed and she is slowly being titrated down.  Still significantly winded on minimal exertion she is on Vapotherm now at 30 L and 80% to maintain good oxygenation.  She is currently fully anticoagulated.    Review of patient's allergies indicates:   Allergen Reactions    Codeine Palpitations       REVIEW OF SYSTEMS  CARDIOVASCULAR: No recent chest pain, palpitations, arm, neck, or jaw pain  RESPIRATORY:  Short of breath on minimal exertion currently on 30 L of Vapotherm at 80%  : No blood in the urine  GI: No Nausea, vomiting, constipation, diarrhea, blood, or reflux.  MUSCULOSKELETAL:  Muscle weakness  NEURO:  Multilevel laminectomy in got compression surgery with quadriparesis persisting.  EYES: No Double vision, blurry, vision or headache     OBJECTIVE     VITAL SIGNS (Most Recent)  Temp: 98.3 °F (36.8 °C) (11/07/24 1130)  Pulse: 78 (11/07/24 1342)  Resp: 10 (11/07/24 1342)  BP: (!) 94/53 (11/07/24 1300)  SpO2: 95 % (11/07/24 1342)    VENTILATION STATUS  Resp: 10 (11/07/24 1342)  SpO2: 95 % (11/07/24 1342)  Oxygen Concentration (%):  [70-95] 95        I & O (Last 24H):  Intake/Output Summary (Last 24 hours) at 11/7/2024 1406  Last data filed at 11/7/2024 1250  Gross per 24 hour   Intake 4853.46 ml   Output 1746 ml   Net 3107.46 ml       WEIGHTS  Wt Readings from Last 1 Encounters:   11/01/24 1825 72.7 kg (160 lb 4.4 oz)   11/01/24 0733 70.8 kg (156 lb)   10/31/24 2104 71.2 kg (156 lb 15.5 oz)   10/31/24 1455 71.7 kg (158 lb)       PHYSICAL EXAM  CONSTITUTIONAL:  Comfortable in bed currently on Vapotherm.    NECK: no carotid bruit, no JVD  LUNGS:  Bilateral decreased breath sounds in both lung  bases  CHEST WALL: no tenderness  HEART: regular rate and rhythm, S1, S2 normal, systolic murmur audible  ABDOMEN: soft, bowel sounds audible  EXTREMITIES: Extremities bilateral ankle edema  NEURO: AAO X 3 recent cord compression surgery status post multilevel laminectomy and persistent quadriparesis.    SCHEDULED MEDS:   allopurinoL  100 mg Oral QHS    atorvastatin  40 mg Oral Daily    bisacodyL  10 mg Rectal Daily    colchicine  0.3 mg Oral Daily    enoxparin  1 mg/kg Subcutaneous Q24H (treatment, non-standard time)    HYDROcodone-acetaminophen  1 tablet Oral Q6H    macitentan  10 mg Oral Daily    methocarbamoL  750 mg Oral TID    midodrine  15 mg Oral TID    montelukast  10 mg Oral Daily    mupirocin   Nasal BID    pantoprazole  40 mg Oral Daily    selexipag  1,600 mcg Oral BID    tadalafil  40 mg Oral Daily       CONTINUOUS INFUSIONS:   amiodarone in dextrose 5%  0.5 mg/min Intravenous Continuous 16.7 mL/hr at 11/07/24 0302 0.5 mg/min at 11/07/24 0302    NORepinephrine bitartrate-D5W  0-3 mcg/kg/min Intravenous Continuous 10.9 mL/hr at 11/07/24 1244 0.32 mcg/kg/min at 11/07/24 1244       PRN MEDS:  Current Facility-Administered Medications:     acetaminophen, 650 mg, Oral, Q4H PRN    albuterol sulfate, 2.5 mg, Nebulization, Q6H PRN    aluminum-magnesium hydroxide-simethicone, 30 mL, Oral, Q4H PRN    dextrose 50%, 12.5 g, Intravenous, PRN    dextrose 50%, 25 g, Intravenous, PRN    diphenhydrAMINE, 25 mg, Oral, Q6H PRN    diphenhydrAMINE-zinc acetate 1-0.1%, , Topical (Top), TID PRN    glucagon (human recombinant), 1 mg, Intramuscular, PRN    glucose, 16 g, Oral, PRN    glucose, 24 g, Oral, PRN    HYDROmorphone, 1 mg, Intravenous, Q6H PRN    HYDROmorphone, 2 mg, Oral, Q4H PRN    magnesium oxide, 800 mg, Oral, PRN    magnesium oxide, 800 mg, Oral, PRN    methocarbamoL, 500 mg, Oral, TID PRN    naloxone, 0.02 mg, Intravenous, PRN    ondansetron, 4 mg, Intravenous, Q8H PRN    potassium bicarbonate, 35 mEq, Oral,  PRN    potassium bicarbonate, 50 mEq, Oral, PRN    potassium bicarbonate, 60 mEq, Oral, PRN    potassium, sodium phosphates, 2 packet, Oral, PRN    potassium, sodium phosphates, 2 packet, Oral, PRN    potassium, sodium phosphates, 2 packet, Oral, PRN    prochlorperazine, 5 mg, Intravenous, Q6H PRN    senna-docusate 8.6-50 mg, 2 tablet, Oral, Nightly PRN    sodium chloride 0.9%, 500 mL, Intravenous, PRN    sodium chloride 0.9%, 10 mL, Intravenous, Q12H PRN    sodium chloride 0.9%, 10 mL, Intravenous, PRN    LABS AND DIAGNOSTICS     CBC LAST 3 DAYS  Recent Labs   Lab 10/31/24  1532 11/01/24  0551 11/03/24  2020 11/04/24  0358 11/05/24  0317 11/05/24  0446 11/06/24  0318 11/06/24  1324 11/07/24  0415 11/07/24  0607   WBC 3.68*   < > 6.33   < > 7.87  --  8.94  --  7.70  --    RBC 3.53*   < > 3.97*   < > 3.71*  --  3.91*  --  3.69*  --    HGB 9.7*   < > 11.2*   < > 10.2*  --  11.0*  --  10.3*  --    HCT 30.8*   < > 34.4*   < > 31.5*   < > 33.4* 41 31.1* 36   MCV 87   < > 87   < > 85  --  85  --  84  --    MCH 27.5   < > 28.2   < > 27.5  --  28.1  --  27.9  --    MCHC 31.5*   < > 32.6   < > 32.4  --  32.9  --  33.1  --    RDW 17.0*   < > 16.7*   < > 15.9*  --  15.8*  --  15.4*  --    *   < > 119*   < > 97*  --  121*  --  134*  --    MPV 11.5   < > 10.7   < > 10.9  --  12.0  --  11.5  --    GRAN 53.8  2.0  --  75.8*  4.8  --   --   --   --   --   --   --    LYMPH 31.0  1.1  --  12.6*  0.8*  --   --   --   --   --   --   --    MONO 9.0  0.3  --  8.8  0.6  --   --   --   --   --   --   --    BASO 0.03  --  0.04  --   --   --   --   --   --   --    NRBC 0  --  0  --   --   --   --   --   --   --     < > = values in this interval not displayed.       COAGULATION LAST 3 DAYS  Recent Labs   Lab 11/01/24  0551   INR 1.2   APTT 27.7       CHEMISTRY LAST 3 DAYS  Recent Labs   Lab 11/01/24  0551 11/02/24  0505 11/03/24  0256 11/03/24  2020 11/05/24  0913 11/06/24  0318 11/06/24  1239 11/06/24  1324 11/07/24  0415  11/07/24  0607    142 141   < >  --  131* 131*  --  134*  --    K 4.3 4.2 4.2   < >  --  4.3 3.9  --  4.0  --    * 110 108   < >  --  103 104  --  107  --    CO2 21* 21* 25   < >  --  22* 20*  --  22*  --    ANIONGAP 11 11 8   < >  --  6* 7*  --  5*  --    BUN 30* 31* 30*   < >  --  34* 33*  --  32*  --    CREATININE 1.7* 1.8* 1.9*   < >  --  2.2* 1.9*  --  1.9*  --    GLU 78 99 93   < >  --  106 171*  --  109  --    CALCIUM 8.5* 8.1* 8.2*   < >  --  7.5* 7.2*  --  7.5*  --    PHOS 3.8 3.6 3.8  --   --   --  4.0  --   --   --    PH  --   --   --    < > 7.355  --   --  7.335*  --  7.313*   MG 1.9 2.0 2.1   < >  --  2.0 1.9  --  2.0  --    ALBUMIN 2.6* 2.8* 2.9*  --   --   --   --   --   --   --    BILITOT 0.4 0.4 0.4  --   --   --   --   --   --   --    PROT 5.7* 5.6* 5.6*  --   --   --   --   --   --   --    ALKPHOS 85 81 79  --   --   --   --   --   --   --    ALT 11 6* 5*  --   --   --   --   --   --   --    AST 16 10 9*  --   --   --   --   --   --   --     < > = values in this interval not displayed.       CARDIAC PROFILE LAST 3 DAYS  Recent Labs   Lab 10/31/24  1532 11/01/24  0145 11/01/24  0551   BNP 39  --   --    TROPONINI 0.011 0.019 0.026       ENDOCRINE LAST 3 DAYS  Recent Labs   Lab 11/01/24  0551 11/03/24  0256   TSH 0.474  --    PROCAL  --  0.169       LAST ARTERIAL BLOOD GAS  ABG  Recent Labs   Lab 11/07/24  0607   PH 7.313*   PO2 86   PCO2 42.6   HCO3 21.6*   BE -5*       LAST 7 DAYS MICROBIOLOGY   Microbiology Results (last 7 days)       Procedure Component Value Units Date/Time    Blood culture [[id number]] Collected: 11/01/24 1829    Order Status: Completed Specimen: Blood from Peripheral, Hand, Right Updated: 11/06/24 2032     Blood Culture, Routine No growth after 5 days.            MOST RECENT IMAGING  Echo Saline Bubble? No; Ultrasound enhancing contrast? No  Addendum:   Left Ventricle: The left ventricle is smaller than normal. Normal wall  thickness. There is normal systolic  function with a visually estimated  ejection fraction of 65 - 70%. There is normal diastolic function.     Right Ventricle: Mild right ventricular enlargement. Wall thickness is  normal. Systolic function is moderately reduced.     Left Atrium: Left atrium is moderately dilated.     Right Atrium: Right atrium is mildly dilated.     IVC/SVC: Normal venous pressure at 3 mmHg.  Narrative:   Left Ventricle: The left ventricle is normal in size. Normal wall   thickness. There is normal systolic function with a visually estimated   ejection fraction of 65 - 70%. There is normal diastolic function.    Right Ventricle: Normal right ventricular cavity size. Wall thickness   is normal. Systolic function is normal.    Left Atrium: Left atrium is mildly dilated.    IVC/SVC: Normal venous pressure at 3 mmHg.      [facility]  Results for orders placed during the hospital encounter of 10/31/24    Echo Saline Bubble? No; Ultrasound enhancing contrast? No    Interpretation Summary    Left Ventricle: The left ventricle is smaller than normal. Normal wall thickness. There is normal systolic function with a visually estimated ejection fraction of 65 - 70%. There is normal diastolic function.    Right Ventricle: Mild right ventricular enlargement. Wall thickness is normal. Systolic function is moderately reduced.    Left Atrium: Left atrium is moderately dilated.    Right Atrium: Right atrium is mildly dilated.    IVC/SVC: Normal venous pressure at 3 mmHg.      CURRENT/PREVIOUS VISIT EKG  Results for orders placed or performed during the hospital encounter of 10/31/24   EKG 12-lead    Collection Time: 11/07/24  7:05 AM   Result Value Ref Range    QRS Duration 72 ms    OHS QTC Calculation 427 ms    Narrative    Test Reason : I49.9,    Vent. Rate : 073 BPM     Atrial Rate : 079 BPM     P-R Int : 200 ms          QRS Dur : 072 ms      QT Int : 388 ms       P-R-T Axes : 000 083 024 degrees     QTc Int : 427 ms    Sinus rhythm with Blocked  Premature atrial complexes  Nonspecific T wave abnormality  Abnormal ECG  When compared with ECG of 06-NOV-2024 13:13,  Sinus rhythm has replaced Atrial fibrillation  Vent. rate has decreased BY  67 BPM  Left posterior fascicular block is no longer Present    Referred By: HAILE   SELF           Confirmed By:        ASSESSMENT/PLAN:     Active Hospital Problems    Diagnosis    *Central cord syndrome    S/P cervical spinal fusion    Paroxysmal atrial fibrillation    Myelopathy    Quadriparesis    Acute deep vein thrombosis (DVT) of popliteal vein of right lower extremity    Status post cervical spinal fusion    Sinus arrhythmia seen on electrocardiogram    Anemia    Thrombocytopenia    Hypotension    Acute urinary retention    Syncope and collapse    CKD (chronic kidney disease), stage IV    Chronic respiratory failure with hypoxia    Pulmonary hypertension    Secondary hyperparathyroidism    Brain mass    Stroke    Weakness of both lower extremities       ASSESSMENT & PLAN:  1. Severe pulmonary hypertension  2. Acute on chronic hypoxemic respiratory failure   3. Paroxysmal atrial fibrillation  4. Severe central cord compression  5. Quadriparesis  6. Chronic kidney disease  7. Intracranial mass  8. Pulmonary nodule  9. Anemia with thrombocytopenia  10. Acute DVT  11. Syncope with collapse and history of stroke        RECOMMENDATIONS:  1. Patient with severe pulmonary hypertension and currently on increased requirement of oxygen on Vapotherm 30 L and 80% to meet the oxygen requirements.  Patient was on Opsumit, Uptravi and Adcirca, probably needs to be started back on it.  Patient's echocardiogram shows mild to moderately dilated right ventricle with hypokinesis and there is some evidence of flattening of the septum.    Risk of PE still remains high and currently she is fully anticoagulated.    2. Hypotension  Her echo shows normal left ventricle systolic function with an ejection fraction of 65% and she is  currently on Levophed to maintain her blood pressure.  Not sure if this is reflexion of cord compression syndrome and spinal cord shock.  And if it is spinal cord shock me lasts for days to weeks.  And the treatment for spinal cord shock would be maintaining hemodynamic and respiratory stability.  Will defer this to Neurology and Neurosurgery.  At the present time patient does not seem to have unopposed parasympathetic tone.  She is currently on midodrine 15 mg p.o. t.i.d.    3. Acute on chronic hypoxemic respiratory failure and she is currently on high levels of Vapotherm support about 30 L and 75% to oxygenation.    She has multiple issues that cause significant compromise in her pulmonary function including possibility of a PE and underlying renal failure and evidence of diastolic dysfunction with significant pulmonary hypertension     4. Atrial fibrillation   Currently on full anticoagulation continue the same.  Need to continue it uninterrupted.  Patient is currently in sinus rhythm.    5. Ventricular tachycardia   Patient had 25 beat run of ventricular tachycardia overnight and for the same reason she is also on amiodarone.  Patient is not a candidate for beta-blockers.    6. DVT   she does have nonocclusive DVT to the right leg in the risk of pulmonary embolism does exists.  Continue anticoagulation    7. Severe central cord compression status post multilevel laminectomy and and she does have persisting quadriparesis.  Further recommendations as per Neurosurgery.    8. Anemia   Her current hemoglobin is 10.3 hematocrit is 31.1 and platelets a 134 with a white count of 7.7    9. Intracranial mass/pulmonary nodules  Further recommendations as per hospitalist and heme/ onc.    10. Would still consider moving her to tertiary care center for further management.            11/06/2024 8:38 AM 5:03 pm   1. Patient has had pulmonary hypertension and she is being followed by her pulmonologist and she had a right heart  catheterization.  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     Dr. Segal in [place]. He performed a right heart cath and found a mean PA pressure of 40, wedge of 12, with PVR of 10 Wood units. She is on Opsumit, Adcirca, and Uptravi.      2. Atrial fibrillation   She follows up with her primary cardiologist Dr. Segal.  At home she is not on any anticoagulation.    She was started on enoxaparin 1 milligram/kilogram subQ Q 24 hours.  Patient underwent surgery for central cord syndrome and cervical spondylosis with myelopathy and radiculopathy on 11/03/2024  Highly recommend to consult Neurosurgery in regards with anticoagulation of the patient as risk of bleeding into the area is significant.  If she is not adequately anticoagulated she is also at risk for a stroke.     3. History of recent DVT and not on any anticoagulation and at risk for pulmonary embolism.     4. Hypotension and syncope with acute on chronic hypoxemia, need to rule out pulmonary embolism.  She would need a CTA to rule out pulmonary embolus and given the fact that she has underlying chronic kidney disease stage 5 in the past, that would create a problem.     Currently she is on norepinephrine and midodrine, continue the same.     5. She also has a left frontoparietal and temporal extra-axial mass and would need further evaluation of the same at some point in time.       6. Atrial fibrillation   Currently her heart rate is controlled at 97 beats per minute she was started on amiodarone drip may need to hold amiodarone as she is not adequately anticoagulate and not sure how long she had been in atrial fibrillation.  Patient was also started on enoxaparin need to get clearance from Neurosurgery also.     7. Anemia of chronic disease.       8.  Chronic hypoxemic respiratory failure with significant shortness a breath on exertion  And she had office pulmonary function tests done at  Dr. Federico Tejada's office.  6/8/2021   7:36 AM 3/29/2022   3:22 PM 1/23/2023   2:35 PM   Office Spirometry/PFT/Diffusion Results   FVC (Liters) 2.69 Liters 2.76 Liters   FEV1 (Liters) 2.08 Liters 1.92 Liters   FEV1/FVC (%) 77 % 69 %   MVV (L/min) 69 L/min   O2 Satu 88 96   FiO2 21 28   TLC Liters Pre Kelly 5.34 Liters   RV/TLC (%) 49 %   VC (Liters) 2.72 Liters   DLCO (mL/mmHg/min) 16.3 ml/mmHg sec   DLCO/VA Pre Kelly 3.4   FVC PRE 2.94 Liters   FVC Pre%Pred 107 %   FEV1 PRE 2.15 Liters   FEV1 Pre%Pred 102 %   FEV1/FVC PRE 73.23 %      9. Patient has multiple complex medical problems and she is better served at a tertiary care center and would benefit from transferring her to the [facility] on [facility] in [place]..      Ridge Fraser MD  Date of Service: 11/07/2024  2:06 PM

## 2024-11-07 NOTE — PROGRESS NOTES
[facility] Medicine  Progress Note    Patient Name: Shanell Mahmood  MRN: [id number]  Patient Class: IP- Inpatient   Admission Date: 10/31/2024  Length of Stay: 5 days  Attending Physician: Gypsy Bailey MD  Primary Care Provider: Nicole, Primary Doctor        Subjective:     Principal Problem:Central cord syndrome        HPI:  Shanell Mahmood is a 74 year old female with a previous medical history of anemia, Pulmonary hypertension on 4 liters continuous oxygen at home, arthritis, CKD V, Osteoporosis, secondary hyperprathyroidism, S/P closure of patent foramen ovale in 2011, CVA in 2011 with no residuals, chronic back pain, HLD, Gout, Brain Mass and thyroid diease who presented to the ED after unwitnessed syncopal episode. The patient was at her pain management office for re certification only. There were no procedures performed. She reports taking one hydrocodone 10mg today.  The last thing she remembers was walking down the hallway of the office and looking for something to cover her head from the rain and did not have any adverse symptoms or feelings at that time.  She has no recollection of the syncopal events. When she was initially evaluated by EMS, her blood pressure was in the 60s over 40s. She did require constant stimulation to remain awake. Fell and hit her head. Does not take any blood thinners. EMS evaluated her and gave her 1 mg of Narcan as well as 250 cc of fluid. Her pressure improved and she had become more alert. Initial ED workup was thorough and all imaging showing that included CT of neck, head and entire spine showed no acute processes. CBC showed anemia and CMP showed elevated creatinine consistent with history of CKD V. Drug screen positive for opioids but patient has a hydrocodone prescription. The patient is currently in the process of have a left sided brain mass visualized on MRI 10/8/24 evaluated that was an incidental finding after undergoing a PET scan for a pulmonary  "nodule on 9/6/24 with abnormal uptake noted in brain. She has her first appointment on 11/4/24. She is followed by nephrology who is currently monitoring her and there has been one attempt at AV fistual placement but it was unsuccessful due sclerotic changes. Patient reports she awoke this morning feeling well showered and dressed herself. She performs all of her own ADL's. She had one syncopal event in March 2024 and was evaluated by cardiology and informed it was an orthostatic episode. Patient was unable to complete orthostatic vital signs upon admission due to inability to stand stating " I feel as if I can't get my legs under me". When evaluated for admit exam the patient endorses that after the syncopal episode she endorses bilateral leg weakness with decreased sensation in both legs. She reports bilateral  weakness being unable to use her phone and difficulty raising both of her arms. NIH scale performed and total of 9 noted. Patient noted have 400ml of urine in bladder and unable to void. Patient reached a total of 528ml of urine without being able to void.  MRI/MRA of brain and MRI of lumbosacral spine ordered upon admit. MRA and MRI lower back showed no acute process. MRI brain showed Acute infarct in the right caudate head. Dr. Maldonado was called and he recommended MRA of neck in morning and load with aspirin and plavix. Initial EKG read as atrial fibrillation but upon review p waves were noted and this was discussed with the ED. Repeat EKG shows sinus arrhthymias with PACs. Patient admitted by hospital medicine for further evaluation and management.       Overview/Hospital Course:  74-year-old female with history of brain mass, CKD, back pain, pulmonary hypertension on 4 L oxygen is admitted after syncope and collapse found to have a acute CVA in the right caudate on MRI brain.  Has global weakness and decreased sensation to the lower legs.  Multiple CT and x-rays done to rule out trauma ultimately " negative other than the maxillofacial CT reporting mild irregularity of the interior midline mandible with small adjacent bone fragments which could represent acute fracture with overlying soft tissue swelling.  Carotid ultrasound and MRA brain and neck without acute finding.  Neurology is consulted.  Also with underlying brain mass.  Consult Neurosurgery and Oncology.  Given DAPT and statin.  Had hypotension given IVF and midodrine.  Echo shows right heart failure.  BNP is within normal.  Lactic acid pending.  No other sign of infection.  Placed in cervical collar as precaution and MRI of the cervical spine shows severe cord compression at C4-5 and C5-6 likely acute with edema.  Neurosurgery discussed with patient and given her RCRI is 2 and she is a controlled diabetic but has had recent stroke and given Plavix and ASA plan to proceed with surgical decompression tomorrow 11/3.  Patient was NPO at midnight and ASA and Plavix are on hold.    Patient underwent: 11/3/24  Surgery   C2-3, C3-4, C4-5, C5-6, C6-7 laminectomy  C3 through 6 posterior segmental instrumentation using Innover[employer]ony system  C3-4, C4-5, C5-6 posterolateral arthrodesis using autograft harvested from the same incision and allograft DBM    Maintaining MAP 80 - attempt weaning levophed tomorrow.  PT/OT to follow. Patient wants to go to IR in [place], [place].   11/5: DVT study:  here is hypoechoic peripheral nonocclusive thrombus within the proximal right superficial femoral vein. There is also hypoechoic nonocclusive thrombus within the right popliteal vein, with some preserved color Doppler vascular flow.     Cannot give full dose anticoagulation, currently on lovenox 40mg daily   11/6: ECHO: PAP 63mmHg   Patient worked with PT/OT, stood up and quickly became tachycardic/afib 150s, drop in BP.  CVP 3.  She was resumed on amiodarone and given IVF.  Cardiology recommends transfer to higher level of care with specialist.  THey have initiated a transfer  to [facility] main.     Interval History: c/o sore throat and itching on her back    Review of Systems   Unable to perform ROS: Acuity of condition   Constitutional:  Positive for activity change.   Respiratory: Negative.     Cardiovascular: Negative.    Gastrointestinal: Negative.    Musculoskeletal:  Positive for arthralgias and back pain.   Neurological:  Positive for weakness.     Objective:     Vital Signs (Most Recent):   Vital Signs (24h Range):  Temp:  [97.9 °F (36.6 °C)-99.5 °F (37.5 °C)] 98 °F (36.7 °C)  Pulse:  [] 79  Resp:  [9-23] 20  SpO2:  [89 %-99 %] 95 %  BP: ()/(49-74) 126/59  Arterial Line BP: ()/() 119/63     Weight: 72.7 kg (160 lb 4.4 oz)  Body mass index is 25.1 kg/m².          Physical Exam  Constitutional:       General: She is not in acute distress.     Comments: Sleeping    Neck:      Comments: Cervical collar in place   Cardiovascular:      Rate and Rhythm: Regular rhythm. Bradycardia present.   Pulmonary:      Effort: Pulmonary effort is normal. No respiratory distress.      Breath sounds: No wheezing or rhonchi.   Abdominal:      General: Bowel sounds are normal. There is no distension.   Skin:     General: Skin is warm and dry.      Capillary Refill: Capillary refill takes less than 2 seconds.             Significant Labs: All pertinent labs within the past 24 hours have been reviewed.  Recent Lab Results         11/06/24  1324   11/06/24  1239   11/06/24  0900   11/06/24  0318        A2C EF     71         A4C EF     67         Allens Test N/A             Anion Gap   7     6       Site Bonnie/UAC             BSA     1.85         BUN   33     34       Calcium   7.2     7.5       Chloride   104     103       CO2   20     22       Creatinine   1.9     2.2       Left Ventricle Relative Wall Thickness     0.36         DelSys Nasal Can             eGFR   27.5     23.1       Flow 14             FS     31.1         Glucose   171     106       Hematocrit       33.4        Hemoglobin       11.0       IVC diameter     2.70         IVSd     1.0         LA area A4C     28.90         LA size     4.10         LVOT area     3.1         LV EDV BP     92.40         LV Diastolic Volume Index     50.22         Left Ventricular End Diastolic Volume by Teichholz Method     92.40         LV EDV A2C     [id number]         LV EDV A4C     59.30         Left Ventricular End Systolic Volume by Teichholz Method     37.90         LV ESV A4C     88.70         LVIDd     4.5         LVIDs     3.1         LV mass     132.8         LV Mass Index     72.2         LVOT diameter     2.0         LV ESV BP     37.90         LV Systolic Volume Index     20.6         Magnesium    1.9     2.0       MCH       28.1       MCHC       32.9       MCV       85       Mode SPONT             MPV       12.0       Bazzi's Biplane MOD Ejection Fraction     68         Phosphorus Level   4.0           Platelet Count       121       POC BE -6             POC Glucose 149             POC HCO3 20.0             POC Hematocrit 41             POC Ionized Calcium 1.17             POC PCO2 37.5             POC PH 7.335             POC PO2 53             POC Potassium 4.0             POC SATURATED O2 85             POC Sodium 132             POC TCO2 21             Potassium   3.9     4.3       PW     0.8         RA Major Axis     6.30         Est. RA pres     3         RA area length vol     35.90         RA Width     4.30         RBC       3.91       RDW       15.8       RV mid diameter     3.70         RV TB RVSP     7         RV/LV Ratio     0.96         RVDD     3.70         RV- quarles basal diam     4.3         RV-quarles length     8.2         RV-quarles mid d     3.7         RV free wall strain     -23.1         RV Basal Diameter     8.20         Sample ARTERIAL             Sodium   131     131       TAPSE     2.15         Triscuspid Valve Regurgitation Peak Gradient     60         TR Max Basil     3.87         TV resting pulmonary  artery pressure     63         WBC       8.94       ZLVIDD     -1.26         ZLVIDS     -0.13                 Significant Imaging: I have reviewed all pertinent imaging results/findings within the past 24 hours.    Assessment/Plan:      * Central cord syndrome  S/p (11/3/24)  C2-3, C3-4, C4-5, C5-6, C6-7 laminectomy  C3 through 6 posterior segmental instrumentation using DePuy Schveyhony system  C3-4, C4-5, C5-6 posterolateral arthrodesis using autograft harvested from the same incision and allograft DBM    Cervical collar in place       Paroxysmal atrial fibrillation  Patient has paroxysmal (<7 days) atrial fibrillation. Patient is currently in atrial fibrillation. [id number] Score: 3. The patients heart rate in the last 24 hours is as follows:  Pulse  Min: 71  Max: 142     Antiarrhythmics  amiodarone 360 mg/200 mL (1.8 mg/mL) infusion, Continuous, Intravenous    Anticoagulants  enoxaparin injection 70 mg, Every 24 hours, Subcutaneous    Plan  - Replete lytes with a goal of K>4, Mg >2  - Patient is anticoagulated, see medications listed above.  - Patient's afib is currently uncontrolled. Will continue current treatment          Acute deep vein thrombosis (DVT) of popliteal vein of right lower extremity  Found on u/s 11/5/24  Cannot give full dose lovenox   Currently on 1mg/kg daily       Quadriparesis  PT/OT      Status post cervical spinal fusion  PT/OT  Cervical collar in place       Hypotension  Asymptomatic.  Etiology is unclear but suspected to be related to the stroke or underlying cervical cord pathology  Echo reviewed.  Do not suspect cardiogenic shock.  Do not suspect sepsis  -hold any BP meds or diuretics  -check lactate  -NS 1 L bolus  -start midodrine 10 mg t.i.d.  -MRI cervical spine: severe cord compression at C4-5 and C5-6 , plan for neurosurgical decompression 11/3.  Hold ASA and Plavix and NPO at midnight.  -transferred to ICU, goal MAP 80 or higher    Thrombocytopenia  The patients 3 most recent  "labs are listed below.  Recent Labs     10/31/24  1532 11/01/24  0551   * 148*       Plan  - Will transfuse if platelet count is <100k (if undergoing neurosurgery), or otherwise less than 10 K  -trend daily    Anemia  Anemia is likely due to chronic disease due to Chronic Kidney Disease. Most recent hemoglobin and hematocrit are listed below.  Recent Labs     10/31/24  1532 11/01/24  0551 11/02/24  0505   HGB 9.7* 12.0 13.4   HCT 30.8* 38.3 43.3       Plan  - Monitor serial CBC: Daily  - Transfuse PRBC if patient becomes hemodynamically unstable, symptomatic or H/H drops below 7/21.  - Patient has not received any PRBC transfusions to date  - Patient's anemia is currently improving    Sinus arrhythmia seen on electrocardiogram  EKG read states atrial fibrillation but P waves are present. Repeat EKG after admission confirms this.     Stroke  Acute CVA right caudate  MRI, MRA, CT, carotid U/S reports reviewed  -DAPT and statin  -neurology following  -neurosurgery and oncology consulted for the brain mass  -PT/OT/SLP  -echo bubble report is positive, follow up Neurology recommendation    Brain mass  Discovered after PET scan of lung  on 9/6/24 revealed abnormal uptake in brain. 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  Unclear if contributing to CVA  She will have her first appointment with Dr. Ray Mcintyre at  [facility]  on 11/4/24  -neurosurgery and oncology follow-up    Secondary hyperparathyroidism  Chronic condition that is stable. Followed by nephrology last visit in May 2024 and endocrinology (Last visit 9/4/24)    Pulmonary hypertension  Chronic condition that is stable.   -holding her torsemide and pulmonary hypertension meds due to the hypotension    Chronic respiratory failure with hypoxia  Patient with Hypoxic Respiratory failure which is Chronic.  she is on home oxygen at 4 " LPM. Maintaining oxygen saturation on 4 liters nasal cannula which has been continued.  Due to pulmonary hypertension    CKD (chronic kidney disease), stage IV  Creatine stable for now. BMP reviewed- noted Estimated Creatinine Clearance: 28.7 mL/min (A) (based on SCr of 1.7 mg/dL (H)). according to latest data. Based on current GFR, CKD stage is stage 4 - GFR 15-29.  Monitor UOP and serial BMP and adjust therapy as needed. Renally dose meds. Avoid nephrotoxic medications and procedures.    Followed by nephrology Nayely Clements-CHENG at [facility]. Initial attempt at AV fistula placement unsuccessful and currently just being monitored. Last visit 5/2/24    Weakness of both lower extremities  PT/OT   Recommending inpatient rehab - referral sent       Syncope and collapse  See stroke  Orthostatic vital signs ordered and patient unable to stand due weakness in bilateral legs    Acute urinary retention  Possibly neurogenic from the CVA or from cervical spine cord compression  -straight cath per protocol         VTE Risk Mitigation (From admission, onward)           Ordered     enoxaparin injection 70 mg  Every 24 hours         11/06/24 1146     Reason for No Pharmacological VTE Prophylaxis  Once        Question:  Reasons:  Answer:  Risk of Bleeding    10/31/24 1849     IP VTE HIGH RISK PATIENT  Once         10/31/24 1849     Place sequential compression device  Until discontinued         10/31/24 1849                    Discharge Planning   ELBERT: 11/8/2024     Code Status: DNR   Is the patient medically ready for discharge?:     Reason for patient still in hospital (select all that apply): Patient trending condition  Discharge Plan A: Rehab   Discharge Delays: None known at this time        Critical care time spent on the evaluation and treatment of severe organ dysfunction, review of pertinent labs and imaging studies, discussions with consulting providers and discussions with patient/family: 30  minutes.      Gypsy Bailey MD  Department of Hospital Medicine   [facility]

## 2024-11-07 NOTE — PROGRESS NOTES
Pulmonary/Critical Care Progress Note      PATIENT NAME: Shanell Mahmood  MRN: [id number]  TODAY'S DATE: 2024  8:00 AM  ADMIT DATE: 10/31/2024  AGE: 74 y.o. : 1950    CONSULT REQUESTED BY: Gypsy Bailey MD    REASON FOR CONSULT:   Critical care management    HPI:  The patient is a 74 year old female who had a syncopal event on 10/31.  She was weak, myelopathic.  CT of the head showed an acute infarct in the right caudate head.  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  The MRI of her cervical spine showed disc bulging and spondylolysis at C4-C5 and C5-C6 with severe spinal canal stenosis, cervical cord compression and cord signal alteration there was also broad-based disc bulging producing moderate spinal canal stenosis at C3-C4.  She was brought to the OR yesterday where she underwent C2- 3, 3-4, 4-5,5-6,  and 6- 7 laminectomies with C3 through 6 posterior segmental instrumentation and C3-4, 4- 5, 5- 6 posterolateral arthrodesis using autograft harvested from the incision and allograft of DBM.  This morning she remains profoundly myelopathic.  She is on Levophed at 0.06 mics per kilos per minute to maintain a mean of 80.    The patient has significant pulmonary hypertension in his managed with Opsumit 10 mg daily, Adcirca 40 mg daily, and Uptravi 1600 ug bid.  She is on 4 L continuous oxygen and has dyspnea with any exertion.  She is also on Advair 115/21 b.i.d.     the patient's oxygenation dropped dramatically this morning.  She is now on more levophed to maintain a mean of 80.  She is in a lot of pain.     the patient is requiring a little less oxygen this morning.  However, she is in AFib and had a 25 beat run of V-tach overnight.  She is on 0.24 of Levophed.  I am concerned that her pulmonary hypertension is significantly worse and have an echo coming.  Will start  walking the Levophed down and off as her renal function is worse, she is in AFib and she had V-tach last night.    11/7 the patient is continuing to decline.  She is on Vapotherm now at 30 L and 80% to maintain good oxygenation.  She is requiring 0.3 of Levophed to maintain her blood pressure.  We did fully anticoagulate with Levophed yesterday as I am still worried about a PE and the patient developed AFib with RVR.  Started on amiodarone and this morning she is back in sinus rhythm.  Limited echo yesterday did not show any worsening of the right ventricle but PA pressures were estimated at 60.  If her heart is actually working well, then we have no SVR, and this is spinal shock.  However, spinal shock is usually bradycardic.  A [place]-J[place] catheter maybe helpful, but I am not sure how it will change our management unless the RV is failing and the PAs are too high and we need to be on IV Flolan.    REVIEW OF SYSTEMS  GENERAL: Feeling tired  EYES: Vision is good.  ENT: No sinusitis or pharyngitis.   HEART: No chest pain or palpitations.  LUNGS:  Not dyspneic at rest  GI: No Nausea, vomiting, constipation, diarrhea, or reflux.  : No dysuria, hesitancy, or nocturia.  SKIN: No lesions or rashes.  MUSCULOSKELETAL: No joint pain or myalgias.  NEURO:  She remains very numb and this is distressing to her more so than the quadriparesis  LYMPH: No edema or adenopathy.  PSYCH: No anxiety or depression.  ENDO: No weight change.    No change in the patient's Past Medical History, Past Surgical History, Social History or Family History since admission.    VITAL SIGNS (MOST RECENT)  Temp: 98.9 °F (37.2 °C) (11/07/24 0530)  Pulse: 72 (11/07/24 0630)  Resp: (!) 9 (11/07/24 0630)  BP: (!) 116/58 (11/07/24 0630)  SpO2: 98 % (11/07/24 0630)    INTAKE AND OUTPUT (LAST 24 HOURS):  Intake/Output Summary (Last 24 hours) at 11/7/2024 0701  Last data filed at 11/7/2024 0601  Gross per 24 hour   Intake 4485.59 ml   Output 1613 ml   Net  2872.59 ml       WEIGHT  Wt Readings from Last 1 Encounters:   11/01/24 72.7 kg (160 lb 4.4 oz)       PHYSICAL EXAM  GENERAL: Older patient looking quiet..  HEENT: Pupils equal and reactive. Extraocular movements intact. Nose intact. Pharynx moist.  NECK: Supple.  Aspen collar in place.  DEYSI drain with serosanguineous drainage, 43 cc in the last 24 hours  HEART:  Regular rate and rhythm. No murmur or gallop auscultated.  LUNGS:  The lungs are clear..  Lung excursion symmetrical. No change in fremitus.   ABDOMEN: Bowel sounds present. Non-tender, no masses palpated.  : Normal anatomy.  Yeung catheter with clear urine  EXTREMITIES:  Minimal movement to the right hand.  There is a bit of a squeeze to the left hand.  She can wiggle both sets of toes.  She can not do anything against gravity.  Arterial line right radial.  Right midline.  Two peripherals.  LYMPHATICS: No adenopathy palpated, no edema.  SKIN: Dry, intact, no lesions.   NEURO: Cranial nerves II-XII intact. Motor strength 1/5 bilaterally, except left hand is a 2/5.  The right hand is the weakest.  The left quadricep tightens better than the right quadricep.  PSYCH: Appropriate affect      CBC LAST (LAST 24 HOURS)  Recent Labs   Lab 11/07/24 0415 11/07/24  0607   WBC 7.70  --    RBC 3.69*  --    HGB 10.3*  --    HCT 31.1* 36   MCV 84  --    MCH 27.9  --    MCHC 33.1  --    RDW 15.4*  --    *  --    MPV 11.5  --        CHEMISTRY LAST (LAST 24 HOURS)  Recent Labs   Lab 11/07/24  0415 11/07/24  0607   *  --    K 4.0  --      --    CO2 22*  --    ANIONGAP 5*  --    BUN 32*  --    CREATININE 1.9*  --      --    CALCIUM 7.5*  --    PH  --  7.313*   MG 2.0  --          CARDIAC PROFILE (LAST 24 HOURS)  Recent Labs   Lab 10/31/24  1532 11/01/24  0145 11/01/24  0551   BNP 39  --   --    TROPONINI 0.011   < > 0.026    < > = values in this interval not displayed.       LAST 7 DAYS MICROBIOLOGY   Microbiology Results (last 7 days)        Procedure Component Value Units Date/Time    Blood culture [[id number]] Collected: 11/01/24 1829    Order Status: Completed Specimen: Blood from Peripheral, Hand, Right Updated: 11/06/24 2032     Blood Culture, Routine No growth after 5 days.            MOST RECENT IMAGING  Echo Saline Bubble? No; Ultrasound enhancing contrast? No    Left Ventricle: The left ventricle is normal in size. Normal wall   thickness. There is normal systolic function with a visually estimated   ejection fraction of 65 - 70%. There is normal diastolic function.    Right Ventricle: Normal right ventricular cavity size. Wall thickness   is normal. Systolic function is normal.    Left Atrium: Left atrium is mildly dilated.    IVC/SVC: Normal venous pressure at 3 mmHg.  X-Ray Chest AP Portable  Narrative: EXAMINATION:  XR CHEST AP PORTABLE    CLINICAL HISTORY:  pulmonary hypertension;    TECHNIQUE:  Single frontal view of the chest was performed.    COMPARISON:  11/05/2024.    FINDINGS:  There is a right-sided PICC, unchanged in position.  The lungs are well expanded.  There is prominence of the central pulmonary vasculature, stable from prior.  There are small bilateral pleural effusions.  The lungs are otherwise clear.  The cardiac silhouette is enlarged.  Osseous structures are intact.  There are calcifications of the aortic arch.  There are surgical clips overlying the left upper quadrant.  Impression: No significant detrimental change from prior.    Electronically signed by: Dawit Barrios  Date:    11/06/2024  Time:    05:05    Chest x-ray 11/6 shows better inspiratory effort.  Tiny pleural effusions and huge pulmonary arteries.  There is a PICC line on the right side.    CURRENT VISIT EKG  No results found for this visit on 10/31/24.    ECHOCARDIOGRAM RESULTS  Results for orders placed during the hospital encounter of 10/31/24    Echo    Interpretation Summary    Left Ventricle: The left ventricle is normal in size. Normal wall  thickness. Unable to assess wall motion. There is normal systolic function with a visually estimated ejection fraction of 60 - 65%.    Right Ventricle: Right ventricle was not well visualized due to poor acoustic window.  Grossly appears to be dilated with reduced function.  There appears to be some concern of flattening of the interventricular septum which could indicate right ventricular pressure and volume overload.    Left Atrium: Left atrium is severely dilated.    IVC/SVC: Elevated venous pressure at 15 mmHg.    Oxygen INFORMATION  Oxygen Concentration (%):  [85] 858 L        IMPRESSION AND PLAN  Severe central cord compression now status post multilevel laminectomy and instrumentation  Quadriparesis persisting  Severe pulmonary hypertension  - patient needs her Opsumit, Uptravi, and Adcirca.    - patient appears to have decreased cardiac output with worsening renal function.  I am concerned that her right ventricle maybe under greater pressure either from a PE or the fact that she was off her meds for several days.  She is also in atrial fib which is not helping either  - increase Lovenox back to 40, she truly needs to be on mg per kg daily for the AFib and DVT  DVT  - nonocclusive to the right leg  Atrial fibrillation  - back in sinus  - on amiodarone 0.5  Acute on chronic hypoxemic respiratory failure  - on 4 L of oxygen at home  - now requiring high levels of Vapotherm support, currently on 30 L and 75% O2  - I's and O's ahead 6 liters  - pulmonary hypertension meds just restarted   - worry about PE, fully anticoagulated with Lovenox  - DVT seen in legs  - CTA likely to worsen her renal function significantly  - I do not think the patient could tolerate a V/Q scan  - D-dimer is obviously positive  - takes Advair but is not obstructed  - will continue p.r.n. Matteo  Anemia  - chronic disease  Thrombocytopenia  - consumption  - improving  - continue to trend  Chronic kidney disease  - likely aggravated with  the Levophed and the need for Levophed  - creatinine has not improved with volume  - stop IV fluids, she is 6 L ahead  Hyponatremia  - discontinuing fluids  Moderate hypoalbuminemia  Intracranial mass  Pulmonary nodules, 1 greater than 1 cm  - patient workup underway        The patient's oxygenation continues to deteriorate.  The patient's shock persist.  According to the echo it is not cardiogenic shock.  Spinal shock should have bradycardia.  The patient is also on midodrine.  The patient is back in sinus rhythm.  Patient has a history of a patent foramen ovale that was closed but her numbers are not so severe that you would think about Eisen[place]ger nor does her echo suggest this.  If the shock is because of a PE, we should be able to continue anticoagulation and she should eventually improve.  If this is an atypical spinal shock, it should improve with time.  I have no reason to think the patient is septic.  However, I will check a lactic acid.  Her caudate CVA should not be causing this.  The temporal parietal calvarium mass is not the problem.  Discussed with the patient and her children.  She will let me know if she is willing to have a right heart catheterization.      Critical care time spent reviewing the chart, examining the patient, reviewing the labs, reviewing the radiological findings, discussing care with nursing, physicians, and respiratory and creating the note and  has been 50 minutes.    Gaye Ramos MD  Date of Service: 11/07/2024  8:00 AM

## 2024-11-07 NOTE — ASSESSMENT & PLAN NOTE
Patient is status post C3-6 posterior cervical fusion with instrumentation performed on 11/03/2024 for central cord syndrome.    Exam is stable.    --Maintain collar at all times   --PT/OT/OOB  --Pain improved with current pain regimen.  --TEDs/SCDs  --Continue to monitor clinically, notify NSGY immediately with any changes in neuro status    Dispo: Patient will follow-up with Dr. Corona after discharged with cervical x-rays.

## 2024-11-07 NOTE — SUBJECTIVE & OBJECTIVE
Interval History: 11/7:  No acute events overnight.  Afebrile.  Blood pressure 95/52, pulse 72.  Respirations 15.  WBC 7.7.  Hemoglobin 10.3.  Hematocrit 31.1.  Creatinine 1.9.  Sodium 134.  Patient was seen around 815 a.m. and was found in bed, awake and alert with family at bedside.  She continues to report improve posterior cervical pain.  She denies new extremity pain or paresthesias.    Medications:  Continuous Infusions:   amiodarone in dextrose 5%  0.5 mg/min Intravenous Continuous 16.7 mL/hr at 11/07/24 0302 0.5 mg/min at 11/07/24 0302    NORepinephrine bitartrate-D5W  0-3 mcg/kg/min Intravenous Continuous 10.2 mL/hr at 11/07/24 1218 0.3 mcg/kg/min at 11/07/24 1218     Scheduled Meds:   allopurinoL  100 mg Oral QHS    atorvastatin  40 mg Oral Daily    bisacodyL  10 mg Rectal Daily    colchicine  0.3 mg Oral Daily    enoxparin  1 mg/kg Subcutaneous Q24H (treatment, non-standard time)    HYDROcodone-acetaminophen  1 tablet Oral Q6H    macitentan  10 mg Oral Daily    methocarbamoL  750 mg Oral TID    midodrine  10 mg Oral TID    montelukast  10 mg Oral Daily    mupirocin   Nasal BID    pantoprazole  40 mg Oral Daily    selexipag  1,600 mcg Oral BID    tadalafil  40 mg Oral Daily     PRN Meds:  Current Facility-Administered Medications:     acetaminophen, 650 mg, Oral, Q4H PRN    albuterol sulfate, 2.5 mg, Nebulization, Q6H PRN    aluminum-magnesium hydroxide-simethicone, 30 mL, Oral, Q4H PRN    dextrose 50%, 12.5 g, Intravenous, PRN    dextrose 50%, 25 g, Intravenous, PRN    diphenhydrAMINE, 25 mg, Oral, Q6H PRN    diphenhydrAMINE-zinc acetate 1-0.1%, , Topical (Top), TID PRN    glucagon (human recombinant), 1 mg, Intramuscular, PRN    glucose, 16 g, Oral, PRN    glucose, 24 g, Oral, PRN    HYDROmorphone, 1 mg, Intravenous, Q6H PRN    HYDROmorphone, 2 mg, Oral, Q4H PRN    magnesium oxide, 800 mg, Oral, PRN    magnesium oxide, 800 mg, Oral, PRN    methocarbamoL, 500 mg, Oral, TID PRN    naloxone, 0.02 mg,  Intravenous, PRN    ondansetron, 4 mg, Intravenous, Q8H PRN    potassium bicarbonate, 35 mEq, Oral, PRN    potassium bicarbonate, 50 mEq, Oral, PRN    potassium bicarbonate, 60 mEq, Oral, PRN    potassium, sodium phosphates, 2 packet, Oral, PRN    potassium, sodium phosphates, 2 packet, Oral, PRN    potassium, sodium phosphates, 2 packet, Oral, PRN    prochlorperazine, 5 mg, Intravenous, Q6H PRN    senna-docusate 8.6-50 mg, 2 tablet, Oral, Nightly PRN    sodium chloride 0.9%, 500 mL, Intravenous, PRN    sodium chloride 0.9%, 10 mL, Intravenous, Q12H PRN    sodium chloride 0.9%, 10 mL, Intravenous, PRN       Objective:     Weight: 72.7 kg (160 lb 4.4 oz)  Body mass index is 25.1 kg/m².  Vital Signs (Most Recent):  Temp: 98.3 °F (36.8 °C) (11/07/24 1130)  Pulse: 72 (11/07/24 1135)  Resp: 15 (11/07/24 1135)  BP: (!) 95/52 (11/07/24 1130)  SpO2: 99 % (11/07/24 1135) Vital Signs (24h Range):  Temp:  [98 °F (36.7 °C)-99.6 °F (37.6 °C)] 98.3 °F (36.8 °C)  Pulse:  [] 72  Resp:  [0-23] 15  SpO2:  [89 %-100 %] 99 %  BP: ()/(52-74) 95/52  Arterial Line BP: ()/() 106/81     Date 11/07/24 0700 - 11/08/24 0659   Shift 6242-8118 1833-0605 9258-8099 24 Hour Total   INTAKE   I.V.(mL/kg) 367.9(5.1)   367.9(5.1)   Shift Total(mL/kg) 367.9(5.1)   367.9(5.1)   OUTPUT   Urine(mL/kg/hr) 425   425   Shift Total(mL/kg) 425(5.8)   425(5.8)   Weight (kg) 72.7 72.7 72.7 72.7              Oxygen Concentration (%):  [70-85] 70             Closed/Suction Drain 11/03/24 1108 Tube - 1 Posterior Neck Accordion 10 Fr. (Active)   Site Description Unable to view 11/07/24 0501   Dressing Type Gauze;Transparent (Tegaderm) 11/07/24 0501   Dressing Status Clean;Dry;Intact 11/07/24 0501   Dressing Intervention Integrity maintained 11/07/24 0501   Drainage Serosanguineous 11/07/24 0501   Status To bulb suction 11/07/24 0501   Output (mL) 25 mL 11/07/24 0601            Urethral Catheter 11/01/24 1731 Non-latex 16 Fr. (Active)   Site  "Assessment Clean;Intact 11/07/24 1101   Collection Container Urimeter 11/07/24 1101   Securement Method secured to top of thigh w/ tape 11/07/24 1101   Catheter Care Performed no 11/07/24 1101   Reason for Continuing Urinary Catheterization Critically ill in ICU and requiring hourly monitoring of intake/output 11/07/24 1101   CAUTI Prevention Bundle Securement Device in place with 1" slack;Intact seal between catheter & drainage tubing;Drainage bag/urimeter off the floor;Sheeting clip in use;No dependent loops or kinks;Drainage bag/urimeter not overfilled (<2/3 full);Drainage bag/urimeter below bladder 11/07/24 0501   Output (mL) 60 mL 11/07/24 1052           Neurosurgery Physical Exam  General:  No acute distress.  Neck:  Aspen collar in place.  Neurologic: Alert and oriented. Thought content appropriate.  GCS: E4 V5 M6; Total: 15  Pulmonary: normal respirations, no signs of respiratory distress  Skin: Skin is warm, dry and intact.     Motor Strength:  No abnormal movements seen.  Bilateral upper extremity strength deltoid/biceps/triceps/left hand  3/5, right hand  2/5     Posterior cervical Incision: Covered with clean, dry steri strip. No surrounding erythema or edema. No drainage from incision. No palpable hematoma or underlying fluid collection.  DEYSI drain x 1 in place     Significant Labs:  Recent Labs   Lab 11/06/24 0318 11/06/24  1239 11/07/24  0415    171* 109   * 131* 134*   K 4.3 3.9 4.0    104 107   CO2 22* 20* 22*   BUN 34* 33* 32*   CREATININE 2.2* 1.9* 1.9*   CALCIUM 7.5* 7.2* 7.5*   MG 2.0 1.9 2.0     Recent Labs   Lab 11/06/24  0318 11/06/24  1324 11/07/24  0415 11/07/24  0607   WBC 8.94  --  7.70  --    HGB 11.0*  --  10.3*  --    HCT 33.4* 41 31.1* 36   *  --  134*  --      No results for input(s): "LABPT", "INR", "APTT" in the last 48 hours.  Microbiology Results (last 7 days)       Procedure Component Value Units Date/Time    Blood culture [[id number]] " Collected: 11/01/24 1829    Order Status: Completed Specimen: Blood from Peripheral, Hand, Right Updated: 11/06/24 2032     Blood Culture, Routine No growth after 5 days.

## 2024-11-08 NOTE — PROGRESS NOTES
Pulmonary/Critical Care Progress Note      PATIENT NAME: Shanell Mahmood  MRN: [id number]  TODAY'S DATE: 2024  8:00 AM  ADMIT DATE: 10/31/2024  AGE: 74 y.o. : 1950    CONSULT REQUESTED BY: Gypsy Bailey MD    REASON FOR CONSULT:   Critical care management    HPI:  The patient is a 74 year old female who had a syncopal event on 10/31.  She was weak, myelopathic.  CT of the head showed an acute infarct in the right caudate head.  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  The MRI of her cervical spine showed disc bulging and spondylolysis at C4-C5 and C5-C6 with severe spinal canal stenosis, cervical cord compression and cord signal alteration there was also broad-based disc bulging producing moderate spinal canal stenosis at C3-C4.  She was brought to the OR yesterday where she underwent C2- 3, 3-4, 4-5,5-6,  and 6- 7 laminectomies with C3 through 6 posterior segmental instrumentation and C3-4, 4- 5, 5- 6 posterolateral arthrodesis using autograft harvested from the incision and allograft of DBM.  This morning she remains profoundly myelopathic.  She is on Levophed at 0.06 mics per kilos per minute to maintain a mean of 80.    The patient has significant pulmonary hypertension in his managed with Opsumit 10 mg daily, Adcirca 40 mg daily, and Uptravi 1600 ug bid.  She is on 4 L continuous oxygen and has dyspnea with any exertion.  She is also on Advair 115/21 b.i.d.     the patient's oxygenation dropped dramatically this morning.  She is now on more levophed to maintain a mean of 80.  She is in a lot of pain.     the patient is requiring a little less oxygen this morning.  However, she is in AFib and had a 25 beat run of V-tach overnight.  She is on 0.24 of Levophed.  I am concerned that her pulmonary hypertension is significantly worse and have an echo coming.  Will start  walking the Levophed down and off as her renal function is worse, she is in AFib and she had V-tach last night.    11/7 the patient is continuing to decline.  She is on Vapotherm now at 30 L and 80% to maintain good oxygenation.  She is requiring 0.3 of Levophed to maintain her blood pressure.  We did fully anticoagulate with Levophed yesterday as I am still worried about a PE and the patient developed AFib with RVR.  Started on amiodarone and this morning she is back in sinus rhythm.  Limited echo yesterday did not show any worsening of the right ventricle but PA pressures were estimated at 60.  If her heart is actually working well, then we have no SVR, and this is spinal shock.  However, spinal shock is usually bradycardic.  A [place]-Jens catheter maybe helpful, but I am not sure how it will change our management unless the RV is failing and the PAs are too high and we need to be on IV Flolan.    11/8 the patient is on 0.3 of Levophed.  She is on 85% on the Vapotherm.  The patient is still declining to have a a [place]-Jens catheter.  We would need this to initiate Flolan.  She would like to continue as we are hoping that things will turn around.  Her creatinine is marginally better today.  She remains ahead on her I's and O's by about 6 L. I do not see any point in trying to transfer her if she is declining a [place].    REVIEW OF SYSTEMS  GENERAL: Feeling ok, her left hand feels cold  EYES: Vision is good.  ENT: No sinusitis or pharyngitis.   HEART: No chest pain or palpitations.  LUNGS:  Breathing is okay  GI: No Nausea, vomiting, constipation, diarrhea, or reflux.  : No dysuria, hesitancy, or nocturia.  SKIN: No lesions or rashes.  MUSCULOSKELETAL: No joint pain or myalgias.  NEURO:  She remains very numb and this is distressing to her more so than the quadriparesis  LYMPH: No edema or adenopathy.  PSYCH: No anxiety or depression.  ENDO: No weight change.    No change in the patient's Past Medical History, Past  Surgical History, Social History or Family History since admission.    VITAL SIGNS (MOST RECENT)  Temp: 98.9 °F (37.2 °C) (11/08/24 0301)  Pulse: 81 (11/08/24 0601)  Resp: 10 (11/08/24 0601)  BP: (!) 140/72 (11/08/24 0601)  SpO2: (!) 94 % (11/08/24 0601)    INTAKE AND OUTPUT (LAST 24 HOURS):  Intake/Output Summary (Last 24 hours) at 11/8/2024 0737  Last data filed at 11/8/2024 0601  Gross per 24 hour   Intake 1459.38 ml   Output 1498 ml   Net -38.62 ml       WEIGHT  Wt Readings from Last 1 Encounters:   11/01/24 72.7 kg (160 lb 4.4 oz)       PHYSICAL EXAM  GENERAL: Older patient looking quiet..  HEENT: Pupils equal and reactive. Extraocular movements intact. Nose intact. Pharynx moist.  NECK: Supple.  Aspen collar in place. HEART:  Mostly regular rate and rhythm. No murmur or gallop auscultated.  The monitor appears to be AFib with a controlled rate.  LUNGS:  The lungs are clear..  Lung excursion symmetrical. No change in fremitus.   ABDOMEN: Bowel sounds present. Non-tender, no masses palpated.  : Normal anatomy.  Yeung catheter with clear urine  EXTREMITIES:  Minimal movement to the right hand.  There is a bit of a squeeze to the left hand.  She can wiggle both sets of toes.  She can not do anything against gravity.  Arterial line right radial.  Right PICC.    LYMPHATICS: No adenopathy palpated, no edema.  SKIN: Dry, intact, no lesions.   NEURO: Cranial nerves II-XII intact. Motor strength 1/5 bilaterally, except left hand is a 2/5.  The right hand is the weakest.  The left quadricep tightens better than the right quadricep.  PSYCH: Appropriate affect      CBC LAST (LAST 24 HOURS)  Recent Labs   Lab 11/08/24  0546   WBC 7.26   RBC 3.67*   HGB 10.2*   HCT 30.7*   MCV 84   MCH 27.8   MCHC 33.2   RDW 15.8*   *   MPV 11.4   GRAN 77.2*  5.6   LYMPH 10.1*  0.7*   MONO 10.3  0.8   BASO 0.04   NRBC 0       CHEMISTRY LAST (LAST 24 HOURS)  Recent Labs   Lab 11/08/24  0546   *   K 4.1      CO2 21*    ANIONGAP 7*   BUN 32*   CREATININE 1.8*      CALCIUM 7.7*         LAST 7 DAYS MICROBIOLOGY   Microbiology Results (last 7 days)       Procedure Component Value Units Date/Time    Blood culture [[id number]] Collected: 11/01/24 1829    Order Status: Completed Specimen: Blood from Peripheral, Hand, Right Updated: 11/06/24 2032     Blood Culture, Routine No growth after 5 days.            MOST RECENT IMAGING  Echo Saline Bubble? No; Ultrasound enhancing contrast? No  Addendum:   Left Ventricle: The left ventricle is smaller than normal. Normal wall  thickness. There is normal systolic function with a visually estimated  ejection fraction of 65 - 70%. There is normal diastolic function.     Right Ventricle: Mild right ventricular enlargement. Wall thickness is  normal. Systolic function is moderately reduced.     Left Atrium: Left atrium is moderately dilated.     Right Atrium: Right atrium is mildly dilated.     IVC/SVC: Normal venous pressure at 3 mmHg.  Narrative:   Left Ventricle: The left ventricle is normal in size. Normal wall   thickness. There is normal systolic function with a visually estimated   ejection fraction of 65 - 70%. There is normal diastolic function.    Right Ventricle: Normal right ventricular cavity size. Wall thickness   is normal. Systolic function is normal.    Left Atrium: Left atrium is mildly dilated.    IVC/SVC: Normal venous pressure at 3 mmHg.    Chest x-ray 11/6 shows better inspiratory effort.  Tiny pleural effusions and huge pulmonary arteries.  There is a PICC line on the right side.    CURRENT VISIT EKG  No results found for this visit on 10/31/24.    ECHOCARDIOGRAM RESULTS  Results for orders placed during the hospital encounter of 10/31/24    Echo    Interpretation Summary    Left Ventricle: The left ventricle is normal in size. Normal wall thickness. Unable to assess wall motion. There is normal systolic function with a visually estimated ejection fraction of 60 - 65%.     Right Ventricle: Right ventricle was not well visualized due to poor acoustic window.  Grossly appears to be dilated with reduced function.  There appears to be some concern of flattening of the interventricular septum which could indicate right ventricular pressure and volume overload.    Left Atrium: Left atrium is severely dilated.    IVC/SVC: Elevated venous pressure at 15 mmHg.    Oxygen INFORMATION  Oxygen Concentration (%):  [70-95] 858 L        IMPRESSION AND PLAN  Severe central cord compression now status post multilevel laminectomy and instrumentation  Quadriparesis persisting  Severe pulmonary hypertension  - patient on Opsumit, Uptravi, and Adcirca, max doses    - patient's echo continues to show a functioning RV and LV.  - patient declining a [place]-Jens catheter which we would need to evaluate whether Flolan might be helpful  - patient is on mg per kg Lovenox daily as her GFR is still less than 30  DVT  - nonocclusive to the right leg  - on full-dose Lovenox  Atrial fibrillation  - in and out of AFib, rate controlled  - on amiodarone 0.5  Acute on chronic hypoxemic respiratory failure  - on 4 L of oxygen at home  - now requiring high levels of Vapotherm support, currently on 30 L and 85% O2  - I's and O's ahead 6 liters  - pulmonary hypertension meds restarted   - worry about PE, fully anticoagulated with Lovenox  - DVT seen in legs  - takes Advair but is not obstructed  - will continue p.r.n. DuoNebs  Anemia  - chronic disease  Thrombocytopenia  - consumption  - improving  - continue to trend  Chronic kidney disease  - likely aggravated with the Levophed and the need for Levophed  - creatinine minimally improved today  - stop IV fluids, she is 6 L ahead  Hyponatremia  - discontinuing fluids  - will feed a little more salt  Moderate hypoalbuminemia  Intracranial mass  Pulmonary nodules, 1 greater than 1 cm  - patient workup underway        The patient's oxygenation continues to deteriorate.  The  patient's shock persists.  According to the echo it is not cardiogenic shock.  Spinal shock should have bradycardia.  The patient is also on midodrine.  The patient is in and out of AFib  Patient has a history of a patent foramen ovale that was closed but her numbers are not so severe that you would think about [place] nor does her echo suggest this.  If the shock is because of a PE, we should be able to continue anticoagulation and she should eventually improve as her RV appears to be working.  If this is an atypical spinal shock, it should improve with time.  Lactic acid is normal. Her caudate CVA should not be causing this.  The temporal parietal calvarium mass is not the problem.  Discussed with the patient and her children.  She remains unwilling to have a [place]-Jens catheter placed.  Given this, I see no way forward with possibly initiating Flolan or the benefit of transfer.      Critical care time spent reviewing the chart, examining the patient, reviewing the labs, reviewing the radiological findings, discussing care with nursing, physicians, and respiratory and creating the note and  has been 40 minutes.    Gaye Ramos MD  Date of Service: 11/08/2024  8:00 AM

## 2024-11-08 NOTE — PT/OT/SLP PROGRESS
Physical Therapy Treatment    Patient Name:  Shanell Mahmood   MRN:  [id number]    Recommendations:     Discharge Recommendations: High Intensity Therapy  Discharge Equipment Recommendations: to be determined by next level of care  Barriers to discharge:  increase assist with mobility, high risk for falls, generalized weakness, decrease activity tolerance    Assessment:     Shanell Mahmood is a 74 y.o. female admitted with a medical diagnosis of Central cord syndrome.  She presents with the following impairments/functional limitations: weakness, impaired endurance, impaired self care skills, impaired functional mobility, gait instability, impaired balance, decreased upper extremity function, decreased lower extremity function, pain, decreased ROM, impaired cardiopulmonary response to activity, orthopedic precautions.    Pt found seated on EOB with OT. Pt agreeable to visit. Pt tolerate sitting x 8 additional minute with emphasis on upright posture and upward gaze. Attempted sit to stand with bilateral knee blocking with only 75% buttock clearance but limited due to B weakness and weight acceptance resulting in difficulty maintaining optimal foot placement. Significant improvement in all vitals noted this date at rest and with mobility    Rehab Prognosis: Fair; patient would benefit from acute skilled PT services to address these deficits and reach maximum level of function.    Recent Surgery: Procedure(s) (LRB):  LAMINECTOMY, SPINE, CERVICAL, POSTERIOR APPROACH (N/A) 5 Days Post-Op    Plan:     During this hospitalization, patient to be seen daily to address the identified rehab impairments via gait training, therapeutic activities, therapeutic exercises, neuromuscular re-education and progress toward the following goals:    Plan of Care Expires:  12/08/24    Subjective     Chief Complaint: none reports feeling better  Patient/Family Comments/goals: get better  Pain/Comfort:  Pain Rating 1: 0/10      Objective:      Communicated with RN prior to session.  Patient found sitting edge of bed with peripheral IV, telemetry, blood pressure cuff, pulse ox (continuous), DEYSI drain, oxygen (vapotherm) upon PT entry to room.     General Precautions: Standard, fall  Orthopedic Precautions: spinal precautions  Braces: [place] J collar  Respiratory Status:  Vapotherm 30L 75%     Functional Mobility:  Bed Mobility:     Sit to Supine: maximal assistance and of 2 persons  Transfers:     Sit to Stand:  maximal assistance and of 2 persons with no AD      AM-PAC 6 CLICK MOBILITY  Turning over in bed (including adjusting bedclothes, sheets and blankets)?: 2  Sitting down on and standing up from a chair with arms (e.g., wheelchair, bedside commode, etc.): 2  Moving from lying on back to sitting on the side of the bed?: 2  Moving to and from a bed to a chair (including a wheelchair)?: 1  Need to walk in hospital room?: 1  Climbing 3-5 steps with a railing?: 1  Basic Mobility Total Score: 9       Treatment & Education:  Pt educated on POC, discharge recommendation, midline seated positioning/balance, proper form with mobility, need for assist with mobility, use of call bell to seek assistance as needed and fall prevention      Patient left  up in cardiac chair  with all lines intact, RN notified, and ST present..    GOALS:   Multidisciplinary Problems       Physical Therapy Goals          Problem: Physical Therapy    Goal Priority Disciplines Outcome Interventions   Physical Therapy Goal     PT, PT/OT Not Progressing    Description: Goals to be met by: 24     Patient will increase functional independence with mobility by performin. Supine to sit with Moderate Assistance  2. Sit to supine with Moderate Assistance  3. Sit to stand transfer with Moderate Assistance  4. Bed to chair transfer with Moderate Assistance using Rolling Walker  5. Gait  x 25 feet with Moderate Assistance using Rolling Walker.                          Time Tracking:      PT Received On: 11/08/24  PT Start Time: 0933     PT Stop Time: 0949  PT Total Time (min): 16 min     Billable Minutes: Therapeutic Activity 16    Treatment Type: Treatment  PT/PTA: PT     Number of PTA visits since last PT visit: 0     11/08/2024

## 2024-11-08 NOTE — PLAN OF CARE
Problem: Oral Intake Inadequate  Goal: Improved Oral Intake  Outcome: Progressing  Intervention: Promote and Optimize Oral Intake  Flowsheets (Taken 11/8/2024 1207)  Oral Nutrition Promotion: nutrition counseling provided  Nutrition Interventions: supplemental drinks provided     Problem: Skin Injury Risk Increased  Goal: Skin Health and Integrity  Intervention: Promote and Optimize Oral Intake  Flowsheets (Taken 11/8/2024 1207)  Oral Nutrition Promotion: nutrition counseling provided  Nutrition Interventions: supplemental drinks provided

## 2024-11-08 NOTE — SUBJECTIVE & OBJECTIVE
Interval History: no new complaints     Review of Systems   Constitutional:  Positive for activity change.   Respiratory: Negative.     Cardiovascular: Negative.    Gastrointestinal: Negative.    Musculoskeletal:  Positive for arthralgias and back pain.   Neurological:  Positive for weakness.     Objective:     Vital Signs (Most Recent):   Vital Signs (24h Range):  Temp:  [97.9 °F (36.6 °C)-99.6 °F (37.6 °C)] 99.6 °F (37.6 °C)  Pulse:  [70-87] 84  Resp:  [0-22] 19  SpO2:  [91 %-100 %] 93 %  BP: ()/(51-66) 109/58  Arterial Line BP: ()/() 104/92     Weight: 72.7 kg (160 lb 4.4 oz)  Body mass index is 25.1 kg/m².          Physical Exam  Constitutional:       General: She is not in acute distress.     Comments: Sitting up in cardiac chair    Neck:      Comments: Cervical collar in place   Cardiovascular:      Rate and Rhythm: Tachycardia present. Rhythm irregular.   Pulmonary:      Effort: Pulmonary effort is normal. No respiratory distress.      Breath sounds: Rales present. No wheezing.   Abdominal:      General: Bowel sounds are normal. There is no distension.   Skin:     General: Skin is warm and dry.      Capillary Refill: Capillary refill takes less than 2 seconds.             Significant Labs: All pertinent labs within the past 24 hours have been reviewed.  Recent Lab Results         11/07/24  0821   11/07/24  0607   11/07/24  0415        Allens Test   N/A         Anion Gap     5       Site   Bonnie/UAC         BUN     32       Calcium     7.5       Chloride     107       CO2     22       Creatinine     1.9       DelSys   Nasal Can         eGFR     27.5       FiO2   85         Flow   25         Glucose     109       Hematocrit     31.1       Hemoglobin     10.3       Lactic Acid Level 0.6  Comment: Falsely low lactic acid results can be found in samples   containing >=13.0 mg/dL total bilirubin and/or >=3.5 mg/dL   direct bilirubin.             Magnesium      2.0       MCH     27.9       MCHC      33.1       MCV     84       Mode   SPONT         MPV     11.5       Platelet Count     134       POC BE   -5         POC Glucose   102         POC HCO3   21.6         POC Hematocrit   36         POC Ionized Calcium   1.17         POC PCO2   42.6         POC PH   7.313         POC PO2   86         POC Potassium   4.0         POC SATURATED O2   96         POC Sodium   135         POC TCO2   23         Potassium     4.0       RBC     3.69       RDW     15.4       Sample   ARTERIAL         Sodium     134       Sp02   96         WBC     7.70               Significant Imaging: I have reviewed all pertinent imaging results/findings within the past 24 hours.

## 2024-11-08 NOTE — CARE UPDATE
11/07/24 1912   Patient Assessment/Suction   Level of Consciousness (AVPU) alert   Respiratory Effort Normal;Unlabored   Expansion/Accessory Muscles/Retractions no use of accessory muscles;no retractions;expansion symmetric   All Lung Fields Breath Sounds diminished   Rhythm/Pattern, Respiratory unlabored;pattern regular   Cough Frequency infrequent   Skin Integrity   $ Wound Care Tech Time 15 min   Area Observed Nares;Chin;Cheek   Skin Appearance without discoloration   PRE-TX-O2   Device (Oxygen Therapy) high flow nasal cannula w/device   Humidification temp set 33   Humidification temp actual 33   Flow (L/min) (Oxygen Therapy) 30   Oxygen Concentration (%) 85   SpO2 100 %   Pulse Oximetry Type Continuous   Pulse 80   Resp 15   BP (!) 99/55   Aerosol Therapy   $ Aerosol Therapy Charges PRN treatment not required   Daily Review of Necessity (SVN) completed   Respiratory Treatment Status (SVN) PRN treatment not required   Incentive Spirometer   $ Incentive Spirometer Charges done with encouragement   Incentive Spirometer Predicted Level (mL) 2000   Administration (IS) instruction provided, follow-up;mouthpiece utilized   Number of Repetitions (IS) 10   Level Incentive Spirometer (mL) 1500   Patient Tolerance (IS) no adverse signs/symptoms present   Vibratory PEP Therapy   $ Vibratory PEP Charges [place] Therapy   $ Vibratory PEP Tech Time Charges 15 min   Daily Review of Necessity (PEP Therapy) completed   Type (PEP Therapy) vibratory/oscillatory   Device (PEP Therapy) flutter   Route (PEP Therapy) mouthpiece   Breaths per Cycle (PEP Therapy) 10   Cycles (PEP Therapy) 1   PEP Pressure (cm H2O) 5   PEP Duration (min) 5   Settings (PEP Therapy) PEP 5   Patient Position (PEP Therapy) HOB elevated   Signs of Intolerance (PEP Therapy) none   Ready to Wean/Extubation Screen   FIO2<=50 (chart decimal) (!) 0.85

## 2024-11-08 NOTE — SUBJECTIVE & OBJECTIVE
Interval History: patient sitting up in cardiac chair and able to communicate well; CM meeting with her regarding discharge options     Review of Systems   Respiratory:  Positive for cough and shortness of breath.    Cardiovascular: Negative.    Gastrointestinal: Negative.    Musculoskeletal:  Positive for back pain, gait problem, myalgias and neck pain.   Neurological:  Positive for weakness and numbness.     Objective:     Vital Signs (Most Recent):  Temp: 98.7 °F (37.1 °C) (11/08/24 1515)  Pulse: 84 (11/08/24 1600)  Resp: (!) 33 (11/08/24 1600)  BP: (!) 83/51 (11/08/24 1600)  SpO2: 96 % (11/08/24 1600) Vital Signs (24h Range):  Temp:  [98.5 °F (36.9 °C)-99.6 °F (37.6 °C)] 98.7 °F (37.1 °C)  Pulse:  [70-97] 84  Resp:  [10-33] 33  SpO2:  [91 %-100 %] 96 %  BP: ()/(45-72) 83/51  Arterial Line BP: ()/() 108/54     Weight: 72.7 kg (160 lb 4.4 oz)  Body mass index is 25.1 kg/m².    Intake/Output Summary (Last 24 hours) at 11/8/2024 1712  Last data filed at 11/8/2024 1500  Gross per 24 hour   Intake 1153.21 ml   Output 1360 ml   Net -206.79 ml         Physical Exam  Constitutional:       General: She is not in acute distress.     Appearance: She is ill-appearing.   HENT:      Mouth/Throat:      Mouth: Mucous membranes are moist.      Pharynx: Oropharynx is clear.   Eyes:      Comments: Perioribtal bruising right eye    Neck:      Comments: Cervical collar in place   Cardiovascular:      Rate and Rhythm: Normal rate and regular rhythm.      Pulses: Normal pulses.      Heart sounds: Murmur heard.   Pulmonary:      Effort: No respiratory distress.      Breath sounds: Rales present.   Abdominal:      General: Bowel sounds are normal.      Tenderness: There is no abdominal tenderness. There is no guarding.   Skin:     General: Skin is warm and dry.      Capillary Refill: Capillary refill takes less than 2 seconds.   Neurological:      Mental Status: She is alert and oriented to person, place, and time.       Sensory: Sensory deficit present.      Coordination: Coordination abnormal.             Significant Labs: All pertinent labs within the past 24 hours have been reviewed.  Recent Lab Results         11/08/24  0801   11/08/24  0546        Allens Test N/A         Anion Gap   7       Baso #   0.04       Basophil %   0.6       Site Bonnie/UAC         BUN   32       Calcium   7.7       Chloride   105       CO2   21       Creatinine   1.8       DelSys HFDD         Differential Method   Automated       eGFR   29.4       Eos #   0.1       Eos %   1.4       FiO2 85         Flow 30         Glucose   101       Gran # (ANC)   5.6       Gran %   77.2       Hematocrit   30.7       Hemoglobin   10.2       Immature Grans (Abs)   0.03  Comment: Mild elevation in immature granulocytes is non specific and   can be seen in a variety of conditions including stress response,   acute inflammation, trauma and pregnancy. Correlation with other   laboratory and clinical findings is essential.         Immature Granulocytes   0.4       Lymph #   0.7       Lymph %   10.1       MCH   27.8       MCHC   33.2       MCV   84       Mode SPONT         Mono #   0.8       Mono %   10.3       MPV   11.4       nRBC   0       Platelet Count   139       POC BE -5         POC HCO3 21.2         POC PCO2 39.5         POC PH 7.337         POC PO2 74         POC SATURATED O2 94         POC TCO2 22         Potassium   4.1       RBC   3.67       RDW   15.8       Sample ARTERIAL         Sodium   133       Sp02 91         WBC   7.26               Significant Imaging: I have reviewed all pertinent imaging results/findings within the past 24 hours.

## 2024-11-08 NOTE — PLAN OF CARE
CM reviewed with attending and met with pt who had son present to discuss possible LTAC placement if pt continues Monday with high O2 needs and drips that cannot be managed in rehabs then will need to explore LTAC options. Reviewed options for LTAC and walked pt son through Medicare.gov to explore LTAC ratings and compare. Attempted to call pt daughter but no answer. Message left with request for c/b.  Family to review for choice options and CM sent courtesy referrals via careport. Should pt improve over weekend will f/u Monday with SNF/rehab referrals previously sent

## 2024-11-08 NOTE — ASSESSMENT & PLAN NOTE
Acute CVA right caudate  MRI, MRA, CT, carotid U/S reports reviewed  -DAPT and statin  -neurology following  -neurosurgery and oncology consulted for the brain mass  -PT/OT/SLP  -echo bubble report is positive, follow up Neurology recommendation    Dr. Corona secure chat:  Patient is post-op day 5. Ok to resume antiplatelet and anticoagulation from neurosurgical stand point

## 2024-11-08 NOTE — PT/OT/SLP PROGRESS
Occupational Therapy   Treatment    Name: Shanell Mahmood  MRN: [id number]  Admitting Diagnosis:  Central cord syndrome  5 Days Post-Op    Recommendations:     Discharge Recommendations: High Intensity Therapy  Discharge Equipment Recommendations:  to be determined by next level of care  Barriers to discharge:   (Increased physical assistance with ADLs and functional mobility.)    Assessment:     Shanell Mahmood is a 74 y.o. female with a medical diagnosis of Central cord syndrome.  She presents with general weakness. Patient participated in BUE/BLE therex bed level, bed mobility, unsupported sitting EOB and neck/trunk control exercise sitting EOB. Performance deficits affecting function are weakness, impaired endurance, impaired self care skills, impaired functional mobility, gait instability, impaired balance, impaired sensation, decreased safety awareness, decreased lower extremity function, decreased upper extremity function, orthopedic precautions.     Rehab Prognosis:  Good; patient would benefit from acute skilled OT services to address these deficits and reach maximum level of function.       Plan:     Patient to be seen 6 x/week to address the above listed problems via self-care/home management, therapeutic activities, therapeutic exercises  Plan of Care Expires: 12/04/24  Plan of Care Reviewed with: patient    Subjective     Chief Complaint: General weakness  Patient/Family Comments/goals: Improved functional mobility and ADL independence.  Pain/Comfort:  Pain Rating 1: 0/10  Pain Rating Post-Intervention 1: 0/10    Objective:     Communicated with: nurse prior to session.  Patient found HOB elevated with telemetry, serna catheter, arterial line, PICC line upon OT entry to room.    General Precautions: Standard, fall    Orthopedic Precautions:spinal precautions  Braces: [place] collar  Respiratory Status:  30L @ 85% via Vapotherm     Occupational Performance:     Bed Mobility:    Patient completed Scooting/Bridging  with maximal assistance  Patient completed Supine to Sit with maximal assistance   Performed unsupported sitting EOB with moderate assistance  Performed neck/trunk control exercises sitting EOB with maximal assistance.  Performed BUE/BLE therex x10 reps with maximal assistance bed level.    [facility] 6 Click ADL:      Treatment & Education:  Patient is slowly gaining LUE strength and trunk control.     Patient left sitting edge of bed with  turnover to Physical Therapy bedside.    GOALS:   Multidisciplinary Problems       Occupational Therapy Goals          Problem: Occupational Therapy    Goal Priority Disciplines Outcome Interventions   Occupational Therapy Goal     OT, PT/OT Progressing    Description: Goals to be met by: 12/2/2024  - revised target date, feeding and g/hygiene goals, all other goals remain appropriate.  Patient will increase functional independence with ADLs by performing:    Feeding with Minimal Assistance with adaptive device PRN.  UE Dressing with Minimal Assistance.  LE Dressing with Moderate Assistance.  Grooming while EOB with Minimal Assistance with adaptive device PRN.  Toileting from bedside commode with Minimal Assistance for hygiene and clothing management.   Sitting at edge of bed x15 minutes with Stand-by Assistance.  Supine to sit with Minimal Assistance.  Toilet transfer to bedside commode with Minimal Assistance.  Upper extremity exercise program, with assistance as needed.                         Time Tracking:     OT Date of Treatment: 11/08/24  OT Start Time: 0913  OT Stop Time: 0936  OT Total Time (min): 23 min    Billable Minutes:Therapeutic Activity 12  Therapeutic Exercise 11    OT/MARYELLEN: OT          11/8/2024

## 2024-11-08 NOTE — PROGRESS NOTES
[facility]  Adult Nutrition   Progress Note (Follow-Up)    SUMMARY     Recommendations  1. Recommend Ensure HP BID.   2. Once medically feasible advance to Cardiac diet with texture per SLP.   3. RD following.  Goals: 1.) Pt to consume/tolerate >75% of meals/ supplement by RD follow up.  Nutrition Goal Status: progressing towards goal    Nutrition Diagnosis PES Statement: Inadequate (suboptimal) protein-energy intake related to chewing issues as evidenced by pt limited to clear/full liquid diet >5 days.     Dietitian Rounds Brief  RD follow up. Pt is s/p laminectomy with neck brace limiting chewing/ po intake. Pt agreed to ensure HP for additional energy protein intake. Weight stable.      Nutrition Related Social Determinants of Health:   Food Insecurity: Patient Declined (11/1/2024)    Hunger Vital Sign     Worried About Running Out of Food in the Last Year: Patient declined     Ran Out of Food in the Last Year: Patient declined        Malnutrition Assessment   No malnutrition at this time.   Skin (Micronutrient): none  Musculoskeletal/Lower Extremities: none       Energy Intake (Malnutrition): less than 75% for greater than or equal to 3 months   Orbital Region (Subcutaneous Fat Loss): well nourished  Upper Arm Region (Subcutaneous Fat Loss): well nourished  Thoracic and Lumbar Region: well nourished   [place] Region (Muscle Loss): well nourished  Clavicle Bone Region (Muscle Loss): well nourished  Clavicle and Acromion Bone Region (Muscle Loss): well nourished  Scapular Bone Region (Muscle Loss): well nourished  Dorsal Hand (Muscle Loss): well nourished  Patellar Region (Muscle Loss): well nourished  Anterior Thigh Region (Muscle Loss): well nourished  Posterior Calf Region (Muscle Loss): well nourished       Diet order:   Current Diet Order: Full liquid diet      Evaluation of Received Nutrient/Fluid Intake  Energy Calories Required: not meeting needs  Protein Required: not meeting  "needs  Tolerance: tolerating     % Intake of Estimated Energy Needs: 50%  % Meal Intake: 50 - 75 %      Intake/Output Summary (Last 24 hours) at 2024 1200  Last data filed at 2024 1053  Gross per 24 hour   Intake 1091.51 ml   Output 1390 ml   Net -298.49 ml        Anthropometrics  Temp: 98.5 °F (36.9 °C)  Height Method: Stated  Height: 5' 7" (170.2 cm)  Height (inches): 67 in  Weight Method: Bed Scale  Weight: 72.7 kg (160 lb 4.4 oz)  Weight (lb): 160.28 lb  Ideal Body Weight (IBW), Female: 135 lb  % Ideal Body Weight, Female (lb): 115.56 %  BMI (Calculated): 25.1  BMI Grade: 25 - 29.9 - overweight  Usual Body Weight (UBW), k kg (2016)  % Usual Body Weight: 65.05       Estimated/Assessed Needs  Weight Used For Calorie Calculations: 72.7 kg (160 lb 4.4 oz)  Energy Calorie Requirements (kcal): 0544-4944  Energy Need Method: Kcal/kg (25-30)  Protein Requirements: 58-73 (0.8-1.0 (moderate protein))  Weight Used For Protein Calculations: 72.7 kg (160 lb 4.4 oz)     Estimated Fluid Requirement Method: RDA Method  RDA Method (mL): 1818       Reason for Assessment  Reason For Assessment: RD follow-up  Diagnosis: stroke/CVA  General Information Comments: Consult received for stroke pathway. 74 year old female with a previous medical history of anemia, Pulmonary hypertension on 4 liters continuous oxygen at home, arthritis, CKD V, Osteoporosis, secondary hyperprathyroidism, S/P closure of patent foramen ovale in , CVA in  with no residuals, chronic back pain, HLD, Gout, Brain Mass and thyroid diease who presented to the ED after unwitnessed syncopal episode. Pt sitting up in bed reports feeling ok. Her appetite is fair, consuming 50% of meals. She endorses following a low Na diet at home and monitors her fluid intake.  She reports consuming 2-3 meals daily. No ONS at home and not interested at this time. She denies chew/swallowing issues. Wt reviewed. UBW 112kg (2016), CBW 72.7kg reflecting 35% " total weight loss x 8 years (average of 4% per year), not significant. No skin breakdown per chart. NFPE completed with no s/s of wasting.  Nutrition Discharge Planning: Cardiac, moderate protein intake (0.8-1.0 g/kg)    Nutrition/Diet History  Patient Reported Diet/Restrictions/Preferences: heart healthy  Spiritual, Cultural Beliefs, Restoration Practices, Values that Affect Care: no  Food Allergies: NKFA  Factors Affecting Nutritional Intake: chewing difficulties/inability to chew food    Nutrition Risk Screen  Nutrition Risk Screen: no indicators present     MST Score: 0  Have you recently lost weight without trying?: No  Weight loss score: 0  Have you been eating poorly because of a decreased appetite?: No  Appetite score: 0       Weight History:  Wt Readings from Last 10 Encounters:   11/01/24 72.7 kg (160 lb 4.4 oz)   11/06/24 72.7 kg (160 lb 4.4 oz)        Lab/Procedures/Meds: Pertinent Labs/Meds Reviewed    Medications:Pertinent Medications Reviewed  Scheduled Meds:   allopurinoL  100 mg Oral QHS    atorvastatin  40 mg Oral Daily    bisacodyL  10 mg Rectal Daily    colchicine  0.3 mg Oral Daily    enoxparin  1 mg/kg Subcutaneous Q24H (treatment, non-standard time)    HYDROcodone-acetaminophen  1 tablet Oral Q6H    macitentan  10 mg Oral Daily    methocarbamoL  750 mg Oral TID    midodrine  15 mg Oral TID    montelukast  10 mg Oral Daily    pantoprazole  40 mg Oral Daily    selexipag  1,600 mcg Oral BID    tadalafil  40 mg Oral Daily     Continuous Infusions:   amiodarone in dextrose 5%  0.5 mg/min Intravenous Continuous 16.7 mL/hr at 11/08/24 0452 0.5 mg/min at 11/08/24 0452    NORepinephrine bitartrate-D5W  0-3 mcg/kg/min Intravenous Continuous 10.2 mL/hr at 11/08/24 0600 0.3 mcg/kg/min at 11/08/24 0600     Labs: Pertinent Labs Reviewed  Clinical Chemistry:  Recent Labs   Lab 11/02/24  0505 11/03/24  0256 11/03/24  2020 11/06/24  1239 11/07/24  0415 11/08/24  0546    141   < > 131* 134* 133*   K 4.2 4.2    < > 3.9 4.0 4.1    108   < > 104 107 105   CO2 21* 25   < > 20* 22* 21*   GLU 99 93   < > 171* 109 101   BUN 31* 30*   < > 33* 32* 32*   CREATININE 1.8* 1.9*   < > 1.9* 1.9* 1.8*   CALCIUM 8.1* 8.2*   < > 7.2* 7.5* 7.7*   PROT 5.6* 5.6*  --   --   --   --    ALBUMIN 2.8* 2.9*  --   --   --   --    BILITOT 0.4 0.4  --   --   --   --    ALKPHOS 81 79  --   --   --   --    AST 10 9*  --   --   --   --    ALT 6* 5*  --   --   --   --    ANIONGAP 11 8   < > 7* 5* 7*   MG 2.0 2.1   < > 1.9 2.0  --    PHOS 3.6 3.8  --  4.0  --   --     < > = values in this interval not displayed.         Monitor and Evaluation  Food and Nutrient Intake: energy intake, food and beverage intake  Food and Nutrient Adminstration: diet order  Knowledge/Beliefs/Attitudes: food and nutrition knowledge/skill, beliefs and attitudes  Physical Activity and Function: nutrition-related ADLs and IADLs  Anthropometric Measurements: weight, weight change  Biochemical Data, Medical Tests and Procedures: electrolyte and renal panel, gastrointestinal profile, glucose/endocrine profile, inflammatory profile, lipid profile  Nutrition-Focused Physical Findings: overall appearance     Nutrition Risk  Level of Risk/Frequency of Follow-up:  (high 2 x week)     Nutrition Follow-Up  RD Follow-up?: Yes  Inna Camacho, FAIZA 11/08/2024 12:00 PM

## 2024-11-08 NOTE — PT/OT/SLP EVAL
Speech Language Pathology Evaluation  Bedside Swallow    Patient Name:  Shanell Mahmood   MRN:  [id number]  Admitting Diagnosis: Central cord syndrome    Recommendations:                 General Recommendations:  Follow-up not indicated  Diet recommendations:  Soft & Bite Sized Diet - IDDSI Level 6, Thin   Aspiration Precautions: Assistance with meals and Standard aspiration precautions   General Precautions: Standard, aphasia, fall (cervical collar)  Communication strategies:  none    Assessment:     Shanell Mahmood is a 74 y.o. female with an admitting diagnosis of Central cord syndrome. Pt s/p posterior fusion of C3-C6, 11/3/24. Clinical swallowing evaluation completed. All po trials consumed with functional oral phase and no overt s/s airway compromise. REC Soft & Bite-Sized (IDDSI 6) textures with thin liquids.Diet recs communicated with patient's nurse, Pepper, @ 11:36. No f/u indicated ATT. Please re consult PRN.      History:   PER HPI:  Shanell Mahmood is a 74 year old female with a previous medical history of anemia, Pulmonary hypertension on 4 liters continuous oxygen at home, arthritis, CKD V, Osteoporosis, secondary hyperprathyroidism, S/P closure of patent foramen ovale in 2011, CVA in 2011 with no residuals, chronic back pain, HLD, Gout, Brain Mass and thyroid diease who presented to the ED after unwitnessed syncopal episode. The patient was at her pain management office for re certification only. There were no procedures performed. She reports taking one hydrocodone 10mg today.  The last thing she remembers was walking down the hallway of the office and looking for something to cover her head from the rain and did not have any adverse symptoms or feelings at that time.  She has no recollection of the syncopal events. When she was initially evaluated by EMS, her blood pressure was in the 60s over 40s. She did require constant stimulation to remain awake. Fell and hit her head. Does not take any blood thinners. EMS  "evaluated her and gave her 1 mg of Narcan as well as 250 cc of fluid. Her pressure improved and she had become more alert. Initial ED workup was thorough and all imaging showing that included CT of neck, head and entire spine showed no acute processes. CBC showed anemia and CMP showed elevated creatinine consistent with history of CKD V. Drug screen positive for opioids but patient has a hydrocodone prescription. The patient is currently in the process of have a left sided brain mass visualized on MRI 10/8/24 evaluated that was an incidental finding after undergoing a PET scan for a pulmonary nodule on 9/6/24 with abnormal uptake noted in brain. She has her first appointment on 11/4/24. She is followed by nephrology who is currently monitoring her and there has been one attempt at AV fistual placement but it was unsuccessful due sclerotic changes. Patient reports she awoke this morning feeling well showered and dressed herself. She performs all of her own ADL's. She had one syncopal event in March 2024 and was evaluated by cardiology and informed it was an orthostatic episode. Patient was unable to complete orthostatic vital signs upon admission due to inability to stand stating " I feel as if I can't get my legs under me". When evaluated for admit exam the patient endorses that after the syncopal episode she endorses bilateral leg weakness with decreased sensation in both legs. She reports bilateral  weakness being unable to use her phone and difficulty raising both of her arms. NIH scale performed and total of 9 noted. Patient noted have 400ml of urine in bladder and unable to void. Patient reached a total of 528ml of urine without being able to void.  MRI/MRA of brain and MRI of lumbosacral spine ordered upon admit. MRA and MRI lower back showed no acute process. MRI brain showed Acute infarct in the right caudate head. Dr. Maldonado was called and he recommended MRA of neck in morning and load with aspirin and " plavix. Initial EKG read as atrial fibrillation but upon review p waves were noted and this was discussed with the ED. Repeat EKG shows sinus arrhthymias with PACs. Patient admitted by hospital medicine for further evaluation and management.         PER Overview/Hospital Course:  74-year-old female with history of brain mass, CKD, back pain, pulmonary hypertension on 4 L oxygen is admitted after syncope and collapse found to have a acute CVA in the right caudate on MRI brain.  Has global weakness and decreased sensation to the lower legs.  Multiple CT and x-rays done to rule out trauma ultimately negative other than the maxillofacial CT reporting mild irregularity of the interior midline mandible with small adjacent bone fragments which could represent acute fracture with overlying soft tissue swelling.  Carotid ultrasound and MRA brain and neck without acute finding.  Neurology is consulted.  Also with underlying brain mass.  Consult Neurosurgery and Oncology.  Given DAPT and statin.  Had hypotension given IVF and midodrine.  Echo shows right heart failure.  BNP is within normal.  Lactic acid pending.  No other sign of infection.  Placed in cervical collar as precaution and MRI of the cervical spine shows severe cord compression at C4-5 and C5-6 likely acute with edema.  Neurosurgery discussed with patient and given her RCRI is 2 and she is a controlled diabetic but has had recent stroke and given Plavix and ASA plan to proceed with surgical decompression tomorrow 11/3.  Patient was NPO at midnight and ASA and Plavix are on hold.     Patient underwent: 11/3/24  Surgery   C2-3, C3-4, C4-5, C5-6, C6-7 laminectomy  C3 through 6 posterior segmental instrumentation using DePuy Symphony system  C3-4, C4-5, C5-6 posterolateral arthrodesis using autograft harvested from the same incision and allograft DBM     Maintaining MAP 80 - attempt weaning levophed tomorrow.  PT/OT to follow. Patient wants to go to IR in [place].   11/5: DVT study:  here is hypoechoic peripheral nonocclusive thrombus within the proximal right superficial femoral vein. There is also hypoechoic nonocclusive thrombus within the right popliteal vein, with some preserved color Doppler vascular flow.      Cannot give full dose anticoagulation, currently on lovenox 40mg daily   11/6: ECHO: PAP 63mmHg   Patient worked with PT/OT, stood up and quickly became tachycardic/afib 150s, drop in BP.  CVP 3.  She was resumed on amiodarone and given IVF.  Cardiology recommends transfer to higher level of care with specialist.  THey have initiated a transfer to [facility].    11/7: IVF dc'd.  Considering swanz cath      PER Interval History: no new complaints      No past medical history on file.    Past Surgical History:   Procedure Laterality Date    POSTERIOR CERVICAL LAMINECTOMY N/A 11/3/2024    Procedure: LAMINECTOMY, SPINE, CERVICAL, POSTERIOR APPROACH;  Surgeon: Kobi Corona MD;  [place] [facility];  Service: Neurosurgery;  Laterality: N/A;  C3-6 laminectomy and posterior instrumented fusion, prone, Chest roll, Parada, neuromonitoring, DePuy rep       Social History: Patient lives with .    Prior Intubation HX:  11/3/24    Modified Barium Swallow: NA    Imaging Results               MRI Brain Without Contrast (Final result)  Result time 10/31/24 22:27:12      Final result by Dawit Barrios DO (10/31/24 22:27:12)                   Impression:      1. Acute infarct in the right caudate head.  2. 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  The mass abuts the left temporal lobe, without resulting in significant mass effect or vasogenic edema.  Differential includes a primary or metastatic osseous lesion, an atypical meningioma, or additional etiologies.  3. Chronic microvascular ischemic changes.  This report was flagged in Epic as abnormal.    Dr. Barrios discussed critical findings with NP  Owen by [employer] secure chat at 22:23 on 10/31/2024.      Electronically signed by: Dawit Barrios  Date:    10/31/2024  Time:    22:27               Narrative:    EXAMINATION:  MRI BRAIN WITHOUT CONTRAST    CLINICAL HISTORY:  Neuro deficit, acute, stroke suspected;    TECHNIQUE:  Multiplanar multisequence MR imaging of the brain was performed without intravenous contrast.    COMPARISON:  CT head from earlier the same date.    FINDINGS:  There is restricted diffusion in the right caudate head, compatible with an acute infarct.  There is associated T2/FLAIR hyperintense signal.  There is a mass centered within the left temporoparietal calvarium measuring approximately 5.0 x 2.9 x 4.0 cm, with extension into the left cerebral convexity extra-axial space and extension into the left temporal scalp.  There is abutment of the left temporal lobe, without evidence of vasogenic edema or evidence of significant mass effect.  There is no midline shift.  Ventricles are normal in size for age without evidence of hydrocephalus.  There is T2/FLAIR hyperintense signal throughout the supratentorial white matter, compatible with chronic microvascular ischemic changes.  There is a small focus of encephalomalacia within the right medial parietal lobe, likely related to a remote infarct.  There is no intracranial hemorrhage.  No extra-axial blood or fluid collections. Normal vascular flow voids.    Left common rim mass as above.  The remaining bone marrow signal intensity is normal. Paranasal sinuses and mastoid air cells are clear.                                       US Carotid Bilateral (Final result)  Result time 10/31/24 23:55:49      Final result by Dawit Barrios DO (10/31/24 23:55:49)                   Impression:      No evidence of a hemodynamically significant carotid bifurcation stenosis.      Electronically signed by: Dawit Barrios  Date:    10/31/2024  Time:    23:55               Narrative:    EXAMINATION:  US  CAROTID BILATERAL    CLINICAL HISTORY:  syncope;    TECHNIQUE:  Grayscale and color Doppler ultrasound examination of the carotid and vertebral artery systems bilaterally.  Stenosis estimates are per the NASCET measurement criteria.    COMPARISON:  None.    FINDINGS:  Right:    Internal Carotid Artery (ICA) peak systolic velocity 60.6 cm/sec    ICA/CCA peak systolic velocity ratio: 0.9    Plaque formation: Heterogeneous    Vertebral artery: Antegrade flow and normal waveform.    Left:    Internal Carotid Artery (ICA)  peak systolic velocity 98.1 cm/sec    ICA/CCA peak systolic velocity ratio: 1.4    Plaque formation: Heterogeneous    Vertebral artery: Antegrade flow and normal waveform.                                       X-Ray Wrist Complete Left (Final result)  Result time 10/31/24 16:40:18      Final result by Saeid Pérez MD (10/31/24 16:40:18)                   Narrative:    EXAMINATION:  XR WRIST COMPLETE 3 VIEWS LEFT    CLINICAL HISTORY:  Syncope and collapse    TECHNIQUE:  PA, lateral, and oblique views of the left wrist were performed.    COMPARISON:  None    FINDINGS:  No displaced fracture or traumatic malalignment.  Mild carpal degenerative change.  Unremarkable soft tissues.      Electronically signed by: Saeid Pérez  Date:    10/31/2024  Time:    16:40                                     X-Ray Knee 1 or 2 View Left (Final result)  Result time 10/31/24 16:39:40      Final result by Saeid Pérez MD (10/31/24 16:39:40)                   Narrative:    EXAMINATION:  XR KNEE 1 OR 2 VIEW LEFT    CLINICAL HISTORY:  Syncope and Fall;    TECHNIQUE:  One or two views of the left knee were performed.    COMPARISON:  None    FINDINGS:  Left knee total arthroplasty hardware with no periprosthetic fracture or abnormal lucency to suggest loosening or infection.  No malalignment.  Trace knee joint fluid.  Subcutaneous soft tissue edema about the knee and proximal calf.      Electronically signed  by: Saeid Pérez  Date:    10/31/2024  Time:    16:39                                     X-Ray Chest AP Portable (Final result)  Result time 10/31/24 16:38:38      Final result by Saeid Pérez MD (10/31/24 16:38:38)                   Impression:      1. Pleuroparenchymal nodule in the right lung base and several other nodules in the right lung apex.  Findings better seen at CT.  These are nonspecific with neoplasm not excluded.  2. Borderline heart enlargement.  3. Prominence of the pulmonary vasculature raising the possibility for elevated pulmonary pressures.      Electronically signed by: Saeid Pérez  Date:    10/31/2024  Time:    16:38               Narrative:    EXAMINATION:  XR CHEST AP PORTABLE    CLINICAL HISTORY:  Syncope and collapse    TECHNIQUE:  Single frontal view of the chest was performed.    COMPARISON:  Same-day CT    FINDINGS:  There is a nodular airspace opacity at the periphery of the right lung base.  Remaining lungs clear.  Borderline cardiac enlargement with metallic device projecting over the right heart border possibly an intra atrial septal occlusion device.  Prominence of the pulmonary arterial vasculature aortic arch atherosclerosis.  No pleural effusion or pneumothorax.  There are multiple surgical clips and staple lines over the upper abdomen.                                       CT Entire Spine Without Contrast (Final result)  Result time 10/31/24 16:26:15      Final result by Nomi Vasquez MD (10/31/24 16:26:15)                   Impression:      There is degenerative change throughout the spine, most significant in the lower lumbar spine as well as in the cervical spine.  There is however no obvious acute fracture or traumatic malalignment.  The entire spine was obtained in the large field of view.      Electronically signed by: Nomi Vasquez MD  Date:    10/31/2024  Time:    16:26               Narrative:    EXAMINATION:  CT ENTIRE SPINE WITHOUT  CONTRAST (XPD)    CLINICAL HISTORY:  Fall, pan spinal tenderness;    TECHNIQUE:  Axial images were obtained through the entire spine.  Sagittal and coronal reformatted images were created.    COMPARISON:  Lumbar spine MRI dated 03/15/2016    FINDINGS:  There is no recent study for comparison.  On the screening study obtained with a large field of view there is no obvious acute fracture.  There is extensive chronic change throughout the cervical spine.  The odontoid process is intact.  The anterior and posterior arches of C1 are intact as well.  There is severe disc space narrowing at the C2-3, C3-4, C4-5 levels with moderate to marked disc space narrowing at the C5-6 level.  There is trace retrolisthesis of C4 on C5.  There is chronic anterior wedging of C3 and C4.  There is multilevel foraminal stenosis throughout the cervical region due to uncovertebral spurring and/or facet joint arthropathy.  Also, there is spinal stenosis most apparent at the C4-5 level.    In the thoracic spine, there is mild chronic anterior wedging of several midthoracic vertebral bodies but there is no obvious acute fracture or traumatic malalignment.  The facet joints maintain normal articulation.    In the lumbar spine there is a vacuum disc at the L3-4 and L5-S1 levels.  There is severe disc space narrowing at L5-S1.  The lumbar vertebral bodies maintain normal height without obvious acute fracture.  Alignment is grossly normal.  There is extensive facet joint arthropathy in the mid to lower lumbar spine.    There is a dextrocurvature of the lower lumbar spine with gentle broad levocurvature at the thoracolumbar junction.    There is no displaced or depressed rib fracture identified on this limited field of view.  There is no obvious spinous process fracture.    The sacrum is intact.    There is atherosclerosis.  There is no pneumothorax.  There are several scattered nodules in the lungs which are suboptimally evaluated.                                        CT Maxillofacial Without Contrast (Final result)  Result time 10/31/24 16:12:17      Final result by Nomi Vasquez MD (10/31/24 16:12:17)                   Impression:      1. There is mild irregularity of the interior midline mandible with small adjacent bone fragments which could represent acute fracture with overlying soft tissue swelling.  There is no other acute maxillofacial or orbital fracture.  There is suspected old deformities of the anterior nasal bones.      Electronically signed by: Nomi Vasquez MD  Date:    10/31/2024  Time:    16:12               Narrative:    EXAMINATION:  CT MAXILLOFACIAL WITHOUT CONTRAST    CLINICAL HISTORY:  Facial trauma, blunt;    TECHNIQUE:  Low dose axial images, sagittal and coronal reformations were obtained through the face.  Contrast was not administered.    COMPARISON:  None    FINDINGS:  There is suspected soft tissue swelling over the chin.  There is very subtle cortical irregularity involving the anterior paramedian mandible which could represent subtle acute fracture with minimal adjacent bone fragments.  There is beam hardening artifact related to hardware in the region of the left maxilla.  There is no dislocation at the TMJ.  There are changes of torus mandibularis.    There is old deformity of the anterior nasal bones.  There is no acute displaced or depressed nasal bone or nasal septum fracture.    There is no acute orbital fracture.                                       CT Head Without Contrast (Final result)  Result time 10/31/24 16:07:44      Final result by Nomi Vasquez MD (10/31/24 16:07:44)                   Impression:      There is left temporoparietal soft tissue prominence with demineralization of the underlying bone and suspected extra-axial mass in this region which is nonspecific and incompletely evaluated.  These could potentially represent an atypical meningioma but further evaluation with brain MRI  "without and with contrast could be obtained.  This is not acute.  There is no hemorrhage.  There is no acute skull fracture.      Electronically signed by: Nomi Vasquez MD  Date:    10/31/2024  Time:    16:07               Narrative:    EXAMINATION:  CT HEAD WITHOUT CONTRAST    CLINICAL HISTORY:  Head trauma, minor (Age >= 65y);    TECHNIQUE:  Routine unenhanced axial images were obtained through the head.  Sagittal and coronal reformatted images were created.  The study is reviewed in bone and soft tissue windows.    COMPARISON:  None    FINDINGS:  Intracranial contents: There is no acute intracranial abnormality.  There is mild nonspecific white matter change.  There is no hemorrhage, parenchymal mass or mass effect.  The gray-white interface is preserved without acute infarction.  The basilar cisterns are open.  There is a remote lacunar infarction in the right caudate nucleus.  The cerebellar tonsils are normal position.    Extracranial contents, calvarium, soft tissues: The included paranasal sinuses and mastoid air cells are clear.  There is no acute skull fracture.  There is soft tissue prominence of the left temporoparietal region.  There is demineralization of the underlying calvarium with in irregular inner bony margin.  Also, there is suggestion of possible extra-axial dural-based soft tissue mass in this region which could potentially represent an atypical meningioma and further evaluated with brain MRI without and with contrast.                                       Prior diet: Regular/thin.    Subjective     Denies dysphagia.   "Thank yall so much."     Pain/Comfort:  Pain Rating 1: 0/10    Respiratory Status: High flow, flow 30 L/min, concentration 80%    Objective:   Pt seen in ICU for clinical swallow evaluation. She is AAOx4, pleasant and cooperative. Sitting up in cardiac chair with Aspen, rigid collar in place. Pt denies dysphagia.     Oral Musculature Evaluation  Oral Musculature: " WFL  Dentition: upper dentures (no lower dentition)  Secretion Management: adequate  Mucosal Quality: good  Mandibular Strength and Mobility: WFL  Oral Labial Strength and Mobility: WFL  Lingual Strength and Mobility: WFL (slight deviation to L upon protrusion; functional)  Velar Elevation: WFL  Buccal Strength and Mobility: WFL, impaired (strength, difficulty bilaterally)  Volitional Cough: weak  Volitional Swallow: rise and tilt palpated  Voice Prior to PO Intake: clear    Bedside Swallow Eval:   Consistencies Assessed:  Thin liquids --via tsp and straw  Puree --applesauce  Soft solids --diced peaches  Solids --connor cracker      Oral Phase:   WFL  Pt reports mild difficulty chewing salting cracker     Pharyngeal Phase:   no overt clinical signs/symptoms of aspiration  no overt clinical signs/symptoms of pharyngeal dysphagia    Compensatory Strategies  None    Treatment: Pt/family educated re: results/recs of evaluation, SLP role and POC. Receptive to information provided.     Goals:   Multidisciplinary Problems       SLP Goals          Problem: SLP    Goal Priority Disciplines Outcome   SLP Goal     SLP Progressing   Description: 1) The patient will consume a IDDSI level 7 regular thin liquids level 0 diet with improved oral efficiency and continued safety.                                  Plan:     Patient to be seen:  2 x/week   Plan of Care expires:     Plan of Care reviewed with:  patient   SLP Follow-Up:  No       Discharge recommendations:  No Therapy Indicated   Barriers to Discharge:  Level of Skilled Assistance Needed .    Time Tracking:     SLP Treatment Date:   11/08/24  Speech Start Time:  0948  Speech Stop Time:  1003     Speech Total Time (min):  15 min    Billable Minutes: Eval Swallow and Oral Function 15 and Total Time 15    11/08/2024

## 2024-11-08 NOTE — PROGRESS NOTES
[facility]  Department of Cardiology  Progress Note    PATIENT NAME: Shanell Mahmood  MRN: [id number]  TODAY'S DATE: 11/08/2024  ADMIT DATE: 10/31/2024    SUBJECTIVE     PRINCIPLE PROBLEM: Central cord syndrome    INTERVAL HISTORY:    11/8/2024  Pt seen this morning doing very well - up in cardiac chair and was able to eat breakfast and take PO medications. Remains on amio gtt as well as nor epi.  Does not have any complaints at this time.  Remains on 30 L 75% high-flow nasal cannula.    11/7/2024  Is awake lying in bed not in any acute distress.  She had been on Levophed and she is slowly being titrated down.  Still significantly winded on minimal exertion she is on Vapotherm now at 30 L and 80% to maintain good oxygenation.  She is currently fully anticoagulated.    Review of patient's allergies indicates:   Allergen Reactions    Codeine Palpitations       REVIEW OF SYSTEMS  CARDIOVASCULAR: No recent chest pain, palpitations, arm, neck, or jaw pain  RESPIRATORY:  Short of breath on minimal exertion currently on 30 L of Vapotherm at 80%  : No blood in the urine  GI: No Nausea, vomiting, constipation, diarrhea, blood, or reflux.  MUSCULOSKELETAL:  Muscle weakness  NEURO:  Multilevel laminectomy in got compression surgery with quadriparesis persisting.  EYES: No Double vision, blurry, vision or headache     OBJECTIVE     VITAL SIGNS (Most Recent)  Temp: 98.5 °F (36.9 °C) (11/08/24 1130)  Pulse: 84 (11/08/24 1245)  Resp: 13 (11/08/24 1245)  BP: (!) 86/45 (11/08/24 1245)  SpO2: 100 % (11/08/24 1245)    VENTILATION STATUS  Resp: 13 (11/08/24 1245)  SpO2: 100 % (11/08/24 1245)  Oxygen Concentration (%):  [70-95] 75        I & O (Last 24H):  Intake/Output Summary (Last 24 hours) at 11/8/2024 1252  Last data filed at 11/8/2024 1251  Gross per 24 hour   Intake 1395.78 ml   Output 1450 ml   Net -54.22 ml       WEIGHTS  Wt Readings from Last 1 Encounters:   11/01/24 1825 72.7 kg (160 lb 4.4 oz)   11/01/24 0733 70.8  kg (156 lb)   10/31/24 2104 71.2 kg (156 lb 15.5 oz)   10/31/24 1455 71.7 kg (158 lb)       PHYSICAL EXAM  CONSTITUTIONAL:  Comfortable in cardiac chair currently on Vapotherm.    NECK: no carotid bruit, no JVD  LUNGS:  Bilateral decreased breath sounds in both lung bases  CHEST WALL: no tenderness  HEART: regular rate and rhythm, S1, S2 normal, systolic murmur audible  ABDOMEN: soft, bowel sounds audible  EXTREMITIES: Extremities bilateral ankle edema  NEURO: AAO X 3 recent cord compression surgery status post multilevel laminectomy and persistent quadriparesis.    SCHEDULED MEDS:   allopurinoL  100 mg Oral QHS    atorvastatin  40 mg Oral Daily    bisacodyL  10 mg Rectal Daily    colchicine  0.3 mg Oral Daily    enoxparin  1 mg/kg Subcutaneous Q24H (treatment, non-standard time)    HYDROcodone-acetaminophen  1 tablet Oral Q6H    macitentan  10 mg Oral Daily    methocarbamoL  750 mg Oral TID    midodrine  15 mg Oral TID    montelukast  10 mg Oral Daily    pantoprazole  40 mg Oral Daily    selexipag  1,600 mcg Oral BID    tadalafil  40 mg Oral Daily       CONTINUOUS INFUSIONS:   amiodarone in dextrose 5%  0.5 mg/min Intravenous Continuous 16.7 mL/hr at 11/08/24 1251 0.5 mg/min at 11/08/24 1251    NORepinephrine bitartrate-D5W  0-3 mcg/kg/min Intravenous Continuous 10.2 mL/hr at 11/08/24 1251 0.3 mcg/kg/min at 11/08/24 1251       PRN MEDS:  Current Facility-Administered Medications:     acetaminophen, 650 mg, Oral, Q4H PRN    albuterol sulfate, 2.5 mg, Nebulization, Q6H PRN    aluminum-magnesium hydroxide-simethicone, 30 mL, Oral, Q4H PRN    dextrose 50%, 12.5 g, Intravenous, PRN    dextrose 50%, 25 g, Intravenous, PRN    diphenhydrAMINE, 25 mg, Oral, Q6H PRN    diphenhydrAMINE-zinc acetate 1-0.1%, , Topical (Top), TID PRN    glucagon (human recombinant), 1 mg, Intramuscular, PRN    glucose, 16 g, Oral, PRN    glucose, 24 g, Oral, PRN    HYDROmorphone, 1 mg, Intravenous, Q6H PRN    HYDROmorphone, 2 mg, Oral, Q4H PRN    " magnesium oxide, 800 mg, Oral, PRN    magnesium oxide, 800 mg, Oral, PRN    methocarbamoL, 500 mg, Oral, TID PRN    naloxone, 0.02 mg, Intravenous, PRN    ondansetron, 4 mg, Intravenous, Q8H PRN    potassium bicarbonate, 35 mEq, Oral, PRN    potassium bicarbonate, 50 mEq, Oral, PRN    potassium bicarbonate, 60 mEq, Oral, PRN    potassium, sodium phosphates, 2 packet, Oral, PRN    potassium, sodium phosphates, 2 packet, Oral, PRN    potassium, sodium phosphates, 2 packet, Oral, PRN    prochlorperazine, 5 mg, Intravenous, Q6H PRN    senna-docusate 8.6-50 mg, 2 tablet, Oral, Nightly PRN    sodium chloride 0.9%, 500 mL, Intravenous, PRN    sodium chloride 0.9%, 10 mL, Intravenous, Q12H PRN    sodium chloride 0.9%, 10 mL, Intravenous, PRN    LABS AND DIAGNOSTICS     CBC LAST 3 DAYS  Recent Labs   Lab 11/03/24  2020 11/04/24  0358 11/06/24  0318 11/06/24  1324 11/07/24  0415 11/07/24  0607 11/08/24  0546   WBC 6.33   < > 8.94  --  7.70  --  7.26   RBC 3.97*   < > 3.91*  --  3.69*  --  3.67*   HGB 11.2*   < > 11.0*  --  10.3*  --  10.2*   HCT 34.4*   < > 33.4*   < > 31.1* 36 30.7*   MCV 87   < > 85  --  84  --  84   MCH 28.2   < > 28.1  --  27.9  --  27.8   MCHC 32.6   < > 32.9  --  33.1  --  33.2   RDW 16.7*   < > 15.8*  --  15.4*  --  15.8*   *   < > 121*  --  134*  --  139*   MPV 10.7   < > 12.0  --  11.5  --  11.4   GRAN 75.8*  4.8  --   --   --   --   --  77.2*  5.6   LYMPH 12.6*  0.8*  --   --   --   --   --  10.1*  0.7*   MONO 8.8  0.6  --   --   --   --   --  10.3  0.8   BASO 0.04  --   --   --   --   --  0.04   NRBC 0  --   --   --   --   --  0    < > = values in this interval not displayed.       COAGULATION LAST 3 DAYS  No results for input(s): "LABPT", "INR", "APTT" in the last 168 hours.      CHEMISTRY LAST 3 DAYS  Recent Labs   Lab 11/02/24  0505 11/03/24  0256 11/03/24  2020 11/06/24  0318 11/06/24  1239 11/06/24  1324 11/07/24  0415 11/07/24  0607 11/08/24  0546 11/08/24  0801    141   < " "> 131* 131*  --  134*  --  133*  --    K 4.2 4.2   < > 4.3 3.9  --  4.0  --  4.1  --     108   < > 103 104  --  107  --  105  --    CO2 21* 25   < > 22* 20*  --  22*  --  21*  --    ANIONGAP 11 8   < > 6* 7*  --  5*  --  7*  --    BUN 31* 30*   < > 34* 33*  --  32*  --  32*  --    CREATININE 1.8* 1.9*   < > 2.2* 1.9*  --  1.9*  --  1.8*  --    GLU 99 93   < > 106 171*  --  109  --  101  --    CALCIUM 8.1* 8.2*   < > 7.5* 7.2*  --  7.5*  --  7.7*  --    PHOS 3.6 3.8  --   --  4.0  --   --   --   --   --    PH  --   --    < >  --   --  7.335*  --  7.313*  --  7.337*   MG 2.0 2.1   < > 2.0 1.9  --  2.0  --   --   --    ALBUMIN 2.8* 2.9*  --   --   --   --   --   --   --   --    BILITOT 0.4 0.4  --   --   --   --   --   --   --   --    PROT 5.6* 5.6*  --   --   --   --   --   --   --   --    ALKPHOS 81 79  --   --   --   --   --   --   --   --    ALT 6* 5*  --   --   --   --   --   --   --   --    AST 10 9*  --   --   --   --   --   --   --   --     < > = values in this interval not displayed.       CARDIAC PROFILE LAST 3 DAYS  No results for input(s): "BNP", "CPK", "CPKMB", "LDH", "TROPONINI", "TROPONINIHS" in the last 168 hours.      ENDOCRINE LAST 3 DAYS  Recent Labs   Lab 11/03/24  0256   PROCAL 0.169       LAST ARTERIAL BLOOD GAS  ABG  Recent Labs   Lab 11/08/24  0801   PH 7.337*   PO2 74*   PCO2 39.5   HCO3 21.2*   BE -5*       LAST 7 DAYS MICROBIOLOGY   Microbiology Results (last 7 days)       Procedure Component Value Units Date/Time    Blood culture [[id number]] Collected: 11/01/24 1829    Order Status: Completed Specimen: Blood from Peripheral, Hand, Right Updated: 11/06/24 2032     Blood Culture, Routine No growth after 5 days.            MOST RECENT IMAGING  X-Ray Chest AP Portable  Narrative: EXAMINATION:  XR CHEST AP PORTABLE    CLINICAL HISTORY:  Pulmonary hypertension;    FINDINGS:  Portable chest with comparison chest x-ray 11/06/2024.  Normal cardiomediastinal silhouette.Enlarged central hilar " vascular structures again noted with mild perihilar peribronchial interstitial thickening.  Hazy opacities of the left lung base are mildly increased.  No pneumothorax.  Right PICC line is unchanged.  Pulmonary vasculature is normal. No acute osseous abnormality.  Impression: Mild increased hazy opacities of the left lung bases.  Otherwise no significant changes from prior exam.    Electronically signed by: Scotty Aceves  Date:    11/08/2024  Time:    08:50      [facility]  Results for orders placed during the hospital encounter of 10/31/24    Echo Saline Bubble? No; Ultrasound enhancing contrast? No    Interpretation Summary    Left Ventricle: The left ventricle is smaller than normal. Normal wall thickness. There is normal systolic function with a visually estimated ejection fraction of 65 - 70%. There is normal diastolic function.    Right Ventricle: Mild right ventricular enlargement. Wall thickness is normal. Systolic function is moderately reduced.    Left Atrium: Left atrium is moderately dilated.    Right Atrium: Right atrium is mildly dilated.    IVC/SVC: Normal venous pressure at 3 mmHg.      CURRENT/PREVIOUS VISIT EKG  Results for orders placed or performed during the hospital encounter of 10/31/24   EKG 12-lead    Collection Time: 11/07/24  7:05 AM   Result Value Ref Range    QRS Duration 72 ms    OHS QTC Calculation 427 ms    Narrative    Test Reason : I49.9,    Vent. Rate : 073 BPM     Atrial Rate : 079 BPM     P-R Int : 200 ms          QRS Dur : 072 ms      QT Int : 388 ms       P-R-T Axes : 000 083 024 degrees     QTc Int : 427 ms    Sinus rhythm with Blocked Premature atrial complexes  Nonspecific T wave abnormality  Abnormal ECG  When compared with ECG of 06-NOV-2024 13:13,  Sinus rhythm has replaced Atrial fibrillation  Vent. rate has decreased BY  67 BPM  Left posterior fascicular block is no longer Present    Referred By: [email or web address]   SELF           Confirmed By:        ASSESSMENT/PLAN:     Active  Hospital Problems    Diagnosis    *Central cord syndrome    S/P cervical spinal fusion    Paroxysmal atrial fibrillation    Myelopathy    Quadriparesis    Acute deep vein thrombosis (DVT) of popliteal vein of right lower extremity    Status post cervical spinal fusion    Sinus arrhythmia seen on electrocardiogram    Anemia    Thrombocytopenia    Hypotension    Acute urinary retention    Syncope and collapse    CKD (chronic kidney disease), stage IV    Chronic respiratory failure with hypoxia    Pulmonary hypertension    Secondary hyperparathyroidism    Brain mass    Stroke    Weakness of both lower extremities       ASSESSMENT & PLAN:  1. Severe pulmonary hypertension  2. Acute on chronic hypoxemic respiratory failure   3. Paroxysmal atrial fibrillation  4. Severe central cord compression  5. Quadriparesis  6. Chronic kidney disease  7. Intracranial mass  8. Pulmonary nodule  9. Anemia with thrombocytopenia  10. Acute DVT  11. Syncope with collapse and history of stroke        RECOMMENDATIONS:    11/08/2024  Remains on 30 L high-flow nasal cannula 75% to need O2 requirements.  Continue to wean as tolerated.  Blood pressure currently stable on Levophed - wean as tolerated.  Continue midodrine 15 mg t.i.d.  Continue anticoagulation for DVT as well as the risk of PE and Afib.  Consider transitioning IV amiodarone to p.o. as patient passed her speech eval this morning and was able to eat breakfast and take p.o. medications.  We will continue to follow at this time.      11/07  1. Patient with severe pulmonary hypertension and currently on increased requirement of oxygen on Vapotherm 30 L and 80% to meet the oxygen requirements.  Patient was on Opsumit, Uptravi and Adcirca, probably needs to be started back on it.  Patient's echocardiogram shows mild to moderately dilated right ventricle with hypokinesis and there is some evidence of flattening of the septum.    Risk of PE still remains high and currently she is fully  anticoagulated.    2. Hypotension  Her echo shows normal left ventricle systolic function with an ejection fraction of 65% and she is currently on Levophed to maintain her blood pressure.  Not sure if this is reflexion of cord compression syndrome and spinal cord shock.  And if it is spinal cord shock me lasts for days to weeks.  And the treatment for spinal cord shock would be maintaining hemodynamic and respiratory stability.  Will defer this to Neurology and Neurosurgery.  At the present time patient does not seem to have unopposed parasympathetic tone.  She is currently on midodrine 15 mg p.o. t.i.d.    3. Acute on chronic hypoxemic respiratory failure and she is currently on high levels of Vapotherm support about 30 L and 75% to oxygenation.    She has multiple issues that cause significant compromise in her pulmonary function including possibility of a PE and underlying renal failure and evidence of diastolic dysfunction with significant pulmonary hypertension     4. Atrial fibrillation   Currently on full anticoagulation continue the same.  Need to continue it uninterrupted.  Patient is currently in sinus rhythm.    5. Ventricular tachycardia   Patient had 25 beat run of ventricular tachycardia overnight and for the same reason she is also on amiodarone.  Patient is not a candidate for beta-blockers.    6. DVT   she does have nonocclusive DVT to the right leg in the risk of pulmonary embolism does exists.  Continue anticoagulation    7. Severe central cord compression status post multilevel laminectomy and and she does have persisting quadriparesis.  Further recommendations as per Neurosurgery.    8. Anemia   Her current hemoglobin is 10.3 hematocrit is 31.1 and platelets a 134 with a white count of 7.7    9. Intracranial mass/pulmonary nodules  Further recommendations as per hospitalist and heme/ onc.    10. Would still consider moving her to tertiary care center for further management.            11/06/2024  8:38 AM 5:03 pm   1. Patient has had pulmonary hypertension and she is being followed by her pulmonologist and she had a right heart catheterization.  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     Dr. Segal in [place]. He performed a right heart cath and found a mean PA pressure of 40, wedge of 12, with PVR of 10 Wood units. She is on Opsumit, Adcirca, and Uptravi.      2. Atrial fibrillation   She follows up with her primary cardiologist Dr. Segal.  At home she is not on any anticoagulation.    She was started on enoxaparin 1 milligram/kilogram subQ Q 24 hours.  Patient underwent surgery for central cord syndrome and cervical spondylosis with myelopathy and radiculopathy on 11/03/2024  Highly recommend to consult Neurosurgery in regards with anticoagulation of the patient as risk of bleeding into the area is significant.  If she is not adequately anticoagulated she is also at risk for a stroke.     3. History of recent DVT and not on any anticoagulation and at risk for pulmonary embolism.     4. Hypotension and syncope with acute on chronic hypoxemia, need to rule out pulmonary embolism.  She would need a CTA to rule out pulmonary embolus and given the fact that she has underlying chronic kidney disease stage 5 in the past, that would create a problem.     Currently she is on norepinephrine and midodrine, continue the same.     5. She also has a left frontoparietal and temporal extra-axial mass and would need further evaluation of the same at some point in time.       6. Atrial fibrillation   Currently her heart rate is controlled at 97 beats per minute she was started on amiodarone drip may need to hold amiodarone as she is not adequately anticoagulate and not sure how long she had been in atrial fibrillation.  Patient was also started on enoxaparin need to get clearance from Neurosurgery also.     7. Anemia of chronic disease.       8.  Chronic  hypoxemic respiratory failure with significant shortness a breath on exertion  And she had office pulmonary function tests done at Dr. Federico Tejada's office.  6/8/2021   7:36 AM 3/29/2022   3:22 PM 1/23/2023   2:35 PM   Office Spirometry/PFT/Diffusion Results   FVC (Liters) 2.69 Liters 2.76 Liters   FEV1 (Liters) 2.08 Liters 1.92 Liters   FEV1/FVC (%) 77 % 69 %   MVV (L/min) 69 L/min   O2 Satu 88 96   FiO2 21 28   TLC Liters Pre Kelly 5.34 Liters   RV/TLC (%) 49 %   VC (Liters) 2.72 Liters   DLCO (mL/mmHg/min) 16.3 ml/mmHg sec   DLCO/VA Pre Kelly 3.4   FVC PRE 2.94 Liters   FVC Pre%Pred 107 %   FEV1 PRE 2.15 Liters   FEV1 Pre%Pred 102 %   FEV1/FVC PRE 73.23 %      9. Patient has multiple complex medical problems and she is better served at a tertiary care center and would benefit from transferring her to the [facility] on [facility] in [place]..      Maryan Fernandez NP  Date of Service: 11/08/2024  2:06 PM    I have personally interviewed and examined the patient, I have reviewed the Nurse Practitioner's history and physical, assessment, and plan. I have personally evaluated the patient at bedside and agree with the findings and made appropriate changes as necessary in recommendations.    1. Patient appears to be stable she is in a seated position and she has had her lunch today.  2. Will switch her to oral amiodarone  3. She is still on Levophed at lower doses continue the same.  4. Will continue to monitor her closely in the ICU at [facility] will consider transfer if her condition should get worse.  At the present time will hold of the transfer.          Ridge Fraser MD  Department of Cardiology  Formerly [facility], later [facility]  11/08/2024 8:38 AM

## 2024-11-08 NOTE — CARE UPDATE
11/08/24 0700   Patient Assessment/Suction   Level of Consciousness (AVPU) alert   Respiratory Effort Normal;Unlabored   Expansion/Accessory Muscles/Retractions no use of accessory muscles;no retractions;expansion symmetric   All Lung Fields Breath Sounds diminished   Rhythm/Pattern, Respiratory unlabored;pattern regular;depth regular   Cough Frequency infrequent   PRE-TX-O2   Device (Oxygen Therapy) high flow nasal cannula w/device   $ Is the patient on High Flow Oxygen? Yes   Flow (L/min) (Oxygen Therapy) 30   Oxygen Concentration (%) 85   SpO2 (!) 92 %   Pulse Oximetry Type Continuous   $ Pulse Oximetry - Multiple Charge Pulse Oximetry - Multiple   Pulse 84   Resp 15   /68   Vibratory PEP Therapy   $ Vibratory PEP Charges Aerobika Therapy   $ Vibratory PEP Tech Time Charges 15 min   Daily Review of Necessity (PEP Therapy) completed   Type (PEP Therapy) vibratory/oscillatory   Device (PEP Therapy) flutter   Route (PEP Therapy) mouthpiece   Breaths per Cycle (PEP Therapy) 10   PEP Pressure (cm H2O) 5   Ready to Wean/Extubation Screen   FIO2<=50 (chart decimal) (!) 0.85

## 2024-11-08 NOTE — PROGRESS NOTES
[facility]  Progress Note    Patient Name: Shanell Mahmood  MRN: [id number]  Patient Class: IP- Inpatient   Admission Date: 10/31/2024  Length of Stay: 6 days  Attending Physician: Gypsy Bailey MD  Primary Care Provider: Nicole, Primary Doctor        Subjective:     Principal Problem:Central cord syndrome        HPI:  Shanell Mahmood is a 74 year old female with a previous medical history of anemia, Pulmonary hypertension on 4 liters continuous oxygen at home, arthritis, CKD V, Osteoporosis, secondary hyperprathyroidism, S/P closure of patent foramen ovale in 2011, CVA in 2011 with no residuals, chronic back pain, HLD, Gout, Brain Mass and thyroid diease who presented to the ED after unwitnessed syncopal episode. The patient was at her pain management office for re certification only. There were no procedures performed. She reports taking one hydrocodone 10mg today.  The last thing she remembers was walking down the hallway of the office and looking for something to cover her head from the rain and did not have any adverse symptoms or feelings at that time.  She has no recollection of the syncopal events. When she was initially evaluated by EMS, her blood pressure was in the 60s over 40s. She did require constant stimulation to remain awake. Fell and hit her head. Does not take any blood thinners. EMS evaluated her and gave her 1 mg of Narcan as well as 250 cc of fluid. Her pressure improved and she had become more alert. Initial ED workup was thorough and all imaging showing that included CT of neck, head and entire spine showed no acute processes. CBC showed anemia and CMP showed elevated creatinine consistent with history of CKD V. Drug screen positive for opioids but patient has a hydrocodone prescription. The patient is currently in the process of have a left sided brain mass visualized on MRI 10/8/24 evaluated that was an incidental finding after undergoing a PET scan for a pulmonary  "nodule on 9/6/24 with abnormal uptake noted in brain. She has her first appointment on 11/4/24. She is followed by nephrology who is currently monitoring her and there has been one attempt at AV fistual placement but it was unsuccessful due sclerotic changes. Patient reports she awoke this morning feeling well showered and dressed herself. She performs all of her own ADL's. She had one syncopal event in March 2024 and was evaluated by cardiology and informed it was an orthostatic episode. Patient was unable to complete orthostatic vital signs upon admission due to inability to stand stating " I feel as if I can't get my legs under me". When evaluated for admit exam the patient endorses that after the syncopal episode she endorses bilateral leg weakness with decreased sensation in both legs. She reports bilateral  weakness being unable to use her phone and difficulty raising both of her arms. NIH scale performed and total of 9 noted. Patient noted have 400ml of urine in bladder and unable to void. Patient reached a total of 528ml of urine without being able to void.  MRI/MRA of brain and MRI of lumbosacral spine ordered upon admit. MRA and MRI lower back showed no acute process. MRI brain showed Acute infarct in the right caudate head. Dr. Maldonado was called and he recommended MRA of neck in morning and load with aspirin and plavix. Initial EKG read as atrial fibrillation but upon review p waves were noted and this was discussed with the ED. Repeat EKG shows sinus arrhthymias with PACs. Patient admitted by hospital medicine for further evaluation and management.       Overview/Hospital Course:  74-year-old female with history of brain mass, CKD, back pain, pulmonary hypertension on 4 L oxygen is admitted after syncope and collapse found to have a acute CVA in the right caudate on MRI brain.  Has global weakness and decreased sensation to the lower legs.  Multiple CT and x-rays done to rule out trauma ultimately " negative other than the maxillofacial CT reporting mild irregularity of the interior midline mandible with small adjacent bone fragments which could represent acute fracture with overlying soft tissue swelling.  Carotid ultrasound and MRA brain and neck without acute finding.  Neurology is consulted.  Also with underlying brain mass.  Consult Neurosurgery and Oncology.  Given DAPT and statin.  Had hypotension given IVF and midodrine.  Echo shows right heart failure.  BNP is within normal.  Lactic acid pending.  No other sign of infection.  Placed in cervical collar as precaution and MRI of the cervical spine shows severe cord compression at C4-5 and C5-6 likely acute with edema.  Neurosurgery discussed with patient and given her RCRI is 2 and she is a controlled diabetic but has had recent stroke and given Plavix and ASA plan to proceed with surgical decompression tomorrow 11/3.  Patient was NPO at midnight and ASA and Plavix are on hold.    Patient underwent: 11/3/24  Surgery   C2-3, C3-4, C4-5, C5-6, C6-7 laminectomy  C3 through 6 posterior segmental instrumentation using [employer] system  C3-4, C4-5, C5-6 posterolateral arthrodesis using autograft harvested from the same incision and allograft DBM    Maintaining MAP 80 - attempt weaning levophed tomorrow.  PT/OT to follow. Patient wants to go to IR in [place], [place].   11/5: DVT study:  here is hypoechoic peripheral nonocclusive thrombus within the proximal right superficial femoral vein. There is also hypoechoic nonocclusive thrombus within the right popliteal vein, with some preserved color Doppler vascular flow.     Cannot give full dose anticoagulation, currently on lovenox 40mg daily   11/6: ECHO: PAP 63mmHg   Patient worked with PT/OT, stood up and quickly became tachycardic/afib 150s, drop in BP.  CVP 3.  She was resumed on amiodarone and given IVF.  Cardiology recommends transfer to higher level of care with specialist.  THey have initiated a transfer  to [facility] and [facility].    11/7: IVF dc'd.  Considering swanz cath     Interval History: no new complaints     Review of Systems   Constitutional:  Positive for activity change.   Respiratory: Negative.     Cardiovascular: Negative.    Gastrointestinal: Negative.    Musculoskeletal:  Positive for arthralgias and back pain.   Neurological:  Positive for weakness.     Objective:     Vital Signs (Most Recent):   Vital Signs (24h Range):  Temp:  [97.9 °F (36.6 °C)-99.6 °F (37.6 °C)] 99.6 °F (37.6 °C)  Pulse:  [70-87] 84  Resp:  [0-22] 19  SpO2:  [91 %-100 %] 93 %  BP: ()/(51-66) 109/58  Arterial Line BP: ()/() 104/92     Weight: 72.7 kg (160 lb 4.4 oz)  Body mass index is 25.1 kg/m².          Physical Exam  Constitutional:       General: She is not in acute distress.     Comments: Sitting up in cardiac chair    Neck:      Comments: Cervical collar in place   Cardiovascular:      Rate and Rhythm: Tachycardia present. Rhythm irregular.   Pulmonary:      Effort: Pulmonary effort is normal. No respiratory distress.      Breath sounds: Rales present. No wheezing.   Abdominal:      General: Bowel sounds are normal. There is no distension.   Skin:     General: Skin is warm and dry.      Capillary Refill: Capillary refill takes less than 2 seconds.             Significant Labs: All pertinent labs within the past 24 hours have been reviewed.  Recent Lab Results         11/07/24  0821   11/07/24  0607   11/07/24  0415        Allens Test   N/A         Anion Gap     5       Site   [facility]         BUN     32       Calcium     7.5       Chloride     107       CO2     22       Creatinine     1.9       DelSys   Nasal Can         eGFR     27.5       FiO2   85         Flow   25         Glucose     109       Hematocrit     31.1       Hemoglobin     10.3       Lactic Acid Level 0.6  Comment: Falsely low lactic acid results can be found in samples   containing >=13.0 mg/dL total bilirubin and/or >=3.5 mg/dL   direct  bilirubin.             Magnesium      2.0       MCH     27.9       MCHC     33.1       MCV     84       Mode   SPONT         MPV     11.5       Platelet Count     134       POC BE   -5         POC Glucose   102         POC HCO3   21.6         POC Hematocrit   36         POC Ionized Calcium   1.17         POC PCO2   42.6         POC PH   7.313         POC PO2   86         POC Potassium   4.0         POC SATURATED O2   96         POC Sodium   135         POC TCO2   23         Potassium     4.0       RBC     3.69       RDW     15.4       Sample   ARTERIAL         Sodium     134       Sp02   96         WBC     7.70               Significant Imaging: I have reviewed all pertinent imaging results/findings within the past 24 hours.    Assessment/Plan:      * Central cord syndrome  S/p (11/3/24)  C2-3, C3-4, C4-5, C5-6, C6-7 laminectomy  C3 through 6 posterior segmental instrumentation using [employer] system  C3-4, C4-5, C5-6 posterolateral arthrodesis using autograft harvested from the same incision and allograft DBM    Cervical collar in place       Paroxysmal atrial fibrillation  Patient has paroxysmal (<7 days) atrial fibrillation. Patient is currently in atrial fibrillation. [id number] Score: 3. The patients heart rate in the last 24 hours is as follows:  Pulse  Min: 71  Max: 142     Antiarrhythmics  amiodarone 360 mg/200 mL (1.8 mg/mL) infusion, Continuous, Intravenous    Anticoagulants  enoxaparin injection 70 mg, Every 24 hours, Subcutaneous    Plan  - Replete lytes with a goal of K>4, Mg >2  - Patient is anticoagulated, see medications listed above.  - Patient's afib is currently uncontrolled. Will continue current treatment          Acute deep vein thrombosis (DVT) of popliteal vein of right lower extremity  Found on u/s 11/5/24  Cannot give full dose lovenox   Currently on 1mg/kg daily       Quadriparesis  PT/OT      Status post cervical spinal fusion  PT/OT  Cervical collar in place  "      Hypotension  Asymptomatic.  Etiology is unclear but suspected to be related to the stroke or underlying cervical cord pathology  Echo reviewed.  Do not suspect cardiogenic shock.  Do not suspect sepsis  -hold any BP meds or diuretics  -check lactate  -NS 1 L bolus  -start midodrine 10 mg t.i.d.  -MRI cervical spine: severe cord compression at C4-5 and C5-6 , plan for neurosurgical decompression 11/3.  Hold ASA and Plavix and NPO at midnight.  -transferred to ICU, goal MAP 80 or higher    Thrombocytopenia  The patients 3 most recent labs are listed below.  Recent Labs     10/31/24  1532 11/01/24  0551   * 148*       Plan  - Will transfuse if platelet count is <100k (if undergoing neurosurgery), or otherwise less than 10 K  -trend daily    Anemia  Anemia is likely due to chronic disease due to Chronic Kidney Disease. Most recent hemoglobin and hematocrit are listed below.  Recent Labs     10/31/24  1532 11/01/24  0551 11/02/24  0505   HGB 9.7* 12.0 13.4   HCT 30.8* 38.3 43.3       Plan  - Monitor serial CBC: Daily  - Transfuse PRBC if patient becomes hemodynamically unstable, symptomatic or H/H drops below 7/21.  - Patient has not received any PRBC transfusions to date  - Patient's anemia is currently improving    Sinus arrhythmia seen on electrocardiogram  EKG read states atrial fibrillation but P waves are present. Repeat EKG after admission confirms this.     Stroke  Acute CVA right caudate  MRI, MRA, CT, carotid U/S reports reviewed  -DAPT and statin  -neurology following  -neurosurgery and oncology consulted for the brain mass  -PT/OT/SLP  -echo bubble report is positive, follow up Neurology recommendation    Brain mass  Discovered after PET scan of lung  on 9/6/24 revealed abnormal uptake in brain. 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  Unclear if contributing to CVA  She will have her first appointment with " Dr. Ray Mcintyre at  [facility]  on 11/4/24  -neurosurgery and oncology follow-up    Secondary hyperparathyroidism  Chronic condition that is stable. Followed by nephrology last visit in May 2024 and endocrinology (Last visit 9/4/24)    Pulmonary hypertension  Chronic condition that is stable.   -holding her torsemide and pulmonary hypertension meds due to the hypotension    Chronic respiratory failure with hypoxia  Patient with Hypoxic Respiratory failure which is Chronic.  she is on home oxygen at 4 LPM. Maintaining oxygen saturation on 4 liters nasal cannula which has been continued.  Due to pulmonary hypertension    CKD (chronic kidney disease), stage IV  Creatine stable for now. BMP reviewed- noted Estimated Creatinine Clearance: 28.7 mL/min (A) (based on SCr of 1.7 mg/dL (H)). according to latest data. Based on current GFR, CKD stage is stage 4 - GFR 15-29.  Monitor UOP and serial BMP and adjust therapy as needed. Renally dose meds. Avoid nephrotoxic medications and procedures.    Followed by nephrology Nayely Clements-CHENG at [facility]. Initial attempt at AV fistula placement unsuccessful and currently just being monitored. Last visit 5/2/24    Weakness of both lower extremities  PT/OT   Recommending inpatient rehab - referral sent       Syncope and collapse  See stroke  Orthostatic vital signs ordered and patient unable to stand due weakness in bilateral legs    Acute urinary retention  Possibly neurogenic from the CVA or from cervical spine cord compression  -straight cath per protocol         VTE Risk Mitigation (From admission, onward)           Ordered     enoxaparin injection 70 mg  Every 24 hours         11/06/24 1146     Reason for No Pharmacological VTE Prophylaxis  Once        Question:  Reasons:  Answer:  Risk of Bleeding    10/31/24 7685     IP VTE HIGH RISK PATIENT  Once         10/31/24 1849     Place sequential compression device  Until  discontinued         10/31/24 1849                    Discharge Planning   ELBERT: 11/8/2024     Code Status: DNR   Is the patient medically ready for discharge?:     Reason for patient still in hospital (select all that apply): Patient unstable, Treatment, and Pending disposition  Discharge Plan A: Hospital Transfer   Discharge Delays:  (Accepting facility)        Critical care time spent on the evaluation and treatment of severe organ dysfunction, review of pertinent labs and imaging studies, discussions with consulting providers and discussions with patient/family: 20 minutes.      Gypsy Bailey MD  Department of Hospital Medicine   [facility]

## 2024-11-09 PROBLEM — I49.8 SINUS ARRHYTHMIA SEEN ON ELECTROCARDIOGRAM: Status: RESOLVED | Noted: 2024-01-01 | Resolved: 2024-01-01

## 2024-11-09 NOTE — PLAN OF CARE
Pt in ICU on levophed and amiodarone drip. Worked with OT and PT today, tolerated well. Sat in cardiac chair for 4 hours, tolerated well. On a waffle mattress in bed, frequent turns and repositioning per patient request done. Tolerating more oral intake, adequte UOP noted with clear yellow urine. Remains in c-collar. Vapotherm oxygen at 85%.      Problem: Adult Inpatient Plan of Care  Goal: Plan of Care Review  Outcome: Progressing  Goal: Patient-Specific Goal (Individualized)  Outcome: Progressing  Goal: Absence of Hospital-Acquired Illness or Injury  Outcome: Progressing  Goal: Optimal Comfort and Wellbeing  Outcome: Progressing  Goal: Readiness for Transition of Care  Outcome: Progressing     Problem: Skin Injury Risk Increased  Goal: Skin Health and Integrity  Outcome: Progressing     Problem: Stroke, Ischemic (Includes Transient Ischemic Attack)  Goal: Optimal Coping  Outcome: Progressing  Goal: Effective Bowel Elimination  Outcome: Progressing  Goal: Optimal Cerebral Tissue Perfusion  Outcome: Progressing  Goal: Optimal Cognitive Function  Outcome: Progressing  Goal: Improved Communication Skills  Outcome: Progressing  Goal: Optimal Functional Ability  Outcome: Progressing  Goal: Optimal Nutrition Intake  Outcome: Progressing  Goal: Effective Oxygenation and Ventilation  Outcome: Progressing  Goal: Improved Sensorimotor Function  Outcome: Progressing  Goal: Safe and Effective Swallow  Outcome: Progressing  Goal: Effective Urinary Elimination  Outcome: Progressing     Problem: Fall Injury Risk  Goal: Absence of Fall and Fall-Related Injury  Outcome: Progressing     Problem: Infection  Goal: Absence of Infection Signs and Symptoms  Outcome: Progressing     Problem: Wound  Goal: Optimal Coping  Outcome: Progressing  Goal: Optimal Functional Ability  Outcome: Progressing  Goal: Absence of Infection Signs and Symptoms  Outcome: Progressing  Goal: Improved Oral Intake  Outcome: Progressing  Goal: Optimal Pain  Control and Function  Outcome: Progressing  Goal: Skin Health and Integrity  Outcome: Progressing  Goal: Optimal Wound Healing  Outcome: Progressing     Problem: Oral Intake Inadequate  Goal: Improved Oral Intake  Outcome: Progressing

## 2024-11-09 NOTE — ASSESSMENT & PLAN NOTE
Asymptomatic.  Etiology is unclear but suspected to be related to the stroke or underlying cervical cord pathology vs spinal shock/possible from phospho diesterase   Echo reviewed.  Not  cardiogenic shock.  No signs of infection or sepsis  -keep hold any BP meds or diuretics  On midodrine 10 mg t.i.d.  Status post multilevel cervical spine laminectomy   Currently on 2 vasopressor  Possible trial of steroid

## 2024-11-09 NOTE — PROGRESS NOTES
Pulmonary/Critical Care Progress Note      PATIENT NAME: Shanell Mahmood  MRN: [id number]  TODAY'S DATE: 2024  8:00 AM  ADMIT DATE: 10/31/2024  AGE: 74 y.o. : 1950    CONSULT REQUESTED BY: Eric Bass MD    REASON FOR CONSULT:   Critical care management    HPI:  The patient is a 74 year old female who had a syncopal event on 10/31.  She was weak, myelopathic.  CT of the head showed an acute infarct in the right caudate head.  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  The MRI of her cervical spine showed disc bulging and spondylolysis at C4-C5 and C5-C6 with severe spinal canal stenosis, cervical cord compression and cord signal alteration there was also broad-based disc bulging producing moderate spinal canal stenosis at C3-C4.  She was brought to the OR yesterday where she underwent C2- 3, 3-4, 4-5,5-6,  and 6- 7 laminectomies with C3 through 6 posterior segmental instrumentation and C3-4, 4- 5, 5- 6 posterolateral arthrodesis using autograft harvested from the incision and allograft of DBM.  This morning she remains profoundly myelopathic.  She is on Levophed at 0.06 mics per kilos per minute to maintain a mean of 80.    The patient has significant pulmonary hypertension in his managed with Opsumit 10 mg daily, Adcirca 40 mg daily, and Uptravi 1600 ug bid.  She is on 4 L continuous oxygen and has dyspnea with any exertion.  She is also on Advair 115/21 b.i.d.     the patient's oxygenation dropped dramatically this morning.  She is now on more levophed to maintain a mean of 80.  She is in a lot of pain.     the patient is requiring a little less oxygen this morning.  However, she is in AFib and had a 25 beat run of V-tach overnight.  She is on 0.24 of Levophed.  I am concerned that her pulmonary hypertension is significantly worse and have an echo coming.  Will start walking  the Levophed down and off as her renal function is worse, she is in AFib and she had V-tach last night.    11/7 the patient is continuing to decline.  She is on Vapotherm now at 30 L and 80% to maintain good oxygenation.  She is requiring 0.3 of Levophed to maintain her blood pressure.  We did fully anticoagulate with Levophed yesterday as I am still worried about a PE and the patient developed AFib with RVR.  Started on amiodarone and this morning she is back in sinus rhythm.  Limited echo yesterday did not show any worsening of the right ventricle but PA pressures were estimated at 60.  If her heart is actually working well, then we have no SVR, and this is spinal shock.  However, spinal shock is usually bradycardic.  A [place]-Jens catheter maybe helpful, but I am not sure how it will change our management unless the RV is failing and the PAs are too high and we need to be on IV Flolan.    11/8 the patient is on 0.3 of Levophed.  She is on 85% on the Vapotherm.  The patient is still declining to have a a [place]-Jens catheter.  We would need this to initiate Flolan.  She would like to continue as we are hoping that things will turn around.  Her creatinine is marginally better today.  She remains ahead on her I's and O's by about 6 L. I do not see any point in trying to transfer her if she is declining a Blue Grass.    11/9 the patient is still on 0.3 of Levophed.  Her oxygenation is slightly improved and we have decreased her Vapotherm to 70% and 20 L. will try adding vasopressin to see if we get any benefit from this.  Her creatinine is stable at 1.8.  She still does not want a [place]-Jens catheter.  She has not had a bowel movement in 3 days.  Will make sure we are doing rectal stimulation with her Dulcolax suppositories.    REVIEW OF SYSTEMS  GENERAL: Feeling ok  EYES: Vision is good.  ENT: No sinusitis or pharyngitis.   HEART: No chest pain or palpitations.  LUNGS:  Breathing is okay  GI: No Nausea, vomiting, constipation,  diarrhea, or reflux.  : No dysuria, hesitancy, or nocturia.  SKIN: No lesions or rashes.  MUSCULOSKELETAL: No joint pain or myalgias.  NEURO:  She remains very numb and this is distressing to her more so than the quadriparesis  LYMPH: No edema or adenopathy.  PSYCH: No anxiety or depression.  ENDO: No weight change.    No change in the patient's Past Medical History, Past Surgical History, Social History or Family History since admission.    VITAL SIGNS (MOST RECENT)  Temp: 98.6 °F (37 °C) (11/09/24 0301)  Pulse: 72 (11/09/24 0700)  Resp: (!) 22 (11/09/24 0700)  BP: (!) 99/57 (11/09/24 0700)  SpO2: 100 % (11/09/24 0700)    INTAKE AND OUTPUT (LAST 24 HOURS):  Intake/Output Summary (Last 24 hours) at 11/9/2024 0750  Last data filed at 11/9/2024 0640  Gross per 24 hour   Intake 754.27 ml   Output 842 ml   Net -87.73 ml       WEIGHT  Wt Readings from Last 1 Encounters:   11/01/24 72.7 kg (160 lb 4.4 oz)       PHYSICAL EXAM  GENERAL: Older patient looking quiet..  HEENT: Pupils equal and reactive. Extraocular movements intact. Nose intact. Pharynx moist.  NECK: Supple.  Aspen collar in place. HEART:  Mostly regular rate and rhythm. No murmur or gallop auscultated.  The monitor appears to be AFib with a controlled rate.  LUNGS:  The lungs are clear..  Lung excursion symmetrical. No change in fremitus.   ABDOMEN: Bowel sounds present. Non-tender, no masses palpated.  : Normal anatomy.  Yeung catheter with clear urine  EXTREMITIES:  Minimal movement to the right hand.  There is a bit of a squeeze to the left hand.  She can wiggle both sets of toes.  She can not do anything against gravity.  Arterial line right radial.  Right PICC.    LYMPHATICS: No adenopathy palpated, 1+ edema.  SKIN: Dry, intact, no lesions.   NEURO: Cranial nerves II-XII intact. Motor strength 1/5 bilaterally, except left hand is a 2/5.  The right hand is the weakest.  The left quadricep tightens better than the right quadricep.  PSYCH: Appropriate  affect      CBC LAST (LAST 24 HOURS)  Recent Labs   Lab 11/09/24  0439   WBC 6.58   RBC 3.44*   HGB 9.4*   HCT 29.1*   MCV 85   MCH 27.3   MCHC 32.3   RDW 15.6*      MPV 11.1   GRAN 74.4*  4.9   LYMPH 12.5*  0.8*   MONO 10.8  0.7   BASO 0.02   NRBC 0       CHEMISTRY LAST (LAST 24 HOURS)  Recent Labs   Lab 11/09/24  0339 11/09/24  0439   NA  --  134*   K  --  4.1   CL  --  106   CO2  --  22*   ANIONGAP  --  6*   BUN  --  34*   CREATININE  --  1.8*   GLU  --  96   CALCIUM  --  7.6*   PH 7.360  --    MG  --  2.1         LAST 7 DAYS MICROBIOLOGY   Microbiology Results (last 7 days)       Procedure Component Value Units Date/Time    Blood culture [[id number]] Collected: 11/01/24 1829    Order Status: Completed Specimen: Blood from Peripheral, Hand, Right Updated: 11/06/24 2032     Blood Culture, Routine No growth after 5 days.            MOST RECENT IMAGING  X-Ray Chest AP Portable  Narrative: EXAMINATION:  XR CHEST AP PORTABLE    CLINICAL HISTORY:  Pulmonary hypertension;    FINDINGS:  Portable chest with comparison chest x-ray 11/06/2024.  Normal cardiomediastinal silhouette.Enlarged central hilar vascular structures again noted with mild perihilar peribronchial interstitial thickening.  Hazy opacities of the left lung base are mildly increased.  No pneumothorax.  Right PICC line is unchanged.  Pulmonary vasculature is normal. No acute osseous abnormality.  Impression: Mild increased hazy opacities of the left lung bases.  Otherwise no significant changes from prior exam.    Electronically signed by: Scotty Aceves  Date:    11/08/2024  Time:    08:50    Chest x-ray 11/8 shows  tiny pleural effusions and huge pulmonary arteries.  There is a PICC line on the right side.    CURRENT VISIT EKG  No results found for this visit on 10/31/24.    ECHOCARDIOGRAM RESULTS  Results for orders placed during the hospital encounter of 10/31/24    Echo    Interpretation Summary    Left Ventricle: The left ventricle is normal  in size. Normal wall thickness. Unable to assess wall motion. There is normal systolic function with a visually estimated ejection fraction of 60 - 65%.    Right Ventricle: Right ventricle was not well visualized due to poor acoustic window.  Grossly appears to be dilated with reduced function.  There appears to be some concern of flattening of the interventricular septum which could indicate right ventricular pressure and volume overload.    Left Atrium: Left atrium is severely dilated.    IVC/SVC: Elevated venous pressure at 15 mmHg.    Oxygen INFORMATION  Oxygen Concentration (%):  [75] 758 L        IMPRESSION AND PLAN  Severe central cord compression now status post multilevel laminectomy and instrumentation  Quadriparesis persisting  - no bowel movement in several days, will begin rectal stimulation in addition to Dulcolax suppositories  Severe pulmonary hypertension  - patient on Opsumit, Uptravi, and Adcirca, max doses    - patient's echo continues to show a functioning RV and LV.  - patient declining a [place]-Jens catheter which we would need to evaluate whether Flolan might be helpful  - patient is on mg per kg Lovenox daily as her GFR is still less than 30  Shock  - still on 0.3 of Levophed  - etiology still remains obscure  - patient is still declining [place]-Jens catheter  - patient receiving 15 mg of midodrine t.i.d.  - will add vasopressin and see if this helps decrease the Levophed dose  - echo repeated twice and seems to show a functioning RV and LV  - spinal shock should be associated with bradycardia and she is been in AFib with RVR  - no signs of infection  DVT  - nonocclusive to the right leg  - on full-dose Lovenox  Atrial fibrillation  - in and out of AFib, rate controlled  - on amiodarone 0.5  Acute on chronic hypoxemic respiratory failure  - on 4 L of oxygen at home  - now requiring high levels of Vapotherm support, currently on 20 L and 70% O2  - I's and O's ahead 6 liters  - pulmonary  hypertension meds restarted   - worry about PE, fully anticoagulated with Lovenox  - DVT seen in legs  - takes Advair but is not obstructed  - will continue p.r.n. Matteo  Anemia  - chronic disease  - trending down  Thrombocytopenia  - resolved  Chronic kidney disease  - likely aggravated with the Levophed and the need for Levophed  - creatinine stable  Hyponatremia  - will feed a little more salt  Moderate hypoalbuminemia  - advance diet  Intracranial mass  Pulmonary nodules, 1 greater than 1 cm  - patient workup underway as an outpatient          Critical care time spent reviewing the chart, examining the patient, reviewing the labs, reviewing the radiological findings, discussing care with nursing, physicians, and respiratory and creating the note and  has been 30 minutes.    Gaye Ramos MD  Date of Service: 11/09/2024  8:00 AM

## 2024-11-09 NOTE — PT/OT/SLP PROGRESS
Physical Therapy Treatment    Patient Name:  Shanell Mahmood   MRN:  [id number]    Recommendations:     Discharge Recommendations: High Intensity Therapy  Discharge Equipment Recommendations: to be determined by next level of care  Barriers to discharge:  assist of two persons for mobility, decreased activity tolerance, spinal precautions, balance deficits    Assessment:     Shanell Mahmood is a 74 y.o. female admitted with a medical diagnosis of Central cord syndrome.  She presents with the following impairments/functional limitations: weakness, impaired endurance, impaired self care skills, impaired functional mobility, gait instability, impaired balance, decreased upper extremity function, decreased lower extremity function, pain, decreased ROM, impaired cardiopulmonary response to activity, orthopedic precautions.    Pt agreeable to visit. Pt required max assist x 2 for supine to sit transfer.    Pt noted to have a left lateral lean upon sitting EOB. Pt required min to contact guard assist to maintain balance and for midline positioning. Intermittent episodes of stand by assist to maintain balance with pt able to correct left lateral lean.    Pt performed sit to stand transfer with max assist x 2 and B knees and feet blocked, no AD.    Pt returned to bed with max assist x 2. Nurse and respiratory therapist this.    Rehab Prognosis: Fair; patient would benefit from acute skilled PT services to address these deficits and reach maximum level of function.    Recent Surgery: Procedure(s) (LRB):  LAMINECTOMY, SPINE, CERVICAL, POSTERIOR APPROACH (N/A) 6 Days Post-Op    Plan:     During this hospitalization, patient to be seen daily to address the identified rehab impairments via gait training, therapeutic activities, therapeutic exercises, neuromuscular re-education and progress toward the following goals:    Plan of Care Expires:  12/08/24    Subjective     Chief Complaint: neck pain, reports collar is  uncomfortable  Patient/Family Comments/goals: to get better  Pain/Comfort:  Pain Rating 1: other (see comments) (not rated)  Location 1: neck  Pain Addressed 1: Distraction, Cessation of Activity  Pain Rating Post-Intervention 1: other (see comments) (not rated)      Objective:     Communicated with nurse prior to session.  Patient found HOB elevated with peripheral IV, telemetry, blood pressure cuff, pulse ox (continuous), oxygen, arterial line upon PT entry to room.     General Precautions: Standard, fall  Orthopedic Precautions: spinal precautions  Braces: [place] J collar  Respiratory Status: High flow, flow 20 L/min, concentration 75%     Functional Mobility:  Bed Mobility:     Supine to Sit: maximal assistance and of 2 persons  Sit to Supine: maximal assistance and of 2 persons  Transfers:     Sit to Stand:  maximal assistance and of 2 persons with no AD  Balance: sitting EOB with min-CGA, intermittently SBA      AM-PAC 6 CLICK MOBILITY          Treatment & Education:  Pt educated on importance of time OOB, importance of intermittent mobility, safe techniques for transfers/ambulation, discharge recommendations/options, and use of call light for assistance and fall prevention.      Patient left HOB elevated with all lines intact, call button in reach, and nurse and RT  present..    GOALS:   Multidisciplinary Problems       Physical Therapy Goals          Problem: Physical Therapy    Goal Priority Disciplines Outcome Interventions   Physical Therapy Goal     PT, PT/OT Not Progressing    Description: Goals to be met by: 24     Patient will increase functional independence with mobility by performin. Supine to sit with Moderate Assistance  2. Sit to supine with Moderate Assistance  3. Sit to stand transfer with Moderate Assistance  4. Bed to chair transfer with Moderate Assistance using Rolling Walker  5. Gait  x 25 feet with Moderate Assistance using Rolling Walker.                          Time  Tracking:     PT Received On: 11/09/24  PT Start Time: 1024     PT Stop Time: 1047  PT Total Time (min): 23 min     Billable Minutes: Therapeutic Activity 23    Treatment Type: Treatment  PT/PTA: PTA     Number of PTA visits since last PT visit: 1 11/09/2024

## 2024-11-09 NOTE — CARE UPDATE
11/08/24 2030   Patient Assessment/Suction   Level of Consciousness (AVPU) alert   Respiratory Effort Unlabored   Expansion/Accessory Muscles/Retractions expansion symmetric   All Lung Fields Breath Sounds coarse   Rhythm/Pattern, Respiratory depth regular;pattern regular   Cough Frequency infrequent   Cough Type fair;nonproductive   PRE-TX-O2   Device (Oxygen Therapy) high flow nasal cannula w/device   $ Is the patient on Low Flow Oxygen? Yes   Humidification temp set 33   Humidification temp actual 33   Flow (L/min) (Oxygen Therapy) 30   Oxygen Concentration (%) 75   SpO2 95 %   Pulse Oximetry Type Continuous   $ Pulse Oximetry - Multiple Charge Pulse Oximetry - Multiple   Pulse 90   Resp 15   Aerosol Therapy   $ Aerosol Therapy Charges PRN treatment not required   Incentive Spirometer   $ Incentive Spirometer Charges done with encouragement   Incentive Spirometer Predicted Level (mL) 1500   Administration (IS) instruction provided, follow-up   Number of Repetitions (IS) 10   Level Incentive Spirometer (mL) 1000   Patient Tolerance (IS) no adverse signs/symptoms present   Vibratory PEP Therapy   $ Vibratory PEP Charges Aerobika Therapy   $ Vibratory PEP Equipment Aerobika Equipment   $ Vibratory PEP Tech Time Charges 15 min   Daily Review of Necessity (PEP Therapy) completed   Type (PEP Therapy) vibratory/oscillatory   Device (PEP Therapy) flutter   Route (PEP Therapy) mouthpiece   Breaths per Cycle (PEP Therapy) 10   Cycles (PEP Therapy) 1   PEP Pressure (cm H2O) 5   PEP Duration (min) 5   Settings (PEP Therapy) PEP 5   Patient Position (PEP Therapy) HOB elevated   Signs of Intolerance (PEP Therapy) none   Ready to Wean/Extubation Screen   FIO2<=50 (chart decimal) (!) 0.75   Education   $ Education Oxygen;15 min

## 2024-11-09 NOTE — NURSING
Pt has bee averaging 95% on o2 on 30L vapo therm at 75% Pt is oriented and responds appropriately.     [id number] Dr. Ramos notified

## 2024-11-09 NOTE — ASSESSMENT & PLAN NOTE
Patient has paroxysmal (<7 days) atrial fibrillation. Patient is currently in atrial fibrillation. [id number] Score: 3. The patients heart rate in the last 24 hours is as follows:  Pulse  Min: 69  Max: 98     Antiarrhythmics  amiodarone 360 mg/200 mL (1.8 mg/mL) infusion, Continuous, Intravenous    Anticoagulants  enoxaparin injection 70 mg, Every 24 hours, Subcutaneous    Plan  - Replete lytes with a goal of K>4, Mg >2  - Patient is anticoagulated, see medications listed above.  - Patient's afib is currently uncontrolled. Will continue current treatment  Started amiodarone drip

## 2024-11-09 NOTE — RESPIRATORY THERAPY
11/09/24 0820   Patient Assessment/Suction   Level of Consciousness (AVPU) alert   Respiratory Effort Normal;Unlabored   Expansion/Accessory Muscles/Retractions no use of accessory muscles;no retractions;expansion symmetric   All Lung Fields Breath Sounds Anterior:;Lateral:   CAYETANO Breath Sounds coarse;clear   LLL Breath Sounds coarse;clear   RUL Breath Sounds coarse;clear   RML Breath Sounds coarse;clear   RLL Breath Sounds coarse;clear   Rhythm/Pattern, Respiratory unlabored;pattern regular;shallow   Cough Frequency infrequent   PRE-TX-O2   Device (Oxygen Therapy) high flow nasal cannula w/device   $ Is the patient on High Flow Oxygen? Yes   $ Noninvasive Daily Charge Noninvasive Daily   Humidification temp set 34   Flow (L/min) (Oxygen Therapy) 20  (decreased to 15)   SpO2 (!) 93 %   Pulse Oximetry Type Continuous   $ Pulse Oximetry - Multiple Charge Pulse Oximetry - Multiple   Pulse 80   Resp 13   BP (!) 154/77   Aerosol Therapy   $ Aerosol Therapy Charges PRN treatment not required   Incentive Spirometer   $ Incentive Spirometer Charges done with encouragement   Incentive Spirometer Predicted Level (mL) 1500   Administration (IS) instruction provided, follow-up   Number of Repetitions (IS) 10   Level Incentive Spirometer (mL) 2000   Patient Tolerance (IS) good

## 2024-11-09 NOTE — ASSESSMENT & PLAN NOTE
Patient with Hypoxic Respiratory failure which is Chronic.  she is on home oxygen at 4 LPM. Was on that at home

## 2024-11-09 NOTE — NURSING
Notified Dr. Ramos that patient is requiring higher titrations of quad strength levophed, up to 0.5 mcg/kg/min. RT at bedside assessing arterial line with nurse, extremely positional and requiring frequent power flushes. Called anesthesiologist to come assess line with RT when available, Dr. Ortiz states he will be here as soon as he can as he is in the OR with a case at this time.

## 2024-11-09 NOTE — ASSESSMENT & PLAN NOTE
S/p multilevel laminectomy (11/3/24)  C2-3, C3-4, C4-5, C5-6, C6-7 laminectomy  C3 through 6 posterior segmental instrumentation using [employer] system  C3-4, C4-5, C5-6 posterolateral arthrodesis using autograft harvested from the same incision and allograft DBM    Cervical collar in place  Neuro surgery follow up

## 2024-11-09 NOTE — ASSESSMENT & PLAN NOTE
Acute CVA right caudate  MRI, MRA, CT, carotid U/S reports reviewed  -DAPT and statin  -neurology following  -neurosurgery and oncology consulted for the brain mass and follow up as OP   -PT/OT/SLP  -echo bubble report is positive, follow up Neurology recommendation

## 2024-11-09 NOTE — ASSESSMENT & PLAN NOTE
Possibly neurogenic from the CVA or from cervical spine cord compression but less likely   -straight cath per protocol

## 2024-11-09 NOTE — ASSESSMENT & PLAN NOTE
Creatine stable for now. BMP reviewed- noted Estimated Creatinine Clearance: 27.1 mL/min (A) (based on SCr of 1.8 mg/dL (H)). according to latest data. Based on current GFR, CKD stage is stage 4 - GFR 15-29.  Monitor UOP and serial BMP and adjust therapy as needed. Renally dose meds. Avoid nephrotoxic medications and procedures.    Followed by nephrology Nayely Jain at [facility]. Initial attempt at AV fistula placement unsuccessful and currently just being monitored. Last visit 5/2/24

## 2024-11-09 NOTE — PROGRESS NOTES
Replaced by [facility]  Progress Note    Patient Name: Shanell Mahmood  MRN: [id number]  Patient Class: IP- Inpatient   Admission Date: 10/31/2024  Length of Stay: 8 days  Attending Physician: Eric Bass MD  Primary Care Provider: Nicole, Primary Doctor        Subjective:     Principal Problem:Central cord syndrome        HPI:  Shanell Mahmood is a 74 year old female with a previous medical history of anemia, Pulmonary hypertension on 4 liters continuous oxygen at home, arthritis, CKD V, Osteoporosis, secondary hyperprathyroidism, S/P closure of patent foramen ovale in 2011, CVA in 2011 with no residuals, chronic back pain, HLD, Gout, Brain Mass and thyroid diease who presented to the ED after unwitnessed syncopal episode. The patient was at her pain management office for re certification only. There were no procedures performed. She reports taking one hydrocodone 10mg today.  The last thing she remembers was walking down the hallway of the office and looking for something to cover her head from the rain and did not have any adverse symptoms or feelings at that time.  She has no recollection of the syncopal events. When she was initially evaluated by EMS, her blood pressure was in the 60s over 40s. She did require constant stimulation to remain awake. Fell and hit her head. Does not take any blood thinners. EMS evaluated her and gave her 1 mg of Narcan as well as 250 cc of fluid. Her pressure improved and she had become more alert. Initial ED workup was thorough and all imaging showing that included CT of neck, head and entire spine showed no acute processes. CBC showed anemia and CMP showed elevated creatinine consistent with history of CKD V. Drug screen positive for opioids but patient has a hydrocodone prescription. The patient is currently in the process of have a left sided brain mass visualized on MRI 10/8/24 evaluated that was an incidental finding after undergoing a PET scan for a pulmonary nodule  "on 9/6/24 with abnormal uptake noted in brain. She has her first appointment on 11/4/24. She is followed by nephrology who is currently monitoring her and there has been one attempt at AV fistual placement but it was unsuccessful due sclerotic changes. Patient reports she awoke this morning feeling well showered and dressed herself. She performs all of her own ADL's. She had one syncopal event in March 2024 and was evaluated by cardiology and informed it was an orthostatic episode. Patient was unable to complete orthostatic vital signs upon admission due to inability to stand stating " I feel as if I can't get my legs under me". When evaluated for admit exam the patient endorses that after the syncopal episode she endorses bilateral leg weakness with decreased sensation in both legs. She reports bilateral  weakness being unable to use her phone and difficulty raising both of her arms. NIH scale performed and total of 9 noted. Patient noted have 400ml of urine in bladder and unable to void. Patient reached a total of 528ml of urine without being able to void.  MRI/MRA of brain and MRI of lumbosacral spine ordered upon admit. MRA and MRI lower back showed no acute process. MRI brain showed Acute infarct in the right caudate head. Dr. Maldonado was called and he recommended MRA of neck in morning and load with aspirin and plavix. Initial EKG read as atrial fibrillation but upon review p waves were noted and this was discussed with the ED. Repeat EKG shows sinus arrhthymias with PACs. Patient admitted by hospital medicine for further evaluation and management.       Overview/Hospital Course:  74-year-old female with history of brain mass, CKD, back pain, pulmonary hypertension on 4 L oxygen is admitted after syncope and collapse found to have a acute CVA in the right caudate on MRI brain.   She Has global weakness and decreased sensation to the lower legs.  Multiple CT and x-rays done to rule out trauma ultimately " negative other than the maxillofacial CT reporting mild irregularity of the interior midline mandible with small adjacent bone fragments which could represent acute fracture with overlying soft tissue swelling.    Carotid ultrasound and MRA brain and neck without acute finding.  Neurology is consulted.  Also with underlying brain mass.  Consult Neurosurgery and Oncology.  Given DAPT and statin.  Had hypotension given IVF and midodrine.  Echo shows right heart failure.  BNP is within normal.  Lactic acid pending.  No other sign of infection.  Placed in cervical collar as precaution and MRI of the cervical spine shows severe cord compression at C4-5 and C5-6 likely acute with edema.  Neurosurgery discussed with patient  and plan to proceed with surgical decompression  and  patient underwent surgery on : 11/3/24 multilevel C-spine laminectomy.  Later patient continued to have hypotension and needed vasopressor and another vasopressor vasopressin is added  Patient also her DVT of the the proximal right superficial femoral vein. And nonocclusive thrombus within the right popliteal vein,  Also patient developed new onset AFib and started amiodarone drip.  Patient was planned to transferred [facility] but later patient declined Bow-Jens catheter placement and transfer was canceled.    .     Interval History:     Patient was seen and examined, patient was current and do vasopressor and amiodarone drip.  Heart rate is around 90s.  Discussed with the daughter at bedside.      Review of Systems  Objective:     Vital Signs (Most Recent):  Temp: 98.6 °F (37 °C) (11/09/24 1130)  Pulse: 78 (11/09/24 1130)  Resp: 16 (11/09/24 1215)  BP: (!) 112/59 (11/09/24 1130)  SpO2: 98 % (11/09/24 1130) Vital Signs (24h Range):  Temp:  [98.6 °F (37 °C)-99 °F (37.2 °C)] 98.6 °F (37 °C)  Pulse:  [69-98] 78  Resp:  [8-33] 16  SpO2:  [82 %-100 %] 98 %  BP: ()/(46-77) 112/59  Arterial Line BP: ()/(35-71) 98/50     Weight: 72.7  "kg (160 lb 4.4 oz)  Body mass index is 25.1 kg/m².    Intake/Output Summary (Last 24 hours) at 11/9/2024 1312  Last data filed at 11/9/2024 1148  Gross per 24 hour   Intake 1620.69 ml   Output 812 ml   Net 808.69 ml         Physical Exam  Vitals and nursing note reviewed.   HENT:      Head: Normocephalic and atraumatic.      Mouth/Throat:      Mouth: Mucous membranes are moist.   Eyes:      Conjunctiva/sclera: Conjunctivae normal.   Neck:      Vascular: No JVD.   Cardiovascular:      Rate and Rhythm: Normal rate.      Heart sounds: Normal heart sounds.   Pulmonary:      Effort: Pulmonary effort is normal.   Abdominal:      Palpations: Abdomen is soft.   Skin:     General: Skin is warm.   Neurological:      Mental Status: She is alert. She is disoriented.   Psychiatric:         Mood and Affect: Mood normal.             Significant Labs: All pertinent labs within the past 24 hours have been reviewed.  CBC:   Recent Labs   Lab 11/08/24  0546 11/09/24  0339 11/09/24  0439   WBC 7.26  --  6.58   HGB 10.2*  --  9.4*   HCT 30.7* 32* 29.1*   *  --  153     CMP:   Recent Labs   Lab 11/08/24  0546 11/09/24  0439   * 134*   K 4.1 4.1    106   CO2 21* 22*    96   BUN 32* 34*   CREATININE 1.8* 1.8*   CALCIUM 7.7* 7.6*   ANIONGAP 7* 6*     Cardiac Markers: No results for input(s): "CKMB", "MYOGLOBIN", "BNP", "TROPISTAT" in the last 48 hours.    Significant Imaging: I have reviewed all pertinent imaging results/findings within the past 24 hours.    Assessment/Plan:      * Central cord syndrome  S/p multilevel laminectomy (11/3/24)  C2-3, C3-4, C4-5, C5-6, C6-7 laminectomy  C3 through 6 posterior segmental instrumentation using KnCMinerhony system  C3-4, C4-5, C5-6 posterolateral arthrodesis using autograft harvested from the same incision and allograft DBM    Cervical collar in place  Neuro surgery follow up        Paroxysmal atrial fibrillation  Patient has paroxysmal (<7 days) atrial fibrillation. " Patient is currently in atrial fibrillation. [id number] Score: 3. The patients heart rate in the last 24 hours is as follows:  Pulse  Min: 69  Max: 98     Antiarrhythmics  amiodarone 360 mg/200 mL (1.8 mg/mL) infusion, Continuous, Intravenous    Anticoagulants  enoxaparin injection 70 mg, Every 24 hours, Subcutaneous    Plan  - Replete lytes with a goal of K>4, Mg >2  - Patient is anticoagulated, see medications listed above.  - Patient's afib is currently uncontrolled. Will continue current treatment  Started amiodarone drip           S/P cervical spinal fusion  Multi levels  See NSGY op note     Acute deep vein thrombosis (DVT) of popliteal vein of right lower extremity, nonocclusive  Found on u/s 11/5/24  Also with hypoechoic peripheral nonocclusive thrombus within the proximal right superficial femoral vein   Cannot give full dose lovenox s/p spine surgery   Currently on 1mg/kg daily       Quadriparesis  PT/OT  Frequent turns   Air mattress        Myelopathy  aware      Status post cervical spinal fusion  PT/OT  Cervical collar in place   Lortab, robaxin and hydromorphone       Hypotension  Asymptomatic.  Etiology is unclear but suspected to be related to the stroke or underlying cervical cord pathology vs spinal shock/possible from phospho diesterase   Echo reviewed.  Not  cardiogenic shock.  No signs of infection or sepsis  -keep hold any BP meds or diuretics  On midodrine 10 mg t.i.d.  Status post multilevel cervical spine laminectomy   Currently on 2 vasopressor  Possible trial of steroid     Thrombocytopenia  The patients 3 most recent labs are listed below.  Recent Labs     11/07/24  0415 11/08/24  0546 11/09/24  0439   * 139* 153       Plan  - Will transfuse if platelet count is <100k (if undergoing neurosurgery), or otherwise less than 10 K  -trend daily    Anemia  Anemia is likely due to chronic disease due to Chronic Kidney Disease. Most recent hemoglobin and hematocrit are listed below.  Recent  "Labs     11/07/24  0415 11/07/24  0607 11/08/24  0546 11/09/24  0339 11/09/24  0439   HGB 10.3*  --  10.2*  --  9.4*   HCT 31.1*   < > 30.7* 32* 29.1*    < > = values in this interval not displayed.       Plan  - Monitor serial CBC: Daily  - Transfuse PRBC if patient becomes hemodynamically unstable, symptomatic or H/H drops below 7/21.  - Patient has not received any PRBC transfusions to date  - Patient's anemia is currently improving    Stroke  Acute CVA right caudate  MRI, MRA, CT, carotid U/S reports reviewed  -DAPT and statin  -neurology following  -neurosurgery and oncology consulted for the brain mass and follow up as OP   -PT/OT/SLP  -echo bubble report is positive, follow up Neurology recommendation    Brain mass  Discovered after PET scan of lung  on 9/6/24 revealed abnormal uptake in brain. 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  Unclear if contributing to CVA  She will have her first appointment with Dr. Ray Mcintyre at  [facility]  on 11/4/24  -neurosurgery and oncology follow-up    Secondary hyperparathyroidism  Chronic condition that is stable. Followed by nephrology last visit in May 2024 and endocrinology (Last visit 9/4/24)    Pulmonary hypertension  Chronic condition   Macitentan, selexipag and tadalafil continued   Patient refuse swan ramya    Chronic respiratory failure with hypoxia  Patient with Hypoxic Respiratory failure which is Chronic.  she is on home oxygen at 4 LPM. Was on that at home    CKD (chronic kidney disease), stage IV  Creatine stable for now. BMP reviewed- noted Estimated Creatinine Clearance: 27.1 mL/min (A) (based on SCr of 1.8 mg/dL (H)). according to latest data. Based on current GFR, CKD stage is stage 4 - GFR 15-29.  Monitor UOP and serial BMP and adjust therapy as needed. Renally dose meds. Avoid nephrotoxic medications and procedures.    Followed " by nephrology Nayely Jain at [facility]. Initial attempt at AV fistula placement unsuccessful and currently just being monitored. Last visit 5/2/24    Weakness of both lower extremities  PT/OT   Recommending inpatient rehab - referral sent       Syncope and collapse  See stroke      Acute urinary retention  Possibly neurogenic from the CVA or from cervical spine cord compression but less likely   -straight cath per protocol         VTE Risk Mitigation (From admission, onward)           Ordered     enoxaparin injection 70 mg  Every 24 hours         11/06/24 1146     Reason for No Pharmacological VTE Prophylaxis  Once        Question:  Reasons:  Answer:  Risk of Bleeding    10/31/24 1849     IP VTE HIGH RISK PATIENT  Once         10/31/24 1849     Place sequential compression device  Until discontinued         10/31/24 1849                    Discharge Planning   ELBERT:      Code Status: DNR   Is the patient medically ready for discharge?:     Reason for patient still in hospital (select all that apply): Treatment  Discharge Plan A: Hospital Transfer   Discharge Delays:  (Accepting facility)        Critical care time spent on the evaluation and treatment of severe organ dysfunction, review of pertinent labs and imaging studies, discussions with consulting providers and discussions with patient/family: 33 minutes.      Eric Bass MD  Department of Hospital Medicine   [facility]

## 2024-11-09 NOTE — PLAN OF CARE
Problem: Adult Inpatient Plan of Care  Goal: Plan of Care Review  Outcome: Progressing  Goal: Patient-Specific Goal (Individualized)  Outcome: Progressing  Goal: Absence of Hospital-Acquired Illness or Injury  Outcome: Progressing  Goal: Optimal Comfort and Wellbeing  Outcome: Progressing  Goal: Readiness for Transition of Care  Outcome: Progressing     Problem: Skin Injury Risk Increased  Goal: Skin Health and Integrity  Outcome: Progressing     Problem: Stroke, Ischemic (Includes Transient Ischemic Attack)  Goal: Optimal Coping  Outcome: Progressing  Goal: Effective Bowel Elimination  Outcome: Progressing  Goal: Optimal Cerebral Tissue Perfusion  Outcome: Progressing  Goal: Optimal Cognitive Function  Outcome: Progressing  Goal: Improved Communication Skills  Outcome: Progressing  Goal: Optimal Functional Ability  Outcome: Progressing  Goal: Optimal Nutrition Intake  Outcome: Progressing  Goal: Effective Oxygenation and Ventilation  Outcome: Progressing  Goal: Improved Sensorimotor Function  Outcome: Progressing  Goal: Safe and Effective Swallow  Outcome: Progressing  Goal: Effective Urinary Elimination  Outcome: Progressing     Problem: Fall Injury Risk  Goal: Absence of Fall and Fall-Related Injury  Outcome: Progressing     Problem: Infection  Goal: Absence of Infection Signs and Symptoms  Outcome: Progressing     Problem: Wound  Goal: Optimal Coping  Outcome: Progressing  Goal: Optimal Functional Ability  Outcome: Progressing  Goal: Absence of Infection Signs and Symptoms  Outcome: Progressing  Goal: Improved Oral Intake  Outcome: Progressing  Goal: Optimal Pain Control and Function  Outcome: Progressing  Goal: Skin Health and Integrity  Outcome: Progressing  Goal: Optimal Wound Healing  Outcome: Progressing     Problem: Oral Intake Inadequate  Goal: Improved Oral Intake  Outcome: Progressing

## 2024-11-09 NOTE — ASSESSMENT & PLAN NOTE
Anemia is likely due to chronic disease due to Chronic Kidney Disease. Most recent hemoglobin and hematocrit are listed below.  Recent Labs     11/07/24  0415 11/07/24  0607 11/08/24  0546 11/09/24  0339 11/09/24  0439   HGB 10.3*  --  10.2*  --  9.4*   HCT 31.1*   < > 30.7* 32* 29.1*    < > = values in this interval not displayed.       Plan  - Monitor serial CBC: Daily  - Transfuse PRBC if patient becomes hemodynamically unstable, symptomatic or H/H drops below 7/21.  - Patient has not received any PRBC transfusions to date  - Patient's anemia is currently improving

## 2024-11-09 NOTE — PT/OT/SLP PROGRESS
Occupational Therapy   Treatment    Name: Shanell Mahmood  MRN: [id number]  Admitting Diagnosis:  Central cord syndrome  6 Days Post-Op  The primary encounter diagnosis was Central cord syndrome, initial encounter. Diagnoses of Central cord syndrome, subsequent encounter, Syncope, Syncope and collapse, Chest pain, Atrial fibrillation, Pulmonary hypertension, Stroke [I63.9], Brain mass [G93.89], CKD (chronic kidney disease), stage IV, Cerebrovascular accident (CVA) due to other mechanism, Myelopathy, Quadriparesis, Chronic respiratory failure with hypoxia, Central cord syndrome, Brain mass, S/P cervical spinal fusion, Atrial fibrillation with rapid ventricular response, and Cardiac rhythm disorder or disturbance or change were also pertinent to this visit.  Pre-op Diagnosis: Central cord syndrome, initial encounter [S14.129A] s/p Procedure(s):  LAMINECTOMY, SPINE, CERVICAL, POSTERIOR APPROACH     Recommendations:     Discharge Recommendations: High Intensity Therapy  Discharge Equipment Recommendations:  to be determined by next level of care  Barriers to discharge:  Decreased caregiver support (increased level of assist for ADLS and mobilty, not near baseline functioning)    Assessment:     Shanell Mahmood is a 74 y.o. female with a medical diagnosis of Central cord syndrome.  She presents with no significant progress. Pt more distant this date. Pt expressing her wants in needs appropriately.  Pt was uncomfortable and asked to be pulled up higher in bed.  Pillows repositioned under arms and behind back. Pt  pulled up to HOB with DEP A x 2 with draw sheet..She performed BUE AAROM ex with Max A. Noted Moderate/Minimal edema in hands. Hands elevated on pillows. Continue with OT POC. . Performance deficits affecting function are weakness, impaired endurance, impaired self care skills, impaired functional mobility, gait instability, impaired balance, impaired sensation, decreased safety awareness, decreased lower extremity  function, decreased upper extremity function, orthopedic precautions.     Rehab Prognosis:  Fair; patient would benefit from acute skilled OT services to address these deficits and reach maximum level of function.       Plan:     Patient to be seen 6 x/week to address the above listed problems via self-care/home management, therapeutic activities, therapeutic exercises  Plan of Care Expires: 12/04/24  Plan of Care Reviewed with: patient    Subjective     Chief Complaint: feeling uncomfortable in bed.  Patient/Family Comments/goals: pt asked to be pulled up high in bed.  Pain/Comfort:  Pain Rating 1: 0/10  Pain Rating Post-Intervention 1: 0/10    Objective:     Communicated with: nurse prior to session.  Patient found left sidelying with bed alarm, blood pressure cuff, peripheral IV, pulse ox (continuous), arterial line, oxygen, telemetry, cervical collar upon OT entry to room.    General Precautions: Standard, fall    Orthopedic Precautions:spinal precautions  Braces: [place] J collar  Respiratory Status:  vapotherm 20L/75%     Occupational Performance:     Bed Mobility:    Patient completed Scooting/Bridging with dependent and 2 persons     Treatment & Education:  Pt performed BUE AAROM ex with Max A. Shld flex/ext, abd/add, circles, elbow fle/xext, sup/pro, wrist flex/ext finger flex/ext. Absent  grasp R, weak/poor grasp L.    Bed repositioning as stated above.    Patient left left sidelying with all lines intact, call button in reach, and bed alarm on    GOALS:   Multidisciplinary Problems       Occupational Therapy Goals          Problem: Occupational Therapy    Goal Priority Disciplines Outcome Interventions   Occupational Therapy Goal     OT, PT/OT Progressing    Description: Goals to be met by: 12/2/2024  - revised target date, feeding and g/hygiene goals, all other goals remain appropriate.  Patient will increase functional independence with ADLs by performing:    Feeding with Minimal Assistance with adaptive  device PRN.  UE Dressing with Minimal Assistance.  LE Dressing with Moderate Assistance.  Grooming while EOB with Minimal Assistance with adaptive device PRN.  Toileting from bedside commode with Minimal Assistance for hygiene and clothing management.   Sitting at edge of bed x15 minutes with Stand-by Assistance.  Supine to sit with Minimal Assistance.  Toilet transfer to bedside commode with Minimal Assistance.  Upper extremity exercise program, with assistance as needed.                         Time Tracking:     OT Date of Treatment: 11/09/24  OT Start Time: 1621  OT Stop Time: 1631  OT Total Time (min): 10 min    Billable Minutes:Therapeutic Exercise 10  Total Time 10    OT/MARYELLEN: OT          11/9/2024

## 2024-11-09 NOTE — PLAN OF CARE
Problem: Adult Inpatient Plan of Care  Goal: Plan of Care Review  11/9/2024 0322 by Arpan Ramirez RN  Outcome: Progressing  11/9/2024 0322 by Arpan Ramirez RN  Outcome: Progressing  Goal: Patient-Specific Goal (Individualized)  11/9/2024 0322 by Arpan Ramirez RN  Outcome: Progressing  11/9/2024 0322 by Arpan Ramirez RN  Outcome: Progressing  Goal: Absence of Hospital-Acquired Illness or Injury  11/9/2024 0322 by Arpan Ramirez RN  Outcome: Progressing  11/9/2024 0322 by Arpan Ramirez RN  Outcome: Progressing  Goal: Optimal Comfort and Wellbeing  11/9/2024 0322 by Arpan Ramirez RN  Outcome: Progressing  11/9/2024 0322 by Arpan Ramirez RN  Outcome: Progressing  Goal: Readiness for Transition of Care  11/9/2024 0322 by Arpan Ramirez RN  Outcome: Progressing  11/9/2024 0322 by Arpan Ramirez RN  Outcome: Progressing     Problem: Skin Injury Risk Increased  Goal: Skin Health and Integrity  11/9/2024 0322 by Arpan Ramirez RN  Outcome: Progressing  11/9/2024 0322 by Arpan Ramirez RN  Outcome: Progressing     Problem: Stroke, Ischemic (Includes Transient Ischemic Attack)  Goal: Optimal Coping  11/9/2024 0322 by Arpan Ramirez RN  Outcome: Progressing  11/9/2024 0322 by Arpan Ramirez RN  Outcome: Progressing  Goal: Effective Bowel Elimination  11/9/2024 0322 by Arpan Ramirez RN  Outcome: Progressing  11/9/2024 0322 by Arpan Ramirez RN  Outcome: Progressing  Goal: Optimal Cerebral Tissue Perfusion  11/9/2024 0322 by Arpan Ramirez RN  Outcome: Progressing  11/9/2024 0322 by Arpan Ramirez RN  Outcome: Progressing  Goal: Optimal Cognitive Function  11/9/2024 0322 by Arpan Ramirez RN  Outcome: Progressing  11/9/2024 0322 by Arpan Ramirez RN  Outcome: Progressing  Goal: Improved Communication Skills  11/9/2024 0322 by Arpan Ramirez RN  Outcome: Progressing  11/9/2024 0322 by Arpan Ramirez RN  Outcome: Progressing  Goal: Optimal Functional Ability  11/9/2024 0322 by Arpan Ramirez RN  Outcome:  Progressing  11/9/2024 0322 by Arpan Ramirez RN  Outcome: Progressing  Goal: Optimal Nutrition Intake  11/9/2024 0322 by Arpan Ramirez RN  Outcome: Progressing  11/9/2024 0322 by Arpan Ramirez RN  Outcome: Progressing  Goal: Effective Oxygenation and Ventilation  11/9/2024 0322 by Arpan Ramirez RN  Outcome: Progressing  11/9/2024 0322 by Arpan Ramirez, RN  Outcome: Progressing  Goal: Improved Sensorimotor Function  11/9/2024 0322 by Arpan Ramirez RN  Outcome: Progressing  11/9/2024 0322 by Arpan Ramirez RN  Outcome: Progressing  Goal: Safe and Effective Swallow  11/9/2024 0322 by Arpan Ramirez RN  Outcome: Progressing  11/9/2024 0322 by Arpan Ramirez RN  Outcome: Progressing  Goal: Effective Urinary Elimination  11/9/2024 0322 by Arpan Ramirez RN  Outcome: Progressing  11/9/2024 0322 by Arpan Ramirez, RN  Outcome: Progressing     Problem: Stroke, Ischemic (Includes Transient Ischemic Attack)  Goal: Optimal Coping  11/9/2024 0322 by Arpan Ramirez RN  Outcome: Progressing  11/9/2024 0322 by Arpan Ramirez, RN  Outcome: Progressing  Goal: Effective Bowel Elimination  11/9/2024 0322 by Arpan Ramirez RN  Outcome: Progressing  11/9/2024 0322 by Arpan Ramirez RN  Outcome: Progressing  Goal: Optimal Cerebral Tissue Perfusion  11/9/2024 0322 by Arpan Ramirez, RN  Outcome: Progressing  11/9/2024 0322 by Arpan Ramirez, RN  Outcome: Progressing  Goal: Optimal Cognitive Function  11/9/2024 0322 by Arpan Ramirez RN  Outcome: Progressing  11/9/2024 0322 by Arpan Ramirez, RN  Outcome: Progressing  Goal: Improved Communication Skills  11/9/2024 0322 by Arpan Ramirez RN  Outcome: Progressing  11/9/2024 0322 by Arpan Ramirez RN  Outcome: Progressing  Goal: Optimal Functional Ability  11/9/2024 0322 by Arpan Ramirez, RN  Outcome: Progressing  11/9/2024 0322 by Arpan Ramirez, RN  Outcome: Progressing  Goal: Optimal Nutrition Intake  11/9/2024 0322 by Arpan Ramirez, RN  Outcome: Progressing  11/9/2024 0322 by Arpan Ramirez,  RN  Outcome: Progressing  Goal: Effective Oxygenation and Ventilation  11/9/2024 0322 by Arpan Ramirez RN  Outcome: Progressing  11/9/2024 0322 by Arpan Ramirez RN  Outcome: Progressing  Goal: Improved Sensorimotor Function  11/9/2024 0322 by Arpan Ramirez RN  Outcome: Progressing  11/9/2024 0322 by Arpan Ramirez RN  Outcome: Progressing  Goal: Safe and Effective Swallow  11/9/2024 0322 by Arpan Ramirez RN  Outcome: Progressing  11/9/2024 0322 by Arpan Ramirez RN  Outcome: Progressing  Goal: Effective Urinary Elimination  11/9/2024 0322 by Arpan Ramirez RN  Outcome: Progressing  11/9/2024 0322 by Arpan Ramirez RN  Outcome: Progressing     Problem: Skin Injury Risk Increased  Goal: Skin Health and Integrity  11/9/2024 0322 by Arpan Ramirez RN  Outcome: Progressing  11/9/2024 0322 by Arpan Ramirez RN  Outcome: Progressing     Problem: Fall Injury Risk  Goal: Absence of Fall and Fall-Related Injury  11/9/2024 0322 by Arpan Ramirez RN  Outcome: Progressing  11/9/2024 0322 by Arpan Ramirez RN  Outcome: Progressing     Problem: Infection  Goal: Absence of Infection Signs and Symptoms  11/9/2024 0322 by Arpan Ramirez RN  Outcome: Progressing  11/9/2024 0322 by Arpan Ramirez RN  Outcome: Progressing     Problem: Wound  Goal: Optimal Coping  11/9/2024 0322 by Arpan Ramirez RN  Outcome: Progressing  11/9/2024 0322 by Arpan Ramirez RN  Outcome: Progressing  Goal: Optimal Functional Ability  11/9/2024 0322 by Arpan Ramirez RN  Outcome: Progressing  11/9/2024 0322 by Arpan Ramirez RN  Outcome: Progressing  Goal: Absence of Infection Signs and Symptoms  11/9/2024 0322 by Arpan Ramirez RN  Outcome: Progressing  11/9/2024 0322 by Arpan Ramirez RN  Outcome: Progressing  Goal: Improved Oral Intake  11/9/2024 0322 by Arpan Ramirez RN  Outcome: Progressing  11/9/2024 0322 by Arpan Ramirez, RN  Outcome: Progressing  Goal: Optimal Pain Control and Function  11/9/2024 0322 by Arpan Ramirez RN  Outcome:  Progressing  11/9/2024 0322 by Arpan Ramirez RN  Outcome: Progressing  Goal: Skin Health and Integrity  11/9/2024 0322 by Arpan Ramirez RN  Outcome: Progressing  11/9/2024 0322 by Arpan Ramirez RN  Outcome: Progressing  Goal: Optimal Wound Healing  11/9/2024 0322 by Arpan Ramirez RN  Outcome: Progressing  11/9/2024 0322 by Arpan Ramirez RN  Outcome: Progressing     Problem: Oral Intake Inadequate  Goal: Improved Oral Intake  11/9/2024 0322 by Arpan Ramirez RN  Outcome: Progressing  11/9/2024 0322 by Arpan Ramirez RN  Outcome: Progressing

## 2024-11-09 NOTE — SUBJECTIVE & OBJECTIVE
Interval History:     Patient was seen and examined, patient was current and do vasopressor and amiodarone drip.  Heart rate is around 90s.  Discussed with the daughter at bedside.      Review of Systems  Objective:     Vital Signs (Most Recent):  Temp: 98.6 °F (37 °C) (11/09/24 1130)  Pulse: 78 (11/09/24 1130)  Resp: 16 (11/09/24 1215)  BP: (!) 112/59 (11/09/24 1130)  SpO2: 98 % (11/09/24 1130) Vital Signs (24h Range):  Temp:  [98.6 °F (37 °C)-99 °F (37.2 °C)] 98.6 °F (37 °C)  Pulse:  [69-98] 78  Resp:  [8-33] 16  SpO2:  [82 %-100 %] 98 %  BP: ()/(46-77) 112/59  Arterial Line BP: ()/(35-71) 98/50     Weight: 72.7 kg (160 lb 4.4 oz)  Body mass index is 25.1 kg/m².    Intake/Output Summary (Last 24 hours) at 11/9/2024 1312  Last data filed at 11/9/2024 1148  Gross per 24 hour   Intake 1620.69 ml   Output 812 ml   Net 808.69 ml         Physical Exam  Vitals and nursing note reviewed.   HENT:      Head: Normocephalic and atraumatic.      Mouth/Throat:      Mouth: Mucous membranes are moist.   Eyes:      Conjunctiva/sclera: Conjunctivae normal.   Neck:      Vascular: No JVD.   Cardiovascular:      Rate and Rhythm: Normal rate.      Heart sounds: Normal heart sounds.   Pulmonary:      Effort: Pulmonary effort is normal.   Abdominal:      Palpations: Abdomen is soft.   Skin:     General: Skin is warm.   Neurological:      Mental Status: She is alert. She is disoriented.   Psychiatric:         Mood and Affect: Mood normal.             Significant Labs: All pertinent labs within the past 24 hours have been reviewed.  CBC:   Recent Labs   Lab 11/08/24  0546 11/09/24  0339 11/09/24  0439   WBC 7.26  --  6.58   HGB 10.2*  --  9.4*   HCT 30.7* 32* 29.1*   *  --  153     CMP:   Recent Labs   Lab 11/08/24  0546 11/09/24  0439   * 134*   K 4.1 4.1    106   CO2 21* 22*    96   BUN 32* 34*   CREATININE 1.8* 1.8*   CALCIUM 7.7* 7.6*   ANIONGAP 7* 6*     Cardiac Markers: No results for input(s):  ""CKMB", "MYOGLOBIN", "BNP", "TROPISTAT" in the last 48 hours.    Significant Imaging: I have reviewed all pertinent imaging results/findings within the past 24 hours.  "

## 2024-11-09 NOTE — ASSESSMENT & PLAN NOTE
Found on u/s 11/5/24  Also with hypoechoic peripheral nonocclusive thrombus within the proximal right superficial femoral vein   Cannot give full dose lovenox s/p spine surgery   Currently on 1mg/kg daily

## 2024-11-09 NOTE — PROGRESS NOTES
[facility]  Department of Cardiology  Progress Note    PATIENT NAME: Shanell Mahmood  MRN: [id number]  TODAY'S DATE: 11/09/2024  ADMIT DATE: 10/31/2024    SUBJECTIVE     PRINCIPLE PROBLEM: Central cord syndrome    INTERVAL HISTORY:    11/9/2024  Remains on vapotherm, down to 20 Liters today; Now on Levophed and Vasopressin; Resting quietly in bed    11/8/24  Pt seen this morning doing very well - up in cardiac chair and was able to eat breakfast and take PO medications. Remains on amio gtt as well as nor epi.  Does not have any complaints at this time.  Remains on 30 L 75% high-flow nasal cannula.    11/7/2024  Is awake lying in bed not in any acute distress.  She had been on Levophed and she is slowly being titrated down.  Still significantly winded on minimal exertion she is on Vapotherm now at 30 L and 80% to maintain good oxygenation.  She is currently fully anticoagulated.    Review of patient's allergies indicates:   Allergen Reactions    Codeine Palpitations       REVIEW OF SYSTEMS    OBJECTIVE     VITAL SIGNS (Most Recent)  Temp: 98.8 °F (37.1 °C) (11/09/24 0830)  Pulse: 77 (11/09/24 1015)  Resp: 16 (11/09/24 1015)  BP: (!) 98/56 (11/09/24 1015)  SpO2: 96 % (11/09/24 1015)    VENTILATION STATUS  Resp: 16 (11/09/24 1015)  SpO2: 96 % (11/09/24 1015)  Oxygen Concentration (%):  [75] 75        I & O (Last 24H):  Intake/Output Summary (Last 24 hours) at 11/9/2024 1021  Last data filed at 11/9/2024 0640  Gross per 24 hour   Intake 754.27 ml   Output 632 ml   Net 122.27 ml       WEIGHTS  Wt Readings from Last 1 Encounters:   11/01/24 1825 72.7 kg (160 lb 4.4 oz)   11/01/24 0733 70.8 kg (156 lb)   10/31/24 2104 71.2 kg (156 lb 15.5 oz)   10/31/24 1455 71.7 kg (158 lb)       PHYSICAL EXAM  CONSTITUTIONAL: calm, awake, elderly fatigued female, resting in bed breathing comfortably on Vapotherm.    NECK: no carotid bruit, no JVD  LUNGS:  Bilateral decreased breath sounds in both lung bases; 20 L per  vapotherm  CHEST WALL: no tenderness  HEART: regular rate and rhythm, S1, S2 normal, systolic murmur audible  ABDOMEN: soft, bowel sounds audible  EXTREMITIES: Extremities bilateral ankle and hand edema  NEURO: AAO X 3 recent cord compression surgery status post multilevel laminectomy and persistent quadriparesis.    SCHEDULED MEDS:   allopurinoL  100 mg Oral QHS    atorvastatin  40 mg Oral Daily    bisacodyL  10 mg Rectal Daily    clopidogreL  75 mg Oral Daily    colchicine  0.3 mg Oral Daily    enoxparin  1 mg/kg Subcutaneous Q24H (treatment, non-standard time)    HYDROcodone-acetaminophen  1 tablet Oral Q6H    macitentan  10 mg Oral Daily    methocarbamoL  750 mg Oral TID    midodrine  15 mg Oral TID    montelukast  10 mg Oral Daily    pantoprazole  40 mg Oral Daily    selexipag  1,600 mcg Oral BID    tadalafil  40 mg Oral Daily       CONTINUOUS INFUSIONS:   amiodarone in dextrose 5%  0.5 mg/min Intravenous Continuous 16.7 mL/hr at 11/09/24 0342 0.5 mg/min at 11/09/24 0342    NORepinephrine bitartrate-D5W  0-3 mcg/kg/min Intravenous Continuous 10.9 mL/hr at 11/09/24 1018 0.32 mcg/kg/min at 11/09/24 1018    vasopressin  0.04 Units/min Intravenous Continuous 12 mL/hr at 11/09/24 0819 0.04 Units/min at 11/09/24 0819       PRN MEDS:  Current Facility-Administered Medications:     acetaminophen, 650 mg, Oral, Q4H PRN    albuterol sulfate, 2.5 mg, Nebulization, Q6H PRN    aluminum-magnesium hydroxide-simethicone, 30 mL, Oral, Q4H PRN    dextrose 50%, 12.5 g, Intravenous, PRN    dextrose 50%, 25 g, Intravenous, PRN    diphenhydrAMINE, 25 mg, Oral, Q6H PRN    diphenhydrAMINE-zinc acetate 1-0.1%, , Topical (Top), TID PRN    glucagon (human recombinant), 1 mg, Intramuscular, PRN    glucose, 16 g, Oral, PRN    glucose, 24 g, Oral, PRN    HYDROmorphone, 1 mg, Intravenous, Q6H PRN    HYDROmorphone, 2 mg, Oral, Q4H PRN    magnesium oxide, 800 mg, Oral, PRN    magnesium oxide, 800 mg, Oral, PRN    methocarbamoL, 500 mg, Oral,  "TID PRN    naloxone, 0.02 mg, Intravenous, PRN    ondansetron, 4 mg, Intravenous, Q8H PRN    potassium bicarbonate, 35 mEq, Oral, PRN    potassium bicarbonate, 50 mEq, Oral, PRN    potassium bicarbonate, 60 mEq, Oral, PRN    potassium, sodium phosphates, 2 packet, Oral, PRN    potassium, sodium phosphates, 2 packet, Oral, PRN    potassium, sodium phosphates, 2 packet, Oral, PRN    prochlorperazine, 5 mg, Intravenous, Q6H PRN    senna-docusate 8.6-50 mg, 2 tablet, Oral, Nightly PRN    sodium chloride 0.9%, 500 mL, Intravenous, PRN    sodium chloride 0.9%, 10 mL, Intravenous, Q12H PRN    sodium chloride 0.9%, 10 mL, Intravenous, PRN    LABS AND DIAGNOSTICS     CBC LAST 3 DAYS  Recent Labs   Lab 11/03/24  2020 11/04/24  0358 11/07/24  0415 11/07/24  0607 11/08/24  0546 11/09/24  0339 11/09/24  0439   WBC 6.33   < > 7.70  --  7.26  --  6.58   RBC 3.97*   < > 3.69*  --  3.67*  --  3.44*   HGB 11.2*   < > 10.3*  --  10.2*  --  9.4*   HCT 34.4*   < > 31.1*   < > 30.7* 32* 29.1*   MCV 87   < > 84  --  84  --  85   MCH 28.2   < > 27.9  --  27.8  --  27.3   MCHC 32.6   < > 33.1  --  33.2  --  32.3   RDW 16.7*   < > 15.4*  --  15.8*  --  15.6*   *   < > 134*  --  139*  --  153   MPV 10.7   < > 11.5  --  11.4  --  11.1   GRAN 75.8*  4.8  --   --   --  77.2*  5.6  --  74.4*  4.9   LYMPH 12.6*  0.8*  --   --   --  10.1*  0.7*  --  12.5*  0.8*   MONO 8.8  0.6  --   --   --  10.3  0.8  --  10.8  0.7   BASO 0.04  --   --   --  0.04  --  0.02   NRBC 0  --   --   --  0  --  0    < > = values in this interval not displayed.       COAGULATION LAST 3 DAYS  No results for input(s): "LABPT", "INR", "APTT" in the last 168 hours.      CHEMISTRY LAST 3 DAYS  Recent Labs   Lab 11/03/24  0256 11/03/24  2020 11/06/24  1239 11/06/24  1324 11/07/24  0415 11/07/24  0607 11/08/24  0546 11/08/24  0801 11/09/24  0339 11/09/24  0439      < > 131*  --  134*  --  133*  --   --  134*   K 4.2   < > 3.9  --  4.0  --  4.1  --   --  4.1 " "     < > 104  --  107  --  105  --   --  106   CO2 25   < > 20*  --  22*  --  21*  --   --  22*   ANIONGAP 8   < > 7*  --  5*  --  7*  --   --  6*   BUN 30*   < > 33*  --  32*  --  32*  --   --  34*   CREATININE 1.9*   < > 1.9*  --  1.9*  --  1.8*  --   --  1.8*   GLU 93   < > 171*  --  109  --  101  --   --  96   CALCIUM 8.2*   < > 7.2*  --  7.5*  --  7.7*  --   --  7.6*   PHOS 3.8  --  4.0  --   --   --   --   --   --  3.1   PH  --    < >  --    < >  --  7.313*  --  7.337* 7.360  --    MG 2.1   < > 1.9  --  2.0  --   --   --   --  2.1   ALBUMIN 2.9*  --   --   --   --   --   --   --   --   --    BILITOT 0.4  --   --   --   --   --   --   --   --   --    PROT 5.6*  --   --   --   --   --   --   --   --   --    ALKPHOS 79  --   --   --   --   --   --   --   --   --    ALT 5*  --   --   --   --   --   --   --   --   --    AST 9*  --   --   --   --   --   --   --   --   --     < > = values in this interval not displayed.       CARDIAC PROFILE LAST 3 DAYS  No results for input(s): "BNP", "CPK", "CPKMB", "LDH", "TROPONINI", "TROPONINIHS" in the last 168 hours.      ENDOCRINE LAST 3 DAYS  Recent Labs   Lab 11/03/24  0256   PROCAL 0.169       LAST ARTERIAL BLOOD GAS  ABG  Recent Labs   Lab 11/09/24  0339   PH 7.360   PO2 71*   PCO2 39.2   HCO3 22.1*   BE -3*       LAST 7 DAYS MICROBIOLOGY   Microbiology Results (last 7 days)       Procedure Component Value Units Date/Time    Blood culture [[id number]] Collected: 11/01/24 1829    Order Status: Completed Specimen: Blood from Peripheral, Hand, Right Updated: 11/06/24 2032     Blood Culture, Routine No growth after 5 days.            MOST RECENT IMAGING  X-Ray Chest AP Portable  Narrative: EXAMINATION:  XR CHEST AP PORTABLE    CLINICAL HISTORY:  Pulmonary hypertension;    FINDINGS:  Portable chest with comparison chest x-ray 11/06/2024.  Normal cardiomediastinal silhouette.Enlarged central hilar vascular structures again noted with mild perihilar peribronchial " interstitial thickening.  Hazy opacities of the left lung base are mildly increased.  No pneumothorax.  Right PICC line is unchanged.  Pulmonary vasculature is normal. No acute osseous abnormality.  Impression: Mild increased hazy opacities of the left lung bases.  Otherwise no significant changes from prior exam.    Electronically signed by: Scotty Aceves  Date:    11/08/2024  Time:    08:50      [facility]  Results for orders placed during the hospital encounter of 10/31/24    Echo Saline Bubble? No; Ultrasound enhancing contrast? No    Interpretation Summary    Left Ventricle: The left ventricle is smaller than normal. Normal wall thickness. There is normal systolic function with a visually estimated ejection fraction of 65 - 70%. There is normal diastolic function.    Right Ventricle: Mild right ventricular enlargement. Wall thickness is normal. Systolic function is moderately reduced.    Left Atrium: Left atrium is moderately dilated.    Right Atrium: Right atrium is mildly dilated.    IVC/SVC: Normal venous pressure at 3 mmHg.      CURRENT/PREVIOUS VISIT EKG  Results for orders placed or performed during the hospital encounter of 10/31/24   EKG 12-lead    Collection Time: 11/07/24  7:05 AM   Result Value Ref Range    QRS Duration 72 ms    OHS QTC Calculation 427 ms    Narrative    Test Reason : I49.9,    Vent. Rate : 073 BPM     Atrial Rate : 079 BPM     P-R Int : 200 ms          QRS Dur : 072 ms      QT Int : 388 ms       P-R-T Axes : 000 083 024 degrees     QTc Int : 427 ms    Sinus rhythm with Blocked Premature atrial complexes  Nonspecific T wave abnormality  Abnormal ECG  When compared with ECG of 06-NOV-2024 13:13,  Sinus rhythm has replaced Atrial fibrillation  Vent. rate has decreased BY  67 BPM  Left posterior fascicular block is no longer Present    Referred By: [email or web address]   SELF           Confirmed By:        ASSESSMENT/PLAN:     Active Hospital Problems    Diagnosis    *Central cord syndrome    S/P  cervical spinal fusion    Paroxysmal atrial fibrillation    Myelopathy    Quadriparesis    Acute deep vein thrombosis (DVT) of popliteal vein of right lower extremity    Status post cervical spinal fusion    Sinus arrhythmia seen on electrocardiogram    Anemia    Thrombocytopenia    Hypotension    Acute urinary retention    Syncope and collapse    CKD (chronic kidney disease), stage IV    Chronic respiratory failure with hypoxia    Pulmonary hypertension    Secondary hyperparathyroidism    Brain mass    Stroke    Weakness of both lower extremities       ASSESSMENT & PLAN:  1. Severe pulmonary hypertension  2. Acute on chronic hypoxemic respiratory failure   3. Paroxysmal atrial fibrillation  4. Severe central cord compression  5. Quadriparesis  6. Chronic kidney disease  7. Intracranial mass  8. Pulmonary nodule  9. Anemia   10. Acute DVT right leg  11. Syncope with collapse and history of stroke  12. NSVT      RECOMMENDATIONS:    11/09/2024  Echocardiogram 11/6/24 showed Efx 65-70%  Discontinue IV amiodarone and start PO amiodarone 200 mg TID x 5 days followed by 200 mg BID x 30 days  Blood pressure still low, required additional pressor support today  Continue midodrine 15 mg t.i.d.  Continue renal dose Lovenox for DVT Rt leg and CVA prophylaxis in PAF.  6.   Continue Plavix 75 mg daily and statin therapy  7.   Cardiology will continue to follow      11/07  1. Patient with severe pulmonary hypertension and currently on increased requirement of oxygen on Vapotherm 30 L and 80% to meet the oxygen requirements.  Patient was on Opsumit, Uptravi and Adcirca, probably needs to be started back on it.  Patient's echocardiogram shows mild to moderately dilated right ventricle with hypokinesis and there is some evidence of flattening of the septum.    Risk of PE still remains high and currently she is fully anticoagulated.    2. Hypotension  Her echo shows normal left ventricle systolic function with an ejection fraction of  65% and she is currently on Levophed to maintain her blood pressure.  Not sure if this is reflexion of cord compression syndrome and spinal cord shock.  And if it is spinal cord shock me lasts for days to weeks.  And the treatment for spinal cord shock would be maintaining hemodynamic and respiratory stability.  Will defer this to Neurology and Neurosurgery.  At the present time patient does not seem to have unopposed parasympathetic tone.  She is currently on midodrine 15 mg p.o. t.i.d.    3. Acute on chronic hypoxemic respiratory failure and she is currently on high levels of Vapotherm support about 30 L and 75% to oxygenation.    She has multiple issues that cause significant compromise in her pulmonary function including possibility of a PE and underlying renal failure and evidence of diastolic dysfunction with significant pulmonary hypertension     4. Atrial fibrillation   Currently on full anticoagulation continue the same.  Need to continue it uninterrupted.  Patient is currently in sinus rhythm.    5. Ventricular tachycardia   Patient had 25 beat run of ventricular tachycardia overnight and for the same reason she is also on amiodarone.  Patient is not a candidate for beta-blockers.    6. DVT   she does have nonocclusive DVT to the right leg in the risk of pulmonary embolism does exists.  Continue anticoagulation    7. Severe central cord compression status post multilevel laminectomy and and she does have persisting quadriparesis.  Further recommendations as per Neurosurgery.    8. Anemia   Her current hemoglobin is 10.3 hematocrit is 31.1 and platelets a 134 with a white count of 7.7    9. Intracranial mass/pulmonary nodules  Further recommendations as per hospitalist and heme/ onc.    10. Would still consider moving her to tertiary care center for further management.            11/06/2024 8:38 AM 5:03 pm   1. Patient has had pulmonary hypertension and she is being followed by her pulmonologist and she had  a right heart catheterization.  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     Dr. Segal in [place]. He performed a right heart cath and found a mean PA pressure of 40, wedge of 12, with PVR of 10 Wood units. She is on Opsumit, Adcirca, and Uptravi.      2. Atrial fibrillation   She follows up with her primary cardiologist Dr. Segal.  At home she is not on any anticoagulation.    She was started on enoxaparin 1 milligram/kilogram subQ Q 24 hours.  Patient underwent surgery for central cord syndrome and cervical spondylosis with myelopathy and radiculopathy on 11/03/2024  Highly recommend to consult Neurosurgery in regards with anticoagulation of the patient as risk of bleeding into the area is significant.  If she is not adequately anticoagulated she is also at risk for a stroke.     3. History of recent DVT and not on any anticoagulation and at risk for pulmonary embolism.     4. Hypotension and syncope with acute on chronic hypoxemia, need to rule out pulmonary embolism.  She would need a CTA to rule out pulmonary embolus and given the fact that she has underlying chronic kidney disease stage 5 in the past, that would create a problem.     Currently she is on norepinephrine and midodrine, continue the same.     5. She also has a left frontoparietal and temporal extra-axial mass and would need further evaluation of the same at some point in time.       6. Atrial fibrillation   Currently her heart rate is controlled at 97 beats per minute she was started on amiodarone drip may need to hold amiodarone as she is not adequately anticoagulate and not sure how long she had been in atrial fibrillation.  Patient was also started on enoxaparin need to get clearance from Neurosurgery also.     7. Anemia of chronic disease.       8.  Chronic hypoxemic respiratory failure with significant shortness a breath on exertion  And she had office pulmonary function  tests done at Dr. Federico Tejada's office.  6/8/2021   7:36 AM 3/29/2022   3:22 PM 1/23/2023   2:35 PM   Office Spirometry/PFT/Diffusion Results   FVC (Liters) 2.69 Liters 2.76 Liters   FEV1 (Liters) 2.08 Liters 1.92 Liters   FEV1/FVC (%) 77 % 69 %   MVV (L/min) 69 L/min   O2 Satu 88 96   FiO2 21 28   TLC Liters Pre Kelly 5.34 Liters   RV/TLC (%) 49 %   VC (Liters) 2.72 Liters   DLCO (mL/mmHg/min) 16.3 ml/mmHg sec   DLCO/VA Pre Kelly 3.4   FVC PRE 2.94 Liters   FVC Pre%Pred 107 %   FEV1 PRE 2.15 Liters   FEV1 Pre%Pred 102 %   FEV1/FVC PRE 73.23 %      9. Patient has multiple complex medical problems and she is better served at a tertiary care center and would benefit from transferring her to the [facility] on [facility] in [place]..      Karly Madison NP  Date of Service: 11/09/2024  2:06 PM    I have personally interviewed and examined the patient, I have reviewed the Nurse Practitioner's history and physical, assessment, and plan. I have personally evaluated the patient at bedside and agree with the findings and made appropriate changes as necessary in recommendations.      1. Patient is status has remained the same her blood pressure is still on the lower side.  We will discuss with Pulmonary in regards with steroids helping her.  2. If patient's status does not improve especially with the blood pressure consideration for transferred to the tertiary [facility] center.   3. Will change amiodarone to oral amiodarone and see if that would help with blood pressure.          1. Patient appears to be stable she is in a seated position and she has had her lunch today.  2. Will switch her to oral amiodarone  3. She is still on Levophed at lower doses continue the same.  4. Will continue to monitor her closely in the ICU at [facility] will consider transfer if her condition should get worse.  At the present time will hold of the transfer.          Ridge Fraser MD  Department of Cardiology  [facility]  [facility]  11/09/2024 8:38 AM

## 2024-11-09 NOTE — ASSESSMENT & PLAN NOTE
The patients 3 most recent labs are listed below.  Recent Labs     11/07/24  0415 11/08/24  0546 11/09/24  0439   * 139* 153       Plan  - Will transfuse if platelet count is <100k (if undergoing neurosurgery), or otherwise less than 10 K  -trend daily

## 2024-11-09 NOTE — ASSESSMENT & PLAN NOTE
"Discovered after PET scan of lung  on 9/6/24 revealed abnormal uptake in brain. 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  Unclear if contributing to CVA  She will have her first appointment with Dr. Ray Mcintyre at  [place] [facility]  on 11/4/24  -neurosurgery and oncology follow-up  "

## 2024-11-10 PROBLEM — R29.898 WEAKNESS OF BOTH LOWER EXTREMITIES: Status: RESOLVED | Noted: 2024-01-01 | Resolved: 2024-01-01

## 2024-11-10 NOTE — PROGRESS NOTES
[facility] Medicine  Progress Note    Patient Name: Shanell Mahmood  MRN: [id number]  Patient Class: IP- Inpatient   Admission Date: 10/31/2024  Length of Stay: 9 days  Attending Physician: Eric Bass MD  Primary Care Provider: Nicole, Primary Doctor        Subjective:     Principal Problem:Central cord syndrome        HPI:  Shanell Mahmood is a 74 year old female with a previous medical history of anemia, Pulmonary hypertension on 4 liters continuous oxygen at home, arthritis, CKD V, Osteoporosis, secondary hyperprathyroidism, S/P closure of patent foramen ovale in 2011, CVA in 2011 with no residuals, chronic back pain, HLD, Gout, Brain Mass and thyroid diease who presented to the ED after unwitnessed syncopal episode. The patient was at her pain management office for re certification only. There were no procedures performed. She reports taking one hydrocodone 10mg today.  The last thing she remembers was walking down the hallway of the office and looking for something to cover her head from the rain and did not have any adverse symptoms or feelings at that time.  She has no recollection of the syncopal events. When she was initially evaluated by EMS, her blood pressure was in the 60s over 40s. She did require constant stimulation to remain awake. Fell and hit her head. Does not take any blood thinners. EMS evaluated her and gave her 1 mg of Narcan as well as 250 cc of fluid. Her pressure improved and she had become more alert. Initial ED workup was thorough and all imaging showing that included CT of neck, head and entire spine showed no acute processes. CBC showed anemia and CMP showed elevated creatinine consistent with history of CKD V. Drug screen positive for opioids but patient has a hydrocodone prescription. The patient is currently in the process of have a left sided brain mass visualized on MRI 10/8/24 evaluated that was an incidental finding after undergoing a PET scan for a pulmonary nodule  "on 9/6/24 with abnormal uptake noted in brain. She has her first appointment on 11/4/24. She is followed by nephrology who is currently monitoring her and there has been one attempt at AV fistual placement but it was unsuccessful due sclerotic changes. Patient reports she awoke this morning feeling well showered and dressed herself. She performs all of her own ADL's. She had one syncopal event in March 2024 and was evaluated by cardiology and informed it was an orthostatic episode. Patient was unable to complete orthostatic vital signs upon admission due to inability to stand stating " I feel as if I can't get my legs under me". When evaluated for admit exam the patient endorses that after the syncopal episode she endorses bilateral leg weakness with decreased sensation in both legs. She reports bilateral  weakness being unable to use her phone and difficulty raising both of her arms. NIH scale performed and total of 9 noted. Patient noted have 400ml of urine in bladder and unable to void. Patient reached a total of 528ml of urine without being able to void.  MRI/MRA of brain and MRI of lumbosacral spine ordered upon admit. MRA and MRI lower back showed no acute process. MRI brain showed Acute infarct in the right caudate head. Dr. Maldonado was called and he recommended MRA of neck in morning and load with aspirin and plavix. Initial EKG read as atrial fibrillation but upon review p waves were noted and this was discussed with the ED. Repeat EKG shows sinus arrhthymias with PACs. Patient admitted by hospital medicine for further evaluation and management.       Overview/Hospital Course:  74-year-old female with history of brain mass, CKD, back pain, pulmonary hypertension on 4 L oxygen is admitted after syncope and collapse found to have a acute CVA in the right caudate on MRI brain.   She Has global weakness and decreased sensation to the lower legs.  Multiple CT and x-rays done to rule out trauma ultimately " negative other than the maxillofacial CT reporting mild irregularity of the interior midline mandible with small adjacent bone fragments which could represent acute fracture with overlying soft tissue swelling.    Carotid ultrasound and MRA brain and neck without acute finding.  Neurology is consulted.  Also with underlying brain mass.  Consult Neurosurgery and Oncology.  Given DAPT and statin.  Had hypotension given IVF and midodrine.  Echo shows right heart failure.  BNP is within normal.  Lactic acid pending.  No other sign of infection.  Placed in cervical collar as precaution and MRI of the cervical spine shows severe cord compression at C4-5 and C5-6 likely acute with edema.  Neurosurgery discussed with patient  and plan to proceed with surgical decompression  and  patient underwent surgery on : 11/3/24 multilevel C-spine laminectomy.  Later patient continued to have hypotension and needed vasopressor and another vasopressor vasopressin is added  Patient also her DVT of the the proximal right superficial femoral vein. And nonocclusive thrombus within the right popliteal vein,  Also patient developed new onset AFib and started amiodarone drip.  Patient was planned to transferred to [facility] but later patient declined [place]-Jens catheter placement and transfer was canceled.    .     Interval History:     Awake and alert . Daughter bedside   HR still elevated around 120. Still on both levophed and vasopressin low dose . Still on vapotherm 15L  Sensations at lower limbs are intact    Review of Systems  Objective:     Vital Signs (Most Recent):  Temp: 97.8 °F (36.6 °C) (11/10/24 0901)  Pulse: 63 (11/10/24 0922)  Resp: 20 (11/10/24 0922)  BP: (!) 110/55 (11/10/24 0901)  SpO2: 98 % (11/10/24 0922) Vital Signs (24h Range):  Temp:  [97.8 °F (36.6 °C)-98.9 °F (37.2 °C)] 97.8 °F (36.6 °C)  Pulse:  [61-83] 63  Resp:  [5-20] 20  SpO2:  [90 %-100 %] 98 %  BP: ()/(44-75) 110/55  Arterial Line BP:  ()/(38-59) 101/48     Weight: 72.7 kg (160 lb 4.4 oz)  Body mass index is 25.1 kg/m².    Intake/Output Summary (Last 24 hours) at 11/10/2024 1140  Last data filed at 11/10/2024 0820  Gross per 24 hour   Intake 1438.19 ml   Output 810 ml   Net 628.19 ml         Physical Exam        Significant Labs: All pertinent labs within the past 24 hours have been reviewed.  BMP:   Recent Labs   Lab 11/10/24  0311   GLU 95   *   K 3.9      CO2 22*   BUN 31*   CREATININE 1.6*   CALCIUM 7.6*   MG 2.1     CBC:   Recent Labs   Lab 11/09/24  0439 11/10/24  0311 11/10/24  0313   WBC 6.58 5.36  --    HGB 9.4* 9.0*  --    HCT 29.1* 27.5* 32*    144*  --      CMP:   Recent Labs   Lab 11/09/24  0439 11/10/24  0311   * 133*   K 4.1 3.9    105   CO2 22* 22*   GLU 96 95   BUN 34* 31*   CREATININE 1.8* 1.6*   CALCIUM 7.6* 7.6*   ANIONGAP 6* 6*       Significant Imaging: I have reviewed all pertinent imaging results/findings within the past 24 hours.    Assessment/Plan:      * Central cord syndrome  S/p multilevel laminectomy (11/3/24)  C2-3, C3-4, C4-5, C5-6, C6-7 laminectomy  C3 through 6 posterior segmental instrumentation using [employer] system  C3-4, C4-5, C5-6 posterolateral arthrodesis using autograft harvested from the same incision and allograft DBM    Cervical collar in place  Neuro surgery follow up   Possible spinal shock ?  PT OT when more stable        Paroxysmal atrial fibrillation  Patient has paroxysmal (<7 days) atrial fibrillation. Patient is currently in atrial fibrillation. [id number] Score: 3. The patients heart rate in the last 24 hours is as follows:  Pulse  Min: 61  Max: 83     Antiarrhythmics  amiodarone tablet 200 mg, 3 times daily, Oral  amiodarone tablet 200 mg, 2 times daily, Oral    Anticoagulants  enoxaparin injection 70 mg, Every 24 hours, Subcutaneous    Plan  - Replete lytes with a goal of K>4, Mg >2  - Patient is anticoagulated, see medications listed above.  -  Patient's afib is currently uncontrolled. Will continue current treatment  Continue  amiodarone drip   On full dose lovenox           S/P cervical spinal fusion  Multi levels  See NSGY op note     Acute deep vein thrombosis (DVT) of popliteal vein of right lower extremity, nonocclusive  Found on u/s 11/5/24  Also with hypoechoic peripheral nonocclusive thrombus within the proximal right superficial femoral vein   Changed to full dose lovenox   Close monitor  s/p spine surgery   Associated with hypotension /shock suspicious for PE         Quadriparesis  PT/OT  Frequent turns   Air mattress        Myelopathy  aware      Status post cervical spinal fusion  PT/OT  Cervical collar in place   Pain Mx      Hypotension  Asymptomatic.  Etiology is unclear but suspected to be related to the stroke or underlying cervical cord pathology vs spinal shock/possible from phospho diesterase inhibitors for pul HTN / A fib with RVR   Echo reviewed.  Not  cardiogenic shock.  No signs of infection or sepsis  -keep hold any BP meds or diuretics  On midodrine 10 mg t.i.d. and further increased to 15 tid   Currently on 2 vasopressor  Pulmonary offered swan ramya but patient refused     Thrombocytopenia  The patients 3 most recent labs are listed below.  Recent Labs     11/08/24  0546 11/09/24  0439 11/10/24  0311   * 153 144*       Plan  - Will transfuse if platelet count is <100k (if undergoing neurosurgery), or otherwise less than 10 K  -trend daily    Anemia  Anemia is likely due to chronic disease due to Chronic Kidney Disease. Most recent hemoglobin and hematocrit are listed below.  Recent Labs     11/08/24  0546 11/09/24  0339 11/09/24  0439 11/10/24  0311 11/10/24  0313   HGB 10.2*  --  9.4* 9.0*  --    HCT 30.7*   < > 29.1* 27.5* 32*    < > = values in this interval not displayed.       Plan  - Monitor serial CBC: Daily  - Transfuse PRBC if patient becomes hemodynamically unstable, symptomatic or H/H drops below 7/21.  -  "Patient has not received any PRBC transfusions to date  - Patient's anemia is currently improving    Stroke  Acute CVA right caudate  MRI, MRA, CT, carotid U/S reports reviewed  Plavix  and statin and patient also need full dose lovenox   -neurology following  -neurosurgery and oncology consulted for the brain mass and follow up as OP   -PT/OT/SLP when more stable   -echo bubble report is seem edited. Not mentioning of PFO    Brain mass  Discovered after PET scan of lung  on 9/6/24 revealed abnormal uptake in brain. 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  Unclear if contributing to CVA  She will have her first appointment with Dr. Ray Mcintyre at  [place] [facility]  on 11/4/24  -neurosurgery and oncology follow-up    Secondary hyperparathyroidism  Chronic condition that is stable.     Pulmonary hypertension  Chronic condition   Macitentan, selexipag and tadalafil continued   Patient refuse swan ramya    Chronic respiratory failure with hypoxia  Patient with Hypoxic Respiratory failure which is Chronic.  she is on home oxygen at 4 LPM.   Monitor     CKD (chronic kidney disease), stage IV  Creatine stable for now. BMP reviewed- noted Estimated Creatinine Clearance: 30.5 mL/min (A) (based on SCr of 1.6 mg/dL (H)). according to latest data. Based on current GFR, CKD stage is stage 4 - GFR 15-29.  Monitor UOP and serial BMP and adjust therapy as needed. Renally dose meds. Avoid nephrotoxic medications and procedures.    Close monitor     Syncope and collapse  See stroke      Acute urinary retention  Possibly neurogenic from the CVA or from cervical spine cord compression  Urinary catheter        VTE Risk Mitigation (From admission, onward)           Ordered     enoxaparin injection 70 mg  Every 24 hours         11/06/24 6352     Reason for No Pharmacological VTE Prophylaxis  Once        Question:  Reasons: "  Answer:  Risk of Bleeding    10/31/24 1849     IP VTE HIGH RISK PATIENT  Once         10/31/24 1849     Place sequential compression device  Until discontinued         10/31/24 1849                    Discharge Planning   ELBERT:      Code Status: DNR   Is the patient medically ready for discharge?:     Reason for patient still in hospital (select all that apply): Treatment  Discharge Plan A: Hospital Transfer   Discharge Delays:  (Accepting facility)        Critical care time spent on the evaluation and treatment of severe organ dysfunction, review of pertinent labs and imaging studies, discussions with consulting providers and discussions with patient/family: 36 minutes.      Eric Bass MD  Department of Hospital Medicine   [facility]

## 2024-11-10 NOTE — ASSESSMENT & PLAN NOTE
Acute CVA right caudate  MRI, MRA, CT, carotid U/S reports reviewed  Plavix  and statin and patient also need full dose lovenox   -neurology following  -neurosurgery and oncology consulted for the brain mass and follow up as OP   -PT/OT/SLP when more stable   -echo bubble report is seem edited. Not mentioning of PFO

## 2024-11-10 NOTE — CARE UPDATE
11/09/24 2020   Patient Assessment/Suction   Level of Consciousness (AVPU) alert   Respiratory Effort Unlabored   Expansion/Accessory Muscles/Retractions expansion symmetric   All Lung Fields Breath Sounds coarse;diminished   Rhythm/Pattern, Respiratory depth regular;pattern regular   Cough Frequency infrequent   Cough Type good;nonproductive   PRE-TX-O2   Device (Oxygen Therapy) high flow nasal cannula w/device   $ Is the patient on Low Flow Oxygen? Yes   Humidification temp set 33   Humidification temp actual 33   Flow (L/min) (Oxygen Therapy) 16   Oxygen Concentration (%) 75   SpO2 100 %   Pulse Oximetry Type Continuous   $ Pulse Oximetry - Multiple Charge Pulse Oximetry - Multiple   Pulse 73   Resp 12   Aerosol Therapy   $ Aerosol Therapy Charges PRN treatment not required   Incentive Spirometer   $ Incentive Spirometer Charges done with encouragement   Incentive Spirometer Predicted Level (mL) 1500   Administration (IS) instruction provided, follow-up   Number of Repetitions (IS) 10   Level Incentive Spirometer (mL) 1500   Patient Tolerance (IS) good   Vibratory PEP Therapy   $ Vibratory PEP Charges Aerobika Therapy   $ Vibratory PEP Equipment Aerobika Equipment   $ Vibratory PEP Tech Time Charges 15 min   Daily Review of Necessity (PEP Therapy) completed   Type (PEP Therapy) vibratory/oscillatory   Device (PEP Therapy) flutter   Route (PEP Therapy) mouthpiece   Breaths per Cycle (PEP Therapy) 10   Cycles (PEP Therapy) 1   PEP Pressure (cm H2O) 5   PEP Duration (min) 5   Settings (PEP Therapy) PEP 5   Patient Position (PEP Therapy) HOB elevated   Post Treatment Assessment (PEP) breath sounds unchanged   Signs of Intolerance (PEP Therapy) none   Ready to Wean/Extubation Screen   FIO2<=50 (chart decimal) (!) 0.75   Education   $ Education PEP Therapy;15 min

## 2024-11-10 NOTE — ASSESSMENT & PLAN NOTE
Anemia is likely due to chronic disease due to Chronic Kidney Disease. Most recent hemoglobin and hematocrit are listed below.  Recent Labs     11/08/24  0546 11/09/24  0339 11/09/24  0439 11/10/24  0311 11/10/24  0313   HGB 10.2*  --  9.4* 9.0*  --    HCT 30.7*   < > 29.1* 27.5* 32*    < > = values in this interval not displayed.       Plan  - Monitor serial CBC: Daily  - Transfuse PRBC if patient becomes hemodynamically unstable, symptomatic or H/H drops below 7/21.  - Patient has not received any PRBC transfusions to date  - Patient's anemia is currently improving

## 2024-11-10 NOTE — PT/OT/SLP PROGRESS
Physical Therapy Treatment    Patient Name:  Shanell Mahmood   MRN:  [id number]    Recommendations:     Discharge Recommendations: High Intensity Therapy  Discharge Equipment Recommendations: to be determined by next level of care  Barriers to discharge:  weakness, impaired endurance, impaired self care skills, impaired functional mobility, decreased safety awareness, decreased LE/UE function, orthopedic precautions.     Assessment:     Shanell Mahmood is a 74 y.o. female admitted with a medical diagnosis of Central cord syndrome.  She presents with the following impairments/functional limitations: weakness, impaired endurance, impaired self care skills, impaired functional mobility, gait instability, impaired balance, impaired sensation, decreased safety awareness, decreased lower extremity function, decreased upper extremity function, orthopedic precautions.    Pt found resting with HOB elevated, agreeable and excited to participate in skilled physical therapy session today.     She required maxA 1-2 to transfer from supine to sitting EOB with maxA required to scoot to place feet flat on the floor. Once EOB, pt was able to maintain sitting balance with SBA. She performed sit to stand from EOB with maxA x2 blocking bilateral feet and knees. She demonstrated head down and forward flexed posture during standing that required verbal and tactile cues for correction. She stood for ~20 sec during first standing trial before requiring to sit and rest.     After and extended sitting rest break EOB, she performed anther sit to stand from EOB with maxA x2 standing for >1min while RN performed hygiene. Verbal cues given throughout for postural awareness. She sat back EOB to rest before transferring to bed side chair.   maxA x2 required to perform sit to stand from EOB and then to performed standing pivot transfer to bed side chair. maxA required to assist with repositioning once in chair for increased comfort and improved positioning.  Pt left sitting up in chair, chair alarm on, call light within reach, all needs met, and RN notified.     Rehab Prognosis: Fair; patient would benefit from acute skilled PT services to address these deficits and reach maximum level of function.    Recent Surgery: Procedure(s) (LRB):  LAMINECTOMY, SPINE, CERVICAL, POSTERIOR APPROACH (N/A) 7 Days Post-Op    Plan:     During this hospitalization, patient to be seen daily to address the identified rehab impairments via gait training, therapeutic activities, therapeutic exercises, neuromuscular re-education and progress toward the following goals:    Plan of Care Expires:  12/08/24    Subjective     Chief Complaint: muscle fatigue and weakness  Patient/Family Comments/goals: to get better  Pain/Comfort:  Pain Rating 1: 0/10      Objective:     Communicated with RN prior to session.  Patient found HOB elevated with bed alarm, blood pressure cuff, peripheral IV, pulse ox (continuous), arterial line, oxygen, telemetry, cervical collar upon PT entry to room.     General Precautions: Standard, fall  Orthopedic Precautions: spinal precautions  Braces: Carlisle J collar  Respiratory Status: Nasal cannula, flow 14 L/min     Functional Mobility:  Bed Mobility:     Supine to Sit: maximal assistance and of 1-2 persons  Transfers:     Sit to Stand:  maximal assistance and of 2 persons with no AD  Bed to Chair: maximal assistance and of 2 persons with  no AD  using  Stand Pivot      AM-PAC 6 CLICK MOBILITY          Treatment & Education:  Pt educated on importance of time OOB, importance of intermittent mobility, safe techniques for transfers/ambulation, discharge recommendations/options, and use of call light for assistance and fall prevention.      Patient left up in chair with all lines intact, call button in reach, and RN notified..    GOALS:   Multidisciplinary Problems       Physical Therapy Goals          Problem: Physical Therapy    Goal Priority Disciplines Outcome  Interventions   Physical Therapy Goal     PT, PT/OT Not Progressing    Description: Goals to be met by: 24     Patient will increase functional independence with mobility by performin. Supine to sit with Moderate Assistance  2. Sit to supine with Moderate Assistance  3. Sit to stand transfer with Moderate Assistance  4. Bed to chair transfer with Moderate Assistance using Rolling Walker  5. Gait  x 25 feet with Moderate Assistance using Rolling Walker.                          Time Tracking:     PT Received On: 11/10/24  PT Start Time: 917     PT Stop Time: 955  PT Total Time (min): 38 min     Billable Minutes: Therapeutic Activity 38    Treatment Type: Treatment  PT/PTA: PTA     Number of PTA visits since last PT visit: 2     11/10/2024

## 2024-11-10 NOTE — ASSESSMENT & PLAN NOTE
The patients 3 most recent labs are listed below.  Recent Labs     11/08/24  0546 11/09/24  0439 11/10/24  0311   * 153 144*       Plan  - Will transfuse if platelet count is <100k (if undergoing neurosurgery), or otherwise less than 10 K  -trend daily

## 2024-11-10 NOTE — ASSESSMENT & PLAN NOTE
S/p multilevel laminectomy (11/3/24)  C2-3, C3-4, C4-5, C5-6, C6-7 laminectomy  C3 through 6 posterior segmental instrumentation using [employer] system  C3-4, C4-5, C5-6 posterolateral arthrodesis using autograft harvested from the same incision and allograft DBM    Cervical collar in place  Neuro surgery follow up   Possible spinal shock ?  PT OT when more stable

## 2024-11-10 NOTE — ASSESSMENT & PLAN NOTE
"Discovered after PET scan of lung  on 9/6/24 revealed abnormal uptake in brain. 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  Unclear if contributing to CVA  She will have her first appointment with Dr. Ray Mcintyre at  [place] [facility]  on 11/4/24  -neurosurgery and oncology follow-up  "

## 2024-11-10 NOTE — ASSESSMENT & PLAN NOTE
Found on u/s 11/5/24  Also with hypoechoic peripheral nonocclusive thrombus within the proximal right superficial femoral vein   Changed to full dose lovenox   Close monitor  s/p spine surgery   Associated with hypotension /shock suspicious for PE

## 2024-11-10 NOTE — PROGRESS NOTES
Formerly [facility]  Department of Cardiology  Progress Note    PATIENT NAME: Shanell Mahmood  MRN: [id number]  TODAY'S DATE: 11/10/2024  ADMIT DATE: 10/31/2024    SUBJECTIVE     PRINCIPLE PROBLEM: Central cord syndrome    INTERVAL HISTORY:    11/10/2024  Vapotherm 15 L today; creatinine 1.6 today  OOB to chair; feeling improved today; Remains on Levophed and vasopressin.    Her blood pressure is much better today and the pressors are being weaned slowly.      11/9/24  Remains on vapotherm, down to 20 Liters today; Now on Levophed and Vasopressin; Resting quietly in bed    11/8/24  Pt seen this morning doing very well - up in cardiac chair and was able to eat breakfast and take PO medications. Remains on amio gtt as well as nor epi.  Does not have any complaints at this time.  Remains on 30 L 75% high-flow nasal cannula.    11/7/2024  Is awake lying in bed not in any acute distress.  She had been on Levophed and she is slowly being titrated down.  Still significantly winded on minimal exertion she is on Vapotherm now at 30 L and 80% to maintain good oxygenation.  She is currently fully anticoagulated.    Review of patient's allergies indicates:   Allergen Reactions    Codeine Palpitations       REVIEW OF SYSTEMS    OBJECTIVE     VITAL SIGNS (Most Recent)  Temp: 97.8 °F (36.6 °C) (11/10/24 0701)  Pulse: 63 (11/10/24 0922)  Resp: 20 (11/10/24 0922)  BP: (!) 94/52 (11/10/24 0701)  SpO2: 98 % (11/10/24 0922)    VENTILATION STATUS  Resp: 20 (11/10/24 0922)  SpO2: 98 % (11/10/24 0922)  Oxygen Concentration (%):  [75-85] 85        I & O (Last 24H):  Intake/Output Summary (Last 24 hours) at 11/10/2024 0955  Last data filed at 11/10/2024 0820  Gross per 24 hour   Intake 1438.19 ml   Output 1160 ml   Net 278.19 ml       WEIGHTS  Wt Readings from Last 1 Encounters:   11/01/24 1825 72.7 kg (160 lb 4.4 oz)   11/01/24 0733 70.8 kg (156 lb)   10/31/24 2104 71.2 kg (156 lb 15.5 oz)   10/31/24 1455 71.7 kg (158 lb)       PHYSICAL  EXAM  CONSTITUTIONAL: calm, awake, elderly fatigued female, resting in recliner comfortably on Vapotherm.    NECK: no carotid bruit, no JVD  LUNGS:  Bilateral decreased breath sounds in both lung bases; 15 L per vapotherm  CHEST WALL: no tenderness  HEART: regular rate and rhythm, S1, S2 normal, systolic murmur audible  ABDOMEN: soft, bowel sounds audible  EXTREMITIES: Extremities bilateral ankle and hand edema  NEURO: AAO X 3 recent cord compression surgery status post multilevel laminectomy and improving quadriparesis.    SCHEDULED MEDS:   allopurinoL  100 mg Oral QHS    amiodarone  200 mg Oral TID    [START ON 11/14/2024] amiodarone  200 mg Oral BID    atorvastatin  40 mg Oral Daily    bisacodyL  10 mg Rectal Daily    clopidogreL  75 mg Oral Daily    colchicine  0.3 mg Oral Daily    enoxparin  1 mg/kg Subcutaneous Q24H (treatment, non-standard time)    HYDROcodone-acetaminophen  1 tablet Oral Q6H    macitentan  10 mg Oral Daily    methocarbamoL  750 mg Oral TID    midodrine  15 mg Oral TID    montelukast  10 mg Oral Daily    pantoprazole  40 mg Oral Daily    selexipag  1,600 mcg Oral BID    tadalafil  40 mg Oral Daily       CONTINUOUS INFUSIONS:   NORepinephrine bitartrate-D5W  0-3 mcg/kg/min Intravenous Continuous 6.8 mL/hr at 11/10/24 0820 0.2 mcg/kg/min at 11/10/24 0820    vasopressin  0.04 Units/min Intravenous Continuous 12 mL/hr at 11/10/24 0820 0.04 Units/min at 11/10/24 0820       PRN MEDS:  Current Facility-Administered Medications:     acetaminophen, 650 mg, Oral, Q4H PRN    albuterol sulfate, 2.5 mg, Nebulization, Q6H PRN    aluminum-magnesium hydroxide-simethicone, 30 mL, Oral, Q4H PRN    dextrose 50%, 12.5 g, Intravenous, PRN    dextrose 50%, 25 g, Intravenous, PRN    diphenhydrAMINE, 25 mg, Oral, Q6H PRN    diphenhydrAMINE-zinc acetate 1-0.1%, , Topical (Top), TID PRN    glucagon (human recombinant), 1 mg, Intramuscular, PRN    glucose, 16 g, Oral, PRN    glucose, 24 g, Oral, PRN    HYDROmorphone, 1  "mg, Intravenous, Q6H PRN    HYDROmorphone, 2 mg, Oral, Q4H PRN    magnesium oxide, 800 mg, Oral, PRN    magnesium oxide, 800 mg, Oral, PRN    methocarbamoL, 500 mg, Oral, TID PRN    naloxone, 0.02 mg, Intravenous, PRN    ondansetron, 4 mg, Intravenous, Q8H PRN    potassium bicarbonate, 35 mEq, Oral, PRN    potassium bicarbonate, 50 mEq, Oral, PRN    potassium bicarbonate, 60 mEq, Oral, PRN    potassium, sodium phosphates, 2 packet, Oral, PRN    potassium, sodium phosphates, 2 packet, Oral, PRN    potassium, sodium phosphates, 2 packet, Oral, PRN    prochlorperazine, 5 mg, Intravenous, Q6H PRN    senna-docusate 8.6-50 mg, 2 tablet, Oral, Nightly PRN    sodium chloride 0.9%, 500 mL, Intravenous, PRN    sodium chloride 0.9%, 10 mL, Intravenous, Q12H PRN    sodium chloride 0.9%, 10 mL, Intravenous, PRN    LABS AND DIAGNOSTICS     CBC LAST 3 DAYS  Recent Labs   Lab 11/08/24  0546 11/09/24  0339 11/09/24  0439 11/10/24  0311 11/10/24  0313   WBC 7.26  --  6.58 5.36  --    RBC 3.67*  --  3.44* 3.26*  --    HGB 10.2*  --  9.4* 9.0*  --    HCT 30.7*   < > 29.1* 27.5* 32*   MCV 84  --  85 84  --    MCH 27.8  --  27.3 27.6  --    MCHC 33.2  --  32.3 32.7  --    RDW 15.8*  --  15.6* 15.3*  --    *  --  153 144*  --    MPV 11.4  --  11.1 11.3  --    GRAN 77.2*  5.6  --  74.4*  4.9 72.2  3.9  --    LYMPH 10.1*  0.7*  --  12.5*  0.8* 13.4*  0.7*  --    MONO 10.3  0.8  --  10.8  0.7 10.6  0.6  --    BASO 0.04  --  0.02 0.03  --    NRBC 0  --  0 0  --     < > = values in this interval not displayed.       COAGULATION LAST 3 DAYS  No results for input(s): "LABPT", "INR", "APTT" in the last 168 hours.      CHEMISTRY LAST 3 DAYS  Recent Labs   Lab 11/06/24  1239 11/06/24  1324 11/07/24  0415 11/07/24  0607 11/08/24  0546 11/08/24  0801 11/09/24  0339 11/09/24  0439 11/10/24  0311 11/10/24  0313   *  --  134*  --  133*  --   --  134* 133*  --    K 3.9  --  4.0  --  4.1  --   --  4.1 3.9  --      --  107  --  " "105  --   --  106 105  --    CO2 20*  --  22*  --  21*  --   --  22* 22*  --    ANIONGAP 7*  --  5*  --  7*  --   --  6* 6*  --    BUN 33*  --  32*  --  32*  --   --  34* 31*  --    CREATININE 1.9*  --  1.9*  --  1.8*  --   --  1.8* 1.6*  --    *  --  109  --  101  --   --  96 95  --    CALCIUM 7.2*  --  7.5*  --  7.7*  --   --  7.6* 7.6*  --    PHOS 4.0  --   --   --   --   --   --  3.1 2.7  --    PH  --    < >  --    < >  --  7.337* 7.360  --   --  7.328*   MG 1.9  --  2.0  --   --   --   --  2.1 2.1  --     < > = values in this interval not displayed.       CARDIAC PROFILE LAST 3 DAYS  No results for input(s): "BNP", "CPK", "CPKMB", "LDH", "TROPONINI", "TROPONINIHS" in the last 168 hours.      ENDOCRINE LAST 3 DAYS  No results for input(s): "TSH", "PROCAL" in the last 168 hours.      LAST ARTERIAL BLOOD GAS  ABG  Recent Labs   Lab 11/10/24  0313   PH 7.328*   PO2 54*   PCO2 39.4   HCO3 20.7*   BE -5*       LAST 7 DAYS MICROBIOLOGY   Microbiology Results (last 7 days)       Procedure Component Value Units Date/Time    Blood culture [[id number]] Collected: 11/01/24 1829    Order Status: Completed Specimen: Blood from Peripheral, Hand, Right Updated: 11/06/24 2032     Blood Culture, Routine No growth after 5 days.            MOST RECENT IMAGING  X-Ray Chest AP Portable  Narrative: EXAMINATION:  XR CHEST AP PORTABLE    CLINICAL HISTORY:  Pulmonary hypertension;    FINDINGS:  Portable chest with comparison chest x-ray 11/06/2024.  Normal cardiomediastinal silhouette.Enlarged central hilar vascular structures again noted with mild perihilar peribronchial interstitial thickening.  Hazy opacities of the left lung base are mildly increased.  No pneumothorax.  Right PICC line is unchanged.  Pulmonary vasculature is normal. No acute osseous abnormality.  Impression: Mild increased hazy opacities of the left lung bases.  Otherwise no significant changes from prior exam.    Electronically signed by: Scotty" Ketan  Date:    11/08/2024  Time:    08:50      [facility]  Results for orders placed during the hospital encounter of 10/31/24    Echo Saline Bubble? No; Ultrasound enhancing contrast? No    Interpretation Summary    Left Ventricle: The left ventricle is smaller than normal. Normal wall thickness. There is normal systolic function with a visually estimated ejection fraction of 65 - 70%. There is normal diastolic function.    Right Ventricle: Mild right ventricular enlargement. Wall thickness is normal. Systolic function is moderately reduced.    Left Atrium: Left atrium is moderately dilated.    Right Atrium: Right atrium is mildly dilated.    IVC/SVC: Normal venous pressure at 3 mmHg.      CURRENT/PREVIOUS VISIT EKG  Results for orders placed or performed during the hospital encounter of 10/31/24   EKG 12-lead    Collection Time: 11/07/24  7:05 AM   Result Value Ref Range    QRS Duration 72 ms    OHS QTC Calculation 427 ms    Narrative    Test Reason : I49.9,    Vent. Rate : 073 BPM     Atrial Rate : 079 BPM     P-R Int : 200 ms          QRS Dur : 072 ms      QT Int : 388 ms       P-R-T Axes : 000 083 024 degrees     QTc Int : 427 ms    Sinus rhythm with Blocked Premature atrial complexes  Nonspecific T wave abnormality  Abnormal ECG  When compared with ECG of 06-NOV-2024 13:13,  Sinus rhythm has replaced Atrial fibrillation  Vent. rate has decreased BY  67 BPM  Left posterior fascicular block is no longer Present    Referred By: [email or web address]   SELF           Confirmed By:        ASSESSMENT/PLAN:     Active Hospital Problems    Diagnosis    *Central cord syndrome    S/P cervical spinal fusion    Paroxysmal atrial fibrillation    Myelopathy    Quadriparesis    Acute deep vein thrombosis (DVT) of popliteal vein of right lower extremity, nonocclusive    Status post cervical spinal fusion    Anemia    Thrombocytopenia    Hypotension    Acute urinary retention    Syncope and collapse    CKD (chronic kidney disease), stage  IV    Chronic respiratory failure with hypoxia    Pulmonary hypertension    Secondary hyperparathyroidism    Brain mass    Stroke    Weakness of both lower extremities       ASSESSMENT & PLAN:  1. Severe pulmonary hypertension  2. Acute on chronic hypoxemic respiratory failure   3. Paroxysmal atrial fibrillation  4. Severe central cord compression  5. Quadriparesis  6. Chronic kidney disease  7. Intracranial mass  8. Pulmonary nodule  9. Anemia   10. Acute DVT right leg  11. Syncope with collapse and history of stroke  12. NSVT      RECOMMENDATIONS:    11/10/2024  Echocardiogram 11/6/24 showed Efx 65-70%  Continue amiodarone 200 mg TID x 5 days followed by 200 mg BID x 30 days  Blood pressure still low, required additional pressor support today  Continue midodrine 15 mg t.i.d.  Continue renal dose Lovenox for DVT Rt leg and CVA prophylaxis in PAF.  6.   Continue Plavix 75 mg daily and statin therapy        11/07  1. Patient with severe pulmonary hypertension and currently on increased requirement of oxygen on Vapotherm 30 L and 80% to meet the oxygen requirements.  Patient was on Opsumit, Uptravi and Adcirca, probably needs to be started back on it.  Patient's echocardiogram shows mild to moderately dilated right ventricle with hypokinesis and there is some evidence of flattening of the septum.    Risk of PE still remains high and currently she is fully anticoagulated.    2. Hypotension  Her echo shows normal left ventricle systolic function with an ejection fraction of 65% and she is currently on Levophed to maintain her blood pressure.  Not sure if this is reflexion of cord compression syndrome and spinal cord shock.  And if it is spinal cord shock me lasts for days to weeks.  And the treatment for spinal cord shock would be maintaining hemodynamic and respiratory stability.  Will defer this to Neurology and Neurosurgery.  At the present time patient does not seem to have unopposed parasympathetic tone.  She is  currently on midodrine 15 mg p.o. t.i.d.    3. Acute on chronic hypoxemic respiratory failure and she is currently on high levels of Vapotherm support about 30 L and 75% to oxygenation.    She has multiple issues that cause significant compromise in her pulmonary function including possibility of a PE and underlying renal failure and evidence of diastolic dysfunction with significant pulmonary hypertension     4. Atrial fibrillation   Currently on full anticoagulation continue the same.  Need to continue it uninterrupted.  Patient is currently in sinus rhythm.    5. Ventricular tachycardia   Patient had 25 beat run of ventricular tachycardia overnight and for the same reason she is also on amiodarone.  Patient is not a candidate for beta-blockers.    6. DVT   she does have nonocclusive DVT to the right leg in the risk of pulmonary embolism does exists.  Continue anticoagulation    7. Severe central cord compression status post multilevel laminectomy and and she does have persisting quadriparesis.  Further recommendations as per Neurosurgery.    8. Anemia   Her current hemoglobin is 10.3 hematocrit is 31.1 and platelets a 134 with a white count of 7.7    9. Intracranial mass/pulmonary nodules  Further recommendations as per hospitalist and heme/ onc.    10. Would still consider moving her to tertiary care center for further management.            11/06/2024 8:38 AM 5:03 pm   1. Patient has had pulmonary hypertension and she is being followed by her pulmonologist and she had a right heart catheterization.  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     Dr. Segal in [place]. He performed a right heart cath and found a mean PA pressure of 40, wedge of 12, with PVR of 10 Wood units. She is on Opsumit, Adcirca, and Uptravi.      2. Atrial fibrillation   She follows up with her primary cardiologist Dr. Segal.  At home she is not on any anticoagulation.     She was started on enoxaparin 1 milligram/kilogram subQ Q 24 hours.  Patient underwent surgery for central cord syndrome and cervical spondylosis with myelopathy and radiculopathy on 11/03/2024  Highly recommend to consult Neurosurgery in regards with anticoagulation of the patient as risk of bleeding into the area is significant.  If she is not adequately anticoagulated she is also at risk for a stroke.     3. History of recent DVT and not on any anticoagulation and at risk for pulmonary embolism.     4. Hypotension and syncope with acute on chronic hypoxemia, need to rule out pulmonary embolism.  She would need a CTA to rule out pulmonary embolus and given the fact that she has underlying chronic kidney disease stage 5 in the past, that would create a problem.     Currently she is on norepinephrine and midodrine, continue the same.     5. She also has a left frontoparietal and temporal extra-axial mass and would need further evaluation of the same at some point in time.       6. Atrial fibrillation   Currently her heart rate is controlled at 97 beats per minute she was started on amiodarone drip may need to hold amiodarone as she is not adequately anticoagulate and not sure how long she had been in atrial fibrillation.  Patient was also started on enoxaparin need to get clearance from Neurosurgery also.     7. Anemia of chronic disease.       8.  Chronic hypoxemic respiratory failure with significant shortness a breath on exertion  And she had office pulmonary function tests done at Dr. Federico Tejada's office.  6/8/2021   7:36 AM 3/29/2022   3:22 PM 1/23/2023   2:35 PM   Office Spirometry/PFT/Diffusion Results   FVC (Liters) 2.69 Liters 2.76 Liters   FEV1 (Liters) 2.08 Liters 1.92 Liters   FEV1/FVC (%) 77 % 69 %   MVV (L/min) 69 L/min   O2 Satu 88 96   FiO2 21 28   TLC Liters Pre Kelly 5.34 Liters   RV/TLC (%) 49 %   VC (Liters) 2.72 Liters   DLCO (mL/mmHg/min) 16.3 ml/mmHg sec   DLCO/VA Pre Kelly 3.4   FVC PRE  2.94 Liters   FVC Pre%Pred 107 %   FEV1 PRE 2.15 Liters   FEV1 Pre%Pred 102 %   FEV1/FVC PRE 73.23 %      9. Patient has multiple complex medical problems and she is better served at a tertiary care center and would benefit from transferring her to the [facility] on [facility] in [place]..      Karly Madison NP  Date of Service: 11/10/2024  2:06 PM    I have personally interviewed and examined the patient, I have reviewed the Nurse Practitioner's history and physical, assessment, and plan. I have personally evaluated the patient at bedside and agree with the findings and made appropriate changes as necessary in recommendations.    1. Patient seems to be improving and she feels much better.  Continue her current management .  And her blood pressure is slowly improving.  Continue to wean off the pressors.  New line she is also using lesser amount of Vapotherm.  2. She is currently sitting in a chair.  Patient to continue to do physical therapy.          1. Patient is status has remained the same her blood pressure is still on the lower side.  We will discuss with Pulmonary in regards with steroids helping her.  2. If patient's status does not improve especially with the blood pressure consideration for transferred to the tertiary care center.   3. Will change amiodarone to oral amiodarone and see if that would help with blood pressure.          1. Patient appears to be stable she is in a seated position and she has had her lunch today.  2. Will switch her to oral amiodarone  3. She is still on Levophed at lower doses continue the same.  4. Will continue to monitor her closely in the ICU at [facility] will consider transfer if her condition should get worse.  At the present time will hold of the transfer.          Ridge Fraser MD  Department of Cardiology  [facility]  11/10/2024 8:38 AM

## 2024-11-10 NOTE — ASSESSMENT & PLAN NOTE
Patient has paroxysmal (<7 days) atrial fibrillation. Patient is currently in atrial fibrillation. [id number] Score: 3. The patients heart rate in the last 24 hours is as follows:  Pulse  Min: 61  Max: 83     Antiarrhythmics  amiodarone tablet 200 mg, 3 times daily, Oral  amiodarone tablet 200 mg, 2 times daily, Oral    Anticoagulants  enoxaparin injection 70 mg, Every 24 hours, Subcutaneous    Plan  - Replete lytes with a goal of K>4, Mg >2  - Patient is anticoagulated, see medications listed above.  - Patient's afib is currently uncontrolled. Will continue current treatment  Continue  amiodarone drip   On full dose lovenox

## 2024-11-10 NOTE — ASSESSMENT & PLAN NOTE
Asymptomatic.  Etiology is unclear but suspected to be related to the stroke or underlying cervical cord pathology vs spinal shock/possible from phospho diesterase inhibitors for pul HTN / A fib with RVR   Echo reviewed.  Not  cardiogenic shock.  No signs of infection or sepsis  -keep hold any BP meds or diuretics  On midodrine 10 mg t.i.d. and further increased to 15 tid   Currently on 2 vasopressor  Pulmonary offered swan ramya but patient refused

## 2024-11-10 NOTE — ASSESSMENT & PLAN NOTE
Creatine stable for now. BMP reviewed- noted Estimated Creatinine Clearance: 30.5 mL/min (A) (based on SCr of 1.6 mg/dL (H)). according to latest data. Based on current GFR, CKD stage is stage 4 - GFR 15-29.  Monitor UOP and serial BMP and adjust therapy as needed. Renally dose meds. Avoid nephrotoxic medications and procedures.    Close monitor

## 2024-11-10 NOTE — SUBJECTIVE & OBJECTIVE
Interval History:     Awake and alert . Daughter bedside   HR still elevated around 120. Still on both levophed and vasopressin low dose . Still on vapotherm 15L  Sensations at lower limbs are intact    Review of Systems  Objective:     Vital Signs (Most Recent):  Temp: 97.8 °F (36.6 °C) (11/10/24 0901)  Pulse: 63 (11/10/24 0922)  Resp: 20 (11/10/24 0922)  BP: (!) 110/55 (11/10/24 0901)  SpO2: 98 % (11/10/24 0922) Vital Signs (24h Range):  Temp:  [97.8 °F (36.6 °C)-98.9 °F (37.2 °C)] 97.8 °F (36.6 °C)  Pulse:  [61-83] 63  Resp:  [5-20] 20  SpO2:  [90 %-100 %] 98 %  BP: ()/(44-75) 110/55  Arterial Line BP: ()/(38-59) 101/48     Weight: 72.7 kg (160 lb 4.4 oz)  Body mass index is 25.1 kg/m².    Intake/Output Summary (Last 24 hours) at 11/10/2024 1140  Last data filed at 11/10/2024 0820  Gross per 24 hour   Intake 1438.19 ml   Output 810 ml   Net 628.19 ml         Physical Exam        Significant Labs: All pertinent labs within the past 24 hours have been reviewed.  BMP:   Recent Labs   Lab 11/10/24  0311   GLU 95   *   K 3.9      CO2 22*   BUN 31*   CREATININE 1.6*   CALCIUM 7.6*   MG 2.1     CBC:   Recent Labs   Lab 11/09/24  0439 11/10/24  0311 11/10/24  0313   WBC 6.58 5.36  --    HGB 9.4* 9.0*  --    HCT 29.1* 27.5* 32*    144*  --      CMP:   Recent Labs   Lab 11/09/24  0439 11/10/24  0311   * 133*   K 4.1 3.9    105   CO2 22* 22*   GLU 96 95   BUN 34* 31*   CREATININE 1.8* 1.6*   CALCIUM 7.6* 7.6*   ANIONGAP 6* 6*       Significant Imaging: I have reviewed all pertinent imaging results/findings within the past 24 hours.

## 2024-11-10 NOTE — PROGRESS NOTES
Pulmonary/Critical Care Progress Note      PATIENT NAME: Shanell Mahmood  MRN: [id number]  TODAY'S DATE: 11/10/2024  8:00 AM  ADMIT DATE: 10/31/2024  AGE: 74 y.o. : 1950    CONSULT REQUESTED BY: Eric Bass MD    REASON FOR CONSULT:   Critical care management    HPI:  The patient is a 74 year old female who had a syncopal event on 10/31.  She was weak, myelopathic.  CT of the head showed an acute infarct in the right caudate head.  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  The MRI of her cervical spine showed disc bulging and spondylolysis at C4-C5 and C5-C6 with severe spinal canal stenosis, cervical cord compression and cord signal alteration there was also broad-based disc bulging producing moderate spinal canal stenosis at C3-C4.  She was brought to the OR yesterday where she underwent C2- 3, 3-4, 4-5,5-6,  and 6- 7 laminectomies with C3 through 6 posterior segmental instrumentation and C3-4, 4- 5, 5- 6 posterolateral arthrodesis using autograft harvested from the incision and allograft of DBM.  This morning she remains profoundly myelopathic.  She is on Levophed at 0.06 mics per kilos per minute to maintain a mean of 80.    The patient has significant pulmonary hypertension in his managed with Opsumit 10 mg daily, Adcirca 40 mg daily, and Uptravi 1600 ug bid.  She is on 4 L continuous oxygen and has dyspnea with any exertion.  She is also on Advair 115/21 b.i.d.     the patient's oxygenation dropped dramatically this morning.  She is now on more levophed to maintain a mean of 80.  She is in a lot of pain.     the patient is requiring a little less oxygen this morning.  However, she is in AFib and had a 25 beat run of V-tach overnight.  She is on 0.24 of Levophed.  I am concerned that her pulmonary hypertension is significantly worse and have an echo coming.  Will start walking  the Levophed down and off as her renal function is worse, she is in AFib and she had V-tach last night.    11/7 the patient is continuing to decline.  She is on Vapotherm now at 30 L and 80% to maintain good oxygenation.  She is requiring 0.3 of Levophed to maintain her blood pressure.  We did fully anticoagulate with Levophed yesterday as I am still worried about a PE and the patient developed AFib with RVR.  Started on amiodarone and this morning she is back in sinus rhythm.  Limited echo yesterday did not show any worsening of the right ventricle but PA pressures were estimated at 60.  If her heart is actually working well, then we have no SVR, and this is spinal shock.  However, spinal shock is usually bradycardic.  A [place]-Jens catheter maybe helpful, but I am not sure how it will change our management unless the RV is failing and the PAs are too high and we need to be on IV Flolan.    11/8 the patient is on 0.3 of Levophed.  She is on 85% on the Vapotherm.  The patient is still declining to have a a [place]-Jens catheter.  We would need this to initiate Flolan.  She would like to continue as we are hoping that things will turn around.  Her creatinine is marginally better today.  She remains ahead on her I's and O's by about 6 L. I do not see any point in trying to transfer her if she is declining a [place].    11/9 the patient is still on 0.3 of Levophed.  Her oxygenation is slightly improved and we have decreased her Vapotherm to 70% and 20 L. will try adding vasopressin to see if we get any benefit from this.  Her creatinine is stable at 1.8.  She still does not want a [place]-Jens catheter.  She has not had a bowel movement in 3 days.  Will make sure we are doing rectal stimulation with her Dulcolax suppositories.    11/10 The patient has less Levophed need this morning.  She is on vasopressin 0.04.  Her O2, though, has had to be increased to 85% she is still on Vapotherm but only at 15 L. she has more movement in  her arms and her legs today and this may be what is helping us with a blood pressure.    REVIEW OF SYSTEMS  GENERAL: Feeling ok  EYES: Vision is good.  ENT: No sinusitis or pharyngitis.   HEART: No chest pain or palpitations.  LUNGS:  Breathing is okay  GI: No Nausea, vomiting, constipation, diarrhea, or reflux.  : No dysuria, hesitancy, or nocturia.  SKIN: No lesions or rashes.  MUSCULOSKELETAL: No joint pain or myalgias.  NEURO:  She remains very numb and this is distressing to her more so than the quadriparesis  LYMPH: No edema or adenopathy.  PSYCH: No anxiety or depression.  ENDO: No weight change.    No change in the patient's Past Medical History, Past Surgical History, Social History or Family History since admission.    VITAL SIGNS (MOST RECENT)  Temp: 98.4 °F (36.9 °C) (11/10/24 0313)  Pulse: 69 (11/10/24 0545)  Resp: 16 (11/10/24 0700)  BP: (!) 116/55 (11/10/24 0545)  SpO2: 99 % (11/10/24 0545)    INTAKE AND OUTPUT (LAST 24 HOURS):  Intake/Output Summary (Last 24 hours) at 11/10/2024 0733  Last data filed at 11/10/2024 0549  Gross per 24 hour   Intake 1636.8 ml   Output 985 ml   Net 651.8 ml       WEIGHT  Wt Readings from Last 1 Encounters:   11/01/24 72.7 kg (160 lb 4.4 oz)       PHYSICAL EXAM  GENERAL: Older patient looking quiet..  HEENT: Pupils equal and reactive. Extraocular movements intact. Nose intact. Pharynx moist.  NECK: Supple.  Aspen collar in place. HEART:  Irregularly irregular rate and rhythm. No murmur or gallop auscultated.  The monitor shows AFib with a controlled rate.  LUNGS:  The lungs are clear..  Lung excursion symmetrical. No change in fremitus.   ABDOMEN: Bowel sounds present. Non-tender, no masses palpated.  : Normal anatomy.  Yeung catheter with yellow urine  EXTREMITIES:  She is moving her right arm much better today.  In her hand.  There was increase strength on the left and both legs are stronger as well.  Arterial line right radial.  Right PICC.    LYMPHATICS: No  adenopathy palpated, 1+ edema.  SKIN: Dry, intact, no lesions.   NEURO: Cranial nerves II-XII intact. Motor strength has improved in the last 24 hours and all extremities  PSYCH: Appropriate affect      CBC LAST (LAST 24 HOURS)  Recent Labs   Lab 11/10/24  0311 11/10/24  0313   WBC 5.36  --    RBC 3.26*  --    HGB 9.0*  --    HCT 27.5* 32*   MCV 84  --    MCH 27.6  --    MCHC 32.7  --    RDW 15.3*  --    *  --    MPV 11.3  --    GRAN 72.2  3.9  --    LYMPH 13.4*  0.7*  --    MONO 10.6  0.6  --    BASO 0.03  --    NRBC 0  --        CHEMISTRY LAST (LAST 24 HOURS)  Recent Labs   Lab 11/10/24  0311 11/10/24  0313   *  --    K 3.9  --      --    CO2 22*  --    ANIONGAP 6*  --    BUN 31*  --    CREATININE 1.6*  --    GLU 95  --    CALCIUM 7.6*  --    PH  --  7.328*   MG 2.1  --          LAST 7 DAYS MICROBIOLOGY   Microbiology Results (last 7 days)       Procedure Component Value Units Date/Time    Blood culture [[id number]] Collected: 11/01/24 1829    Order Status: Completed Specimen: Blood from Peripheral, Hand, Right Updated: 11/06/24 2032     Blood Culture, Routine No growth after 5 days.            MOST RECENT IMAGING  X-Ray Chest AP Portable  Narrative: EXAMINATION:  XR CHEST AP PORTABLE    CLINICAL HISTORY:  Pulmonary hypertension;    FINDINGS:  Portable chest with comparison chest x-ray 11/06/2024.  Normal cardiomediastinal silhouette.Enlarged central hilar vascular structures again noted with mild perihilar peribronchial interstitial thickening.  Hazy opacities of the left lung base are mildly increased.  No pneumothorax.  Right PICC line is unchanged.  Pulmonary vasculature is normal. No acute osseous abnormality.  Impression: Mild increased hazy opacities of the left lung bases.  Otherwise no significant changes from prior exam.    Electronically signed by: Scotty Aceves  Date:    11/08/2024  Time:    08:50    Chest x-ray 11/8 shows  tiny pleural effusions and huge pulmonary arteries.   There is a PICC line on the right side.    CURRENT VISIT EKG  No results found for this visit on 10/31/24.    ECHOCARDIOGRAM RESULTS  Results for orders placed during the hospital encounter of 10/31/24    Echo    Interpretation Summary    Left Ventricle: The left ventricle is normal in size. Normal wall thickness. Unable to assess wall motion. There is normal systolic function with a visually estimated ejection fraction of 60 - 65%.    Right Ventricle: Right ventricle was not well visualized due to poor acoustic window.  Grossly appears to be dilated with reduced function.  There appears to be some concern of flattening of the interventricular septum which could indicate right ventricular pressure and volume overload.    Left Atrium: Left atrium is severely dilated.    IVC/SVC: Elevated venous pressure at 15 mmHg.    Oxygen INFORMATION  Oxygen Concentration (%):  [75-85] 858 L        IMPRESSION AND PLAN  Severe central cord compression now status post multilevel laminectomy and instrumentation  Quadriparesis   - improved  Severe pulmonary hypertension  - patient on Opsumit, Uptravi, and Adcirca, max doses    - patient's echo continues to show a functioning RV and LV.  - patient declining a [place]-Jens catheter which we would need to evaluate whether Flolan might be helpful  - will increase Lovenox to b.i.d. today  Shock  - still on 0.18 of Levophed and 0.4 vasopressin  - etiology still remains obscure  - patient is still declining [place]-Jens catheter  - patient receiving 15 mg of midodrine t.i.d.  - echo repeated twice and seems to show a functioning RV and LV  - spinal shock should be associated with bradycardia and she has been in AFib with RVR  - no signs of infection  - arterial line replaced yesterday  DVT  - nonocclusive to the right leg  - on full-dose Lovenox  Atrial fibrillation  - in and out of AFib, rate controlled  - on amiodarone 0.5  Acute on chronic hypoxemic respiratory failure  - on 4 L of oxygen at  home  - now requiring high levels of Vapotherm support, currently on 16 L and 85% O2  - I's and O's ahead 6 liters  - pulmonary hypertension meds restarted   - worry about PE, fully anticoagulated with Lovenox  - DVT seen in legs  - takes Advair but is not obstructed  - will continue p.r.n. DuoNebs  Anemia  - chronic disease  - trending down  Thrombocytopenia  - mild  Chronic kidney disease  - likely aggravated with the Levophed and the need for Levophed  - creatinine slightly improved  Hyponatremia  - will feed a little more salt  Moderate hypoalbuminemia  - advance diet  Intracranial mass  Pulmonary nodules, 1 greater than 1 cm  - patient workup underway as an outpatient      Critical care time spent reviewing the chart, examining the patient, reviewing the labs, reviewing the radiological findings, discussing care with nursing, physicians, and respiratory and creating the note and  has been 30 minutes.    Gaye Ramos MD  Date of Service: 11/10/2024  8:00 AM

## 2024-11-10 NOTE — ASSESSMENT & PLAN NOTE
Patient with Hypoxic Respiratory failure which is Chronic.  she is on home oxygen at 4 LPM.   Monitor

## 2024-11-10 NOTE — CARE UPDATE
11/10/24 0826   Patient Assessment/Suction   Level of Consciousness (AVPU) alert   Respiratory Effort Normal;Unlabored   All Lung Fields Breath Sounds diminished   LLL Breath Sounds crackles   RUL Breath Sounds diminished   Rhythm/Pattern, Respiratory unlabored;pattern regular;depth regular   Cough Frequency infrequent   Cough Type congested;fair   PRE-TX-O2   Device (Oxygen Therapy) high flow nasal cannula w/device   $ Is the patient on High Flow Oxygen? Yes   $ Noninvasive Daily Charge Noninvasive Daily   Humidification temp set 33   Flow (L/min) (Oxygen Therapy) 16   Oxygen Concentration (%) 85   SpO2 100 %   Pulse Oximetry Type Continuous   $ Pulse Oximetry - Multiple Charge Pulse Oximetry - Multiple   Pulse 68   Resp 14   Positioning HOB elevated 30 degrees   Aerosol Therapy   $ Aerosol Therapy Charges PRN treatment not required   Incentive Spirometer   $ Incentive Spirometer Charges done with encouragement   Administration (IS) mouthpiece utilized   Number of Repetitions (IS) 16   Level Incentive Spirometer (mL) 1250  (max 1500)   Patient Tolerance (IS) no adverse signs/symptoms present   Vibratory PEP Therapy   $ Vibratory PEP Charges [place]ob[place] Therapy   $ Vibratory PEP Tech Time Charges 15 min   Daily Review of Necessity (PEP Therapy) completed   Type (PEP Therapy) vibratory/oscillatory   Device (PEP Therapy) flutter   Route (PEP Therapy) mouthpiece   Breaths per Cycle (PEP Therapy) 15   Cycles (PEP Therapy) 1   Patient Position (PEP Therapy) HOB elevated   Post Treatment Assessment (PEP) breath sounds unchanged;vital signs unchanged   Signs of Intolerance (PEP Therapy) none     Weaned vapo as kwadwo to 16/50%   then changed to 14lpm HFNC.

## 2024-11-11 NOTE — ASSESSMENT & PLAN NOTE
Asymptomatic.  Etiology is unclear but suspected to be related to the stroke or underlying cervical cord pathology vs spinal shock/possible from phospho diesterase inhibitors for pul HTN / A fib with RVR   Echo reviewed.  Not  cardiogenic shock.  No signs of infection or sepsis  -keep hold any BP meds or diuretics  On midodrine 10 mg t.i.d. and further increased to 15 tid   Currently on 2 vasopressor  Pulmonary offered swan ramya but patient refused   Cortisol random and am cortisol added

## 2024-11-11 NOTE — ASSESSMENT & PLAN NOTE
Chronic condition   Macitentan, selexipag and tadalafil not available and sildenafil changed and continued   Patient refuse padmini bui

## 2024-11-11 NOTE — CARE UPDATE
11/10/24 1941   Patient Assessment/Suction   Level of Consciousness (AVPU) alert   Respiratory Effort Unlabored   Expansion/Accessory Muscles/Retractions expansion symmetric   All Lung Fields Breath Sounds clear   Rhythm/Pattern, Respiratory depth regular;pattern regular   Cough Frequency infrequent   Cough Type good;nonproductive   PRE-TX-O2   Device (Oxygen Therapy) high flow nasal cannula   $ Is the patient on Low Flow Oxygen? Yes   Flow (L/min) (Oxygen Therapy) 14   SpO2 100 %   Pulse 60   Resp (!) 7   Aerosol Therapy   $ Aerosol Therapy Charges PRN treatment not required   Incentive Spirometer   $ Incentive Spirometer Charges done with encouragement   Incentive Spirometer Predicted Level (mL) 1500   Administration (IS) instruction provided, follow-up   Number of Repetitions (IS) 1   Level Incentive Spirometer (mL) 1500   Patient Tolerance (IS) good   Vibratory PEP Therapy   $ Vibratory PEP Charges Aerobika Therapy   $ Vibratory PEP Equipment Aerobika Equipment   $ Vibratory PEP Tech Time Charges 15 min   Daily Review of Necessity (PEP Therapy) completed   Type (PEP Therapy) vibratory/oscillatory   Device (PEP Therapy) flutter   Route (PEP Therapy) mouthpiece   Breaths per Cycle (PEP Therapy) 10   Cycles (PEP Therapy) 1   PEP Pressure (cm H2O) 5   PEP Duration (min) 10   Settings (PEP Therapy) PEP 5   Patient Position (PEP Therapy) HOB elevated   Post Treatment Assessment (PEP) breath sounds unchanged   Signs of Intolerance (PEP Therapy) none   Education   $ Education Incentive Spirometry;15 min

## 2024-11-11 NOTE — PROGRESS NOTES
Pulmonary/Critical Care Progress Note      PATIENT NAME: Shanell Mahmood  MRN: [id number]  TODAY'S DATE: 2024  8:00 AM  ADMIT DATE: 10/31/2024  AGE: 74 y.o. : 1950    CONSULT REQUESTED BY: Eric Bass MD    REASON FOR CONSULT:   Critical care management    HPI:  The patient is a 74 year old female who had a syncopal event on 10/31.  She was weak, myelopathic.  CT of the head showed an acute infarct in the right caudate head.  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  The MRI of her cervical spine showed disc bulging and spondylolysis at C4-C5 and C5-C6 with severe spinal canal stenosis, cervical cord compression and cord signal alteration there was also broad-based disc bulging producing moderate spinal canal stenosis at C3-C4.  She was brought to the OR yesterday where she underwent C2- 3, 3-4, 4-5,5-6,  and 6- 7 laminectomies with C3 through 6 posterior segmental instrumentation and C3-4, 4- 5, 5- 6 posterolateral arthrodesis using autograft harvested from the incision and allograft of DBM.  This morning she remains profoundly myelopathic.  She is on Levophed at 0.06 mics per kilos per minute to maintain a mean of 80.    The patient has significant pulmonary hypertension in his managed with Opsumit 10 mg daily, Adcirca 40 mg daily, and Uptravi 1600 ug bid.  She is on 4 L continuous oxygen and has dyspnea with any exertion.  She is also on Advair 115/21 b.i.d.     the patient's oxygenation dropped dramatically this morning.  She is now on more levophed to maintain a mean of 80.  She is in a lot of pain.     the patient is requiring a little less oxygen this morning.  However, she is in AFib and had a 25 beat run of V-tach overnight.  She is on 0.24 of Levophed.  I am concerned that her pulmonary hypertension is significantly worse and have an echo coming.  Will start walking  the Levophed down and off as her renal function is worse, she is in AFib and she had V-tach last night.    11/7 the patient is continuing to decline.  She is on Vapotherm now at 30 L and 80% to maintain good oxygenation.  She is requiring 0.3 of Levophed to maintain her blood pressure.  We did fully anticoagulate with Levophed yesterday as I am still worried about a PE and the patient developed AFib with RVR.  Started on amiodarone and this morning she is back in sinus rhythm.  Limited echo yesterday did not show any worsening of the right ventricle but PA pressures were estimated at 60.  If her heart is actually working well, then we have no SVR, and this is spinal shock.  However, spinal shock is usually bradycardic.  A [place]-Jens catheter maybe helpful, but I am not sure how it will change our management unless the RV is failing and the PAs are too high and we need to be on IV Flolan.    11/8 the patient is on 0.3 of Levophed.  She is on 85% on the Vapotherm.  The patient is still declining to have a a [place]-Jens catheter.  We would need this to initiate Flolan.  She would like to continue as we are hoping that things will turn around.  Her creatinine is marginally better today.  She remains ahead on her I's and O's by about 6 L. I do not see any point in trying to transfer her if she is declining a [place].    11/9 the patient is still on 0.3 of Levophed.  Her oxygenation is slightly improved and we have decreased her Vapotherm to 70% and 20 L. will try adding vasopressin to see if we get any benefit from this.  Her creatinine is stable at 1.8.  She still does not want a [place]-Jens catheter.  She has not had a bowel movement in 3 days.  Will make sure we are doing rectal stimulation with her Dulcolax suppositories.    11/10 The patient has less Levophed need this morning.  She is on vasopressin 0.04.  Her O2, though, has had to be increased to 85% she is still on Vapotherm but only at 15 L. she has more movement in  her arms and her legs today and this may be what is helping us with a blood pressure.    11/11 the patient is significantly improved this morning we are down to 0.05 of Levophed and the vasopressin.  Her oxygen is down to 6 L nasal cannula.  Her strength continues to improve.    REVIEW OF SYSTEMS  GENERAL: Feeling ok  EYES: Vision is good.  ENT: No sinusitis or pharyngitis.   HEART: No chest pain or palpitations.  LUNGS:  Breathing is okay  GI: No Nausea, vomiting, constipation, diarrhea, or reflux.  : No dysuria, hesitancy, or nocturia.  SKIN: No lesions or rashes.  MUSCULOSKELETAL: No joint pain or myalgias.  NEURO:  She feels she is getting a little stronger.  LYMPH: No edema or adenopathy.  PSYCH: No anxiety or depression.  ENDO: No weight change.    No change in the patient's Past Medical History, Past Surgical History, Social History or Family History since admission.    VITAL SIGNS (MOST RECENT)  Temp: 98.2 °F (36.8 °C) (11/11/24 0701)  Pulse: (!) 56 (11/11/24 0738)  Resp: 19 (11/11/24 0738)  BP: (!) 110/55 (11/11/24 0732)  SpO2: 100 % (11/11/24 0738)    INTAKE AND OUTPUT (LAST 24 HOURS):  Intake/Output Summary (Last 24 hours) at 11/11/2024 0742  Last data filed at 11/11/2024 0701  Gross per 24 hour   Intake 764.56 ml   Output 895 ml   Net -130.44 ml       WEIGHT  Wt Readings from Last 1 Encounters:   11/01/24 72.7 kg (160 lb 4.4 oz)       PHYSICAL EXAM  GENERAL: Older patient looking quiet..  HEENT: Pupils equal and reactive. Extraocular movements intact. Nose intact. Pharynx moist.  NECK: Supple.  Aspen collar in place. HEART:  Irregularly irregular rate and rhythm. No murmur or gallop auscultated.  The monitor shows AFib with a controlled rate.  LUNGS:  The lungs are clear. Lung excursion symmetrical. No change in fremitus.   ABDOMEN: Bowel sounds present. Non-tender, no masses palpated.  : Normal anatomy.  Yeung catheter with yellow urine  EXTREMITIES:  There is increasing strength in her hands.  The  right is still weaker than the left.  legs are stronger as well.  Again the right leg is weaker than the left.  Arterial line right radial.  Right PICC.    LYMPHATICS: No adenopathy palpated, tr edema.  SKIN: Dry, intact, no lesions.   NEURO: Cranial nerves II-XII intact. Motor strength has improved in all extremities  PSYCH: Appropriate affect      CBC LAST (LAST 24 HOURS)  Recent Labs   Lab 11/11/24 0316   HCT 29*       CHEMISTRY LAST (LAST 24 HOURS)  Recent Labs   Lab 11/11/24 0316   PH 7.310*         LAST 7 DAYS MICROBIOLOGY   Microbiology Results (last 7 days)       Procedure Component Value Units Date/Time    Blood culture [[id number]] Collected: 11/01/24 1829    Order Status: Completed Specimen: Blood from Peripheral, Hand, Right Updated: 11/06/24 2032     Blood Culture, Routine No growth after 5 days.            MOST RECENT IMAGING  X-Ray Chest AP Portable  Narrative: EXAMINATION:  XR CHEST AP PORTABLE    CLINICAL HISTORY:  Pulmonary hypertension;    FINDINGS:  Portable chest with comparison chest x-ray 11/06/2024.  Normal cardiomediastinal silhouette.Enlarged central hilar vascular structures again noted with mild perihilar peribronchial interstitial thickening.  Hazy opacities of the left lung base are mildly increased.  No pneumothorax.  Right PICC line is unchanged.  Pulmonary vasculature is normal. No acute osseous abnormality.  Impression: Mild increased hazy opacities of the left lung bases.  Otherwise no significant changes from prior exam.    Electronically signed by: Scotty Aceves  Date:    11/08/2024  Time:    08:50    Chest x-ray 11/8 shows  tiny pleural effusions and huge pulmonary arteries.  There is a PICC line on the right side.    CURRENT VISIT EKG  No results found for this visit on 10/31/24.    ECHOCARDIOGRAM RESULTS  Results for orders placed during the hospital encounter of 10/31/24    Echo    Interpretation Summary    Left Ventricle: The left ventricle is normal in size. Normal wall  thickness. Unable to assess wall motion. There is normal systolic function with a visually estimated ejection fraction of 60 - 65%.    Right Ventricle: Right ventricle was not well visualized due to poor acoustic window.  Grossly appears to be dilated with reduced function.  There appears to be some concern of flattening of the interventricular septum which could indicate right ventricular pressure and volume overload.    Left Atrium: Left atrium is severely dilated.    IVC/SVC: Elevated venous pressure at 15 mmHg.    Oxygen INFORMATION  Oxygen Concentration (%):  [85] 85    6 L nasal cannula    IMPRESSION AND PLAN  Severe central cord compression now status post multilevel laminectomy and instrumentation  Quadriparesis   - improved  Severe pulmonary hypertension  - patient on Opsumit, Uptravi, and Adcirca, max doses    - patient's echo continues to show a functioning RV and LV.  - patient declining a [place]-Jens catheter which we would need to evaluate whether Flolan might be helpful  - will increase Lovenox to b.i.d. today  Shock  - only on 0.05 of Levophed and 0.4 vasopressin  - etiology still remains obscure  - patient is still declining [place]-Jens catheter  - patient receiving 15 mg of midodrine t.i.d.  - echo repeated twice and seems to show a functioning RV and LV  - spinal shock should be associated with bradycardia and she has been in AFib with RVR  - no signs of infection  DVT  - nonocclusive to the right leg  - on full-dose Lovenox  Atrial fibrillation  - in and out of AFib, rate controlled  - on oral amiodarone   Acute on chronic hypoxemic respiratory failure  - on 4 L of oxygen at home  - now down to 6 liters  - I's and O's ahead 6 liters  - pulmonary hypertension meds being given  - worry about PE, fully anticoagulated with Lovenox  - DVT seen in legs  - takes Advair but is not obstructed  - will continue p.r.nSangeetha Felipe  Anemia  - chronic disease  - trending down  Thrombocytopenia  - mild  Chronic kidney  disease  - likely aggravated with the Levophed and the need for Levophed  Hyponatremia  - will feed a little more salt  Moderate hypoalbuminemia  - advance diet  Intracranial mass  Pulmonary nodules, 1 greater than 1 cm  - patient workup underway as an outpatient      Critical care time spent reviewing the chart, examining the patient, reviewing the labs, reviewing the radiological findings, discussing care with nursing, physicians, and respiratory and creating the note and  has been 30 minutes.    Gaye Ramos MD  Date of Service: 11/11/2024  8:00 AM

## 2024-11-11 NOTE — PROGRESS NOTES
[facility] Medicine  Progress Note    Patient Name: Shanell Mahmood  MRN: [id number]  Patient Class: IP- Inpatient   Admission Date: 10/31/2024  Length of Stay: 10 days  Attending Physician: Eric Bass MD  Primary Care Provider: Nicole, Primary Doctor        Subjective:     Principal Problem:Central cord syndrome        HPI:  Shanell Mahmood is a 74 year old female with a previous medical history of anemia, Pulmonary hypertension on 4 liters continuous oxygen at home, arthritis, CKD V, Osteoporosis, secondary hyperprathyroidism, S/P closure of patent foramen ovale in 2011, CVA in 2011 with no residuals, chronic back pain, HLD, Gout, Brain Mass and thyroid diease who presented to the ED after unwitnessed syncopal episode. The patient was at her pain management office for re certification only. There were no procedures performed. She reports taking one hydrocodone 10mg today.  The last thing she remembers was walking down the hallway of the office and looking for something to cover her head from the rain and did not have any adverse symptoms or feelings at that time.  She has no recollection of the syncopal events. When she was initially evaluated by EMS, her blood pressure was in the 60s over 40s. She did require constant stimulation to remain awake. Fell and hit her head. Does not take any blood thinners. EMS evaluated her and gave her 1 mg of Narcan as well as 250 cc of fluid. Her pressure improved and she had become more alert. Initial ED workup was thorough and all imaging showing that included CT of neck, head and entire spine showed no acute processes. CBC showed anemia and CMP showed elevated creatinine consistent with history of CKD V. Drug screen positive for opioids but patient has a hydrocodone prescription. The patient is currently in the process of have a left sided brain mass visualized on MRI 10/8/24 evaluated that was an incidental finding after undergoing a PET scan for a pulmonary  "nodule on 9/6/24 with abnormal uptake noted in brain. She has her first appointment on 11/4/24. She is followed by nephrology who is currently monitoring her and there has been one attempt at AV fistual placement but it was unsuccessful due sclerotic changes. Patient reports she awoke this morning feeling well showered and dressed herself. She performs all of her own ADL's. She had one syncopal event in March 2024 and was evaluated by cardiology and informed it was an orthostatic episode. Patient was unable to complete orthostatic vital signs upon admission due to inability to stand stating " I feel as if I can't get my legs under me". When evaluated for admit exam the patient endorses that after the syncopal episode she endorses bilateral leg weakness with decreased sensation in both legs. She reports bilateral  weakness being unable to use her phone and difficulty raising both of her arms. NIH scale performed and total of 9 noted. Patient noted have 400ml of urine in bladder and unable to void. Patient reached a total of 528ml of urine without being able to void.  MRI/MRA of brain and MRI of lumbosacral spine ordered upon admit. MRA and MRI lower back showed no acute process. MRI brain showed Acute infarct in the right caudate head. Dr. Maldonado was called and he recommended MRA of neck in morning and load with aspirin and plavix. Initial EKG read as atrial fibrillation but upon review p waves were noted and this was discussed with the ED. Repeat EKG shows sinus arrhthymias with PACs. Patient admitted by hospital medicine for further evaluation and management.       Overview/Hospital Course:  74-year-old female with history of brain mass, CKD, back pain, pulmonary hypertension on 4 L oxygen is admitted after syncope and collapse found to have a acute CVA in the right caudate on MRI brain.   She Has global weakness and decreased sensation to the lower legs.  Multiple CT and x-rays done to rule out trauma ultimately " negative other than the maxillofacial CT reporting mild irregularity of the interior midline mandible with small adjacent bone fragments which could represent acute fracture with overlying soft tissue swelling.    Carotid ultrasound and MRA brain and neck without acute finding.  Neurology is consulted.  Also with underlying brain mass.  Consult Neurosurgery and Oncology.  Given DAPT and statin.  Had hypotension given IVF and midodrine.  Echo shows right heart failure.  BNP is within normal.  Lactic acid pending.  No other sign of infection.  Placed in cervical collar as precaution and MRI of the cervical spine shows severe cord compression at C4-5 and C5-6 likely acute with edema.  Neurosurgery discussed with patient  and plan to proceed with surgical decompression  and  patient underwent surgery on : 11/3/24 multilevel C-spine laminectomy.  Later patient continued to have hypotension and needed vasopressor and another vasopressor vasopressin is added  Patient also her DVT of the the proximal right superficial femoral vein. And nonocclusive thrombus within the right popliteal vein,  Also patient developed new onset AFib and started amiodarone drip and in and out of a fib .  Patient was planned to transferred to [facility] but later patient declined Reader-Jens catheter placement and transfer was canceled.    .     Interval History:     Seen with RN   Feeling better . A fib rate better controlled .  In and out   Urine output adequate . Need less vasopressor   Cortisol added       Review of Systems  Objective:     Vital Signs (Most Recent):  Temp: 98.2 °F (36.8 °C) (11/11/24 0901)  Pulse: 60 (11/11/24 0901)  Resp: 12 (11/11/24 0920)  BP: (!) 102/56 (11/11/24 0901)  SpO2: 97 % (11/11/24 0901) Vital Signs (24h Range):  Temp:  [97.9 °F (36.6 °C)-98.6 °F (37 °C)] 98.2 °F (36.8 °C)  Pulse:  [48-72] 60  Resp:  [3-19] 12  SpO2:  [88 %-100 %] 97 %  BP: ()/(50-66) 102/56  Arterial Line BP: ()/(46-69) 107/49  "    Weight: 72.7 kg (160 lb 4.4 oz)  Body mass index is 25.1 kg/m².    Intake/Output Summary (Last 24 hours) at 11/11/2024 1101  Last data filed at 11/11/2024 0901  Gross per 24 hour   Intake 483.17 ml   Output 870 ml   Net -386.83 ml         Physical Exam  Vitals and nursing note reviewed.   HENT:      Head: Normocephalic and atraumatic.   Eyes:      Conjunctiva/sclera: Conjunctivae normal.   Neck:      Vascular: No JVD.   Cardiovascular:      Rate and Rhythm: Tachycardia present. Rhythm irregular.      Heart sounds: Normal heart sounds.   Pulmonary:      Effort: Pulmonary effort is normal.   Abdominal:      Palpations: Abdomen is soft.   Skin:     General: Skin is warm.   Neurological:      Mental Status: She is alert and oriented to person, place, and time.   Psychiatric:         Mood and Affect: Mood normal.             Significant Labs: All pertinent labs within the past 24 hours have been reviewed.  CBC:   Recent Labs   Lab 11/10/24  0311 11/10/24  0313 11/11/24  0316 11/11/24  0734   WBC 5.36  --   --  3.27*   HGB 9.0*  --   --  9.4*   HCT 27.5* 32* 29* 28.6*   *  --   --  147*     CMP:   Recent Labs   Lab 11/10/24  0311 11/11/24  0734   * 131*   K 3.9 4.2    103   CO2 22* 22*   GLU 95 100   BUN 31* 28*   CREATININE 1.6* 1.5*   CALCIUM 7.6* 8.2*   PROT  --  5.3*   ALBUMIN  --  2.6*   BILITOT  --  0.4   ALKPHOS  --  81   AST  --  25   ALT  --  7*   ANIONGAP 6* 6*     Cardiac Markers: No results for input(s): "CKMB", "MYOGLOBIN", "BNP", "TROPISTAT" in the last 48 hours.    Significant Imaging: I have reviewed all pertinent imaging results/findings within the past 24 hours.    Assessment/Plan:      * Central cord syndrome  S/p multilevel laminectomy (11/3/24)  C2-3, C3-4, C4-5, C5-6, C6-7 laminectomy  C3 through 6 posterior segmental instrumentation using DePuy Symphony system  C3-4, C4-5, C5-6 posterolateral arthrodesis using autograft harvested from the same incision and allograft " DBM    Cervical collar in place  Neuro surgery follow up   Possible spinal shock ?  PT OT when more stable    Cortisol added       Paroxysmal atrial fibrillation  Patient has paroxysmal (<7 days) atrial fibrillation. Patient is currently in atrial fibrillation. [id number] Score: 3. The patients heart rate in the last 24 hours is as follows:  Pulse  Min: 48  Max: 72     Antiarrhythmics  amiodarone tablet 200 mg, 3 times daily, Oral  amiodarone tablet 200 mg, 2 times daily, Oral    Anticoagulants  enoxaparin injection 70 mg, Every 24 hours, Subcutaneous    Plan  - Replete lytes with a goal of K>4, Mg >2  - Patient is anticoagulated, see medications listed above.  - Patient's afib is currently uncontrolled. Will continue current treatment  Continue  amiodarone drip and changed to PO   In and out of A fib   On full dose lovenox           S/P cervical spinal fusion  Multi levels  See NSGY op note     Acute deep vein thrombosis (DVT) of popliteal vein of right lower extremity, nonocclusive  Found on u/s 11/5/24  Also with hypoechoic peripheral nonocclusive thrombus within the proximal right superficial femoral vein   Changed to full dose lovenox   Close monitor  s/p spine surgery   Associated with hypotension /shock suspicious for PE         Quadriparesis  PT/OT  Frequent turns   Air mattress        Myelopathy  aware      Status post cervical spinal fusion  PT/OT  Cervical collar in place   Pain Mx      Hypotension  Asymptomatic.  Etiology is unclear but suspected to be related to the stroke or underlying cervical cord pathology vs spinal shock/possible from phospho diesterase inhibitors for pul HTN / A fib with RVR   Echo reviewed.  Not  cardiogenic shock.  No signs of infection or sepsis  -keep hold any BP meds or diuretics  On midodrine 10 mg t.i.d. and further increased to 15 tid   Currently on 2 vasopressor  Pulmonary offered swan ramya but patient refused   Cortisol random and am cortisol added  "    Thrombocytopenia  The patients 3 most recent labs are listed below.  Recent Labs     11/09/24  0439 11/10/24  0311 11/11/24  0734    144* 147*       Plan  - Will transfuse if platelet count is <100k (if undergoing neurosurgery), or otherwise less than 10 K  -trend daily    Anemia  Anemia is likely due to chronic disease due to Chronic Kidney Disease. Most recent hemoglobin and hematocrit are listed below.  Recent Labs     11/09/24  0439 11/10/24  0311 11/10/24  0313 11/11/24  0316 11/11/24  0734   HGB 9.4* 9.0*  --   --  9.4*   HCT 29.1* 27.5* 32* 29* 28.6*       Plan  - Monitor serial CBC: Daily  - Transfuse PRBC if patient becomes hemodynamically unstable, symptomatic or H/H drops below 7/21.  - Patient has not received any PRBC transfusions to date  - Patient's anemia is currently improving    Stroke  Acute CVA right caudate  MRI, MRA, CT, carotid U/S reports reviewed  Plavix  and statin and patient also need full dose lovenox   -neurology following  -neurosurgery and oncology consulted for the brain mass and follow up as OP   -PT/OT/SLP when more stable   -echo bubble report is seem edited. Not mentioning of PFO  Patient going to need placement     Brain mass  Discovered after PET scan of lung  on 9/6/24 revealed abnormal uptake in brain. 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  Unclear if contributing to CVA  She will have her first appointment with Dr. Ray Mcintyre at  [facility]  on 11/4/24  -neurosurgery and oncology follow-up    Secondary hyperparathyroidism  Chronic condition that is stable.     Pulmonary hypertension  Chronic condition   Macitentan, selexipag and tadalafil not available and sildenafil changed and continued   Patient refuse swan ramya    Chronic respiratory failure with hypoxia  Patient with Hypoxic Respiratory failure which is Chronic.  she is on home " oxygen at 4 LPM.   Monitor     CKD (chronic kidney disease), stage IV  Creatine stable for now. BMP reviewed- noted Estimated Creatinine Clearance: 32.5 mL/min (A) (based on SCr of 1.5 mg/dL (H)). according to latest data. Based on current GFR, CKD stage is stage 4 - GFR 15-29.  Monitor UOP and serial BMP and adjust therapy as needed. Renally dose meds. Avoid nephrotoxic medications and procedures.    Close monitor     Syncope and collapse  See stroke      Acute urinary retention  Possibly neurogenic from the CVA or from cervical spine cord compression  Urinary catheter. Appear may need long term        VTE Risk Mitigation (From admission, onward)           Ordered     enoxaparin injection 70 mg  Every 24 hours         11/06/24 1146     Reason for No Pharmacological VTE Prophylaxis  Once        Question:  Reasons:  Answer:  Risk of Bleeding    10/31/24 1849     IP VTE HIGH RISK PATIENT  Once         10/31/24 1849     Place sequential compression device  Until discontinued         10/31/24 1849                    Discharge Planning   ELBERT:      Code Status: DNR   Is the patient medically ready for discharge?:     Reason for patient still in hospital (select all that apply): Treatment  Discharge Plan A: Hospital Transfer   Discharge Delays:  (Accepting facility)        Critical care time spent on the evaluation and treatment of severe organ dysfunction, review of pertinent labs and imaging studies, discussions with consulting providers and discussions with patient/family: 44 minutes.      Eric Bass MD  Department of Hospital Medicine   [facility]

## 2024-11-11 NOTE — PLAN OF CARE
CM discussed with attending and met with pt to discuss discharge plan and possible discharge date. Pt states that she would like to talk with both [facility] and [facility] rep for [facility] and have them call her family to speak with them as well. She reports that she is leaning towards [facility] as they are closer to her home but is unsure at this time. Notified both Kaitlynn with [facility] and Lisa with [facility] and they were going to discuss their respective units with pt. Will continue to follow.       11/11/24 1412   Post-Acute Status   Post-Acute Authorization Placement

## 2024-11-11 NOTE — ASSESSMENT & PLAN NOTE
Anemia is likely due to chronic disease due to Chronic Kidney Disease. Most recent hemoglobin and hematocrit are listed below.  Recent Labs     11/09/24  0439 11/10/24  0311 11/10/24  0313 11/11/24  0316 11/11/24  0734   HGB 9.4* 9.0*  --   --  9.4*   HCT 29.1* 27.5* 32* 29* 28.6*       Plan  - Monitor serial CBC: Daily  - Transfuse PRBC if patient becomes hemodynamically unstable, symptomatic or H/H drops below 7/21.  - Patient has not received any PRBC transfusions to date  - Patient's anemia is currently improving

## 2024-11-11 NOTE — ASSESSMENT & PLAN NOTE
Patient has paroxysmal (<7 days) atrial fibrillation. Patient is currently in atrial fibrillation. [id number] Score: 3. The patients heart rate in the last 24 hours is as follows:  Pulse  Min: 48  Max: 72     Antiarrhythmics  amiodarone tablet 200 mg, 3 times daily, Oral  amiodarone tablet 200 mg, 2 times daily, Oral    Anticoagulants  enoxaparin injection 70 mg, Every 24 hours, Subcutaneous    Plan  - Replete lytes with a goal of K>4, Mg >2  - Patient is anticoagulated, see medications listed above.  - Patient's afib is currently uncontrolled. Will continue current treatment  Continue  amiodarone drip and changed to PO   In and out of A fib   On full dose lovenox

## 2024-11-11 NOTE — PT/OT/SLP PROGRESS
Occupational Therapy   Treatment    Name: Shanell Mahmood  MRN: [id number]  Admitting Diagnosis:  Central cord syndrome  8 Days Post-Op    Recommendations:     Discharge Recommendations: High Intensity Therapy  Discharge Equipment Recommendations:  to be determined by next level of care  Barriers to discharge:   (Increased physical assistance with ADLs and functional mobility.)    Assessment:     Shanell Mahmood is a 74 y.o. female with a medical diagnosis of Central cord syndrome.  She presents with improving medical acuity and slowly progressing functional mobility. Patient participated in bed mobility, BUE/BLE therex bed level, unsupported sitting EOB and neck/trunk control sitting EOB. Performance deficits affecting function are weakness, impaired endurance, impaired self care skills, impaired functional mobility, gait instability, impaired balance, decreased safety awareness, decreased lower extremity function, decreased upper extremity function, orthopedic precautions.     Rehab Prognosis:  Good; patient would benefit from acute skilled OT services to address these deficits and reach maximum level of function.       Plan:     Patient to be seen 6 x/week to address the above listed problems via self-care/home management, therapeutic activities, therapeutic exercises  Plan of Care Expires: 12/04/24  Plan of Care Reviewed with: patient    Subjective     Chief Complaint: General Weakness  Patient/Family Comments/goals: Improved functional mobility and ADL independence.  Pain/Comfort:  Pain Rating 1: 0/10  Pain Rating Post-Intervention 1: 0/10    Objective:     Communicated with: nurse prior to session.  Patient found HOB elevated with telemetry, arterial line, PICC line, serna catheter upon OT entry to room.    General Precautions: Standard, fall    Orthopedic Precautions:spinal precautions  Braces: [place] J collar  Respiratory Status:  3L O2 via HFNC     Occupational Performance:     Bed Mobility:    Patient completed  Scooting/Bridging with maximal assistance  Patient completed Supine to Sit with maximal assistance  Patient completed Sit to Supine with maximal assistance   Performed unsupported sitting EOB with contact guard assistance.    Functional Mobility/Transfers:  Performed BUE/BLE therex bed level moderate to maximal assistance x10 reps.  Performed neck/trunk control exercises sitting EOB with minimal assistance x10 reps    [facility] 6 Click ADL:      Treatment & Education:  Patient has improved ability to range left UE against gravity is ready for ADL activity.    Patient left HOB elevated with all lines intact, call button in reach, and bed alarm on    GOALS:   Multidisciplinary Problems       Occupational Therapy Goals          Problem: Occupational Therapy    Goal Priority Disciplines Outcome Interventions   Occupational Therapy Goal     OT, PT/OT Progressing    Description: Goals to be met by: 12/2/2024  - revised target date, feeding and g/hygiene goals, all other goals remain appropriate.  Patient will increase functional independence with ADLs by performing:    Feeding with Minimal Assistance with adaptive device PRN.  UE Dressing with Minimal Assistance.  LE Dressing with Moderate Assistance.  Grooming while EOB with Minimal Assistance with adaptive device PRN.  Toileting from bedside commode with Minimal Assistance for hygiene and clothing management.   Sitting at edge of bed x15 minutes with Stand-by Assistance.  Supine to sit with Minimal Assistance.  Toilet transfer to bedside commode with Minimal Assistance.  Upper extremity exercise program, with assistance as needed.                         Time Tracking:     OT Date of Treatment: 11/11/24  OT Start Time: 0930  OT Stop Time: 0957  OT Total Time (min): 27 min    Billable Minutes:Therapeutic Activity 13  Therapeutic Exercise 14    OT/MARYELLEN: OT          11/11/2024

## 2024-11-11 NOTE — ASSESSMENT & PLAN NOTE
Possibly neurogenic from the CVA or from cervical spine cord compression  Urinary catheter. Appear may need long term

## 2024-11-11 NOTE — ASSESSMENT & PLAN NOTE
The patients 3 most recent labs are listed below.  Recent Labs     11/09/24  0439 11/10/24  0311 11/11/24  0734    144* 147*       Plan  - Will transfuse if platelet count is <100k (if undergoing neurosurgery), or otherwise less than 10 K  -trend daily

## 2024-11-11 NOTE — ASSESSMENT & PLAN NOTE
S/p multilevel laminectomy (11/3/24)  C2-3, C3-4, C4-5, C5-6, C6-7 laminectomy  C3 through 6 posterior segmental instrumentation using [employer] system  C3-4, C4-5, C5-6 posterolateral arthrodesis using autograft harvested from the same incision and allograft DBM    Cervical collar in place  Neuro surgery follow up   Possible spinal shock ?  PT OT when more stable    Cortisol added

## 2024-11-11 NOTE — SUBJECTIVE & OBJECTIVE
Interval History:     Seen with RN   Feeling better . A fib rate better controlled .  In and out   Urine output adequate . Need less vasopressor   Cortisol added       Review of Systems  Objective:     Vital Signs (Most Recent):  Temp: 98.2 °F (36.8 °C) (11/11/24 0901)  Pulse: 60 (11/11/24 0901)  Resp: 12 (11/11/24 0920)  BP: (!) 102/56 (11/11/24 0901)  SpO2: 97 % (11/11/24 0901) Vital Signs (24h Range):  Temp:  [97.9 °F (36.6 °C)-98.6 °F (37 °C)] 98.2 °F (36.8 °C)  Pulse:  [48-72] 60  Resp:  [3-19] 12  SpO2:  [88 %-100 %] 97 %  BP: ()/(50-66) 102/56  Arterial Line BP: ()/(46-69) 107/49     Weight: 72.7 kg (160 lb 4.4 oz)  Body mass index is 25.1 kg/m².    Intake/Output Summary (Last 24 hours) at 11/11/2024 1101  Last data filed at 11/11/2024 0901  Gross per 24 hour   Intake 483.17 ml   Output 870 ml   Net -386.83 ml         Physical Exam  Vitals and nursing note reviewed.   HENT:      Head: Normocephalic and atraumatic.   Eyes:      Conjunctiva/sclera: Conjunctivae normal.   Neck:      Vascular: No JVD.   Cardiovascular:      Rate and Rhythm: Tachycardia present. Rhythm irregular.      Heart sounds: Normal heart sounds.   Pulmonary:      Effort: Pulmonary effort is normal.   Abdominal:      Palpations: Abdomen is soft.   Skin:     General: Skin is warm.   Neurological:      Mental Status: She is alert and oriented to person, place, and time.   Psychiatric:         Mood and Affect: Mood normal.             Significant Labs: All pertinent labs within the past 24 hours have been reviewed.  CBC:   Recent Labs   Lab 11/10/24  0311 11/10/24  0313 11/11/24  0316 11/11/24  0734   WBC 5.36  --   --  3.27*   HGB 9.0*  --   --  9.4*   HCT 27.5* 32* 29* 28.6*   *  --   --  147*     CMP:   Recent Labs   Lab 11/10/24  0311 11/11/24  0734   * 131*   K 3.9 4.2    103   CO2 22* 22*   GLU 95 100   BUN 31* 28*   CREATININE 1.6* 1.5*   CALCIUM 7.6* 8.2*   PROT  --  5.3*   ALBUMIN  --  2.6*   BILITOT  --   "0.4   ALKPHOS  --  81   AST  --  25   ALT  --  7*   ANIONGAP 6* 6*     Cardiac Markers: No results for input(s): "CKMB", "MYOGLOBIN", "BNP", "TROPISTAT" in the last 48 hours.    Significant Imaging: I have reviewed all pertinent imaging results/findings within the past 24 hours.  "

## 2024-11-11 NOTE — PLAN OF CARE
SW met with Pt at bedside to explain that [facility] did not accept but [facility] did. Pt asked hoe far [facility] was and if they would transport her. SW stated [facility] is about 25 minutes away, and confirmed that they would transport. Pt then asked if the facility paid for gas expenses for the family. SW stated that they did not. Pt stated she would be fine with whatever placement will take her, even though her preference is closer to home.

## 2024-11-11 NOTE — PLAN OF CARE
Problem: Adult Inpatient Plan of Care  Goal: Plan of Care Review  Outcome: Progressing  Goal: Patient-Specific Goal (Individualized)  Outcome: Progressing  Goal: Absence of Hospital-Acquired Illness or Injury  Outcome: Progressing  Goal: Optimal Comfort and Wellbeing  Outcome: Progressing  Goal: Readiness for Transition of Care  Outcome: Progressing     Problem: Skin Injury Risk Increased  Goal: Skin Health and Integrity  Outcome: Progressing     Problem: Stroke, Ischemic (Includes Transient Ischemic Attack)  Goal: Optimal Coping  Outcome: Progressing  Goal: Effective Bowel Elimination  Outcome: Progressing  Goal: Optimal Cognitive Function  Outcome: Progressing  Goal: Improved Communication Skills  Outcome: Progressing  Goal: Safe and Effective Swallow  Outcome: Progressing     Problem: Fall Injury Risk  Goal: Absence of Fall and Fall-Related Injury  Outcome: Progressing     Problem: Infection  Goal: Absence of Infection Signs and Symptoms  Outcome: Progressing

## 2024-11-11 NOTE — CARE UPDATE
11/11/24 0732   Patient Assessment/Suction   Level of Consciousness (AVPU) alert   Expansion/Accessory Muscles/Retractions no use of accessory muscles   Rhythm/Pattern, Respiratory unlabored   Cough Frequency no cough   PRE-TX-O2   Device (Oxygen Therapy) high flow nasal cannula   $ Is the patient on Low Flow Oxygen? Yes   Flow (L/min) (Oxygen Therapy) 11   SpO2 100 %   Pulse Oximetry Type Continuous   $ Pulse Oximetry - Multiple Charge Pulse Oximetry - Multiple   Pulse (!) 57   Resp 17   BP (!) 110/55   Incentive Spirometer   $ Incentive Spirometer Charges done with encouragement   Administration (IS) instruction provided, follow-up;proper technique demonstrated   Number of Repetitions (IS) 10   Level Incentive Spirometer (mL) 1500   Patient Tolerance (IS) good   Vibratory PEP Therapy   $ Vibratory PEP Charges Aerobika Therapy   $ Vibratory PEP Tech Time Charges 15 min   Daily Review of Necessity (PEP Therapy) completed   Type (PEP Therapy) vibratory/oscillatory   Device (PEP Therapy) flutter   Route (PEP Therapy) mouthpiece   Breaths per Cycle (PEP Therapy) 10   Cycles (PEP Therapy) 1   Settings (PEP Therapy) PEP 5   Patient Position (PEP Therapy) HOB elevated   Post Treatment Assessment (PEP) breath sounds unchanged   Signs of Intolerance (PEP Therapy) none   Education   $ Education Incentive Spirometry;Oxygen;PEP Therapy;15 min

## 2024-11-11 NOTE — ASSESSMENT & PLAN NOTE
Acute CVA right caudate  MRI, MRA, CT, carotid U/S reports reviewed  Plavix  and statin and patient also need full dose lovenox   -neurology following  -neurosurgery and oncology consulted for the brain mass and follow up as OP   -PT/OT/SLP when more stable   -echo bubble report is seem edited. Not mentioning of PFO  Patient going to need placement

## 2024-11-11 NOTE — PROGRESS NOTES
[facility]  Department of Cardiology  Progress Note    PATIENT NAME: Shanell Mahmood  MRN: [id number]  TODAY'S DATE: 11/11/2024  ADMIT DATE: 10/31/2024    SUBJECTIVE     PRINCIPLE PROBLEM: Central cord syndrome    INTERVAL HISTORY:    11/11/2024  Pt seen this afternoon eating lunch with RN at bedside. BP stable - weaning pressors as tolerated.     11/10/2024  Vapotherm 15 L today; creatinine 1.6 today  OOB to chair; feeling improved today; Remains on Levophed and vasopressin.    Her blood pressure is much better today and the pressors are being weaned slowly.      11/9/24  Remains on vapotherm, down to 20 Liters today; Now on Levophed and Vasopressin; Resting quietly in bed    11/8/24  Pt seen this morning doing very well - up in cardiac chair and was able to eat breakfast and take PO medications. Remains on amio gtt as well as nor epi.  Does not have any complaints at this time.  Remains on 30 L 75% high-flow nasal cannula.    11/7/2024  Is awake lying in bed not in any acute distress.  She had been on Levophed and she is slowly being titrated down.  Still significantly winded on minimal exertion she is on Vapotherm now at 30 L and 80% to maintain good oxygenation.  She is currently fully anticoagulated.    Review of patient's allergies indicates:   Allergen Reactions    Codeine Palpitations       REVIEW OF SYSTEMS    OBJECTIVE     VITAL SIGNS (Most Recent)  Temp: 98.6 °F (37 °C) (11/11/24 1501)  Pulse: 65 (11/11/24 1501)  Resp: (!) 21 (11/11/24 1501)  BP: (!) 84/52 (11/11/24 1501)  SpO2: 96 % (11/11/24 1501)    VENTILATION STATUS  Resp: (!) 21 (11/11/24 1501)  SpO2: 96 % (11/11/24 1501)           I & O (Last 24H):  Intake/Output Summary (Last 24 hours) at 11/11/2024 1518  Last data filed at 11/11/2024 0901  Gross per 24 hour   Intake 483.17 ml   Output 720 ml   Net -236.83 ml       WEIGHTS  Wt Readings from Last 1 Encounters:   11/01/24 1825 72.7 kg (160 lb 4.4 oz)   11/01/24 0733 70.8 kg (156 lb)    10/31/24 2104 71.2 kg (156 lb 15.5 oz)   10/31/24 1455 71.7 kg (158 lb)       PHYSICAL EXAM  CONSTITUTIONAL: calm, awake, elderly fatigued female, resting in recliner comfortably on Vapotherm.    NECK: no carotid bruit, no JVD, neck brace in place   LUNGS:  Bilateral decreased breath sounds in both lung bases; 15 L per vapotherm  CHEST WALL: no tenderness  HEART: regular rate and rhythm, S1, S2 normal, systolic murmur audible  ABDOMEN: soft, bowel sounds audible  EXTREMITIES: Extremities bilateral ankle and hand edema  NEURO: AAO X 3 recent cord compression surgery status post multilevel laminectomy and improving quadriparesis.    SCHEDULED MEDS:   allopurinoL  100 mg Oral QHS    amiodarone  200 mg Oral TID    [START ON 11/14/2024] amiodarone  200 mg Oral BID    atorvastatin  40 mg Oral Daily    bisacodyL  10 mg Rectal Daily    clopidogreL  75 mg Oral Daily    colchicine  0.3 mg Oral Daily    enoxparin  1 mg/kg Subcutaneous Q24H (treatment, non-standard time)    HYDROcodone-acetaminophen  1 tablet Oral Q6H    macitentan  10 mg Oral Daily    methocarbamoL  750 mg Oral TID    midodrine  15 mg Oral TID    montelukast  10 mg Oral Daily    pantoprazole  40 mg Oral Daily    selexipag  1,600 mcg Oral BID    [START ON 11/12/2024] tadalafil  40 mg Oral Daily       CONTINUOUS INFUSIONS:   NORepinephrine bitartrate-D5W  0-3 mcg/kg/min Intravenous Continuous 1.7 mL/hr at 11/11/24 0600 0.05 mcg/kg/min at 11/11/24 0600    vasopressin  0.04 Units/min Intravenous Continuous 12 mL/hr at 11/11/24 0507 0.04 Units/min at 11/11/24 0507       PRN MEDS:  Current Facility-Administered Medications:     acetaminophen, 650 mg, Oral, Q4H PRN    albuterol sulfate, 2.5 mg, Nebulization, Q6H PRN    aluminum-magnesium hydroxide-simethicone, 30 mL, Oral, Q4H PRN    dextrose 50%, 12.5 g, Intravenous, PRN    dextrose 50%, 25 g, Intravenous, PRN    diphenhydrAMINE, 25 mg, Oral, Q6H PRN    diphenhydrAMINE-zinc acetate 1-0.1%, , Topical (Top), TID  "PRN    glucagon (human recombinant), 1 mg, Intramuscular, PRN    glucose, 16 g, Oral, PRN    glucose, 24 g, Oral, PRN    HYDROmorphone, 1 mg, Intravenous, Q6H PRN    HYDROmorphone, 2 mg, Oral, Q4H PRN    magnesium oxide, 800 mg, Oral, PRN    magnesium oxide, 800 mg, Oral, PRN    methocarbamoL, 500 mg, Oral, TID PRN    naloxone, 0.02 mg, Intravenous, PRN    ondansetron, 4 mg, Intravenous, Q8H PRN    potassium bicarbonate, 35 mEq, Oral, PRN    potassium bicarbonate, 50 mEq, Oral, PRN    potassium bicarbonate, 60 mEq, Oral, PRN    potassium, sodium phosphates, 2 packet, Oral, PRN    potassium, sodium phosphates, 2 packet, Oral, PRN    potassium, sodium phosphates, 2 packet, Oral, PRN    prochlorperazine, 5 mg, Intravenous, Q6H PRN    senna-docusate 8.6-50 mg, 2 tablet, Oral, Nightly PRN    sodium chloride 0.9%, 500 mL, Intravenous, PRN    sodium chloride 0.9%, 10 mL, Intravenous, Q12H PRN    sodium chloride 0.9%, 10 mL, Intravenous, PRN    LABS AND DIAGNOSTICS     CBC LAST 3 DAYS  Recent Labs   Lab 11/09/24  0439 11/10/24  0311 11/10/24  0313 11/11/24  0316 11/11/24  0734   WBC 6.58 5.36  --   --  3.27*   RBC 3.44* 3.26*  --   --  3.42*   HGB 9.4* 9.0*  --   --  9.4*   HCT 29.1* 27.5* 32* 29* 28.6*   MCV 85 84  --   --  84   MCH 27.3 27.6  --   --  27.5   MCHC 32.3 32.7  --   --  32.9   RDW 15.6* 15.3*  --   --  15.5*    144*  --   --  147*   MPV 11.1 11.3  --   --  11.6   GRAN 74.4*  4.9 72.2  3.9  --   --  67.9  2.2   LYMPH 12.5*  0.8* 13.4*  0.7*  --   --  15.0*  0.5*   MONO 10.8  0.7 10.6  0.6  --   --  9.8  0.3   BASO 0.02 0.03  --   --  0.01   NRBC 0 0  --   --  0       COAGULATION LAST 3 DAYS  No results for input(s): "LABPT", "INR", "APTT" in the last 168 hours.      CHEMISTRY LAST 3 DAYS  Recent Labs   Lab 11/06/24  1239 11/06/24  1324 11/07/24  0415 11/07/24  0607 11/09/24  0339 11/09/24  0439 11/10/24  0311 11/10/24  0313 11/11/24  0316 11/11/24  0734   *  --  134*   < >  --  134* 133*  " "--   --  131*   K 3.9  --  4.0   < >  --  4.1 3.9  --   --  4.2     --  107   < >  --  106 105  --   --  103   CO2 20*  --  22*   < >  --  22* 22*  --   --  22*   ANIONGAP 7*  --  5*   < >  --  6* 6*  --   --  6*   BUN 33*  --  32*   < >  --  34* 31*  --   --  28*   CREATININE 1.9*  --  1.9*   < >  --  1.8* 1.6*  --   --  1.5*   *  --  109   < >  --  96 95  --   --  100   CALCIUM 7.2*  --  7.5*   < >  --  7.6* 7.6*  --   --  8.2*   PHOS 4.0  --   --   --   --  3.1 2.7  --   --   --    PH  --    < >  --    < > 7.360  --   --  7.328* 7.310*  --    MG 1.9  --  2.0  --   --  2.1 2.1  --   --   --    ALBUMIN  --   --   --   --   --   --   --   --   --  2.6*   BILITOT  --   --   --   --   --   --   --   --   --  0.4   PROT  --   --   --   --   --   --   --   --   --  5.3*   ALKPHOS  --   --   --   --   --   --   --   --   --  81   ALT  --   --   --   --   --   --   --   --   --  7*   AST  --   --   --   --   --   --   --   --   --  25    < > = values in this interval not displayed.       CARDIAC PROFILE LAST 3 DAYS  No results for input(s): "BNP", "CPK", "CPKMB", "LDH", "TROPONINI", "TROPONINIHS" in the last 168 hours.      ENDOCRINE LAST 3 DAYS  No results for input(s): "TSH", "PROCAL" in the last 168 hours.      LAST ARTERIAL BLOOD GAS  ABG  Recent Labs   Lab 11/11/24  0316   PH 7.310*   PO2 79*   PCO2 38.2   HCO3 19.2*   BE -7*       LAST 7 DAYS MICROBIOLOGY   Microbiology Results (last 7 days)       Procedure Component Value Units Date/Time    Blood culture [[id number]] Collected: 11/01/24 1829    Order Status: Completed Specimen: Blood from Peripheral, Hand, Right Updated: 11/06/24 2032     Blood Culture, Routine No growth after 5 days.            MOST RECENT IMAGING  X-Ray Chest AP Portable  Narrative: EXAMINATION:  XR CHEST AP PORTABLE    CLINICAL HISTORY:  Pulmonary hypertension;    FINDINGS:  Portable chest with comparison chest x-ray 11/06/2024.  Normal cardiomediastinal silhouette.Enlarged central " hilar vascular structures again noted with mild perihilar peribronchial interstitial thickening.  Hazy opacities of the left lung base are mildly increased.  No pneumothorax.  Right PICC line is unchanged.  Pulmonary vasculature is normal. No acute osseous abnormality.  Impression: Mild increased hazy opacities of the left lung bases.  Otherwise no significant changes from prior exam.    Electronically signed by: Scotty Aceves  Date:    11/08/2024  Time:    08:50      [facility]  Results for orders placed during the hospital encounter of 10/31/24    Echo Saline Bubble? No; Ultrasound enhancing contrast? No    Interpretation Summary    Left Ventricle: The left ventricle is smaller than normal. Normal wall thickness. There is normal systolic function with a visually estimated ejection fraction of 65 - 70%. There is normal diastolic function.    Right Ventricle: Mild right ventricular enlargement. Wall thickness is normal. Systolic function is moderately reduced.    Left Atrium: Left atrium is moderately dilated.    Right Atrium: Right atrium is mildly dilated.    IVC/SVC: Normal venous pressure at 3 mmHg.      CURRENT/PREVIOUS VISIT EKG  Results for orders placed or performed during the hospital encounter of 10/31/24   EKG 12-lead    Collection Time: 11/07/24  7:05 AM   Result Value Ref Range    QRS Duration 72 ms    OHS QTC Calculation 427 ms    Narrative    Test Reason : I49.9,    Vent. Rate : 073 BPM     Atrial Rate : 079 BPM     P-R Int : 200 ms          QRS Dur : 072 ms      QT Int : 388 ms       P-R-T Axes : 000 083 024 degrees     QTc Int : 427 ms    Sinus rhythm with Blocked Premature atrial complexes  Nonspecific T wave abnormality  Abnormal ECG  When compared with ECG of 06-NOV-2024 13:13,  Sinus rhythm has replaced Atrial fibrillation  Vent. rate has decreased BY  67 BPM  Left posterior fascicular block is no longer Present    Referred By: [email or web address]   SELF           Confirmed By:        ASSESSMENT/PLAN:      Active Hospital Problems    Diagnosis    *Central cord syndrome    S/P cervical spinal fusion    Paroxysmal atrial fibrillation    Myelopathy    Quadriparesis    Acute deep vein thrombosis (DVT) of popliteal vein of right lower extremity, nonocclusive    Status post cervical spinal fusion    Anemia    Thrombocytopenia    Hypotension    Acute urinary retention    Syncope and collapse    CKD (chronic kidney disease), stage IV    Chronic respiratory failure with hypoxia    Pulmonary hypertension    Secondary hyperparathyroidism    Brain mass    Stroke       ASSESSMENT & PLAN:  1. Severe pulmonary hypertension  2. Acute on chronic hypoxemic respiratory failure   3. Paroxysmal atrial fibrillation  4. Severe central cord compression  5. Quadriparesis  6. Chronic kidney disease  7. Intracranial mass  8. Pulmonary nodule  9. Anemia   10. Acute DVT right leg  11. Syncope with collapse and history of stroke  12. NSVT      RECOMMENDATIONS:    Echocardiogram 11/6/24 showed Efx 65-70%  Continue amiodarone 200 mg TID x 5 days followed by 200 mg BID x 30 days  Blood pressure still low, required additional pressor support today - continue to wean pressors as tolerated.   Continue midodrine 15 mg t.i.d.  Continue renal dose Lovenox for DVT Rt leg and CVA prophylaxis in PAF.  6.   Continue Plavix 75 mg daily and statin therapy.  7.   Will continue to follow at this time.     Maryan Fernandez NP  Date of Service: 11/11/2024  2:06 PM      I have personally interviewed and examined the patient, I have reviewed the Nurse Practitioner's history and physical, assessment, and plan. I agree with the findings and plan.    PATIENT SEEN AND EVALUATED  HAS KNOWN PULMONARY HYPER TENSION.  HAS RIGHT VENTRICULAR ENLARGEMENT BUT PULMONARY PRESSURES WERE NOT WELL MEASURED ON THE LAST ECHO  PAROXYSMAL ATRIAL FIBRILLATION SHE EKG SHOWS SINUS RHYTHM   CKD  ANEMIA  HYPOXEMIA  CURRENTLY REMAINS HYPOTENSIVE UNABLE TO WEAN THE LEVOPHED AND IT WAS  INCREASED BACK UP TO 0 0.08  PATIENT IS RESTING COMFORTABLY    AGREE WITH EVALUATION TREATMENT AND SUPPORTIVE CARE TRY TO WEAN PRESSORS AS TOLERATED     Casimiro Hammond M.D.  Department of Cardiology  Date of Service: 11/11/2024  8:24 AM

## 2024-11-11 NOTE — ASSESSMENT & PLAN NOTE
Creatine stable for now. BMP reviewed- noted Estimated Creatinine Clearance: 32.5 mL/min (A) (based on SCr of 1.5 mg/dL (H)). according to latest data. Based on current GFR, CKD stage is stage 4 - GFR 15-29.  Monitor UOP and serial BMP and adjust therapy as needed. Renally dose meds. Avoid nephrotoxic medications and procedures.    Close monitor

## 2024-11-11 NOTE — ASSESSMENT & PLAN NOTE
"Discovered after PET scan of lung  on 9/6/24 revealed abnormal uptake in brain. 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  Unclear if contributing to CVA  She will have her first appointment with Dr. Ray Mcintyre at  [facility]  on 11/4/24  -neurosurgery and oncology follow-up  "

## 2024-11-11 NOTE — PT/OT/SLP PROGRESS
Physical Therapy Treatment    Patient Name:  Shanell Mahmood   MRN:  [id number]    Recommendations:     Discharge Recommendations: High Intensity Therapy  Discharge Equipment Recommendations: to be determined by next level of care  Barriers to discharge:   increase assist with mobility, high risk for falls, generalized weakness, decrease activity tolerance    Assessment:     Shanell Mahmood is a 74 y.o. female admitted with a medical diagnosis of Central cord syndrome.  She presents with the following impairments/functional limitations: weakness, impaired endurance, impaired self care skills, impaired functional mobility, gait instability, impaired balance, impaired sensation, decreased safety awareness, decreased lower extremity function, decreased upper extremity function, orthopedic precautions.    Pt found in right sidelying. Pt agreeable to visit. Pt requires max A for bed mobility. Pt tolerate sitting EOB x 10 mins with min A and mod VI for midline seated positioning due to R lateral lean. With increase fatigue increase difficulty achieving and maintaining midline seated positioning.     Rehab Prognosis: Fair; patient would benefit from acute skilled PT services to address these deficits and reach maximum level of function.    Recent Surgery: Procedure(s) (LRB):  LAMINECTOMY, SPINE, CERVICAL, POSTERIOR APPROACH (N/A) 8 Days Post-Op    Plan:     During this hospitalization, patient to be seen daily to address the identified rehab impairments via gait training, therapeutic activities, therapeutic exercises, neuromuscular re-education and progress toward the following goals:    Plan of Care Expires:  12/11/24    Subjective     Chief Complaint: fatigue  Patient/Family Comments/goals: get better  Pain/Comfort:  Pain Rating 1: 0/10      Objective:     Communicated with RN prior to session.  Patient found right sidelying with peripheral IV, telemetry, blood pressure cuff, pulse ox (continuous), arterial line, cervical collar  upon PT entry to room.     General Precautions: Standard, fall  Orthopedic Precautions: spinal precautions  Braces: [place] J collar  Respiratory Status: Nasal cannula, flow 3 L/min     Functional Mobility:  Bed Mobility:     Supine to Sit: maximal assistance  Sit to Supine: moderate assistance and of 2 persons      AM-PAC 6 CLICK MOBILITY          Treatment & Education:  Pt educated on POC, discharge recommendation, midline seated position/balance, importance of time OOB, need for assist with mobility, use of call bell to seek assistance as needed and fall prevention      Patient left right sidelying with all lines intact, call button in reach, bed alarm on, and RN notified..    GOALS:   Multidisciplinary Problems       Physical Therapy Goals          Problem: Physical Therapy    Goal Priority Disciplines Outcome Interventions   Physical Therapy Goal     PT, PT/OT Not Progressing    Description: Goals to be met by: 24     Patient will increase functional independence with mobility by performin. Supine to sit with Moderate Assistance  2. Sit to supine with Moderate Assistance  3. Sit to stand transfer with Moderate Assistance  4. Bed to chair transfer with Moderate Assistance using Rolling Walker  5. Gait  x 25 feet with Moderate Assistance using Rolling Walker.                          Time Tracking:     PT Received On: 24  PT Start Time: 1020     PT Stop Time: 1038  PT Total Time (min): 18 min     Billable Minutes: Therapeutic Activity 18    Treatment Type: Treatment  PT/PTA: PT     Number of PTA visits since last PT visit: 0     2024

## 2024-11-12 NOTE — ASSESSMENT & PLAN NOTE
Asymptomatic.  Etiology most likely from pulmonary embolism/pul HTN / A fib with RVR   Echo reviewed.    -keep hold any BP meds or diuretics  On midodrine 15 tid   Currently on 2 vasopressor  Pulmonary offered swan ramya but patient refused   Cortisol random normal

## 2024-11-12 NOTE — RESPIRATORY THERAPY
11/12/24 0746   PRE-TX-O2   Device (Oxygen Therapy) high flow nasal cannula w/device   $ Is the patient on High Flow Oxygen? Yes   Flow (L/min) (Oxygen Therapy) 40   Oxygen Concentration (%) 100   SpO2 (!) 85 %   Pulse Oximetry Type Continuous   Pulse 71   Resp (!) 6

## 2024-11-12 NOTE — PLAN OF CARE
Problem: Adult Inpatient Plan of Care  Goal: Plan of Care Review  Outcome: Progressing  Goal: Patient-Specific Goal (Individualized)  Outcome: Progressing  Goal: Absence of Hospital-Acquired Illness or Injury  Outcome: Progressing  Goal: Optimal Comfort and Wellbeing  Outcome: Progressing     Problem: Skin Injury Risk Increased  Goal: Skin Health and Integrity  Outcome: Progressing     Problem: Stroke, Ischemic (Includes Transient Ischemic Attack)  Goal: Optimal Coping  Outcome: Progressing     Problem: Fall Injury Risk  Goal: Absence of Fall and Fall-Related Injury  Outcome: Progressing      81 year old female with past medical history of DLD, ILD on 2-3L home O2, compression fx thoracic spine, IVC filter, and hypothyroidism presents after recent discharge for shortness of breath and back pain.  Found in the hospital to have COVID.  Transferred here from [facility] for neurosurgery regarding compression fracture, found here to have extensive DVT.  Patient has an IVC filter, following up neuroendovascular surgery on whether patient can have AC.     #Acute hypoxemic respiratory failure  #COVID19 s/p RDV   -Left midlung bandlike opacity. Lung volumes. No focal consolidation otherwise. No pneumothorax or pleural effusion.  -2-3L home O2, back on baseline requirement  -s/p RDV, she was given one dose of decadron at the time, was not continued  -ECHO performed; pending read     #Recurrent DVT  #New PE, no right heart strain   -per vascular, no need for thrombectomy   - has an IVC filter, couldn't be on AC in the past because of aneurysm, per neuroendovascular can restart a/c  -now on LVX 80mg sq bid  -will get MRA brain per neuroendovascular - performed, pending read     # Severe Thoracic compression fracture T12 complicated by worsening severe back pain, limited ROM, difficulty with ambulation due to pain 2/2 severe compression fx  -MRI T-spine: multilevel compression deformities of the thoracic spine  -Neurosurgery was planning on intervention, however DVT was found  - Pain management eval appreciated: dilaudid PRN, gabapentin, toradol PRN for breakthrough pain  - TLSO brace and PT eval  - CM/social work  -per neurosurgery, can do PT/OT/Rehab, if can walk then can discharge and f/u o/p; if not, then will need to do kyphoplasty - looks like patient did ok with both PT and rehab; will start d/c planning     #Obesity BMI 35  - wt loss per PCP    #Hepatic cyst  - f/u OP     #Hx/o cerebral aneurysm s/p coiling   - no new deficits.     #Hypothyroidism  - cont synthroid    #h/o HTN   - c/w amlodipine     #Hypokalemia  -replete     #h/o DLD   - c/w statin     Diet - DASH   DVT ppx: LVX 80mg sq bid     Dispo: f/u [facility] brain and ECHO; d/c planning

## 2024-11-12 NOTE — PLAN OF CARE
CM met with pt who had sister present. Discussed LTAC referrals and pt/sister prefer [place] with pt sister noting that pt daughter works in [place].  CM attempted to reach pt daughter via phone but no answer and message left on v/m.  Spoke with Lisa with [facility] and she  reports that pt is accepted clinically and they submitted for authorization with insurance. Will continue to follow.       11/12/24 6418   Discharge Reassessment   Assessment Type Discharge Planning Reassessment   Did the patient's condition or plan change since previous assessment? Yes   Discharge Plan discussed with: Patient;Sibling   Name(s) and Number(s) Shelia Olson (Sister)  [phone]   Communicated ELBERT with patient/caregiver Yes   Discharge Plan A Long-term acute care facility (LTAC)   Discharge Plan B Skilled Nursing Facility   DME Needed Upon Discharge  none   Transition of Care Barriers None   Why the patient remains in the hospital Requires continued medical care   Post-Acute Status   Post-Acute Authorization Placement   Post-Acute Placement Status Referrals Sent   Discharge Delays None known at this time

## 2024-11-12 NOTE — PROGRESS NOTES
[facility] Medicine  Progress Note    Patient Name: Shanell Mahmood  MRN: [id number]  Patient Class: IP- Inpatient   Admission Date: 10/31/2024  Length of Stay: 11 days  Attending Physician: Eric Bass MD  Primary Care Provider: Nicole, Primary Doctor        Subjective:     Principal Problem:Central cord syndrome        HPI:  Shanell Mahmood is a 74 year old female with a previous medical history of anemia, Pulmonary hypertension on 4 liters continuous oxygen at home, arthritis, CKD V, Osteoporosis, secondary hyperprathyroidism, S/P closure of patent foramen ovale in 2011, CVA in 2011 with no residuals, chronic back pain, HLD, Gout, Brain Mass and thyroid diease who presented to the ED after unwitnessed syncopal episode. The patient was at her pain management office for re certification only. There were no procedures performed. She reports taking one hydrocodone 10mg today.  The last thing she remembers was walking down the hallway of the office and looking for something to cover her head from the rain and did not have any adverse symptoms or feelings at that time.  She has no recollection of the syncopal events. When she was initially evaluated by EMS, her blood pressure was in the 60s over 40s. She did require constant stimulation to remain awake. Fell and hit her head. Does not take any blood thinners. EMS evaluated her and gave her 1 mg of Narcan as well as 250 cc of fluid. Her pressure improved and she had become more alert. Initial ED workup was thorough and all imaging showing that included CT of neck, head and entire spine showed no acute processes. CBC showed anemia and CMP showed elevated creatinine consistent with history of CKD V. Drug screen positive for opioids but patient has a hydrocodone prescription. The patient is currently in the process of have a left sided brain mass visualized on MRI 10/8/24 evaluated that was an incidental finding after undergoing a PET scan for a pulmonary  "nodule on 9/6/24 with abnormal uptake noted in brain. She has her first appointment on 11/4/24. She is followed by nephrology who is currently monitoring her and there has been one attempt at AV fistual placement but it was unsuccessful due sclerotic changes. Patient reports she awoke this morning feeling well showered and dressed herself. She performs all of her own ADL's. She had one syncopal event in March 2024 and was evaluated by cardiology and informed it was an orthostatic episode. Patient was unable to complete orthostatic vital signs upon admission due to inability to stand stating " I feel as if I can't get my legs under me". When evaluated for admit exam the patient endorses that after the syncopal episode she endorses bilateral leg weakness with decreased sensation in both legs. She reports bilateral  weakness being unable to use her phone and difficulty raising both of her arms. NIH scale performed and total of 9 noted. Patient noted have 400ml of urine in bladder and unable to void. Patient reached a total of 528ml of urine without being able to void.  MRI/MRA of brain and MRI of lumbosacral spine ordered upon admit. MRA and MRI lower back showed no acute process. MRI brain showed Acute infarct in the right caudate head. Dr. Maldonado was called and he recommended MRA of neck in morning and load with aspirin and plavix. Initial EKG read as atrial fibrillation but upon review p waves were noted and this was discussed with the ED. Repeat EKG shows sinus arrhthymias with PACs. Patient admitted by hospital medicine for further evaluation and management.       Overview/Hospital Course:  74-year-old female with history of brain mass, CKD, back pain, pulmonary hypertension on 4 L oxygen is admitted after syncope and collapse found to have a acute CVA in the right caudate on MRI brain.   She Has global weakness and decreased sensation to the lower legs.  Multiple CT and x-rays done to rule out trauma ultimately " negative other than the maxillofacial CT reporting mild irregularity of the interior midline mandible with small adjacent bone fragments which could represent acute fracture with overlying soft tissue swelling.    Carotid ultrasound and MRA brain and neck without acute finding.  Neurology is consulted.  Also with underlying brain mass.  Consult Neurosurgery and Oncology.  Given DAPT and statin.  Had hypotension given IVF and midodrine.  Echo shows right heart failure.  BNP is within normal.  Lactic acid pending.  No other sign of infection.  Placed in cervical collar as precaution and MRI of the cervical spine shows severe cord compression at C4-5 and C5-6 likely acute with edema.  Neurosurgery discussed with patient  and plan to proceed with surgical decompression  and  patient underwent surgery on : 11/3/24 multilevel C-spine laminectomy.  Later patient continued to have hypotension and needed vasopressor and another vasopressor vasopressin is added  Patient also her DVT of the the proximal right superficial femoral vein. And nonocclusive thrombus within the right popliteal vein,  Also patient developed new onset AFib and started amiodarone drip and in and out of a fib  and later changed to PO amiodarone   Later patient was more hypoxic and not able to wean vasopressor and PE study was done and found to have segmental PE with right heart strain . Patient was on full dose lovenox .    Interval History:     No CP or SOB . Mild abdominal distension reported . Patient had BM   Need more vasopressor support and CTA ordered by dr Ramos with PE with possible right heart strain     Review of Systems  Objective:     Vital Signs (Most Recent):  Temp: 98.2 °F (36.8 °C) (11/12/24 1115)  Pulse: (!) 59 (11/12/24 1100)  Resp: (!) 22 (11/12/24 1102)  BP: (!) 95/55 (11/12/24 1100)  SpO2: (!) 93 % (11/12/24 0930) Vital Signs (24h Range):  Temp:  [97.9 °F (36.6 °C)-98.6 °F (37 °C)] 98.2 °F (36.8 °C)  Pulse:  [] 59  Resp:   "[0-25] 22  SpO2:  [77 %-100 %] 93 %  BP: ()/(49-70) 95/55  Arterial Line BP: ()/(44-72) 126/53     Weight: 72.7 kg (160 lb 4.4 oz)  Body mass index is 25.1 kg/m².    Intake/Output Summary (Last 24 hours) at 11/12/2024 1134  Last data filed at 11/12/2024 0952  Gross per 24 hour   Intake 560.9 ml   Output 750 ml   Net -189.1 ml         Physical Exam  Vitals and nursing note reviewed.   HENT:      Head: Normocephalic and atraumatic.   Eyes:      Conjunctiva/sclera: Conjunctivae normal.   Neck:      Vascular: No JVD.   Cardiovascular:      Rate and Rhythm: Normal rate.      Heart sounds: Normal heart sounds.   Pulmonary:      Effort: Pulmonary effort is normal.      Breath sounds: Normal breath sounds.   Abdominal:      Palpations: Abdomen is soft.   Musculoskeletal:         General: Normal range of motion.   Skin:     General: Skin is warm.   Neurological:      Mental Status: She is alert and oriented to person, place, and time.   Psychiatric:         Mood and Affect: Mood normal.             Significant Labs: All pertinent labs within the past 24 hours have been reviewed.  CBC:   Recent Labs   Lab 11/11/24  0734 11/12/24  0310 11/12/24  0847 11/12/24  0859   WBC 3.27*  --  4.23  4.23  --    HGB 9.4*  --  9.4*  9.4*  --    HCT 28.6* 34* 28.2*  28.2* 34*   *  --  172  172  --      CMP:   Recent Labs   Lab 11/11/24  0734 11/12/24  0847   * 126*  126*  126*   K 4.2 4.0  4.0  4.0    98  98  98   CO2 22* 22*  22*  22*    96  96  96   BUN 28* 30*  30*  30*   CREATININE 1.5* 1.5*  1.5*  1.5*   CALCIUM 8.2* 8.1*  8.1*  8.1*   PROT 5.3* 5.2*  5.2*   ALBUMIN 2.6* 2.6*  2.6*   BILITOT 0.4 0.4  0.4   ALKPHOS 81 90  90   AST 25 57*  57*   ALT 7* 14  14   ANIONGAP 6* 6*  6*  6*     Cardiac Markers: No results for input(s): "CKMB", "MYOGLOBIN", "BNP", "TROPISTAT" in the last 48 hours.    Significant Imaging: I have reviewed all pertinent imaging results/findings within " the past 24 hours.    Assessment/Plan:      * Central cord syndrome  S/p multilevel laminectomy (11/3/24)  C2-3, C3-4, C4-5, C5-6, C6-7 laminectomy  C3 through 6 posterior segmental instrumentation using [employer] system  C3-4, C4-5, C5-6 posterolateral arthrodesis using autograft harvested from the same incision and allograft DBM    Cervical collar in place  Neuro surgery follow up   Possible spinal shock ?  PT OT when more stable    Cortisol added and normal       Paroxysmal atrial fibrillation  Patient has paroxysmal (<7 days) atrial fibrillation. Patient is currently in atrial fibrillation. FLCOT9HTHr Score: 3. The patients heart rate in the last 24 hours is as follows:  Pulse  Min: 59  Max: 114     Antiarrhythmics  amiodarone tablet 200 mg, 3 times daily, Oral  amiodarone tablet 200 mg, 2 times daily, Oral    Anticoagulants  enoxaparin injection 70 mg, Every 24 hours, Subcutaneous    Plan  - Replete lytes with a goal of K>4, Mg >2  - Patient is anticoagulated, see medications listed above.  - Patient's afib is currently uncontrolled. Will continue current treatment  Continue  amiodarone drip and changed to PO   In and out of A fib   On full dose lovenox           S/P cervical spinal fusion  Multi levels  See NSGY op note     Acute deep vein thrombosis (DVT) of popliteal vein of right lower extremity, nonocclusive and pulmonary embolism  Found on u/s 11/5/24 and CTA 11/12   hypoechoic peripheral nonocclusive thrombus within the proximal right superficial femoral vein    full dose lovenox   Close monitor  s/p spine surgery   Associated with hypotension /shock suspicious for PE  and CTA confirmed         Quadriparesis  PT/OT  Frequent turns   Air mattress        Myelopathy  aware      Status post cervical spinal fusion  PT/OT  Cervical collar in place   Pain Mx      Hypotension  Asymptomatic.  Etiology most likely from pulmonary embolism/pul HTN / A fib with RVR   Echo reviewed.    -keep hold any BP meds or  "diuretics  On midodrine 15 tid   Currently on 2 vasopressor  Pulmonary offered swan ramya but patient refused   Cortisol random normal     Thrombocytopenia  The patients 3 most recent labs are listed below.  Recent Labs     11/10/24  0311 11/11/24  0734 11/12/24  0847   * 147* 172  172       Plan  - Will transfuse if platelet count is <100k (if undergoing neurosurgery), or otherwise less than 10 K  -trend daily    Anemia  Anemia is likely due to chronic disease due to Chronic Kidney Disease. Most recent hemoglobin and hematocrit are listed below.  Recent Labs     11/10/24  0311 11/10/24  0313 11/11/24  0734 11/12/24  0310 11/12/24  0847 11/12/24  0859   HGB 9.0*  --  9.4*  --  9.4*  9.4*  --    HCT 27.5*   < > 28.6* 34* 28.2*  28.2* 34*    < > = values in this interval not displayed.       Plan  - Monitor serial CBC: Daily  - Transfuse PRBC if patient becomes hemodynamically unstable, symptomatic or H/H drops below 7/21.  - Patient has not received any PRBC transfusions to date  - Patient's anemia is currently improving    Stroke  Acute CVA right caudate  MRI, MRA, CT, carotid U/S reports reviewed  Plavix  and statin and patient also need full dose lovenox   -neurology following  -neurosurgery and oncology consulted for the brain mass and follow up as OP   -PT/OT/SLP when more stable   -echo bubble report is seem edited. Not mentioning of PFO  Patient going to need placement     Brain mass  Discovered after PET scan of lung  on 9/6/24 revealed abnormal uptake in brain. 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  Unclear if contributing to CVA  She will have her first appointment with Dr. Ray Mcintyre at  [place] [facility]  on 11/4/24  -neurosurgery and oncology follow-up    Secondary hyperparathyroidism  Chronic condition that is stable.     Pulmonary hypertension  Chronic condition "   Macitentan, selexipag and tadalafil not available and sildenafil changed and continued   Patient refuse swan ramya    Chronic respiratory failure with hypoxia  Patient with Hypoxic Respiratory failure which is Chronic.  she is on home oxygen at 4 LPM.   Secondary to pulmonary embolism  Full-dose Lovenox    CKD (chronic kidney disease), stage IV  Creatine stable for now. BMP reviewed- noted Estimated Creatinine Clearance: 32.5 mL/min (A) (based on SCr of 1.5 mg/dL (H)). according to latest data. Based on current GFR, CKD stage is stage 4 - GFR 15-29.  Monitor UOP and serial BMP and adjust therapy as needed. Renally dose meds. Avoid nephrotoxic medications and procedures.    Close monitor     Syncope and collapse  See stroke      Acute urinary retention  Possibly neurogenic from the CVA or from cervical spine cord compression  Urinary catheter. Appear may need long term        VTE Risk Mitigation (From admission, onward)           Ordered     enoxaparin injection 70 mg  Every 24 hours         11/06/24 1146     Reason for No Pharmacological VTE Prophylaxis  Once        Question:  Reasons:  Answer:  Risk of Bleeding    10/31/24 1849     IP VTE HIGH RISK PATIENT  Once         10/31/24 1849     Place sequential compression device  Until discontinued         10/31/24 1849                    Discharge Planning   ELBERT: 11/15/2024     Code Status: DNR   Is the patient medically ready for discharge?:     Reason for patient still in hospital (select all that apply): Treatment  Discharge Plan A: Hospital Transfer   Discharge Delays:  (Accepting facility)        Critical care time spent on the evaluation and treatment of severe organ dysfunction, review of pertinent labs and imaging studies, discussions with consulting providers and discussions with patient/family: 33 minutes.      Eric Bass MD  Department of Hospital Medicine   Formerly [facility]

## 2024-11-12 NOTE — RESPIRATORY THERAPY
11/12/24 0745   PRE-TX-O2   Flow (L/min) (Oxygen Therapy) 15   SpO2 (!) 79 %   Pulse 72   Resp 20

## 2024-11-12 NOTE — RESPIRATORY THERAPY
11/12/24 0859   PRE-TX-O2   Device (Oxygen Therapy) high flow nasal cannula w/device   Flow (L/min) (Oxygen Therapy) 40   Oxygen Concentration (%) 100   SpO2 (!) 91 %   Pulse 69   Resp 19   Labs   $ Was an ABG obtained? Arterial Blood Draw from Existing Line;POCT - Blood gas;POCT - Calcium;POCT - Hematocrit;POCT - Potassium;POCT - Sodium   $ Labs Tech Time 15 min   Critical Value Communication   Date Result Received 11/12/24   Time Result Received 0900   Resulting Department of Critical Value Respiratory   Who communicated critical value from resulting department? Cadence Arias, RRT   Critical Test #1 PO2

## 2024-11-12 NOTE — CONSULTS
formerly [facility]  Vascular Surgery  Consult Note    Inpatient consult to Vascular Surgery  Consult performed by: Christopher Carlos MD  Consult ordered by: Gaye Ramos MD        Subjective:     Chief Complaint/Reason for Admission:   Pulmonary embolism    History of Present Illness: The patient is a 74 year old female who had a syncopal event on 10/31. She was weak, myelopathic. CT of the head showed an acute infarct in the right caudate head. 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 The MRI of her cervical spine showed disc bulging and spondylolysis at C4-C5 and C5-C6 with severe spinal canal stenosis, cervical cord compression and cord signal alteration there was also broad-based disc bulging producing moderate spinal canal stenosis at C3-C4. She was brought to the OR yesterday where she underwent C2- 3, 3-4, 4-5,5-6, and 6- 7 laminectomies with C3 through 6 posterior segmental instrumentation and C3-4, 4- 5, 5- 6 posterolateral arthrodesis using autograft harvested from the incision and allograft of DBM.      Surgery was performed about 2 weeks ago.  Since that time she has had issues with shadowing intermittent increases of oxygenation and pressor support.  Over the last 24 hours she had to go back on some Levophed and vasopressin.  She is also to go up on her oxygenation requirements now on Vapotherm.  During the day she has been weaned off of the Levophed and is now only on vasopressin.  She is conversant and does not appear to be any severe distress at this time.  She has a known right femoral and superficial femoral vein thrombus which is nonocclusive.  She has been on daily Lovenox.  We have been asked to evaluate her for mechanical thrombectomy of the newly diagnosed pulmonary embolism on CTA today.  On review of the films she has bilateral segmental DVT with some small  slivers of thrombus extending into the main pulmonary artery but overall does not appear to be a large thrombus burden.  She has some chronic pulmonary hypertension  .  She has some right hilar lymphadenopathy of indeterminate etiology.    Medications Prior to Admission   Medication Sig Dispense Refill Last Dose/Taking    allopurinoL (ZYLOPRIM) 100 MG tablet Take 100 mg by mouth every evening.   10/31/2024    clotrimazole-betamethasone 1-0.05% (LOTRISONE) cream Apply 1 g topically Daily.   10/31/2024    colchicine (COLCRYS) 0.6 mg tablet Take 0.6 mg by mouth 2 (two) times daily.   10/31/2024    diclofenac sodium (VOLTAREN) 1 % Gel Apply 2 g topically 3 (three) times daily.   10/31/2024    DULoxetine (CYMBALTA) 60 MG capsule Take 60 mg by mouth once daily.   10/31/2024    ergocalciferol (ERGOCALCIFEROL) 50,000 unit Cap Take 50,000 Units by mouth every 7 days.   Past Week    fluticasone propionate (FLONASE) 50 mcg/actuation nasal spray 1 spray by Each Nostril route once daily.   10/31/2024    gabapentin (NEURONTIN) 100 MG capsule Take 2 capsules by mouth every 12 (twelve) hours.   10/31/2024    HYDROcodone-acetaminophen (NORCO)  mg per tablet Take 1 tablet by mouth every 4 to 6 hours as needed for Pain.   10/31/2024    LINZESS 290 mcg Cap capsule Take 290 mcg by mouth before breakfast.   Past Week    montelukast (SINGULAIR) 10 mg tablet Take 10 mg by mouth once daily.   10/31/2024    omeprazole (PRILOSEC) 40 MG capsule Take 40 mg by mouth Daily.   10/31/2024    OPSUMIT 10 mg Tab Take 10 mg by mouth once daily.   10/31/2024    simvastatin (ZOCOR) 40 MG tablet Take 40 mg by mouth Daily.   10/31/2024    tiZANidine (ZANAFLEX) 4 MG tablet Take 4 mg by mouth every 12 (twelve) hours as needed.   10/31/2024    torsemide (DEMADEX) 20 MG Tab Take 40 mg by mouth once daily.   10/31/2024    UPTRAVI 1,600 mcg Tab Take 1 tablet by mouth 2 (two) times a day.   10/31/2024    tadalafil (ADCIRCA) 20 mg Tab Take 20 mg by mouth  every other day.   Unknown       Review of patient's allergies indicates:   Allergen Reactions    Codeine Palpitations       No past medical history on file.  Past Surgical History:   Procedure Laterality Date    POSTERIOR CERVICAL LAMINECTOMY N/A 11/3/2024    Procedure: LAMINECTOMY, SPINE, CERVICAL, POSTERIOR APPROACH;  Surgeon: Kobi Corona MD;  Location: [facility];  Service: Neurosurgery;  Laterality: N/A;  C3-6 laminectomy and posterior instrumented fusion, prone, Chest roll, Parada, neuromonitoring, DePuy rep     Family History    None       Tobacco Use    Smoking status: Not on file    Smokeless tobacco: Not on file   Substance and Sexual Activity    Alcohol use: Not on file    Drug use: Not on file    Sexual activity: Not on file     Review of Systems   Constitutional: Negative.    Respiratory: Negative.  Negative for shortness of breath.    Cardiovascular: Negative.    Gastrointestinal: Negative.    Musculoskeletal: Negative.    Skin: Negative.    Neurological:  Positive for weakness. Negative for speech difficulty.     Objective:     Vital Signs (Most Recent):  Temp: 98.2 °F (36.8 °C) (11/12/24 1115)  Pulse: (!) 59 (11/12/24 1100)  Resp: (!) 22 (11/12/24 1102)  BP: (!) 95/55 (11/12/24 1100)  SpO2: (!) 93 % (11/12/24 0930) Vital Signs (24h Range):  Temp:  [97.9 °F (36.6 °C)-98.6 °F (37 °C)] 98.2 °F (36.8 °C)  Pulse:  [] 59  Resp:  [0-25] 22  SpO2:  [77 %-100 %] 93 %  BP: ()/(49-70) 95/55  Arterial Line BP: ()/(44-72) 126/53     Weight: 72.7 kg (160 lb 4.4 oz)  Body mass index is 25.1 kg/m².    Date 11/12/24 0700 - 11/13/24 0659   Shift 7805-2527 1159-9938 3903-1194 24 Hour Total   INTAKE   Shift Total(mL/kg)       OUTPUT   Urine(mL/kg/hr) 250   250   Shift Total(mL/kg) 250(3.4)   250(3.4)   Weight (kg) 72.7 72.7 72.7 72.7       Physical Exam  Neck:      Comments:   Cervical collar in place  Cardiovascular:      Pulses:           Dorsalis pedis pulses are 2+ on the right side and 2+ on  the left side.   Pulmonary:      Effort: No respiratory distress.   Musculoskeletal:      Right lower leg: No edema.      Left lower leg: No edema.   Neurological:      Motor: Weakness present.      Comments:  Upper extremity motor function is very weak.  Minimal lower extremity were motor function         Significant Labs:  ABGs:   Recent Labs   Lab 11/12/24  1306   PH 7.349*   PCO2 39.5   PO2 65*   HCO3 21.8*   POCSATURATED 91   BE -4*     BMP:   Recent Labs   Lab 11/12/24  0847   GLU 96  96  96   *  126*  126*   K 4.0  4.0  4.0   CL 98  98  98   CO2 22*  22*  22*   BUN 30*  30*  30*   CREATININE 1.5*  1.5*  1.5*   CALCIUM 8.1*  8.1*  8.1*     CBC:   Recent Labs   Lab 11/12/24  0847 11/12/24  0859   WBC 4.23  4.23  --    RBC 3.40*  3.40*  --    HGB 9.4*  9.4*  --    HCT 28.2*  28.2* 34*     172  --    MCV 83  83  --    MCH 27.6  27.6  --    MCHC 33.3  33.3  --        Significant Diagnostics:  I have reviewed all pertinent imaging results/findings within the past 24 hours.    Assessment/Plan:    Postop central cord syndrome with extensive cervical laminectomy with residual upper and lower extremity motor deficits.    Chronic pulmonary hypertension   Right leg nonocclusive DVT   Bilateral segmental pulmonary emboli, weaning pressor support but currently on Vapotherm for pulmonary support.    After review of the CTA of the chest and the amount of a segmental thrombus visualized adult believe this volume of thrombus would necessitate mechanical thrombectomy.  She is not a candidate for thrombolytic therapy because of her recent surgery.    Recommend full-dose anticoagulation with Lovenox 1 milligram/kilogram b.I.d. if okay with Neurosurgery.  Active Diagnoses:    Diagnosis Date Noted POA    PRINCIPAL PROBLEM:  Central cord syndrome [S14.129A] 11/03/2024 Yes    S/P cervical spinal fusion [Z98.1] 11/06/2024 Not Applicable    Paroxysmal atrial fibrillation [I48.0] 11/06/2024 No     Myelopathy [G95.9] 11/05/2024 Yes    Quadriparesis [G82.50] 11/05/2024 Yes    Acute deep vein thrombosis (DVT) of popliteal vein of right lower extremity, nonocclusive and pulmonary embolism [I82.431] 11/05/2024 No    Status post cervical spinal fusion [Z98.1] 11/04/2024 Not Applicable    Anemia [D64.9] 11/01/2024 Yes    Thrombocytopenia [D69.6] 11/01/2024 Yes    Hypotension [I95.9] 11/01/2024 No    Acute urinary retention [R33.8] 10/31/2024 Yes    Syncope and collapse [R55] 10/31/2024 Yes    CKD (chronic kidney disease), stage IV [N18.4] 10/31/2024 Yes    Chronic respiratory failure with hypoxia [J96.11] 10/31/2024 Yes    Pulmonary hypertension [I27.20] 10/31/2024 Yes    Secondary hyperparathyroidism [N25.81] 10/31/2024 Yes    Brain mass [G93.89] 10/31/2024 Yes    Stroke [I63.9] 10/31/2024 Yes      Problems Resolved During this Admission:    Diagnosis Date Noted Date Resolved POA    Sinus arrhythmia seen on electrocardiogram [I49.8] 11/01/2024 11/09/2024 Yes    Weakness of both lower extremities [R29.898] 10/31/2024 11/10/2024 Yes       Thank you for your consult. I will follow-up with patient. Please contact us if you have any additional questions.    Christopher Carlos MD  Vascular Surgery  [facility]

## 2024-11-12 NOTE — ASSESSMENT & PLAN NOTE
S/p multilevel laminectomy (11/3/24)  C2-3, C3-4, C4-5, C5-6, C6-7 laminectomy  C3 through 6 posterior segmental instrumentation using [employer] system  C3-4, C4-5, C5-6 posterolateral arthrodesis using autograft harvested from the same incision and allograft DBM    Cervical collar in place  Neuro surgery follow up   Possible spinal shock ?  PT OT when more stable    Cortisol added and normal

## 2024-11-12 NOTE — PT/OT/SLP PROGRESS
Occupational Therapy   Treatment    Name: Shanell Mahmood  MRN: [id number]  Admitting Diagnosis:  Central cord syndrome  9 Days Post-Op    Recommendations:     Discharge Recommendations: High Intensity Therapy  Discharge Equipment Recommendations:  to be determined by next level of care  Barriers to discharge:   (Increased physical assistance with ADLs and functional mobility.)    Assessment:     Shanell Mahmood is a 74 y.o. female with a medical diagnosis of Central cord syndrome.  She presents with improving functional mobility. Patient participated in grooming bed level, bed mobility, unsupported sitting EOB, neck/trunk control exercises and partial stand x2 trial, hand-held. Performance deficits affecting function are weakness, impaired endurance, impaired sensation, impaired self care skills, impaired functional mobility, gait instability, impaired balance, decreased safety awareness, decreased lower extremity function, decreased upper extremity function, orthopedic precautions.     Rehab Prognosis:  Good; patient would benefit from acute skilled OT services to address these deficits and reach maximum level of function.       Plan:     Patient to be seen 6 x/week to address the above listed problems via self-care/home management, therapeutic activities, therapeutic exercises  Plan of Care Expires: 12/04/24  Plan of Care Reviewed with: patient    Subjective     Chief Complaint: General weakness  Patient/Family Comments/goals: Improved functional mobility and ADL independence.  Pain/Comfort:  Pain Rating 1: 0/10  Pain Rating Post-Intervention 1: 0/10    Objective:     Communicated with: nurse prior to session.  Patient found HOB elevated with telemetry, PureWick, arterial line, PICC line, oxygen upon OT entry to room.    General Precautions: Standard, fall    Orthopedic Precautions:spinal precautions  Braces: [place] J collar  Respiratory Status:  40L @100% via Vapotherm     Occupational Performance:     Bed Mobility:     Patient completed Scooting/Bridging with maximal assistance  Patient completed Supine to Sit with maximal assistance  Patient completed Sit to Supine with maximal assistance   Performed unsupported sitting EOB with contact guard assistance.  Performed neck/trunk control exercises x5 reps with contact guard assisance  Performed RUE therex x5 reps with maximal assistance bed level.  Performed RLE therex 5 reps with moderate assistance bed level.  Performed LUE therex x5 reps with minimal assistance bed level.  Performed LLE therex x5 reps with minimal assistance bed level.    Functional Mobility/Transfers:  Patient completed partial Sit <> Stand x2 trials with maximal assistanc    Activities of Daily Living:  Grooming: minimum assistance to wash face using left hand with hand over hand assistance bed level      [facility] 6 Click ADL:      Treatment & Education:  Patient continues to gain strength every day.     Patient left HOB elevated with all lines intact, call button in reach, and bed alarm on    GOALS:   Multidisciplinary Problems       Occupational Therapy Goals          Problem: Occupational Therapy    Goal Priority Disciplines Outcome Interventions   Occupational Therapy Goal     OT, PT/OT Progressing    Description: Goals to be met by: 12/2/2024  - revised target date, feeding and g/hygiene goals, all other goals remain appropriate.  Patient will increase functional independence with ADLs by performing:    Feeding with Minimal Assistance with adaptive device PRN.  UE Dressing with Minimal Assistance.  LE Dressing with Moderate Assistance.  Grooming while EOB with Minimal Assistance with adaptive device PRN.  Toileting from bedside commode with Minimal Assistance for hygiene and clothing management.   Sitting at edge of bed x15 minutes with Stand-by Assistance.  Supine to sit with Minimal Assistance.  Toilet transfer to bedside commode with Minimal Assistance.  Upper extremity exercise program, with assistance  as needed.                         Time Tracking:     OT Date of Treatment: 11/12/24  OT Start Time: 0924  OT Stop Time: 0947  OT Total Time (min): 23 min    Billable Minutes:Self Care/Home Management 11  Therapeutic Exercise 12    OT/MARYELLEN: OT          11/12/2024

## 2024-11-12 NOTE — ASSESSMENT & PLAN NOTE
Found on u/s 11/5/24 and CTA 11/12   hypoechoic peripheral nonocclusive thrombus within the proximal right superficial femoral vein    full dose lovenox   Close monitor  s/p spine surgery   Associated with hypotension /shock suspicious for PE  and CTA confirmed

## 2024-11-12 NOTE — PROGRESS NOTES
Pulmonary/Critical Care Progress Note      PATIENT NAME: Shanell Mahmood  MRN: [id number]  TODAY'S DATE: 2024  8:00 AM  ADMIT DATE: 10/31/2024  AGE: 74 y.o. : 1950    CONSULT REQUESTED BY: Eric Bass MD    REASON FOR CONSULT:   Critical care management    HPI:  The patient is a 74 year old female who had a syncopal event on 10/31.  She was weak, myelopathic.  CT of the head showed an acute infarct in the right caudate head.  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  The MRI of her cervical spine showed disc bulging and spondylolysis at C4-C5 and C5-C6 with severe spinal canal stenosis, cervical cord compression and cord signal alteration there was also broad-based disc bulging producing moderate spinal canal stenosis at C3-C4.  She was brought to the OR yesterday where she underwent C2- 3, 3-4, 4-5,5-6,  and 6- 7 laminectomies with C3 through 6 posterior segmental instrumentation and C3-4, 4- 5, 5- 6 posterolateral arthrodesis using autograft harvested from the incision and allograft of DBM.  This morning she remains profoundly myelopathic.  She is on Levophed at 0.06 mics per kilos per minute to maintain a mean of 80.    The patient has significant pulmonary hypertension in his managed with Opsumit 10 mg daily, Adcirca 40 mg daily, and Uptravi 1600 ug bid.  She is on 4 L continuous oxygen and has dyspnea with any exertion.  She is also on Advair 115/21 b.i.d.     the patient's oxygenation dropped dramatically this morning.  She is now on more levophed to maintain a mean of 80.  She is in a lot of pain.     the patient is requiring a little less oxygen this morning.  However, she is in AFib and had a 25 beat run of V-tach overnight.  She is on 0.24 of Levophed.  I am concerned that her pulmonary hypertension is significantly worse and have an echo coming.  Will start walking  the Levophed down and off as her renal function is worse, she is in AFib and she had V-tach last night.    11/7 the patient is continuing to decline.  She is on Vapotherm now at 30 L and 80% to maintain good oxygenation.  She is requiring 0.3 of Levophed to maintain her blood pressure.  We did fully anticoagulate with Levophed yesterday as I am still worried about a PE and the patient developed AFib with RVR.  Started on amiodarone and this morning she is back in sinus rhythm.  Limited echo yesterday did not show any worsening of the right ventricle but PA pressures were estimated at 60.  If her heart is actually working well, then we have no SVR, and this is spinal shock.  However, spinal shock is usually bradycardic.  A [place]-Jens catheter maybe helpful, but I am not sure how it will change our management unless the RV is failing and the PAs are too high and we need to be on IV Flolan.    11/8 the patient is on 0.3 of Levophed.  She is on 85% on the Vapotherm.  The patient is still declining to have a a Angora-Jens catheter.  We would need this to initiate Flolan.  She would like to continue as we are hoping that things will turn around.  Her creatinine is marginally better today.  She remains ahead on her I's and O's by about 6 L. I do not see any point in trying to transfer her if she is declining a Angora.    11/9 the patient is still on 0.3 of Levophed.  Her oxygenation is slightly improved and we have decreased her Vapotherm to 70% and 20 L. will try adding vasopressin to see if we get any benefit from this.  Her creatinine is stable at 1.8.  She still does not want a Angora-Jens catheter.  She has not had a bowel movement in 3 days.  Will make sure we are doing rectal stimulation with her Dulcolax suppositories.    11/10 The patient has less Levophed need this morning.  She is on vasopressin 0.04.  Her O2, though, has had to be increased to 85% she is still on Vapotherm but only at 15 L. she has more movement in  her arms and her legs today and this may be what is helping us with a blood pressure.    11/11 the patient is significantly improved this morning we are down to 0.05 of Levophed and the vasopressin.  Her oxygen is down to 6 L nasal cannula.  Her strength continues to improve.    11/12 the patient's Levophed dose is back up to 0.1.  She is still on vasopressin.  Her oxygen requirements are also higher again today.    REVIEW OF SYSTEMS  GENERAL: Feeling ok  EYES: Vision is good.  ENT: No sinusitis or pharyngitis.   HEART: No chest pain or palpitations.  LUNGS:  Breathing is okay  GI: No Nausea, vomiting, constipation, diarrhea, or reflux.  : No dysuria, hesitancy, or nocturia.  SKIN: No lesions or rashes.  MUSCULOSKELETAL: No joint pain or myalgias.  NEURO:  She feels she is getting a little stronger.  LYMPH: No edema or adenopathy.  PSYCH: No anxiety or depression.  ENDO: No weight change.    No change in the patient's Past Medical History, Past Surgical History, Social History or Family History since admission.    VITAL SIGNS (MOST RECENT)  Temp: 98.6 °F (37 °C) (11/11/24 1901)  Pulse: 65 (11/12/24 0600)  Resp: 11 (11/12/24 0600)  BP: (!) 102/56 (11/12/24 0600)  SpO2: 100 % (11/12/24 0600)    INTAKE AND OUTPUT (LAST 24 HOURS):  Intake/Output Summary (Last 24 hours) at 11/12/2024 0791  Last data filed at 11/12/2024 0614  Gross per 24 hour   Intake 560.9 ml   Output 675 ml   Net -114.1 ml       WEIGHT  Wt Readings from Last 1 Encounters:   11/01/24 72.7 kg (160 lb 4.4 oz)       PHYSICAL EXAM  GENERAL: Older patient looking quiet..  HEENT: Pupils equal and reactive. Extraocular movements intact. Nose intact. Pharynx moist.  NECK: Supple.  Aspen collar in place. HEART:  Irregularly irregular rate and rhythm. No murmur or gallop auscultated.  The monitor shows AFib with a controlled rate.  LUNGS:  The lungs are clear. Lung excursion symmetrical. No change in fremitus.   ABDOMEN: Bowel sounds present. Non-tender, no  masses palpated.  : Normal anatomy.  Yeung catheter with yellow urine  EXTREMITIES:  There is increasing strength in her hands.  The right is still weaker than the left.  legs are stronger as well.  Again the right leg is weaker than the left.  Arterial line right radial.  Right PICC.    LYMPHATICS: No adenopathy palpated, tr edema.  SKIN: Dry, intact, no lesions.   NEURO: Cranial nerves II-XII intact. Motor strength has improved in all extremities  PSYCH: Appropriate affect      CBC LAST (LAST 24 HOURS)  Recent Labs   Lab 11/12/24 0310   HCT 34*       CHEMISTRY LAST (LAST 24 HOURS)  Recent Labs   Lab 11/12/24 0310   PH 7.356         LAST 7 DAYS MICROBIOLOGY   Microbiology Results (last 7 days)       Procedure Component Value Units Date/Time    Blood culture [[id number]] Collected: 11/01/24 1829    Order Status: Completed Specimen: Blood from Peripheral, Hand, Right Updated: 11/06/24 2032     Blood Culture, Routine No growth after 5 days.            MOST RECENT IMAGING  X-Ray Chest AP Portable  Narrative: EXAMINATION:  XR CHEST AP PORTABLE    CLINICAL HISTORY:  Pulmonary hypertension;    FINDINGS:  Portable chest with comparison chest x-ray 11/06/2024.  Normal cardiomediastinal silhouette.Enlarged central hilar vascular structures again noted with mild perihilar peribronchial interstitial thickening.  Hazy opacities of the left lung base are mildly increased.  No pneumothorax.  Right PICC line is unchanged.  Pulmonary vasculature is normal. No acute osseous abnormality.  Impression: Mild increased hazy opacities of the left lung bases.  Otherwise no significant changes from prior exam.    Electronically signed by: Scotty Aceves  Date:    11/08/2024  Time:    08:50    Chest x-ray 11/8 shows  tiny pleural effusions and huge pulmonary arteries.  There is a PICC line on the right side.    CURRENT VISIT EKG  No results found for this visit on 10/31/24.    ECHOCARDIOGRAM RESULTS  Results for orders placed during the  hospital encounter of 10/31/24    Echo    Interpretation Summary    Left Ventricle: The left ventricle is normal in size. Normal wall thickness. Unable to assess wall motion. There is normal systolic function with a visually estimated ejection fraction of 60 - 65%.    Right Ventricle: Right ventricle was not well visualized due to poor acoustic window.  Grossly appears to be dilated with reduced function.  There appears to be some concern of flattening of the interventricular septum which could indicate right ventricular pressure and volume overload.    Left Atrium: Left atrium is severely dilated.    IVC/SVC: Elevated venous pressure at 15 mmHg.    Oxygen INFORMATION       10 L nasal cannula    IMPRESSION AND PLAN  Severe central cord compression now status post multilevel laminectomy and instrumentation  Quadriparesis   - improved  Severe pulmonary hypertension  - patient on Opsumit, Uptravi, and Adcirca, max doses    - patient's echo continues to show a functioning RV and LV.  - patient declining a [place]-Jens catheter which we would need to evaluate whether Flolan might be helpful  - will increase Lovenox to b.i.d. today  Shock  - back up to 0.10 of Levophed and 0.4 vasopressin  - etiology still remains obscure  - patient is still declining [place]-Jens catheter  - patient receiving 15 mg of midodrine t.i.d.  - echo repeated twice and seems to show a functioning RV and LV  - spinal shock should be associated with bradycardia and she has been in AFib with RVR  - no signs of infection  DVT  - nonocclusive to the right leg  - on full-dose Lovenox  Atrial fibrillation  - in and out of AFib, rate controlled  - on oral amiodarone   Acute on chronic hypoxemic respiratory failure  - on 4 L of oxygen at home  - now down to 6 liters  - I's and O's ahead 6 liters  - pulmonary hypertension meds being given  - worry about PE, fully anticoagulated with Lovenox, will order CTA  - DVT seen in legs  - takes Advair but is not  obstructed  - will continue p.rnicho Felipe  Anemia  - chronic disease  - trending down  Thrombocytopenia  - mild  Chronic kidney disease  - likely aggravated with the Levophed and the need for Levophed  - improving  Hyponatremia  - will feed a little more salt  Moderate hypoalbuminemia  - advance diet  Intracranial mass  Pulmonary nodules, 1 greater than 1 cm  - patient workup underway as an outpatient    The patient's oxygenation is dramatically worse today.  Getting false readings from the pulse ox.  Her pressor requirements have also doubled in the last 24 hours.  Will order a CTA assuming that when today's labs are drawn the creatinine isn't any worse.  Going back on Vapotherm now and hopefully can oxygenate her with this.  Undoubtedly her hypoxemia is worsening her pulmonary hypertension which is likely driving the worsened systolic blood pressure.  Trying to correct this now.    Critical care time spent reviewing the chart, examining the patient, reviewing the labs, reviewing the radiological findings, discussing care with nursing, physicians, and respiratory and creating the note and  has been 40 minutes.    Gaye Ramos MD  Date of Service: 11/12/2024  8:00 AM

## 2024-11-12 NOTE — ASSESSMENT & PLAN NOTE
Patient with Hypoxic Respiratory failure which is Chronic.  she is on home oxygen at 4 LPM.   Secondary to pulmonary embolism  Full-dose Lovenox

## 2024-11-12 NOTE — PT/OT/SLP PROGRESS
Physical Therapy      Patient Name:  Shanell Mahmood   MRN:  [id number]    Patient not seen today secondary to Nurse/ GERA hold. Will follow-up 11/13/24.

## 2024-11-12 NOTE — RESPIRATORY THERAPY
11/12/24 0777   Patient Assessment/Suction   Level of Consciousness (AVPU) alert   Respiratory Effort Normal;Unlabored;Shallow   Expansion/Accessory Muscles/Retractions no use of accessory muscles;no retractions;expansion symmetric   All Lung Fields Breath Sounds clear;diminished;coarse   CAYETANO Breath Sounds coarse;clear;diminished   LLL Breath Sounds coarse;clear;diminished   RUL Breath Sounds coarse;clear;diminished   RML Breath Sounds coarse;diminished;clear   RLL Breath Sounds coarse;diminished;clear   Rhythm/Pattern, Respiratory unlabored;pattern regular;shallow;no shortness of breath reported   Cough Frequency infrequent   Cough Type nonproductive   PRE-TX-O2   Device (Oxygen Therapy) high flow nasal cannula   $ Is the patient on Low Flow Oxygen? Yes   Flow (L/min) (Oxygen Therapy) 10   SpO2 (!) 77 %  (On hand held PO X2)   Pulse Oximetry Type Continuous   $ Pulse Oximetry - Multiple Charge Pulse Oximetry - Multiple   Pulse 71   Resp (!) 0   Aerosol Therapy   $ Aerosol Therapy Charges PRN treatment not required   Labs   $ Was an ABG obtained? Arterial Blood Draw from Existing Line;POCT - Blood gas   $ Labs Tech Time 15 min   Critical Value Communication   Date Result Received 11/12/24   Time Result Received 0773   Resulting Department of Critical Value Respiratory   Who communicated critical value from resulting department? Cadence Arias, RRT   Critical Test #1 PO2   Name of Notified Physician/Designee Dr. Ramos   Date Notified 11/12/24   Time Notified 0979

## 2024-11-12 NOTE — ASSESSMENT & PLAN NOTE
The patients 3 most recent labs are listed below.  Recent Labs     11/10/24  0311 11/11/24  0734 11/12/24  0847   * 147* 172  172       Plan  - Will transfuse if platelet count is <100k (if undergoing neurosurgery), or otherwise less than 10 K  -trend daily

## 2024-11-12 NOTE — SUBJECTIVE & OBJECTIVE
Interval History:     No CP or SOB . Mild abdominal distension reported . Patient had BM   Need more vasopressor support and CTA ordered by dr Ramos with PE with possible right heart strain     Review of Systems  Objective:     Vital Signs (Most Recent):  Temp: 98.2 °F (36.8 °C) (11/12/24 1115)  Pulse: (!) 59 (11/12/24 1100)  Resp: (!) 22 (11/12/24 1102)  BP: (!) 95/55 (11/12/24 1100)  SpO2: (!) 93 % (11/12/24 0930) Vital Signs (24h Range):  Temp:  [97.9 °F (36.6 °C)-98.6 °F (37 °C)] 98.2 °F (36.8 °C)  Pulse:  [] 59  Resp:  [0-25] 22  SpO2:  [77 %-100 %] 93 %  BP: ()/(49-70) 95/55  Arterial Line BP: ()/(44-72) 126/53     Weight: 72.7 kg (160 lb 4.4 oz)  Body mass index is 25.1 kg/m².    Intake/Output Summary (Last 24 hours) at 11/12/2024 1134  Last data filed at 11/12/2024 0952  Gross per 24 hour   Intake 560.9 ml   Output 750 ml   Net -189.1 ml         Physical Exam  Vitals and nursing note reviewed.   HENT:      Head: Normocephalic and atraumatic.   Eyes:      Conjunctiva/sclera: Conjunctivae normal.   Neck:      Vascular: No JVD.   Cardiovascular:      Rate and Rhythm: Normal rate.      Heart sounds: Normal heart sounds.   Pulmonary:      Effort: Pulmonary effort is normal.      Breath sounds: Normal breath sounds.   Abdominal:      Palpations: Abdomen is soft.   Musculoskeletal:         General: Normal range of motion.   Skin:     General: Skin is warm.   Neurological:      Mental Status: She is alert and oriented to person, place, and time.   Psychiatric:         Mood and Affect: Mood normal.             Significant Labs: All pertinent labs within the past 24 hours have been reviewed.  CBC:   Recent Labs   Lab 11/11/24  0734 11/12/24  0310 11/12/24  0847 11/12/24  0859   WBC 3.27*  --  4.23  4.23  --    HGB 9.4*  --  9.4*  9.4*  --    HCT 28.6* 34* 28.2*  28.2* 34*   *  --  172  172  --      CMP:   Recent Labs   Lab 11/11/24  0734 11/12/24  0847   * 126*  126*  126*   K 4.2  "4.0  4.0  4.0    98  98  98   CO2 22* 22*  22*  22*    96  96  96   BUN 28* 30*  30*  30*   CREATININE 1.5* 1.5*  1.5*  1.5*   CALCIUM 8.2* 8.1*  8.1*  8.1*   PROT 5.3* 5.2*  5.2*   ALBUMIN 2.6* 2.6*  2.6*   BILITOT 0.4 0.4  0.4   ALKPHOS 81 90  90   AST 25 57*  57*   ALT 7* 14  14   ANIONGAP 6* 6*  6*  6*     Cardiac Markers: No results for input(s): "CKMB", "MYOGLOBIN", "BNP", "TROPISTAT" in the last 48 hours.    Significant Imaging: I have reviewed all pertinent imaging results/findings within the past 24 hours.  "

## 2024-11-12 NOTE — PLAN OF CARE
Per Lisa via [employer] system, they submitted for authorization.       11/12/24 1626   Post-Acute Status   Post-Acute Authorization Placement   Post-Acute Placement Status Pending payor review/awaiting authorization (if required)

## 2024-11-12 NOTE — ASSESSMENT & PLAN NOTE
"Discovered after PET scan of lung  on 9/6/24 revealed abnormal uptake in brain. 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  Unclear if contributing to CVA  She will have her first appointment with Dr. Ray Mcintyre at  [facility]  on 11/4/24  -neurosurgery and oncology follow-up  "

## 2024-11-12 NOTE — PROGRESS NOTES
[facility]  Department of Cardiology  Progress Note    PATIENT NAME: Shanell Mahmood  MRN: [id number]  TODAY'S DATE: 11/12/2024  ADMIT DATE: 10/31/2024    SUBJECTIVE     PRINCIPLE PROBLEM: Central cord syndrome    INTERVAL HISTORY:    11/12/2024  O2 requirements increased and she is requiring vapo therm again per pulmonary recommendations. Chest xray with some worsening CHF pattern, and she is still Positive 6 L on I&O.     11/11/24  Pt seen this afternoon eating lunch with RN at bedside. BP stable - weaning pressors as tolerated.     11/10/2024  Vapotherm 15 L today; creatinine 1.6 today  OOB to chair; feeling improved today; Remains on Levophed and vasopressin.    Her blood pressure is much better today and the pressors are being weaned slowly.      11/9/24  Remains on vapotherm, down to 20 Liters today; Now on Levophed and Vasopressin; Resting quietly in bed    11/8/24  Pt seen this morning doing very well - up in cardiac chair and was able to eat breakfast and take PO medications. Remains on amio gtt as well as nor epi.  Does not have any complaints at this time.  Remains on 30 L 75% high-flow nasal cannula.    11/7/2024  Is awake lying in bed not in any acute distress.  She had been on Levophed and she is slowly being titrated down.  Still significantly winded on minimal exertion she is on Vapotherm now at 30 L and 80% to maintain good oxygenation.  She is currently fully anticoagulated.    Review of patient's allergies indicates:   Allergen Reactions    Codeine Palpitations       REVIEW OF SYSTEMS    OBJECTIVE     VITAL SIGNS (Most Recent)  Temp: 98.6 °F (37 °C) (11/11/24 1901)  Pulse: 65 (11/12/24 0600)  Resp: 11 (11/12/24 0600)  BP: (!) 102/56 (11/12/24 0600)  SpO2: 100 % (11/12/24 0600)    VENTILATION STATUS  Resp: 11 (11/12/24 0600)  SpO2: 100 % (11/12/24 0600)           I & O (Last 24H):  Intake/Output Summary (Last 24 hours) at 11/12/2024 0854  Last data filed at 11/12/2024 0614  Gross per 24  hour   Intake 560.9 ml   Output 625 ml   Net -64.1 ml       WEIGHTS  Wt Readings from Last 1 Encounters:   11/01/24 1825 72.7 kg (160 lb 4.4 oz)   11/01/24 0733 70.8 kg (156 lb)   10/31/24 2104 71.2 kg (156 lb 15.5 oz)   10/31/24 1455 71.7 kg (158 lb)       PHYSICAL EXAM  CONSTITUTIONAL: calm, awake, elderly fatigued female, resting in recliner comfortably on Vapotherm.    NECK: no carotid bruit, no JVD, neck brace in place   LUNGS:  Bilateral decreased breath sounds in both lung bases; 15 L per vapotherm  CHEST WALL: no tenderness  HEART: regular rate and rhythm, S1, S2 normal, systolic murmur audible  ABDOMEN: soft, bowel sounds audible  EXTREMITIES: Extremities bilateral ankle and hand edema  NEURO: AAO X 3 recent cord compression surgery status post multilevel laminectomy and improving quadriparesis.    SCHEDULED MEDS:   allopurinoL  100 mg Oral QHS    amiodarone  200 mg Oral TID    [START ON 11/14/2024] amiodarone  200 mg Oral BID    atorvastatin  40 mg Oral Daily    bisacodyL  10 mg Rectal Daily    clopidogreL  75 mg Oral Daily    colchicine  0.3 mg Oral Daily    enoxparin  1 mg/kg Subcutaneous Q24H (treatment, non-standard time)    HYDROcodone-acetaminophen  1 tablet Oral Q6H    macitentan  10 mg Oral Daily    methocarbamoL  750 mg Oral TID    midodrine  15 mg Oral TID    montelukast  10 mg Oral Daily    pantoprazole  40 mg Oral Daily    selexipag  1,600 mcg Oral BID    tadalafil  40 mg Oral Daily       CONTINUOUS INFUSIONS:   NORepinephrine bitartrate-D5W  0-3 mcg/kg/min Intravenous Continuous 3.4 mL/hr at 11/12/24 0614 0.1 mcg/kg/min at 11/12/24 0614    vasopressin  0.04 Units/min Intravenous Continuous 12 mL/hr at 11/12/24 0614 0.04 Units/min at 11/12/24 0614       PRN MEDS:  Current Facility-Administered Medications:     acetaminophen, 650 mg, Oral, Q4H PRN    albuterol sulfate, 2.5 mg, Nebulization, Q6H PRN    aluminum-magnesium hydroxide-simethicone, 30 mL, Oral, Q4H PRN    dextrose 50%, 12.5 g,  Intravenous, PRN    dextrose 50%, 25 g, Intravenous, PRN    diphenhydrAMINE, 25 mg, Oral, Q6H PRN    diphenhydrAMINE-zinc acetate 1-0.1%, , Topical (Top), TID PRN    glucagon (human recombinant), 1 mg, Intramuscular, PRN    glucose, 16 g, Oral, PRN    glucose, 24 g, Oral, PRN    HYDROmorphone, 1 mg, Intravenous, Q6H PRN    HYDROmorphone, 2 mg, Oral, Q4H PRN    magnesium oxide, 800 mg, Oral, PRN    magnesium oxide, 800 mg, Oral, PRN    methocarbamoL, 500 mg, Oral, TID PRN    naloxone, 0.02 mg, Intravenous, PRN    ondansetron, 4 mg, Intravenous, Q8H PRN    potassium bicarbonate, 35 mEq, Oral, PRN    potassium bicarbonate, 50 mEq, Oral, PRN    potassium bicarbonate, 60 mEq, Oral, PRN    potassium, sodium phosphates, 2 packet, Oral, PRN    potassium, sodium phosphates, 2 packet, Oral, PRN    potassium, sodium phosphates, 2 packet, Oral, PRN    prochlorperazine, 5 mg, Intravenous, Q6H PRN    senna-docusate 8.6-50 mg, 2 tablet, Oral, Nightly PRN    sodium chloride 0.9%, 500 mL, Intravenous, PRN    sodium chloride 0.9%, 10 mL, Intravenous, Q12H PRN    sodium chloride 0.9%, 10 mL, Intravenous, PRN    LABS AND DIAGNOSTICS     CBC LAST 3 DAYS  Recent Labs   Lab 11/09/24  0439 11/10/24  0311 11/10/24  0313 11/11/24  0316 11/11/24  0734 11/12/24  0310   WBC 6.58 5.36  --   --  3.27*  --    RBC 3.44* 3.26*  --   --  3.42*  --    HGB 9.4* 9.0*  --   --  9.4*  --    HCT 29.1* 27.5*   < > 29* 28.6* 34*   MCV 85 84  --   --  84  --    MCH 27.3 27.6  --   --  27.5  --    MCHC 32.3 32.7  --   --  32.9  --    RDW 15.6* 15.3*  --   --  15.5*  --     144*  --   --  147*  --    MPV 11.1 11.3  --   --  11.6  --    GRAN 74.4*  4.9 72.2  3.9  --   --  67.9  2.2  --    LYMPH 12.5*  0.8* 13.4*  0.7*  --   --  15.0*  0.5*  --    MONO 10.8  0.7 10.6  0.6  --   --  9.8  0.3  --    BASO 0.02 0.03  --   --  0.01  --    NRBC 0 0  --   --  0  --     < > = values in this interval not displayed.       COAGULATION LAST 3 DAYS  No results  "for input(s): "LABPT", "INR", "APTT" in the last 168 hours.      CHEMISTRY LAST 3 DAYS  Recent Labs   Lab 11/06/24  1239 11/06/24  1324 11/07/24  0415 11/07/24  0607 11/09/24  0439 11/10/24  0311 11/10/24  0313 11/11/24  0316 11/11/24  0734 11/12/24  0310 11/12/24  0744   *  --  134*   < > 134* 133*  --   --  131*  --   --    K 3.9  --  4.0   < > 4.1 3.9  --   --  4.2  --   --      --  107   < > 106 105  --   --  103  --   --    CO2 20*  --  22*   < > 22* 22*  --   --  22*  --   --    ANIONGAP 7*  --  5*   < > 6* 6*  --   --  6*  --   --    BUN 33*  --  32*   < > 34* 31*  --   --  28*  --   --    CREATININE 1.9*  --  1.9*   < > 1.8* 1.6*  --   --  1.5*  --   --    *  --  109   < > 96 95  --   --  100  --   --    CALCIUM 7.2*  --  7.5*   < > 7.6* 7.6*  --   --  8.2*  --   --    PHOS 4.0  --   --   --  3.1 2.7  --   --   --   --   --    PH  --    < >  --    < >  --   --    < > 7.310*  --  7.356 7.355   MG 1.9  --  2.0  --  2.1 2.1  --   --   --   --   --    ALBUMIN  --   --   --   --   --   --   --   --  2.6*  --   --    BILITOT  --   --   --   --   --   --   --   --  0.4  --   --    PROT  --   --   --   --   --   --   --   --  5.3*  --   --    ALKPHOS  --   --   --   --   --   --   --   --  81  --   --    ALT  --   --   --   --   --   --   --   --  7*  --   --    AST  --   --   --   --   --   --   --   --  25  --   --     < > = values in this interval not displayed.       CARDIAC PROFILE LAST 3 DAYS  No results for input(s): "BNP", "CPK", "CPKMB", "LDH", "TROPONINI", "TROPONINIHS" in the last 168 hours.      ENDOCRINE LAST 3 DAYS  No results for input(s): "TSH", "PROCAL" in the last 168 hours.      LAST ARTERIAL BLOOD GAS  ABG  Recent Labs   Lab 11/12/24  0744   PH 7.355   PO2 43*   PCO2 41.2   HCO3 23.1*   BE -2       LAST 7 DAYS MICROBIOLOGY   Microbiology Results (last 7 days)       Procedure Component Value Units Date/Time    Blood culture [[id number]] Collected: 11/01/24 9456    Order Status: " Completed Specimen: Blood from Peripheral, Hand, Right Updated: 11/06/24 2032     Blood Culture, Routine No growth after 5 days.            MOST RECENT IMAGING  X-Ray Chest AP Portable  Narrative: EXAMINATION:  XR CHEST AP PORTABLE    CLINICAL HISTORY:  worsening oxygenation;    FINDINGS:  Portable chest with comparison chest x-ray 11/08/2024.  Normal cardiomediastinal silhouette.Enlarged central hilar vascular structures again noted.  There is bilateral perihilar haziness and interstitial thickening similar to previous exam.  Blunting of the left costophrenic angle again noted with increased hazy opacification of the left lung base.  Blunting of the right costophrenic angle with patchy opacities of the right lung base is similar to previous exam.  Pulmonary vasculature is normal. No acute osseous abnormality.  Impression: CHF lung pattern with probable small bilateral pleural effusions noted,  and is subtly increased from previous exam.    Electronically signed by: Scotty Aceves  Date:    11/12/2024  Time:    08:41      [facility]  Results for orders placed during the hospital encounter of 10/31/24    Echo Saline Bubble? No; Ultrasound enhancing contrast? No    Interpretation Summary    Left Ventricle: The left ventricle is smaller than normal. Normal wall thickness. There is normal systolic function with a visually estimated ejection fraction of 65 - 70%. There is normal diastolic function.    Right Ventricle: Mild right ventricular enlargement. Wall thickness is normal. Systolic function is moderately reduced.    Left Atrium: Left atrium is moderately dilated.    Right Atrium: Right atrium is mildly dilated.    IVC/SVC: Normal venous pressure at 3 mmHg.      CURRENT/PREVIOUS VISIT EKG  Results for orders placed or performed during the hospital encounter of 10/31/24   EKG 12-lead    Collection Time: 11/07/24  7:05 AM   Result Value Ref Range    QRS Duration 72 ms    OHS QTC Calculation 427 ms    Narrative    Test  Reason : I49.9,    Vent. Rate : 073 BPM     Atrial Rate : 079 BPM     P-R Int : 200 ms          QRS Dur : 072 ms      QT Int : 388 ms       P-R-T Axes : 000 083 024 degrees     QTc Int : 427 ms    Sinus rhythm with Blocked Premature atrial complexes  Nonspecific T wave abnormality  Abnormal ECG  When compared with ECG of 06-NOV-2024 13:13,  Sinus rhythm has replaced Atrial fibrillation  Vent. rate has decreased BY  67 BPM  Left posterior fascicular block is no longer Present    Referred By: [email or web address]   SELF           Confirmed By:        ASSESSMENT/PLAN:     Active Hospital Problems    Diagnosis    *Central cord syndrome    S/P cervical spinal fusion    Paroxysmal atrial fibrillation    Myelopathy    Quadriparesis    Acute deep vein thrombosis (DVT) of popliteal vein of right lower extremity, nonocclusive    Status post cervical spinal fusion    Anemia    Thrombocytopenia    Hypotension    Acute urinary retention    Syncope and collapse    CKD (chronic kidney disease), stage IV    Chronic respiratory failure with hypoxia    Pulmonary hypertension    Secondary hyperparathyroidism    Brain mass    Stroke       ASSESSMENT & PLAN:  1. Severe pulmonary hypertension  2. Acute on chronic hypoxemic respiratory failure   3. Paroxysmal atrial fibrillation  4. Severe central cord compression  5. Quadriparesis  6. Chronic kidney disease  7. Intracranial mass  8. Pulmonary nodule  9. Anemia   10. Acute DVT right leg  11. Syncope with collapse and history of stroke  12. NSVT      RECOMMENDATIONS:    Continue amiodarone 200 mg TID x 5 days followed by 200 mg BID x 30 days  Continue midodrine 15 mg t.i.d.  Patient with large pulmonary emboli and vascular has been consulted.  Full-dose anticoagulation recommended if okay with Neurosurgery per vascular.  Remains on pressors. Was on vasopressin while we were in the room today.   Will follow.     Sally García NP  Date of Service: 11/12/2024  2:06 PM      I have personally  interviewed and examined the patient, I have reviewed the Nurse Practitioner's history and physical, assessment, and plan. I agree with the findings and plan.    Patient has bilateral pulmonary emboli noted on CT scan because of desaturation.  Discussed with Dr. Carlos will increase the Lovenox to full dose 1 milligram/kilogram b.i.d. not a candidate for intervention   Casimiro Hammond M.D.  Department of Cardiology  Date of Service: 11/12/2024  8:24 AM

## 2024-11-12 NOTE — ASSESSMENT & PLAN NOTE
Patient has paroxysmal (<7 days) atrial fibrillation. Patient is currently in atrial fibrillation. [id number] Score: 3. The patients heart rate in the last 24 hours is as follows:  Pulse  Min: 59  Max: 114     Antiarrhythmics  amiodarone tablet 200 mg, 3 times daily, Oral  amiodarone tablet 200 mg, 2 times daily, Oral    Anticoagulants  enoxaparin injection 70 mg, Every 24 hours, Subcutaneous    Plan  - Replete lytes with a goal of K>4, Mg >2  - Patient is anticoagulated, see medications listed above.  - Patient's afib is currently uncontrolled. Will continue current treatment  Continue  amiodarone drip and changed to PO   In and out of A fib   On full dose lovenox

## 2024-11-12 NOTE — CARE UPDATE
11/11/24 2008   Patient Assessment/Suction   Level of Consciousness (AVPU) alert   Respiratory Effort Unlabored   Expansion/Accessory Muscles/Retractions expansion symmetric   All Lung Fields Breath Sounds clear   Rhythm/Pattern, Respiratory depth regular;pattern regular   Cough Frequency infrequent   Cough Type good;nonproductive   PRE-TX-O2   Device (Oxygen Therapy) high flow nasal cannula w/device   $ Is the patient on Low Flow Oxygen? Yes   Flow (L/min) (Oxygen Therapy) 3   SpO2 100 %   Pulse Oximetry Type Continuous   $ Pulse Oximetry - Multiple Charge Pulse Oximetry - Multiple   Pulse 70   Resp 15   Aerosol Therapy   $ Aerosol Therapy Charges PRN treatment not required   Incentive Spirometer   $ Incentive Spirometer Charges done with encouragement   Incentive Spirometer Predicted Level (mL) 1500   Administration (IS) instruction provided, follow-up   Number of Repetitions (IS) 10   Level Incentive Spirometer (mL) 1500   Patient Tolerance (IS) good   Vibratory PEP Therapy   $ Vibratory PEP Charges Aerobika Therapy   $ Vibratory PEP Equipment Aerobika Equipment   $ Vibratory PEP Tech Time Charges 15 min   Daily Review of Necessity (PEP Therapy) completed   Type (PEP Therapy) vibratory/oscillatory   Device (PEP Therapy) flutter   Route (PEP Therapy) mouthpiece   Breaths per Cycle (PEP Therapy) 10   Cycles (PEP Therapy) 1   PEP Pressure (cm H2O) 5   PEP Duration (min) 5   Settings (PEP Therapy) PEP 5   Patient Position (PEP Therapy) HOB elevated   Post Treatment Assessment (PEP) breath sounds unchanged   Signs of Intolerance (PEP Therapy) none   Education   $ Education Incentive Spirometry;15 min

## 2024-11-12 NOTE — ASSESSMENT & PLAN NOTE
Anemia is likely due to chronic disease due to Chronic Kidney Disease. Most recent hemoglobin and hematocrit are listed below.  Recent Labs     11/10/24  0311 11/10/24  0313 11/11/24  0734 11/12/24  0310 11/12/24  0847 11/12/24  0859   HGB 9.0*  --  9.4*  --  9.4*  9.4*  --    HCT 27.5*   < > 28.6* 34* 28.2*  28.2* 34*    < > = values in this interval not displayed.       Plan  - Monitor serial CBC: Daily  - Transfuse PRBC if patient becomes hemodynamically unstable, symptomatic or H/H drops below 7/21.  - Patient has not received any PRBC transfusions to date  - Patient's anemia is currently improving

## 2024-11-13 NOTE — ASSESSMENT & PLAN NOTE
S/p multilevel laminectomy (11/3/24)  C2-3, C3-4, C4-5, C5-6, C6-7 laminectomy  C3 through 6 posterior segmental instrumentation using [employer] system  C3-4, C4-5, C5-6 posterolateral arthrodesis using autograft harvested from the same incision and allograft DBM    Cervical collar in place  Neuro surgery follow up   Possible spinal shock ?  PT OT when more stable    Cortisol added and normal

## 2024-11-13 NOTE — ASSESSMENT & PLAN NOTE
Acute CVA right caudate  MRI, MRA, CT, carotid U/S reports reviewed  Plavix  and statin and patient also need full dose lovenox   -neurology following  -neurosurgery and oncology consulted for the brain mass and follow up as OP   -PT/OT/SLP when more stable   -echo bubble report is seem edited. Not mentioning of PFO  Patient going to need placement Vs comfort care

## 2024-11-13 NOTE — SUBJECTIVE & OBJECTIVE
Interval History:     Still with two vasopressors  Vapotherm maxed out   D/w dr barth and Dr Retana and pt and sister and consult placed .      Review of Systems  Objective:     Vital Signs (Most Recent):  Temp: 97.1 °F (36.2 °C) (11/13/24 0701)  Pulse: 78 (11/13/24 1145)  Resp: (!) 26 (11/13/24 1049)  BP: (!) 104/58 (11/13/24 0900)  SpO2: 100 % (11/13/24 1145) Vital Signs (24h Range):  Temp:  [97.1 °F (36.2 °C)-98.5 °F (36.9 °C)] 97.1 °F (36.2 °C)  Pulse:  [58-80] 78  Resp:  [4-28] 26  SpO2:  [57 %-100 %] 100 %  BP: ()/(37-83) 104/58  Arterial Line BP: ()/(33-76) 152/76     Weight: 72.7 kg (160 lb 4.4 oz)  Body mass index is 25.1 kg/m².    Intake/Output Summary (Last 24 hours) at 11/13/2024 1220  Last data filed at 11/13/2024 1200  Gross per 24 hour   Intake 1058.96 ml   Output 1100 ml   Net -41.04 ml         Physical Exam  Vitals and nursing note reviewed.   HENT:      Head: Normocephalic and atraumatic.   Eyes:      Conjunctiva/sclera: Conjunctivae normal.   Neck:      Vascular: No JVD.   Cardiovascular:      Rate and Rhythm: Tachycardia present.      Heart sounds: Normal heart sounds.   Pulmonary:      Effort: Pulmonary effort is normal.   Abdominal:      Palpations: Abdomen is soft.   Skin:     General: Skin is warm.   Neurological:      Mental Status: She is alert and oriented to person, place, and time.   Psychiatric:         Mood and Affect: Mood normal.             Significant Labs: All pertinent labs within the past 24 hours have been reviewed.  CMP:   Recent Labs   Lab 11/12/24  0847 11/12/24  1730 11/13/24  0347   *  126*  126* 126* 127*   K 4.0  4.0  4.0  --  4.1   CL 98  98  98  --  98   CO2 22*  22*  22*  --  22*   GLU 96  96  96  --  72   BUN 30*  30*  30*  --  29*   CREATININE 1.5*  1.5*  1.5*  --  1.6*   CALCIUM 8.1*  8.1*  8.1*  --  8.1*   PROT 5.2*  5.2*  --  5.3*   ALBUMIN 2.6*  2.6*  --  2.7*   BILITOT 0.4  0.4  --  0.6   ALKPHOS 90  90  --  110   AST 57*  " 57*  --  87*   ALT 14  14  --  26   ANIONGAP 6*  6*  6*  --  7*     Cardiac Markers: No results for input(s): "CKMB", "MYOGLOBIN", "BNP", "TROPISTAT" in the last 48 hours.  Coagulation: No results for input(s): "PT", "INR", "APTT" in the last 48 hours.    Significant Imaging: I have reviewed all pertinent imaging results/findings within the past 24 hours.  "

## 2024-11-13 NOTE — PLAN OF CARE
YORDY spoke to sulma at [facility] who informed SW that Pt's auth will be good until Thursday when it will . She stated that when Pt is medically cleared, as of now, she still has a bed for Pt.

## 2024-11-13 NOTE — ASSESSMENT & PLAN NOTE
Patient has paroxysmal (<7 days) atrial fibrillation. Patient is currently in atrial fibrillation. [id number] Score: 3. The patients heart rate in the last 24 hours is as follows:  Pulse  Min: 58  Max: 80     Antiarrhythmics  amiodarone tablet 200 mg, 3 times daily, Oral  amiodarone tablet 200 mg, 2 times daily, Oral    Anticoagulants  enoxaparin injection 70 mg, Every 12 hours, Subcutaneous    Plan  - Replete lytes with a goal of K>4, Mg >2  - Patient is anticoagulated, see medications listed above.  - Patient's afib is currently uncontrolled. Will continue current treatment  Continue  amiodarone drip and changed to PO   In and out of A fib   On full dose lovenox

## 2024-11-13 NOTE — PROGRESS NOTES
[facility]  Department of Cardiology  Progress Note    PATIENT NAME: Shanell Mahmood  MRN: [id number]  TODAY'S DATE: 11/13/2024  ADMIT DATE: 10/31/2024    SUBJECTIVE     PRINCIPLE PROBLEM: Central cord syndrome    INTERVAL HISTORY:    11/13/2024    Patient seen lying in bed with no distress noted. Family at bedside.  Remains on pressors.     11/12/24  O2 requirements increased and she is requiring vapo therm again per pulmonary recommendations. Chest xray with some worsening CHF pattern, and she is still Positive 6 L on I&O.     11/11/24  Pt seen this afternoon eating lunch with RN at bedside. BP stable - weaning pressors as tolerated.     11/10/2024  Vapotherm 15 L today; creatinine 1.6 today  OOB to chair; feeling improved today; Remains on Levophed and vasopressin.    Her blood pressure is much better today and the pressors are being weaned slowly.      11/9/24  Remains on vapotherm, down to 20 Liters today; Now on Levophed and Vasopressin; Resting quietly in bed    11/8/24  Pt seen this morning doing very well - up in cardiac chair and was able to eat breakfast and take PO medications. Remains on amio gtt as well as nor epi.  Does not have any complaints at this time.  Remains on 30 L 75% high-flow nasal cannula.    11/7/2024  Is awake lying in bed not in any acute distress.  She had been on Levophed and she is slowly being titrated down.  Still significantly winded on minimal exertion she is on Vapotherm now at 30 L and 80% to maintain good oxygenation.  She is currently fully anticoagulated.    Review of patient's allergies indicates:   Allergen Reactions    Codeine Palpitations       REVIEW OF SYSTEMS    OBJECTIVE     VITAL SIGNS (Most Recent)  Temp: 97.1 °F (36.2 °C) (11/13/24 0701)  Pulse: 75 (11/13/24 1345)  Resp: (!) 26 (11/13/24 1049)  BP: (!) 104/58 (11/13/24 0900)  SpO2: 100 % (11/13/24 1345)    VENTILATION STATUS  Resp: (!) 26 (11/13/24 1049)  SpO2: 100 % (11/13/24 1345)  Oxygen Concentration  (%):  [100] 100        I & O (Last 24H):  Intake/Output Summary (Last 24 hours) at 11/13/2024 1423  Last data filed at 11/13/2024 1200  Gross per 24 hour   Intake 1058.96 ml   Output 900 ml   Net 158.96 ml       WEIGHTS  Wt Readings from Last 1 Encounters:   11/01/24 1825 72.7 kg (160 lb 4.4 oz)   11/01/24 0733 70.8 kg (156 lb)   10/31/24 2104 71.2 kg (156 lb 15.5 oz)   10/31/24 1455 71.7 kg (158 lb)       PHYSICAL EXAM  CONSTITUTIONAL: calm, awake, elderly fatigued female, resting in recliner comfortably on Vapotherm.    NECK: no carotid bruit, no JVD, neck brace in place   LUNGS:  Bilateral decreased breath sounds in both lung bases; 15 L per vapotherm  CHEST WALL: no tenderness  HEART: regular rate and rhythm, S1, S2 normal, systolic murmur audible  ABDOMEN: soft, bowel sounds audible  EXTREMITIES: Extremities bilateral ankle and hand edema  NEURO: AAO X 3 recent cord compression surgery status post multilevel laminectomy and improving quadriparesis.    SCHEDULED MEDS:   allopurinoL  100 mg Oral QHS    amiodarone  200 mg Oral TID    [START ON 11/14/2024] amiodarone  200 mg Oral BID    atorvastatin  40 mg Oral Daily    bisacodyL  10 mg Rectal Daily    clopidogreL  75 mg Oral Daily    colchicine  0.3 mg Oral Daily    enoxparin  1 mg/kg Subcutaneous Q12H (prophylaxis, 0900/2100)    furosemide (LASIX) injection  40 mg Intravenous Daily    macitentan  10 mg Oral Daily    methocarbamoL  750 mg Oral TID    midodrine  15 mg Oral TID    montelukast  10 mg Oral Daily    pantoprazole  40 mg Oral Daily    selexipag  1,600 mcg Oral BID    tadalafil  40 mg Oral Daily       CONTINUOUS INFUSIONS:   NORepinephrine bitartrate-D5W  0-3 mcg/kg/min Intravenous Continuous 9.5 mL/hr at 11/13/24 1000 0.28 mcg/kg/min at 11/13/24 1000    vasopressin  0.04 Units/min Intravenous Continuous        vasopressin  0.04 Units/min Intravenous Continuous 12 mL/hr at 11/13/24 0846 0.04 Units/min at 11/13/24 0846       PRN MEDS:  Current  Facility-Administered Medications:     acetaminophen, 650 mg, Oral, Q4H PRN    albuterol sulfate, 2.5 mg, Nebulization, Q6H PRN    aluminum-magnesium hydroxide-simethicone, 30 mL, Oral, Q4H PRN    dextrose 50%, 12.5 g, Intravenous, PRN    dextrose 50%, 25 g, Intravenous, PRN    diphenhydrAMINE, 25 mg, Oral, Q6H PRN    diphenhydrAMINE-zinc acetate 1-0.1%, , Topical (Top), TID PRN    glucagon (human recombinant), 1 mg, Intramuscular, PRN    glucose, 16 g, Oral, PRN    glucose, 24 g, Oral, PRN    HYDROcodone-acetaminophen, 1 tablet, Oral, Q6H PRN    HYDROmorphone, 1 mg, Intravenous, Q6H PRN    HYDROmorphone, 2 mg, Oral, Q4H PRN    magnesium oxide, 800 mg, Oral, PRN    magnesium oxide, 800 mg, Oral, PRN    methocarbamoL, 500 mg, Oral, TID PRN    naloxone, 0.02 mg, Intravenous, PRN    ondansetron, 4 mg, Intravenous, Q8H PRN    potassium bicarbonate, 35 mEq, Oral, PRN    potassium bicarbonate, 50 mEq, Oral, PRN    potassium bicarbonate, 60 mEq, Oral, PRN    potassium, sodium phosphates, 2 packet, Oral, PRN    potassium, sodium phosphates, 2 packet, Oral, PRN    potassium, sodium phosphates, 2 packet, Oral, PRN    prochlorperazine, 5 mg, Intravenous, Q6H PRN    senna-docusate 8.6-50 mg, 2 tablet, Oral, Nightly PRN    sodium chloride 0.9%, 500 mL, Intravenous, PRN    sodium chloride 0.9%, 10 mL, Intravenous, Q12H PRN    sodium chloride 0.9%, 10 mL, Intravenous, PRN    LABS AND DIAGNOSTICS     CBC LAST 3 DAYS  Recent Labs   Lab 11/11/24  0734 11/12/24  0310 11/12/24  0847 11/12/24  0859 11/13/24  0347 11/13/24  0400   WBC 3.27*  --  4.23  4.23  --  5.83  --    RBC 3.42*  --  3.40*  3.40*  --  3.43*  --    HGB 9.4*  --  9.4*  9.4*  --  9.5*  --    HCT 28.6*   < > 28.2*  28.2* 34* 27.9* 35*   MCV 84  --  83  83  --  81*  --    MCH 27.5  --  27.6  27.6  --  27.7  --    MCHC 32.9  --  33.3  33.3  --  34.1  --    RDW 15.5*  --  15.6*  15.6*  --  15.0*  --    *  --  172  172  --  163  --    MPV 11.6  --  11.1   "11.1  --  11.4  --    GRAN 67.9  2.2  --  67.9  2.9  --  76.7*  4.5  --    LYMPH 15.0*  0.5*  --  14.7*  0.6*  --  10.1*  0.6*  --    MONO 9.8  0.3  --  11.3  0.5  --  11.1  0.7  --    BASO 0.01  --  0.02  --  0.03  --    NRBC 0  --  0  --  0  --     < > = values in this interval not displayed.       COAGULATION LAST 3 DAYS  No results for input(s): "LABPT", "INR", "APTT" in the last 168 hours.      CHEMISTRY LAST 3 DAYS  Recent Labs   Lab 11/07/24  0415 11/07/24  0607 11/09/24  0439 11/10/24  0311 11/10/24  0313 11/11/24  0734 11/12/24  0310 11/12/24  0847 11/12/24  0859 11/12/24  1306 11/12/24  1730 11/13/24  0347 11/13/24  0400   *   < > 134* 133*  --  131*  --  126*  126*  126*  --   --  126* 127*  --    K 4.0   < > 4.1 3.9  --  4.2  --  4.0  4.0  4.0  --   --   --  4.1  --       < > 106 105  --  103  --  98  98  98  --   --   --  98  --    CO2 22*   < > 22* 22*  --  22*  --  22*  22*  22*  --   --   --  22*  --    ANIONGAP 5*   < > 6* 6*  --  6*  --  6*  6*  6*  --   --   --  7*  --    BUN 32*   < > 34* 31*  --  28*  --  30*  30*  30*  --   --   --  29*  --    CREATININE 1.9*   < > 1.8* 1.6*  --  1.5*  --  1.5*  1.5*  1.5*  --   --   --  1.6*  --       < > 96 95  --  100  --  96  96  96  --   --   --  72  --    CALCIUM 7.5*   < > 7.6* 7.6*  --  8.2*  --  8.1*  8.1*  8.1*  --   --   --  8.1*  --    PHOS  --   --  3.1 2.7  --   --   --   --   --   --   --   --   --    PH  --    < >  --   --    < >  --    < >  --  7.356 7.349*  --   --  7.360   MG 2.0  --  2.1 2.1  --   --   --   --   --   --   --   --   --    ALBUMIN  --   --   --   --   --  2.6*  --  2.6*  2.6*  --   --   --  2.7*  --    BILITOT  --   --   --   --   --  0.4  --  0.4  0.4  --   --   --  0.6  --    PROT  --   --   --   --   --  5.3*  --  5.2*  5.2*  --   --   --  5.3*  --    ALKPHOS  --   --   --   --   --  81  --  90  90  --   --   --  110  --    ALT  --   --   --   --   --  7*  --  14  14  --   " "--   --  26  --    AST  --   --   --   --   --  25  --  57*  57*  --   --   --  87*  --     < > = values in this interval not displayed.       CARDIAC PROFILE LAST 3 DAYS  No results for input(s): "BNP", "CPK", "CPKMB", "LDH", "TROPONINI", "TROPONINIHS" in the last 168 hours.      ENDOCRINE LAST 3 DAYS  No results for input(s): "TSH", "PROCAL" in the last 168 hours.      LAST ARTERIAL BLOOD GAS  ABG  Recent Labs   Lab 11/13/24  0400   PH 7.360   PO2 60*   PCO2 38.8   HCO3 21.9*   BE -4*       LAST 7 DAYS MICROBIOLOGY   Microbiology Results (last 7 days)       Procedure Component Value Units Date/Time    Blood culture [[id number]] Collected: 11/01/24 1829    Order Status: Completed Specimen: Blood from Peripheral, Hand, Right Updated: 11/06/24 2032     Blood Culture, Routine No growth after 5 days.            MOST RECENT IMAGING  X-Ray Chest AP Portable  Narrative: EXAMINATION:  XR CHEST AP PORTABLE    CLINICAL HISTORY:  hypoxemia;    FINDINGS:  Portable chest radiograph at 04:58 hours compared to prior exams including CT of the prior day show right PICC unchanged in position, with stable cardiomediastinal silhouette and stable enlarged central pulmonary vasculature.    There are persistent bilateral perihilar and left lower lung airspace opacities, with bilateral pleural effusions blunting the costophrenic angles.  No pneumothorax.  Impression: No significant interval change.    Electronically signed by: Nitin Mayo  Date:    11/13/2024  Time:    08:04      [facility]  Results for orders placed during the hospital encounter of 10/31/24    Echo Saline Bubble? No; Ultrasound enhancing contrast? No    Interpretation Summary    Left Ventricle: The left ventricle is smaller than normal. Normal wall thickness. There is normal systolic function with a visually estimated ejection fraction of 65 - 70%. There is normal diastolic function.    Right Ventricle: Mild right ventricular enlargement. Wall thickness is normal. " Systolic function is moderately reduced.    Left Atrium: Left atrium is moderately dilated.    Right Atrium: Right atrium is mildly dilated.    IVC/SVC: Normal venous pressure at 3 mmHg.      CURRENT/PREVIOUS VISIT EKG  Results for orders placed or performed during the hospital encounter of 10/31/24   EKG 12-lead    Collection Time: 11/07/24  7:05 AM   Result Value Ref Range    QRS Duration 72 ms    OHS QTC Calculation 427 ms    Narrative    Test Reason : I49.9,    Vent. Rate : 073 BPM     Atrial Rate : 079 BPM     P-R Int : 200 ms          QRS Dur : 072 ms      QT Int : 388 ms       P-R-T Axes : 000 083 024 degrees     QTc Int : 427 ms    Sinus rhythm with Blocked Premature atrial complexes  Nonspecific T wave abnormality  Abnormal ECG  When compared with ECG of 06-NOV-2024 13:13,  Sinus rhythm has replaced Atrial fibrillation  Vent. rate has decreased BY  67 BPM  Left posterior fascicular block is no longer Present    Referred By: [email or web address]   SELF           Confirmed By:        ASSESSMENT/PLAN:     Active Hospital Problems    Diagnosis    *Central cord syndrome    S/P cervical spinal fusion    Paroxysmal atrial fibrillation    Myelopathy    Quadriparesis    Acute deep vein thrombosis (DVT) of popliteal vein of right lower extremity, nonocclusive and pulmonary embolism    Status post cervical spinal fusion    Anemia    Thrombocytopenia    Hypotension    Acute urinary retention    Syncope and collapse    CKD (chronic kidney disease), stage IV    Chronic respiratory failure with hypoxia    Pulmonary hypertension    Secondary hyperparathyroidism    Brain mass    Stroke       ASSESSMENT & PLAN:  1. Severe pulmonary hypertension  2. Acute on chronic hypoxemic respiratory failure   3. Paroxysmal atrial fibrillation  4. Severe central cord compression  5. Quadriparesis  6. Chronic kidney disease  7. Intracranial mass  8. Pulmonary nodule  9. Anemia   10. Acute DVT right leg  11. Syncope with collapse and history of  stroke  12. NSVT      RECOMMENDATIONS:    Continue amiodarone 200 mg TID x 5 days followed by 200 mg BID x 30 days  Continue midodrine 15 mg t.i.d.  Remains on pressors. Currently on levo and vasopressin. Management per intensivist.   Anticoagulation with lovenox has been ordered for PE. She is also on Plavix. Monitor labs and for s/s of bleeding.   She is receiving furosemide 40 mg IV daily. Monitor strict I&O and continue to diurese as tolerated/needed.   Cardiology will be available PRN. Please reconsult for any cardiac specific needs that have not been addressed.       Sally García NP  Date of Service: 11/13/2024  2:06 PM      I have personally interviewed and examined the patient, I have reviewed the Nurse Practitioner's history and physical, assessment, and plan. I agree with the findings and plan.    Patient remains on 2 inotropes.  Family is at bedside.  Discussions have been made regarding hospice and palliative care.  There is really nothing to add from a cardiac point of view today   Casimiro Hammond M.D.  Department of Cardiology  Date of Service: 11/13/2024  8:24 AM

## 2024-11-13 NOTE — ASSESSMENT & PLAN NOTE
Found on u/s 11/5/24 and CTA 11/12   hypoechoic peripheral nonocclusive thrombus within the proximal right superficial femoral vein    full dose lovenox   Close monitor  s/p spine surgery   Associated with hypotension /shock suspicious for PE  and CTA confirmed   Vascular did not recommend thrombectomy

## 2024-11-13 NOTE — PT/OT/SLP PROGRESS
Physical Therapy Treatment    Patient Name:  Shanell Mahmood   MRN:  [id number]    Recommendations:     Discharge Recommendations: High Intensity Therapy  Discharge Equipment Recommendations: to be determined by next level of care  Barriers to discharge:   increase assist with mobility, high risk for falls, generalized weakness, decrease activity tolerance    Assessment:     Shanell Mahmood is a 74 y.o. female admitted with a medical diagnosis of Central cord syndrome.  She presents with the following impairments/functional limitations: weakness, impaired endurance, impaired self care skills, impaired functional mobility, gait instability, impaired balance, impaired sensation, decreased safety awareness, decreased lower extremity function, decreased upper extremity function, orthopedic precautions.    Pt found in right sidelying with sister at bedside. RN adjusted vapotherm to 40 and 100% prior to visit. Pt requires with improved form with bed mobility with HOB elevated with min A greatest assist for LE. Significant improvement improvement in sitting balance/tolerance/posture x 15 mins static and dynamic with occ VI for returning to midline due to left lateral lean. No report of dizziness or lightheadedness. SAO2 greater than 95% at EOB slight decrease with bed mobility to 89% but quick recovery at rest.     Rehab Prognosis: Fair; patient would benefit from acute skilled PT services to address these deficits and reach maximum level of function.    Recent Surgery: Procedure(s) (LRB):  LAMINECTOMY, SPINE, CERVICAL, POSTERIOR APPROACH (N/A) 10 Days Post-Op    Plan:     During this hospitalization, patient to be seen daily to address the identified rehab impairments via gait training, therapeutic activities, therapeutic exercises, neuromuscular re-education and progress toward the following goals:    Plan of Care Expires:  12/11/24    Subjective     Chief Complaint: none stated  Patient/Family Comments/goals: get  better  Pain/Comfort:  Pain Rating 1: 0/10      Objective:     Communicated with RN prior to session.  Patient found right sidelying with peripheral IV, telemetry, blood pressure cuff, pulse ox (continuous), oxygen upon PT entry to room.     General Precautions: Standard, fall  Orthopedic Precautions: spinal precautions  Braces: [place] J collar  Respiratory Status:  40L 100% vapotherm     Functional Mobility:  Bed Mobility:     Rolling Left:  moderate assistance  Rolling Right: moderate assistance  Supine to Sit: minimum assistance  Sit to Supine: moderate assistance and of 2 persons      AM-PAC 6 CLICK MOBILITY  Turning over in bed (including adjusting bedclothes, sheets and blankets)?: 2  Sitting down on and standing up from a chair with arms (e.g., wheelchair, bedside commode, etc.): 2  Moving from lying on back to sitting on the side of the bed?: 2  Moving to and from a bed to a chair (including a wheelchair)?: 1  Need to walk in hospital room?: 1  Climbing 3-5 steps with a railing?: 1  Basic Mobility Total Score: 9       Treatment & Education:  Pt educated on POC, discharge recommendation, importance of time OOB, midline seated positioning/balance, seated TE ankle DF and knee ext, need for assist with mobility, use of call bell to seek assistance as needed and fall prevention      Patient left HOB elevated with all lines intact, call button in reach, bed alarm on, MD notified, and sister present..    GOALS:   Multidisciplinary Problems       Physical Therapy Goals          Problem: Physical Therapy    Goal Priority Disciplines Outcome Interventions   Physical Therapy Goal     PT, PT/OT Not Progressing    Description: Goals to be met by: 24     Patient will increase functional independence with mobility by performin. Supine to sit with Moderate Assistance  2. Sit to supine with Moderate Assistance  3. Sit to stand transfer with Moderate Assistance  4. Bed to chair transfer with Moderate Assistance  using Rolling Walker  5. Gait  x 25 feet with Moderate Assistance using Rolling Walker.                          Time Tracking:     PT Received On: 11/13/24  PT Start Time: 0920     PT Stop Time: 0944  PT Total Time (min): 24 min     Billable Minutes: Therapeutic Activity 24    Treatment Type: Treatment  PT/PTA: PT     Number of PTA visits since last PT visit: 0     11/13/2024

## 2024-11-13 NOTE — PT/OT/SLP PROGRESS
Occupational Therapy   Treatment    Name: Shanell Mahmood  MRN: [id number]  Admitting Diagnosis:  Central cord syndrome  10 Days Post-Op    Recommendations:     Discharge Recommendations: High Intensity Therapy  Discharge Equipment Recommendations:  to be determined by next level of care  Barriers to discharge:   (Increased physical assistance with ADLs and functional mobility.)    Assessment:     Shanell Mahmood is a 74 y.o. female with a medical diagnosis of Central cord syndrome.  She presents with general weakness s/p C3 to C7 laminectomy/fusion. Performance deficits affecting function are weakness, impaired endurance, impaired self care skills, impaired functional mobility, gait instability, impaired balance, decreased safety awareness, decreased lower extremity function, decreased upper extremity function, orthopedic precautions.     Rehab Prognosis:  Good; patient would benefit from acute skilled OT services to address these deficits and reach maximum level of function.       Plan:     Patient to be seen 6 x/week to address the above listed problems via self-care/home management, therapeutic activities, therapeutic exercises  Plan of Care Expires: 12/04/24  Plan of Care Reviewed with: patient    Subjective     Chief Complaint: General weakness  Patient/Family Comments/goals: Improved functional mobility and ADL independence.  Pain/Comfort:  Pain Rating 1: 0/10  Pain Rating Post-Intervention 1: 0/10    Objective:     Communicated with: nurse prior to session.  Patient found HOB elevated with telemetry, arterial line, PureWick, PICC line upon OT entry to room.    General Precautions: Standard, fall    Orthopedic Precautions:spinal precautions  Braces: [place] J collar  Respiratory Status:  4)L @100% via Vapotherm     Occupational Performance:     Bed Mobility:    BUE therex x10 reps with minimal assistance bed level.      [facility] 6 Click ADL:      Treatment & Education:  Patient just finished working with Physical Therapy.  Declined EOB or OOB activity, but agreeable to BUE therex bed level.    Patient left HOB elevated with all lines intact, call button in reach, and bed alarm on    GOALS:   Multidisciplinary Problems       Occupational Therapy Goals          Problem: Occupational Therapy    Goal Priority Disciplines Outcome Interventions   Occupational Therapy Goal     OT, PT/OT Progressing    Description: Goals to be met by: 12/2/2024  - revised target date, feeding and g/hygiene goals, all other goals remain appropriate.  Patient will increase functional independence with ADLs by performing:    Feeding with Minimal Assistance with adaptive device PRN.  UE Dressing with Minimal Assistance.  LE Dressing with Moderate Assistance.  Grooming while EOB with Minimal Assistance with adaptive device PRN.  Toileting from bedside commode with Minimal Assistance for hygiene and clothing management.   Sitting at edge of bed x15 minutes with Stand-by Assistance.  Supine to sit with Minimal Assistance.  Toilet transfer to bedside commode with Minimal Assistance.  Upper extremity exercise program, with assistance as needed.                         Time Tracking:     OT Date of Treatment: 11/13/24  OT Start Time: 1005  OT Stop Time: 1015  OT Total Time (min): 10 min    Billable Minutes:Therapeutic Exercise 10    OT/MARYELLEN: OT          11/13/2024

## 2024-11-13 NOTE — ASSESSMENT & PLAN NOTE
The patients 3 most recent labs are listed below.  Recent Labs     11/11/24  0734 11/12/24  0847 11/13/24  0347   * 172  172 163       Plan  - Will transfuse if platelet count is <100k (if undergoing neurosurgery), or otherwise less than 10 K  -trend daily

## 2024-11-13 NOTE — ASSESSMENT & PLAN NOTE
Anemia is likely due to chronic disease due to Chronic Kidney Disease. Most recent hemoglobin and hematocrit are listed below.  Recent Labs     11/11/24  0734 11/12/24  0310 11/12/24  0847 11/12/24  0859 11/13/24  0347 11/13/24  0400   HGB 9.4*  --  9.4*  9.4*  --  9.5*  --    HCT 28.6*   < > 28.2*  28.2* 34* 27.9* 35*    < > = values in this interval not displayed.       Plan  - Monitor serial CBC: Daily  - Transfuse PRBC if patient becomes hemodynamically unstable, symptomatic or H/H drops below 7/21.  - Patient has not received any PRBC transfusions to date  - Patient's anemia is currently improving

## 2024-11-13 NOTE — PLAN OF CARE
"12:27  YORDY met with Pt and her sister at bedside. Pt choice obtained for [facility]. Pt is still agreeable and Pt's sister added that she would like to see her sister go to [facility] and continue to "get better." Pt choice scanned into media.    YORDY spoke to lisa at [facility] who stated Pt is approved but Lisa is following, waiting for the result of the Palliative consult. CM will be better informed on which direction DP is going after consult.    2:07  YORDY spoke to lisa at [facility] who informed SW that Pt's auth will be good until Thursday when it will . She stated that when Pt is medically cleared, as of now, she still has a bed for Pt.    "

## 2024-11-13 NOTE — PROGRESS NOTES
[facility]  Progress Note    Patient Name: Shanell Mahmood  MRN: [id number]  Patient Class: IP- Inpatient   Admission Date: 10/31/2024  Length of Stay: 12 days  Attending Physician: Eric Bass MD  Primary Care Provider: Nicole, Primary Doctor        Subjective:     Principal Problem:Central cord syndrome        HPI:  Shanell Mahmood is a 74 year old female with a previous medical history of anemia, Pulmonary hypertension on 4 liters continuous oxygen at home, arthritis, CKD V, Osteoporosis, secondary hyperprathyroidism, S/P closure of patent foramen ovale in 2011, CVA in 2011 with no residuals, chronic back pain, HLD, Gout, Brain Mass and thyroid diease who presented to the ED after unwitnessed syncopal episode. The patient was at her pain management office for re certification only. There were no procedures performed. She reports taking one hydrocodone 10mg today.  The last thing she remembers was walking down the hallway of the office and looking for something to cover her head from the rain and did not have any adverse symptoms or feelings at that time.  She has no recollection of the syncopal events. When she was initially evaluated by EMS, her blood pressure was in the 60s over 40s. She did require constant stimulation to remain awake. Fell and hit her head. Does not take any blood thinners. EMS evaluated her and gave her 1 mg of Narcan as well as 250 cc of fluid. Her pressure improved and she had become more alert. Initial ED workup was thorough and all imaging showing that included CT of neck, head and entire spine showed no acute processes. CBC showed anemia and CMP showed elevated creatinine consistent with history of CKD V. Drug screen positive for opioids but patient has a hydrocodone prescription. The patient is currently in the process of have a left sided brain mass visualized on MRI 10/8/24 evaluated that was an incidental finding after undergoing a PET scan for a pulmonary  "nodule on 9/6/24 with abnormal uptake noted in brain. She has her first appointment on 11/4/24. She is followed by nephrology who is currently monitoring her and there has been one attempt at AV fistual placement but it was unsuccessful due sclerotic changes. Patient reports she awoke this morning feeling well showered and dressed herself. She performs all of her own ADL's. She had one syncopal event in March 2024 and was evaluated by cardiology and informed it was an orthostatic episode. Patient was unable to complete orthostatic vital signs upon admission due to inability to stand stating " I feel as if I can't get my legs under me". When evaluated for admit exam the patient endorses that after the syncopal episode she endorses bilateral leg weakness with decreased sensation in both legs. She reports bilateral  weakness being unable to use her phone and difficulty raising both of her arms. NIH scale performed and total of 9 noted. Patient noted have 400ml of urine in bladder and unable to void. Patient reached a total of 528ml of urine without being able to void.  MRI/MRA of brain and MRI of lumbosacral spine ordered upon admit. MRA and MRI lower back showed no acute process. MRI brain showed Acute infarct in the right caudate head. Dr. Maldonado was called and he recommended MRA of neck in morning and load with aspirin and plavix. Initial EKG read as atrial fibrillation but upon review p waves were noted and this was discussed with the ED. Repeat EKG shows sinus arrhthymias with PACs. Patient admitted by hospital medicine for further evaluation and management.       Overview/Hospital Course:  74-year-old female with history of brain mass, CKD, back pain, pulmonary hypertension on 4 L oxygen is admitted after syncope and collapse found to have a acute CVA in the right caudate on MRI brain.   She Has global weakness and decreased sensation to the lower legs.  Multiple CT and x-rays done to rule out trauma ultimately " negative other than the maxillofacial CT reporting mild irregularity of the interior midline mandible with small adjacent bone fragments which could represent acute fracture with overlying soft tissue swelling.    Carotid ultrasound and MRA brain and neck without acute finding.  Neurology is consulted.  Also with underlying brain mass.  Consult Neurosurgery and Oncology.  Given DAPT and statin.  Had hypotension given IVF and midodrine.  Echo shows right heart failure.  BNP is within normal.  Lactic acid pending.  No other sign of infection.  Placed in cervical collar as precaution and MRI of the cervical spine shows severe cord compression at C4-5 and C5-6 likely acute with edema.  Neurosurgery discussed with patient  and plan to proceed with surgical decompression  and  patient underwent surgery on : 11/3/24 multilevel C-spine laminectomy.  Later patient continued to have hypotension and needed vasopressor and another vasopressor vasopressin is added  Patient also her DVT of the the proximal right superficial femoral vein. And nonocclusive thrombus within the right popliteal vein,  Also patient developed new onset AFib and started amiodarone drip and in and out of a fib  and later changed to PO amiodarone   Later patient was more hypoxic and not able to wean vasopressor and PE study was done and found to have segmental PE with right heart strain . Patient was on full dose lovenox . Vascular surgery consulted and reviewed the film and did not recommend thrombectomy intervention .    Interval History:     Still with two vasopressors  Vapotherm maxed out   D/w dr barth and Dr Retana and pt and sister and consult placed .      Review of Systems  Objective:     Vital Signs (Most Recent):  Temp: 97.1 °F (36.2 °C) (11/13/24 0701)  Pulse: 78 (11/13/24 1145)  Resp: (!) 26 (11/13/24 1049)  BP: (!) 104/58 (11/13/24 0900)  SpO2: 100 % (11/13/24 1145) Vital Signs (24h Range):  Temp:  [97.1 °F (36.2 °C)-98.5 °F (36.9 °C)] 97.1  "°F (36.2 °C)  Pulse:  [58-80] 78  Resp:  [4-28] 26  SpO2:  [57 %-100 %] 100 %  BP: ()/(37-83) 104/58  Arterial Line BP: ()/(33-76) 152/76     Weight: 72.7 kg (160 lb 4.4 oz)  Body mass index is 25.1 kg/m².    Intake/Output Summary (Last 24 hours) at 11/13/2024 1220  Last data filed at 11/13/2024 1200  Gross per 24 hour   Intake 1058.96 ml   Output 1100 ml   Net -41.04 ml         Physical Exam  Vitals and nursing note reviewed.   HENT:      Head: Normocephalic and atraumatic.   Eyes:      Conjunctiva/sclera: Conjunctivae normal.   Neck:      Vascular: No JVD.   Cardiovascular:      Rate and Rhythm: Tachycardia present.      Heart sounds: Normal heart sounds.   Pulmonary:      Effort: Pulmonary effort is normal.   Abdominal:      Palpations: Abdomen is soft.   Skin:     General: Skin is warm.   Neurological:      Mental Status: She is alert and oriented to person, place, and time.   Psychiatric:         Mood and Affect: Mood normal.             Significant Labs: All pertinent labs within the past 24 hours have been reviewed.  CMP:   Recent Labs   Lab 11/12/24  0847 11/12/24  1730 11/13/24  0347   *  126*  126* 126* 127*   K 4.0  4.0  4.0  --  4.1   CL 98  98  98  --  98   CO2 22*  22*  22*  --  22*   GLU 96  96  96  --  72   BUN 30*  30*  30*  --  29*   CREATININE 1.5*  1.5*  1.5*  --  1.6*   CALCIUM 8.1*  8.1*  8.1*  --  8.1*   PROT 5.2*  5.2*  --  5.3*   ALBUMIN 2.6*  2.6*  --  2.7*   BILITOT 0.4  0.4  --  0.6   ALKPHOS 90  90  --  110   AST 57*  57*  --  87*   ALT 14  14  --  26   ANIONGAP 6*  6*  6*  --  7*     Cardiac Markers: No results for input(s): "CKMB", "MYOGLOBIN", "BNP", "TROPISTAT" in the last 48 hours.  Coagulation: No results for input(s): "PT", "INR", "APTT" in the last 48 hours.    Significant Imaging: I have reviewed all pertinent imaging results/findings within the past 24 hours.    Assessment/Plan:      * Central cord syndrome  S/p multilevel laminectomy " (11/3/24)  C2-3, C3-4, C4-5, C5-6, C6-7 laminectomy  C3 through 6 posterior segmental instrumentation using [employer] Takt[employer]hony system  C3-4, C4-5, C5-6 posterolateral arthrodesis using autograft harvested from the same incision and allograft DBM    Cervical collar in place  Neuro surgery follow up   Possible spinal shock ?  PT OT when more stable    Cortisol added and normal       Paroxysmal atrial fibrillation  Patient has paroxysmal (<7 days) atrial fibrillation. Patient is currently in atrial fibrillation. [id number] Score: 3. The patients heart rate in the last 24 hours is as follows:  Pulse  Min: 58  Max: 80     Antiarrhythmics  amiodarone tablet 200 mg, 3 times daily, Oral  amiodarone tablet 200 mg, 2 times daily, Oral    Anticoagulants  enoxaparin injection 70 mg, Every 12 hours, Subcutaneous    Plan  - Replete lytes with a goal of K>4, Mg >2  - Patient is anticoagulated, see medications listed above.  - Patient's afib is currently uncontrolled. Will continue current treatment  Continue  amiodarone drip and changed to PO   In and out of A fib   On full dose lovenox           S/P cervical spinal fusion  Multi levels  See NSGY op note     Acute deep vein thrombosis (DVT) of popliteal vein of right lower extremity, nonocclusive and pulmonary embolism  Found on u/s 11/5/24 and CTA 11/12   hypoechoic peripheral nonocclusive thrombus within the proximal right superficial femoral vein    full dose lovenox   Close monitor  s/p spine surgery   Associated with hypotension /shock suspicious for PE  and CTA confirmed   Vascular did not recommend thrombectomy        Quadriparesis  PT/OT  Frequent turns   Air mattress        Myelopathy  aware      Status post cervical spinal fusion  PT/OT  Cervical collar in place   Pain Mx      Hypotension  Asymptomatic.  Etiology most likely from pulmonary embolism/pul HTN / A fib with RVR   Echo reviewed.    -keep hold any BP meds or diuretics  On midodrine 15 tid   Currently on 2  "vasopressor  Pulmonary offered swan ramya but patient refused   Cortisol random normal     Thrombocytopenia  The patients 3 most recent labs are listed below.  Recent Labs     11/11/24  0734 11/12/24  0847 11/13/24  0347   * 172  172 163       Plan  - Will transfuse if platelet count is <100k (if undergoing neurosurgery), or otherwise less than 10 K  -trend daily    Anemia  Anemia is likely due to chronic disease due to Chronic Kidney Disease. Most recent hemoglobin and hematocrit are listed below.  Recent Labs     11/11/24  0734 11/12/24  0310 11/12/24  0847 11/12/24  0859 11/13/24  0347 11/13/24  0400   HGB 9.4*  --  9.4*  9.4*  --  9.5*  --    HCT 28.6*   < > 28.2*  28.2* 34* 27.9* 35*    < > = values in this interval not displayed.       Plan  - Monitor serial CBC: Daily  - Transfuse PRBC if patient becomes hemodynamically unstable, symptomatic or H/H drops below 7/21.  - Patient has not received any PRBC transfusions to date  - Patient's anemia is currently improving    Stroke  Acute CVA right caudate  MRI, MRA, CT, carotid U/S reports reviewed  Plavix  and statin and patient also need full dose lovenox   -neurology following  -neurosurgery and oncology consulted for the brain mass and follow up as OP   -PT/OT/SLP when more stable   -echo bubble report is seem edited. Not mentioning of PFO  Patient going to need placement Vs comfort care     Brain mass  Discovered after PET scan of lung  on 9/6/24 revealed abnormal uptake in brain. 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  Unclear if contributing to CVA  She will have her first appointment with Dr. Ray Mcintyre at  [place] [facility]  on 11/4/24  -neurosurgery and oncology follow-up    Secondary hyperparathyroidism  Chronic condition that is stable.     Pulmonary hypertension  Chronic condition   Macitentan, selexipag and tadalafil not " available and sildenafil changed and continued   Patient refuse swan ramya    Chronic respiratory failure with hypoxia  Patient with Hypoxic Respiratory failure which is Chronic.  she is on home oxygen at 4 LPM.   Secondary to pulmonary embolism  Full-dose Lovenox    CKD (chronic kidney disease), stage IV  Creatine stable for now. BMP reviewed- noted Estimated Creatinine Clearance: 30.5 mL/min (A) (based on SCr of 1.6 mg/dL (H)). according to latest data. Based on current GFR, CKD stage is stage 4 - GFR 15-29.  Monitor UOP and serial BMP and adjust therapy as needed. Renally dose meds. Avoid nephrotoxic medications and procedures.    Close monitor     Syncope and collapse  See stroke      Acute urinary retention  Possibly neurogenic from the CVA or from cervical spine cord compression  Urinary catheter. Appear may need long term        VTE Risk Mitigation (From admission, onward)           Ordered     enoxaparin injection 70 mg  Every 12 hours         11/12/24 1520     Reason for No Pharmacological VTE Prophylaxis  Once        Question:  Reasons:  Answer:  Risk of Bleeding    10/31/24 1849     IP VTE HIGH RISK PATIENT  Once         10/31/24 1849     Place sequential compression device  Until discontinued         10/31/24 1849                    Discharge Planning   ELBERT:      Code Status: DNR   Is the patient medically ready for discharge?:     Reason for patient still in hospital (select all that apply): Treatment  Discharge Plan A: Long-term acute care facility (LTAC)   Discharge Delays: None known at this time        Critical care time spent on the evaluation and treatment of severe organ dysfunction, review of pertinent labs and imaging studies, discussions with consulting providers and discussions with patient/family: 33 minutes.      Eric Bass MD  Department of Hospital Medicine   [facility]ly [facility]

## 2024-11-13 NOTE — ASSESSMENT & PLAN NOTE
"Discovered after PET scan of lung  on 9/6/24 revealed abnormal uptake in brain. 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  Unclear if contributing to CVA  She will have her first appointment with Dr. Ray Mcintyre at  [place] [facility]  on 11/4/24  -neurosurgery and oncology follow-up  "

## 2024-11-13 NOTE — PLAN OF CARE
Problem: Adult Inpatient Plan of Care  Goal: Plan of Care Review  11/13/2024 0712 by Michelle Valdez RN  Outcome: Progressing  11/13/2024 0710 by Michelle Valdez RN  Outcome: Progressing  Goal: Patient-Specific Goal (Individualized)  11/13/2024 0712 by Michelle Valdez RN  Outcome: Progressing  11/13/2024 0710 by Michelle Valdez RN  Outcome: Progressing  Goal: Absence of Hospital-Acquired Illness or Injury  11/13/2024 0712 by Michelle Valdez RN  Outcome: Progressing  11/13/2024 0710 by Michelle Valdez RN  Outcome: Progressing  Goal: Optimal Comfort and Wellbeing  11/13/2024 0712 by Michelle Valdez RN  Outcome: Progressing  11/13/2024 0710 by Michelle Valdez RN  Outcome: Progressing  Goal: Readiness for Transition of Care  11/13/2024 0712 by Michelle Valdez RN  Outcome: Progressing  11/13/2024 0710 by Michelle Valdez RN  Outcome: Progressing     Problem: Skin Injury Risk Increased  Goal: Skin Health and Integrity  11/13/2024 0712 by Michelle Valdez RN  Outcome: Progressing  11/13/2024 0710 by Michelle Valdez RN  Outcome: Progressing     Problem: Stroke, Ischemic (Includes Transient Ischemic Attack)  Goal: Optimal Coping  11/13/2024 0712 by Michelle Valdez RN  Outcome: Progressing  11/13/2024 0710 by Michelle Valdez RN  Outcome: Progressing  Goal: Effective Bowel Elimination  11/13/2024 0712 by Michelle Valdez RN  Outcome: Progressing  11/13/2024 0710 by Michelle Valdez RN  Outcome: Progressing  Goal: Optimal Cerebral Tissue Perfusion  11/13/2024 0712 by Michelle Valdez RN  Outcome: Progressing  11/13/2024 0710 by Michelle Valdez RN  Outcome: Progressing  Goal: Optimal Cognitive Function  11/13/2024 0712 by Michelle Valdez RN  Outcome: Progressing  11/13/2024 0710 by Michelle Valdez RN  Outcome: Progressing  Goal: Improved Communication Skills  11/13/2024 0712 by Michelle Valdez RN  Outcome: Progressing  11/13/2024 0710 by Michelle Valdez RN  Outcome: Progressing  Goal: Optimal Functional Ability  11/13/2024 0712 by Michelle Valdez  RN  Outcome: Progressing  11/13/2024 0710 by Michelle Valdez RN  Outcome: Progressing  Goal: Optimal Nutrition Intake  11/13/2024 0712 by Michelle Valdez RN  Outcome: Progressing  11/13/2024 0710 by Michelle Valdez RN  Outcome: Progressing  Goal: Effective Oxygenation and Ventilation  11/13/2024 0712 by Michelle Valdez RN  Outcome: Progressing  11/13/2024 0710 by Michelle Valdez RN  Outcome: Progressing  Goal: Improved Sensorimotor Function  11/13/2024 0712 by Michelle Valdez RN  Outcome: Progressing  11/13/2024 0710 by Michelle Valdez RN  Outcome: Progressing  Goal: Safe and Effective Swallow  11/13/2024 0712 by Michelle Valdez RN  Outcome: Progressing  11/13/2024 0710 by Michelle Valdez RN  Outcome: Progressing  Goal: Effective Urinary Elimination  11/13/2024 0712 by Michelle Valdez RN  Outcome: Progressing  11/13/2024 0710 by Michelle Valdez RN  Outcome: Progressing     Problem: Fall Injury Risk  Goal: Absence of Fall and Fall-Related Injury  11/13/2024 0712 by Michelle Valdez RN  Outcome: Progressing  11/13/2024 0710 by Michelle Valdez RN  Outcome: Progressing     Problem: Infection  Goal: Absence of Infection Signs and Symptoms  11/13/2024 0712 by Michelle Valdez RN  Outcome: Progressing  11/13/2024 0710 by Michelle Valdez RN  Outcome: Progressing     Problem: Wound  Goal: Optimal Coping  11/13/2024 0712 by Michelle Valdez RN  Outcome: Progressing  11/13/2024 0710 by Michelle Valdez RN  Outcome: Progressing  Goal: Optimal Functional Ability  11/13/2024 0712 by Michelle Valdez RN  Outcome: Progressing  11/13/2024 0710 by Michelle Valdez RN  Outcome: Progressing  Goal: Absence of Infection Signs and Symptoms  11/13/2024 0712 by Michelle Valdez RN  Outcome: Progressing  11/13/2024 0710 by Michelle Valdez RN  Outcome: Progressing  Goal: Improved Oral Intake  11/13/2024 0712 by Michelle Valdez, RN  Outcome: Progressing  11/13/2024 0710 by Michelle Valdez RN  Outcome: Progressing  Goal: Optimal Pain Control and Function  11/13/2024 0712 by  Valdez, Michelle, RN  Outcome: Progressing  11/13/2024 0710 by Michelle Valdez RN  Outcome: Progressing  Goal: Skin Health and Integrity  11/13/2024 0712 by Michelle Valdez RN  Outcome: Progressing  11/13/2024 0710 by Michelle Valdez RN  Outcome: Progressing  Goal: Optimal Wound Healing  11/13/2024 0712 by Michelle Valdez RN  Outcome: Progressing  11/13/2024 0710 by Michelle Valdez RN  Outcome: Progressing     Problem: Oral Intake Inadequate  Goal: Improved Oral Intake  11/13/2024 0712 by Michelle Valdez RN  Outcome: Progressing  11/13/2024 0710 by Michelle Valdez RN  Outcome: Progressing

## 2024-11-13 NOTE — PROGRESS NOTES
Pulmonary/Critical Care Progress Note      PATIENT NAME: Shanell Mahmood  MRN: [id number]  TODAY'S DATE: 2024  8:00 AM  ADMIT DATE: 10/31/2024  AGE: 74 y.o. : 1950    CONSULT REQUESTED BY: Eric Bass MD    REASON FOR CONSULT:   Critical care management    HPI:  The patient is a 74 year old female who had a syncopal event on 10/31.  She was weak, myelopathic.  CT of the head showed an acute infarct in the right caudate head.  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  The MRI of her cervical spine showed disc bulging and spondylolysis at C4-C5 and C5-C6 with severe spinal canal stenosis, cervical cord compression and cord signal alteration there was also broad-based disc bulging producing moderate spinal canal stenosis at C3-C4.  She was brought to the OR yesterday where she underwent C2- 3, 3-4, 4-5,5-6,  and 6- 7 laminectomies with C3 through 6 posterior segmental instrumentation and C3-4, 4- 5, 5- 6 posterolateral arthrodesis using autograft harvested from the incision and allograft of DBM.  This morning she remains profoundly myelopathic.  She is on Levophed at 0.06 mics per kilos per minute to maintain a mean of 80.    The patient has significant pulmonary hypertension in his managed with Opsumit 10 mg daily, Adcirca 40 mg daily, and Uptravi 1600 ug bid.  She is on 4 L continuous oxygen and has dyspnea with any exertion.  She is also on Advair 115/21 b.i.d.     the patient's oxygenation dropped dramatically this morning.  She is now on more levophed to maintain a mean of 80.  She is in a lot of pain.     the patient is requiring a little less oxygen this morning.  However, she is in AFib and had a 25 beat run of V-tach overnight.  She is on 0.24 of Levophed.  I am concerned that her pulmonary hypertension is significantly worse and have an echo coming.  Will start walking  the Levophed down and off as her renal function is worse, she is in AFib and she had V-tach last night.    11/7 the patient is continuing to decline.  She is on Vapotherm now at 30 L and 80% to maintain good oxygenation.  She is requiring 0.3 of Levophed to maintain her blood pressure.  We did fully anticoagulate with Levophed yesterday as I am still worried about a PE and the patient developed AFib with RVR.  Started on amiodarone and this morning she is back in sinus rhythm.  Limited echo yesterday did not show any worsening of the right ventricle but PA pressures were estimated at 60.  If her heart is actually working well, then we have no SVR, and this is spinal shock.  However, spinal shock is usually bradycardic.  A [place]-Jens catheter maybe helpful, but I am not sure how it will change our management unless the RV is failing and the PAs are too high and we need to be on IV Flolan.    11/8 the patient is on 0.3 of Levophed.  She is on 85% on the Vapotherm.  The patient is still declining to have a a Jersey-Jens catheter.  We would need this to initiate Flolan.  She would like to continue as we are hoping that things will turn around.  Her creatinine is marginally better today.  She remains ahead on her I's and O's by about 6 L. I do not see any point in trying to transfer her if she is declining a Jersey.    11/9 the patient is still on 0.3 of Levophed.  Her oxygenation is slightly improved and we have decreased her Vapotherm to 70% and 20 L. will try adding vasopressin to see if we get any benefit from this.  Her creatinine is stable at 1.8.  She still does not want a [place]-Jens catheter.  She has not had a bowel movement in 3 days.  Will make sure we are doing rectal stimulation with her Dulcolax suppositories.    11/10 The patient has less Levophed need this morning.  She is on vasopressin 0.04.  Her O2, though, has had to be increased to 85% she is still on Vapotherm but only at 15 L. she has more movement in  her arms and her legs today and this may be what is helping us with a blood pressure.    11/11 the patient is significantly improved this morning we are down to 0.05 of Levophed and the vasopressin.  Her oxygen is down to 6 L nasal cannula.  Her strength continues to improve.    11/12 the patient's Levophed dose is back up to 0.1.  She is still on vasopressin.  Her oxygen requirements are also higher again today.    11/13 the patient is still requiring her Levophed and vasopressin.  She is still on maximum Vapotherm.  The CTA done yesterday showed bilateral pulmonary emboli but Dr. Carlos did not feel the clot burden was so significant as to be impacting her pressures and did not feel that removal of the clot would have a positive risk benefit ratio.    REVIEW OF SYSTEMS  GENERAL: Feeling tired  EYES: Vision is good.  ENT: No sinusitis or pharyngitis.   HEART: No chest pain or palpitations.  LUNGS:  Breathing is okay  GI: No Nausea, vomiting, constipation, diarrhea, or reflux.  : No dysuria, hesitancy, or nocturia.  SKIN: No lesions or rashes.  MUSCULOSKELETAL: No joint pain or myalgias.  NEURO:  She reports her strength is about the same.  She still has paresthesias.  LYMPH: No edema or adenopathy.  PSYCH: No anxiety or depression.  ENDO: No weight change.    No change in the patient's Past Medical History, Past Surgical History, Social History or Family History since admission.    VITAL SIGNS (MOST RECENT)  Temp: 98.4 °F (36.9 °C) (11/13/24 0301)  Pulse: 72 (11/13/24 0718)  Resp: 16 (11/13/24 0718)  BP: 108/63 (11/13/24 0601)  SpO2: 97 % (11/13/24 0718)    INTAKE AND OUTPUT (LAST 24 HOURS):  Intake/Output Summary (Last 24 hours) at 11/13/2024 0923  Last data filed at 11/13/2024 0630  Gross per 24 hour   Intake 458.96 ml   Output 1350 ml   Net -891.04 ml       WEIGHT  Wt Readings from Last 1 Encounters:   11/01/24 72.7 kg (160 lb 4.4 oz)       PHYSICAL EXAM  GENERAL: Older patient looking quiet.  HEENT: Pupils  equal and reactive. Extraocular movements intact. Nose intact. Pharynx moist.  NECK: Supple.  Aspen collar in place. HEART:  Mostly regular rate and rhythm. No murmur or gallop auscultated.  The monitor shows AFib with a controlled rate.  LUNGS:  The lungs are clear. Lung excursion symmetrical. No change in fremitus.   ABDOMEN: Bowel sounds present. Non-tender, no masses palpated.  : Normal anatomy.  Yeung catheter with yellow urine  EXTREMITIES:  There is increasing strength in her hands.  The right is still weaker than the left.  legs are stronger as well.  Again the right leg is weaker than the left.  Arterial line right radial.  Right PICC.    LYMPHATICS: No adenopathy palpated, tr edema.  SKIN: Dry, intact, no lesions.   NEURO: Cranial nerves II-XII intact. Motor strength has improved in all extremities, but she remains very quadriparetic.  PSYCH:  Quiet      CBC LAST (LAST 24 HOURS)  Recent Labs   Lab 11/13/24  0347 11/13/24  0400   WBC 5.83  --    RBC 3.43*  --    HGB 9.5*  --    HCT 27.9* 35*   MCV 81*  --    MCH 27.7  --    MCHC 34.1  --    RDW 15.0*  --      --    MPV 11.4  --    GRAN 76.7*  4.5  --    LYMPH 10.1*  0.6*  --    MONO 11.1  0.7  --    BASO 0.03  --    NRBC 0  --        CHEMISTRY LAST (LAST 24 HOURS)  Recent Labs   Lab 11/13/24  0347 11/13/24  0400   *  --    K 4.1  --    CL 98  --    CO2 22*  --    ANIONGAP 7*  --    BUN 29*  --    CREATININE 1.6*  --    GLU 72  --    CALCIUM 8.1*  --    PH  --  7.360   ALBUMIN 2.7*  --    PROT 5.3*  --    ALKPHOS 110  --    ALT 26  --    AST 87*  --    BILITOT 0.6  --          LAST 7 DAYS MICROBIOLOGY   Microbiology Results (last 7 days)       Procedure Component Value Units Date/Time    Blood culture [[id number]] Collected: 11/01/24 1829    Order Status: Completed Specimen: Blood from Peripheral, Hand, Right Updated: 11/06/24 2032     Blood Culture, Routine No growth after 5 days.            MOST RECENT IMAGING  X-Ray Chest AP  Portable  Narrative: EXAMINATION:  XR CHEST AP PORTABLE    CLINICAL HISTORY:  hypoxemia;    FINDINGS:  Portable chest radiograph at 04:58 hours compared to prior exams including CT of the prior day show right PICC unchanged in position, with stable cardiomediastinal silhouette and stable enlarged central pulmonary vasculature.    There are persistent bilateral perihilar and left lower lung airspace opacities, with bilateral pleural effusions blunting the costophrenic angles.  No pneumothorax.  Impression: No significant interval change.    Electronically signed by: Nitin Mayo  Date:    11/13/2024  Time:    08:04    Chest x-ray 11/13 shows bilateral pleural effusions and huge pulmonary arteries.  There is a PICC line on the right side.  The CTA done yesterday shows clot bilaterally no huge amount of clot.    CURRENT VISIT EKG  No results found for this visit on 10/31/24.    ECHOCARDIOGRAM RESULTS  Results for orders placed during the hospital encounter of 10/31/24    Echo    Interpretation Summary    Left Ventricle: The left ventricle is normal in size. Normal wall thickness. Unable to assess wall motion. There is normal systolic function with a visually estimated ejection fraction of 60 - 65%.    Right Ventricle: Right ventricle was not well visualized due to poor acoustic window.  Grossly appears to be dilated with reduced function.  There appears to be some concern of flattening of the interventricular septum which could indicate right ventricular pressure and volume overload.    Left Atrium: Left atrium is severely dilated.    IVC/SVC: Elevated venous pressure at 15 mmHg.    Oxygen INFORMATION  Oxygen Concentration (%):  [100] 100    10 L nasal cannula    IMPRESSION AND PLAN  Severe central cord compression now status post multilevel laminectomy and instrumentation  Quadriparesis   - improved but still quite severe  Severe pulmonary hypertension  - patient on Opsumit, Uptravi, and Adcirca, max doses    -  patient's echo continues to show a functioning RV and LV.  - patient declining a [place]-Jens catheter which we would need to evaluate whether Flolan might be helpful  Pulmonary emboli  - will continue Lovenox 1 milligram/kilogram b.i.d.  Bilateral pleural effusions  - mild diuresis yesterday will continue Lasix 40 daily  - if her renal function improves further will increase to b.i.d., pressure allowing  Shock  - back up to 0.10 of Levophed and 0.4 vasopressin  - etiology still remains unclear.  The patient's echo would suggest that her heart is fine.  Spinal shock should have worn off by now.  - patient is still declining Concord-Jens catheter  - patient receiving 15 mg of midodrine t.i.d.  - echo repeated twice and seems to show a functioning RV and LV  - no signs of infection  DVT  - nonocclusive to the right leg  - on full-dose Lovenox  Atrial fibrillation  - in AFib, rate controlled  - on oral amiodarone   Acute on chronic hypoxemic respiratory failure  - on 4 L of oxygen at home  - now requiring maximum Vapotherm  - I's and O's ahead 6 liters  - pulmonary hypertension meds being given  - takes Advair but is not obstructed  - will continue p.r.n. Matteo  Anemia  - chronic disease  - stable  Thrombocytopenia  - resolved  Chronic kidney disease  - likely aggravated with the Levophed and the need for Levophed  - a bit worse after CTA yesterday  Hyponatremia  - will have pharmacy mix everything and saline  Moderate hypoalbuminemia  - advance diet  Intracranial mass  Pulmonary nodules, 1 greater than 1 cm  - patient workup underway as an outpatient    The patient's continues to require significant pressors at 0.1 of Levophed and vasopressin.  She is also receiving midodrine 15 t.i.d..  Her hypoxemia remains severe and she is maxed on Vapotherm.  Undoubtedly her hypoxemia is worsening her pulmonary hypertension which is likely driving the worsened systolic blood pressure.  Trying to correct.    Discussed with the patient  and her sister and nurse and hospitalist and palliative care.    Critical care time spent reviewing the chart, examining the patient, reviewing the labs, reviewing the radiological findings, discussing care with nursing, physicians, and respiratory and creating the note and  has been 45 minutes.     Gaye Ramos MD  Date of Service: 11/13/2024  8: AM

## 2024-11-13 NOTE — PLAN OF CARE
11/13/24 0718   Patient Assessment/Suction   Level of Consciousness (AVPU) alert   Respiratory Effort Normal;Unlabored   Expansion/Accessory Muscles/Retractions no use of accessory muscles;no retractions   All Lung Fields Breath Sounds diminished   Rhythm/Pattern, Respiratory unlabored;pattern regular;depth regular   PRE-TX-O2   Device (Oxygen Therapy) high flow nasal cannula w/device   $ Is the patient on High Flow Oxygen? Yes   $ Noninvasive Daily Charge Noninvasive Daily   Humidification temp set 33   Humidification temp actual 33   Flow (L/min) (Oxygen Therapy) 40   Oxygen Concentration (%) 100   SpO2 97 %   Pulse Oximetry Type Continuous   $ Pulse Oximetry - Multiple Charge Pulse Oximetry - Multiple   Pulse 72   Resp 16   Aerosol Therapy   $ Aerosol Therapy Charges PRN treatment not required   Education   $ Education 15 min;Oxygen

## 2024-11-14 PROBLEM — Z71.89 ACP (ADVANCE CARE PLANNING): Status: ACTIVE | Noted: 2024-01-01

## 2024-11-14 PROBLEM — Z71.89 GOALS OF CARE, COUNSELING/DISCUSSION: Status: ACTIVE | Noted: 2024-01-01

## 2024-11-14 NOTE — ASSESSMENT & PLAN NOTE
Anemia is likely due to chronic disease due to Chronic Kidney Disease. Most recent hemoglobin and hematocrit are listed below.  Recent Labs     11/12/24  0847 11/12/24  0859 11/13/24  0347 11/13/24  0400 11/14/24  0408 11/14/24  0449   HGB 9.4*  9.4*  --  9.5*  --   --  9.2*   HCT 28.2*  28.2*   < > 27.9* 35* 32* 26.8*    < > = values in this interval not displayed.       Plan  - Monitor serial CBC: Daily  - Transfuse PRBC if patient becomes hemodynamically unstable, symptomatic or H/H drops below 7/21.  - Patient has not received any PRBC transfusions to date  - Patient's anemia is currently improving

## 2024-11-14 NOTE — ASSESSMENT & PLAN NOTE
"Discovered after PET scan of lung  on 9/6/24 revealed abnormal uptake in brain. 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  Unclear if contributing to CVA  She will have her first appointment with Dr. Ray Mcintyre at  [facility]  on 11/4/24  -neurosurgery and oncology follow-up  "

## 2024-11-14 NOTE — ASSESSMENT & PLAN NOTE
Acute CVA right caudate  MRI, MRA, CT, carotid U/S reports reviewed  Plavix  and statin and patient also need full dose lovenox   -neurology following  -neurosurgery and oncology consulted for the brain mass and follow up as OP   -PT/OT/SLP when more stable   -echo bubble report is seem edited. Not mentioning of PFO  Patient going to hospice care and possible withdrawal of vasopressors over the weekend

## 2024-11-14 NOTE — ASSESSMENT & PLAN NOTE
Advance Care Planning     Date: 11/14/2024    Hospice  I did explain the role for hospice care at this stage of the patient's illness, including its ability to help the patient live with the best quality of life possible.  We willbe making a hospice referral.

## 2024-11-14 NOTE — PT/OT/SLP PROGRESS
Physical Therapy Treatment    Patient Name:  Shanell Mahmood   MRN:  [id number]    Recommendations:     Discharge Recommendations: High Intensity Therapy  Discharge Equipment Recommendations: to be determined by next level of care  Barriers to discharge:  increase assist with mobility, high risk for falls, generalized weakness, decrease activity tolerance    Assessment:     Shanell Mahmood is a 74 y.o. female admitted with a medical diagnosis of Central cord syndrome.  She presents with the following impairments/functional limitations: weakness, impaired endurance, impaired self care skills, impaired functional mobility, gait instability, impaired balance, impaired sensation, decreased safety awareness, decreased lower extremity function, decreased upper extremity function, orthopedic precautions.    Pt found in right sidelying with sister at bedside. RN adjusted vapotherm to 40 and 100% prior to visit. Pt requires with improved form with bed mobility with HOB elevated with mod A Significant consistent sitting balance/tolerance/posture x 10 mins static and dynamic with occ VI for returning to midline due to left lateral lean. Able to occ self recognize latereal lean and initiate correction No report of dizziness or lightheadedness. SAO2 greater than 94% at EOB. Pt with increase fatigue and decrease communication this date.     Rehab Prognosis: Fair; patient would benefit from acute skilled PT services to address these deficits and reach maximum level of function.    Recent Surgery: Procedure(s) (LRB):  LAMINECTOMY, SPINE, CERVICAL, POSTERIOR APPROACH (N/A) 11 Days Post-Op    Plan:     During this hospitalization, patient to be seen 5 x/week to address the identified rehab impairments via gait training, therapeutic activities, therapeutic exercises, neuromuscular re-education and progress toward the following goals:    Plan of Care Expires:  12/11/24    Subjective     Chief Complaint: none stated  Patient/Family  Comments/goals: rest  Pain/Comfort:  Pain Rating 1: 0/10      Objective:     Communicated with RN prior to session.  Patient found HOB elevated with peripheral IV, telemetry, blood pressure cuff, pulse ox (continuous), oxygen (vapotherm) upon PT entry to room.     General Precautions: Standard, fall  Orthopedic Precautions: spinal precautions  Braces: [place] J collar  Respiratory Status:  40L 100% vapotherm     Functional Mobility:  Bed Mobility:     Supine to Sit: moderate assistance  Sit to Supine: moderate assistance and of 2 persons      AM-PAC 6 CLICK MOBILITY          Treatment & Education:  Pt educated on POC, discharge recommendation, importance of time OOB, need for assist with mobility, use of call bell to seek assistance as needed and fall prevention      Patient left right sidelying with all lines intact, call button in reach, bed alarm on, and sister and Rn present..    GOALS:   Multidisciplinary Problems       Physical Therapy Goals          Problem: Physical Therapy    Goal Priority Disciplines Outcome Interventions   Physical Therapy Goal     PT, PT/OT Progressing    Description: Goals to be met by: 24     Patient will increase functional independence with mobility by performin. Supine to sit with Moderate Assistance  2. Sit to supine with Moderate Assistance  3. Sit to stand transfer with Moderate Assistance  4. Bed to chair transfer with Moderate Assistance using Rolling Walker  5. Gait  x 25 feet with Moderate Assistance using Rolling Walker.                          Time Tracking:     PT Received On: 24  PT Start Time: 908     PT Stop Time: 927  PT Total Time (min): 19 min     Billable Minutes: Therapeutic Activity 19    Treatment Type: Treatment  PT/PTA: PT     Number of PTA visits since last PT visit: 0     2024

## 2024-11-14 NOTE — ASSESSMENT & PLAN NOTE
I reviewed the patient's chart and discussed the case with the patient's team.      I examined Shanell Mahmood at bedside.  The patient maintains capacity for complex medical decision-making.  I spoke with her, her , her daughter, and her sister at bedside. Her son, Ran, joined us by phone.    I introduced myself and my role as palliative care physician. They were agreeable to speaking.    We discussed the patient's medical illness, prognosis, and values in detail.  Below is a brief summary of our discussion.    First took a moment to get to know her.  She and her  have been  for 60 years.  They have 4 grown children.  Originally from [place].  She was always worked outside the home.  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  She enjoys spending time with her family.  Twila is large part of her life; she is Jewish.    We discussed her medical illness.  They all understand her condition to be critical.  We discussed her spinal injury and resultant surgery.  They understand her course afterwards has been quite complicated related to pulmonary hypertension, shock, respiratory failure, and overall severely debilitated state.  They are all very worried about her.    After asking permission we discussed prognosis.  Unfortunately, her team and I are worried that her current condition will not improve.  With that in mind, I let them know if she continues on her current trajectory she was likely to die in the coming days or weeks despite all available supportive measures.  They were not at all surprised by this news.      We discussed her values.  She was tired.  She was suffering.  She does not desire to continue with interventions that are not helping her get better.  She has no worries.  She was no fears.  She does not fear dying.  She finds great strength in her twila.    Based on the above, I took a moment to discuss hospice in the  philosophy of hospice care.  I recommended transitioning her care to hospice care.  They all understand and agree with this recommendation.  He explained with hospice they would not continue with interventions that are only prolonging death; specifically discuss discontinuing vasopressors and high-intensity oxygen therapy.  Rather with hospice all of her care would be focused on comfort, relief of suffering, and dying peacefully absent a miracle.  With hospice, I estimated a prognosis of hours to maybe days.  They understand.  And again they desire to pursue hospice.  The patient confirmed this twice.     There are family and friends that need to visit.  She also has some business that her family needs to attend prior to making a transition to hospice/comfort care.  It sounds like she and her family will be ready to make this transition over the weekend.    I updated her bedside RN and care team via secure chat.    I appreciate being involved.  I hope I have been helpful.

## 2024-11-14 NOTE — PT/OT/SLP PROGRESS
Occupational Therapy   Treatment    Name: Shanell Mahmood  MRN: [id number]  Admitting Diagnosis:  Central cord syndrome  11 Days Post-Op    Recommendations:     Discharge Recommendations: High Intensity Therapy  Discharge Equipment Recommendations:  to be determined by next level of care  Barriers to discharge:   (Increased physical assistance with ADLs and functional mobility.)    Assessment:     Shanell Mahmood is a 74 y.o. female with a medical diagnosis of Central cord syndrome.  She presents with general weakness s/p cervical fusion. Patient participated in BUE therex bed level. Performance deficits affecting function are weakness, impaired endurance, impaired self care skills, impaired functional mobility, gait instability, impaired balance, decreased safety awareness, decreased lower extremity function, decreased upper extremity function.     Rehab Prognosis:  Good; patient would benefit from acute skilled OT services to address these deficits and reach maximum level of function.       Plan:     Patient to be seen 5 x/week to address the above listed problems via self-care/home management, therapeutic activities, therapeutic exercises  Plan of Care Expires: 12/04/24  Plan of Care Reviewed with: patient    Subjective     Chief Complaint: General weakness  Patient/Family Comments/goals: Improved functional mobility and ADL independence.  Pain/Comfort:  Pain Rating 1: 0/10  Pain Rating Post-Intervention 1: 0/10    Objective:     Communicated with: nurse prior to session.  Patient found HOB elevated with telemetry, arterial line, PICC line, pulse ox (continuous), oxygen upon OT entry to room.    General Precautions: Standard, fall    Orthopedic Precautions:spinal precautions  Braces: [place] J collar  Respiratory Status:  26L @ 65%  via Vapotherm     Occupational Performance:     Bed Mobility:    Performed BUE therex with minimal to moderate assistance for 10 reps bed level.     [facility] 6 Click ADL: 12    Treatment &  Education:  Patient fatigued from working with Physical Therapy earlier. Only able to tolerate bed level therex.     Patient left HOB elevated with all lines intact, call button in reach, and bed alarm on    GOALS:   Multidisciplinary Problems       Occupational Therapy Goals          Problem: Occupational Therapy    Goal Priority Disciplines Outcome Interventions   Occupational Therapy Goal     OT, PT/OT Progressing    Description: Goals to be met by: 12/2/2024  - revised target date, feeding and g/hygiene goals, all other goals remain appropriate.  Patient will increase functional independence with ADLs by performing:    Feeding with Minimal Assistance with adaptive device PRN.  UE Dressing with Minimal Assistance.  LE Dressing with Moderate Assistance.  Grooming while EOB with Minimal Assistance with adaptive device PRN.  Toileting from bedside commode with Minimal Assistance for hygiene and clothing management.   Sitting at edge of bed x15 minutes with Stand-by Assistance.  Supine to sit with Minimal Assistance.  Toilet transfer to bedside commode with Minimal Assistance.  Upper extremity exercise program, with assistance as needed.                         Time Tracking:     OT Date of Treatment: 11/14/24  OT Start Time: 0950  OT Stop Time: 1001  OT Total Time (min): 11 min    Billable Minutes:Therapeutic Exercise 11    OT/MARYELLEN: OT          11/14/2024

## 2024-11-14 NOTE — CONSULTS
[facility]  Palliative Medicine  Consult Note    Patient Name: Shanell Mahmood  MRN: [id number]  Admission Date: 10/31/2024  Hospital Length of Stay: 13 days  Code Status: DNR   Attending Provider: Eric Bass MD  Consulting Provider: José Miguel Retana MD  Primary Care Physician: Nicole, Primary Doctor  Principal Problem:Central cord syndrome    Patient information was obtained from patient, spouse/SO, relative(s), past medical records, and primary team.      Inpatient consult to Palliative Care  Consult performed by: José Miguel Retana MD  Consult ordered by: Eric Bass MD        Assessment/Plan:     Palliative Care  ACP (advance care planning)  Advance Care Planning    Date: 11/14/2024    Hospice  I did explain the role for hospice care at this stage of the patient's illness, including its ability to help the patient live with the best quality of life possible.  We willbe making a hospice referral.          Goals of care, counseling/discussion  I reviewed the patient's chart and discussed the case with the patient's team.      I examined Shanell Mahmood at bedside.  The patient maintains capacity for complex medical decision-making.  I spoke with her, her , her daughter, and her sister at bedside. Her son, Ran, joined us by phone.    I introduced myself and my role as palliative care physician. They were agreeable to speaking.    We discussed the patient's medical illness, prognosis, and values in detail.  Below is a brief summary of our discussion.    First took a moment to get to know her.  She and her  have been  for 60 years.  They have 4 grown children.  Originally from [place].  She was always worked outside the home.  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  She enjoys spending time with her family.  Twila is large part of her life; she is [employer].    We discussed her medical illness.  They all understand her  condition to be critical.  We discussed her spinal injury and resultant surgery.  They understand her course afterwards has been quite complicated related to pulmonary hypertension, shock, respiratory failure, and overall severely debilitated state.  They are all very worried about her.    After asking permission we discussed prognosis.  Unfortunately, her team and I are worried that her current condition will not improve.  With that in mind, I let them know if she continues on her current trajectory she was likely to die in the coming days or weeks despite all available supportive measures.  They were not at all surprised by this news.      We discussed her values.  She was tired.  She was suffering.  She does not desire to continue with interventions that are not helping her get better.  She has no worries.  She was no fears.  She does not fear dying.  She finds great strength in her wily.    Based on the above, I took a moment to discuss hospice in the philosophy of hospice care.  I recommended transitioning her care to hospice care.  They all understand and agree with this recommendation.  He explained with hospice they would not continue with interventions that are only prolonging death; specifically discuss discontinuing vasopressors and high-intensity oxygen therapy.  Rather with hospice all of her care would be focused on comfort, relief of suffering, and dying peacefully absent a miracle.  With hospice, I estimated a prognosis of hours to maybe days.  They understand.  And again they desire to pursue hospice.  The patient confirmed this twice.     There are family and friends that need to visit.  She also has some business that her family needs to attend prior to making a transition to hospice/comfort care.  It sounds like she and her family will be ready to make this transition over the weekend.    I updated her bedside RN and care team via secure chat.    I appreciate being involved.  I hope I have been  "helpful.          Thank you for your consult. I will follow-up with patient. Please contact us if you have any additional questions.    Subjective:     HPI:   Per admit H&P: "Shanell Mahmood is a 74 year old female with a previous medical history of anemia, Pulmonary hypertension on 4 liters continuous oxygen at home, arthritis, CKD V, Osteoporosis, secondary hyperprathyroidism, S/P closure of patent foramen ovale in 2011, CVA in 2011 with no residuals, chronic back pain, HLD, Gout, Brain Mass and thyroid diease who presented to the ED after unwitnessed syncopal episode. The patient was at her pain management office for re certification only. There were no procedures performed. She reports taking one hydrocodone 10mg today.  The last thing she remembers was walking down the hallway of the office and looking for something to cover her head from the rain and did not have any adverse symptoms or feelings at that time.  She has no recollection of the syncopal events. When she was initially evaluated by EMS, her blood pressure was in the 60s over 40s. She did require constant stimulation to remain awake. Fell and hit her head. Does not take any blood thinners. EMS evaluated her and gave her 1 mg of Narcan as well as 250 cc of fluid. Her pressure improved and she had become more alert. Initial ED workup was thorough and all imaging showing that included CT of neck, head and entire spine showed no acute processes. CBC showed anemia and CMP showed elevated creatinine consistent with history of CKD V. Drug screen positive for opioids but patient has a hydrocodone prescription. The patient is currently in the process of have a left sided brain mass visualized on MRI 10/8/24 evaluated that was an incidental finding after undergoing a PET scan for a pulmonary nodule on 9/6/24 with abnormal uptake noted in brain. She has her first appointment on 11/4/24. She is followed by nephrology who is currently monitoring her and there has been " "one attempt at AV fistual placement but it was unsuccessful due sclerotic changes. Patient reports she awoke this morning feeling well showered and dressed herself. She performs all of her own ADL's. She had one syncopal event in March 2024 and was evaluated by cardiology and informed it was an orthostatic episode. Patient was unable to complete orthostatic vital signs upon admission due to inability to stand stating " I feel as if I can't get my legs under me". When evaluated for admit exam the patient endorses that after the syncopal episode she endorses bilateral leg weakness with decreased sensation in both legs. She reports bilateral  weakness being unable to use her phone and difficulty raising both of her arms. NIH scale performed and total of 9 noted. Patient noted have 400ml of urine in bladder and unable to void. Patient reached a total of 528ml of urine without being able to void.  MRI/MRA of brain and MRI of lumbosacral spine ordered upon admit. MRA and MRI lower back showed no acute process. MRI brain showed Acute infarct in the right caudate head. Dr. Maldonado was called and he recommended MRA of neck in morning and load with aspirin and plavix. Initial EKG read as atrial fibrillation but upon review p waves were noted and this was discussed with the ED. Repeat EKG shows sinus arrhthymias with PACs. Patient admitted by hospital medicine for further evaluation and management."    She has undergone a very complicated course since admission.  She ultimately underwent neurosurgical intervention for cervical cord compression.  Her hospital course has been complicated by severe pulmonary hypertension, shock on persistent vasopressor support, and hypoxic respiratory failure requiring high-flow nasal cannula oxygen.  Unfortunately, despite supportive measures her condition is not improving and she remains critically ill.  Her team is concerned that her current condition will not improve.  I have been asked to " assist with goals of care.    Hospital Course:  No notes on file    Interval History: See HPI    No past medical history on file.    Past Surgical History:   Procedure Laterality Date    POSTERIOR CERVICAL LAMINECTOMY N/A 11/3/2024    Procedure: LAMINECTOMY, SPINE, CERVICAL, POSTERIOR APPROACH;  Surgeon: Kobi Corona MD;  Location: [facility];  Service: Neurosurgery;  Laterality: N/A;  C3-6 laminectomy and posterior instrumented fusion, prone, Chest roll, Parada, neuromonitoring, DePuy rep       Review of patient's allergies indicates:   Allergen Reactions    Codeine Palpitations       Medications:  Continuous Infusions:   NORepinephrine bitartrate-D5W  0-3 mcg/kg/min Intravenous Continuous 6.8 mL/hr at 11/14/24 0902 0.2 mcg/kg/min at 11/14/24 0902    vasopressin  0.04 Units/min Intravenous Continuous   Paused at 11/14/24 1123     Scheduled Meds:   allopurinoL  100 mg Oral QHS    amiodarone  200 mg Oral TID    amiodarone  200 mg Oral BID    atorvastatin  40 mg Oral Daily    bisacodyL  10 mg Rectal Daily    clopidogreL  75 mg Oral Daily    colchicine  0.3 mg Oral Daily    enoxparin  1 mg/kg Subcutaneous Q12H (prophylaxis, 0900/2100)    furosemide (LASIX) injection  40 mg Intravenous Daily    macitentan  10 mg Oral Daily    methocarbamoL  750 mg Oral TID    midodrine  15 mg Oral TID    montelukast  10 mg Oral Daily    pantoprazole  40 mg Oral Daily    selexipag  1,600 mcg Oral BID    tadalafil  40 mg Oral Daily     PRN Meds:  Current Facility-Administered Medications:     acetaminophen, 650 mg, Oral, Q4H PRN    albuterol sulfate, 2.5 mg, Nebulization, Q6H PRN    aluminum-magnesium hydroxide-simethicone, 30 mL, Oral, Q4H PRN    dextrose 50%, 12.5 g, Intravenous, PRN    dextrose 50%, 25 g, Intravenous, PRN    diphenhydrAMINE, 25 mg, Oral, Q6H PRN    diphenhydrAMINE-zinc acetate 1-0.1%, , Topical (Top), TID PRN    glucagon (human recombinant), 1 mg, Intramuscular, PRN    glucose, 16 g, Oral, PRN    glucose, 24 g,  Oral, PRN    HYDROcodone-acetaminophen, 1 tablet, Oral, Q6H PRN    HYDROmorphone, 1 mg, Intravenous, Q6H PRN    HYDROmorphone, 2 mg, Oral, Q4H PRN    magnesium oxide, 800 mg, Oral, PRN    magnesium oxide, 800 mg, Oral, PRN    methocarbamoL, 500 mg, Oral, TID PRN    naloxone, 0.02 mg, Intravenous, PRN    ondansetron, 4 mg, Intravenous, Q8H PRN    potassium bicarbonate, 35 mEq, Oral, PRN    potassium bicarbonate, 50 mEq, Oral, PRN    potassium bicarbonate, 60 mEq, Oral, PRN    potassium, sodium phosphates, 2 packet, Oral, PRN    potassium, sodium phosphates, 2 packet, Oral, PRN    potassium, sodium phosphates, 2 packet, Oral, PRN    prochlorperazine, 5 mg, Intravenous, Q6H PRN    senna-docusate 8.6-50 mg, 2 tablet, Oral, Nightly PRN    sodium chloride 0.9%, 500 mL, Intravenous, PRN    sodium chloride 0.9%, 10 mL, Intravenous, Q12H PRN    sodium chloride 0.9%, 10 mL, Intravenous, PRN    Family History    None       Tobacco Use    Smoking status: Not on file    Smokeless tobacco: Not on file   Substance and Sexual Activity    Alcohol use: Not on file    Drug use: Not on file    Sexual activity: Not on file       Review of Systems  Objective:     Vital Signs (Most Recent):  Temp: 98.4 °F (36.9 °C) (11/14/24 0301)  Pulse: 71 (11/14/24 1000)  Resp: 12 (11/14/24 1000)  BP: (!) 98/55 (11/14/24 1000)  SpO2: 95 % (11/14/24 1000) Vital Signs (24h Range):  Temp:  [97.6 °F (36.4 °C)-99.4 °F (37.4 °C)] 98.4 °F (36.9 °C)  Pulse:  [60-88] 71  Resp:  [9-32] 12  SpO2:  [87 %-100 %] 95 %  BP: ()/(50-64) 98/55  Arterial Line BP: ()/(42-73) 114/55     Weight: 72.7 kg (160 lb 4.4 oz)  Body mass index is 25.1 kg/m².       Physical Exam  Vitals reviewed.   Constitutional:       General: She is not in acute distress.     Appearance: She is ill-appearing.   HENT:      Head: Normocephalic and atraumatic.      Right Ear: External ear normal.      Left Ear: External ear normal.      Nose: Nose normal. No congestion.       Mouth/Throat:      Mouth: Mucous membranes are moist.      Pharynx: No oropharyngeal exudate.   Eyes:      General:         Right eye: No discharge.         Left eye: No discharge.   Cardiovascular:      Rate and Rhythm: Normal rate.   Pulmonary:      Effort: Pulmonary effort is normal. No respiratory distress.      Comments: High-flow nasal cannula oxygen in place  Abdominal:      General: There is no distension.      Palpations: Abdomen is soft.      Tenderness: There is no abdominal tenderness.   Skin:     General: Skin is warm.   Neurological:      Mental Status: She is alert and oriented to person, place, and time.      Motor: Weakness present.   Psychiatric:         Mood and Affect: Mood normal.         Behavior: Behavior normal.         Thought Content: Thought content normal.         Judgment: Judgment normal.            Review of Symptoms      Symptom Assessment (ESAS 0-10 Scale)  Pain:  0  Dyspnea:  3  Anxiety:  0  Nausea:  0  Depression:  0  Anorexia:  8  Fatigue:  9  Insomnia:  0  Restlessness:  0  Agitation:  0         Performance Status:  10    Psychosocial/Cultural:   See Palliative Psychosocial Note: No  **Primary  to Follow**  Palliative Care  Consult: No        Advance Care Planning  Advance Directives:   Do Not Resuscitate Status: Yes      Decision Making:  Family answered questions and Patient answered questions  Goals of Care: The patient and family endorses that what is most important right now is to focus on comfort and QOL     Accordingly, we have decided that the best plan to meet the patient's goals includes enrolling in hospice care after family/friends have had a chance to visit and she has been able to attend unfinished business.         Significant Labs: BMP:   Recent Labs   Lab 11/14/24  0449   GLU 84   *   K 3.8   CL 98   CO2 23   BUN 32*   CREATININE 1.6*   CALCIUM 8.1*     CBC:   Recent Labs   Lab 11/13/24  0347 11/13/24  0400 11/14/24  0408  11/14/24 0449   WBC 5.83  --   --  6.25   HGB 9.5*  --   --  9.2*   HCT 27.9* 35* 32* 26.8*     --   --  185     CBC:   Recent Labs   Lab 11/14/24 0449   WBC 6.25   HGB 9.2*   HCT 26.8*   MCV 80*        BMP:  Recent Labs   Lab 11/14/24 0449   GLU 84   *   K 3.8   CL 98   CO2 23   BUN 32*   CREATININE 1.6*   CALCIUM 8.1*     LFT:  Lab Results   Component Value Date     (H) 11/14/2024    ALKPHOS 115 11/14/2024    BILITOT 0.6 11/14/2024     Albumin:   Albumin   Date Value Ref Range Status   11/14/2024 2.5 (L) 3.5 - 5.2 g/dL Final     Protein:   Total Protein   Date Value Ref Range Status   11/14/2024 5.0 (L) 6.0 - 8.4 g/dL Final     Lactic acid:   Lab Results   Component Value Date    LACTATE 0.6 11/07/2024    LACTATE 0.8 11/03/2024       Significant Imaging: I have reviewed all pertinent imaging results/findings within the past 24 hours.      I spent a total of 85 minutes on the day of the visit. This includes face to face time in discussion of goals of care, symptom assessment, coordination of care and emotional support.  This also includes non-face to face time preparing to see the patient (eg, review of tests/imaging), obtaining and/or reviewing separately obtained history, documenting clinical information in the electronic or other health record, independently interpreting results and communicating results to the patient/family/caregiver, or care coordinator.    An additional 25 minutes was specifically spent in advance care planning.      José Miguel Retana MD  Palliative Medicine  [facility]

## 2024-11-14 NOTE — PROGRESS NOTES
[facility] Medicine  Progress Note    Patient Name: Shanell Mahmood  MRN: [id number]  Patient Class: IP- Inpatient   Admission Date: 10/31/2024  Length of Stay: 13 days  Attending Physician: Eric Bass MD  Primary Care Provider: Nicole, Primary Doctor        Subjective:     Principal Problem:Central cord syndrome        HPI:  Shanell Mahmood is a 74 year old female with a previous medical history of anemia, Pulmonary hypertension on 4 liters continuous oxygen at home, arthritis, CKD V, Osteoporosis, secondary hyperprathyroidism, S/P closure of patent foramen ovale in 2011, CVA in 2011 with no residuals, chronic back pain, HLD, Gout, Brain Mass and thyroid diease who presented to the ED after unwitnessed syncopal episode. The patient was at her pain management office for re certification only. There were no procedures performed. She reports taking one hydrocodone 10mg today.  The last thing she remembers was walking down the hallway of the office and looking for something to cover her head from the rain and did not have any adverse symptoms or feelings at that time.  She has no recollection of the syncopal events. When she was initially evaluated by EMS, her blood pressure was in the 60s over 40s. She did require constant stimulation to remain awake. Fell and hit her head. Does not take any blood thinners. EMS evaluated her and gave her 1 mg of Narcan as well as 250 cc of fluid. Her pressure improved and she had become more alert. Initial ED workup was thorough and all imaging showing that included CT of neck, head and entire spine showed no acute processes. CBC showed anemia and CMP showed elevated creatinine consistent with history of CKD V. Drug screen positive for opioids but patient has a hydrocodone prescription. The patient is currently in the process of have a left sided brain mass visualized on MRI 10/8/24 evaluated that was an incidental finding after undergoing a PET scan for a pulmonary  "nodule on 9/6/24 with abnormal uptake noted in brain. She has her first appointment on 11/4/24. She is followed by nephrology who is currently monitoring her and there has been one attempt at AV fistual placement but it was unsuccessful due sclerotic changes. Patient reports she awoke this morning feeling well showered and dressed herself. She performs all of her own ADL's. She had one syncopal event in March 2024 and was evaluated by cardiology and informed it was an orthostatic episode. Patient was unable to complete orthostatic vital signs upon admission due to inability to stand stating " I feel as if I can't get my legs under me". When evaluated for admit exam the patient endorses that after the syncopal episode she endorses bilateral leg weakness with decreased sensation in both legs. She reports bilateral  weakness being unable to use her phone and difficulty raising both of her arms. NIH scale performed and total of 9 noted. Patient noted have 400ml of urine in bladder and unable to void. Patient reached a total of 528ml of urine without being able to void.  MRI/MRA of brain and MRI of lumbosacral spine ordered upon admit. MRA and MRI lower back showed no acute process. MRI brain showed Acute infarct in the right caudate head. Dr. Maldonado was called and he recommended MRA of neck in morning and load with aspirin and plavix. Initial EKG read as atrial fibrillation but upon review p waves were noted and this was discussed with the ED. Repeat EKG shows sinus arrhthymias with PACs. Patient admitted by hospital medicine for further evaluation and management.       Overview/Hospital Course:  74-year-old female with history of brain mass, CKD, back pain, pulmonary hypertension on 4 L oxygen is admitted after syncope and collapse found to have a acute CVA in the right caudate on MRI brain.   She Has global weakness and decreased sensation to the lower legs.  Multiple CT and x-rays done to rule out trauma ultimately " negative other than the maxillofacial CT reporting mild irregularity of the interior midline mandible with small adjacent bone fragments which could represent acute fracture with overlying soft tissue swelling.    Carotid ultrasound and MRA brain and neck without acute finding.  Neurology is consulted.  Also with underlying brain mass.  Consult Neurosurgery and Oncology.  Given DAPT and statin.  Had hypotension given IVF and midodrine.  Echo shows right heart failure.  BNP is within normal.  Lactic acid pending.  No other sign of infection.  Placed in cervical collar as precaution and MRI of the cervical spine shows severe cord compression at C4-5 and C5-6 likely acute with edema.  Neurosurgery discussed with patient  and plan to proceed with surgical decompression  and  patient underwent surgery on : 11/3/24 multilevel C-spine laminectomy.  Later patient continued to have hypotension and needed vasopressor and another vasopressor vasopressin is added  Patient also her DVT of the the proximal right superficial femoral vein. And nonocclusive thrombus within the right popliteal vein,  Also patient developed new onset AFib and started amiodarone drip and in and out of a fib  and later changed to PO amiodarone   Later patient was more hypoxic and not able to wean vasopressor and PE study was done and found to have segmental PE with right heart strain . Patient was on full dose lovenox . Vascular surgery consulted and reviewed the film and did not recommend thrombectomy intervention .    Interval History:     Patient was resting, she answer a few questions and discussion more with patient's sister  Later palliative care team inform that patient was going to hospice  Patient was still on Vapotherm slightly lower support and vasopressors    Review of Systems  Objective:     Vital Signs (Most Recent):  Temp: 98.4 °F (36.9 °C) (11/14/24 0301)  Pulse: 71 (11/14/24 1000)  Resp: 12 (11/14/24 1000)  BP: (!) 98/55 (11/14/24  "1000)  SpO2: 95 % (11/14/24 1000) Vital Signs (24h Range):  Temp:  [97.6 °F (36.4 °C)-99.4 °F (37.4 °C)] 98.4 °F (36.9 °C)  Pulse:  [60-88] 71  Resp:  [9-32] 12  SpO2:  [87 %-100 %] 95 %  BP: ()/(50-64) 98/55  Arterial Line BP: ()/(42-76) 114/55     Weight: 72.7 kg (160 lb 4.4 oz)  Body mass index is 25.1 kg/m².    Intake/Output Summary (Last 24 hours) at 11/14/2024 1137  Last data filed at 11/14/2024 0800  Gross per 24 hour   Intake 1408.87 ml   Output 0 ml   Net 1408.87 ml         Physical Exam  Vitals and nursing note reviewed.   HENT:      Head: Normocephalic and atraumatic.   Eyes:      Conjunctiva/sclera: Conjunctivae normal.   Neck:      Vascular: No JVD.   Cardiovascular:      Heart sounds: Normal heart sounds.   Pulmonary:      Effort: Pulmonary effort is normal.   Abdominal:      Palpations: Abdomen is soft.   Skin:     General: Skin is warm.   Neurological:      Mental Status: She is alert. Mental status is at baseline.   Psychiatric:         Mood and Affect: Mood normal.             Significant Labs: All pertinent labs within the past 24 hours have been reviewed.  CBC:   Recent Labs   Lab 11/13/24  0347 11/13/24  0400 11/14/24  0408 11/14/24  0449   WBC 5.83  --   --  6.25   HGB 9.5*  --   --  9.2*   HCT 27.9* 35* 32* 26.8*     --   --  185     CMP:   Recent Labs   Lab 11/12/24  1730 11/13/24  0347 11/14/24  0449   * 127* 127*   K  --  4.1 3.8   CL  --  98 98   CO2  --  22* 23   GLU  --  72 84   BUN  --  29* 32*   CREATININE  --  1.6* 1.6*   CALCIUM  --  8.1* 8.1*   PROT  --  5.3* 5.0*   ALBUMIN  --  2.7* 2.5*   BILITOT  --  0.6 0.6   ALKPHOS  --  110 115   AST  --  87* 189*   ALT  --  26 62*   ANIONGAP  --  7* 6*     Cardiac Markers: No results for input(s): "CKMB", "MYOGLOBIN", "BNP", "TROPISTAT" in the last 48 hours.    Significant Imaging: I have reviewed all pertinent imaging results/findings within the past 24 hours.    Assessment/Plan:      * Central cord syndrome  S/p " multilevel laminectomy (11/3/24)  C2-3, C3-4, C4-5, C5-6, C6-7 laminectomy  C3 through 6 posterior segmental instrumentation using DePuy Symphony system  C3-4, C4-5, C5-6 posterolateral arthrodesis using autograft harvested from the same incision and allograft DBM    Cervical collar in place  Neuro surgery follow up   Possible spinal shock ?  PT OT when more stable    Cortisol added and normal       Paroxysmal atrial fibrillation  Patient has paroxysmal (<7 days) atrial fibrillation. Patient is currently in atrial fibrillation. [id number] Score: 3. The patients heart rate in the last 24 hours is as follows:  Pulse  Min: 60  Max: 88     Antiarrhythmics  amiodarone tablet 200 mg, 3 times daily, Oral  amiodarone tablet 200 mg, 2 times daily, Oral    Anticoagulants  enoxaparin injection 70 mg, Every 12 hours, Subcutaneous    Plan  - Replete lytes with a goal of K>4, Mg >2  - Patient is anticoagulated, see medications listed above.  - Patient's afib is currently uncontrolled. Will continue current treatment  Continue  amiodarone drip and changed to PO   In and out of A fib   On full dose lovenox           S/P cervical spinal fusion  Multi levels  See NSGY op note     Acute deep vein thrombosis (DVT) of popliteal vein of right lower extremity, nonocclusive and pulmonary embolism  Found on u/s 11/5/24 and CTA 11/12   hypoechoic peripheral nonocclusive thrombus within the proximal right superficial femoral vein    full dose lovenox   Close monitor  s/p spine surgery   Associated with hypotension /shock suspicious for PE  and CTA confirmed   Vascular did not recommend thrombectomy        Quadriparesis  PT/OT  Frequent turns   Air mattress        Myelopathy  aware      Status post cervical spinal fusion  PT/OT  Cervical collar in place   Pain Mx      Hypotension  Asymptomatic.  Etiology most likely from pulmonary embolism/pul HTN / A fib with RVR   Echo reviewed.    -keep hold any BP meds or diuretics  On midodrine 15 tid  "  Currently on 2 vasopressor  Pulmonary offered swan ramya but patient refused   Cortisol random normal     Thrombocytopenia  The patients 3 most recent labs are listed below.  Recent Labs     11/12/24  0847 11/13/24  0347 11/14/24  0449     172 [id number]       Plan  - Will transfuse if platelet count is <100k (if undergoing neurosurgery), or otherwise less than 10 K  -trend daily    Anemia  Anemia is likely due to chronic disease due to Chronic Kidney Disease. Most recent hemoglobin and hematocrit are listed below.  Recent Labs     11/12/24  0847 11/12/24  0859 11/13/24  0347 11/13/24  0400 11/14/24  0408 11/14/24  0449   HGB 9.4*  9.4*  --  9.5*  --   --  9.2*   HCT 28.2*  28.2*   < > 27.9* 35* 32* 26.8*    < > = values in this interval not displayed.       Plan  - Monitor serial CBC: Daily  - Transfuse PRBC if patient becomes hemodynamically unstable, symptomatic or H/H drops below 7/21.  - Patient has not received any PRBC transfusions to date  - Patient's anemia is currently improving    Stroke  Acute CVA right caudate  MRI, MRA, CT, carotid U/S reports reviewed  Plavix  and statin and patient also need full dose lovenox   -neurology following  -neurosurgery and oncology consulted for the brain mass and follow up as OP   -PT/OT/SLP when more stable   -echo bubble report is seem edited. Not mentioning of PFO  Patient going to hospice care and possible withdrawal of vasopressors over the weekend    Brain mass  Discovered after PET scan of lung  on 9/6/24 revealed abnormal uptake in brain. 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  Unclear if contributing to CVA  She will have her first appointment with Dr. Ray Mcintyre at  [facility]  on 11/4/24  -neurosurgery and oncology follow-up    Secondary hyperparathyroidism  Chronic condition that is stable.     Pulmonary hypertension  Chronic " condition   Macitentan, selexipag and tadalafil not available and sildenafil changed and continued   Patient refuse swan ramya    Chronic respiratory failure with hypoxia  Patient with Hypoxic Respiratory failure which is Chronic.  she is on home oxygen at 4 LPM.   Secondary to pulmonary embolism  Full-dose Lovenox    CKD (chronic kidney disease), stage IV  Creatine stable for now. BMP reviewed- noted Estimated Creatinine Clearance: 30.5 mL/min (A) (based on SCr of 1.6 mg/dL (H)). according to latest data. Based on current GFR, CKD stage is stage 4 - GFR 15-29.  Monitor UOP and serial BMP and adjust therapy as needed. Renally dose meds. Avoid nephrotoxic medications and procedures.    Close monitor     Syncope and collapse  See stroke      Acute urinary retention  Possibly neurogenic from the CVA or from cervical spine cord compression  Urinary catheter. Appear may need long term        VTE Risk Mitigation (From admission, onward)           Ordered     enoxaparin injection 70 mg  Every 12 hours         11/12/24 1520     Reason for No Pharmacological VTE Prophylaxis  Once        Question:  Reasons:  Answer:  Risk of Bleeding    10/31/24 1849     IP VTE HIGH RISK PATIENT  Once         10/31/24 1849     Place sequential compression device  Until discontinued         10/31/24 1849                    Discharge Planning   ELBERT:      Code Status: DNR   Is the patient medically ready for discharge?:     Reason for patient still in hospital (select all that apply): Treatment  Discharge Plan A: Long-term acute care facility (LTAC)   Discharge Delays: None known at this time        Critical care time spent on the evaluation and treatment of severe organ dysfunction, review of pertinent labs and imaging studies, discussions with consulting providers and discussions with patient/family: 33 minutes.      Eric Bass MD  Department of Hospital Medicine   [facility]

## 2024-11-14 NOTE — SUBJECTIVE & OBJECTIVE
Interval History: See HPI    No past medical history on file.    Past Surgical History:   Procedure Laterality Date    POSTERIOR CERVICAL LAMINECTOMY N/A 11/3/2024    Procedure: LAMINECTOMY, SPINE, CERVICAL, POSTERIOR APPROACH;  Surgeon: Kobi Corona MD;  Location: [facility];  Service: Neurosurgery;  Laterality: N/A;  C3-6 laminectomy and posterior instrumented fusion, prone, Chest roll, Parada, neuromonitoring, DePuy rep       Review of patient's allergies indicates:   Allergen Reactions    Codeine Palpitations       Medications:  Continuous Infusions:   NORepinephrine bitartrate-D5W  0-3 mcg/kg/min Intravenous Continuous 6.8 mL/hr at 11/14/24 0902 0.2 mcg/kg/min at 11/14/24 0902    vasopressin  0.04 Units/min Intravenous Continuous   Paused at 11/14/24 1123     Scheduled Meds:   allopurinoL  100 mg Oral QHS    amiodarone  200 mg Oral TID    amiodarone  200 mg Oral BID    atorvastatin  40 mg Oral Daily    bisacodyL  10 mg Rectal Daily    clopidogreL  75 mg Oral Daily    colchicine  0.3 mg Oral Daily    enoxparin  1 mg/kg Subcutaneous Q12H (prophylaxis, 0900/2100)    furosemide (LASIX) injection  40 mg Intravenous Daily    macitentan  10 mg Oral Daily    methocarbamoL  750 mg Oral TID    midodrine  15 mg Oral TID    montelukast  10 mg Oral Daily    pantoprazole  40 mg Oral Daily    selexipag  1,600 mcg Oral BID    tadalafil  40 mg Oral Daily     PRN Meds:  Current Facility-Administered Medications:     acetaminophen, 650 mg, Oral, Q4H PRN    albuterol sulfate, 2.5 mg, Nebulization, Q6H PRN    aluminum-magnesium hydroxide-simethicone, 30 mL, Oral, Q4H PRN    dextrose 50%, 12.5 g, Intravenous, PRN    dextrose 50%, 25 g, Intravenous, PRN    diphenhydrAMINE, 25 mg, Oral, Q6H PRN    diphenhydrAMINE-zinc acetate 1-0.1%, , Topical (Top), TID PRN    glucagon (human recombinant), 1 mg, Intramuscular, PRN    glucose, 16 g, Oral, PRN    glucose, 24 g, Oral, PRN    HYDROcodone-acetaminophen, 1 tablet, Oral, Q6H PRN     HYDROmorphone, 1 mg, Intravenous, Q6H PRN    HYDROmorphone, 2 mg, Oral, Q4H PRN    magnesium oxide, 800 mg, Oral, PRN    magnesium oxide, 800 mg, Oral, PRN    methocarbamoL, 500 mg, Oral, TID PRN    naloxone, 0.02 mg, Intravenous, PRN    ondansetron, 4 mg, Intravenous, Q8H PRN    potassium bicarbonate, 35 mEq, Oral, PRN    potassium bicarbonate, 50 mEq, Oral, PRN    potassium bicarbonate, 60 mEq, Oral, PRN    potassium, sodium phosphates, 2 packet, Oral, PRN    potassium, sodium phosphates, 2 packet, Oral, PRN    potassium, sodium phosphates, 2 packet, Oral, PRN    prochlorperazine, 5 mg, Intravenous, Q6H PRN    senna-docusate 8.6-50 mg, 2 tablet, Oral, Nightly PRN    sodium chloride 0.9%, 500 mL, Intravenous, PRN    sodium chloride 0.9%, 10 mL, Intravenous, Q12H PRN    sodium chloride 0.9%, 10 mL, Intravenous, PRN    Family History    None       Tobacco Use    Smoking status: Not on file    Smokeless tobacco: Not on file   Substance and Sexual Activity    Alcohol use: Not on file    Drug use: Not on file    Sexual activity: Not on file       Review of Systems  Objective:     Vital Signs (Most Recent):  Temp: 98.4 °F (36.9 °C) (11/14/24 0301)  Pulse: 71 (11/14/24 1000)  Resp: 12 (11/14/24 1000)  BP: (!) 98/55 (11/14/24 1000)  SpO2: 95 % (11/14/24 1000) Vital Signs (24h Range):  Temp:  [97.6 °F (36.4 °C)-99.4 °F (37.4 °C)] 98.4 °F (36.9 °C)  Pulse:  [60-88] 71  Resp:  [9-32] 12  SpO2:  [87 %-100 %] 95 %  BP: ()/(50-64) 98/55  Arterial Line BP: ()/(42-73) 114/55     Weight: 72.7 kg (160 lb 4.4 oz)  Body mass index is 25.1 kg/m².       Physical Exam  Vitals reviewed.   Constitutional:       General: She is not in acute distress.     Appearance: She is ill-appearing.   HENT:      Head: Normocephalic and atraumatic.      Right Ear: External ear normal.      Left Ear: External ear normal.      Nose: Nose normal. No congestion.      Mouth/Throat:      Mouth: Mucous membranes are moist.      Pharynx: No  oropharyngeal exudate.   Eyes:      General:         Right eye: No discharge.         Left eye: No discharge.   Cardiovascular:      Rate and Rhythm: Normal rate.   Pulmonary:      Effort: Pulmonary effort is normal. No respiratory distress.      Comments: High-flow nasal cannula oxygen in place  Abdominal:      General: There is no distension.      Palpations: Abdomen is soft.      Tenderness: There is no abdominal tenderness.   Skin:     General: Skin is warm.   Neurological:      Mental Status: She is alert and oriented to person, place, and time.      Motor: Weakness present.   Psychiatric:         Mood and Affect: Mood normal.         Behavior: Behavior normal.         Thought Content: Thought content normal.         Judgment: Judgment normal.            Review of Symptoms      Symptom Assessment (ESAS 0-10 Scale)  Pain:  0  Dyspnea:  3  Anxiety:  0  Nausea:  0  Depression:  0  Anorexia:  8  Fatigue:  9  Insomnia:  0  Restlessness:  0  Agitation:  0         Performance Status:  10    Psychosocial/Cultural:   See Palliative Psychosocial Note: No  **Primary  to Follow**  Palliative Care  Consult: No        Advance Care Planning   Advance Directives:   Do Not Resuscitate Status: Yes      Decision Making:  Family answered questions and Patient answered questions  Goals of Care: The patient and family endorses that what is most important right now is to focus on comfort and QOL     Accordingly, we have decided that the best plan to meet the patient's goals includes enrolling in hospice care after family/friends have had a chance to visit and she has been able to attend unfinished business.         Significant Labs: BMP:   Recent Labs   Lab 11/14/24 0449   GLU 84   *   K 3.8   CL 98   CO2 23   BUN 32*   CREATININE 1.6*   CALCIUM 8.1*     CBC:   Recent Labs   Lab 11/13/24  0347 11/13/24  0400 11/14/24 0408 11/14/24 0449   WBC 5.83  --   --  6.25   HGB 9.5*  --   --  9.2*   HCT 27.9*  35* 32* 26.8*     --   --  185     CBC:   Recent Labs   Lab 11/14/24 0449   WBC 6.25   HGB 9.2*   HCT 26.8*   MCV 80*        BMP:  Recent Labs   Lab 11/14/24 0449   GLU 84   *   K 3.8   CL 98   CO2 23   BUN 32*   CREATININE 1.6*   CALCIUM 8.1*     LFT:  Lab Results   Component Value Date     (H) 11/14/2024    ALKPHOS 115 11/14/2024    BILITOT 0.6 11/14/2024     Albumin:   Albumin   Date Value Ref Range Status   11/14/2024 2.5 (L) 3.5 - 5.2 g/dL Final     Protein:   Total Protein   Date Value Ref Range Status   11/14/2024 5.0 (L) 6.0 - 8.4 g/dL Final     Lactic acid:   Lab Results   Component Value Date    LACTATE 0.6 11/07/2024    LACTATE 0.8 11/03/2024       Significant Imaging: I have reviewed all pertinent imaging results/findings within the past 24 hours.

## 2024-11-14 NOTE — ASSESSMENT & PLAN NOTE
The patients 3 most recent labs are listed below.  Recent Labs     11/12/24  0847 11/13/24  0347 11/14/24  0449     172 163 185       Plan  - Will transfuse if platelet count is <100k (if undergoing neurosurgery), or otherwise less than 10 K  -trend daily

## 2024-11-14 NOTE — PROGRESS NOTES
Pulmonary/Critical Care Progress Note      PATIENT NAME: Shanell Mahmood  MRN: [id number]  TODAY'S DATE: 2024  8:00 AM  ADMIT DATE: 10/31/2024  AGE: 74 y.o. : 1950    CONSULT REQUESTED BY: Eric Bass MD    REASON FOR CONSULT:   Critical care management    HPI:  The patient is a 74 year old female who had a syncopal event on 10/31.  She was weak, myelopathic.  CT of the head showed an acute infarct in the right caudate head.  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  The MRI of her cervical spine showed disc bulging and spondylolysis at C4-C5 and C5-C6 with severe spinal canal stenosis, cervical cord compression and cord signal alteration there was also broad-based disc bulging producing moderate spinal canal stenosis at C3-C4.  She was brought to the OR yesterday where she underwent C2- 3, 3-4, 4-5,5-6,  and 6- 7 laminectomies with C3 through 6 posterior segmental instrumentation and C3-4, 4- 5, 5- 6 posterolateral arthrodesis using autograft harvested from the incision and allograft of DBM.  This morning she remains profoundly myelopathic.  She is on Levophed at 0.06 mics per kilos per minute to maintain a mean of 80.    The patient has significant pulmonary hypertension in his managed with Opsumit 10 mg daily, Adcirca 40 mg daily, and Uptravi 1600 ug bid.  She is on 4 L continuous oxygen and has dyspnea with any exertion.  She is also on Advair 115/21 b.i.d.     the patient's oxygenation dropped dramatically this morning.  She is now on more levophed to maintain a mean of 80.  She is in a lot of pain.     the patient is requiring a little less oxygen this morning.  However, she is in AFib and had a 25 beat run of V-tach overnight.  She is on 0.24 of Levophed.  I am concerned that her pulmonary hypertension is significantly worse and have an echo coming.  Will start walking  the Levophed down and off as her renal function is worse, she is in AFib and she had V-tach last night.    11/7 the patient is continuing to decline.  She is on Vapotherm now at 30 L and 80% to maintain good oxygenation.  She is requiring 0.3 of Levophed to maintain her blood pressure.  We did fully anticoagulate with Levophed yesterday as I am still worried about a PE and the patient developed AFib with RVR.  Started on amiodarone and this morning she is back in sinus rhythm.  Limited echo yesterday did not show any worsening of the right ventricle but PA pressures were estimated at 60.  If her heart is actually working well, then we have no SVR, and this is spinal shock.  However, spinal shock is usually bradycardic.  A [place]-Jens catheter maybe helpful, but I am not sure how it will change our management unless the RV is failing and the PAs are too high and we need to be on IV Flolan.    11/8 the patient is on 0.3 of Levophed.  She is on 85% on the Vapotherm.  The patient is still declining to have a a Whitewood-Jens catheter.  We would need this to initiate Flolan.  She would like to continue as we are hoping that things will turn around.  Her creatinine is marginally better today.  She remains ahead on her I's and O's by about 6 L. I do not see any point in trying to transfer her if she is declining a Whitewood.    11/9 the patient is still on 0.3 of Levophed.  Her oxygenation is slightly improved and we have decreased her Vapotherm to 70% and 20 L. will try adding vasopressin to see if we get any benefit from this.  Her creatinine is stable at 1.8.  She still does not want a Whitewood-Jens catheter.  She has not had a bowel movement in 3 days.  Will make sure we are doing rectal stimulation with her Dulcolax suppositories.    11/10 The patient has less Levophed need this morning.  She is on vasopressin 0.04.  Her O2, though, has had to be increased to 85% she is still on Vapotherm but only at 15 L. she has more movement in  her arms and her legs today and this may be what is helping us with a blood pressure.    11/11 the patient is significantly improved this morning we are down to 0.05 of Levophed and the vasopressin.  Her oxygen is down to 6 L nasal cannula.  Her strength continues to improve.    11/12 the patient's Levophed dose is back up to 0.1.  She is still on vasopressin.  Her oxygen requirements are also higher again today.    11/13 the patient is still requiring her Levophed and vasopressin.  She is still on maximum Vapotherm.  The CTA done yesterday showed bilateral pulmonary emboli but Dr. Carlos did not feel the clot burden was so significant as to be impacting her pressures and did not feel that removal of the clot would have a positive risk benefit ratio.    11/14 the patient is no better.  She is on max Vapotherm.  She is on 0.18 of Levophed and vasopressin.  Her  is coming in today to meet with Dr. Retana.  Her son's are back home and her daughter can not come today but would like to be on the phone.  Her sister is in the room.  Her sister is certainly ready for her to be out of this situation and wishes her not to suffer.  The patient is complaining of a sore throat when she swallows today I do not see anything in her pharynx.  She apparently diurese yesterday but the pure wick is not working.  Her chest x-ray shows a larger left smaller right effusion and left lower lobe edema.     REVIEW OF SYSTEMS  GENERAL: Feeling tired  EYES: Vision is good.  ENT: pharyngitis with swallowing.   HEART: No chest pain or palpitations.  LUNGS:  Breathing is okay  GI:  She is anorectic  :  She is repeatedly soaked from the Lasix.  SKIN: No lesions or rashes.  MUSCULOSKELETAL: No joint pain or myalgias.  NEURO:  She reports her strength is about the same.  She still has paresthesias.  LYMPH: No edema or adenopathy.  PSYCH: No anxiety or depression.  ENDO: No weight change.    No change in the patient's Past Medical History,  Past Surgical History, Social History or Family History since admission.    VITAL SIGNS (MOST RECENT)  Temp: 98.4 °F (36.9 °C) (11/14/24 0301)  Pulse: 70 (11/14/24 0601)  Resp: 16 (11/14/24 0601)  BP: (!) 95/50 (11/14/24 0601)  SpO2: 96 % (11/14/24 0601)    INTAKE AND OUTPUT (LAST 24 HOURS):  Intake/Output Summary (Last 24 hours) at 11/14/2024 0759  Last data filed at 11/14/2024 0630  Gross per 24 hour   Intake 1008.87 ml   Output 0 ml   Net 1008.87 ml       WEIGHT  Wt Readings from Last 1 Encounters:   11/01/24 72.7 kg (160 lb 4.4 oz)       PHYSICAL EXAM  GENERAL: Older patient looking quiet.  HEENT: Pupils equal and reactive. Extraocular movements intact. Nose intact. Pharynx moist, no thrush, no erythema.  NECK: Supple.  Aspen collar in place.  The nurse reports the wound looks good.  HEART:  Mostly regular rate and rhythm. No murmur or gallop auscultated.  The monitor shows AFib with a controlled rate.  LUNGS:  The lungs are clear.  She is diminished in the left base.  Lung excursion symmetrical. No change in fremitus.   ABDOMEN: Bowel sounds present. Non-tender, no masses palpated.  : Normal anatomy.    EXTREMITIES:  There is increasing strength in her hands.  The right is still weaker than the left.  legs are stronger as well.  Again the right leg is weaker than the left.  Arterial line right radial.  Right PICC.    LYMPHATICS: No adenopathy palpated, tr edema.  SKIN: Dry, intact, no lesions.   NEURO: Cranial nerves II-XII intact. Motor strength has improved in all extremities, but she remains very quadriparetic.  PSYCH:  Quiet      CBC LAST (LAST 24 HOURS)  Recent Labs   Lab 11/14/24  0449   WBC 6.25   RBC 3.34*   HGB 9.2*   HCT 26.8*   MCV 80*   MCH 27.5   MCHC 34.3   RDW 14.8*      MPV 10.5   GRAN 79.2*  5.0   LYMPH 9.3*  0.6*   MONO 9.3  0.6   BASO 0.03   NRBC 0       CHEMISTRY LAST (LAST 24 HOURS)  Recent Labs   Lab 11/14/24  0408 11/14/24  0449   NA  --  127*   K  --  3.8   CL  --  98   CO2   --  23   ANIONGAP  --  6*   BUN  --  32*   CREATININE  --  1.6*   GLU  --  84   CALCIUM  --  8.1*   PH 7.348*  --    ALBUMIN  --  2.5*   PROT  --  5.0*   ALKPHOS  --  115   ALT  --  62*   AST  --  189*   BILITOT  --  0.6         LAST 7 DAYS MICROBIOLOGY   Microbiology Results (last 7 days)       ** No results found for the last 168 hours. **            MOST RECENT IMAGING  X-Ray Chest AP Portable  Narrative: EXAMINATION:  XR CHEST AP PORTABLE    CLINICAL HISTORY:  hypoxemia;    FINDINGS:  Portable chest radiograph at 04:58 hours compared to prior exams including CT of the prior day show right PICC unchanged in position, with stable cardiomediastinal silhouette and stable enlarged central pulmonary vasculature.    There are persistent bilateral perihilar and left lower lung airspace opacities, with bilateral pleural effusions blunting the costophrenic angles.  No pneumothorax.  Impression: No significant interval change.    Electronically signed by: Nitin Mayo  Date:    11/13/2024  Time:    08:04    Chest x-ray 11/13 shows bilateral pleural effusions and huge pulmonary arteries.  There is a PICC line on the right side.  The CTA done yesterday shows clot bilaterally no huge amount of clot.    CURRENT VISIT EKG  No results found for this visit on 10/31/24.    ECHOCARDIOGRAM RESULTS  Results for orders placed during the hospital encounter of 10/31/24    Echo    Interpretation Summary    Left Ventricle: The left ventricle is normal in size. Normal wall thickness. Unable to assess wall motion. There is normal systolic function with a visually estimated ejection fraction of 60 - 65%.    Right Ventricle: Right ventricle was not well visualized due to poor acoustic window.  Grossly appears to be dilated with reduced function.  There appears to be some concern of flattening of the interventricular septum which could indicate right ventricular pressure and volume overload.    Left Atrium: Left atrium is severely dilated.     IVC/SVC: Elevated venous pressure at 15 mmHg.    Oxygen INFORMATION  Oxygen Concentration (%):  [100] 100    Maximum Vapotherm 40 L, 100%    IMPRESSION AND PLAN  Severe central cord compression now status post multilevel laminectomy and instrumentation  Quadriparesis   - improved but still quite severe  Severe pulmonary hypertension  - patient on Opsumit, Uptravi, and Adcirca, max doses    - patient's echo continues to show a functioning RV and LV.  - patient declining a [place]-Jens catheter which we would need to evaluate whether Flolan might be helpful  Pulmonary emboli  - will continue Lovenox 1 milligram/kilogram b.i.d.  Bilateral pleural effusions  - ongoing, unmeasurable diuresis will continue Lasix 40 daily  - if her renal function improves further will increase to b.i.d., pressure allowing  Shock  - back up to 0.18 of Levophed and 0.4 vasopressin  - etiology still remains unclear.  The patient's echo would suggest that her heart is fine.  Spinal shock should have worn off by now.  - patient is still declining [place]-Jens catheter  - patient receiving 15 mg of midodrine t.i.d.  - echo repeated twice and seems to show a functioning RV and LV  - no signs of infection  DVT  - nonocclusive to the right leg  - on full-dose Lovenox  Atrial fibrillation  - in AFib, rate controlled  - on oral amiodarone   Acute on chronic hypoxemic respiratory failure  - on 4 L of oxygen at home  - now requiring maximum Vapotherm  - I's and O's ahead 6 liters  - pulmonary hypertension meds being given  - takes Advair but is not obstructed  - will continue p.rnicho Felipe  Anemia  - chronic disease  - stable  Thrombocytopenia  - resolved  Chronic kidney disease  - likely aggravated with the Levophed and the need for Levophed  - stable today  Hyponatremia  - will have pharmacy mix everything and saline  Moderate hypoalbuminemia  - advance diet  Intracranial mass  Pulmonary nodules, 1 greater than 1 cm  - patient workup underway as an  outpatient  Pharyngitis  - Chloraseptic spray    The patient's continues to require significant pressors at 0.18 (requirements continued to rise) of Levophed and vasopressin.  She is also receiving midodrine 15 t.i.d..  Her hypoxemia remains severe and she is maxed on Vapotherm.  The patient is declining.  She is less motivated.  She is anorectic.  Unfortunately I do not have await to fix this.  Dr. Retana is going to meet with the family this morning.  I am hopeful that we will move to comfort care.    Discussed with the patient and her sister and nurse.    Critical care time spent reviewing the chart, examining the patient, reviewing the labs, reviewing the radiological findings, discussing care with nursing, physicians, and respiratory and creating the note and  has been 35 minutes.     Gaye Ramos MD  Date of Service: 11/14/2024  8: AM

## 2024-11-14 NOTE — ASSESSMENT & PLAN NOTE
Patient has paroxysmal (<7 days) atrial fibrillation. Patient is currently in atrial fibrillation. [id number] Score: 3. The patients heart rate in the last 24 hours is as follows:  Pulse  Min: 60  Max: 88     Antiarrhythmics  amiodarone tablet 200 mg, 3 times daily, Oral  amiodarone tablet 200 mg, 2 times daily, Oral    Anticoagulants  enoxaparin injection 70 mg, Every 12 hours, Subcutaneous    Plan  - Replete lytes with a goal of K>4, Mg >2  - Patient is anticoagulated, see medications listed above.  - Patient's afib is currently uncontrolled. Will continue current treatment  Continue  amiodarone drip and changed to PO   In and out of A fib   On full dose lovenox

## 2024-11-14 NOTE — PLAN OF CARE
Pt choice obtained, family chose [facility]. Preference noted on Pt choice. Referral sent.     11/14/24 1157   Post-Acute Status   Post-Acute Authorization Hospice   Post-Acute Placement Status Patient List Provided   Hospice Status Referrals Sent   Discharge Delays None known at this time   Discharge Plan   Discharge Plan A Inpatient Hospice   Discharge Plan B Inpatient Hospice

## 2024-11-14 NOTE — HPI
"Per admit H&P: "Shanell Mahmood is a 74 year old female with a previous medical history of anemia, Pulmonary hypertension on 4 liters continuous oxygen at home, arthritis, CKD V, Osteoporosis, secondary hyperprathyroidism, S/P closure of patent foramen ovale in 2011, CVA in 2011 with no residuals, chronic back pain, HLD, Gout, Brain Mass and thyroid diease who presented to the ED after unwitnessed syncopal episode. The patient was at her pain management office for re certification only. There were no procedures performed. She reports taking one hydrocodone 10mg today.  The last thing she remembers was walking down the hallway of the office and looking for something to cover her head from the rain and did not have any adverse symptoms or feelings at that time.  She has no recollection of the syncopal events. When she was initially evaluated by EMS, her blood pressure was in the 60s over 40s. She did require constant stimulation to remain awake. Fell and hit her head. Does not take any blood thinners. EMS evaluated her and gave her 1 mg of Narcan as well as 250 cc of fluid. Her pressure improved and she had become more alert. Initial ED workup was thorough and all imaging showing that included CT of neck, head and entire spine showed no acute processes. CBC showed anemia and CMP showed elevated creatinine consistent with history of CKD V. Drug screen positive for opioids but patient has a hydrocodone prescription. The patient is currently in the process of have a left sided brain mass visualized on MRI 10/8/24 evaluated that was an incidental finding after undergoing a PET scan for a pulmonary nodule on 9/6/24 with abnormal uptake noted in brain. She has her first appointment on 11/4/24. She is followed by nephrology who is currently monitoring her and there has been one attempt at AV fistual placement but it was unsuccessful due sclerotic changes. Patient reports she awoke this morning feeling well showered and dressed " "herself. She performs all of her own ADL's. She had one syncopal event in March 2024 and was evaluated by cardiology and informed it was an orthostatic episode. Patient was unable to complete orthostatic vital signs upon admission due to inability to stand stating " I feel as if I can't get my legs under me". When evaluated for admit exam the patient endorses that after the syncopal episode she endorses bilateral leg weakness with decreased sensation in both legs. She reports bilateral  weakness being unable to use her phone and difficulty raising both of her arms. NIH scale performed and total of 9 noted. Patient noted have 400ml of urine in bladder and unable to void. Patient reached a total of 528ml of urine without being able to void.  MRI/MRA of brain and MRI of lumbosacral spine ordered upon admit. MRA and MRI lower back showed no acute process. MRI brain showed Acute infarct in the right caudate head. Dr. Maldonado was called and he recommended MRA of neck in morning and load with aspirin and plavix. Initial EKG read as atrial fibrillation but upon review p waves were noted and this was discussed with the ED. Repeat EKG shows sinus arrhthymias with PACs. Patient admitted by hospital medicine for further evaluation and management."    She has undergone a very complicated course since admission.  She ultimately underwent neurosurgical intervention for cervical cord compression.  Her hospital course has been complicated by severe pulmonary hypertension, shock on persistent vasopressor support, and hypoxic respiratory failure requiring high-flow nasal cannula oxygen.  Unfortunately, despite supportive measures her condition is not improving and she remains critically ill.  Her team is concerned that her current condition will not improve.  I have been asked to assist with goals of care.  "

## 2024-11-14 NOTE — PLAN OF CARE
Problem: Adult Inpatient Plan of Care  Goal: Plan of Care Review  Outcome: Progressing  Goal: Patient-Specific Goal (Individualized)  Outcome: Progressing  Goal: Absence of Hospital-Acquired Illness or Injury  Outcome: Progressing  Goal: Optimal Comfort and Wellbeing  Outcome: Progressing  Goal: Readiness for Transition of Care  Outcome: Progressing     Problem: Skin Injury Risk Increased  Goal: Skin Health and Integrity  Outcome: Progressing     Problem: Stroke, Ischemic (Includes Transient Ischemic Attack)  Goal: Optimal Coping  Outcome: Progressing  Goal: Effective Bowel Elimination  Outcome: Progressing  Goal: Optimal Cerebral Tissue Perfusion  Outcome: Progressing  Goal: Optimal Cognitive Function  Outcome: Progressing  Goal: Improved Communication Skills  Outcome: Progressing  Goal: Optimal Functional Ability  Outcome: Progressing  Goal: Optimal Nutrition Intake  Outcome: Progressing  Goal: Effective Oxygenation and Ventilation  Outcome: Progressing  Goal: Improved Sensorimotor Function  Outcome: Progressing  Goal: Safe and Effective Swallow  Outcome: Progressing  Goal: Effective Urinary Elimination  Outcome: Progressing     Problem: Fall Injury Risk  Goal: Absence of Fall and Fall-Related Injury  Outcome: Progressing     Problem: Infection  Goal: Absence of Infection Signs and Symptoms  Outcome: Progressing     Problem: Wound  Goal: Optimal Coping  Outcome: Progressing  Goal: Optimal Functional Ability  Outcome: Progressing  Goal: Absence of Infection Signs and Symptoms  Outcome: Progressing  Goal: Improved Oral Intake  Outcome: Progressing  Goal: Optimal Pain Control and Function  Outcome: Progressing  Goal: Skin Health and Integrity  Outcome: Progressing  Goal: Optimal Wound Healing  Outcome: Progressing     Problem: Oral Intake Inadequate  Goal: Improved Oral Intake  Outcome: Progressing     Problem: Coping Ineffective  Goal: Effective Coping  Outcome: Progressing

## 2024-11-14 NOTE — ASSESSMENT & PLAN NOTE
S/p multilevel laminectomy (11/3/24)  C2-3, C3-4, C4-5, C5-6, C6-7 laminectomy  C3 through 6 posterior segmental instrumentation using DePSDIhony system  C3-4, C4-5, C5-6 posterolateral arthrodesis using autograft harvested from the same incision and allograft DBM    Cervical collar in place  Neuro surgery follow up   Possible spinal shock ?  PT OT when more stable    Cortisol added and normal

## 2024-11-14 NOTE — PLAN OF CARE
12:09  YORDY met with Pt at bedside and explained that there are two hospice agencies that she can receive services from. Pt delegated the choice to her family.  SW attempted to call Pt's daughter Evie and Pt's sister Shelia to have them choose which [facility] company to render services for Pt. Left VM for both.     1:00  Evie called YORDY back. YORDY explained that there are two companies to choose services from, [facility] and [facility]. Evie stated she doesn't know that difference so she will choose [facility]. YORDY asked if she would give verbal permission to sign a Pt choice form stating their preference is [facility], she agreed. CM nurse Holley witnessed Pt choice. Pt choice scanned into media and referral sent to [facility].    YORDY informed Bridger of [facility] that family is interested in [facility] services for [facility]. Bridger stated he would come meet with the family.

## 2024-11-14 NOTE — PROGRESS NOTES
"[facility]  Adult Nutrition   Progress Note (Follow-Up)    SUMMARY     Recommendations   1. Continue diet and encourage po/ fluid intake.   2. Honor patient and family wishes.    3. RD available prn.    Nutrition Diagnosis PES Statement: Inadequate (suboptimal) protein-energy intake related to decreased appetite/ medical condition as evidenced by po intake < 50% of estimated needs over the past few days.      Dietitian Rounds Brief   RD follow up. Per Rn pt is not eating, she is drinking some fluids and Ensure but very limited. Possible D/C with Hospice pending.    Malnutrition Assessment     Skin (Micronutrient): none  Musculoskeletal/Lower Extremities: none       Energy Intake (Malnutrition): less than 75% for greater than or equal to 3 months   Orbital Region (Subcutaneous Fat Loss): well nourished  Upper Arm Region (Subcutaneous Fat Loss): well nourished  Thoracic and Lumbar Region: well nourished   Religion Region (Muscle Loss): well nourished  Clavicle Bone Region (Muscle Loss): well nourished  Clavicle and Acromion Bone Region (Muscle Loss): well nourished  Scapular Bone Region (Muscle Loss): well nourished  Dorsal Hand (Muscle Loss): well nourished  Patellar Region (Muscle Loss): well nourished  Anterior Thigh Region (Muscle Loss): well nourished  Posterior Calf Region (Muscle Loss): well nourished         Diet order:   Current Diet Order: Soft and Bite Sized level 6.        Evaluation of Received Nutrient/Fluid Intake  Energy Calories Required: meeting needs  Protein Required: meeting needs  Fluid Required: meeting needs    Tolerance: tolerating     % Intake of Estimated Energy Needs: 0 - 25 %  % Meal Intake: 0 - 25 %      Intake/Output Summary (Last 24 hours) at 11/14/2024 1411  Last data filed at 11/14/2024 1200  Gross per 24 hour   Intake 1308.87 ml   Output 450 ml   Net 858.87 ml        Anthropometrics  Temp: 98.4 °F (36.9 °C)  Height Method: Stated  Height: 5' 7" (170.2 cm)  Height " (inches): 67 in  Weight Method: Bed Scale  Weight: 72.7 kg (160 lb 4.4 oz)  Weight (lb): 160.28 lb  Ideal Body Weight (IBW), Female: 135 lb  % Ideal Body Weight, Female (lb): 115.56 %  BMI (Calculated): 25.1  BMI Grade: 25 - 29.9 - overweight  Usual Body Weight (UBW), k kg (2016)  % Usual Body Weight: 65.05       Estimated/Assessed Needs  Weight Used For Calorie Calculations: 72.7 kg (160 lb 4.4 oz)  Energy Calorie Requirements (kcal): 7342-5797  Energy Need Method: Kcal/kg (25-30)  Protein Requirements: 58-73 (0.8-1.0 (moderate protein))  Weight Used For Protein Calculations: 72.7 kg (160 lb 4.4 oz)     Estimated Fluid Requirement Method: RDA Method  RDA Method (mL): 1818       Reason for Assessment  Reason For Assessment: RD follow-up  Diagnosis: stroke/CVA  General Information Comments: Consult received for stroke pathway. 74 year old female with a previous medical history of anemia, Pulmonary hypertension on 4 liters continuous oxygen at home, arthritis, CKD V, Osteoporosis, secondary hyperprathyroidism, S/P closure of patent foramen ovale in , CVA in  with no residuals, chronic back pain, HLD, Gout, Brain Mass and thyroid diease who presented to the ED after unwitnessed syncopal episode. Pt sitting up in bed reports feeling ok. Her appetite is fair, consuming 50% of meals. She endorses following a low Na diet at home and monitors her fluid intake.  She reports consuming 2-3 meals daily. No ONS at home and not interested at this time. She denies chew/swallowing issues. Wt reviewed. UBW 112kg (2016), CBW 72.7kg reflecting 35% total weight loss x 8 years (average of 4% per year), not significant. No skin breakdown per chart. NFPE completed with no s/s of wasting.  Nutrition Discharge Planning: Cardiac, moderate protein intake (0.8-1.0 g/kg)    Nutrition/Diet History  Patient Reported Diet/Restrictions/Preferences: heart healthy  Spiritual, Cultural Beliefs, Congregation Practices, Values that  Affect Care: no  Food Allergies: NKFA  Factors Affecting Nutritional Intake: chewing difficulties/inability to chew food    Nutrition Risk Screen  Nutrition Risk Screen: no indicators present     MST Score: 0  Have you recently lost weight without trying?: No  Weight loss score: 0  Have you been eating poorly because of a decreased appetite?: No  Appetite score: 0       Weight History:  Wt Readings from Last 10 Encounters:   11/01/24 72.7 kg (160 lb 4.4 oz)   11/06/24 72.7 kg (160 lb 4.4 oz)        Lab/Procedures/Meds: Pertinent Labs/Meds Reviewed    Medications:Pertinent Medications Reviewed  Scheduled Meds:   allopurinoL  100 mg Oral QHS    amiodarone  200 mg Oral TID    amiodarone  200 mg Oral BID    atorvastatin  40 mg Oral Daily    bisacodyL  10 mg Rectal Daily    clopidogreL  75 mg Oral Daily    colchicine  0.3 mg Oral Daily    enoxparin  1 mg/kg Subcutaneous Q12H (prophylaxis, 0900/2100)    furosemide (LASIX) injection  40 mg Intravenous Daily    macitentan  10 mg Oral Daily    methocarbamoL  750 mg Oral TID    midodrine  15 mg Oral TID    montelukast  10 mg Oral Daily    pantoprazole  40 mg Oral Daily    selexipag  1,600 mcg Oral BID    tadalafil  40 mg Oral Daily     Continuous Infusions:   NORepinephrine bitartrate-D5W  0-3 mcg/kg/min Intravenous Continuous 6.8 mL/hr at 11/14/24 0902 0.2 mcg/kg/min at 11/14/24 0902    vasopressin  0.04 Units/min Intravenous Continuous   Paused at 11/14/24 1123       Labs: Pertinent Labs Reviewed  Clinical Chemistry:  Recent Labs   Lab 11/09/24  0439 11/10/24  0311 11/11/24  0734 11/12/24  0847 11/12/24  1730 11/13/24  0347 11/14/24  0449   * 133*   < > 126*  126*  126*   < > 127* 127*   K 4.1 3.9   < > 4.0  4.0  4.0  --  4.1 3.8    105   < > 98  98  98  --  98 98   CO2 22* 22*   < > 22*  22*  22*  --  22* 23   GLU 96 95   < > 96  96  96  --  72 84   BUN 34* 31*   < > 30*  30*  30*  --  29* 32*   CREATININE 1.8* 1.6*   < > 1.5*  1.5*  1.5*  --   1.6* 1.6*   CALCIUM 7.6* 7.6*   < > 8.1*  8.1*  8.1*  --  8.1* 8.1*   PROT  --   --    < > 5.2*  5.2*  --  5.3* 5.0*   ALBUMIN  --   --    < > 2.6*  2.6*  --  2.7* 2.5*   BILITOT  --   --    < > 0.4  0.4  --  0.6 0.6   ALKPHOS  --   --    < > 90  90  --  110 115   AST  --   --    < > 57*  57*  --  87* 189*   ALT  --   --    < > 14  14  --  26 62*   ANIONGAP 6* 6*   < > 6*  6*  6*  --  7* 6*   MG 2.1 2.1  --   --   --   --   --    PHOS 3.1 2.7  --   --   --   --   --     < > = values in this interval not displayed.     Monitor and Evaluation  Food and Nutrient Intake: energy intake, food and beverage intake  Food and Nutrient Adminstration: diet order  Knowledge/Beliefs/Attitudes: food and nutrition knowledge/skill, beliefs and attitudes  Physical Activity and Function: nutrition-related ADLs and IADLs  Anthropometric Measurements: weight, weight change  Biochemical Data, Medical Tests and Procedures: electrolyte and renal panel, gastrointestinal profile, glucose/endocrine profile, inflammatory profile, lipid profile  Nutrition-Focused Physical Findings: overall appearance     Nutrition Risk  Level of Risk/Frequency of Follow-up:  (weekly)     Nutrition Follow-Up  RD Follow-up?: Yes  Inna Camacho, FAIZA 11/14/2024 2:11 PM

## 2024-11-14 NOTE — SUBJECTIVE & OBJECTIVE
Interval History:     Patient was resting, she answer a few questions and discussion more with patient's sister  Later palliative care team inform that patient was going to hospice  Patient was still on Vapotherm slightly lower support and vasopressors    Review of Systems  Objective:     Vital Signs (Most Recent):  Temp: 98.4 °F (36.9 °C) (11/14/24 0301)  Pulse: 71 (11/14/24 1000)  Resp: 12 (11/14/24 1000)  BP: (!) 98/55 (11/14/24 1000)  SpO2: 95 % (11/14/24 1000) Vital Signs (24h Range):  Temp:  [97.6 °F (36.4 °C)-99.4 °F (37.4 °C)] 98.4 °F (36.9 °C)  Pulse:  [60-88] 71  Resp:  [9-32] 12  SpO2:  [87 %-100 %] 95 %  BP: ()/(50-64) 98/55  Arterial Line BP: ()/(42-76) 114/55     Weight: 72.7 kg (160 lb 4.4 oz)  Body mass index is 25.1 kg/m².    Intake/Output Summary (Last 24 hours) at 11/14/2024 1137  Last data filed at 11/14/2024 0800  Gross per 24 hour   Intake 1408.87 ml   Output 0 ml   Net 1408.87 ml         Physical Exam  Vitals and nursing note reviewed.   HENT:      Head: Normocephalic and atraumatic.   Eyes:      Conjunctiva/sclera: Conjunctivae normal.   Neck:      Vascular: No JVD.   Cardiovascular:      Heart sounds: Normal heart sounds.   Pulmonary:      Effort: Pulmonary effort is normal.   Abdominal:      Palpations: Abdomen is soft.   Skin:     General: Skin is warm.   Neurological:      Mental Status: She is alert. Mental status is at baseline.   Psychiatric:         Mood and Affect: Mood normal.             Significant Labs: All pertinent labs within the past 24 hours have been reviewed.  CBC:   Recent Labs   Lab 11/13/24  0347 11/13/24  0400 11/14/24  0408 11/14/24  0449   WBC 5.83  --   --  6.25   HGB 9.5*  --   --  9.2*   HCT 27.9* 35* 32* 26.8*     --   --  185     CMP:   Recent Labs   Lab 11/12/24  1730 11/13/24  0347 11/14/24  0449   * 127* 127*   K  --  4.1 3.8   CL  --  98 98   CO2  --  22* 23   GLU  --  72 84   BUN  --  29* 32*   CREATININE  --  1.6* 1.6*   CALCIUM   "--  8.1* 8.1*   PROT  --  5.3* 5.0*   ALBUMIN  --  2.7* 2.5*   BILITOT  --  0.6 0.6   ALKPHOS  --  110 115   AST  --  87* 189*   ALT  --  26 62*   ANIONGAP  --  7* 6*     Cardiac Markers: No results for input(s): "CKMB", "MYOGLOBIN", "BNP", "TROPISTAT" in the last 48 hours.    Significant Imaging: I have reviewed all pertinent imaging results/findings within the past 24 hours.  "

## 2024-11-15 PROBLEM — Z71.89 GOALS OF CARE, COUNSELING/DISCUSSION: Status: RESOLVED | Noted: 2024-01-01 | Resolved: 2024-01-01

## 2024-11-15 NOTE — ASSESSMENT & PLAN NOTE
S/p multilevel laminectomy (11/3/24)  C2-3, C3-4, C4-5, C5-6, C6-7 laminectomy  C3 through 6 posterior segmental instrumentation using DePEbuzzing and Teadshony system  C3-4, C4-5, C5-6 posterolateral arthrodesis using autograft harvested from the same incision and allograft DBM    Cervical collar in place  Neuro surgery follow up   Possible spinal shock ?  PT OT when more stable    Cortisol added and normal

## 2024-11-15 NOTE — ASSESSMENT & PLAN NOTE
Patient has paroxysmal (<7 days) atrial fibrillation. Patient is currently in atrial fibrillation. [id number] Score: 3. The patients heart rate in the last 24 hours is as follows:  Pulse  Min: 65  Max: 79     Antiarrhythmics  amiodarone tablet 200 mg, 2 times daily, Oral    Anticoagulants  enoxaparin injection 70 mg, Every 24 hours, Subcutaneous    Plan  - Replete lytes with a goal of K>4, Mg >2  - Patient is anticoagulated, see medications listed above.  - Patient's afib is currently uncontrolled. Will continue current treatment  Continue  amiodarone drip and changed to PO   In and out of A fib   On full dose lovenox

## 2024-11-15 NOTE — PLAN OF CARE
8:32  Bridger communicated to YORDY that he met with family but they were undecided. He stated he will follow up with the family today.    YORDY communicated with Bridger to see if the family had reached out. He stated they had not. Bridger stated he did speak to Dr. Retana who said they are more than likely going to want to do the transition over the weekend.    2:16  YORDY reached out to Evie, Pt's daughter who stated they are definitely going with [facility], but they would like to wait until Saturday when Pt's spouse will be able to come up to the hospital and be with Pt. YORDY informed Bridger and Pt's provider.

## 2024-11-15 NOTE — ASSESSMENT & PLAN NOTE
Advance Care Planning     Date: 11/15/2024  Patient is DNR now  and going into comfort care / hospice

## 2024-11-15 NOTE — PLAN OF CARE
Problem: Adult Inpatient Plan of Care  Goal: Plan of Care Review  11/15/2024 0640 by Michelle Valdez RN  Outcome: Progressing  11/15/2024 0640 by Michelle Valdez RN  Outcome: Progressing  Goal: Patient-Specific Goal (Individualized)  11/15/2024 0640 by Michelle Valdez RN  Outcome: Progressing  11/15/2024 0640 by Michelle Valdez RN  Outcome: Progressing  Goal: Absence of Hospital-Acquired Illness or Injury  11/15/2024 0640 by Michelle Valdez RN  Outcome: Progressing  11/15/2024 0640 by Michelle Valdez RN  Outcome: Progressing  Goal: Optimal Comfort and Wellbeing  11/15/2024 0640 by Michelle Valdez RN  Outcome: Progressing  11/15/2024 0640 by Michelle Valdez RN  Outcome: Progressing  Goal: Readiness for Transition of Care  11/15/2024 0640 by Michelle Valdez RN  Outcome: Progressing  11/15/2024 0640 by Michelle Valdez RN  Outcome: Progressing     Problem: Skin Injury Risk Increased  Goal: Skin Health and Integrity  11/15/2024 0640 by Mihcelle Valdez RN  Outcome: Progressing  11/15/2024 0640 by Michelle Valdez RN  Outcome: Progressing     Problem: Stroke, Ischemic (Includes Transient Ischemic Attack)  Goal: Optimal Coping  11/15/2024 0640 by Michelle Valdez RN  Outcome: Progressing  11/15/2024 0640 by Michelle Valdez RN  Outcome: Progressing  Goal: Effective Bowel Elimination  11/15/2024 0640 by Michelle Valdez RN  Outcome: Progressing  11/15/2024 0640 by Michelle Valdez RN  Outcome: Progressing  Goal: Optimal Cerebral Tissue Perfusion  11/15/2024 0640 by Michelle Valdez RN  Outcome: Progressing  11/15/2024 0640 by Michelle Valdez RN  Outcome: Progressing  Goal: Optimal Cognitive Function  11/15/2024 0640 by Michelle Valdez RN  Outcome: Progressing  11/15/2024 0640 by Michelle Valdez RN  Outcome: Progressing  Goal: Improved Communication Skills  11/15/2024 0640 by Michelle Valdez RN  Outcome: Progressing  11/15/2024 0640 by Michelle Valdez RN  Outcome: Progressing  Goal: Optimal Functional Ability  11/15/2024 0640 by Michelle Valdez  RN  Outcome: Progressing  11/15/2024 0640 by Michelle Valdez RN  Outcome: Progressing  Goal: Optimal Nutrition Intake  11/15/2024 0640 by Michelle Valdez RN  Outcome: Progressing  11/15/2024 0640 by Michelle Valdez RN  Outcome: Progressing  Goal: Effective Oxygenation and Ventilation  11/15/2024 0640 by Michelle Valdez RN  Outcome: Progressing  11/15/2024 0640 by Michelle Valdez RN  Outcome: Progressing  Goal: Improved Sensorimotor Function  11/15/2024 0640 by Michelle Valdez RN  Outcome: Progressing  11/15/2024 0640 by Michelle Valdez RN  Outcome: Progressing  Goal: Safe and Effective Swallow  11/15/2024 0640 by Michelle Valdez RN  Outcome: Progressing  11/15/2024 0640 by Michelle Valdez RN  Outcome: Progressing  Goal: Effective Urinary Elimination  11/15/2024 0640 by Michelle Valdez RN  Outcome: Progressing  11/15/2024 0640 by Michelle Valdez RN  Outcome: Progressing     Problem: Fall Injury Risk  Goal: Absence of Fall and Fall-Related Injury  11/15/2024 0640 by Michelle Valdez RN  Outcome: Progressing  11/15/2024 0640 by Michelle Valdez RN  Outcome: Progressing     Problem: Infection  Goal: Absence of Infection Signs and Symptoms  11/15/2024 0640 by Michelle Valdez RN  Outcome: Progressing  11/15/2024 0640 by Michelle Valdez RN  Outcome: Progressing     Problem: Wound  Goal: Optimal Coping  11/15/2024 0640 by Michelle Valdez RN  Outcome: Progressing  11/15/2024 0640 by Michelle Valdez RN  Outcome: Progressing  Goal: Optimal Functional Ability  11/15/2024 0640 by Michelle Valdez RN  Outcome: Progressing  11/15/2024 0640 by Michelle Valdez RN  Outcome: Progressing  Goal: Absence of Infection Signs and Symptoms  Outcome: Progressing  Goal: Improved Oral Intake  Outcome: Progressing  Goal: Optimal Pain Control and Function  Outcome: Progressing  Goal: Skin Health and Integrity  Outcome: Progressing  Goal: Optimal Wound Healing  Outcome: Progressing     Problem: Oral Intake Inadequate  Goal: Improved Oral Intake  Outcome: Progressing      Problem: Coping Ineffective  Goal: Effective Coping  Outcome: Progressing

## 2024-11-15 NOTE — RESPIRATORY THERAPY
11/14/24 1943   Patient Assessment/Suction   Level of Consciousness (AVPU) alert   Respiratory Effort Normal;Unlabored   Expansion/Accessory Muscles/Retractions no use of accessory muscles   All Lung Fields Breath Sounds Anterior:;diminished   Rhythm/Pattern, Respiratory unlabored;pattern regular;depth regular;no shortness of breath reported   Skin Integrity   $ Wound Care Tech Time 15 min   Area Observed Left;Right;Behind ear;Cheek;Upper lip;Nares   Skin Appearance without discoloration   PRE-TX-O2   Device (Oxygen Therapy) high flow nasal cannula w/device   Humidification temp set 35   Humidification temp actual 35   Flow (L/min) (Oxygen Therapy) 35   Oxygen Concentration (%) 75   SpO2 (!) 94 %   Pulse Oximetry Type Continuous   $ Pulse Oximetry - Multiple Charge Pulse Oximetry - Multiple   Pulse 76   Resp (!) 7   Aerosol Therapy   $ Aerosol Therapy Charges PRN treatment not required   Daily Review of Necessity (SVN) completed   Respiratory Treatment Status (SVN) PRN treatment not required   Vibratory PEP Therapy   $ Vibratory PEP Charges Aerobika Therapy   $ Vibratory PEP Equipment Aerobika Equipment   $ Vibratory PEP Tech Time Charges 15 min   Daily Review of Necessity (PEP Therapy) completed   Type (PEP Therapy) vibratory/oscillatory   Device (PEP Therapy) flutter   Route (PEP Therapy) mouthpiece   Breaths per Cycle (PEP Therapy) 10   Cycles (PEP Therapy) 1   Settings (PEP Therapy) PEP 5   Patient Position (PEP Therapy) semi-Roque's   Post Treatment Assessment (PEP) breath sounds unchanged   Signs of Intolerance (PEP Therapy) none   Ready to Wean/Extubation Screen   FIO2<=50 (chart decimal) (!) 0.75   Education   $ Education 15 min;Oxygen;PEP Therapy

## 2024-11-15 NOTE — PROGRESS NOTES
"[facility]  Palliative Medicine  Progress Note    Patient Name: Shanell Mahmood  MRN: [id number]  Admission Date: 10/31/2024  Hospital Length of Stay: 14 days  Code Status: DNR   Attending Provider: Eric Bass MD  Consulting Provider: Portia Oneal NP  Primary Care Physician: Nicole, Primary Doctor  Principal Problem:Central cord syndrome    Patient information was obtained from relative(s), past medical records, ER records, and primary team.      Assessment/Plan:     Palliative Care  Goals of care, counseling/discussion  I reviewed the patient's chart and discussed the case with the patient's team.      I examined Shanell Mahmood at bedside.  The patient was sleeping and I did not disturb her.  I spoke with her sister and family present at BS.     I introduced myself and my role as palliative care NP. They were agreeable to speaking.    They recall Dr. Tanner visit yesterday. They let me know that pt had an uneventful night and is resting comfortably. They asked that I not disturb her and let her rest. I honored their request.     They have met with [facility] hospice rep. They have several more family and friends that would like to visit before transitioning to comfort focused care with hospice.     Bridger with [facility] plans to visit with pt and family again today.     We appreciate being involved in pts care. We will cont to follow along. Please reach out if we can be of any assistance.              Subjective:     Chief Complaint:   Chief Complaint   Patient presents with    Loss of Consciousness     Ems Reports patient "Passed out" and had an unwitnessed fall outside of the Pain management clinic, when fire arrived they stated she seemed sleepy and altered mental status , when ems arrived she was given 1mg narcan and patient is now aox4        HPI:   Per admit H&P: "Shanell Mahmood is a 74 year old female with a previous medical history of anemia, Pulmonary hypertension on 4 liters continuous oxygen at " home, arthritis, CKD V, Osteoporosis, secondary hyperprathyroidism, S/P closure of patent foramen ovale in 2011, CVA in 2011 with no residuals, chronic back pain, HLD, Gout, Brain Mass and thyroid diease who presented to the ED after unwitnessed syncopal episode. The patient was at her pain management office for re certification only. There were no procedures performed. She reports taking one hydrocodone 10mg today.  The last thing she remembers was walking down the hallway of the office and looking for something to cover her head from the rain and did not have any adverse symptoms or feelings at that time.  She has no recollection of the syncopal events. When she was initially evaluated by EMS, her blood pressure was in the 60s over 40s. She did require constant stimulation to remain awake. Fell and hit her head. Does not take any blood thinners. EMS evaluated her and gave her 1 mg of Narcan as well as 250 cc of fluid. Her pressure improved and she had become more alert. Initial ED workup was thorough and all imaging showing that included CT of neck, head and entire spine showed no acute processes. CBC showed anemia and CMP showed elevated creatinine consistent with history of CKD V. Drug screen positive for opioids but patient has a hydrocodone prescription. The patient is currently in the process of have a left sided brain mass visualized on MRI 10/8/24 evaluated that was an incidental finding after undergoing a PET scan for a pulmonary nodule on 9/6/24 with abnormal uptake noted in brain. She has her first appointment on 11/4/24. She is followed by nephrology who is currently monitoring her and there has been one attempt at AV fistual placement but it was unsuccessful due sclerotic changes. Patient reports she awoke this morning feeling well showered and dressed herself. She performs all of her own ADL's. She had one syncopal event in March 2024 and was evaluated by cardiology and informed it was an orthostatic  "episode. Patient was unable to complete orthostatic vital signs upon admission due to inability to stand stating " I feel as if I can't get my legs under me". When evaluated for admit exam the patient endorses that after the syncopal episode she endorses bilateral leg weakness with decreased sensation in both legs. She reports bilateral  weakness being unable to use her phone and difficulty raising both of her arms. NIH scale performed and total of 9 noted. Patient noted have 400ml of urine in bladder and unable to void. Patient reached a total of 528ml of urine without being able to void.  MRI/MRA of brain and MRI of lumbosacral spine ordered upon admit. MRA and MRI lower back showed no acute process. MRI brain showed Acute infarct in the right caudate head. Dr. Maldonado was called and he recommended MRA of neck in morning and load with aspirin and plavix. Initial EKG read as atrial fibrillation but upon review p waves were noted and this was discussed with the ED. Repeat EKG shows sinus arrhthymias with PACs. Patient admitted by hospital medicine for further evaluation and management."    She has undergone a very complicated course since admission.  She ultimately underwent neurosurgical intervention for cervical cord compression.  Her hospital course has been complicated by severe pulmonary hypertension, shock on persistent vasopressor support, and hypoxic respiratory failure requiring high-flow nasal cannula oxygen.  Unfortunately, despite supportive measures her condition is not improving and she remains critically ill.  Her team is concerned that her current condition will not improve.  I have been asked to assist with goals of care.    Hospital Course:  No notes on file    Interval History: Pt resting comfortably in bed. Family present at BS. No significant events overnight.     Medications:  Continuous Infusions:   NORepinephrine bitartrate-D5W  0-3 mcg/kg/min Intravenous Continuous 10.2 mL/hr at 11/14/24 " 2000 0.3 mcg/kg/min at 11/14/24 2000    vasopressin  0.04 Units/min Intravenous Continuous   Paused at 11/14/24 1123     Scheduled Meds:   allopurinoL  100 mg Oral QHS    amiodarone  200 mg Oral BID    atorvastatin  40 mg Oral Daily    bisacodyL  10 mg Rectal Daily    clopidogreL  75 mg Oral Daily    colchicine  0.3 mg Oral Daily    [START ON 11/16/2024] enoxparin  1 mg/kg Subcutaneous Q24H (treatment, non-standard time)    furosemide (LASIX) injection  40 mg Intravenous Daily    macitentan  10 mg Oral Daily    methocarbamoL  750 mg Oral TID    midodrine  15 mg Oral TID    montelukast  10 mg Oral Daily    pantoprazole  40 mg Oral Daily    selexipag  1,600 mcg Oral BID    tadalafil  40 mg Oral Daily     PRN Meds:  Current Facility-Administered Medications:     acetaminophen, 650 mg, Oral, Q4H PRN    albuterol sulfate, 2.5 mg, Nebulization, Q6H PRN    aluminum-magnesium hydroxide-simethicone, 30 mL, Oral, Q4H PRN    dextrose 50%, 12.5 g, Intravenous, PRN    dextrose 50%, 25 g, Intravenous, PRN    diphenhydrAMINE, 25 mg, Oral, Q6H PRN    diphenhydrAMINE-zinc acetate 1-0.1%, , Topical (Top), TID PRN    glucagon (human recombinant), 1 mg, Intramuscular, PRN    glucose, 16 g, Oral, PRN    glucose, 24 g, Oral, PRN    HYDROcodone-acetaminophen, 1 tablet, Oral, Q6H PRN    HYDROmorphone, 1 mg, Intravenous, Q6H PRN    HYDROmorphone, 2 mg, Oral, Q4H PRN    magnesium oxide, 800 mg, Oral, PRN    magnesium oxide, 800 mg, Oral, PRN    methocarbamoL, 500 mg, Oral, TID PRN    naloxone, 0.02 mg, Intravenous, PRN    ondansetron, 4 mg, Intravenous, Q8H PRN    potassium bicarbonate, 35 mEq, Oral, PRN    potassium bicarbonate, 50 mEq, Oral, PRN    potassium bicarbonate, 60 mEq, Oral, PRN    potassium, sodium phosphates, 2 packet, Oral, PRN    potassium, sodium phosphates, 2 packet, Oral, PRN    potassium, sodium phosphates, 2 packet, Oral, PRN    prochlorperazine, 5 mg, Intravenous, Q6H PRN    senna-docusate 8.6-50 mg, 2 tablet, Oral,  Nightly PRN    sodium chloride 0.9%, 500 mL, Intravenous, PRN    sodium chloride 0.9%, 10 mL, Intravenous, Q12H PRN    sodium chloride 0.9%, 10 mL, Intravenous, PRN    Objective:     Vital Signs (Most Recent):  Temp: 97.9 °F (36.6 °C) (11/15/24 0301)  Pulse: 69 (11/15/24 0650)  Resp: 20 (11/15/24 0841)  BP: (!) 95/52 (11/15/24 0301)  SpO2: 100 % (11/15/24 0650) Vital Signs (24h Range):  Temp:  [97.9 °F (36.6 °C)-98.6 °F (37 °C)] 97.9 °F (36.6 °C)  Pulse:  [65-81] 69  Resp:  [0-22] 20  SpO2:  [90 %-100 %] 100 %  BP: ()/(52-76) 95/52  Arterial Line BP: ()/(48-75) 94/50     Weight: 72.7 kg (160 lb 4.4 oz)  Body mass index is 25.1 kg/m².       Physical Exam  Vitals reviewed.   Constitutional:       General: She is not in acute distress.     Appearance: She is ill-appearing.      Comments: Sleeping, did not disturb pt   Cardiovascular:      Rate and Rhythm: Normal rate and regular rhythm.   Pulmonary:      Effort: Pulmonary effort is normal. No respiratory distress.      Comments: High-flow nasal cannula oxygen in place  Neurological:      Comments: Pt sleeping, I did not disturb her            Review of Symptoms      Symptom Assessment (ESAS 0-10 Scale)  Unable to complete assessment due to Other         Pain Assessment in Advanced Demential Scale (PAINAD)   Breathing - Independent of vocalization:  0  Negative vocalization:  0  Facial expression:  0  Body language:  0  Consolability:  0  Total:  0    Psychosocial/Cultural:   See Palliative Psychosocial Note: No  **Primary  to Follow**  Palliative Care  Consult: No        Advance Care Planning  Advance Directives:   Do Not Resuscitate Status: Yes      Decision Making:  Family answered questions  Goals of Care: What is most important right now is to focus on comfort and QOL . Accordingly, we have decided that the best plan to meet the patient's goals includes enrolling in hospice care.         Significant Labs: All pertinent labs  within the past 24 hours have been reviewed.  CBC:   Recent Labs   Lab 11/15/24  0440   WBC 5.67   HGB 9.2*   HCT 27.0*   MCV 78*        BMP:  Recent Labs   Lab 11/15/24  0440   GLU 72   *   K 3.9   CL 97   CO2 22*   BUN 34*   CREATININE 1.8*   CALCIUM 7.8*     LFT:  Lab Results   Component Value Date     (H) 11/15/2024    ALKPHOS 101 11/15/2024    BILITOT 0.5 11/15/2024     Albumin:   Albumin   Date Value Ref Range Status   11/15/2024 2.2 (L) 3.5 - 5.2 g/dL Final     Protein:   Total Protein   Date Value Ref Range Status   11/15/2024 4.7 (L) 6.0 - 8.4 g/dL Final     Lactic acid:   Lab Results   Component Value Date    LACTATE 0.6 11/07/2024    LACTATE 0.8 11/03/2024       Significant Imaging: I have reviewed all pertinent imaging results/findings within the past 24 hours.    35 min visit spent in chart review, face to face discussion of goals of care,  symptom assessment, coordination of care and emotional support.     Portia Oneal, NP  Palliative Medicine  Former[facility]

## 2024-11-15 NOTE — ASSESSMENT & PLAN NOTE
"Discovered after PET scan of lung  on 9/6/24 revealed abnormal uptake in brain. 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  Unclear if contributing to CVA  She will have her first appointment with Dr. Ray Mcintyre at  [facility]  on 11/4/24  -neurosurgery and oncology follow-up  "

## 2024-11-15 NOTE — SUBJECTIVE & OBJECTIVE
"Interval History:     Patient is resting . Gave only minimal response   Vapotherm and vasopressor support the same     Review of Systems  Objective:     Vital Signs (Most Recent):  Temp: 98.1 °F (36.7 °C) (11/15/24 1101)  Pulse: 69 (11/15/24 1101)  Resp: 14 (11/15/24 1101)  BP: (!) 95/50 (11/15/24 1101)  SpO2: 100 % (11/15/24 1101) Vital Signs (24h Range):  Temp:  [97.9 °F (36.6 °C)-98.6 °F (37 °C)] 98.1 °F (36.7 °C)  Pulse:  [65-79] 69  Resp:  [0-22] 14  SpO2:  [90 %-100 %] 100 %  BP: ()/(50-68) 95/50  Arterial Line BP: ()/(50-75) 94/50     Weight: 72.7 kg (160 lb 4.4 oz)  Body mass index is 25.1 kg/m².    Intake/Output Summary (Last 24 hours) at 11/15/2024 1202  Last data filed at 11/15/2024 0639  Gross per 24 hour   Intake 639.06 ml   Output 800 ml   Net -160.94 ml         Physical Exam  Vitals and nursing note reviewed.   HENT:      Head: Normocephalic and atraumatic.   Eyes:      Conjunctiva/sclera: Conjunctivae normal.   Neck:      Vascular: No JVD.   Cardiovascular:      Heart sounds: Normal heart sounds.   Pulmonary:      Effort: Pulmonary effort is normal.   Abdominal:      Palpations: Abdomen is soft.   Skin:     General: Skin is warm.   Neurological:      Mental Status: She is alert. She is disoriented.             Significant Labs: All pertinent labs within the past 24 hours have been reviewed.  CBC:   Recent Labs   Lab 11/14/24  0449 11/15/24  0334 11/15/24  0440   WBC 6.25  --  5.67   HGB 9.2*  --  9.2*   HCT 26.8* 35* 27.0*     --  198     CMP:   Recent Labs   Lab 11/14/24  0449 11/15/24  0440   * 127*   K 3.8 3.9   CL 98 97   CO2 23 22*   GLU 84 72   BUN 32* 34*   CREATININE 1.6* 1.8*   CALCIUM 8.1* 7.8*   PROT 5.0* 4.7*   ALBUMIN 2.5* 2.2*   BILITOT 0.6 0.5   ALKPHOS 115 101   * 108*   ALT 62* 51*   ANIONGAP 6* 8     Cardiac Markers: No results for input(s): "CKMB", "MYOGLOBIN", "BNP", "TROPISTAT" in the last 48 hours.    Significant Imaging: I have reviewed all " pertinent imaging results/findings within the past 24 hours.

## 2024-11-15 NOTE — ASSESSMENT & PLAN NOTE
I reviewed the patient's chart and discussed the case with the patient's team.      I examined Shanell Mahmood at bedside.  The patient was sleeping and I did not disturb her.  I spoke with her sister and family present at BS.     I introduced myself and my role as palliative care NP. They were agreeable to speaking.    They recall Dr. Tanner visit yesterday. They let me know that pt had an uneventful night and is resting comfortably. They asked that I not disturb her and let her rest. I honored their request.     They have met with [facility] hospice rep. They have several more family and friends that would like to visit before transitioning to comfort focused care with hospice.     Bridger with [facility] plans to visit with pt and family again today.     We appreciate being involved in pts care. We will cont to follow along. Please reach out if we can be of any assistance.

## 2024-11-15 NOTE — ASSESSMENT & PLAN NOTE
Anemia is likely due to chronic disease due to Chronic Kidney Disease. Most recent hemoglobin and hematocrit are listed below.  Recent Labs     11/13/24  0347 11/13/24  0400 11/14/24  0449 11/15/24  0334 11/15/24  0440   HGB 9.5*  --  9.2*  --  9.2*   HCT 27.9*   < > 26.8* 35* 27.0*    < > = values in this interval not displayed.       Plan  - Monitor serial CBC: Daily  - Transfuse PRBC if patient becomes hemodynamically unstable, symptomatic or H/H drops below 7/21.  - Patient has not received any PRBC transfusions to date  - Patient's anemia is currently improving

## 2024-11-15 NOTE — PROGRESS NOTES
Formerly [facility], [facility] Medicine  Progress Note    Patient Name: Shanell Mahmood  MRN: [id number]  Patient Class: IP- Inpatient   Admission Date: 10/31/2024  Length of Stay: 14 days  Attending Physician: Eric Bass MD  Primary Care Provider: Nicole, Primary Doctor        Subjective:     Principal Problem:Central cord syndrome        HPI:  Shanell Mahmood is a 74 year old female with a previous medical history of anemia, Pulmonary hypertension on 4 liters continuous oxygen at home, arthritis, CKD V, Osteoporosis, secondary hyperprathyroidism, S/P closure of patent foramen ovale in 2011, CVA in 2011 with no residuals, chronic back pain, HLD, Gout, Brain Mass and thyroid diease who presented to the ED after unwitnessed syncopal episode. The patient was at her pain management office for re certification only. There were no procedures performed. She reports taking one hydrocodone 10mg today.  The last thing she remembers was walking down the hallway of the office and looking for something to cover her head from the rain and did not have any adverse symptoms or feelings at that time.  She has no recollection of the syncopal events. When she was initially evaluated by EMS, her blood pressure was in the 60s over 40s. She did require constant stimulation to remain awake. Fell and hit her head. Does not take any blood thinners. EMS evaluated her and gave her 1 mg of Narcan as well as 250 cc of fluid. Her pressure improved and she had become more alert. Initial ED workup was thorough and all imaging showing that included CT of neck, head and entire spine showed no acute processes. CBC showed anemia and CMP showed elevated creatinine consistent with history of CKD V. Drug screen positive for opioids but patient has a hydrocodone prescription. The patient is currently in the process of have a left sided brain mass visualized on MRI 10/8/24 evaluated that was an incidental finding after undergoing a PET scan for a pulmonary  "nodule on 9/6/24 with abnormal uptake noted in brain. She has her first appointment on 11/4/24. She is followed by nephrology who is currently monitoring her and there has been one attempt at AV fistual placement but it was unsuccessful due sclerotic changes. Patient reports she awoke this morning feeling well showered and dressed herself. She performs all of her own ADL's. She had one syncopal event in March 2024 and was evaluated by cardiology and informed it was an orthostatic episode. Patient was unable to complete orthostatic vital signs upon admission due to inability to stand stating " I feel as if I can't get my legs under me". When evaluated for admit exam the patient endorses that after the syncopal episode she endorses bilateral leg weakness with decreased sensation in both legs. She reports bilateral  weakness being unable to use her phone and difficulty raising both of her arms. NIH scale performed and total of 9 noted. Patient noted have 400ml of urine in bladder and unable to void. Patient reached a total of 528ml of urine without being able to void.  MRI/MRA of brain and MRI of lumbosacral spine ordered upon admit. MRA and MRI lower back showed no acute process. MRI brain showed Acute infarct in the right caudate head. Dr. Maldonado was called and he recommended MRA of neck in morning and load with aspirin and plavix. Initial EKG read as atrial fibrillation but upon review p waves were noted and this was discussed with the ED. Repeat EKG shows sinus arrhthymias with PACs. Patient admitted by hospital medicine for further evaluation and management.       Overview/Hospital Course:  74-year-old female with history of brain mass, CKD, back pain, pulmonary hypertension on 4 L oxygen is admitted after syncope and collapse found to have a acute CVA in the right caudate on MRI brain.   She Has global weakness and decreased sensation to the lower legs.  Multiple CT and x-rays done to rule out trauma ultimately " negative other than the maxillofacial CT reporting mild irregularity of the interior midline mandible with small adjacent bone fragments which could represent acute fracture with overlying soft tissue swelling.    Carotid ultrasound and MRA brain and neck without acute finding.  Neurology is consulted.  Also with underlying brain mass.  Consult Neurosurgery and Oncology.  Given DAPT and statin.  Had hypotension given IVF and midodrine.  Echo shows right heart failure.  BNP is within normal.  Lactic acid pending.  No other sign of infection.  Placed in cervical collar as precaution and MRI of the cervical spine shows severe cord compression at C4-5 and C5-6 likely acute with edema.  Neurosurgery discussed with patient  and plan to proceed with surgical decompression  and  patient underwent surgery on : 11/3/24 multilevel C-spine laminectomy.  Later patient continued to have hypotension and needed vasopressor and another vasopressor vasopressin is added  Patient also her DVT of the the proximal right superficial femoral vein. And nonocclusive thrombus within the right popliteal vein,  Also patient developed new onset AFib and started amiodarone drip and in and out of a fib  and later changed to PO amiodarone   Later patient was more hypoxic and not able to wean vasopressor and PE study was done and found to have segmental PE with right heart strain . Patient was on full dose lovenox . Vascular surgery consulted and reviewed the film and did not recommend thrombectomy intervention .    Interval History:     Patient is resting . Gave only minimal response   Vapotherm and vasopressor support the same     Review of Systems  Objective:     Vital Signs (Most Recent):  Temp: 98.1 °F (36.7 °C) (11/15/24 1101)  Pulse: 69 (11/15/24 1101)  Resp: 14 (11/15/24 1101)  BP: (!) 95/50 (11/15/24 1101)  SpO2: 100 % (11/15/24 1101) Vital Signs (24h Range):  Temp:  [97.9 °F (36.6 °C)-98.6 °F (37 °C)] 98.1 °F (36.7 °C)  Pulse:  [65-79]  "69  Resp:  [0-22] 14  SpO2:  [90 %-100 %] 100 %  BP: ()/(50-68) 95/50  Arterial Line BP: ()/(50-75) 94/50     Weight: 72.7 kg (160 lb 4.4 oz)  Body mass index is 25.1 kg/m².    Intake/Output Summary (Last 24 hours) at 11/15/2024 1202  Last data filed at 11/15/2024 0639  Gross per 24 hour   Intake 639.06 ml   Output 800 ml   Net -160.94 ml         Physical Exam  Vitals and nursing note reviewed.   HENT:      Head: Normocephalic and atraumatic.   Eyes:      Conjunctiva/sclera: Conjunctivae normal.   Neck:      Vascular: No JVD.   Cardiovascular:      Heart sounds: Normal heart sounds.   Pulmonary:      Effort: Pulmonary effort is normal.   Abdominal:      Palpations: Abdomen is soft.   Skin:     General: Skin is warm.   Neurological:      Mental Status: She is alert. She is disoriented.             Significant Labs: All pertinent labs within the past 24 hours have been reviewed.  CBC:   Recent Labs   Lab 11/14/24  0449 11/15/24  0334 11/15/24  0440   WBC 6.25  --  5.67   HGB 9.2*  --  9.2*   HCT 26.8* 35* 27.0*     --  198     CMP:   Recent Labs   Lab 11/14/24  0449 11/15/24  0440   * 127*   K 3.8 3.9   CL 98 97   CO2 23 22*   GLU 84 72   BUN 32* 34*   CREATININE 1.6* 1.8*   CALCIUM 8.1* 7.8*   PROT 5.0* 4.7*   ALBUMIN 2.5* 2.2*   BILITOT 0.6 0.5   ALKPHOS 115 101   * 108*   ALT 62* 51*   ANIONGAP 6* 8     Cardiac Markers: No results for input(s): "CKMB", "MYOGLOBIN", "BNP", "TROPISTAT" in the last 48 hours.    Significant Imaging: I have reviewed all pertinent imaging results/findings within the past 24 hours.    Assessment/Plan:      * Central cord syndrome  S/p multilevel laminectomy (11/3/24)  C2-3, C3-4, C4-5, C5-6, C6-7 laminectomy  C3 through 6 posterior segmental instrumentation using De[employer]can[employer]hony system  C3-4, C4-5, C5-6 posterolateral arthrodesis using autograft harvested from the same incision and allograft DBM    Cervical collar in place  Neuro surgery follow up   Possible " spinal shock ?  PT OT when more stable    Cortisol added and normal       ACP (advance care planning)  Advance Care Planning    Date: 11/15/2024  Patient is DNR now  and going into comfort care / hospice           Paroxysmal atrial fibrillation  Patient has paroxysmal (<7 days) atrial fibrillation. Patient is currently in atrial fibrillation. [id number] Score: 3. The patients heart rate in the last 24 hours is as follows:  Pulse  Min: 65  Max: 79     Antiarrhythmics  amiodarone tablet 200 mg, 2 times daily, Oral    Anticoagulants  enoxaparin injection 70 mg, Every 24 hours, Subcutaneous    Plan  - Replete lytes with a goal of K>4, Mg >2  - Patient is anticoagulated, see medications listed above.  - Patient's afib is currently uncontrolled. Will continue current treatment  Continue  amiodarone drip and changed to PO   In and out of A fib   On full dose lovenox           S/P cervical spinal fusion  Multi levels  See NSGY op note     Acute deep vein thrombosis (DVT) of popliteal vein of right lower extremity, nonocclusive and pulmonary embolism  Found on u/s 11/5/24 and CTA 11/12   hypoechoic peripheral nonocclusive thrombus within the proximal right superficial femoral vein    full dose lovenox   Close monitor  s/p spine surgery   Associated with hypotension /shock suspicious for PE  and CTA confirmed   Vascular did not recommend thrombectomy        Quadriparesis  PT/OT  Frequent turns   Air mattress        Myelopathy  aware      Status post cervical spinal fusion  PT/OT  Cervical collar in place   Pain Mx      Hypotension  Asymptomatic.  Etiology most likely from pulmonary embolism/pul HTN / A fib with RVR   Echo reviewed.    -keep hold any BP meds or diuretics  On midodrine 15 tid   Currently on 2 vasopressor  Pulmonary offered swan ramya but patient refused   Cortisol random normal     Thrombocytopenia  The patients 3 most recent labs are listed below.  Recent Labs     11/13/24  0347 11/14/24  0449 11/15/24  0440  "   185 198       Plan  - Will transfuse if platelet count is <100k (if undergoing neurosurgery), or otherwise less than 10 K  -trend daily    Anemia  Anemia is likely due to chronic disease due to Chronic Kidney Disease. Most recent hemoglobin and hematocrit are listed below.  Recent Labs     11/13/24  0347 11/13/24  0400 11/14/24  0449 11/15/24  0334 11/15/24  0440   HGB 9.5*  --  9.2*  --  9.2*   HCT 27.9*   < > 26.8* 35* 27.0*    < > = values in this interval not displayed.       Plan  - Monitor serial CBC: Daily  - Transfuse PRBC if patient becomes hemodynamically unstable, symptomatic or H/H drops below 7/21.  - Patient has not received any PRBC transfusions to date  - Patient's anemia is currently improving    Stroke  Acute CVA right caudate  MRI, MRA, CT, carotid U/S reports reviewed  Plavix  and statin and patient also need full dose lovenox   -neurology following  -neurosurgery and oncology consulted for the brain mass and follow up as OP   -PT/OT/SLP when more stable   -echo bubble report is seem edited. Not mentioning of PFO  Patient going to hospice care and possible withdrawal of vasopressors over the weekend    Brain mass  Discovered after PET scan of lung  on 9/6/24 revealed abnormal uptake in brain. 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  Unclear if contributing to CVA  She will have her first appointment with Dr. Ray Mcintyre at  [facility]  on 11/4/24  -neurosurgery and oncology follow-up    Secondary hyperparathyroidism  Chronic condition that is stable.     Pulmonary hypertension  Chronic condition   Macitentan, selexipag and tadalafil not available and sildenafil changed and continued   Patient refuse swan ramya    Chronic respiratory failure with hypoxia  Patient with Hypoxic Respiratory failure which is Chronic.  she is on home oxygen at 4 LPM.   Secondary to " pulmonary embolism  Full-dose Lovenox    CKD (chronic kidney disease), stage IV  Creatine stable for now. BMP reviewed- noted Estimated Creatinine Clearance: 27.1 mL/min (A) (based on SCr of 1.8 mg/dL (H)). according to latest data. Based on current GFR, CKD stage is stage 4 - GFR 15-29.  Monitor UOP and serial BMP and adjust therapy as needed. Renally dose meds. Avoid nephrotoxic medications and procedures.    Close monitor     Syncope and collapse  See stroke      Acute urinary retention  Possibly neurogenic from the CVA or from cervical spine cord compression  Urinary catheter. Appear may need long term        VTE Risk Mitigation (From admission, onward)           Ordered     enoxaparin injection 70 mg  Every 24 hours         11/15/24 0928     Reason for No Pharmacological VTE Prophylaxis  Once        Question:  Reasons:  Answer:  Risk of Bleeding    10/31/24 1849     IP VTE HIGH RISK PATIENT  Once         10/31/24 1849     Place sequential compression device  Until discontinued         10/31/24 1849                    Discharge Planning   ELBERT:      Code Status: DNR   Is the patient medically ready for discharge?:     Reason for patient still in hospital (select all that apply): Treatment  Discharge Plan A: Inpatient Hospice   Discharge Delays: None known at this time        Critical care time spent on the evaluation and treatment of severe organ dysfunction, review of pertinent labs and imaging studies, discussions with consulting providers and discussions with patient/family: 33 minutes.      Eric Bass MD  Department of Hospital Medicine   Pending sale to [facility]

## 2024-11-15 NOTE — SUBJECTIVE & OBJECTIVE
Interval History: Pt resting comfortably in bed. Family present at . No significant events overnight.     Medications:  Continuous Infusions:   NORepinephrine bitartrate-D5W  0-3 mcg/kg/min Intravenous Continuous 10.2 mL/hr at 11/14/24 2000 0.3 mcg/kg/min at 11/14/24 2000    vasopressin  0.04 Units/min Intravenous Continuous   Paused at 11/14/24 1123     Scheduled Meds:   allopurinoL  100 mg Oral QHS    amiodarone  200 mg Oral BID    atorvastatin  40 mg Oral Daily    bisacodyL  10 mg Rectal Daily    clopidogreL  75 mg Oral Daily    colchicine  0.3 mg Oral Daily    [START ON 11/16/2024] enoxparin  1 mg/kg Subcutaneous Q24H (treatment, non-standard time)    furosemide (LASIX) injection  40 mg Intravenous Daily    macitentan  10 mg Oral Daily    methocarbamoL  750 mg Oral TID    midodrine  15 mg Oral TID    montelukast  10 mg Oral Daily    pantoprazole  40 mg Oral Daily    selexipag  1,600 mcg Oral BID    tadalafil  40 mg Oral Daily     PRN Meds:  Current Facility-Administered Medications:     acetaminophen, 650 mg, Oral, Q4H PRN    albuterol sulfate, 2.5 mg, Nebulization, Q6H PRN    aluminum-magnesium hydroxide-simethicone, 30 mL, Oral, Q4H PRN    dextrose 50%, 12.5 g, Intravenous, PRN    dextrose 50%, 25 g, Intravenous, PRN    diphenhydrAMINE, 25 mg, Oral, Q6H PRN    diphenhydrAMINE-zinc acetate 1-0.1%, , Topical (Top), TID PRN    glucagon (human recombinant), 1 mg, Intramuscular, PRN    glucose, 16 g, Oral, PRN    glucose, 24 g, Oral, PRN    HYDROcodone-acetaminophen, 1 tablet, Oral, Q6H PRN    HYDROmorphone, 1 mg, Intravenous, Q6H PRN    HYDROmorphone, 2 mg, Oral, Q4H PRN    magnesium oxide, 800 mg, Oral, PRN    magnesium oxide, 800 mg, Oral, PRN    methocarbamoL, 500 mg, Oral, TID PRN    naloxone, 0.02 mg, Intravenous, PRN    ondansetron, 4 mg, Intravenous, Q8H PRN    potassium bicarbonate, 35 mEq, Oral, PRN    potassium bicarbonate, 50 mEq, Oral, PRN    potassium bicarbonate, 60 mEq, Oral, PRN    potassium,  sodium phosphates, 2 packet, Oral, PRN    potassium, sodium phosphates, 2 packet, Oral, PRN    potassium, sodium phosphates, 2 packet, Oral, PRN    prochlorperazine, 5 mg, Intravenous, Q6H PRN    senna-docusate 8.6-50 mg, 2 tablet, Oral, Nightly PRN    sodium chloride 0.9%, 500 mL, Intravenous, PRN    sodium chloride 0.9%, 10 mL, Intravenous, Q12H PRN    sodium chloride 0.9%, 10 mL, Intravenous, PRN    Objective:     Vital Signs (Most Recent):  Temp: 97.9 °F (36.6 °C) (11/15/24 0301)  Pulse: 69 (11/15/24 0650)  Resp: 20 (11/15/24 0841)  BP: (!) 95/52 (11/15/24 0301)  SpO2: 100 % (11/15/24 0650) Vital Signs (24h Range):  Temp:  [97.9 °F (36.6 °C)-98.6 °F (37 °C)] 97.9 °F (36.6 °C)  Pulse:  [65-81] 69  Resp:  [0-22] 20  SpO2:  [90 %-100 %] 100 %  BP: ()/(52-76) 95/52  Arterial Line BP: ()/(48-75) 94/50     Weight: 72.7 kg (160 lb 4.4 oz)  Body mass index is 25.1 kg/m².       Physical Exam  Vitals reviewed.   Constitutional:       General: She is not in acute distress.     Appearance: She is ill-appearing.      Comments: Sleeping, did not disturb pt   Cardiovascular:      Rate and Rhythm: Normal rate and regular rhythm.   Pulmonary:      Effort: Pulmonary effort is normal. No respiratory distress.      Comments: High-flow nasal cannula oxygen in place  Neurological:      Comments: Pt sleeping, I did not disturb her            Review of Symptoms      Symptom Assessment (ESAS 0-10 Scale)  Unable to complete assessment due to Other         Pain Assessment in Advanced Demential Scale (PAINAD)   Breathing - Independent of vocalization:  0  Negative vocalization:  0  Facial expression:  0  Body language:  0  Consolability:  0  Total:  0    Psychosocial/Cultural:   See Palliative Psychosocial Note: No  **Primary  to Follow**  Palliative Care  Consult: No        Advance Care Planning   Advance Directives:   Do Not Resuscitate Status: Yes      Decision Making:  Family answered  questions  Goals of Care: What is most important right now is to focus on comfort and QOL . Accordingly, we have decided that the best plan to meet the patient's goals includes enrolling in hospice care.         Significant Labs: All pertinent labs within the past 24 hours have been reviewed.  CBC:   Recent Labs   Lab 11/15/24  0440   WBC 5.67   HGB 9.2*   HCT 27.0*   MCV 78*        BMP:  Recent Labs   Lab 11/15/24  0440   GLU 72   *   K 3.9   CL 97   CO2 22*   BUN 34*   CREATININE 1.8*   CALCIUM 7.8*     LFT:  Lab Results   Component Value Date     (H) 11/15/2024    ALKPHOS 101 11/15/2024    BILITOT 0.5 11/15/2024     Albumin:   Albumin   Date Value Ref Range Status   11/15/2024 2.2 (L) 3.5 - 5.2 g/dL Final     Protein:   Total Protein   Date Value Ref Range Status   11/15/2024 4.7 (L) 6.0 - 8.4 g/dL Final     Lactic acid:   Lab Results   Component Value Date    LACTATE 0.6 11/07/2024    LACTATE 0.8 11/03/2024       Significant Imaging: I have reviewed all pertinent imaging results/findings within the past 24 hours.

## 2024-11-15 NOTE — ASSESSMENT & PLAN NOTE
The patients 3 most recent labs are listed below.  Recent Labs     11/13/24  0347 11/14/24  0449 11/15/24  0440    185 198       Plan  - Will transfuse if platelet count is <100k (if undergoing neurosurgery), or otherwise less than 10 K  -trend daily

## 2024-11-15 NOTE — PROGRESS NOTES
"Pharmacist Renal Dose Adjustment Note    Shanell Mahmood is a 74 y.o. female being treated with the medication Enoxaparin    Patient Data:    Vital Signs (Most Recent):  Temp: 97.9 °F (36.6 °C) (11/15/24 0301)  Pulse: 69 (11/15/24 0650)  Resp: 20 (11/15/24 0841)  BP: (!) 95/52 (11/15/24 0301)  SpO2: 100 % (11/15/24 0650) Vital Signs (72h Range):  Temp:  [97.1 °F (36.2 °C)-99.4 °F (37.4 °C)]   Pulse:  [58-88]   Resp:  [0-32]   BP: ()/(37-83)   SpO2:  [57 %-100 %]   Arterial Line BP: ()/(33-76)        Ht: 5' 7" (1.702 m)  Wt: 72.7 kg (160 lb 4.4 oz)  Estimated Creatinine Clearance: 27.1 mL/min (A) (based on SCr of 1.8 mg/dL (H)).  Body mass index is 25.1 kg/m².    Per [facility] renal dosing protocol:     Previous Order: Enoxaparin 1 mg/kg Q 12 hours    Will be changed to:     New Order: Enoxaparin 1 mg/kg Q 24 hours,    Due to: decreased crcl < 30 ml/min    Renal dose adjustments performed as noted above.    We will continue monitoring and adjusting as necessary.    Pharmacist: Kobi Armando, PharmD  Ext: [phone]      "

## 2024-11-15 NOTE — ASSESSMENT & PLAN NOTE
Creatine stable for now. BMP reviewed- noted Estimated Creatinine Clearance: 27.1 mL/min (A) (based on SCr of 1.8 mg/dL (H)). according to latest data. Based on current GFR, CKD stage is stage 4 - GFR 15-29.  Monitor UOP and serial BMP and adjust therapy as needed. Renally dose meds. Avoid nephrotoxic medications and procedures.    Close monitor

## 2024-11-15 NOTE — PROGRESS NOTES
Pulmonary/Critical Care   Progress Note      PATIENT NAME: Shanell Mahmood  MRN: [id number]  TODAY'S DATE: 11/15/2024  8:00 AM  ADMIT DATE: 10/31/2024  AGE: 74 y.o. : 1950    CONSULT REQUESTED BY: Eric Bass MD    REASON FOR CONSULT:   Critical care management    HPI:  The patient is a 74 year old female who had a syncopal event on 10/31.  She was weak, myelopathic.  CT of the head showed an acute infarct in the right caudate head.  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  The MRI of her cervical spine showed disc bulging and spondylolysis at C4-C5 and C5-C6 with severe spinal canal stenosis, cervical cord compression and cord signal alteration there was also broad-based disc bulging producing moderate spinal canal stenosis at C3-C4.  She was brought to the OR yesterday where she underwent C2- 3, 3-4, 4-5,5-6,  and 6- 7 laminectomies with C3 through 6 posterior segmental instrumentation and C3-4, 4- 5, 5- 6 posterolateral arthrodesis using autograft harvested from the incision and allograft of DBM.  This morning she remains profoundly myelopathic.  She is on Levophed at 0.06 mics per kilos per minute to maintain a mean of 80.    The patient has significant pulmonary hypertension in his managed with Opsumit 10 mg daily, Adcirca 40 mg daily, and Uptravi 1600 ug bid.  She is on 4 L continuous oxygen and has dyspnea with any exertion.  She is also on Advair 115/21 b.i.d.     the patient's oxygenation dropped dramatically this morning.  She is now on more levophed to maintain a mean of 80.  She is in a lot of pain.     the patient is requiring a little less oxygen this morning.  However, she is in AFib and had a 25 beat run of V-tach overnight.  She is on 0.24 of Levophed.  I am concerned that her pulmonary hypertension is significantly worse and have an echo coming.  Will start walking  the Levophed down and off as her renal function is worse, she is in AFib and she had V-tach last night.    11/7 the patient is continuing to decline.  She is on Vapotherm now at 30 L and 80% to maintain good oxygenation.  She is requiring 0.3 of Levophed to maintain her blood pressure.  We did fully anticoagulate with Levophed yesterday as I am still worried about a PE and the patient developed AFib with RVR.  Started on amiodarone and this morning she is back in sinus rhythm.  Limited echo yesterday did not show any worsening of the right ventricle but PA pressures were estimated at 60.  If her heart is actually working well, then we have no SVR, and this is spinal shock.  However, spinal shock is usually bradycardic.  A [place]-Jens catheter maybe helpful, but I am not sure how it will change our management unless the RV is failing and the PAs are too high and we need to be on IV Flolan.    11/8 the patient is on 0.3 of Levophed.  She is on 85% on the Vapotherm.  The patient is still declining to have a a [place]-Jens catheter.  We would need this to initiate Flolan.  She would like to continue as we are hoping that things will turn around.  Her creatinine is marginally better today.  She remains ahead on her I's and O's by about 6 L. I do not see any point in trying to transfer her if she is declining a [place].    11/9 the patient is still on 0.3 of Levophed.  Her oxygenation is slightly improved and we have decreased her Vapotherm to 70% and 20 L. will try adding vasopressin to see if we get any benefit from this.  Her creatinine is stable at 1.8.  She still does not want a [place]-Jens catheter.  She has not had a bowel movement in 3 days.  Will make sure we are doing rectal stimulation with her Dulcolax suppositories.    11/10 The patient has less Levophed need this morning.  She is on vasopressin 0.04.  Her O2, though, has had to be increased to 85% she is still on Vapotherm but only at 15 L. she has more movement in  her arms and her legs today and this may be what is helping us with a blood pressure.    11/11 the patient is significantly improved this morning we are down to 0.05 of Levophed and the vasopressin.  Her oxygen is down to 6 L nasal cannula.  Her strength continues to improve.    11/12 the patient's Levophed dose is back up to 0.1.  She is still on vasopressin.  Her oxygen requirements are also higher again today.    11/13 the patient is still requiring her Levophed and vasopressin.  She is still on maximum Vapotherm.  The CTA done yesterday showed bilateral pulmonary emboli but Dr. Carlos did not feel the clot burden was so significant as to be impacting her pressures and did not feel that removal of the clot would have a positive risk benefit ratio.    11/14 the patient is no better.  She is on max Vapotherm.  She is on 0.18 of Levophed and vasopressin.  Her  is coming in today to meet with Dr. Retana.  Her son's are back home and her daughter can not come today but would like to be on the phone.  Her sister is in the room.  Her sister is certainly ready for her to be out of this situation and wishes her not to suffer.  The patient is complaining of a sore throat when she swallows today I do not see anything in her pharynx.  She apparently diurese yesterday but the pure wick is not working.  Her chest x-ray shows a larger left smaller right effusion and left lower lobe edema.     11/15/2024 - Pt about the same, O2 needs have gone up and down some.  She is in no distress and she is oving toward hospice when all are ready.  She voiced no needs this AM    REVIEW OF SYSTEMS  GENERAL: Feeling tired  EYES: Vision is good.  ENT: pharyngitis with swallowing.   HEART: No chest pain or palpitations.  LUNGS:  Breathing is okay  GI:  She is anorectic  :  She is repeatedly soaked from the Lasix.  SKIN: No lesions or rashes.  MUSCULOSKELETAL: No joint pain or myalgias.  NEURO:  She reports her strength is about the  same.  She still has paresthesias.  LYMPH: No edema or adenopathy.  PSYCH: No anxiety or depression.  ENDO: No weight change.    No change in the patient's Past Medical History, Past Surgical History, Social History or Family History since admission.    VITAL SIGNS (MOST RECENT)  Temp: 98.1 °F (36.7 °C) (11/15/24 1101)  Pulse: 69 (11/15/24 1101)  Resp: 14 (11/15/24 1101)  BP: (!) 95/50 (11/15/24 1101)  SpO2: 100 % (11/15/24 1101)    INTAKE AND OUTPUT (LAST 24 HOURS):  Intake/Output Summary (Last 24 hours) at 11/15/2024 1244  Last data filed at 11/15/2024 0639  Gross per 24 hour   Intake 639.06 ml   Output 800 ml   Net -160.94 ml       WEIGHT  Wt Readings from Last 1 Encounters:   11/01/24 72.7 kg (160 lb 4.4 oz)       PHYSICAL EXAM  GENERAL: Older patient looking quiet.  HEENT: Pupils equal and reactive. Extraocular movements intact. Nose intact. Pharynx moist, no thrush, no erythema.  NECK: Supple.  Aspen collar in place.  The nurse reports the wound looks good.    HEART:  Mostly regular rate and rhythm. No murmur or gallop auscultated.  The monitor shows AFib with a controlled rate.  LUNGS:  The lungs are clear.  She is diminished in the left base.  Lung excursion symmetrical. No change in fremitus.   ABDOMEN: Bowel sounds present. Non-tender, no masses palpated.  : Normal anatomy.    EXTREMITIES:  generalized weakness  LYMPHATICS: No adenopathy palpated, tr edema.  SKIN: Dry, intact, no lesions.   NEURO: Cranial nerves II-XII intact. Motor strength has improved in all extremities, but she remains very quadriparetic.  PSYCH:  Quiet      CBC LAST (LAST 24 HOURS)  Recent Labs   Lab 11/15/24  0440   WBC 5.67   RBC 3.45*   HGB 9.2*   HCT 27.0*   MCV 78*   MCH 26.7*   MCHC 34.1   RDW 14.5      MPV 11.1   GRAN 77.7*  4.4   LYMPH 10.1*  0.6*   MONO 8.5  0.5   BASO 0.04   NRBC 0       CHEMISTRY LAST (LAST 24 HOURS)  Recent Labs   Lab 11/15/24  0334 11/15/24  0440   NA  --  127*   K  --  3.9   CL  --  97   CO2   --  22*   ANIONGAP  --  8   BUN  --  34*   CREATININE  --  1.8*   GLU  --  72   CALCIUM  --  7.8*   PH 7.360  --    ALBUMIN  --  2.2*   PROT  --  4.7*   ALKPHOS  --  101   ALT  --  51*   AST  --  108*   BILITOT  --  0.5         LAST 7 DAYS MICROBIOLOGY   Microbiology Results (last 7 days)       ** No results found for the last 168 hours. **            MOST RECENT IMAGING  X-Ray Chest AP Portable  Narrative: EXAMINATION:  XR CHEST AP PORTABLE    CLINICAL HISTORY:  pulm edema, pleural effusions;    COMPARISON:  November 14    FINDINGS:  Cardiac silhouette is unchanged.  The thoracic aorta is mildly elongated.  Masslike prominence of the bilateral bertha is stable.    There is improved aeration of the lower lung zone on the left, with persistent small bilateral pleural effusions.  No pneumothorax is identified.    Right-sided PICC line is unchanged in position.  No acute osseous abnormality is demonstrated.  Chronic right-sided rotator cuff tear is incidentally noted.  Impression: 1. Slightly improved aeration of the left lung base.  No other significant changes compared to November 14.    Electronically signed by: Leif Jordan  Date:    11/15/2024  Time:    07:17    Chest x-ray 11/13 shows bilateral pleural effusions and huge pulmonary arteries.  There is a PICC line on the right side.  The CTA done yesterday shows clot bilaterally no huge amount of clot.    CURRENT VISIT EKG  No results found for this visit on 10/31/24.    ECHOCARDIOGRAM RESULTS  Results for orders placed during the hospital encounter of 10/31/24    Echo    Interpretation Summary    Left Ventricle: The left ventricle is normal in size. Normal wall thickness. Unable to assess wall motion. There is normal systolic function with a visually estimated ejection fraction of 60 - 65%.    Right Ventricle: Right ventricle was not well visualized due to poor acoustic window.  Grossly appears to be dilated with reduced function.  There appears to be some  concern of flattening of the interventricular septum which could indicate right ventricular pressure and volume overload.    Left Atrium: Left atrium is severely dilated.    IVC/SVC: Elevated venous pressure at 15 mmHg.    Oxygen INFORMATION  Oxygen Concentration (%):  [] 85    Maximum Vapotherm 40 L, 100%    IMPRESSION AND PLAN  Severe central cord compression   - now status post multilevel laminectomy and instrumentation  Quadriparesis   - improved but still quite severe  Severe pulmonary hypertension  - patient on Opsumit, Uptravi, and Adcirca, max doses    - patient's echo continues to show a functioning RV and LV.  - patient declining a [place]-Jens catheter which we would need to evaluate whether Flolan might be helpful  Pulmonary emboli  - will continue Lovenox 1 milligram/kilogram b.i.d.  Bilateral pleural effusions  - continue duresis  - if her renal function improves further will increase to b.i.d., pressure allowing  Shock  - pressor as needed  DVT  - nonocclusive to the right leg  - on full-dose Lovenox  Atrial fibrillation  - in AFib, rate controlled  - on oral amiodarone   Acute on chronic hypoxemic respiratory failure  - on 4 L of oxygen at home  - now requiring Vapotherm  - pulmonary hypertension meds being given  - takes Advair but is not obstructed  - will continue p.r.nSangeetha Felipe  Anemia  - chronic disease  - stable  Chronic kidney disease  - likely aggravated with the Levophed and the need for Levophed  - follow  Hyponatremia  - follow  Moderate hypoalbuminemia  - advance diet  Intracranial mass  Pulmonary nodules, 1 greater than 1 cm  - patient workup underway as an outpatient  Pharyngitis  - Chloraseptic spray    Palliative care note ready, will continue present support but is not getting better.   Await family decision on when to transition to hospice      Saeid Johnson MD  [facility] Pulmonary/Critical Care  11/15/2024

## 2024-11-15 NOTE — PT/OT/SLP DISCHARGE
Occupational Therapy Discharge Summary    Shanell Mahmood  MRN: [id number]   Principal Problem: Central cord syndrome      Patient Discharged from acute Occupational Therapy on 11/15/2024.    Assessment:      Patient is no longer making progress.    Objective:     GOALS:   Multidisciplinary Problems       Occupational Therapy Goals          Problem: Occupational Therapy    Goal Priority Disciplines Outcome Interventions   Occupational Therapy Goal     OT, PT/OT Progressing    Description: Goals to be met by: 12/2/2024  - revised target date, feeding and g/hygiene goals, all other goals remain appropriate.  Patient will increase functional independence with ADLs by performing:    Feeding with Minimal Assistance with adaptive device PRN.  UE Dressing with Minimal Assistance.  LE Dressing with Moderate Assistance.  Grooming while EOB with Minimal Assistance with adaptive device PRN.  Toileting from bedside commode with Minimal Assistance for hygiene and clothing management.   Sitting at edge of bed x15 minutes with Stand-by Assistance.  Supine to sit with Minimal Assistance.  Toilet transfer to bedside commode with Minimal Assistance.  Upper extremity exercise program, with assistance as needed.                         Reasons for Discontinuation of Therapy Services  Patient is unable to continue work toward goals because of medical or psychosocial complications.      Plan:     Patient Discharged to:  Home with Hospice Services.    11/15/2024

## 2024-11-15 NOTE — PT/OT/SLP PROGRESS
Physical Therapy      Patient Name:  Shanell Mahmood   MRN:  [id number]    Patient not seen today secondary to Nurse/ GERA hold (pending discharge disposition). Will follow-up 11/18/24.

## 2024-11-16 NOTE — ASSESSMENT & PLAN NOTE
Anemia is likely due to chronic disease due to Chronic Kidney Disease. Most recent hemoglobin and hematocrit are listed below.  Recent Labs     11/14/24  0449 11/15/24  0334 11/15/24  0440 11/16/24  0406   HGB 9.2*  --  9.2* 9.3*   HCT 26.8* 35* 27.0* 27.2*       Plan  - Monitor serial CBC: Daily  - Transfuse PRBC if patient becomes hemodynamically unstable, symptomatic or H/H drops below 7/21.  - Patient has not received any PRBC transfusions to date  - Patient's anemia is currently improving

## 2024-11-16 NOTE — ASSESSMENT & PLAN NOTE
S/p multilevel laminectomy (11/3/24)  C2-3, C3-4, C4-5, C5-6, C6-7 laminectomy  C3 through 6 posterior segmental instrumentation using DePGAMINSIDEhony system  C3-4, C4-5, C5-6 posterolateral arthrodesis using autograft harvested from the same incision and allograft DBM    Cervical collar in place  Neuro surgery follow up   Possible spinal shock ?? On vasopressor  PT OT when more stable    Cortisol added and normal

## 2024-11-16 NOTE — ASSESSMENT & PLAN NOTE
"Discovered after PET scan of lung  on 9/6/24 revealed abnormal uptake in brain. 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  Unclear if contributing to CVA  She will have her first appointment with Dr. Ray Mcintyre at  [facility]  on 11/4/24  -for comfort / hospice care soon   "

## 2024-11-16 NOTE — ASSESSMENT & PLAN NOTE
Advance Care Planning     Date: 11/15/2024  Patient is DNR now  and going into comfort care / hospice possible tomorrow

## 2024-11-16 NOTE — NURSING
Patient without new complaints during shift. Patient still unable to achieve comfort in bed. Patient turned on their right or left side every 30 to 60 minutes during shift. PRN pain medications administered to help ease discomfort. Family states they are likely to withdraw care on Sunday, but want family to be present. Assured family that there will be no withdrawal of care unless they are ready for it.

## 2024-11-16 NOTE — PROGRESS NOTES
Pulmonary/Critical Care   Progress Note      PATIENT NAME: Shanell Mahmood  MRN: [id number]  TODAY'S DATE: 2024  8:00 AM  ADMIT DATE: 10/31/2024  AGE: 74 y.o. : 1950    CONSULT REQUESTED BY: Eric Bass MD    REASON FOR CONSULT:   Critical care management    HPI:  The patient is a 74 year old female who had a syncopal event on 10/31.  She was weak, myelopathic.  CT of the head showed an acute infarct in the right caudate head.  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  The MRI of her cervical spine showed disc bulging and spondylolysis at C4-C5 and C5-C6 with severe spinal canal stenosis, cervical cord compression and cord signal alteration there was also broad-based disc bulging producing moderate spinal canal stenosis at C3-C4.  She was brought to the OR yesterday where she underwent C2- 3, 3-4, 4-5,5-6,  and 6- 7 laminectomies with C3 through 6 posterior segmental instrumentation and C3-4, 4- 5, 5- 6 posterolateral arthrodesis using autograft harvested from the incision and allograft of DBM.  This morning she remains profoundly myelopathic.  She is on Levophed at 0.06 mics per kilos per minute to maintain a mean of 80.    The patient has significant pulmonary hypertension in his managed with Opsumit 10 mg daily, Adcirca 40 mg daily, and Uptravi 1600 ug bid.  She is on 4 L continuous oxygen and has dyspnea with any exertion.  She is also on Advair 115/21 b.i.d.     the patient's oxygenation dropped dramatically this morning.  She is now on more levophed to maintain a mean of 80.  She is in a lot of pain.     the patient is requiring a little less oxygen this morning.  However, she is in AFib and had a 25 beat run of V-tach overnight.  She is on 0.24 of Levophed.  I am concerned that her pulmonary hypertension is significantly worse and have an echo coming.  Will start walking  the Levophed down and off as her renal function is worse, she is in AFib and she had V-tach last night.    11/7 the patient is continuing to decline.  She is on Vapotherm now at 30 L and 80% to maintain good oxygenation.  She is requiring 0.3 of Levophed to maintain her blood pressure.  We did fully anticoagulate with Levophed yesterday as I am still worried about a PE and the patient developed AFib with RVR.  Started on amiodarone and this morning she is back in sinus rhythm.  Limited echo yesterday did not show any worsening of the right ventricle but PA pressures were estimated at 60.  If her heart is actually working well, then we have no SVR, and this is spinal shock.  However, spinal shock is usually bradycardic.  A [place]-Jens catheter maybe helpful, but I am not sure how it will change our management unless the RV is failing and the PAs are too high and we need to be on IV Flolan.    11/8 the patient is on 0.3 of Levophed.  She is on 85% on the Vapotherm.  The patient is still declining to have a a [place]-Jens catheter.  We would need this to initiate Flolan.  She would like to continue as we are hoping that things will turn around.  Her creatinine is marginally better today.  She remains ahead on her I's and O's by about 6 L. I do not see any point in trying to transfer her if she is declining a [place].    11/9 the patient is still on 0.3 of Levophed.  Her oxygenation is slightly improved and we have decreased her Vapotherm to 70% and 20 L. will try adding vasopressin to see if we get any benefit from this.  Her creatinine is stable at 1.8.  She still does not want a [place]-Jens catheter.  She has not had a bowel movement in 3 days.  Will make sure we are doing rectal stimulation with her Dulcolax suppositories.    11/10 The patient has less Levophed need this morning.  She is on vasopressin 0.04.  Her O2, though, has had to be increased to 85% she is still on Vapotherm but only at 15 L. she has more movement in  her arms and her legs today and this may be what is helping us with a blood pressure.    11/11 the patient is significantly improved this morning we are down to 0.05 of Levophed and the vasopressin.  Her oxygen is down to 6 L nasal cannula.  Her strength continues to improve.    11/12 the patient's Levophed dose is back up to 0.1.  She is still on vasopressin.  Her oxygen requirements are also higher again today.    11/13 the patient is still requiring her Levophed and vasopressin.  She is still on maximum Vapotherm.  The CTA done yesterday showed bilateral pulmonary emboli but Dr. Carlos did not feel the clot burden was so significant as to be impacting her pressures and did not feel that removal of the clot would have a positive risk benefit ratio.    11/14 the patient is no better.  She is on max Vapotherm.  She is on 0.18 of Levophed and vasopressin.  Her  is coming in today to meet with Dr. Retana.  Her son's are back home and her daughter can not come today but would like to be on the phone.  Her sister is in the room.  Her sister is certainly ready for her to be out of this situation and wishes her not to suffer.  The patient is complaining of a sore throat when she swallows today I do not see anything in her pharynx.  She apparently diurese yesterday but the pure wick is not working.  Her chest x-ray shows a larger left smaller right effusion and left lower lobe edema.     11/15/2024 - Pt about the same, O2 needs have gone up and down some.  She is in no distress and she is oving toward hospice when all are ready.  She voiced no needs this AM    11/16/2024 - Stable overnight but O2 needs have increased, her only complaint is back pain and d/w nursing.  No new issues reported.  Labs reviewed - Creatinine has increased some, CXR looks like pulmonary edema    REVIEW OF SYSTEMS  GENERAL: Feeling tired  EYES: Vision is good.  ENT: pharyngitis with swallowing.   HEART: No chest pain or palpitations.  LUNGS:   Breathing is okay  GI:  She is anorectic  :  She is repeatedly soaked from the Lasix.  SKIN: No lesions or rashes.  MUSCULOSKELETAL: No joint pain or myalgias.  NEURO:  She reports her strength is about the same.  She still has paresthesias.  LYMPH: No edema or adenopathy.  PSYCH: No anxiety or depression.  ENDO: No weight change.    No change in the patient's Past Medical History, Past Surgical History, Social History or Family History since admission.    VITAL SIGNS (MOST RECENT)  Temp: 98.1 °F (36.7 °C) (11/16/24 0730)  Pulse: 77 (11/16/24 0800)  Resp: 12 (11/16/24 0904)  BP: (!) 123/55 (11/16/24 0730)  SpO2: 96 % (11/16/24 0800)    INTAKE AND OUTPUT (LAST 24 HOURS):  Intake/Output Summary (Last 24 hours) at 11/16/2024 1110  Last data filed at 11/16/2024 0900  Gross per 24 hour   Intake 892.04 ml   Output 1350 ml   Net -457.96 ml       WEIGHT  Wt Readings from Last 1 Encounters:   11/01/24 72.7 kg (160 lb 4.4 oz)       PHYSICAL EXAM  GENERAL: Older patient looking quiet.  HEENT: Pupils equal and reactive. Extraocular movements intact. Nose intact. Pharynx moist, no thrush, no erythema.  NECK: Supple.  Aspen collar in place.  The nurse reports the wound looks good.    HEART:  Mostly regular rate and rhythm. No murmur or gallop auscultated.  The monitor shows AFib with a controlled rate.  LUNGS:  The lungs are clear.  She is diminished in the left base.  Lung excursion symmetrical. No change in fremitus.   ABDOMEN: Bowel sounds present. Non-tender, no masses palpated.  : Normal anatomy.    EXTREMITIES:  generalized weakness  LYMPHATICS: No adenopathy palpated, tr edema.  SKIN: Dry, intact, no lesions.   NEURO: Cranial nerves II-XII intact. Motor strength has improved in all extremities, but she remains very quadriparetic.  PSYCH:  Quiet      CBC LAST (LAST 24 HOURS)  Recent Labs   Lab 11/16/24  0406   WBC 6.78   RBC 3.36*   HGB 9.3*   HCT 27.2*   MCV 81*   MCH 27.7   MCHC 34.2   RDW 14.7*      MPV 10.9    GRAN 79.2*  5.4   LYMPH 7.8*  0.5*   MONO 9.9  0.7   BASO 0.04   NRBC 0       CHEMISTRY LAST (LAST 24 HOURS)  Recent Labs   Lab 11/16/24  0406   *   K 4.0   CL 98   CO2 22*   ANIONGAP 8   BUN 39*   CREATININE 2.0*   GLU 63*   CALCIUM 7.6*   ALBUMIN 2.3*   PROT 4.8*   ALKPHOS 103   ALT 37   AST 53*   BILITOT 0.5         LAST 7 DAYS MICROBIOLOGY   Microbiology Results (last 7 days)       ** No results found for the last 168 hours. **            MOST RECENT IMAGING  X-Ray Chest AP Portable  Narrative: EXAMINATION:  XR CHEST AP PORTABLE    CLINICAL HISTORY:  pulm edema, pleural effusions;    COMPARISON:  11/15/2024, 11/14/2024    FINDINGS:  Cardiac silhouette size is stable compared to prior.  Right PICC is in stable position with tip overlying the SVC.  Prominent central pulmonary vascularity.  Mild bilateral interstitial/ground-glass opacities suggestive of pulmonary edema.  Small bilateral pleural effusions.  No pneumothorax.  No acute osseous abnormality.  Impression: Pulmonary edema and small bilateral pleural effusions.    Electronically signed by: Ashkan Broussard  Date:    11/16/2024  Time:    07:01    Chest x-ray 11/13 shows bilateral pleural effusions and huge pulmonary arteries.  There is a PICC line on the right side.  The CTA done yesterday shows clot bilaterally no huge amount of clot.    CURRENT VISIT EKG  No results found for this visit on 10/31/24.    ECHOCARDIOGRAM RESULTS  Results for orders placed during the hospital encounter of 10/31/24    Echo    Interpretation Summary    Left Ventricle: The left ventricle is normal in size. Normal wall thickness. Unable to assess wall motion. There is normal systolic function with a visually estimated ejection fraction of 60 - 65%.    Right Ventricle: Right ventricle was not well visualized due to poor acoustic window.  Grossly appears to be dilated with reduced function.  There appears to be some concern of flattening of the interventricular septum which  could indicate right ventricular pressure and volume overload.    Left Atrium: Left atrium is severely dilated.    IVC/SVC: Elevated venous pressure at 15 mmHg.    Oxygen INFORMATION  Oxygen Concentration (%):  [] 100    Maximum Vapotherm 40 L, 100%    IMPRESSION AND PLAN  Severe central cord compression   - now status post multilevel laminectomy and instrumentation  Quadriparesis   - improved but still quite severe  Severe pulmonary hypertension  - patient on Opsumit, Uptravi, and Adcirca, max doses    - patient's echo continues to show a functioning RV and LV.  - patient declining a [place]-Jens catheter which we would need to evaluate whether Flolan might be helpful  Pulmonary emboli  - will continue Lovenox 1 milligram/kilogram b.i.d.  Bilateral pleural effusions  - continue duresis  - if her renal function improves further will increase to b.i.d., pressure allowing  Shock  - pressor as needed  DVT  - nonocclusive to the right leg  - on full-dose Lovenox  Atrial fibrillation  - in AFib, rate controlled  - on oral amiodarone   Acute on chronic hypoxemic respiratory failure  - on 4 L of oxygen at home  - now requiring Vapotherm at 100% FiO2  - pulmonary hypertension meds being given  - takes Advair but is not obstructed  - will continue p.r.n. Matteo  Anemia  - chronic disease  - stable  Chronic kidney disease  - likely aggravated with the Levophed and the need for Levophed  - follow  Hyponatremia  - follow  Moderate hypoalbuminemia  - advance diet  Intracranial mass  Pulmonary nodules, 1 greater than 1 cm  - patient workup underway as an outpatient  Pharyngitis  - Chloraseptic spray    Palliative care note read, will continue present support but is not getting better.   Await family decision on when to transition to hospice      Saeid Johnson MD  [facility] Pulmonary/Critical Care  11/16/2024

## 2024-11-16 NOTE — RESPIRATORY THERAPY
11/15/24 1916   Patient Assessment/Suction   Level of Consciousness (AVPU) alert   Respiratory Effort Normal;Unlabored   Expansion/Accessory Muscles/Retractions no use of accessory muscles   All Lung Fields Breath Sounds Anterior:;diminished   Rhythm/Pattern, Respiratory unlabored;pattern regular;depth regular;no shortness of breath reported   Skin Integrity   $ Wound Care Tech Time 15 min   Area Observed Left;Right;Behind ear;Cheek;Upper lip;Nares   Skin Appearance without discoloration   PRE-TX-O2   Device (Oxygen Therapy) high flow nasal cannula w/device   Humidification temp set 35   Humidification temp actual 35   Flow (L/min) (Oxygen Therapy) 25   Oxygen Concentration (%) 100   SpO2 99 %   Pulse Oximetry Type Continuous   $ Pulse Oximetry - Multiple Charge Pulse Oximetry - Multiple   Pulse 80   Resp (!) 8   Aerosol Therapy   $ Aerosol Therapy Charges PRN treatment not required   Daily Review of Necessity (SVN) completed   Respiratory Treatment Status (SVN) PRN treatment not required   Incentive Spirometer   $ Incentive Spirometer Charges done with encouragement   Incentive Spirometer Predicted Level (mL) 1760   Administration (IS) mouthpiece utilized   Number of Repetitions (IS) 10   Level Incentive Spirometer (mL) 1500   Patient Tolerance (IS) good;no adverse signs/symptoms present   Vibratory PEP Therapy   $ Vibratory PEP Charges Aerobika Therapy   $ Vibratory PEP Equipment Aerobika Equipment   $ Vibratory PEP Tech Time Charges 15 min   Daily Review of Necessity (PEP Therapy) completed   Type (PEP Therapy) vibratory/oscillatory   Device (PEP Therapy) flutter   Route (PEP Therapy) mouthpiece   Breaths per Cycle (PEP Therapy) 10   Cycles (PEP Therapy) 1   Settings (PEP Therapy) PEP 5   Patient Position (PEP Therapy) semi-Roque's   Post Treatment Assessment (PEP) breath sounds unchanged   Signs of Intolerance (PEP Therapy) none   Ready to Wean/Extubation Screen   FIO2<=50 (chart decimal) (!) 1   Education    $ Education 15 min;Oxygen;Incentive Spirometry;Other (see comment)   Respiratory Evaluation   $ Care Plan Tech Time 15 min

## 2024-11-16 NOTE — ASSESSMENT & PLAN NOTE
Found on u/s 11/5/24 and CTA 11/12   hypoechoic peripheral nonocclusive thrombus within the proximal right superficial femoral vein    full dose lovenox   Close monitor  s/p spine surgery   Associated with hypotension /shock suspicious for PE  and CTA confirmed PE   Vascular did not recommend thrombectomy

## 2024-11-16 NOTE — ASSESSMENT & PLAN NOTE
Patient has paroxysmal (<7 days) atrial fibrillation. Patient is currently in atrial fibrillation. [id number] Score: 3. The patients heart rate in the last 24 hours is as follows:  Pulse  Min: 67  Max: 85     Antiarrhythmics  amiodarone tablet 200 mg, 2 times daily, Oral    Anticoagulants  enoxaparin injection 70 mg, Every 24 hours, Subcutaneous    Plan  - Replete lytes with a goal of K>4, Mg >2  - Patient is anticoagulated, see medications listed above.  - Patient's afib is currently uncontrolled. Will continue current treatment  S/p amiodarone drip and changed to PO   In and out of A fib   On full dose lovenox

## 2024-11-16 NOTE — ASSESSMENT & PLAN NOTE
Creatine stable for now. BMP reviewed- noted Estimated Creatinine Clearance: 24.4 mL/min (A) (based on SCr of 2 mg/dL (H)). according to latest data. Based on current GFR, CKD stage is stage 4 - GFR 15-29.  Monitor UOP and serial BMP and adjust therapy as needed. Renally dose meds. Avoid nephrotoxic medications and procedures.    Close monitor

## 2024-11-16 NOTE — PLAN OF CARE
"  Problem: Stroke, Ischemic (Includes Transient Ischemic Attack)  Goal: Effective Bowel Elimination  Outcome: Progressing  Goal: Optimal Cognitive Function  Outcome: Progressing  Goal: Effective Urinary Elimination  Outcome: Progressing     Problem: Wound  Goal: Optimal Coping  Outcome: Progressing     Problem: Oral Intake Inadequate  Goal: Improved Oral Intake  Outcome: Not Progressing     Problem: Coping Ineffective  Goal: Effective Coping  Outcome: Progressing     Problem: Spinal Surgery  Goal: Optimal Functional Ability  Outcome: Progressing  Goal: Absence of Infection Signs and Symptoms  Outcome: Progressing  Goal: Optimal Pain Control and Function  Outcome: Progressing  Goal: Effective Urinary Elimination  Outcome: Progressing  Goal: Effective Oxygenation and Ventilation  Outcome: Progressing     Problem: Syncope  Goal: Absence of Syncopal Symptoms  Outcome: Progressing     Problem: Dysrhythmia  Goal: Normalized Cardiac Rhythm  Outcome: Progressing       Pt turned from side to side every 45 - 65 minutes per pt request. Pt wanting to be up against the rail despite being told it is not a good idea. Pillows placed between pt and rail. Neck brace in place. Pt wanting it loosened at times.     1055 O2 sats noted to be 100 % vapotherm turned down to 95% remain on 25 liters.     1130  pt's O2 level 100% on right side. Vapotherm turned down to 20 liters and 85%. Map remaining consistent  at 70. Levo turned down from 0.36 to 0.32.    1230 pt turned to the left BP went to 102/42 map of 60 even as low as 84/35 w/map 52 before levo was changed back to 0.4 mcg/kg.  O2 sats dropped to 85%. Turned back up vapotherm to 30 liters and 100%.     1330 pt  turned back to right per her request , /45 map 61.  98% sats  1355 vapotherm decreased to 25 liters and 90%. Sats maintained in the 99%.     1550  pt requested to sit on side of bed to dangle her legs. Pt stated " I've been trying to get them to do that". Pt sat on side of " bed for about 20 minutes with either RN or daughter standing in front of her. Daughter combed out pt's hair and oiled it. Pulled it back into a braided ponytail. Pt's BP while siting on side of bed 138/61 with map of 83, sats remain at 99% with vapotherm still at 20 liters and 85%.    1715 pt sitting up at 90 degree in bed. Bp 116/54 map 74. Sats 91-93%.

## 2024-11-16 NOTE — PLAN OF CARE
Problem: Adult Inpatient Plan of Care  Goal: Plan of Care Review  11/16/2024 0504 by Jesus Broussard RN  Outcome: Progressing     Problem: Adult Inpatient Plan of Care  Goal: Patient-Specific Goal (Individualized)  11/16/2024 0504 by Jesus Broussard RN  Outcome: Progressing     Problem: Adult Inpatient Plan of Care  Goal: Absence of Hospital-Acquired Illness or Injury  11/16/2024 0504 by Jesus Broussard RN  Outcome: Progressing     Problem: Adult Inpatient Plan of Care  Goal: Optimal Comfort and Wellbeing  11/16/2024 0504 by Jesus Broussard RN  Outcome: Progressing     Problem: Adult Inpatient Plan of Care  Goal: Readiness for Transition of Care  11/16/2024 0504 by eJsus Broussard RN  Outcome: Progressing     Problem: Skin Injury Risk Increased  Goal: Skin Health and Integrity  11/16/2024 0504 by Jesus Broussard RN  Outcome: Progressing     Problem: Stroke, Ischemic (Includes Transient Ischemic Attack)  Goal: Optimal Coping  11/16/2024 0504 by Jesus Broussard RN  Outcome: Progressing     Problem: Stroke, Ischemic (Includes Transient Ischemic Attack)  Goal: Effective Bowel Elimination  11/16/2024 0504 by Jesus Broussard RN  Outcome: Progressing     Problem: Stroke, Ischemic (Includes Transient Ischemic Attack)  Goal: Optimal Cerebral Tissue Perfusion  11/16/2024 0504 by Jesus Broussard RN  Outcome: Progressing     Problem: Stroke, Ischemic (Includes Transient Ischemic Attack)  Goal: Optimal Cognitive Function  11/16/2024 0504 by Jesus Broussard RN  Outcome: Progressing     Problem: Stroke, Ischemic (Includes Transient Ischemic Attack)  Goal: Improved Communication Skills  11/16/2024 0504 by Jesus Broussard RN  Outcome: Progressing     Problem: Stroke, Ischemic (Includes Transient Ischemic Attack)  Goal: Optimal Functional Ability  11/16/2024 0504 by Jesus Broussard RN  Outcome: Progressing     Problem: Stroke, Ischemic (Includes Transient Ischemic Attack)  Goal: Optimal Nutrition Intake  11/16/2024 0504 by Jesus Broussard  RN  Outcome: Progressing     Problem: Stroke, Ischemic (Includes Transient Ischemic Attack)  Goal: Effective Oxygenation and Ventilation  11/16/2024 0504 by Jesus Broussard RN  Outcome: Progressing     Problem: Stroke, Ischemic (Includes Transient Ischemic Attack)  Goal: Improved Sensorimotor Function  11/16/2024 0504 by Jesus Broussard RN  Outcome: Progressing     Problem: Stroke, Ischemic (Includes Transient Ischemic Attack)  Goal: Safe and Effective Swallow  11/16/2024 0504 by Jesus Broussard RN  Outcome: Progressing     Problem: Stroke, Ischemic (Includes Transient Ischemic Attack)  Goal: Effective Urinary Elimination  11/16/2024 0504 by Jesus Broussard RN  Outcome: Progressing     Problem: Fall Injury Risk  Goal: Absence of Fall and Fall-Related Injury  11/16/2024 0504 by Jesus Broussard RN  Outcome: Progressing     Problem: Infection  Goal: Absence of Infection Signs and Symptoms  11/16/2024 0504 by Jesus Broussard RN  Outcome: Progressing     Problem: Wound  Goal: Optimal Coping  11/16/2024 0504 by Jesus Broussard RN  Outcome: Progressing     Problem: Wound  Goal: Optimal Functional Ability  11/16/2024 0504 by Jesus Broussard RN  Outcome: Progressing     Problem: Wound  Goal: Absence of Infection Signs and Symptoms  11/16/2024 0504 by Jesus Broussard RN  Outcome: Progressing     Problem: Wound  Goal: Improved Oral Intake  11/16/2024 0504 by Jesus Broussard RN  Outcome: Progressing     Problem: Wound  Goal: Optimal Pain Control and Function  11/16/2024 0504 by Jesus Broussard RN  Outcome: Progressing     Problem: Wound  Goal: Skin Health and Integrity  11/16/2024 0504 by Jesus Broussard RN  Outcome: Progressing     Problem: Wound  Goal: Optimal Wound Healing  11/16/2024 0504 by Jesus Broussard RN  Outcome: Progressing     Problem: Oral Intake Inadequate  Goal: Improved Oral Intake  11/16/2024 0504 by Jesus Broussard RN  Outcome: Progressing     Problem: Coping Ineffective  Goal: Effective Coping  11/16/2024 0504 by Bobo  MARYA Lee  Outcome: Progressing

## 2024-11-16 NOTE — PROGRESS NOTES
[facility] Medicine  Progress Note    Patient Name: Shanell Mahmood  MRN: [id number]  Patient Class: IP- Inpatient   Admission Date: 10/31/2024  Length of Stay: 15 days  Attending Physician: Eric Bass MD  Primary Care Provider: Nicole, Primary Doctor        Subjective:     Principal Problem:Central cord syndrome        HPI:  Shanell Mahmood is a 74 year old female with a previous medical history of anemia, Pulmonary hypertension on 4 liters continuous oxygen at home, arthritis, CKD V, Osteoporosis, secondary hyperprathyroidism, S/P closure of patent foramen ovale in 2011, CVA in 2011 with no residuals, chronic back pain, HLD, Gout, Brain Mass and thyroid diease who presented to the ED after unwitnessed syncopal episode. The patient was at her pain management office for re certification only. There were no procedures performed. She reports taking one hydrocodone 10mg today.  The last thing she remembers was walking down the hallway of the office and looking for something to cover her head from the rain and did not have any adverse symptoms or feelings at that time.  She has no recollection of the syncopal events. When she was initially evaluated by EMS, her blood pressure was in the 60s over 40s. She did require constant stimulation to remain awake. Fell and hit her head. Does not take any blood thinners. EMS evaluated her and gave her 1 mg of Narcan as well as 250 cc of fluid. Her pressure improved and she had become more alert. Initial ED workup was thorough and all imaging showing that included CT of neck, head and entire spine showed no acute processes. CBC showed anemia and CMP showed elevated creatinine consistent with history of CKD V. Drug screen positive for opioids but patient has a hydrocodone prescription. The patient is currently in the process of have a left sided brain mass visualized on MRI 10/8/24 evaluated that was an incidental finding after undergoing a PET scan for a pulmonary  "nodule on 9/6/24 with abnormal uptake noted in brain. She has her first appointment on 11/4/24. She is followed by nephrology who is currently monitoring her and there has been one attempt at AV fistual placement but it was unsuccessful due sclerotic changes. Patient reports she awoke this morning feeling well showered and dressed herself. She performs all of her own ADL's. She had one syncopal event in March 2024 and was evaluated by cardiology and informed it was an orthostatic episode. Patient was unable to complete orthostatic vital signs upon admission due to inability to stand stating " I feel as if I can't get my legs under me". When evaluated for admit exam the patient endorses that after the syncopal episode she endorses bilateral leg weakness with decreased sensation in both legs. She reports bilateral  weakness being unable to use her phone and difficulty raising both of her arms. NIH scale performed and total of 9 noted. Patient noted have 400ml of urine in bladder and unable to void. Patient reached a total of 528ml of urine without being able to void.  MRI/MRA of brain and MRI of lumbosacral spine ordered upon admit. MRA and MRI lower back showed no acute process. MRI brain showed Acute infarct in the right caudate head. Dr. Maldonado was called and he recommended MRA of neck in morning and load with aspirin and plavix. Initial EKG read as atrial fibrillation but upon review p waves were noted and this was discussed with the ED. Repeat EKG shows sinus arrhthymias with PACs. Patient admitted by hospital medicine for further evaluation and management.       Overview/Hospital Course:  74-year-old female with history of brain mass, CKD, back pain, pulmonary hypertension on 4 L oxygen is admitted after syncope and collapse found to have a acute CVA in the right caudate on MRI brain.   She Has global weakness and decreased sensation to the lower legs.  Multiple CT and x-rays done to rule out trauma ultimately " negative other than the maxillofacial CT reporting mild irregularity of the interior midline mandible with small adjacent bone fragments which could represent acute fracture with overlying soft tissue swelling.    Carotid ultrasound and MRA brain and neck without acute finding.  Neurology is consulted.  Also with underlying brain mass.  Consult Neurosurgery and Oncology.  Given DAPT and statin.  Had hypotension given IVF and midodrine.  Echo shows right heart failure.  BNP is within normal.  Lactic acid pending.  No other sign of infection.  Placed in cervical collar as precaution and MRI of the cervical spine shows severe cord compression at C4-5 and C5-6 likely acute with edema.  Neurosurgery discussed with patient  and plan to proceed with surgical decompression  and  patient underwent surgery on : 11/3/24 multilevel C-spine laminectomy.  Later patient continued to have hypotension and needed vasopressor and another vasopressor vasopressin is added  Patient also her DVT of the the proximal right superficial femoral vein. And nonocclusive thrombus within the right popliteal vein,  Also patient developed new onset AFib and started amiodarone drip and in and out of a fib  and later changed to PO amiodarone   Later patient was more hypoxic and not able to wean vasopressor and PE study was done and found to have segmental PE with right heart strain . Patient was on full dose lovenox . Vascular surgery consulted and reviewed the film and did not recommend thrombectomy intervention .    Interval History:     Seen with RN   On vapotherm 25 %  On one vasopressor   She is awake and no special complaint   D/w daughter     Review of Systems  Objective:     Vital Signs (Most Recent):  Temp: 98.1 °F (36.7 °C) (11/16/24 0730)  Pulse: 77 (11/16/24 0800)  Resp: 12 (11/16/24 0904)  BP: (!) 123/55 (11/16/24 0730)  SpO2: 96 % (11/16/24 0800) Vital Signs (24h Range):  Temp:  [97.8 °F (36.6 °C)-98.1 °F (36.7 °C)] 98.1 °F (36.7  "°C)  Pulse:  [67-85] 77  Resp:  [4-20] 12  SpO2:  [90 %-100 %] 96 %  BP: ()/(50-66) 123/55  Arterial Line BP: (106-131)/(44-62) 106/48     Weight: 72.7 kg (160 lb 4.4 oz)  Body mass index is 25.1 kg/m².    Intake/Output Summary (Last 24 hours) at 11/16/2024 1037  Last data filed at 11/16/2024 0900  Gross per 24 hour   Intake 892.04 ml   Output 1350 ml   Net -457.96 ml         Physical Exam  Vitals and nursing note reviewed.   HENT:      Head: Normocephalic and atraumatic.      Mouth/Throat:      Mouth: Mucous membranes are moist.   Eyes:      Conjunctiva/sclera: Conjunctivae normal.   Neck:      Vascular: No JVD.   Cardiovascular:      Rate and Rhythm: Normal rate.   Pulmonary:      Effort: Pulmonary effort is normal.      Breath sounds: Normal breath sounds.   Abdominal:      Palpations: Abdomen is soft.   Skin:     General: Skin is warm.   Neurological:      Mental Status: She is alert and oriented to person, place, and time.             Significant Labs: All pertinent labs within the past 24 hours have been reviewed.  CBC:   Recent Labs   Lab 11/15/24  0334 11/15/24  0440 11/16/24  0406   WBC  --  5.67 6.78   HGB  --  9.2* 9.3*   HCT 35* 27.0* 27.2*   PLT  --  198 187     CMP:   Recent Labs   Lab 11/15/24  0440 11/16/24  0406   * 128*   K 3.9 4.0   CL 97 98   CO2 22* 22*   GLU 72 63*   BUN 34* 39*   CREATININE 1.8* 2.0*   CALCIUM 7.8* 7.6*   PROT 4.7* 4.8*   ALBUMIN 2.2* 2.3*   BILITOT 0.5 0.5   ALKPHOS 101 103   * 53*   ALT 51* 37   ANIONGAP 8 8     Cardiac Markers: No results for input(s): "CKMB", "MYOGLOBIN", "BNP", "TROPISTAT" in the last 48 hours.    Significant Imaging: I have reviewed all pertinent imaging results/findings within the past 24 hours.    Assessment/Plan:      * Central cord syndrome  S/p multilevel laminectomy (11/3/24)  C2-3, C3-4, C4-5, C5-6, C6-7 laminectomy  C3 through 6 posterior segmental instrumentation using [employer] system  C3-4, C4-5, C5-6 posterolateral " arthrodesis using autograft harvested from the same incision and allograft DBM    Cervical collar in place  Neuro surgery follow up   Possible spinal shock ?? On vasopressor  PT OT when more stable    Cortisol added and normal       ACP (advance care planning)  Advance Care Planning    Date: 11/15/2024  Patient is DNR now  and going into comfort care / hospice possible tomorrow           Paroxysmal atrial fibrillation  Patient has paroxysmal (<7 days) atrial fibrillation. Patient is currently in atrial fibrillation. [id number] Score: 3. The patients heart rate in the last 24 hours is as follows:  Pulse  Min: 67  Max: 85     Antiarrhythmics  amiodarone tablet 200 mg, 2 times daily, Oral    Anticoagulants  enoxaparin injection 70 mg, Every 24 hours, Subcutaneous    Plan  - Replete lytes with a goal of K>4, Mg >2  - Patient is anticoagulated, see medications listed above.  - Patient's afib is currently uncontrolled. Will continue current treatment  S/p amiodarone drip and changed to PO   In and out of A fib   On full dose lovenox           S/P cervical spinal fusion  Multi levels  See NSGY op note     Acute deep vein thrombosis (DVT) of popliteal vein of right lower extremity, nonocclusive and pulmonary embolism  Found on u/s 11/5/24 and CTA 11/12   hypoechoic peripheral nonocclusive thrombus within the proximal right superficial femoral vein    full dose lovenox   Close monitor  s/p spine surgery   Associated with hypotension /shock suspicious for PE  and CTA confirmed PE   Vascular did not recommend thrombectomy        Quadriparesis  PT/OT  Frequent turns   Air mattress        Myelopathy  aware      Status post cervical spinal fusion  PT/OT  Cervical collar in place   Pain Mx      Hypotension  Asymptomatic.  Etiology most likely from pulmonary embolism/pul HTN / A fib with RVR   Echo reviewed.    -keep hold any BP meds or diuretics  On midodrine 15 tid   Currently on 1 vasopressor  Pulmonary offered swan ramya but  "patient refused   Cortisol random normal     Thrombocytopenia  The patients 3 most recent labs are listed below.  Recent Labs     11/14/24  0449 11/15/24  0440 11/16/24  0406    198 187       Plan  - Will transfuse if platelet count is <100k (if undergoing neurosurgery), or otherwise less than 10 K  -trend daily    Anemia  Anemia is likely due to chronic disease due to Chronic Kidney Disease. Most recent hemoglobin and hematocrit are listed below.  Recent Labs     11/14/24  0449 11/15/24  0334 11/15/24  0440 11/16/24  0406   HGB 9.2*  --  9.2* 9.3*   HCT 26.8* 35* 27.0* 27.2*       Plan  - Monitor serial CBC: Daily  - Transfuse PRBC if patient becomes hemodynamically unstable, symptomatic or H/H drops below 7/21.  - Patient has not received any PRBC transfusions to date  - Patient's anemia is currently improving    Stroke  Acute CVA right caudate  MRI, MRA, CT, carotid U/S reports reviewed  Plavix  and statin and patient also need full dose lovenox   -neurology following  -neurosurgery and oncology consulted for the brain mass and follow up as OP   -PT/OT/SLP when more stable   -echo bubble report is seem edited. Not mentioning of PFO  Patient going to hospice care and possible withdrawal of vasopressors over the weekend    Brain mass  Discovered after PET scan of lung  on 9/6/24 revealed abnormal uptake in brain. 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  Unclear if contributing to CVA  She will have her first appointment with Dr. Ray Mcintyre at  [facility]  on 11/4/24  -for comfort / hospice care soon     Secondary hyperparathyroidism  Chronic condition that is stable.     Pulmonary hypertension  Chronic condition   Macitentan, selexipag and tadalafil not available and sildenafil changed and continued   Patient refuse swan ramya    Chronic respiratory failure with hypoxia  Patient with " Hypoxic Respiratory failure which is Chronic.  she is on home oxygen at 4 LPM.   Secondary to pulmonary embolism  Full-dose Lovenox    CKD (chronic kidney disease), stage IV  Creatine stable for now. BMP reviewed- noted Estimated Creatinine Clearance: 24.4 mL/min (A) (based on SCr of 2 mg/dL (H)). according to latest data. Based on current GFR, CKD stage is stage 4 - GFR 15-29.  Monitor UOP and serial BMP and adjust therapy as needed. Renally dose meds. Avoid nephrotoxic medications and procedures.    Close monitor     Syncope and collapse  See stroke      Acute urinary retention  Possibly neurogenic from the CVA or from cervical spine cord compression  Urinary catheter. Appear may need long term        VTE Risk Mitigation (From admission, onward)           Ordered     enoxaparin injection 70 mg  Every 24 hours         11/15/24 0928     Reason for No Pharmacological VTE Prophylaxis  Once        Question:  Reasons:  Answer:  Risk of Bleeding    10/31/24 1849     IP VTE HIGH RISK PATIENT  Once         10/31/24 1849     Place sequential compression device  Until discontinued         10/31/24 1849                    Discharge Planning   ELBERT:      Code Status: DNR   Is the patient medically ready for discharge?:     Reason for patient still in hospital (select all that apply): Treatment  Discharge Plan A: Inpatient Hospice   Discharge Delays: None known at this time        Critical care time spent on the evaluation and treatment of severe organ dysfunction, review of pertinent labs and imaging studies, discussions with consulting providers and discussions with patient/family: 33 minutes.      Eric Bass MD  Department of Hospital Medicine   [facility]

## 2024-11-16 NOTE — ASSESSMENT & PLAN NOTE
"[facility] for concerns of  Chief Complaint   Patient presents with   • Seizure     Seizure like activity, approx 3 min duration. Pt cried afterwards then fell asleep. FSBG by EMS = 95   • Loss of Appetite     Since Sat evening. Wet diapers are still normal. Pt is consuming some fluids.     Patient medicated at home, prior to arrival, with Tylenol at 1230.      Patient to lobby with mother in no apparent distress.  NPO status explained by this RN. Education provided about triage process; regarding acuities and possible wait time. Mother verbalizes understanding to inform staff of any new concerns or change in status.      Mother denies recent exposure to any known COVID-19 positive individuals.  This RN provided education about organizational visitor policy, and also about the importance of keeping mask in place over both mouth and nose for duration of Emergency Room visit.    BP (!) 134/93 Comment: Pt upset, crying.  Pulse (!) 151   Temp 37.6 °C (99.7 °F) (Rectal)   Resp 38   Ht 0.775 m (2' 6.5\")   Wt 10.1 kg (22 lb 4.3 oz)   SpO2 100%   BMI 16.83 kg/m²     COVID screening: NEG    " Chronic condition   Macitentan, selexipag and tadalafil not available and sildenafil changed and continued   Patient refuse padmini bui

## 2024-11-16 NOTE — ASSESSMENT & PLAN NOTE
Asymptomatic.  Etiology most likely from pulmonary embolism/pul HTN / A fib with RVR   Echo reviewed.    -keep hold any BP meds or diuretics  On midodrine 15 tid   Currently on 1 vasopressor  Pulmonary offered swan ramya but patient refused   Cortisol random normal

## 2024-11-16 NOTE — SUBJECTIVE & OBJECTIVE
Interval History:     Seen with RN   On vapotherm 25 %  On one vasopressor   She is awake and no special complaint   D/w daughter     Review of Systems  Objective:     Vital Signs (Most Recent):  Temp: 98.1 °F (36.7 °C) (11/16/24 0730)  Pulse: 77 (11/16/24 0800)  Resp: 12 (11/16/24 0904)  BP: (!) 123/55 (11/16/24 0730)  SpO2: 96 % (11/16/24 0800) Vital Signs (24h Range):  Temp:  [97.8 °F (36.6 °C)-98.1 °F (36.7 °C)] 98.1 °F (36.7 °C)  Pulse:  [67-85] 77  Resp:  [4-20] 12  SpO2:  [90 %-100 %] 96 %  BP: ()/(50-66) 123/55  Arterial Line BP: (106-131)/(44-62) 106/48     Weight: 72.7 kg (160 lb 4.4 oz)  Body mass index is 25.1 kg/m².    Intake/Output Summary (Last 24 hours) at 11/16/2024 1037  Last data filed at 11/16/2024 0900  Gross per 24 hour   Intake 892.04 ml   Output 1350 ml   Net -457.96 ml         Physical Exam  Vitals and nursing note reviewed.   HENT:      Head: Normocephalic and atraumatic.      Mouth/Throat:      Mouth: Mucous membranes are moist.   Eyes:      Conjunctiva/sclera: Conjunctivae normal.   Neck:      Vascular: No JVD.   Cardiovascular:      Rate and Rhythm: Normal rate.   Pulmonary:      Effort: Pulmonary effort is normal.      Breath sounds: Normal breath sounds.   Abdominal:      Palpations: Abdomen is soft.   Skin:     General: Skin is warm.   Neurological:      Mental Status: She is alert and oriented to person, place, and time.             Significant Labs: All pertinent labs within the past 24 hours have been reviewed.  CBC:   Recent Labs   Lab 11/15/24  0334 11/15/24  0440 11/16/24  0406   WBC  --  5.67 6.78   HGB  --  9.2* 9.3*   HCT 35* 27.0* 27.2*   PLT  --  198 187     CMP:   Recent Labs   Lab 11/15/24  0440 11/16/24  0406   * 128*   K 3.9 4.0   CL 97 98   CO2 22* 22*   GLU 72 63*   BUN 34* 39*   CREATININE 1.8* 2.0*   CALCIUM 7.8* 7.6*   PROT 4.7* 4.8*   ALBUMIN 2.2* 2.3*   BILITOT 0.5 0.5   ALKPHOS 101 103   * 53*   ALT 51* 37   ANIONGAP 8 8     Cardiac  "Markers: No results for input(s): "CKMB", "MYOGLOBIN", "BNP", "TROPISTAT" in the last 48 hours.    Significant Imaging: I have reviewed all pertinent imaging results/findings within the past 24 hours.  "

## 2024-11-16 NOTE — ASSESSMENT & PLAN NOTE
The patients 3 most recent labs are listed below.  Recent Labs     11/14/24  0449 11/15/24  0440 11/16/24  0406    198 187       Plan  - Will transfuse if platelet count is <100k (if undergoing neurosurgery), or otherwise less than 10 K  -trend daily

## 2024-11-17 PROBLEM — I27.21 PULMONARY ARTERY HYPERTENSION: Status: ACTIVE | Noted: 2024-01-01

## 2024-11-17 NOTE — PROGRESS NOTES
[facility]  Progress Note    Patient Name: Shanell Mahmood  MRN: [id number]  Patient Class: IP- Inpatient   Admission Date: 10/31/2024  Length of Stay: 16 days  Attending Physician: Andriy Kilgore MD  Primary Care Provider: Nicole, Primary Doctor        Subjective:     Principal Problem:Central cord syndrome        HPI:  Shanell Mahmood is a 74 year old female with a previous medical history of anemia, Pulmonary hypertension on 4 liters continuous oxygen at home, arthritis, CKD V, Osteoporosis, secondary hyperprathyroidism, S/P closure of patent foramen ovale in 2011, CVA in 2011 with no residuals, chronic back pain, HLD, Gout, Brain Mass and thyroid diease who presented to the ED after unwitnessed syncopal episode. The patient was at her pain management office for re certification only. There were no procedures performed. She reports taking one hydrocodone 10mg today.  The last thing she remembers was walking down the hallway of the office and looking for something to cover her head from the rain and did not have any adverse symptoms or feelings at that time.  She has no recollection of the syncopal events. When she was initially evaluated by EMS, her blood pressure was in the 60s over 40s. She did require constant stimulation to remain awake. Fell and hit her head. Does not take any blood thinners. EMS evaluated her and gave her 1 mg of Narcan as well as 250 cc of fluid. Her pressure improved and she had become more alert. Initial ED workup was thorough and all imaging showing that included CT of neck, head and entire spine showed no acute processes. CBC showed anemia and CMP showed elevated creatinine consistent with history of CKD V. Drug screen positive for opioids but patient has a hydrocodone prescription. The patient is currently in the process of have a left sided brain mass visualized on MRI 10/8/24 evaluated that was an incidental finding after undergoing a PET scan for a pulmonary nodule  "on 9/6/24 with abnormal uptake noted in brain. She has her first appointment on 11/4/24. She is followed by nephrology who is currently monitoring her and there has been one attempt at AV fistual placement but it was unsuccessful due sclerotic changes. Patient reports she awoke this morning feeling well showered and dressed herself. She performs all of her own ADL's. She had one syncopal event in March 2024 and was evaluated by cardiology and informed it was an orthostatic episode. Patient was unable to complete orthostatic vital signs upon admission due to inability to stand stating " I feel as if I can't get my legs under me". When evaluated for admit exam the patient endorses that after the syncopal episode she endorses bilateral leg weakness with decreased sensation in both legs. She reports bilateral  weakness being unable to use her phone and difficulty raising both of her arms. NIH scale performed and total of 9 noted. Patient noted have 400ml of urine in bladder and unable to void. Patient reached a total of 528ml of urine without being able to void.  MRI/MRA of brain and MRI of lumbosacral spine ordered upon admit. MRA and MRI lower back showed no acute process. MRI brain showed Acute infarct in the right caudate head. Dr. Maldonado was called and he recommended MRA of neck in morning and load with aspirin and plavix. Initial EKG read as atrial fibrillation but upon review p waves were noted and this was discussed with the ED. Repeat EKG shows sinus arrhthymias with PACs. Patient admitted by hospital medicine for further evaluation and management.       Overview/Hospital Course:  74-year-old female with history of brain mass, CKD, back pain, pulmonary hypertension on 4 L oxygen is admitted after syncope and collapse found to have a acute CVA in the right caudate on MRI brain.   She Has global weakness and decreased sensation to the lower legs.  Multiple CT and x-rays done to rule out trauma ultimately " negative other than the maxillofacial CT reporting mild irregularity of the interior midline mandible with small adjacent bone fragments which could represent acute fracture with overlying soft tissue swelling.    Carotid ultrasound and MRA brain and neck without acute finding.  Neurology is consulted.  Also with underlying brain mass.  Consult Neurosurgery and Oncology.  Given DAPT and statin.  Had hypotension given IVF and midodrine.  Echo shows right heart failure.  BNP is within normal.  Lactic acid pending.  No other sign of infection.  Placed in cervical collar as precaution and MRI of the cervical spine shows severe cord compression at C4-5 and C5-6 likely acute with edema.  Neurosurgery discussed with patient  and plan to proceed with surgical decompression  and  patient underwent surgery on : 11/3/24 multilevel C-spine laminectomy.  Later patient continued to have hypotension and needed vasopressor and another vasopressor vasopressin is added  Patient also her DVT of the the proximal right superficial femoral vein. And nonocclusive thrombus within the right popliteal vein,  Also patient developed new onset AFib and started amiodarone drip and in and out of a fib  and later changed to PO amiodarone   Later patient was more hypoxic and not able to wean vasopressor and PE study was done and found to have segmental PE with right heart strain . Patient was on full dose lovenox . Vascular surgery consulted and reviewed the film and did not recommend thrombectomy intervention .    Interval History:   Seen and examined at multidisciplinary rounds, looks very weak, still quadriplegic, On vapotherm 25 LPM 85%  On one vasopressor   She is awake and no special complaint   D/w daughter and  the family member at bedside.     Review of Systems  Objective:     Vital Signs (Most Recent):  Temp: 97.7 °F (36.5 °C) (11/17/24 1101)  Pulse: 69 (11/17/24 1101)  Resp: (!) 2 (11/17/24 1101)  BP: (!) 93/51 (11/17/24 1101)  SpO2: 97  "% (11/17/24 1101) Vital Signs (24h Range):  Temp:  [97.3 °F (36.3 °C)-99.2 °F (37.3 °C)] 97.7 °F (36.5 °C)  Pulse:  [67-82] 69  Resp:  [0-33] 2  SpO2:  [90 %-100 %] 97 %  BP: ()/(51-55) 93/51  Arterial Line BP: ()/(41-64) 114/50     Weight: 84.3 kg (185 lb 13.6 oz)  Body mass index is 29.11 kg/m².    Intake/Output Summary (Last 24 hours) at 11/17/2024 1112  Last data filed at 11/17/2024 1101  Gross per 24 hour   Intake 1210 ml   Output 2025 ml   Net -815 ml         Physical Exam  Vitals and nursing note reviewed.   HENT:      Head: Normocephalic and atraumatic.      Mouth/Throat:      Mouth: Mucous membranes are moist.   Eyes:      Conjunctiva/sclera: Conjunctivae normal.   Neck:      Vascular: No JVD.   Cardiovascular:      Rate and Rhythm: Normal rate.   Pulmonary:      Effort: Pulmonary effort is normal.      Breath sounds: Normal breath sounds.   Abdominal:      Palpations: Abdomen is soft.   Skin:     General: Skin is warm.   Neurological:      Mental Status: She is alert and oriented to person, place, and time.             Significant Labs: All pertinent labs within the past 24 hours have been reviewed.  CBC:   Recent Labs   Lab 11/16/24  0406 11/17/24  0432   WBC 6.78 6.02   HGB 9.3* 8.9*   HCT 27.2* 25.4*    160     CMP:   Recent Labs   Lab 11/16/24  0406 11/17/24  0432   * 128*   K 4.0 4.1   CL 98 99   CO2 22* 23   GLU 63* 78   BUN 39* 43*   CREATININE 2.0* 2.3*   CALCIUM 7.6* 7.7*   PROT 4.8* 4.8*   ALBUMIN 2.3* 2.4*   BILITOT 0.5 0.5   ALKPHOS 103 107   AST 53* 67*   ALT 37 39   ANIONGAP 8 6*     Cardiac Markers: No results for input(s): "CKMB", "MYOGLOBIN", "BNP", "TROPISTAT" in the last 48 hours.    Significant Imaging: I have reviewed all pertinent imaging results/findings within the past 24 hours.    Scheduled Meds:   allopurinoL  100 mg Oral QHS    amiodarone  200 mg Oral BID    atorvastatin  40 mg Oral Daily    bisacodyL  10 mg Rectal Daily    clopidogreL  75 mg Oral Daily "    colchicine  0.3 mg Oral Daily    enoxparin  1 mg/kg Subcutaneous Q24H (treatment, non-standard time)    macitentan  10 mg Oral Daily    methocarbamoL  750 mg Oral TID    midodrine  15 mg Oral TID    montelukast  10 mg Oral Daily    pantoprazole  40 mg Oral Daily    selexipag  1,600 mcg Oral BID    tadalafil  40 mg Oral Daily     Continuous Infusions:   0.9% NaCl   Intravenous Continuous        NORepinephrine bitartrate-D5W  0-3 mcg/kg/min Intravenous Continuous 9.5 mL/hr at 11/17/24 0621 0.28 mcg/kg/min at 11/17/24 0621    vasopressin  0.04 Units/min Intravenous Continuous   Paused at 11/14/24 1123     PRN Meds:.  Current Facility-Administered Medications:     acetaminophen, 650 mg, Oral, Q4H PRN    albuterol sulfate, 2.5 mg, Nebulization, Q6H PRN    aluminum-magnesium hydroxide-simethicone, 30 mL, Oral, Q4H PRN    dextrose 50%, 12.5 g, Intravenous, PRN    dextrose 50%, 25 g, Intravenous, PRN    diphenhydrAMINE, 25 mg, Oral, Q6H PRN    diphenhydrAMINE-zinc acetate 1-0.1%, , Topical (Top), TID PRN    glucagon (human recombinant), 1 mg, Intramuscular, PRN    glucose, 16 g, Oral, PRN    glucose, 24 g, Oral, PRN    HYDROcodone-acetaminophen, 1 tablet, Oral, Q4H PRN    HYDROmorphone, 1 mg, Intravenous, Q6H PRN    HYDROmorphone, 2 mg, Oral, Q4H PRN    magnesium oxide, 800 mg, Oral, PRN    magnesium oxide, 800 mg, Oral, PRN    methocarbamoL, 500 mg, Oral, TID PRN    naloxone, 0.02 mg, Intravenous, PRN    ondansetron, 4 mg, Intravenous, Q8H PRN    potassium bicarbonate, 35 mEq, Oral, PRN    potassium bicarbonate, 50 mEq, Oral, PRN    potassium bicarbonate, 60 mEq, Oral, PRN    potassium, sodium phosphates, 2 packet, Oral, PRN    potassium, sodium phosphates, 2 packet, Oral, PRN    potassium, sodium phosphates, 2 packet, Oral, PRN    prochlorperazine, 5 mg, Intravenous, Q6H PRN    senna-docusate 8.6-50 mg, 2 tablet, Oral, Nightly PRN    sodium chloride 0.9%, 500 mL, Intravenous, PRN    sodium chloride 0.9%, 10 mL,  Intravenous, Q12H PRN    sodium chloride 0.9%, 10 mL, Intravenous, PRN    X-Ray Chest AP Portable    Result Date: 11/16/2024  EXAMINATION: XR CHEST AP PORTABLE CLINICAL HISTORY: pulm edema, pleural effusions; COMPARISON: 11/15/2024, 11/14/2024 FINDINGS: Cardiac silhouette size is stable compared to prior.  Right PICC is in stable position with tip overlying the SVC.  Prominent central pulmonary vascularity.  Mild bilateral interstitial/ground-glass opacities suggestive of pulmonary edema.  Small bilateral pleural effusions.  No pneumothorax.  No acute osseous abnormality.     Pulmonary edema and small bilateral pleural effusions. Electronically signed by: Ashkan Broussard Date:    11/16/2024 Time:    07:01    X-Ray Chest AP Portable    Result Date: 11/15/2024  EXAMINATION: XR CHEST AP PORTABLE CLINICAL HISTORY: pulm edema, pleural effusions; COMPARISON: November 14 FINDINGS: Cardiac silhouette is unchanged.  The thoracic aorta is mildly elongated.  Masslike prominence of the bilateral bertha is stable. There is improved aeration of the lower lung zone on the left, with persistent small bilateral pleural effusions.  No pneumothorax is identified. Right-sided PICC line is unchanged in position.  No acute osseous abnormality is demonstrated.  Chronic right-sided rotator cuff tear is incidentally noted.     1. Slightly improved aeration of the left lung base.  No other significant changes compared to November 14. Electronically signed by: Leif Jordan Date:    11/15/2024 Time:    07:17    X-Ray Chest AP Portable    Result Date: 11/14/2024  EXAMINATION: XR CHEST AP PORTABLE CLINICAL HISTORY: pulm edema, pleural effusions; COMPARISON: November 13, 2024 FINDINGS: Heart size is normal.  The mediastinum is unremarkable.  Abnormal bilateral hilar prominence is unchanged. Hazy opacification of the lower left hemithorax consistent with a combination of volume loss or airspace disease and left-sided pleural effusion appears  slightly improved.  Small right pleural effusion is noted.  There is no evidence of pneumothorax. Right-sided PICC line remains in place with tip at the superior vena cava.     Slightly improved aeration of the lower lung zone on the left.  No other significant changes. Electronically signed by: Leif Jordan Date:    11/14/2024 Time:    08:54    X-Ray Chest AP Portable    Result Date: 11/13/2024  EXAMINATION: XR CHEST AP PORTABLE CLINICAL HISTORY: hypoxemia; FINDINGS: Portable chest radiograph at 04:58 hours compared to prior exams including CT of the prior day show right PICC unchanged in position, with stable cardiomediastinal silhouette and stable enlarged central pulmonary vasculature. There are persistent bilateral perihilar and left lower lung airspace opacities, with bilateral pleural effusions blunting the costophrenic angles.  No pneumothorax.     No significant interval change. Electronically signed by: Nitin Mayo Date:    11/13/2024 Time:    08:04    CTA Chest Non-Coronary (PE Studies)    Result Date: 11/12/2024  EXAMINATION: CTA CHEST NON CORONARY (PE STUDIES) CLINICAL HISTORY: Pulmonary embolism (PE) suspected, high prob;. TECHNIQUE: CT angiography targeted to the pulmonary arteries was performed. 100 cc nonionic contrast was administered and the bolus was timed for optimal opacification of the pulmonary arteries. 3-D reformatted images were obtained on an independent workstation and stored as a part of patient's permanent medical record. COMPARISON: Chest radiograph, November 12, 2024 FINDINGS: The pulmonary arteries are adequately opacified.  There are filling defects involving bilateral proximal segmental pulmonary arterial branches, extending to distal segmental and subsegmental branches.  There is a small amount of thrombus in the left main pulmonary artery.  The pulmonary arterial trunk is dilated at up to 4.6 cm.  The right pulmonary artery measures 3.2 cm transverse and the left 3.4 cm.   There is slight bowing of the interventricular septum and minimal reflux to the inferior vena cava.  Correlate for possible right heart strain. The thoracic aorta is normal in caliber.  There is no mediastinal mass or lymphadenopathy, however note is made of mild nonspecific right hilar lymphadenopathy. Images at lung windows demonstrate left greater than right bilateral ground-glass pulmonary opacities, predominantly central in location.  Correlate for pulmonary edema or less likely pneumonia.  There are small bilateral pleural effusions.  Dependent atelectasis is noted at both lung bases. There are several indeterminate tiny noncalcified nodules in the right upper lobe, the largest measuring 4 mm (series 4, image 95.  4 mm nodule is also noted in the right middle lobe abutting the minor fissure. Images of the upper abdomen are limited, but demonstrate changes of apparent previous gastric bypass.  There is mild perihepatic free fluid. Diffuse body wall edema is noted.  No acute osseous abnormalities are identified.     1. Bilateral pulmonary thromboembolic disease. 2. Pulmonary arterial dilation and additional observations as above.  Correlate for possible right heart strain. 3. Left greater than right predominantly central ground-glass pulmonary opacities.  Correlate for edema versus pneumonia. 4. Small pleural effusions. 5. Nonspecific right hilar lymphadenopathy.  Scattered tiny right-sided noncalcified pulmonary nodules, possibly granulomas, but indeterminate. Findings of bilateral pulmonary thromboembolic disease were called to Dr. Ramos at 11:10 on November 12, 2024. Electronically signed by: Leif Jordan Date:    11/12/2024 Time:    11:22    X-Ray Chest AP Portable    Result Date: 11/12/2024  EXAMINATION: XR CHEST AP PORTABLE CLINICAL HISTORY: worsening oxygenation; FINDINGS: Portable chest with comparison chest x-ray 11/08/2024.  Normal cardiomediastinal silhouette.Enlarged central hilar vascular  structures again noted.  There is bilateral perihilar haziness and interstitial thickening similar to previous exam.  Blunting of the left costophrenic angle again noted with increased hazy opacification of the left lung base.  Blunting of the right costophrenic angle with patchy opacities of the right lung base is similar to previous exam.  Pulmonary vasculature is normal. No acute osseous abnormality.     CHF lung pattern with probable small bilateral pleural effusions noted,  and is subtly increased from previous exam. Electronically signed by: Scotty Aceves Date:    11/12/2024 Time:    08:41    X-Ray Chest AP Portable    Result Date: 11/8/2024  EXAMINATION: XR CHEST AP PORTABLE CLINICAL HISTORY: Pulmonary hypertension; FINDINGS: Portable chest with comparison chest x-ray 11/06/2024.  Normal cardiomediastinal silhouette.Enlarged central hilar vascular structures again noted with mild perihilar peribronchial interstitial thickening.  Hazy opacities of the left lung base are mildly increased.  No pneumothorax.  Right PICC line is unchanged.  Pulmonary vasculature is normal. No acute osseous abnormality.     Mild increased hazy opacities of the left lung bases.  Otherwise no significant changes from prior exam. Electronically signed by: Scotty Aceves Date:    11/08/2024 Time:    08:50    Echo Saline Bubble? No; Ultrasound enhancing contrast? No    Addendum Date: 11/7/2024      Left Ventricle: The left ventricle is smaller than normal. Normal wall thickness. There is normal systolic function with a visually estimated ejection fraction of 65 - 70%. There is normal diastolic function.   Right Ventricle: Mild right ventricular enlargement. Wall thickness is normal. Systolic function is moderately reduced.   Left Atrium: Left atrium is moderately dilated.   Right Atrium: Right atrium is mildly dilated.   IVC/SVC: Normal venous pressure at 3 mmHg.     Result Date: 11/7/2024    Left Ventricle: The left ventricle is  normal in size. Normal wall thickness. There is normal systolic function with a visually estimated ejection fraction of 65 - 70%. There is normal diastolic function.   Right Ventricle: Normal right ventricular cavity size. Wall thickness is normal. Systolic function is normal.   Left Atrium: Left atrium is mildly dilated.   IVC/SVC: Normal venous pressure at 3 mmHg.     X-Ray Chest AP Portable    Result Date: 11/6/2024  EXAMINATION: XR CHEST AP PORTABLE CLINICAL HISTORY: pulmonary hypertension; TECHNIQUE: Single frontal view of the chest was performed. COMPARISON: 11/05/2024. FINDINGS: There is a right-sided PICC, unchanged in position.  The lungs are well expanded.  There is prominence of the central pulmonary vasculature, stable from prior.  There are small bilateral pleural effusions.  The lungs are otherwise clear.  The cardiac silhouette is enlarged.  Osseous structures are intact.  There are calcifications of the aortic arch.  There are surgical clips overlying the left upper quadrant.     No significant detrimental change from prior. Electronically signed by: Dawit Barrios Date:    11/06/2024 Time:    05:05    US Lower Extremity Veins Bilateral    Result Date: 11/5/2024  EXAMINATION: US LOWER EXTREMITY VEINS BILATERAL CLINICAL HISTORY: r/o DVT; TECHNIQUE: Grayscale, color and spectral Doppler analysis of the bilateral lower extremity deep venous system was performed. FINDINGS: No prior studies for comparison.  There is hypoechoic peripheral nonocclusive thrombus within the proximal right superficial femoral vein.  There is also hypoechoic nonocclusive thrombus within the right popliteal vein, with some preserved color Doppler vascular flow. There is normal compressibility, with normal color and spectral Doppler vascular flow in the left lower extremity deep venous system, with no findings of left lower extremity deep venous thrombosis.     1. Positive for nonocclusive deep venous thrombosis of the right lower  extremity. 2. Negative for left lower extremity deep venous thrombosis. 3. RESULT NOTIFICATION: Findings were discussed by Dr Mayo with, and acknowledged by LUZ MARINA SANCHEZ at 15:20 hours. Electronically signed by: Nitin Mayo Date:    11/05/2024 Time:    15:22    X-Ray Chest 1 View S/P PICC Line by Nursing    Result Date: 11/5/2024  EXAMINATION: XR CHEST 1 VIEW S/P PICC LINE BY NURSING CLINICAL HISTORY: picc line; FINDINGS: Portable chest radiograph at 10:54 hours compared to 05:20 hours shows interval insertion of a right PICC, with the distal tip overlying the SVC in satisfactory position.  There is no other significant interval change.     Interval insertion of a right PICC, in satisfactory position with distal tip overlying the SVC. Electronically signed by: Nitin Mayo Date:    11/05/2024 Time:    11:05    X-Ray Chest AP Portable    Result Date: 11/5/2024  EXAMINATION: XR CHEST AP PORTABLE CLINICAL HISTORY: Pulmonary HTN; COMPARISON: 10/31/2024. FINDINGS: The heart is enlarged but stable.  Prominence of the central pulmonary vasculature appears similar to prior examination.  There is mild mass effect observed upon the rightward aspect of the trachea. There are mild airspace opacities or volume loss within the lung bases.  There are mildly diminished lung volumes.  No significant effusion demonstrated. Multiple monitoring electrodes overlie the chest.     Diminished lung volumes. Mild basilar airspace opacities or volume loss. Cardiomegaly and marked prominence of the central pulmonary vasculature, stable. Mild mass effect upon the rightward aspect of the trachea. Electronically signed by: Waqas Leavitt Date:    11/05/2024 Time:    07:04    X-Ray Cervical Spine AP And Lateral    Result Date: 11/4/2024  CLINICAL HISTORY: ([id number])73 y/o  (1950) F C3-6 laminectomy and fusion; Central cord syndrome at unspecified level of cervical spinal cord, initial encounter TECHNIQUE: (A#[id number], exam time 11/4/2024 5:48)  XR CERVICAL SPINE AP LATERAL IMG56 2 view(s) obtained. COMPARISON: MRI from 11/02/2024. FINDINGS: C1 through C7 are visualized on the lateral radiograph.  There is straightening/reversal the normal lordotic curvature likely secondary to a combination of positioning/postoperative change, degenerative change and/or muscle spasm. There has been interval posterior laminectomy from C3-C6, with bilateral posterior trans-facet screw and saurabh fixation.  There is a midline posterior paraspinal drain in the laminectomy bed with scattered subcutaneous emphysema and edema over the dorsal aspect of the neck. Again noted is moderate to severe disc height loss at C2-C3, C3-C4 and C4-C5 with moderate disc height loss at C5-C6.  There is oblique lucency through the anterior inferior vertebral body of C2, and C5, suspicious for occult nondisplaced fractures, these could be better characterized with CT.  There is minimal prevertebral soft tissue swelling. There is mild retrolisthesis of C5 on C6 (3 mm).  The visualized lung apices are clear.  The patient is edentulous.     1.  Posterior postoperative fusion from C3-C6 as above. 2.  Suspected tiny nondisplaced oblique fractures through the anterior inferior endplate of C2 and C5. This report was flagged in Epic as abnormal. Electronically signed by: Asim Davis Date:    11/04/2024 Time:    06:42    SURG FL Surgery Fluoro Usage    Result Date: 11/3/2024  See OP Notes for results. IMPRESSION: See OP Notes for results. This procedure was auto-finalized by: Virtual Radi[facility]    MRI Cervical Spine Without Contrast    Result Date: 11/2/2024  EXAMINATION: MRI CERVICAL SPINE WITHOUT CONTRAST CLINICAL HISTORY: Myelopathy, acute, cervical spine; fall with head and neck trauma TECHNIQUE: Routine MRI cervical spine without contrast. COMPARISON: Multiple prior exams. FINDINGS: Motion artifact limits the exam.  There is exaggeration of the normal cervical spinal curvature, with normal vertebral  body alignment, and no acute fractures or destructive osseous lesions.  Chronic vertebral height loss involves C3 and C4, with degenerative loss of disc height and signal most pronounced at C3-C4 and C4-C5, and heterogeneous degenerative type marrow endplate signal alteration.  No findings of a diffuse marrow replacement process. There are stippled T2 hyperintensities in the sandy suggesting chronic ischemic changes, with the visualized posterior fossa and cervicomedullary junction otherwise unremarkable.  There is intramedullary non fluid like T2 hyperintense signal alteration in the cervical cord as detailed below. At C2-C3, there is broad-based disc bulging and spondylosis, without significant spinal canal stenosis.  There is bilateral foraminal narrowing. At C3-C4, there is broad-based disc bulging and spondylosis, contributing to moderate to severe spinal canal stenosis.  There is severe bilateral foraminal narrowing. At C4-C5, there is broad-based disc bulging and spondylosis, which produces severe spinal canal stenosis with cervical cord compression.  There is increased non fluid-like T2 signal in the cord suggesting edema and or myelopathy, with severe bilateral foraminal narrowing. At C5-C6, there is broad-based disc bulging and spondylosis, producing severe spinal canal stenosis with mass effect upon the cervical cord.  There is increased non fluid-like T2 signal in the cord suggesting edema and or myelopathy.  There is severe bilateral foraminal narrowing. At C6-C7, there is broad-based disc bulging, without significant spinal canal stenosis.  There is mild left neural foraminal narrowing. C7-T1 is unremarkable. There are no signal abnormalities of the paraspinal ligaments, with nonspecific STIR hyperintense edema within the posterior paraspinal musculature and subcutaneous fat.  Multiple T2 hyperintensities in the thyroid gland are nonspecific.     1. Disc bulging and spondylosis at C4-C5 and C5-C6, with  severe spinal canal stenosis, cervical cord compression and cord signal alteration suggesting edema and or myelopathy. 2. Broad-based disc bulging producing moderate spinal canal stenosis at C3-C4. Electronically signed by: Nitin Mayo Date:    11/02/2024 Time:    12:28    Echo    Result Date: 11/1/2024    Left Ventricle: The left ventricle is normal in size. Normal wall thickness. Unable to assess wall motion. There is normal systolic function with a visually estimated ejection fraction of 60 - 65%.   Right Ventricle: Right ventricle was not well visualized due to poor acoustic window.  Grossly appears to be dilated with reduced function.  There appears to be some concern of flattening of the interventricular septum which could indicate right ventricular pressure and volume overload.   Left Atrium: Left atrium is severely dilated.   IVC/SVC: Elevated venous pressure at 15 mmHg.     MRA Neck without contrast    Result Date: 11/1/2024  EXAMINATION: MRA NECK WITHOUT CONTRAST CLINICAL HISTORY: Stroke/TIA, determine embolic source; TECHNIQUE: Time of flight MRA of the carotid and vertebral arteries was performed with 3D reconstructions according to the standard neck MRA protocol. COMPARISON: Carotid ultrasound 10/31/2024.  MRI/MRA of the head 10/31/2024. FINDINGS: Somewhat motion limited exam. The right common carotid artery demonstrates no hemodynamically significant stenosis. The cervical right internal carotid artery demonstrates no hemodynamically significant stenosis. The left common carotid artery demonstrates no hemodynamically significant stenosis. The cervical left internal carotid artery demonstrates no hemodynamically significant stenosis. Extracranial vertebral arteries: Vertebral arteries are codominant.  Vertebral arteries are normal to the level of the foramen magnum.  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     1. Negative limited noncontrast MRA of the neck.  No hemodynamically significant stenosis identified. Electronically signed by: Kendall Lee Date:    11/01/2024 Time:    12:35    US Carotid Bilateral    Result Date: 10/31/2024  EXAMINATION: US CAROTID BILATERAL CLINICAL HISTORY: syncope; TECHNIQUE: Grayscale and color Doppler ultrasound examination of the carotid and vertebral artery systems bilaterally.  Stenosis estimates are per the NASCET measurement criteria. COMPARISON: None. FINDINGS: Right: Internal Carotid Artery (ICA) peak systolic velocity 60.6 cm/sec ICA/CCA peak systolic velocity ratio: 0.9 Plaque formation: Heterogeneous Vertebral artery: Antegrade flow and normal waveform. Left: Internal Carotid Artery (ICA)  peak systolic velocity 98.1 cm/sec ICA/CCA peak systolic velocity ratio: 1.4 Plaque formation: Heterogeneous Vertebral artery: Antegrade flow and normal waveform.     No evidence of a hemodynamically significant carotid bifurcation stenosis. Electronically signed by: Dawit Barrios Date:    10/31/2024 Time:    23:55    MRI Lumbar Spine Without Contrast    Result Date: 10/31/2024  EXAMINATION: MRI LUMBAR SPINE WITHOUT CONTRAST CLINICAL HISTORY: Low back pain, trauma; TECHNIQUE: Multiplanar, multisequence MR images were acquired from the thoracolumbar junction to the sacrum without contrast. COMPARISON: CT total spine from 10/31/2024. MRI lumbar spine from 09/15/2016 FINDINGS: Alignment: Normal. Vertebrae: Normal marrow signal. No fracture. Discs: There is moderate disc height loss at L5-S1 and mild disc height loss and disc desiccation at L3-L4 and L4-L5.  Remaining disc heights are preserved. Cord: Normal.  Conus terminates at L2. Degenerative findings: T12-L1: There is mild facet arthropathy bilaterally.  There is no spinal canal stenosis or neural foraminal narrowing. L1-L2: There is mild facet arthropathy and ligamentum flavum hypertrophy.  There is no  spinal canal stenosis or neural foraminal narrowing. L2-L3: There is mild bilateral facet arthropathy and ligamentum flavum hypertrophy and a small circumferential disc bulge, resulting in mild spinal canal stenosis and moderate bilateral neural foraminal narrowing. L3-L4: There is moderate bilateral facet arthropathy and ligamentum flavum hypertrophy and a circumferential disc bulge, resulting in moderate spinal canal stenosis and moderate bilateral neural foraminal narrowing. L4-L5: There is a circumferential disc bulge with moderate bilateral facet arthropathy and ligamentum flavum hypertrophy resulting in moderate spinal canal stenosis and severe bilateral neural foraminal narrowing. L5-S1: There is bilateral facet arthropathy, moderate with a posterior disc bulge and an annular fissure.  There is no spinal canal stenosis.  There is moderate bilateral neural narrowing. Paraspinal muscles & soft tissues: Unremarkable.     Multilevel degenerative changes of the lumbar spine as detailed above.  There is moderate spinal canal stenosis at L3-L4 and L4-L5 and there is severe bilateral neural foraminal narrowing at L4-L5. Electronically signed by: Dawit Barrios Date:    10/31/2024 Time:    22:38    MRA Brain without contrast    Result Date: 10/31/2024  EXAMINATION: MRA BRAIN WITHOUT CONTRAST CLINICAL HISTORY: Syncope, recurrent; TECHNIQUE: Non-contrast 3-D time-of-flight intracranial MR angiography was performed through the [place] [facility] [place] with MIP reformatting. COMPARISON: None FINDINGS: Anterior circulation: The bilateral intracranial ICAs, bilateral ACAs, and bilateral MCAs are patent and unremarkable without high-grade stenosis or occlusion. Posterior circulation: The bilateral intracranial vertebral arteries, the basilar artery, and the bilateral PCAs are patent and unremarkable without high-grade stenosis or occlusion. There is no intracranial aneurysm visualized.     Unremarkable MRA brain. Electronically  signed by: Dawit Barrios Date:    10/31/2024 Time:    22:29    MRI Brain Without Contrast    Result Date: 10/31/2024  EXAMINATION: MRI BRAIN WITHOUT CONTRAST CLINICAL HISTORY: Neuro deficit, acute, stroke suspected; TECHNIQUE: Multiplanar multisequence MR imaging of the brain was performed without intravenous contrast. COMPARISON: CT head from earlier the same date. FINDINGS: There is restricted diffusion in the right caudate head, compatible with an acute infarct.  There is associated T2/FLAIR hyperintense signal.  There is a mass centered within the left temporoparietal calvarium measuring approximately 5.0 x 2.9 x 4.0 cm, with extension into the left cerebral convexity extra-axial space and extension into the left temporal scalp.  There is abutment of the left temporal lobe, without evidence of vasogenic edema or evidence of significant mass effect.  There is no midline shift.  Ventricles are normal in size for age without evidence of hydrocephalus.  There is T2/FLAIR hyperintense signal throughout the supratentorial white matter, compatible with chronic microvascular ischemic changes.  There is a small focus of encephalomalacia within the right medial parietal lobe, likely related to a remote infarct.  There is no intracranial hemorrhage.  No extra-axial blood or fluid collections. Normal vascular flow voids. Left common rim mass as above.  The remaining bone marrow signal intensity is normal. Paranasal sinuses and mastoid air cells are clear.     1. Acute infarct in the right caudate head. 2. 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  The mass abuts the left temporal lobe, without resulting in significant mass effect or vasogenic edema.  Differential includes a primary or metastatic osseous lesion, an atypical meningioma, or additional etiologies. 3. Chronic microvascular ischemic changes. This report was flagged in Epic as abnormal. Dr. Barrios discussed critical  findings with TATIANNA Morales. by [employer] secure chat at 22:23 on 10/31/2024. Electronically signed by: Dawit Barrios Date:    10/31/2024 Time:    22:27    X-Ray Wrist Complete Left    Result Date: 10/31/2024  EXAMINATION: XR WRIST COMPLETE 3 VIEWS LEFT CLINICAL HISTORY: Syncope and collapse TECHNIQUE: PA, lateral, and oblique views of the left wrist were performed. COMPARISON: None FINDINGS: No displaced fracture or traumatic malalignment.  Mild carpal degenerative change.  Unremarkable soft tissues. Electronically signed by: Saeid Pérez Date:    10/31/2024 Time:    16:40    X-Ray Knee 1 or 2 View Left    Result Date: 10/31/2024  EXAMINATION: XR KNEE 1 OR 2 VIEW LEFT CLINICAL HISTORY: Syncope and Fall; TECHNIQUE: One or two views of the left knee were performed. COMPARISON: None FINDINGS: Left knee total arthroplasty hardware with no periprosthetic fracture or abnormal lucency to suggest loosening or infection.  No malalignment.  Trace knee joint fluid.  Subcutaneous soft tissue edema about the knee and proximal calf. Electronically signed by: Saeid Pérez Date:    10/31/2024 Time:    16:39    X-Ray Chest AP Portable    Result Date: 10/31/2024  EXAMINATION: XR CHEST AP PORTABLE CLINICAL HISTORY: Syncope and collapse TECHNIQUE: Single frontal view of the chest was performed. COMPARISON: Same-day CT FINDINGS: There is a nodular airspace opacity at the periphery of the right lung base.  Remaining lungs clear.  Borderline cardiac enlargement with metallic device projecting over the right heart border possibly an intra atrial septal occlusion device.  Prominence of the pulmonary arterial vasculature aortic arch atherosclerosis.  No pleural effusion or pneumothorax.  There are multiple surgical clips and staple lines over the upper abdomen.     1. Pleuroparenchymal nodule in the right lung base and several other nodules in the right lung apex.  Findings better seen at CT.  These are nonspecific with neoplasm not  excluded. 2. Borderline heart enlargement. 3. Prominence of the pulmonary vasculature raising the possibility for elevated pulmonary pressures. Electronically signed by: Saeid Pérez Date:    10/31/2024 Time:    16:38    CT Entire Spine Without Contrast    Result Date: 10/31/2024  EXAMINATION: CT ENTIRE SPINE WITHOUT CONTRAST (XPD) CLINICAL HISTORY: Fall, pan spinal tenderness; TECHNIQUE: Axial images were obtained through the entire spine.  Sagittal and coronal reformatted images were created. COMPARISON: Lumbar spine MRI dated 03/15/2016 FINDINGS: There is no recent study for comparison.  On the screening study obtained with a large field of view there is no obvious acute fracture.  There is extensive chronic change throughout the cervical spine.  The odontoid process is intact.  The anterior and posterior arches of C1 are intact as well.  There is severe disc space narrowing at the C2-3, C3-4, C4-5 levels with moderate to marked disc space narrowing at the C5-6 level.  There is trace retrolisthesis of C4 on C5.  There is chronic anterior wedging of C3 and C4.  There is multilevel foraminal stenosis throughout the cervical region due to uncovertebral spurring and/or facet joint arthropathy.  Also, there is spinal stenosis most apparent at the C4-5 level. In the thoracic spine, there is mild chronic anterior wedging of several midthoracic vertebral bodies but there is no obvious acute fracture or traumatic malalignment.  The facet joints maintain normal articulation. In the lumbar spine there is a vacuum disc at the L3-4 and L5-S1 levels.  There is severe disc space narrowing at L5-S1.  The lumbar vertebral bodies maintain normal height without obvious acute fracture.  Alignment is grossly normal.  There is extensive facet joint arthropathy in the mid to lower lumbar spine. There is a dextrocurvature of the lower lumbar spine with gentle broad levocurvature at the thoracolumbar junction. There is no displaced or  depressed rib fracture identified on this limited field of view.  There is no obvious spinous process fracture. The sacrum is intact. There is atherosclerosis.  There is no pneumothorax.  There are several scattered nodules in the lungs which are suboptimally evaluated.     There is degenerative change throughout the spine, most significant in the lower lumbar spine as well as in the cervical spine.  There is however no obvious acute fracture or traumatic malalignment.  The entire spine was obtained in the large field of view. Electronically signed by: Nomi Vasquez MD Date:    10/31/2024 Time:    16:26    CT Maxillofacial Without Contrast    Result Date: 10/31/2024  EXAMINATION: CT MAXILLOFACIAL WITHOUT CONTRAST CLINICAL HISTORY: Facial trauma, blunt; TECHNIQUE: Low dose axial images, sagittal and coronal reformations were obtained through the face.  Contrast was not administered. COMPARISON: None FINDINGS: There is suspected soft tissue swelling over the chin.  There is very subtle cortical irregularity involving the anterior paramedian mandible which could represent subtle acute fracture with minimal adjacent bone fragments.  There is beam hardening artifact related to hardware in the region of the left maxilla.  There is no dislocation at the TMJ.  There are changes of torus mandibularis. There is old deformity of the anterior nasal bones.  There is no acute displaced or depressed nasal bone or nasal septum fracture. There is no acute orbital fracture.     1. There is mild irregularity of the interior midline mandible with small adjacent bone fragments which could represent acute fracture with overlying soft tissue swelling.  There is no other acute maxillofacial or orbital fracture.  There is suspected old deformities of the anterior nasal bones. Electronically signed by: Nomi Vasquez MD Date:    10/31/2024 Time:    16:12    CT Head Without Contrast    Result Date: 10/31/2024  EXAMINATION: CT HEAD  WITHOUT CONTRAST CLINICAL HISTORY: Head trauma, minor (Age >= 65y); TECHNIQUE: Routine unenhanced axial images were obtained through the head.  Sagittal and coronal reformatted images were created.  The study is reviewed in bone and soft tissue windows. COMPARISON: None FINDINGS: Intracranial contents: There is no acute intracranial abnormality.  There is mild nonspecific white matter change.  There is no hemorrhage, parenchymal mass or mass effect.  The gray-white interface is preserved without acute infarction.  The basilar cisterns are open.  There is a remote lacunar infarction in the right caudate nucleus.  The cerebellar tonsils are normal position. Extracranial contents, calvarium, soft tissues: The included paranasal sinuses and mastoid air cells are clear.  There is no acute skull fracture.  There is soft tissue prominence of the left temporoparietal region.  There is demineralization of the underlying calvarium with in irregular inner bony margin.  Also, there is suggestion of possible extra-axial dural-based soft tissue mass in this region which could potentially represent an atypical meningioma and further evaluated with brain MRI without and with contrast.     There is left temporoparietal soft tissue prominence with demineralization of the underlying bone and suspected extra-axial mass in this region which is nonspecific and incompletely evaluated.  These could potentially represent an atypical meningioma but further evaluation with brain MRI without and with contrast could be obtained.  This is not acute.  There is no hemorrhage.  There is no acute skull fracture. Electronically signed by: Nomi Vasquez MD Date:    10/31/2024 Time:    16:07  - pulls last radiology orders      Assessment/Plan:      * Central cord syndrome  S/p multilevel laminectomy (11/3/24)  C2-3, C3-4, C4-5, C5-6, C6-7 laminectomy  C3 through 6 posterior segmental instrumentation using [employer] system  C3-4, C4-5, C5-6  posterolateral arthrodesis using autograft harvested from the same incision and allograft DBM    Cervical collar in place  Neuro surgery follow up     PT OT when more stable    Cortisol added and normal     Remains quadriplegic after surgery.         Acute deep vein thrombosis (DVT) of popliteal vein of right lower extremity, nonocclusive and pulmonary embolism  Found on u/s 11/5/24 and CTA 11/12   hypoechoic peripheral nonocclusive thrombus within the proximal right superficial femoral vein    full dose lovenox   Close monitor  s/p spine surgery   Associated with hypotension /shock suspicious for PE  and CTA confirmed PE   Vascular did not recommend thrombectomy        ACP (advance care planning)  Advance Care Planning    Date: 11/15/2024  Patient is DNR now  and going into comfort care / hospice possible tomorrow           Paroxysmal atrial fibrillation  Patient has paroxysmal (<7 days) atrial fibrillation. Patient is currently in atrial fibrillation. [id number] Score: 3. The patients heart rate in the last 24 hours is as follows:  Pulse  Min: 67  Max: 85     Antiarrhythmics  amiodarone tablet 200 mg, 2 times daily, Oral    Anticoagulants  enoxaparin injection 70 mg, Every 24 hours, Subcutaneous    Plan  - Replete lytes with a goal of K>4, Mg >2  - Patient is anticoagulated, see medications listed above.  - Patient's afib is currently uncontrolled. Will continue current treatment  S/p amiodarone drip and changed to PO   In and out of A fib   On full dose lovenox           S/P cervical spinal fusion  Multi levels  See NSGY op note     Quadriparesis  PT/OT  Frequent turns   Air mattress        Myelopathy  aware      Status post cervical spinal fusion  PT/OT  Cervical collar in place   Pain Mx      Hypotension  Asymptomatic.  Etiology most likely from pulmonary embolism/pul HTN / A fib with RVR   Echo reviewed.    -keep hold any BP meds or diuretics  On midodrine 15 tid   Currently on 1 vasopressor  Pulmonary offered  "padmini bui but patient refused   Cortisol random normal     Thrombocytopenia  The patients 3 most recent labs are listed below.  Recent Labs     11/14/24  0449 11/15/24  0440 11/16/24  0406    198 187       Plan  - Will transfuse if platelet count is <100k (if undergoing neurosurgery), or otherwise less than 10 K  -trend daily    Anemia  Anemia is likely due to chronic disease due to Chronic Kidney Disease. Most recent hemoglobin and hematocrit are listed below.  Recent Labs     11/14/24  0449 11/15/24  0334 11/15/24  0440 11/16/24  0406   HGB 9.2*  --  9.2* 9.3*   HCT 26.8* 35* 27.0* 27.2*       Plan  - Monitor serial CBC: Daily  - Transfuse PRBC if patient becomes hemodynamically unstable, symptomatic or H/H drops below 7/21.  - Patient has not received any PRBC transfusions to date  - Patient's anemia is currently improving    Stroke  Acute CVA right caudate  MRI, MRA, CT, carotid U/S reports reviewed  Plavix  and statin and patient also need full dose lovenox   -neurology following  -neurosurgery and oncology consulted for the brain mass and follow up as OP   -PT/OT/SLP when more stable   -echo bubble report is seem edited. Not mentioning of PFO  Patient going to hospice care and possible withdrawal of vasopressors over the weekend    Brain mass  Discovered after PET scan of lung  on 9/6/24 revealed abnormal uptake in brain. 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  Unclear if contributing to CVA  She will have her first appointment with Dr. Ray Mcintyre at  [facility]  on 11/4/24  -for comfort / hospice care soon     Secondary hyperparathyroidism  Chronic condition that is stable.     Pulmonary artery hypertension  Acute on chronic issues  Macitentan, selexipag and tadalafil not available and sildenafil changed and continued   Patient's may be a candidate for IV  Epoprostenol however " Patient refuse swan ramya catheter.   Patient breathing condition has been progressively worsening after the surgery, remains on high-flow oxygen since her surgery.   Pulmonary is following.      Chronic respiratory failure with hypoxia  Patient with Hypoxic Respiratory failure which is Chronic.  she is on home oxygen at 4 LPM.   Secondary to pulmonary embolism  Full-dose Lovenox    CKD (chronic kidney disease), stage IV  Creatine stable for now. BMP reviewed- noted Estimated Creatinine Clearance: 24.3 mL/min (A) (based on SCr of 2.3 mg/dL (H)). according to latest data. Based on current GFR, CKD stage is stage 4 - GFR 15-29.  Monitor UOP and serial BMP and adjust therapy as needed. Renally dose meds. Avoid nephrotoxic medications and procedures.    Close monitor     Syncope and collapse  See stroke      Acute urinary retention/ neurogenic bladder  Possibly neurogenic from the CVA or from cervical spine cord compression  Urinary catheter. Appear may need long term        VTE Risk Mitigation (From admission, onward)           Ordered     enoxaparin injection 70 mg  Every 24 hours         11/15/24 0928     Reason for No Pharmacological VTE Prophylaxis  Once        Question:  Reasons:  Answer:  Risk of Bleeding    10/31/24 1849     IP VTE HIGH RISK PATIENT  Once         10/31/24 1849     Place sequential compression device  Until discontinued         10/31/24 1849                    Discharge Planning   ELBERT:      Code Status: DNR   Is the patient medically ready for discharge?:     Reason for patient still in hospital (select all that apply): Patient trending condition and Treatment  Discharge Plan A: Inpatient Hospice   Discharge Delays: None known at this time        Critical care time spent on the evaluation and treatment of severe organ dysfunction, review of pertinent labs and imaging studies, discussions with consulting providers and discussions with patient/family: 35 minutes.      Patient had acute CVA, remains  quadriplegic after cervical cord decompression surgery, also developed pulmonary embolism/ acute DVT, patient developed urinary retention most likely secondary to neurogenic bladder, patient has progressive acute on chronic hypoxic respiratory failure due to pulmonary artery hypertension, patient remains on high-flow oxygen,  due to multiple comorbidities , the patient is a candidate for hospice, currently patient is DNR DNI, I have discussed the prognosis with the family at bedside, they would like continue current management.   Prognosis appears to be very poor.   Cr worsened, hold iv lasix, on mild iv hydration.         Andriy Kilgore MD  Department of Hospital Medicine   [facility]

## 2024-11-17 NOTE — PLAN OF CARE
Problem: Adult Inpatient Plan of Care  Goal: Plan of Care Review  Outcome: Progressing  Goal: Patient-Specific Goal (Individualized)  Outcome: Progressing  Goal: Absence of Hospital-Acquired Illness or Injury  Outcome: Progressing  Goal: Optimal Comfort and Wellbeing  Outcome: Progressing  Goal: Readiness for Transition of Care  Outcome: Progressing     Problem: Skin Injury Risk Increased  Goal: Skin Health and Integrity  Outcome: Progressing     Problem: Stroke, Ischemic (Includes Transient Ischemic Attack)  Goal: Optimal Coping  Outcome: Progressing  Goal: Effective Bowel Elimination  Outcome: Progressing  Goal: Optimal Cerebral Tissue Perfusion  Outcome: Progressing  Goal: Optimal Cognitive Function  Outcome: Progressing  Goal: Improved Communication Skills  Outcome: Progressing  Goal: Optimal Functional Ability  Outcome: Progressing  Goal: Optimal Nutrition Intake  Outcome: Progressing  Goal: Effective Oxygenation and Ventilation  Outcome: Progressing  Goal: Improved Sensorimotor Function  Outcome: Progressing  Goal: Safe and Effective Swallow  Outcome: Progressing  Goal: Effective Urinary Elimination  Outcome: Progressing     Problem: Fall Injury Risk  Goal: Absence of Fall and Fall-Related Injury  Outcome: Progressing     Problem: Infection  Goal: Absence of Infection Signs and Symptoms  Outcome: Progressing     Problem: Wound  Goal: Optimal Coping  Outcome: Progressing  Goal: Optimal Functional Ability  Outcome: Progressing  Goal: Absence of Infection Signs and Symptoms  Outcome: Progressing  Goal: Improved Oral Intake  Outcome: Progressing  Goal: Optimal Pain Control and Function  Outcome: Progressing  Goal: Skin Health and Integrity  Outcome: Progressing  Goal: Optimal Wound Healing  Outcome: Progressing     Problem: Oral Intake Inadequate  Goal: Improved Oral Intake  Outcome: Progressing     Problem: Coping Ineffective  Goal: Effective Coping  Outcome: Progressing     Problem: Spinal Surgery  Goal:  Optimal Coping with Surgery  Outcome: Progressing  Goal: Absence of Bleeding  Outcome: Progressing  Goal: Effective Bowel Elimination  Outcome: Progressing  Goal: Fluid and Electrolyte Balance  Outcome: Progressing  Goal: Optimal Functional Ability  Outcome: Progressing  Goal: Absence of Infection Signs and Symptoms  Outcome: Progressing  Goal: Optimal Neurologic Function  Outcome: Progressing  Goal: Anesthesia/Sedation Recovery  Outcome: Progressing  Goal: Optimal Pain Control and Function  Outcome: Progressing  Goal: Nausea and Vomiting Relief  Outcome: Progressing  Goal: Effective Urinary Elimination  Outcome: Progressing  Goal: Effective Oxygenation and Ventilation  Outcome: Progressing     Problem: Syncope  Goal: Absence of Syncopal Symptoms  Outcome: Progressing     Problem: Dysrhythmia  Goal: Normalized Cardiac Rhythm  Outcome: Progressing

## 2024-11-17 NOTE — PLAN OF CARE
Spoke with Bridger with [facility], he states his nurse saw family yesterday and they are not ready at this time for hospice and family states they will call [facility] when they are.        11/17/24 0909   Post-Acute Status   Post-Acute Authorization Hospice   Post-Acute Placement Status Patient declined/refused

## 2024-11-17 NOTE — ASSESSMENT & PLAN NOTE
Acute on chronic issues  Macitentan, selexipag and tadalafil not available and sildenafil changed and continued   Patient's may be a candidate for IV  Epoprostenol however Patient refuse swan ramya catheter.   Patient breathing condition has been progressively worsening after the surgery, remains on high-flow oxygen since her surgery.   Pulmonary is following.

## 2024-11-17 NOTE — NURSING
Nurse with [facility] at bedside talking to daughter. She is aware that family will be coming thru today and tomorrow.

## 2024-11-17 NOTE — NURSING
Daughter and sister at bedside. Pt's  and his wife came to visit around lunch time. They stayed for an hour or so then left.

## 2024-11-17 NOTE — PLAN OF CARE
Problem: Adult Inpatient Plan of Care  Goal: Plan of Care Review  11/17/2024 1657 by Mindy Tony RN  Outcome: Progressing  11/17/2024 1654 by Mindy Tony RN  Outcome: Progressing  Goal: Patient-Specific Goal (Individualized)  11/17/2024 1657 by Mindy Tony RN  Outcome: Progressing  11/17/2024 1654 by Mindy Tony RN  Outcome: Progressing  Goal: Absence of Hospital-Acquired Illness or Injury  11/17/2024 1657 by Mindy Toyn RN  Outcome: Progressing  11/17/2024 1654 by Mindy Tony RN  Outcome: Progressing  Goal: Optimal Comfort and Wellbeing  11/17/2024 1657 by Mindy Tony RN  Outcome: Progressing  11/17/2024 1654 by Mindy Tony RN  Outcome: Progressing  Goal: Readiness for Transition of Care  11/17/2024 1657 by Mindy Tony RN  Outcome: Progressing  11/17/2024 1654 by Mindy Tony RN  Outcome: Progressing     Problem: Skin Injury Risk Increased  Goal: Skin Health and Integrity  11/17/2024 1657 by Mindy Tony RN  Outcome: Progressing  11/17/2024 1654 by Mindy Tony RN  Outcome: Progressing     Problem: Stroke, Ischemic (Includes Transient Ischemic Attack)  Goal: Optimal Coping  11/17/2024 1657 by Mindy Tony RN  Outcome: Progressing  11/17/2024 1654 by Mindy Tony RN  Outcome: Progressing  Goal: Effective Bowel Elimination  11/17/2024 1657 by Mindy Tony RN  Outcome: Progressing  11/17/2024 1654 by Mindy Tony RN  Outcome: Progressing  Goal: Optimal Cerebral Tissue Perfusion  11/17/2024 1657 by Mindy Tony RN  Outcome: Progressing  11/17/2024 1654 by Mindy Tony RN  Outcome: Progressing  Goal: Optimal Cognitive Function  11/17/2024 1657 by Mindy Tony RN  Outcome: Progressing  11/17/2024 1654 by Mindy Tony RN  Outcome: Progressing  Goal: Improved Communication Skills  11/17/2024 1657 by Mindy Tony RN  Outcome: Progressing  11/17/2024 1654 by Chavo, Mindy, RN  Outcome: Progressing  Goal:  Optimal Functional Ability  11/17/2024 1657 by Mindy Tony RN  Outcome: Progressing  11/17/2024 1654 by Mindy Tony RN  Outcome: Progressing  Goal: Optimal Nutrition Intake  11/17/2024 1657 by Mindy Tony RN  Outcome: Progressing  11/17/2024 1654 by Mindy Tony RN  Outcome: Progressing  Goal: Effective Oxygenation and Ventilation  11/17/2024 1657 by Mindy Tony RN  Outcome: Progressing  11/17/2024 1654 by Mindy Tony RN  Outcome: Progressing  Goal: Improved Sensorimotor Function  11/17/2024 1657 by Mindy Tony RN  Outcome: Progressing  11/17/2024 1654 by Mindy Tony RN  Outcome: Progressing  Goal: Safe and Effective Swallow  11/17/2024 1657 by Mindy Tony RN  Outcome: Progressing  11/17/2024 1654 by Mindy Tony RN  Outcome: Progressing  Goal: Effective Urinary Elimination  11/17/2024 1657 by Mindy Tony RN  Outcome: Progressing  11/17/2024 1654 by Mindy Tony RN  Outcome: Progressing     Problem: Fall Injury Risk  Goal: Absence of Fall and Fall-Related Injury  11/17/2024 1657 by Mindy Tony RN  Outcome: Progressing  11/17/2024 1654 by Minyd Tony RN  Outcome: Progressing     Problem: Infection  Goal: Absence of Infection Signs and Symptoms  11/17/2024 1657 by Mindy Tony RN  Outcome: Progressing  11/17/2024 1654 by Mindy Tony, RN  Outcome: Progressing     Problem: Wound  Goal: Optimal Coping  11/17/2024 1657 by Mindy Tony RN  Outcome: Progressing  11/17/2024 1654 by Mindy Tony RN  Outcome: Progressing  Goal: Optimal Functional Ability  11/17/2024 1657 by Mindy Tony RN  Outcome: Progressing  11/17/2024 1654 by Mindy Tony RN  Outcome: Progressing  Goal: Absence of Infection Signs and Symptoms  11/17/2024 1657 by Mindy Tony, RN  Outcome: Progressing  11/17/2024 1654 by Mindy Tony, RN  Outcome: Progressing  Goal: Improved Oral Intake  11/17/2024/17/2024 1657 by Mindy Tony, RN  Outcome:  Progressing  11/17/2024 1654 by Mindy Tony RN  Outcome: Progressing  Goal: Optimal Pain Control and Function  11/17/2024 1657 by Mindy Tony RN  Outcome: Progressing  11/17/2024 1654 by Mindy Tony RN  Outcome: Progressing  Goal: Skin Health and Integrity  11/17/2024 1657 by Mindy Tony RN  Outcome: Progressing  11/17/2024 1654 by Mindy Tony RN  Outcome: Progressing  Goal: Optimal Wound Healing  11/17/2024 1657 by Mindy Tony RN  Outcome: Progressing  11/17/2024 1654 by Mindy Tony RN  Outcome: Progressing     Problem: Oral Intake Inadequate  Goal: Improved Oral Intake  11/17/2024 1657 by Mindy Tony RN  Outcome: Progressing  11/17/2024 1654 by Mindy Tony RN  Outcome: Progressing     Problem: Coping Ineffective  Goal: Effective Coping  11/17/2024 1657 by Mindy Tony RN  Outcome: Progressing  11/17/2024 1654 by Mindy Tony RN  Outcome: Progressing     Problem: Spinal Surgery  Goal: Optimal Coping with Surgery  11/17/2024 1657 by Mindy Tony RN  Outcome: Progressing  11/17/2024 1654 by Mindy Tony RN  Outcome: Progressing  Goal: Absence of Bleeding  11/17/2024 1657 by Mindy Tony RN  Outcome: Progressing  11/17/2024 1654 by Mindy Tony RN  Outcome: Progressing  Goal: Effective Bowel Elimination  11/17/2024 1657 by Mindy Tony, RN  Outcome: Progressing  11/17/2024 1654 by Mindy Tony RN  Outcome: Progressing  Goal: Fluid and Electrolyte Balance  11/17/2024 1657 by Mindy Tony, RN  Outcome: Progressing  11/17/2024 1654 by Mindy Tony, RN  Outcome: Progressing  Goal: Optimal Functional Ability  11/17/2024 1657 by Mindy Tony RN  Outcome: Progressing  11/17/2024 1654 by Mindy Tony RN  Outcome: Progressing  Goal: Absence of Infection Signs and Symptoms  11/17/2024 1657 by Mindy Tony, RN  Outcome: Progressing  11/17/2024 1654 by Chavo, Mindy, RN  Outcome: Progressing  Goal: Optimal  Neurologic Function  11/17/2024 1657 by Mindy Tony RN  Outcome: Progressing  11/17/2024 1654 by Mindy Tony RN  Outcome: Progressing  Goal: Anesthesia/Sedation Recovery  11/17/2024 1657 by Mindy Tony RN  Outcome: Progressing  11/17/2024 1654 by Mindy Tony RN  Outcome: Progressing  Goal: Optimal Pain Control and Function  11/17/2024 1657 by Mindy Tony RN  Outcome: Progressing  11/17/2024 1654 by Mindy Tony RN  Outcome: Progressing  Goal: Nausea and Vomiting Relief  11/17/2024 1657 by Mindy Tony RN  Outcome: Progressing  11/17/2024 1654 by Mindy Tony RN  Outcome: Progressing  Goal: Effective Urinary Elimination  11/17/2024 1657 by Mindy Tony RN  Outcome: Progressing  11/17/2024 1654 by Mindy Tony RN  Outcome: Progressing  Goal: Effective Oxygenation and Ventilation  11/17/2024 1657 by Mindy Tony RN  Outcome: Progressing  11/17/2024 1654 by Mindy Tony RN  Outcome: Progressing     Problem: Syncope  Goal: Absence of Syncopal Symptoms  11/17/2024 1657 by Mindy Tony RN  Outcome: Progressing  11/17/2024 1654 by Mindy Tony RN  Outcome: Progressing     Problem: Dysrhythmia  Goal: Normalized Cardiac Rhythm  11/17/2024 1657 by Mindy Tony RN  Outcome: Progressing  11/17/2024 1654 by Mindy Tony RN  Outcome: Progressing

## 2024-11-17 NOTE — RESPIRATORY THERAPY
11/16/24 1934   Patient Assessment/Suction   Level of Consciousness (AVPU) alert   Respiratory Effort Normal;Unlabored   Expansion/Accessory Muscles/Retractions no use of accessory muscles   All Lung Fields Breath Sounds Anterior:;coarse;diminished   Rhythm/Pattern, Respiratory unlabored;pattern regular;depth regular;no shortness of breath reported   Skin Integrity   $ Wound Care Tech Time 15 min   Area Observed Left;Right;Behind ear;Cheek;Upper lip;Nares   Skin Appearance without discoloration   PRE-TX-O2   Device (Oxygen Therapy) high flow nasal cannula w/device   Humidification temp set 35   Humidification temp actual 35   Flow (L/min) (Oxygen Therapy) 20   Oxygen Concentration (%) 85   SpO2 100 %   Pulse Oximetry Type Continuous   $ Pulse Oximetry - Multiple Charge Pulse Oximetry - Multiple   Pulse 72   Resp (!) 0   Aerosol Therapy   $ Aerosol Therapy Charges PRN treatment not required   Daily Review of Necessity (SVN) completed   Respiratory Treatment Status (SVN) PRN treatment not required   Incentive Spirometer   $ Incentive Spirometer Charges done with encouragement   Incentive Spirometer Predicted Level (mL) 1760   Administration (IS) mouthpiece utilized   Number of Repetitions (IS) 10   Level Incentive Spirometer (mL) 1000   Patient Tolerance (IS) good;no adverse signs/symptoms present   Vibratory PEP Therapy   $ Vibratory PEP Charges Aerobika Therapy   $ Vibratory PEP Equipment Aerob[place] Equipment   $ Vibratory PEP Tech Time Charges 15 min   Daily Review of Necessity (PEP Therapy) completed   Type (PEP Therapy) vibratory/oscillatory   Device (PEP Therapy) flutter   Route (PEP Therapy) mouthpiece   Breaths per Cycle (PEP Therapy) 10   Cycles (PEP Therapy) 1   PEP Pressure (cm H2O) 5   PEP Duration (min) 5   Settings (PEP Therapy) PEP 5   Patient Position (PEP Therapy) HOB elevated   Post Treatment Assessment (PEP) breath sounds unchanged   Signs of Intolerance (PEP Therapy) none   Ready to  Wean/Extubation Screen   FIO2<=50 (chart decimal) (!) 0.85   Education   $ Education 15 min;PEP Therapy;Oxygen

## 2024-11-17 NOTE — PROGRESS NOTES
Pulmonary/Critical Care   Progress Note      PATIENT NAME: Shanell Mahmood  MRN: [id number]  TODAY'S DATE: 2024  8:00 AM  ADMIT DATE: 10/31/2024  AGE: 74 y.o. : 1950    CONSULT REQUESTED BY: Andriy Kilgore MD    REASON FOR CONSULT:   Critical care management    HPI:  The patient is a 74 year old female who had a syncopal event on 10/31.  She was weak, myelopathic.  CT of the head showed an acute infarct in the right caudate head.  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  The MRI of her cervical spine showed disc bulging and spondylolysis at C4-C5 and C5-C6 with severe spinal canal stenosis, cervical cord compression and cord signal alteration there was also broad-based disc bulging producing moderate spinal canal stenosis at C3-C4.  She was brought to the OR yesterday where she underwent C2- 3, 3-4, 4-5,5-6,  and 6- 7 laminectomies with C3 through 6 posterior segmental instrumentation and C3-4, 4- 5, 5- 6 posterolateral arthrodesis using autograft harvested from the incision and allograft of DBM.  This morning she remains profoundly myelopathic.  She is on Levophed at 0.06 mics per kilos per minute to maintain a mean of 80.    The patient has significant pulmonary hypertension in his managed with Opsumit 10 mg daily, Adcirca 40 mg daily, and Uptravi 1600 ug bid.  She is on 4 L continuous oxygen and has dyspnea with any exertion.  She is also on Advair 115/21 b.i.d.     the patient's oxygenation dropped dramatically this morning.  She is now on more levophed to maintain a mean of 80.  She is in a lot of pain.     the patient is requiring a little less oxygen this morning.  However, she is in AFib and had a 25 beat run of V-tach overnight.  She is on 0.24 of Levophed.  I am concerned that her pulmonary hypertension is significantly worse and have an echo coming.  Will start walking  the Levophed down and off as her renal function is worse, she is in AFib and she had V-tach last night.    11/7 the patient is continuing to decline.  She is on Vapotherm now at 30 L and 80% to maintain good oxygenation.  She is requiring 0.3 of Levophed to maintain her blood pressure.  We did fully anticoagulate with Levophed yesterday as I am still worried about a PE and the patient developed AFib with RVR.  Started on amiodarone and this morning she is back in sinus rhythm.  Limited echo yesterday did not show any worsening of the right ventricle but PA pressures were estimated at 60.  If her heart is actually working well, then we have no SVR, and this is spinal shock.  However, spinal shock is usually bradycardic.  A Transfer-Jens catheter maybe helpful, but I am not sure how it will change our management unless the RV is failing and the PAs are too high and we need to be on IV Flolan.    11/8 the patient is on 0.3 of Levophed.  She is on 85% on the Vapotherm.  The patient is still declining to have a a Transfer-Jens catheter.  We would need this to initiate Flolan.  She would like to continue as we are hoping that things will turn around.  Her creatinine is marginally better today.  She remains ahead on her I's and O's by about 6 L. I do not see any point in trying to transfer her if she is declining a Transfer.    11/9 the patient is still on 0.3 of Levophed.  Her oxygenation is slightly improved and we have decreased her Vapotherm to 70% and 20 L. will try adding vasopressin to see if we get any benefit from this.  Her creatinine is stable at 1.8.  She still does not want a Transfer-Jens catheter.  She has not had a bowel movement in 3 days.  Will make sure we are doing rectal stimulation with her Dulcolax suppositories.    11/10 The patient has less Levophed need this morning.  She is on vasopressin 0.04.  Her O2, though, has had to be increased to 85% she is still on Vapotherm but only at 15 L. she has more movement in  her arms and her legs today and this may be what is helping us with a blood pressure.    11/11 the patient is significantly improved this morning we are down to 0.05 of Levophed and the vasopressin.  Her oxygen is down to 6 L nasal cannula.  Her strength continues to improve.    11/12 the patient's Levophed dose is back up to 0.1.  She is still on vasopressin.  Her oxygen requirements are also higher again today.    11/13 the patient is still requiring her Levophed and vasopressin.  She is still on maximum Vapotherm.  The CTA done yesterday showed bilateral pulmonary emboli but Dr. Carlos did not feel the clot burden was so significant as to be impacting her pressures and did not feel that removal of the clot would have a positive risk benefit ratio.    11/14 the patient is no better.  She is on max Vapotherm.  She is on 0.18 of Levophed and vasopressin.  Her  is coming in today to meet with Dr. Retana.  Her son's are back home and her daughter can not come today but would like to be on the phone.  Her sister is in the room.  Her sister is certainly ready for her to be out of this situation and wishes her not to suffer.  The patient is complaining of a sore throat when she swallows today I do not see anything in her pharynx.  She apparently diurese yesterday but the pure wick is not working.  Her chest x-ray shows a larger left smaller right effusion and left lower lobe edema.     11/15/2024 - Pt about the same, O2 needs have gone up and down some.  She is in no distress and she is oving toward hospice when all are ready.  She voiced no needs this AM    11/16/2024 - Stable overnight but O2 needs have increased, her only complaint is back pain and d/w nursing.  No new issues reported.  Labs reviewed - Creatinine has increased some, CXR looks like pulmonary edema    11/17/2024 - Stable overnight, O2 needs have decreased some.  Still with co back and neck pain.  No new complaints.  Still on levophed.  Renal  function is worse    REVIEW OF SYSTEMS  GENERAL: Feeling tired  EYES: Vision is good.  ENT: pharyngitis with swallowing.   HEART: No chest pain or palpitations.  LUNGS:  Breathing is okay  GI:  She is anorectic  :  She is repeatedly soaked from the Lasix.  SKIN: No lesions or rashes.  MUSCULOSKELETAL: No joint pain or myalgias.  NEURO:  She reports her strength is about the same.  She still has paresthesias.  LYMPH: No edema or adenopathy.  PSYCH: No anxiety or depression.  ENDO: No weight change.    No change in the patient's Past Medical History, Past Surgical History, Social History or Family History since admission.    VITAL SIGNS (MOST RECENT)  Temp: 97.6 °F (36.4 °C) (11/17/24 0701)  Pulse: 68 (11/17/24 0707)  Resp: 16 (11/17/24 1015)  BP: (!) 123/55 (11/17/24 0701)  SpO2: 100 % (11/17/24 0707)    INTAKE AND OUTPUT (LAST 24 HOURS):  Intake/Output Summary (Last 24 hours) at 11/17/2024 1029  Last data filed at 11/17/2024 0701  Gross per 24 hour   Intake 730 ml   Output 1535 ml   Net -805 ml       WEIGHT  Wt Readings from Last 1 Encounters:   11/16/24 84.3 kg (185 lb 13.6 oz)       PHYSICAL EXAM  GENERAL: Older patient looking quiet.  HEENT: Pupils equal and reactive. Extraocular movements intact. Nose intact. Pharynx moist, no thrush, no erythema.  NECK: Supple.  Aspen collar in place.  The nurse reports the wound looks good.    HEART:  Mostly regular rate and rhythm. No murmur or gallop auscultated.  The monitor shows AFib with a controlled rate.  LUNGS:  The lungs are clear.  She is diminished in the left base.  Lung excursion symmetrical. No change in fremitus.   ABDOMEN: Bowel sounds present. Non-tender, no masses palpated.  : Normal anatomy.    EXTREMITIES:  generalized weakness  LYMPHATICS: No adenopathy palpated, tr edema.  SKIN: Dry, intact, no lesions.   NEURO: Cranial nerves II-XII intact. Motor strength has improved in all extremities, but she remains very quadriparetic.  PSYCH:  Quiet      CBC  LAST (LAST 24 HOURS)  Recent Labs   Lab 11/17/24  0432   WBC 6.02   RBC 3.20*   HGB 8.9*   HCT 25.4*   MCV 79*   MCH 27.8   MCHC 35.0   RDW 14.6*      MPV 10.5   GRAN 79.0*  4.8   LYMPH 8.0*  0.5*   MONO 10.0  0.6   BASO 0.04   NRBC 0       CHEMISTRY LAST (LAST 24 HOURS)  Recent Labs   Lab 11/17/24  0432   *   K 4.1   CL 99   CO2 23   ANIONGAP 6*   BUN 43*   CREATININE 2.3*   GLU 78   CALCIUM 7.7*   MG 1.7   ALBUMIN 2.4*   PROT 4.8*   ALKPHOS 107   ALT 39   AST 67*   BILITOT 0.5         LAST 7 DAYS MICROBIOLOGY   Microbiology Results (last 7 days)       ** No results found for the last 168 hours. **            MOST RECENT IMAGING  X-Ray Chest AP Portable  Narrative: EXAMINATION:  XR CHEST AP PORTABLE    CLINICAL HISTORY:  pulm edema, pleural effusions;    COMPARISON:  11/15/2024, 11/14/2024    FINDINGS:  Cardiac silhouette size is stable compared to prior.  Right PICC is in stable position with tip overlying the SVC.  Prominent central pulmonary vascularity.  Mild bilateral interstitial/ground-glass opacities suggestive of pulmonary edema.  Small bilateral pleural effusions.  No pneumothorax.  No acute osseous abnormality.  Impression: Pulmonary edema and small bilateral pleural effusions.    Electronically signed by: Ashkan Broussard  Date:    11/16/2024  Time:    07:01    Chest x-ray 11/13 shows bilateral pleural effusions and huge pulmonary arteries.  There is a PICC line on the right side.  The CTA done yesterday shows clot bilaterally no huge amount of clot.    CURRENT VISIT EKG  No results found for this visit on 10/31/24.    ECHOCARDIOGRAM RESULTS  Results for orders placed during the hospital encounter of 10/31/24    Echo    Interpretation Summary    Left Ventricle: The left ventricle is normal in size. Normal wall thickness. Unable to assess wall motion. There is normal systolic function with a visually estimated ejection fraction of 60 - 65%.    Right Ventricle: Right ventricle was not well  visualized due to poor acoustic window.  Grossly appears to be dilated with reduced function.  There appears to be some concern of flattening of the interventricular septum which could indicate right ventricular pressure and volume overload.    Left Atrium: Left atrium is severely dilated.    IVC/SVC: Elevated venous pressure at 15 mmHg.    Oxygen INFORMATION  Oxygen Concentration (%):  [] 85    Maximum Vapotherm 40 L, 100%    IMPRESSION AND PLAN  Severe central cord compression   - now status post multilevel laminectomy and instrumentation  Quadriparesis   - improved but still quite severe  Severe pulmonary hypertension  - patient on Opsumit, Uptravi, and Adcirca, max doses    - patient's echo continues to show a functioning RV and LV.  - patient declining a Geneva-Jens catheter which we would need to evaluate whether Flolan might be helpful  Pulmonary emboli  - will continue Lovenox 1 milligram/kilogram b.i.d.  Bilateral pleural effusions  - diuresis held with worsened renal function and will follow  Shock  - pressor as needed  DVT  - nonocclusive to the right leg  - on full-dose Lovenox  Atrial fibrillation  - in AFib, rate controlled  - on oral amiodarone   Acute on chronic hypoxemic respiratory failure  - on 4 L of oxygen at home  - now requiring Vapotherm at 100% FiO2  - pulmonary hypertension meds being given  - takes Advair but is not obstructed  - will continue p.r.nSangeetha Felipe  Anemia  - chronic disease  - stable  Chronic kidney disease  - likely aggravated with the Levophed   - follow with some worsening over the last day or so  - IVF with NS at 50 ml/hr and will recheck numbers tomorrow  Hyponatremia  - follow  Moderate hypoalbuminemia  - advance diet  Intracranial mass  Pulmonary nodules, 1 greater than 1 cm  - patient workup underway as an outpatient  Pharyngitis  - Chloraseptic spray    Palliative care note read, will continue present support but is not getting better.   Await family decision on  when to transition to hospice      Saeid Johnson MD  [facility] Pulmonary/Critical Care  11/17/2024

## 2024-11-17 NOTE — ASSESSMENT & PLAN NOTE
Creatine stable for now. BMP reviewed- noted Estimated Creatinine Clearance: 24.3 mL/min (A) (based on SCr of 2.3 mg/dL (H)). according to latest data. Based on current GFR, CKD stage is stage 4 - GFR 15-29.  Monitor UOP and serial BMP and adjust therapy as needed. Renally dose meds. Avoid nephrotoxic medications and procedures.    Close monitor

## 2024-11-17 NOTE — ASSESSMENT & PLAN NOTE
S/p multilevel laminectomy (11/3/24)  C2-3, C3-4, C4-5, C5-6, C6-7 laminectomy  C3 through 6 posterior segmental instrumentation using [employer]hony system  C3-4, C4-5, C5-6 posterolateral arthrodesis using autograft harvested from the same incision and allograft DBM    Cervical collar in place  Neuro surgery follow up     PT OT when more stable    Cortisol added and normal     Remains quadriplegic after surgery.

## 2024-11-17 NOTE — PLAN OF CARE
Problem: Skin Injury Risk Increased  Goal: Skin Health and Integrity  Outcome: Progressing     Problem: Fall Injury Risk  Goal: Absence of Fall and Fall-Related Injury  Outcome: Progressing     Problem: Spinal Surgery  Goal: Absence of Bleeding  Outcome: Progressing     Problem: Adult Inpatient Plan of Care  Goal: Optimal Comfort and Wellbeing  Outcome: Not Progressing     Problem: Spinal Surgery  Goal: Optimal Functional Ability  Outcome: Not Progressing  Goal: Optimal Neurologic Function  Outcome: Not Progressing  Goal: Optimal Pain Control and Function  Outcome: Not Progressing  Goal: Effective Oxygenation and Ventilation  Outcome: Not Progressing

## 2024-11-17 NOTE — SUBJECTIVE & OBJECTIVE
Interval History:   Seen and examined at multidisciplinary rounds, looks very weak, still quadriplegic, On vapotherm 25 LPM 85%  On one vasopressor   She is awake and no special complaint   D/w daughter and  the family member at bedside.     Review of Systems  Objective:     Vital Signs (Most Recent):  Temp: 97.7 °F (36.5 °C) (11/17/24 1101)  Pulse: 69 (11/17/24 1101)  Resp: (!) 2 (11/17/24 1101)  BP: (!) 93/51 (11/17/24 1101)  SpO2: 97 % (11/17/24 1101) Vital Signs (24h Range):  Temp:  [97.3 °F (36.3 °C)-99.2 °F (37.3 °C)] 97.7 °F (36.5 °C)  Pulse:  [67-82] 69  Resp:  [0-33] 2  SpO2:  [90 %-100 %] 97 %  BP: ()/(51-55) 93/51  Arterial Line BP: ()/(41-64) 114/50     Weight: 84.3 kg (185 lb 13.6 oz)  Body mass index is 29.11 kg/m².    Intake/Output Summary (Last 24 hours) at 11/17/2024 1112  Last data filed at 11/17/2024 1101  Gross per 24 hour   Intake 1210 ml   Output 2025 ml   Net -815 ml         Physical Exam  Vitals and nursing note reviewed.   HENT:      Head: Normocephalic and atraumatic.      Mouth/Throat:      Mouth: Mucous membranes are moist.   Eyes:      Conjunctiva/sclera: Conjunctivae normal.   Neck:      Vascular: No JVD.   Cardiovascular:      Rate and Rhythm: Normal rate.   Pulmonary:      Effort: Pulmonary effort is normal.      Breath sounds: Normal breath sounds.   Abdominal:      Palpations: Abdomen is soft.   Skin:     General: Skin is warm.   Neurological:      Mental Status: She is alert and oriented to person, place, and time.             Significant Labs: All pertinent labs within the past 24 hours have been reviewed.  CBC:   Recent Labs   Lab 11/16/24  0406 11/17/24  0432   WBC 6.78 6.02   HGB 9.3* 8.9*   HCT 27.2* 25.4*    160     CMP:   Recent Labs   Lab 11/16/24  0406 11/17/24  0432   * 128*   K 4.0 4.1   CL 98 99   CO2 22* 23   GLU 63* 78   BUN 39* 43*   CREATININE 2.0* 2.3*   CALCIUM 7.6* 7.7*   PROT 4.8* 4.8*   ALBUMIN 2.3* 2.4*   BILITOT 0.5 0.5   ALKPHOS 103  "107   AST 53* 67*   ALT 37 39   ANIONGAP 8 6*     Cardiac Markers: No results for input(s): "CKMB", "MYOGLOBIN", "BNP", "TROPISTAT" in the last 48 hours.    Significant Imaging: I have reviewed all pertinent imaging results/findings within the past 24 hours.    Scheduled Meds:   allopurinoL  100 mg Oral QHS    amiodarone  200 mg Oral BID    atorvastatin  40 mg Oral Daily    bisacodyL  10 mg Rectal Daily    clopidogreL  75 mg Oral Daily    colchicine  0.3 mg Oral Daily    enoxparin  1 mg/kg Subcutaneous Q24H (treatment, non-standard time)    macitentan  10 mg Oral Daily    methocarbamoL  750 mg Oral TID    midodrine  15 mg Oral TID    montelukast  10 mg Oral Daily    pantoprazole  40 mg Oral Daily    selexipag  1,600 mcg Oral BID    tadalafil  40 mg Oral Daily     Continuous Infusions:   0.9% NaCl   Intravenous Continuous        NORepinephrine bitartrate-D5W  0-3 mcg/kg/min Intravenous Continuous 9.5 mL/hr at 11/17/24 0621 0.28 mcg/kg/min at 11/17/24 0621    vasopressin  0.04 Units/min Intravenous Continuous   Paused at 11/14/24 1123     PRN Meds:.  Current Facility-Administered Medications:     acetaminophen, 650 mg, Oral, Q4H PRN    albuterol sulfate, 2.5 mg, Nebulization, Q6H PRN    aluminum-magnesium hydroxide-simethicone, 30 mL, Oral, Q4H PRN    dextrose 50%, 12.5 g, Intravenous, PRN    dextrose 50%, 25 g, Intravenous, PRN    diphenhydrAMINE, 25 mg, Oral, Q6H PRN    diphenhydrAMINE-zinc acetate 1-0.1%, , Topical (Top), TID PRN    glucagon (human recombinant), 1 mg, Intramuscular, PRN    glucose, 16 g, Oral, PRN    glucose, 24 g, Oral, PRN    HYDROcodone-acetaminophen, 1 tablet, Oral, Q4H PRN    HYDROmorphone, 1 mg, Intravenous, Q6H PRN    HYDROmorphone, 2 mg, Oral, Q4H PRN    magnesium oxide, 800 mg, Oral, PRN    magnesium oxide, 800 mg, Oral, PRN    methocarbamoL, 500 mg, Oral, TID PRN    naloxone, 0.02 mg, Intravenous, PRN    ondansetron, 4 mg, Intravenous, Q8H PRN    potassium bicarbonate, 35 mEq, Oral, PRN   "  potassium bicarbonate, 50 mEq, Oral, PRN    potassium bicarbonate, 60 mEq, Oral, PRN    potassium, sodium phosphates, 2 packet, Oral, PRN    potassium, sodium phosphates, 2 packet, Oral, PRN    potassium, sodium phosphates, 2 packet, Oral, PRN    prochlorperazine, 5 mg, Intravenous, Q6H PRN    senna-docusate 8.6-50 mg, 2 tablet, Oral, Nightly PRN    sodium chloride 0.9%, 500 mL, Intravenous, PRN    sodium chloride 0.9%, 10 mL, Intravenous, Q12H PRN    sodium chloride 0.9%, 10 mL, Intravenous, PRN    X-Ray Chest AP Portable    Result Date: 11/16/2024  EXAMINATION: XR CHEST AP PORTABLE CLINICAL HISTORY: pulm edema, pleural effusions; COMPARISON: 11/15/2024, 11/14/2024 FINDINGS: Cardiac silhouette size is stable compared to prior.  Right PICC is in stable position with tip overlying the SVC.  Prominent central pulmonary vascularity.  Mild bilateral interstitial/ground-glass opacities suggestive of pulmonary edema.  Small bilateral pleural effusions.  No pneumothorax.  No acute osseous abnormality.     Pulmonary edema and small bilateral pleural effusions. Electronically signed by: Ashkan Broussard Date:    11/16/2024 Time:    07:01    X-Ray Chest AP Portable    Result Date: 11/15/2024  EXAMINATION: XR CHEST AP PORTABLE CLINICAL HISTORY: pulm edema, pleural effusions; COMPARISON: November 14 FINDINGS: Cardiac silhouette is unchanged.  The thoracic aorta is mildly elongated.  Masslike prominence of the bilateral bertha is stable. There is improved aeration of the lower lung zone on the left, with persistent small bilateral pleural effusions.  No pneumothorax is identified. Right-sided PICC line is unchanged in position.  No acute osseous abnormality is demonstrated.  Chronic right-sided rotator cuff tear is incidentally noted.     1. Slightly improved aeration of the left lung base.  No other significant changes compared to November 14. Electronically signed by: Leif Jordan Date:    11/15/2024 Time:    07:17    X-Ray  Chest AP Portable    Result Date: 11/14/2024  EXAMINATION: XR CHEST AP PORTABLE CLINICAL HISTORY: pulm edema, pleural effusions; COMPARISON: November 13, 2024 FINDINGS: Heart size is normal.  The mediastinum is unremarkable.  Abnormal bilateral hilar prominence is unchanged. Hazy opacification of the lower left hemithorax consistent with a combination of volume loss or airspace disease and left-sided pleural effusion appears slightly improved.  Small right pleural effusion is noted.  There is no evidence of pneumothorax. Right-sided PICC line remains in place with tip at the superior vena cava.     Slightly improved aeration of the lower lung zone on the left.  No other significant changes. Electronically signed by: Leif Jordan Date:    11/14/2024 Time:    08:54    X-Ray Chest AP Portable    Result Date: 11/13/2024  EXAMINATION: XR CHEST AP PORTABLE CLINICAL HISTORY: hypoxemia; FINDINGS: Portable chest radiograph at 04:58 hours compared to prior exams including CT of the prior day show right PICC unchanged in position, with stable cardiomediastinal silhouette and stable enlarged central pulmonary vasculature. There are persistent bilateral perihilar and left lower lung airspace opacities, with bilateral pleural effusions blunting the costophrenic angles.  No pneumothorax.     No significant interval change. Electronically signed by: Nitin Mayo Date:    11/13/2024 Time:    08:04    CTA Chest Non-Coronary (PE Studies)    Result Date: 11/12/2024  EXAMINATION: CTA CHEST NON CORONARY (PE STUDIES) CLINICAL HISTORY: Pulmonary embolism (PE) suspected, high prob;. TECHNIQUE: CT angiography targeted to the pulmonary arteries was performed. 100 cc nonionic contrast was administered and the bolus was timed for optimal opacification of the pulmonary arteries. 3-D reformatted images were obtained on an independent workstation and stored as a part of patient's permanent medical record. COMPARISON: Chest radiograph, November  12, 2024 FINDINGS: The pulmonary arteries are adequately opacified.  There are filling defects involving bilateral proximal segmental pulmonary arterial branches, extending to distal segmental and subsegmental branches.  There is a small amount of thrombus in the left main pulmonary artery.  The pulmonary arterial trunk is dilated at up to 4.6 cm.  The right pulmonary artery measures 3.2 cm transverse and the left 3.4 cm.  There is slight bowing of the interventricular septum and minimal reflux to the inferior vena cava.  Correlate for possible right heart strain. The thoracic aorta is normal in caliber.  There is no mediastinal mass or lymphadenopathy, however note is made of mild nonspecific right hilar lymphadenopathy. Images at lung windows demonstrate left greater than right bilateral ground-glass pulmonary opacities, predominantly central in location.  Correlate for pulmonary edema or less likely pneumonia.  There are small bilateral pleural effusions.  Dependent atelectasis is noted at both lung bases. There are several indeterminate tiny noncalcified nodules in the right upper lobe, the largest measuring 4 mm (series 4, image 95.  4 mm nodule is also noted in the right middle lobe abutting the minor fissure. Images of the upper abdomen are limited, but demonstrate changes of apparent previous gastric bypass.  There is mild perihepatic free fluid. Diffuse body wall edema is noted.  No acute osseous abnormalities are identified.     1. Bilateral pulmonary thromboembolic disease. 2. Pulmonary arterial dilation and additional observations as above.  Correlate for possible right heart strain. 3. Left greater than right predominantly central ground-glass pulmonary opacities.  Correlate for edema versus pneumonia. 4. Small pleural effusions. 5. Nonspecific right hilar lymphadenopathy.  Scattered tiny right-sided noncalcified pulmonary nodules, possibly granulomas, but indeterminate. Findings of bilateral pulmonary  thromboembolic disease were called to Dr. Ramos at 11:10 on November 12, 2024. Electronically signed by: Leif Jordan Date:    11/12/2024 Time:    11:22    X-Ray Chest AP Portable    Result Date: 11/12/2024  EXAMINATION: XR CHEST AP PORTABLE CLINICAL HISTORY: worsening oxygenation; FINDINGS: Portable chest with comparison chest x-ray 11/08/2024.  Normal cardiomediastinal silhouette.Enlarged central hilar vascular structures again noted.  There is bilateral perihilar haziness and interstitial thickening similar to previous exam.  Blunting of the left costophrenic angle again noted with increased hazy opacification of the left lung base.  Blunting of the right costophrenic angle with patchy opacities of the right lung base is similar to previous exam.  Pulmonary vasculature is normal. No acute osseous abnormality.     CHF lung pattern with probable small bilateral pleural effusions noted,  and is subtly increased from previous exam. Electronically signed by: Scotty Aceves Date:    11/12/2024 Time:    08:41    X-Ray Chest AP Portable    Result Date: 11/8/2024  EXAMINATION: XR CHEST AP PORTABLE CLINICAL HISTORY: Pulmonary hypertension; FINDINGS: Portable chest with comparison chest x-ray 11/06/2024.  Normal cardiomediastinal silhouette.Enlarged central hilar vascular structures again noted with mild perihilar peribronchial interstitial thickening.  Hazy opacities of the left lung base are mildly increased.  No pneumothorax.  Right PICC line is unchanged.  Pulmonary vasculature is normal. No acute osseous abnormality.     Mild increased hazy opacities of the left lung bases.  Otherwise no significant changes from prior exam. Electronically signed by: Scotty Aceves Date:    11/08/2024 Time:    08:50    Echo Saline Bubble? No; Ultrasound enhancing contrast? No    Addendum Date: 11/7/2024      Left Ventricle: The left ventricle is smaller than normal. Normal wall thickness. There is normal systolic function with a  visually estimated ejection fraction of 65 - 70%. There is normal diastolic function.   Right Ventricle: Mild right ventricular enlargement. Wall thickness is normal. Systolic function is moderately reduced.   Left Atrium: Left atrium is moderately dilated.   Right Atrium: Right atrium is mildly dilated.   IVC/SVC: Normal venous pressure at 3 mmHg.     Result Date: 11/7/2024    Left Ventricle: The left ventricle is normal in size. Normal wall thickness. There is normal systolic function with a visually estimated ejection fraction of 65 - 70%. There is normal diastolic function.   Right Ventricle: Normal right ventricular cavity size. Wall thickness is normal. Systolic function is normal.   Left Atrium: Left atrium is mildly dilated.   IVC/SVC: Normal venous pressure at 3 mmHg.     X-Ray Chest AP Portable    Result Date: 11/6/2024  EXAMINATION: XR CHEST AP PORTABLE CLINICAL HISTORY: pulmonary hypertension; TECHNIQUE: Single frontal view of the chest was performed. COMPARISON: 11/05/2024. FINDINGS: There is a right-sided PICC, unchanged in position.  The lungs are well expanded.  There is prominence of the central pulmonary vasculature, stable from prior.  There are small bilateral pleural effusions.  The lungs are otherwise clear.  The cardiac silhouette is enlarged.  Osseous structures are intact.  There are calcifications of the aortic arch.  There are surgical clips overlying the left upper quadrant.     No significant detrimental change from prior. Electronically signed by: Dawit Barrios Date:    11/06/2024 Time:    05:05    US Lower Extremity Veins Bilateral    Result Date: 11/5/2024  EXAMINATION: US LOWER EXTREMITY VEINS BILATERAL CLINICAL HISTORY: r/o DVT; TECHNIQUE: Grayscale, color and spectral Doppler analysis of the bilateral lower extremity deep venous system was performed. FINDINGS: No prior studies for comparison.  There is hypoechoic peripheral nonocclusive thrombus within the proximal right  superficial femoral vein.  There is also hypoechoic nonocclusive thrombus within the right popliteal vein, with some preserved color Doppler vascular flow. There is normal compressibility, with normal color and spectral Doppler vascular flow in the left lower extremity deep venous system, with no findings of left lower extremity deep venous thrombosis.     1. Positive for nonocclusive deep venous thrombosis of the right lower extremity. 2. Negative for left lower extremity deep venous thrombosis. 3. RESULT NOTIFICATION: Findings were discussed by Dr Mayo with, and acknowledged by LUZ MARINA SANCHEZ at 15:20 hours. Electronically signed by: Nitin Mayo Date:    11/05/2024 Time:    15:22    X-Ray Chest 1 View S/P PICC Line by Nursing    Result Date: 11/5/2024  EXAMINATION: XR CHEST 1 VIEW S/P PICC LINE BY NURSING CLINICAL HISTORY: picc line; FINDINGS: Portable chest radiograph at 10:54 hours compared to 05:20 hours shows interval insertion of a right PICC, with the distal tip overlying the SVC in satisfactory position.  There is no other significant interval change.     Interval insertion of a right PICC, in satisfactory position with distal tip overlying the SVC. Electronically signed by: Nitin Mayo Date:    11/05/2024 Time:    11:05    X-Ray Chest AP Portable    Result Date: 11/5/2024  EXAMINATION: XR CHEST AP PORTABLE CLINICAL HISTORY: Pulmonary HTN; COMPARISON: 10/31/2024. FINDINGS: The heart is enlarged but stable.  Prominence of the central pulmonary vasculature appears similar to prior examination.  There is mild mass effect observed upon the rightward aspect of the trachea. There are mild airspace opacities or volume loss within the lung bases.  There are mildly diminished lung volumes.  No significant effusion demonstrated. Multiple monitoring electrodes overlie the chest.     Diminished lung volumes. Mild basilar airspace opacities or volume loss. Cardiomegaly and marked prominence of the central pulmonary  vasculature, stable. Mild mass effect upon the rightward aspect of the trachea. Electronically signed by: Waqas Leavitt Date:    11/05/2024 Time:    07:04    X-Ray Cervical Spine AP And Lateral    Result Date: 11/4/2024  CLINICAL HISTORY: ([id number])75 y/o  (1950) F C3-6 laminectomy and fusion; Central cord syndrome at unspecified level of cervical spinal cord, initial encounter TECHNIQUE: (A#[id number], exam time 11/4/2024 5:48) XR CERVICAL SPINE AP LATERAL IMG56 2 view(s) obtained. COMPARISON: MRI from 11/02/2024. FINDINGS: C1 through C7 are visualized on the lateral radiograph.  There is straightening/reversal the normal lordotic curvature likely secondary to a combination of positioning/postoperative change, degenerative change and/or muscle spasm. There has been interval posterior laminectomy from C3-C6, with bilateral posterior trans-facet screw and saurabh fixation.  There is a midline posterior paraspinal drain in the laminectomy bed with scattered subcutaneous emphysema and edema over the dorsal aspect of the neck. Again noted is moderate to severe disc height loss at C2-C3, C3-C4 and C4-C5 with moderate disc height loss at C5-C6.  There is oblique lucency through the anterior inferior vertebral body of C2, and C5, suspicious for occult nondisplaced fractures, these could be better characterized with CT.  There is minimal prevertebral soft tissue swelling. There is mild retrolisthesis of C5 on C6 (3 mm).  The visualized lung apices are clear.  The patient is edentulous.     1.  Posterior postoperative fusion from C3-C6 as above. 2.  Suspected tiny nondisplaced oblique fractures through the anterior inferior endplate of C2 and C5. This report was flagged in Epic as abnormal. Electronically signed by: Asim Davis Date:    11/04/2024 Time:    06:42    SURG FL Surgery Fluoro Usage    Result Date: 11/3/2024  See OP Notes for results. IMPRESSION: See OP Notes for results. This procedure was auto-finalized by:  Virtual Radiologist    MRI Cervical Spine Without Contrast    Result Date: 11/2/2024  EXAMINATION: MRI CERVICAL SPINE WITHOUT CONTRAST CLINICAL HISTORY: Myelopathy, acute, cervical spine; fall with head and neck trauma TECHNIQUE: Routine MRI cervical spine without contrast. COMPARISON: Multiple prior exams. FINDINGS: Motion artifact limits the exam.  There is exaggeration of the normal cervical spinal curvature, with normal vertebral body alignment, and no acute fractures or destructive osseous lesions.  Chronic vertebral height loss involves C3 and C4, with degenerative loss of disc height and signal most pronounced at C3-C4 and C4-C5, and heterogeneous degenerative type marrow endplate signal alteration.  No findings of a diffuse marrow replacement process. There are stippled T2 hyperintensities in the sandy suggesting chronic ischemic changes, with the visualized posterior fossa and cervicomedullary junction otherwise unremarkable.  There is intramedullary non fluid like T2 hyperintense signal alteration in the cervical cord as detailed below. At C2-C3, there is broad-based disc bulging and spondylosis, without significant spinal canal stenosis.  There is bilateral foraminal narrowing. At C3-C4, there is broad-based disc bulging and spondylosis, contributing to moderate to severe spinal canal stenosis.  There is severe bilateral foraminal narrowing. At C4-C5, there is broad-based disc bulging and spondylosis, which produces severe spinal canal stenosis with cervical cord compression.  There is increased non fluid-like T2 signal in the cord suggesting edema and or myelopathy, with severe bilateral foraminal narrowing. At C5-C6, there is broad-based disc bulging and spondylosis, producing severe spinal canal stenosis with mass effect upon the cervical cord.  There is increased non fluid-like T2 signal in the cord suggesting edema and or myelopathy.  There is severe bilateral foraminal narrowing. At C6-C7, there is  broad-based disc bulging, without significant spinal canal stenosis.  There is mild left neural foraminal narrowing. C7-T1 is unremarkable. There are no signal abnormalities of the paraspinal ligaments, with nonspecific STIR hyperintense edema within the posterior paraspinal musculature and subcutaneous fat.  Multiple T2 hyperintensities in the thyroid gland are nonspecific.     1. Disc bulging and spondylosis at C4-C5 and C5-C6, with severe spinal canal stenosis, cervical cord compression and cord signal alteration suggesting edema and or myelopathy. 2. Broad-based disc bulging producing moderate spinal canal stenosis at C3-C4. Electronically signed by: Nitin Mayo Date:    11/02/2024 Time:    12:28    Echo    Result Date: 11/1/2024    Left Ventricle: The left ventricle is normal in size. Normal wall thickness. Unable to assess wall motion. There is normal systolic function with a visually estimated ejection fraction of 60 - 65%.   Right Ventricle: Right ventricle was not well visualized due to poor acoustic window.  Grossly appears to be dilated with reduced function.  There appears to be some concern of flattening of the interventricular septum which could indicate right ventricular pressure and volume overload.   Left Atrium: Left atrium is severely dilated.   IVC/SVC: Elevated venous pressure at 15 mmHg.     MRA Neck without contrast    Result Date: 11/1/2024  EXAMINATION: MRA NECK WITHOUT CONTRAST CLINICAL HISTORY: Stroke/TIA, determine embolic source; TECHNIQUE: Time of flight MRA of the carotid and vertebral arteries was performed with 3D reconstructions according to the standard neck MRA protocol. COMPARISON: Carotid ultrasound 10/31/2024.  MRI/MRA of the head 10/31/2024. FINDINGS: Somewhat motion limited exam. The right common carotid artery demonstrates no hemodynamically significant stenosis. The cervical right internal carotid artery demonstrates no hemodynamically significant stenosis. The left common  carotid artery demonstrates no hemodynamically significant stenosis. The cervical left internal carotid artery demonstrates no hemodynamically significant stenosis. Extracranial vertebral arteries: Vertebral arteries are codominant.  Vertebral arteries are normal to the level of the foramen magnum.  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     1. Negative limited noncontrast MRA of the neck.  No hemodynamically significant stenosis identified. Electronically signed by: Kendall Lee Date:    11/01/2024 Time:    12:35    US Carotid Bilateral    Result Date: 10/31/2024  EXAMINATION: US CAROTID BILATERAL CLINICAL HISTORY: syncope; TECHNIQUE: Grayscale and color Doppler ultrasound examination of the carotid and vertebral artery systems bilaterally.  Stenosis estimates are per the NASCET measurement criteria. COMPARISON: None. FINDINGS: Right: Internal Carotid Artery (ICA) peak systolic velocity 60.6 cm/sec ICA/CCA peak systolic velocity ratio: 0.9 Plaque formation: Heterogeneous Vertebral artery: Antegrade flow and normal waveform. Left: Internal Carotid Artery (ICA)  peak systolic velocity 98.1 cm/sec ICA/CCA peak systolic velocity ratio: 1.4 Plaque formation: Heterogeneous Vertebral artery: Antegrade flow and normal waveform.     No evidence of a hemodynamically significant carotid bifurcation stenosis. Electronically signed by: Dawit Barrios Date:    10/31/2024 Time:    23:55    MRI Lumbar Spine Without Contrast    Result Date: 10/31/2024  EXAMINATION: MRI LUMBAR SPINE WITHOUT CONTRAST CLINICAL HISTORY: Low back pain, trauma; TECHNIQUE: Multiplanar, multisequence MR images were acquired from the thoracolumbar junction to the sacrum without contrast. COMPARISON: CT total spine from 10/31/2024. MRI lumbar spine from 09/15/2016 FINDINGS: Alignment: Normal. Vertebrae: Normal marrow signal.  No fracture. Discs: There is moderate disc height loss at L5-S1 and mild disc height loss and disc desiccation at L3-L4 and L4-L5.  Remaining disc heights are preserved. Cord: Normal.  Conus terminates at L2. Degenerative findings: T12-L1: There is mild facet arthropathy bilaterally.  There is no spinal canal stenosis or neural foraminal narrowing. L1-L2: There is mild facet arthropathy and ligamentum flavum hypertrophy.  There is no spinal canal stenosis or neural foraminal narrowing. L2-L3: There is mild bilateral facet arthropathy and ligamentum flavum hypertrophy and a small circumferential disc bulge, resulting in mild spinal canal stenosis and moderate bilateral neural foraminal narrowing. L3-L4: There is moderate bilateral facet arthropathy and ligamentum flavum hypertrophy and a circumferential disc bulge, resulting in moderate spinal canal stenosis and moderate bilateral neural foraminal narrowing. L4-L5: There is a circumferential disc bulge with moderate bilateral facet arthropathy and ligamentum flavum hypertrophy resulting in moderate spinal canal stenosis and severe bilateral neural foraminal narrowing. L5-S1: There is bilateral facet arthropathy, moderate with a posterior disc bulge and an annular fissure.  There is no spinal canal stenosis.  There is moderate bilateral neural narrowing. Paraspinal muscles & soft tissues: Unremarkable.     Multilevel degenerative changes of the lumbar spine as detailed above.  There is moderate spinal canal stenosis at L3-L4 and L4-L5 and there is severe bilateral neural foraminal narrowing at L4-L5. Electronically signed by: Dawit Barrios Date:    10/31/2024 Time:    22:38    MRA Brain without contrast    Result Date: 10/31/2024  EXAMINATION: MRA BRAIN WITHOUT CONTRAST CLINICAL HISTORY: Syncope, recurrent; TECHNIQUE: Non-contrast 3-D time-of-flight intracranial MR angiography was performed through the [place] [facility] [place] with MIP reformatting. COMPARISON: None FINDINGS:  Anterior circulation: The bilateral intracranial ICAs, bilateral ACAs, and bilateral MCAs are patent and unremarkable without high-grade stenosis or occlusion. Posterior circulation: The bilateral intracranial vertebral arteries, the basilar artery, and the bilateral PCAs are patent and unremarkable without high-grade stenosis or occlusion. There is no intracranial aneurysm visualized.     Unremarkable MRA brain. Electronically signed by: Dawit Barrios Date:    10/31/2024 Time:    22:29    MRI Brain Without Contrast    Result Date: 10/31/2024  EXAMINATION: MRI BRAIN WITHOUT CONTRAST CLINICAL HISTORY: Neuro deficit, acute, stroke suspected; TECHNIQUE: Multiplanar multisequence MR imaging of the brain was performed without intravenous contrast. COMPARISON: CT head from earlier the same date. FINDINGS: There is restricted diffusion in the right caudate head, compatible with an acute infarct.  There is associated T2/FLAIR hyperintense signal.  There is a mass centered within the left temporoparietal calvarium measuring approximately 5.0 x 2.9 x 4.0 cm, with extension into the left cerebral convexity extra-axial space and extension into the left temporal scalp.  There is abutment of the left temporal lobe, without evidence of vasogenic edema or evidence of significant mass effect.  There is no midline shift.  Ventricles are normal in size for age without evidence of hydrocephalus.  There is T2/FLAIR hyperintense signal throughout the supratentorial white matter, compatible with chronic microvascular ischemic changes.  There is a small focus of encephalomalacia within the right medial parietal lobe, likely related to a remote infarct.  There is no intracranial hemorrhage.  No extra-axial blood or fluid collections. Normal vascular flow voids. Left common rim mass as above.  The remaining bone marrow signal intensity is normal. Paranasal sinuses and mastoid air cells are clear.     1. Acute infarct in the right caudate  head. 2. 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  The mass abuts the left temporal lobe, without resulting in significant mass effect or vasogenic edema.  Differential includes a primary or metastatic osseous lesion, an atypical meningioma, or additional etiologies. 3. Chronic microvascular ischemic changes. This report was flagged in Epic as abnormal. Dr. Barrios discussed critical findings with TATIANNA Morales. by [facility] secure chat at 22:23 on 10/31/2024. Electronically signed by: Dawit Barrios Date:    10/31/2024 Time:    22:27    X-Ray Wrist Complete Left    Result Date: 10/31/2024  EXAMINATION: XR WRIST COMPLETE 3 VIEWS LEFT CLINICAL HISTORY: Syncope and collapse TECHNIQUE: PA, lateral, and oblique views of the left wrist were performed. COMPARISON: None FINDINGS: No displaced fracture or traumatic malalignment.  Mild carpal degenerative change.  Unremarkable soft tissues. Electronically signed by: Saeid Pérez Date:    10/31/2024 Time:    16:40    X-Ray Knee 1 or 2 View Left    Result Date: 10/31/2024  EXAMINATION: XR KNEE 1 OR 2 VIEW LEFT CLINICAL HISTORY: Syncope and Fall; TECHNIQUE: One or two views of the left knee were performed. COMPARISON: None FINDINGS: Left knee total arthroplasty hardware with no periprosthetic fracture or abnormal lucency to suggest loosening or infection.  No malalignment.  Trace knee joint fluid.  Subcutaneous soft tissue edema about the knee and proximal calf. Electronically signed by: Saeid Pérez Date:    10/31/2024 Time:    16:39    X-Ray Chest AP Portable    Result Date: 10/31/2024  EXAMINATION: XR CHEST AP PORTABLE CLINICAL HISTORY: Syncope and collapse TECHNIQUE: Single frontal view of the chest was performed. COMPARISON: Same-day CT FINDINGS: There is a nodular airspace opacity at the periphery of the right lung base.  Remaining lungs clear.  Borderline cardiac enlargement with metallic device projecting over the right  heart border possibly an intra atrial septal occlusion device.  Prominence of the pulmonary arterial vasculature aortic arch atherosclerosis.  No pleural effusion or pneumothorax.  There are multiple surgical clips and staple lines over the upper abdomen.     1. Pleuroparenchymal nodule in the right lung base and several other nodules in the right lung apex.  Findings better seen at CT.  These are nonspecific with neoplasm not excluded. 2. Borderline heart enlargement. 3. Prominence of the pulmonary vasculature raising the possibility for elevated pulmonary pressures. Electronically signed by: Saeid Pérez Date:    10/31/2024 Time:    16:38    CT Entire Spine Without Contrast    Result Date: 10/31/2024  EXAMINATION: CT ENTIRE SPINE WITHOUT CONTRAST (XPD) CLINICAL HISTORY: Fall, pan spinal tenderness; TECHNIQUE: Axial images were obtained through the entire spine.  Sagittal and coronal reformatted images were created. COMPARISON: Lumbar spine MRI dated 03/15/2016 FINDINGS: There is no recent study for comparison.  On the screening study obtained with a large field of view there is no obvious acute fracture.  There is extensive chronic change throughout the cervical spine.  The odontoid process is intact.  The anterior and posterior arches of C1 are intact as well.  There is severe disc space narrowing at the C2-3, C3-4, C4-5 levels with moderate to marked disc space narrowing at the C5-6 level.  There is trace retrolisthesis of C4 on C5.  There is chronic anterior wedging of C3 and C4.  There is multilevel foraminal stenosis throughout the cervical region due to uncovertebral spurring and/or facet joint arthropathy.  Also, there is spinal stenosis most apparent at the C4-5 level. In the thoracic spine, there is mild chronic anterior wedging of several midthoracic vertebral bodies but there is no obvious acute fracture or traumatic malalignment.  The facet joints maintain normal articulation. In the lumbar spine  there is a vacuum disc at the L3-4 and L5-S1 levels.  There is severe disc space narrowing at L5-S1.  The lumbar vertebral bodies maintain normal height without obvious acute fracture.  Alignment is grossly normal.  There is extensive facet joint arthropathy in the mid to lower lumbar spine. There is a dextrocurvature of the lower lumbar spine with gentle broad levocurvature at the thoracolumbar junction. There is no displaced or depressed rib fracture identified on this limited field of view.  There is no obvious spinous process fracture. The sacrum is intact. There is atherosclerosis.  There is no pneumothorax.  There are several scattered nodules in the lungs which are suboptimally evaluated.     There is degenerative change throughout the spine, most significant in the lower lumbar spine as well as in the cervical spine.  There is however no obvious acute fracture or traumatic malalignment.  The entire spine was obtained in the large field of view. Electronically signed by: Nomi Vasquez MD Date:    10/31/2024 Time:    16:26    CT Maxillofacial Without Contrast    Result Date: 10/31/2024  EXAMINATION: CT MAXILLOFACIAL WITHOUT CONTRAST CLINICAL HISTORY: Facial trauma, blunt; TECHNIQUE: Low dose axial images, sagittal and coronal reformations were obtained through the face.  Contrast was not administered. COMPARISON: None FINDINGS: There is suspected soft tissue swelling over the chin.  There is very subtle cortical irregularity involving the anterior paramedian mandible which could represent subtle acute fracture with minimal adjacent bone fragments.  There is beam hardening artifact related to hardware in the region of the left maxilla.  There is no dislocation at the TMJ.  There are changes of torus mandibularis. There is old deformity of the anterior nasal bones.  There is no acute displaced or depressed nasal bone or nasal septum fracture. There is no acute orbital fracture.     1. There is mild  irregularity of the interior midline mandible with small adjacent bone fragments which could represent acute fracture with overlying soft tissue swelling.  There is no other acute maxillofacial or orbital fracture.  There is suspected old deformities of the anterior nasal bones. Electronically signed by: Nomi Vasquez MD Date:    10/31/2024 Time:    16:12    CT Head Without Contrast    Result Date: 10/31/2024  EXAMINATION: CT HEAD WITHOUT CONTRAST CLINICAL HISTORY: Head trauma, minor (Age >= 65y); TECHNIQUE: Routine unenhanced axial images were obtained through the head.  Sagittal and coronal reformatted images were created.  The study is reviewed in bone and soft tissue windows. COMPARISON: None FINDINGS: Intracranial contents: There is no acute intracranial abnormality.  There is mild nonspecific white matter change.  There is no hemorrhage, parenchymal mass or mass effect.  The gray-white interface is preserved without acute infarction.  The basilar cisterns are open.  There is a remote lacunar infarction in the right caudate nucleus.  The cerebellar tonsils are normal position. Extracranial contents, calvarium, soft tissues: The included paranasal sinuses and mastoid air cells are clear.  There is no acute skull fracture.  There is soft tissue prominence of the left temporoparietal region.  There is demineralization of the underlying calvarium with in irregular inner bony margin.  Also, there is suggestion of possible extra-axial dural-based soft tissue mass in this region which could potentially represent an atypical meningioma and further evaluated with brain MRI without and with contrast.     There is left temporoparietal soft tissue prominence with demineralization of the underlying bone and suspected extra-axial mass in this region which is nonspecific and incompletely evaluated.  These could potentially represent an atypical meningioma but further evaluation with brain MRI without and with contrast  could be obtained.  This is not acute.  There is no hemorrhage.  There is no acute skull fracture. Electronically signed by: Nomi Vasquez MD Date:    10/31/2024 Time:    16:07  - pulls last radiology orders

## 2024-11-18 PROBLEM — Z51.5 ENCOUNTER FOR ADMISSION TO HOSPICE CARE: Status: ACTIVE | Noted: 2024-01-01

## 2024-11-18 NOTE — PROGRESS NOTES
[facility]  Progress Note    Patient Name: Shanell Mahmood  MRN: [id number]  Patient Class: IP- Inpatient   Admission Date: 10/31/2024  Length of Stay: 17 days  Attending Physician: Andriy Kilgore MD  Primary Care Provider: Nicole, Primary Doctor        Subjective:     Principal Problem:Central cord syndrome        HPI:  Shanell Mahmood is a 74 year old female with a previous medical history of anemia, Pulmonary hypertension on 4 liters continuous oxygen at home, arthritis, CKD V, Osteoporosis, secondary hyperprathyroidism, S/P closure of patent foramen ovale in 2011, CVA in 2011 with no residuals, chronic back pain, HLD, Gout, Brain Mass and thyroid diease who presented to the ED after unwitnessed syncopal episode. The patient was at her pain management office for re certification only. There were no procedures performed. She reports taking one hydrocodone 10mg today.  The last thing she remembers was walking down the hallway of the office and looking for something to cover her head from the rain and did not have any adverse symptoms or feelings at that time.  She has no recollection of the syncopal events. When she was initially evaluated by EMS, her blood pressure was in the 60s over 40s. She did require constant stimulation to remain awake. Fell and hit her head. Does not take any blood thinners. EMS evaluated her and gave her 1 mg of Narcan as well as 250 cc of fluid. Her pressure improved and she had become more alert. Initial ED workup was thorough and all imaging showing that included CT of neck, head and entire spine showed no acute processes. CBC showed anemia and CMP showed elevated creatinine consistent with history of CKD V. Drug screen positive for opioids but patient has a hydrocodone prescription. The patient is currently in the process of have a left sided brain mass visualized on MRI 10/8/24 evaluated that was an incidental finding after undergoing a PET scan for a pulmonary nodule  "on 9/6/24 with abnormal uptake noted in brain. She has her first appointment on 11/4/24. She is followed by nephrology who is currently monitoring her and there has been one attempt at AV fistual placement but it was unsuccessful due sclerotic changes. Patient reports she awoke this morning feeling well showered and dressed herself. She performs all of her own ADL's. She had one syncopal event in March 2024 and was evaluated by cardiology and informed it was an orthostatic episode. Patient was unable to complete orthostatic vital signs upon admission due to inability to stand stating " I feel as if I can't get my legs under me". When evaluated for admit exam the patient endorses that after the syncopal episode she endorses bilateral leg weakness with decreased sensation in both legs. She reports bilateral  weakness being unable to use her phone and difficulty raising both of her arms. NIH scale performed and total of 9 noted. Patient noted have 400ml of urine in bladder and unable to void. Patient reached a total of 528ml of urine without being able to void.  MRI/MRA of brain and MRI of lumbosacral spine ordered upon admit. MRA and MRI lower back showed no acute process. MRI brain showed Acute infarct in the right caudate head. Dr. Maldonado was called and he recommended MRA of neck in morning and load with aspirin and plavix. Initial EKG read as atrial fibrillation but upon review p waves were noted and this was discussed with the ED. Repeat EKG shows sinus arrhthymias with PACs. Patient admitted by hospital medicine for further evaluation and management.       Overview/Hospital Course:  74-year-old female with history of brain mass, CKD, back pain, pulmonary hypertension on 4 L oxygen is admitted after syncope and collapse found to have a acute CVA in the right caudate on MRI brain.   She Has global weakness and decreased sensation to the lower legs.  Multiple CT and x-rays done to rule out trauma ultimately " negative other than the maxillofacial CT reporting mild irregularity of the interior midline mandible with small adjacent bone fragments which could represent acute fracture with overlying soft tissue swelling.    Carotid ultrasound and MRA brain and neck without acute finding.  Neurology is consulted.  Also with underlying brain mass.  Consult Neurosurgery and Oncology.  Given DAPT and statin.  Had hypotension given IVF and midodrine.  Echo shows right heart failure.  BNP is within normal.  Lactic acid pending.  No other sign of infection.  Placed in cervical collar as precaution and MRI of the cervical spine shows severe cord compression at C4-5 and C5-6 likely acute with edema.  Neurosurgery discussed with patient  and plan to proceed with surgical decompression  and  patient underwent surgery on : 11/3/24 multilevel C-spine laminectomy.  Later patient continued to have hypotension and needed vasopressor and another vasopressor vasopressin is added  Patient also her DVT of the the proximal right superficial femoral vein. And nonocclusive thrombus within the right popliteal vein,  Also patient developed new onset AFib and started amiodarone drip and in and out of a fib  and later changed to PO amiodarone   Later patient was more hypoxic and not able to wean vasopressor and PE study was done and found to have segmental PE with right heart strain . Patient was on full dose lovenox . Vascular surgery consulted and reviewed the film and did not recommend thrombectomy intervention .    Interval History:   Seen and examined at multidisciplinary rounds, patient looks better, still quadriplegic, patient is awake alert, following commands, daughter at bedside.  I confirmed with the patient and the family, they would like to move forward with inpatient hospice and down the road go home with home hospice with high-flow oxygen.         Review of Systems  Objective:     Vital Signs (Most Recent):  Temp: 98.4 °F (36.9 °C)  "(11/18/24 0901)  Pulse: 67 (11/18/24 0901)  Resp: (!) 8 (11/18/24 0901)  BP: (!) 85/48 (11/18/24 0901)  SpO2: 99 % (11/18/24 0901) Vital Signs (24h Range):  Temp:  [97.6 °F (36.4 °C)-98.4 °F (36.9 °C)] 98.4 °F (36.9 °C)  Pulse:  [67-78] 67  Resp:  [8-18] 8  SpO2:  [89 %-100 %] 99 %  BP: ()/(48-57) 85/48  Arterial Line BP: ()/(41-65) 132/58     Weight: 84.3 kg (185 lb 13.6 oz)  Body mass index is 29.11 kg/m².    Intake/Output Summary (Last 24 hours) at 11/18/2024 1215  Last data filed at 11/18/2024 0705  Gross per 24 hour   Intake 1062.05 ml   Output 1145 ml   Net -82.95 ml         Physical Exam  Vitals and nursing note reviewed.   HENT:      Head: Normocephalic and atraumatic.      Mouth/Throat:      Mouth: Mucous membranes are moist.   Eyes:      Conjunctiva/sclera: Conjunctivae normal.   Neck:      Vascular: No JVD.   Cardiovascular:      Rate and Rhythm: Normal rate.   Pulmonary:      Effort: Pulmonary effort is normal.      Breath sounds: Normal breath sounds.   Abdominal:      Palpations: Abdomen is soft.   Skin:     General: Skin is warm.   Neurological:      Mental Status: She is alert and oriented to person, place, and time.             Significant Labs: All pertinent labs within the past 24 hours have been reviewed.  CBC:   Recent Labs   Lab 11/17/24  0432   WBC 6.02   HGB 8.9*   HCT 25.4*        CMP:   Recent Labs   Lab 11/17/24  0432   *   K 4.1   CL 99   CO2 23   GLU 78   BUN 43*   CREATININE 2.3*   CALCIUM 7.7*   PROT 4.8*   ALBUMIN 2.4*   BILITOT 0.5   ALKPHOS 107   AST 67*   ALT 39   ANIONGAP 6*     Cardiac Markers: No results for input(s): "CKMB", "MYOGLOBIN", "BNP", "TROPISTAT" in the last 48 hours.    Significant Imaging: I have reviewed all pertinent imaging results/findings within the past 24 hours.    Scheduled Meds:   allopurinoL  100 mg Oral QHS    amiodarone  200 mg Oral BID    atorvastatin  40 mg Oral Daily    bisacodyL  10 mg Rectal Daily    clopidogreL  75 mg Oral " Daily    colchicine  0.3 mg Oral Daily    enoxparin  1 mg/kg Subcutaneous Q24H (treatment, non-standard time)    fludrocortisone  100 mcg Oral Daily    macitentan  10 mg Oral Daily    methocarbamoL  750 mg Oral TID    midodrine  20 mg Oral TID WM    montelukast  10 mg Oral Daily    pantoprazole  40 mg Oral Daily    selexipag  1,600 mcg Oral BID    tadalafil  40 mg Oral Daily     Continuous Infusions:      PRN Meds:.  Current Facility-Administered Medications:     acetaminophen, 650 mg, Oral, Q4H PRN    albuterol sulfate, 2.5 mg, Nebulization, Q6H PRN    aluminum-magnesium hydroxide-simethicone, 30 mL, Oral, Q4H PRN    dextrose 50%, 12.5 g, Intravenous, PRN    dextrose 50%, 25 g, Intravenous, PRN    diphenhydrAMINE, 25 mg, Oral, Q6H PRN    diphenhydrAMINE-zinc acetate 1-0.1%, , Topical (Top), TID PRN    glucagon (human recombinant), 1 mg, Intramuscular, PRN    glucose, 16 g, Oral, PRN    glucose, 24 g, Oral, PRN    HYDROcodone-acetaminophen, 1 tablet, Oral, Q4H PRN    HYDROmorphone, 1 mg, Intravenous, Q6H PRN    HYDROmorphone, 2 mg, Oral, Q4H PRN    magnesium oxide, 800 mg, Oral, PRN    magnesium oxide, 800 mg, Oral, PRN    methocarbamoL, 500 mg, Oral, TID PRN    naloxone, 0.02 mg, Intravenous, PRN    ondansetron, 4 mg, Intravenous, Q8H PRN    potassium bicarbonate, 35 mEq, Oral, PRN    potassium bicarbonate, 50 mEq, Oral, PRN    potassium bicarbonate, 60 mEq, Oral, PRN    potassium, sodium phosphates, 2 packet, Oral, PRN    potassium, sodium phosphates, 2 packet, Oral, PRN    potassium, sodium phosphates, 2 packet, Oral, PRN    prochlorperazine, 5 mg, Intravenous, Q6H PRN    senna-docusate 8.6-50 mg, 2 tablet, Oral, Nightly PRN    sodium chloride 0.9%, 500 mL, Intravenous, PRN    sodium chloride 0.9%, 10 mL, Intravenous, Q12H PRN    sodium chloride 0.9%, 10 mL, Intravenous, PRN    X-Ray Chest AP Portable    Result Date: 11/16/2024  EXAMINATION: XR CHEST AP PORTABLE CLINICAL HISTORY: pulm edema, pleural effusions;  COMPARISON: 11/15/2024, 11/14/2024 FINDINGS: Cardiac silhouette size is stable compared to prior.  Right PICC is in stable position with tip overlying the SVC.  Prominent central pulmonary vascularity.  Mild bilateral interstitial/ground-glass opacities suggestive of pulmonary edema.  Small bilateral pleural effusions.  No pneumothorax.  No acute osseous abnormality.     Pulmonary edema and small bilateral pleural effusions. Electronically signed by: Ashkan Broussard Date:    11/16/2024 Time:    07:01    X-Ray Chest AP Portable    Result Date: 11/15/2024  EXAMINATION: XR CHEST AP PORTABLE CLINICAL HISTORY: pulm edema, pleural effusions; COMPARISON: November 14 FINDINGS: Cardiac silhouette is unchanged.  The thoracic aorta is mildly elongated.  Masslike prominence of the bilateral bertha is stable. There is improved aeration of the lower lung zone on the left, with persistent small bilateral pleural effusions.  No pneumothorax is identified. Right-sided PICC line is unchanged in position.  No acute osseous abnormality is demonstrated.  Chronic right-sided rotator cuff tear is incidentally noted.     1. Slightly improved aeration of the left lung base.  No other significant changes compared to November 14. Electronically signed by: Leif Jordan Date:    11/15/2024 Time:    07:17    X-Ray Chest AP Portable    Result Date: 11/14/2024  EXAMINATION: XR CHEST AP PORTABLE CLINICAL HISTORY: pulm edema, pleural effusions; COMPARISON: November 13, 2024 FINDINGS: Heart size is normal.  The mediastinum is unremarkable.  Abnormal bilateral hilar prominence is unchanged. Hazy opacification of the lower left hemithorax consistent with a combination of volume loss or airspace disease and left-sided pleural effusion appears slightly improved.  Small right pleural effusion is noted.  There is no evidence of pneumothorax. Right-sided PICC line remains in place with tip at the superior vena cava.     Slightly improved aeration of the  lower lung zone on the left.  No other significant changes. Electronically signed by: Leif Jordan Date:    11/14/2024 Time:    08:54    X-Ray Chest AP Portable    Result Date: 11/13/2024  EXAMINATION: XR CHEST AP PORTABLE CLINICAL HISTORY: hypoxemia; FINDINGS: Portable chest radiograph at 04:58 hours compared to prior exams including CT of the prior day show right PICC unchanged in position, with stable cardiomediastinal silhouette and stable enlarged central pulmonary vasculature. There are persistent bilateral perihilar and left lower lung airspace opacities, with bilateral pleural effusions blunting the costophrenic angles.  No pneumothorax.     No significant interval change. Electronically signed by: Nitin Mayo Date:    11/13/2024 Time:    08:04    CTA Chest Non-Coronary (PE Studies)    Result Date: 11/12/2024  EXAMINATION: CTA CHEST NON CORONARY (PE STUDIES) CLINICAL HISTORY: Pulmonary embolism (PE) suspected, high prob;. TECHNIQUE: CT angiography targeted to the pulmonary arteries was performed. 100 cc nonionic contrast was administered and the bolus was timed for optimal opacification of the pulmonary arteries. 3-D reformatted images were obtained on an independent workstation and stored as a part of patient's permanent medical record. COMPARISON: Chest radiograph, November 12, 2024 FINDINGS: The pulmonary arteries are adequately opacified.  There are filling defects involving bilateral proximal segmental pulmonary arterial branches, extending to distal segmental and subsegmental branches.  There is a small amount of thrombus in the left main pulmonary artery.  The pulmonary arterial trunk is dilated at up to 4.6 cm.  The right pulmonary artery measures 3.2 cm transverse and the left 3.4 cm.  There is slight bowing of the interventricular septum and minimal reflux to the inferior vena cava.  Correlate for possible right heart strain. The thoracic aorta is normal in caliber.  There is no mediastinal  mass or lymphadenopathy, however note is made of mild nonspecific right hilar lymphadenopathy. Images at lung windows demonstrate left greater than right bilateral ground-glass pulmonary opacities, predominantly central in location.  Correlate for pulmonary edema or less likely pneumonia.  There are small bilateral pleural effusions.  Dependent atelectasis is noted at both lung bases. There are several indeterminate tiny noncalcified nodules in the right upper lobe, the largest measuring 4 mm (series 4, image 95.  4 mm nodule is also noted in the right middle lobe abutting the minor fissure. Images of the upper abdomen are limited, but demonstrate changes of apparent previous gastric bypass.  There is mild perihepatic free fluid. Diffuse body wall edema is noted.  No acute osseous abnormalities are identified.     1. Bilateral pulmonary thromboembolic disease. 2. Pulmonary arterial dilation and additional observations as above.  Correlate for possible right heart strain. 3. Left greater than right predominantly central ground-glass pulmonary opacities.  Correlate for edema versus pneumonia. 4. Small pleural effusions. 5. Nonspecific right hilar lymphadenopathy.  Scattered tiny right-sided noncalcified pulmonary nodules, possibly granulomas, but indeterminate. Findings of bilateral pulmonary thromboembolic disease were called to Dr. Ramos at 11:10 on November 12, 2024. Electronically signed by: Leif Jordan Date:    11/12/2024 Time:    11:22    X-Ray Chest AP Portable    Result Date: 11/12/2024  EXAMINATION: XR CHEST AP PORTABLE CLINICAL HISTORY: worsening oxygenation; FINDINGS: Portable chest with comparison chest x-ray 11/08/2024.  Normal cardiomediastinal silhouette.Enlarged central hilar vascular structures again noted.  There is bilateral perihilar haziness and interstitial thickening similar to previous exam.  Blunting of the left costophrenic angle again noted with increased hazy opacification of the  left lung base.  Blunting of the right costophrenic angle with patchy opacities of the right lung base is similar to previous exam.  Pulmonary vasculature is normal. No acute osseous abnormality.     CHF lung pattern with probable small bilateral pleural effusions noted,  and is subtly increased from previous exam. Electronically signed by: Scotty Aceves Date:    11/12/2024 Time:    08:41    X-Ray Chest AP Portable    Result Date: 11/8/2024  EXAMINATION: XR CHEST AP PORTABLE CLINICAL HISTORY: Pulmonary hypertension; FINDINGS: Portable chest with comparison chest x-ray 11/06/2024.  Normal cardiomediastinal silhouette.Enlarged central hilar vascular structures again noted with mild perihilar peribronchial interstitial thickening.  Hazy opacities of the left lung base are mildly increased.  No pneumothorax.  Right PICC line is unchanged.  Pulmonary vasculature is normal. No acute osseous abnormality.     Mild increased hazy opacities of the left lung bases.  Otherwise no significant changes from prior exam. Electronically signed by: Scotty Aceves Date:    11/08/2024 Time:    08:50    Echo Saline Bubble? No; Ultrasound enhancing contrast? No    Addendum Date: 11/7/2024      Left Ventricle: The left ventricle is smaller than normal. Normal wall thickness. There is normal systolic function with a visually estimated ejection fraction of 65 - 70%. There is normal diastolic function.   Right Ventricle: Mild right ventricular enlargement. Wall thickness is normal. Systolic function is moderately reduced.   Left Atrium: Left atrium is moderately dilated.   Right Atrium: Right atrium is mildly dilated.   IVC/SVC: Normal venous pressure at 3 mmHg.     Result Date: 11/7/2024    Left Ventricle: The left ventricle is normal in size. Normal wall thickness. There is normal systolic function with a visually estimated ejection fraction of 65 - 70%. There is normal diastolic function.   Right Ventricle: Normal right ventricular cavity  size. Wall thickness is normal. Systolic function is normal.   Left Atrium: Left atrium is mildly dilated.   IVC/SVC: Normal venous pressure at 3 mmHg.     X-Ray Chest AP Portable    Result Date: 11/6/2024  EXAMINATION: XR CHEST AP PORTABLE CLINICAL HISTORY: pulmonary hypertension; TECHNIQUE: Single frontal view of the chest was performed. COMPARISON: 11/05/2024. FINDINGS: There is a right-sided PICC, unchanged in position.  The lungs are well expanded.  There is prominence of the central pulmonary vasculature, stable from prior.  There are small bilateral pleural effusions.  The lungs are otherwise clear.  The cardiac silhouette is enlarged.  Osseous structures are intact.  There are calcifications of the aortic arch.  There are surgical clips overlying the left upper quadrant.     No significant detrimental change from prior. Electronically signed by: Dawit Barrios Date:    11/06/2024 Time:    05:05    US Lower Extremity Veins Bilateral    Result Date: 11/5/2024  EXAMINATION: US LOWER EXTREMITY VEINS BILATERAL CLINICAL HISTORY: r/o DVT; TECHNIQUE: Grayscale, color and spectral Doppler analysis of the bilateral lower extremity deep venous system was performed. FINDINGS: No prior studies for comparison.  There is hypoechoic peripheral nonocclusive thrombus within the proximal right superficial femoral vein.  There is also hypoechoic nonocclusive thrombus within the right popliteal vein, with some preserved color Doppler vascular flow. There is normal compressibility, with normal color and spectral Doppler vascular flow in the left lower extremity deep venous system, with no findings of left lower extremity deep venous thrombosis.     1. Positive for nonocclusive deep venous thrombosis of the right lower extremity. 2. Negative for left lower extremity deep venous thrombosis. 3. RESULT NOTIFICATION: Findings were discussed by Dr Mayo with, and acknowledged by LUZ MARINA SANCHEZ at 15:20 hours. Electronically signed by: Nitin  Gael Date:    11/05/2024 Time:    15:22    X-Ray Chest 1 View S/P PICC Line by Nursing    Result Date: 11/5/2024  EXAMINATION: XR CHEST 1 VIEW S/P PICC LINE BY NURSING CLINICAL HISTORY: picc line; FINDINGS: Portable chest radiograph at 10:54 hours compared to 05:20 hours shows interval insertion of a right PICC, with the distal tip overlying the SVC in satisfactory position.  There is no other significant interval change.     Interval insertion of a right PICC, in satisfactory position with distal tip overlying the SVC. Electronically signed by: Nitin Mayo Date:    11/05/2024 Time:    11:05    X-Ray Chest AP Portable    Result Date: 11/5/2024  EXAMINATION: XR CHEST AP PORTABLE CLINICAL HISTORY: Pulmonary HTN; COMPARISON: 10/31/2024. FINDINGS: The heart is enlarged but stable.  Prominence of the central pulmonary vasculature appears similar to prior examination.  There is mild mass effect observed upon the rightward aspect of the trachea. There are mild airspace opacities or volume loss within the lung bases.  There are mildly diminished lung volumes.  No significant effusion demonstrated. Multiple monitoring electrodes overlie the chest.     Diminished lung volumes. Mild basilar airspace opacities or volume loss. Cardiomegaly and marked prominence of the central pulmonary vasculature, stable. Mild mass effect upon the rightward aspect of the trachea. Electronically signed by: Waqas Leavitt Date:    11/05/2024 Time:    07:04    X-Ray Cervical Spine AP And Lateral    Result Date: 11/4/2024  CLINICAL HISTORY: ([id number])73 y/o  (1950) F C3-6 laminectomy and fusion; Central cord syndrome at unspecified level of cervical spinal cord, initial encounter TECHNIQUE: (A#[id number], exam time 11/4/2024 5:48) XR CERVICAL SPINE AP LATERAL IMG56 2 view(s) obtained. COMPARISON: MRI from 11/02/2024. FINDINGS: C1 through C7 are visualized on the lateral radiograph.  There is straightening/reversal the normal lordotic curvature  likely secondary to a combination of positioning/postoperative change, degenerative change and/or muscle spasm. There has been interval posterior laminectomy from C3-C6, with bilateral posterior trans-facet screw and saurabh fixation.  There is a midline posterior paraspinal drain in the laminectomy bed with scattered subcutaneous emphysema and edema over the dorsal aspect of the neck. Again noted is moderate to severe disc height loss at C2-C3, C3-C4 and C4-C5 with moderate disc height loss at C5-C6.  There is oblique lucency through the anterior inferior vertebral body of C2, and C5, suspicious for occult nondisplaced fractures, these could be better characterized with CT.  There is minimal prevertebral soft tissue swelling. There is mild retrolisthesis of C5 on C6 (3 mm).  The visualized lung apices are clear.  The patient is edentulous.     1.  Posterior postoperative fusion from C3-C6 as above. 2.  Suspected tiny nondisplaced oblique fractures through the anterior inferior endplate of C2 and C5. This report was flagged in Epic as abnormal. Electronically signed by: Asim Davis Date:    11/04/2024 Time:    06:42    SURG FL Surgery Fluoro Usage    Result Date: 11/3/2024  See OP Notes for results. IMPRESSION: See OP Notes for results. This procedure was auto-finalized by: Virtual Radiologist    MRI Cervical Spine Without Contrast    Result Date: 11/2/2024  EXAMINATION: MRI CERVICAL SPINE WITHOUT CONTRAST CLINICAL HISTORY: Myelopathy, acute, cervical spine; fall with head and neck trauma TECHNIQUE: Routine MRI cervical spine without contrast. COMPARISON: Multiple prior exams. FINDINGS: Motion artifact limits the exam.  There is exaggeration of the normal cervical spinal curvature, with normal vertebral body alignment, and no acute fractures or destructive osseous lesions.  Chronic vertebral height loss involves C3 and C4, with degenerative loss of disc height and signal most pronounced at C3-C4 and C4-C5, and  heterogeneous degenerative type marrow endplate signal alteration.  No findings of a diffuse marrow replacement process. There are stippled T2 hyperintensities in the sandy suggesting chronic ischemic changes, with the visualized posterior fossa and cervicomedullary junction otherwise unremarkable.  There is intramedullary non fluid like T2 hyperintense signal alteration in the cervical cord as detailed below. At C2-C3, there is broad-based disc bulging and spondylosis, without significant spinal canal stenosis.  There is bilateral foraminal narrowing. At C3-C4, there is broad-based disc bulging and spondylosis, contributing to moderate to severe spinal canal stenosis.  There is severe bilateral foraminal narrowing. At C4-C5, there is broad-based disc bulging and spondylosis, which produces severe spinal canal stenosis with cervical cord compression.  There is increased non fluid-like T2 signal in the cord suggesting edema and or myelopathy, with severe bilateral foraminal narrowing. At C5-C6, there is broad-based disc bulging and spondylosis, producing severe spinal canal stenosis with mass effect upon the cervical cord.  There is increased non fluid-like T2 signal in the cord suggesting edema and or myelopathy.  There is severe bilateral foraminal narrowing. At C6-C7, there is broad-based disc bulging, without significant spinal canal stenosis.  There is mild left neural foraminal narrowing. C7-T1 is unremarkable. There are no signal abnormalities of the paraspinal ligaments, with nonspecific STIR hyperintense edema within the posterior paraspinal musculature and subcutaneous fat.  Multiple T2 hyperintensities in the thyroid gland are nonspecific.     1. Disc bulging and spondylosis at C4-C5 and C5-C6, with severe spinal canal stenosis, cervical cord compression and cord signal alteration suggesting edema and or myelopathy. 2. Broad-based disc bulging producing moderate spinal canal stenosis at C3-C4.  Electronically signed by: Nitin Mayo Date:    11/02/2024 Time:    12:28    Echo    Result Date: 11/1/2024    Left Ventricle: The left ventricle is normal in size. Normal wall thickness. Unable to assess wall motion. There is normal systolic function with a visually estimated ejection fraction of 60 - 65%.   Right Ventricle: Right ventricle was not well visualized due to poor acoustic window.  Grossly appears to be dilated with reduced function.  There appears to be some concern of flattening of the interventricular septum which could indicate right ventricular pressure and volume overload.   Left Atrium: Left atrium is severely dilated.   IVC/SVC: Elevated venous pressure at 15 mmHg.     MRA Neck without contrast    Result Date: 11/1/2024  EXAMINATION: MRA NECK WITHOUT CONTRAST CLINICAL HISTORY: Stroke/TIA, determine embolic source; TECHNIQUE: Time of flight MRA of the carotid and vertebral arteries was performed with 3D reconstructions according to the standard neck MRA protocol. COMPARISON: Carotid ultrasound 10/31/2024.  MRI/MRA of the head 10/31/2024. FINDINGS: Somewhat motion limited exam. The right common carotid artery demonstrates no hemodynamically significant stenosis. The cervical right internal carotid artery demonstrates no hemodynamically significant stenosis. The left common carotid artery demonstrates no hemodynamically significant stenosis. The cervical left internal carotid artery demonstrates no hemodynamically significant stenosis. Extracranial vertebral arteries: Vertebral arteries are codominant.  Vertebral arteries are normal to the level of the foramen magnum.  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     1. Negative limited noncontrast MRA of the neck.  No hemodynamically significant stenosis identified. Electronically signed by: Kendall Lee  Date:    11/01/2024 Time:    12:35    US Carotid Bilateral    Result Date: 10/31/2024  EXAMINATION: US CAROTID BILATERAL CLINICAL HISTORY: syncope; TECHNIQUE: Grayscale and color Doppler ultrasound examination of the carotid and vertebral artery systems bilaterally.  Stenosis estimates are per the NASCET measurement criteria. COMPARISON: None. FINDINGS: Right: Internal Carotid Artery (ICA) peak systolic velocity 60.6 cm/sec ICA/CCA peak systolic velocity ratio: 0.9 Plaque formation: Heterogeneous Vertebral artery: Antegrade flow and normal waveform. Left: Internal Carotid Artery (ICA)  peak systolic velocity 98.1 cm/sec ICA/CCA peak systolic velocity ratio: 1.4 Plaque formation: Heterogeneous Vertebral artery: Antegrade flow and normal waveform.     No evidence of a hemodynamically significant carotid bifurcation stenosis. Electronically signed by: Dawit Barrios Date:    10/31/2024 Time:    23:55    MRI Lumbar Spine Without Contrast    Result Date: 10/31/2024  EXAMINATION: MRI LUMBAR SPINE WITHOUT CONTRAST CLINICAL HISTORY: Low back pain, trauma; TECHNIQUE: Multiplanar, multisequence MR images were acquired from the thoracolumbar junction to the sacrum without contrast. COMPARISON: CT total spine from 10/31/2024. MRI lumbar spine from 09/15/2016 FINDINGS: Alignment: Normal. Vertebrae: Normal marrow signal. No fracture. Discs: There is moderate disc height loss at L5-S1 and mild disc height loss and disc desiccation at L3-L4 and L4-L5.  Remaining disc heights are preserved. Cord: Normal.  Conus terminates at L2. Degenerative findings: T12-L1: There is mild facet arthropathy bilaterally.  There is no spinal canal stenosis or neural foraminal narrowing. L1-L2: There is mild facet arthropathy and ligamentum flavum hypertrophy.  There is no spinal canal stenosis or neural foraminal narrowing. L2-L3: There is mild bilateral facet arthropathy and ligamentum flavum hypertrophy and a small circumferential disc bulge,  resulting in mild spinal canal stenosis and moderate bilateral neural foraminal narrowing. L3-L4: There is moderate bilateral facet arthropathy and ligamentum flavum hypertrophy and a circumferential disc bulge, resulting in moderate spinal canal stenosis and moderate bilateral neural foraminal narrowing. L4-L5: There is a circumferential disc bulge with moderate bilateral facet arthropathy and ligamentum flavum hypertrophy resulting in moderate spinal canal stenosis and severe bilateral neural foraminal narrowing. L5-S1: There is bilateral facet arthropathy, moderate with a posterior disc bulge and an annular fissure.  There is no spinal canal stenosis.  There is moderate bilateral neural narrowing. Paraspinal muscles & soft tissues: Unremarkable.     Multilevel degenerative changes of the lumbar spine as detailed above.  There is moderate spinal canal stenosis at L3-L4 and L4-L5 and there is severe bilateral neural foraminal narrowing at L4-L5. Electronically signed by: Dawit Barrios Date:    10/31/2024 Time:    22:38    MRA Brain without contrast    Result Date: 10/31/2024  EXAMINATION: MRA BRAIN WITHOUT CONTRAST CLINICAL HISTORY: Syncope, recurrent; TECHNIQUE: Non-contrast 3-D time-of-flight intracranial MR angiography was performed through the [place] [facility] [place] with MIP reformatting. COMPARISON: None FINDINGS: Anterior circulation: The bilateral intracranial ICAs, bilateral ACAs, and bilateral MCAs are patent and unremarkable without high-grade stenosis or occlusion. Posterior circulation: The bilateral intracranial vertebral arteries, the basilar artery, and the bilateral PCAs are patent and unremarkable without high-grade stenosis or occlusion. There is no intracranial aneurysm visualized.     Unremarkable MRA brain. Electronically signed by: Dawit Barrios Date:    10/31/2024 Time:    22:29    MRI Brain Without Contrast    Result Date: 10/31/2024  EXAMINATION: MRI BRAIN WITHOUT CONTRAST CLINICAL HISTORY: Neuro  deficit, acute, stroke suspected; TECHNIQUE: Multiplanar multisequence MR imaging of the brain was performed without intravenous contrast. COMPARISON: CT head from earlier the same date. FINDINGS: There is restricted diffusion in the right caudate head, compatible with an acute infarct.  There is associated T2/FLAIR hyperintense signal.  There is a mass centered within the left temporoparietal calvarium measuring approximately 5.0 x 2.9 x 4.0 cm, with extension into the left cerebral convexity extra-axial space and extension into the left temporal scalp.  There is abutment of the left temporal lobe, without evidence of vasogenic edema or evidence of significant mass effect.  There is no midline shift.  Ventricles are normal in size for age without evidence of hydrocephalus.  There is T2/FLAIR hyperintense signal throughout the supratentorial white matter, compatible with chronic microvascular ischemic changes.  There is a small focus of encephalomalacia within the right medial parietal lobe, likely related to a remote infarct.  There is no intracranial hemorrhage.  No extra-axial blood or fluid collections. Normal vascular flow voids. Left common rim mass as above.  The remaining bone marrow signal intensity is normal. Paranasal sinuses and mastoid air cells are clear.     1. Acute infarct in the right caudate head. 2. 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  The mass abuts the left temporal lobe, without resulting in significant mass effect or vasogenic edema.  Differential includes a primary or metastatic osseous lesion, an atypical meningioma, or additional etiologies. 3. Chronic microvascular ischemic changes. This report was flagged in Epic as abnormal. Dr. Barrios discussed critical findings with TATIANNA Morales. by [facility] secure chat at 22:23 on 10/31/2024. Electronically signed by: Dawit Barrios Date:    10/31/2024 Time:    22:27    X-Ray Wrist Complete  Left    Result Date: 10/31/2024  EXAMINATION: XR WRIST COMPLETE 3 VIEWS LEFT CLINICAL HISTORY: Syncope and collapse TECHNIQUE: PA, lateral, and oblique views of the left wrist were performed. COMPARISON: None FINDINGS: No displaced fracture or traumatic malalignment.  Mild carpal degenerative change.  Unremarkable soft tissues. Electronically signed by: Saeid Pérez Date:    10/31/2024 Time:    16:40    X-Ray Knee 1 or 2 View Left    Result Date: 10/31/2024  EXAMINATION: XR KNEE 1 OR 2 VIEW LEFT CLINICAL HISTORY: Syncope and Fall; TECHNIQUE: One or two views of the left knee were performed. COMPARISON: None FINDINGS: Left knee total arthroplasty hardware with no periprosthetic fracture or abnormal lucency to suggest loosening or infection.  No malalignment.  Trace knee joint fluid.  Subcutaneous soft tissue edema about the knee and proximal calf. Electronically signed by: Saeid Pérez Date:    10/31/2024 Time:    16:39    X-Ray Chest AP Portable    Result Date: 10/31/2024  EXAMINATION: XR CHEST AP PORTABLE CLINICAL HISTORY: Syncope and collapse TECHNIQUE: Single frontal view of the chest was performed. COMPARISON: Same-day CT FINDINGS: There is a nodular airspace opacity at the periphery of the right lung base.  Remaining lungs clear.  Borderline cardiac enlargement with metallic device projecting over the right heart border possibly an intra atrial septal occlusion device.  Prominence of the pulmonary arterial vasculature aortic arch atherosclerosis.  No pleural effusion or pneumothorax.  There are multiple surgical clips and staple lines over the upper abdomen.     1. Pleuroparenchymal nodule in the right lung base and several other nodules in the right lung apex.  Findings better seen at CT.  These are nonspecific with neoplasm not excluded. 2. Borderline heart enlargement. 3. Prominence of the pulmonary vasculature raising the possibility for elevated pulmonary pressures. Electronically signed by: Saeid  Pérez Date:    10/31/2024 Time:    16:38    CT Entire Spine Without Contrast    Result Date: 10/31/2024  EXAMINATION: CT ENTIRE SPINE WITHOUT CONTRAST (XPD) CLINICAL HISTORY: Fall, pan spinal tenderness; TECHNIQUE: Axial images were obtained through the entire spine.  Sagittal and coronal reformatted images were created. COMPARISON: Lumbar spine MRI dated 03/15/2016 FINDINGS: There is no recent study for comparison.  On the screening study obtained with a large field of view there is no obvious acute fracture.  There is extensive chronic change throughout the cervical spine.  The odontoid process is intact.  The anterior and posterior arches of C1 are intact as well.  There is severe disc space narrowing at the C2-3, C3-4, C4-5 levels with moderate to marked disc space narrowing at the C5-6 level.  There is trace retrolisthesis of C4 on C5.  There is chronic anterior wedging of C3 and C4.  There is multilevel foraminal stenosis throughout the cervical region due to uncovertebral spurring and/or facet joint arthropathy.  Also, there is spinal stenosis most apparent at the C4-5 level. In the thoracic spine, there is mild chronic anterior wedging of several midthoracic vertebral bodies but there is no obvious acute fracture or traumatic malalignment.  The facet joints maintain normal articulation. In the lumbar spine there is a vacuum disc at the L3-4 and L5-S1 levels.  There is severe disc space narrowing at L5-S1.  The lumbar vertebral bodies maintain normal height without obvious acute fracture.  Alignment is grossly normal.  There is extensive facet joint arthropathy in the mid to lower lumbar spine. There is a dextrocurvature of the lower lumbar spine with gentle broad levocurvature at the thoracolumbar junction. There is no displaced or depressed rib fracture identified on this limited field of view.  There is no obvious spinous process fracture. The sacrum is intact. There is atherosclerosis.  There is no  pneumothorax.  There are several scattered nodules in the lungs which are suboptimally evaluated.     There is degenerative change throughout the spine, most significant in the lower lumbar spine as well as in the cervical spine.  There is however no obvious acute fracture or traumatic malalignment.  The entire spine was obtained in the large field of view. Electronically signed by: Nomi Vasquez MD Date:    10/31/2024 Time:    16:26    CT Maxillofacial Without Contrast    Result Date: 10/31/2024  EXAMINATION: CT MAXILLOFACIAL WITHOUT CONTRAST CLINICAL HISTORY: Facial trauma, blunt; TECHNIQUE: Low dose axial images, sagittal and coronal reformations were obtained through the face.  Contrast was not administered. COMPARISON: None FINDINGS: There is suspected soft tissue swelling over the chin.  There is very subtle cortical irregularity involving the anterior paramedian mandible which could represent subtle acute fracture with minimal adjacent bone fragments.  There is beam hardening artifact related to hardware in the region of the left maxilla.  There is no dislocation at the TMJ.  There are changes of torus mandibularis. There is old deformity of the anterior nasal bones.  There is no acute displaced or depressed nasal bone or nasal septum fracture. There is no acute orbital fracture.     1. There is mild irregularity of the interior midline mandible with small adjacent bone fragments which could represent acute fracture with overlying soft tissue swelling.  There is no other acute maxillofacial or orbital fracture.  There is suspected old deformities of the anterior nasal bones. Electronically signed by: Nomi Vasquez MD Date:    10/31/2024 Time:    16:12    CT Head Without Contrast    Result Date: 10/31/2024  EXAMINATION: CT HEAD WITHOUT CONTRAST CLINICAL HISTORY: Head trauma, minor (Age >= 65y); TECHNIQUE: Routine unenhanced axial images were obtained through the head.  Sagittal and coronal reformatted  images were created.  The study is reviewed in bone and soft tissue windows. COMPARISON: None FINDINGS: Intracranial contents: There is no acute intracranial abnormality.  There is mild nonspecific white matter change.  There is no hemorrhage, parenchymal mass or mass effect.  The gray-white interface is preserved without acute infarction.  The basilar cisterns are open.  There is a remote lacunar infarction in the right caudate nucleus.  The cerebellar tonsils are normal position. Extracranial contents, calvarium, soft tissues: The included paranasal sinuses and mastoid air cells are clear.  There is no acute skull fracture.  There is soft tissue prominence of the left temporoparietal region.  There is demineralization of the underlying calvarium with in irregular inner bony margin.  Also, there is suggestion of possible extra-axial dural-based soft tissue mass in this region which could potentially represent an atypical meningioma and further evaluated with brain MRI without and with contrast.     There is left temporoparietal soft tissue prominence with demineralization of the underlying bone and suspected extra-axial mass in this region which is nonspecific and incompletely evaluated.  These could potentially represent an atypical meningioma but further evaluation with brain MRI without and with contrast could be obtained.  This is not acute.  There is no hemorrhage.  There is no acute skull fracture. Electronically signed by: Nomi Vasquez MD Date:    10/31/2024 Time:    16:07  - pulls last radiology orders      Assessment/Plan:      * Central cord syndrome  S/p multilevel laminectomy (11/3/24)  C2-3, C3-4, C4-5, C5-6, C6-7 laminectomy  C3 through 6 posterior segmental instrumentation using DePalgranohony system  C3-4, C4-5, C5-6 posterolateral arthrodesis using autograft harvested from the same incision and allograft DBM    Cervical collar in place  Neuro surgery follow up     PT OT when more stable     Cortisol added and normal     Remains quadriplegic after surgery.     Patient had acute CVA, remains quadriplegic after cervical cord decompression surgery, also developed pulmonary embolism/ acute DVT, patient developed urinary retention most likely secondary to neurogenic bladder, patient has progressive acute on chronic hypoxic respiratory failure due to pulmonary artery hypertension, patient remains on high-flow oxygen after surgery.   Patient and family decided to move forward to inpatient hospice 11/18.   Updated with nurse/ pulmonologist/ palliative care team.   Add on fludrocortisone, increase midodrine to 20 mg t.i.d..  Wean off Levophed drip.   Okay to move to step-down floor once off Levophed drip.   Discontinue IV fluid- patient looks very edematous.           Acute deep vein thrombosis (DVT) of popliteal vein of right lower extremity, nonocclusive and pulmonary embolism  Found on u/s 11/5/24 and CTA 11/12   hypoechoic peripheral nonocclusive thrombus within the proximal right superficial femoral vein    full dose lovenox   Close monitor  s/p spine surgery   Associated with hypotension /shock suspicious for PE  and CTA confirmed PE   Vascular did not recommend thrombectomy        ACP (advance care planning)  Advance Care Planning    Date: 11/15/2024  Patient is DNR now  and going into comfort care / hospice possible tomorrow           Paroxysmal atrial fibrillation  Patient has paroxysmal (<7 days) atrial fibrillation. Patient is currently in atrial fibrillation. [id number] Score: 3. The patients heart rate in the last 24 hours is as follows:  Pulse  Min: 67  Max: 85     Antiarrhythmics  amiodarone tablet 200 mg, 2 times daily, Oral    Anticoagulants  enoxaparin injection 70 mg, Every 24 hours, Subcutaneous    Plan  - Replete lytes with a goal of K>4, Mg >2  - Patient is anticoagulated, see medications listed above.  - Patient's afib is currently uncontrolled. Will continue current treatment  S/p  "amiodarone drip and changed to PO   In and out of A fib   On full dose lovenox           S/P cervical spinal fusion  Multi levels  See NSGY op note     Quadriparesis  PT/OT  Frequent turns   Air mattress        Myelopathy  aware      Status post cervical spinal fusion  PT/OT  Cervical collar in place   Pain Mx      Hypotension  Asymptomatic.  Etiology most likely from pulmonary embolism/pul HTN / A fib with RVR   Echo reviewed.    -keep hold any BP meds or diuretics  On midodrine 15 tid   Currently on 1 vasopressor  Pulmonary offered swan ramya but patient refused   Cortisol random normal     Thrombocytopenia  The patients 3 most recent labs are listed below.  Recent Labs     11/14/24  0449 11/15/24  0440 11/16/24  0406    198 187       Plan  - Will transfuse if platelet count is <100k (if undergoing neurosurgery), or otherwise less than 10 K  -trend daily    Anemia  Anemia is likely due to chronic disease due to Chronic Kidney Disease. Most recent hemoglobin and hematocrit are listed below.  Recent Labs     11/14/24  0449 11/15/24  0334 11/15/24  0440 11/16/24  0406   HGB 9.2*  --  9.2* 9.3*   HCT 26.8* 35* 27.0* 27.2*       Plan  - Monitor serial CBC: Daily  - Transfuse PRBC if patient becomes hemodynamically unstable, symptomatic or H/H drops below 7/21.  - Patient has not received any PRBC transfusions to date  - Patient's anemia is currently improving    Stroke  Acute CVA right caudate  MRI, MRA, CT, carotid U/S reports reviewed  Plavix  and statin and patient also need full dose lovenox   -neurology following  -neurosurgery and oncology consulted for the brain mass and follow up as OP   -PT/OT/SLP when more stable   -echo bubble report is seem edited. Not mentioning of PFO  Patient going to hospice care and possible withdrawal of vasopressors over the weekend    Brain mass  Discovered after PET scan of lung  on 9/6/24 revealed abnormal uptake in brain. MRI on 10/8/24 and 10/31/24 shows "Mass centered in " "the left temporoparietal calvarium with extra osseous extension as above.".  Unclear if contributing to CVA  She will have her first appointment with Dr. Ray Mcintyre at  [facility]  on 11/4/24  -for comfort / hospice care soon     Secondary hyperparathyroidism  Chronic condition that is stable.     Pulmonary artery hypertension  Acute on chronic issues  Macitentan, selexipag and tadalafil not available and sildenafil changed and continued   Patient's may be a candidate for IV  Epoprostenol however Patient refuse swan ramya catheter.   Patient breathing condition has been progressively worsening after the surgery, remains on high-flow oxygen since her surgery.   Pulmonary is following.      Chronic respiratory failure with hypoxia  Patient with Hypoxic Respiratory failure which is Chronic.  she is on home oxygen at 4 LPM.   Secondary to pulmonary embolism  Full-dose Lovenox    CKD (chronic kidney disease), stage IV  Creatine stable for now. BMP reviewed- noted Estimated Creatinine Clearance: 24.3 mL/min (A) (based on SCr of 2.3 mg/dL (H)). according to latest data. Based on current GFR, CKD stage is stage 4 - GFR 15-29.  Monitor UOP and serial BMP and adjust therapy as needed. Renally dose meds. Avoid nephrotoxic medications and procedures.    Close monitor     Syncope and collapse  See stroke      Acute urinary retention/ neurogenic bladder  Possibly neurogenic from the CVA or from cervical spine cord compression  Urinary catheter. Appear may need long term        VTE Risk Mitigation (From admission, onward)           Ordered     enoxaparin injection 70 mg  Every 24 hours         11/15/24 0908     Reason for No Pharmacological VTE Prophylaxis  Once        Question:  Reasons:  Answer:  Risk of Bleeding    10/31/24 1849     IP VTE HIGH RISK PATIENT  Once         10/31/24 1849     Place sequential compression device  Until discontinued         10/31/24 1849              "       Discharge Planning   ELBERT:      Code Status: DNR   Is the patient medically ready for discharge?:     Reason for patient still in hospital (select all that apply): Patient trending condition and Treatment  Discharge Plan A: Inpatient Hospice   Discharge Delays: None known at this time        Critical care time spent on the evaluation and treatment of severe organ dysfunction, review of pertinent labs and imaging studies, discussions with consulting providers and discussions with patient/family: 35 minutes.      Andriy Kilgore MD  Department of Hospital Medicine   [facility]

## 2024-11-18 NOTE — ASSESSMENT & PLAN NOTE
"Discovered after PET scan of lung  on 9/6/24 revealed abnormal uptake in brain. 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  Unclear if contributing to CVA  She will have her first appointment with Dr. Ray Mcintyre at  [facility]  on 11/4/24  -for comfort / hospice care soon   "

## 2024-11-18 NOTE — PLAN OF CARE
[facility]  Initial Discharge Assessment       Primary Care Provider: No, Primary Doctor    Admission Diagnosis: End of life care [Z51.5]  Syncope [R55]    Admission Date: 11/18/2024  Expected Discharge Date:     Assessment and facesheet verification done with patients Tariq lancaster ( daughter ) -[phone] . Patient admitted to inpatient hospice .          Payor: [employer] / Plan: [employer] / Product Type: Guarantor 3rd Party /     Extended Emergency Contact Information  Primary Emergency Contact: Tariq Calloway  Mobile Phone: [phone]  Relation: Daughter   needed? No  Secondary Emergency Contact: WesleyShelia  [place] Phone: [phone]  Mobile Phone: [phone]  Relation: Sister   needed? No    Discharge Plan A: Inpatient Hospice  Discharge Plan B: Inpatient Hospice      [facility] [place], [place] - [address]  [address]  [place] [address]  Phone: [phone] Fax: [phone]      Initial Assessment (most recent)       Adult Discharge Assessment - 11/18/24 1518          Discharge Assessment    Assessment Type Discharge Planning Assessment     Confirmed/corrected address, phone number and insurance Yes     Confirmed Demographics Correct on Facesheet     Source of Information family     If unable to respond/provide information was family/caregiver contacted? Yes     Contact Name/Number Tariq Calloway ( daughter ) -[phone]     Communicated ELBERT with patient/caregiver Date not available/Unable to determine     Reason For Admission GIP     Who are your caregiver(s) and their phone number(s)? Tariq Calloway ( daughter ) [phone]     Discharge Plan A Inpatient Hospice     Discharge Plan B Inpatient Hospice

## 2024-11-18 NOTE — PLAN OF CARE
Pt clear for DC from case management standpoint. Discharging to Inpatient hospice with [facility] .        11/18/24 1334   Final Note   Assessment Type Final Discharge Note   Anticipated Discharge Disposition [facility]

## 2024-11-18 NOTE — PLAN OF CARE
Patient and family agreeable to hospice [facility] with [facility] contacted at this time.        11/18/24 1038   Post-Acute Status   Post-Acute Authorization Hospice

## 2024-11-18 NOTE — SUBJECTIVE & OBJECTIVE
No past medical history on file.    Past Surgical History:   Procedure Laterality Date    POSTERIOR CERVICAL LAMINECTOMY N/A 11/3/2024    Procedure: LAMINECTOMY, SPINE, CERVICAL, POSTERIOR APPROACH;  Surgeon: Kobi Corona MD;  Location: [facility];  Service: Neurosurgery;  Laterality: N/A;  C3-6 laminectomy and posterior instrumented fusion, prone, Chest roll, Parada, neuromonitoring, DePuy rep       Review of patient's allergies indicates:   Allergen Reactions    Codeine Palpitations       No current facility-administered medications on file prior to encounter.     Current Outpatient Medications on File Prior to Encounter   Medication Sig    allopurinoL (ZYLOPRIM) 100 MG tablet Take 100 mg by mouth every evening.    clotrimazole-betamethasone 1-0.05% (LOTRISONE) cream Apply 1 g topically Daily.    colchicine (COLCRYS) 0.6 mg tablet Take 0.6 mg by mouth 2 (two) times daily.    diclofenac sodium (VOLTAREN) 1 % Gel Apply 2 g topically 3 (three) times daily.    DULoxetine (CYMBALTA) 60 MG capsule Take 60 mg by mouth once daily.    ergocalciferol (ERGOCALCIFEROL) 50,000 unit Cap Take 50,000 Units by mouth every 7 days.    fluticasone propionate (FLONASE) 50 mcg/actuation nasal spray 1 spray by Each Nostril route once daily.    gabapentin (NEURONTIN) 100 MG capsule Take 2 capsules by mouth every 12 (twelve) hours.    HYDROcodone-acetaminophen (NORCO)  mg per tablet Take 1 tablet by mouth every 4 to 6 hours as needed for Pain.    LINZESS 290 mcg Cap capsule Take 290 mcg by mouth before breakfast.    montelukast (SINGULAIR) 10 mg tablet Take 10 mg by mouth once daily.    omeprazole (PRILOSEC) 40 MG capsule Take 40 mg by mouth Daily.    OPSUMIT 10 mg Tab Take 10 mg by mouth once daily.    simvastatin (ZOCOR) 40 MG tablet Take 40 mg by mouth Daily.    tiZANidine (ZANAFLEX) 4 MG tablet Take 4 mg by mouth every 12 (twelve) hours as needed.    torsemide (DEMADEX) 20 MG Tab Take 40 mg by mouth once daily.    UPTRAVI  1,600 mcg Tab Take 1 tablet by mouth 2 (two) times a day.    tadalafil (ADCIRCA) 20 mg Tab Take 20 mg by mouth every other day.     Family History    None       Tobacco Use    Smoking status: Not on file    Smokeless tobacco: Not on file   Substance and Sexual Activity    Alcohol use: Not on file    Drug use: Not on file    Sexual activity: Not on file     Review of Systems   Unable to perform ROS: Mental status change     Objective:     Vital Signs (Most Recent):  Temp: 98.4 °F (36.9 °C) (11/18/24 0901)  Pulse: 67 (11/18/24 0901)  Resp: (!) 8 (11/18/24 0901)  BP: (!) 85/48 (11/18/24 0901)  SpO2: 99 % (11/18/24 0901) Vital Signs (24h Range):  Temp:  [97.6 °F (36.4 °C)-98.4 °F (36.9 °C)] 98.4 °F (36.9 °C)  Pulse:  [67-78] 67  Resp:  [8-18] 8  SpO2:  [89 %-100 %] 99 %  BP: ()/(48-57) 85/48  Arterial Line BP: ()/(41-65) 132/58     Weight: 84.3 kg (185 lb 13.6 oz)  Body mass index is 29.11 kg/m².     Physical Exam  Vitals and nursing note reviewed.   Constitutional:       General: She is not in acute distress.     Appearance: She is ill-appearing.   Cardiovascular:      Rate and Rhythm: Normal rate and regular rhythm.      Heart sounds: No murmur heard.  Pulmonary:      Effort: Pulmonary effort is normal.      Breath sounds: Normal breath sounds.   Abdominal:      General: There is no distension.      Palpations: Abdomen is soft.      Tenderness: There is no abdominal tenderness.   Skin:     Coloration: Skin is not pale.      Findings: No rash.   Neurological:      Mental Status: She is alert.      Comments: Quadriplegic                Significant Labs: All pertinent labs within the past 24 hours have been reviewed.  CBC:   Recent Labs   Lab 11/17/24 0432   WBC 6.02   HGB 8.9*   HCT 25.4*        CMP:   Recent Labs   Lab 11/17/24 0432   *   K 4.1   CL 99   CO2 23   GLU 78   BUN 43*   CREATININE 2.3*   CALCIUM 7.7*   PROT 4.8*   ALBUMIN 2.4*   BILITOT 0.5   ALKPHOS 107   AST 67*   ALT 39    ANIONGAP 6*       Significant Imaging: I have reviewed all pertinent imaging results/findings within the past 24 hours.  I have reviewed and interpreted all pertinent imaging results/findings within the past 24 hours.    Scheduled Meds:  Continuous Infusions:  PRN Meds:.    X-Ray Chest AP Portable    Result Date: 11/16/2024  EXAMINATION: XR CHEST AP PORTABLE CLINICAL HISTORY: pulm edema, pleural effusions; COMPARISON: 11/15/2024, 11/14/2024 FINDINGS: Cardiac silhouette size is stable compared to prior.  Right PICC is in stable position with tip overlying the SVC.  Prominent central pulmonary vascularity.  Mild bilateral interstitial/ground-glass opacities suggestive of pulmonary edema.  Small bilateral pleural effusions.  No pneumothorax.  No acute osseous abnormality.     Pulmonary edema and small bilateral pleural effusions. Electronically signed by: Ashkan Broussard Date:    11/16/2024 Time:    07:01    X-Ray Chest AP Portable    Result Date: 11/15/2024  EXAMINATION: XR CHEST AP PORTABLE CLINICAL HISTORY: pulm edema, pleural effusions; COMPARISON: November 14 FINDINGS: Cardiac silhouette is unchanged.  The thoracic aorta is mildly elongated.  Masslike prominence of the bilateral bertha is stable. There is improved aeration of the lower lung zone on the left, with persistent small bilateral pleural effusions.  No pneumothorax is identified. Right-sided PICC line is unchanged in position.  No acute osseous abnormality is demonstrated.  Chronic right-sided rotator cuff tear is incidentally noted.     1. Slightly improved aeration of the left lung base.  No other significant changes compared to November 14. Electronically signed by: Leif Jordan Date:    11/15/2024 Time:    07:17    X-Ray Chest AP Portable    Result Date: 11/14/2024  EXAMINATION: XR CHEST AP PORTABLE CLINICAL HISTORY: pulm edema, pleural effusions; COMPARISON: November 13, 2024 FINDINGS: Heart size is normal.  The mediastinum is unremarkable.   Abnormal bilateral hilar prominence is unchanged. Hazy opacification of the lower left hemithorax consistent with a combination of volume loss or airspace disease and left-sided pleural effusion appears slightly improved.  Small right pleural effusion is noted.  There is no evidence of pneumothorax. Right-sided PICC line remains in place with tip at the superior vena cava.     Slightly improved aeration of the lower lung zone on the left.  No other significant changes. Electronically signed by: Leif Jordan Date:    11/14/2024 Time:    08:54    X-Ray Chest AP Portable    Result Date: 11/13/2024  EXAMINATION: XR CHEST AP PORTABLE CLINICAL HISTORY: hypoxemia; FINDINGS: Portable chest radiograph at 04:58 hours compared to prior exams including CT of the prior day show right PICC unchanged in position, with stable cardiomediastinal silhouette and stable enlarged central pulmonary vasculature. There are persistent bilateral perihilar and left lower lung airspace opacities, with bilateral pleural effusions blunting the costophrenic angles.  No pneumothorax.     No significant interval change. Electronically signed by: Nitin Mayo Date:    11/13/2024 Time:    08:04    CTA Chest Non-Coronary (PE Studies)    Result Date: 11/12/2024  EXAMINATION: CTA CHEST NON CORONARY (PE STUDIES) CLINICAL HISTORY: Pulmonary embolism (PE) suspected, high prob;. TECHNIQUE: CT angiography targeted to the pulmonary arteries was performed. 100 cc nonionic contrast was administered and the bolus was timed for optimal opacification of the pulmonary arteries. 3-D reformatted images were obtained on an independent workstation and stored as a part of patient's permanent medical record. COMPARISON: Chest radiograph, November 12, 2024 FINDINGS: The pulmonary arteries are adequately opacified.  There are filling defects involving bilateral proximal segmental pulmonary arterial branches, extending to distal segmental and subsegmental branches.   There is a small amount of thrombus in the left main pulmonary artery.  The pulmonary arterial trunk is dilated at up to 4.6 cm.  The right pulmonary artery measures 3.2 cm transverse and the left 3.4 cm.  There is slight bowing of the interventricular septum and minimal reflux to the inferior vena cava.  Correlate for possible right heart strain. The thoracic aorta is normal in caliber.  There is no mediastinal mass or lymphadenopathy, however note is made of mild nonspecific right hilar lymphadenopathy. Images at lung windows demonstrate left greater than right bilateral ground-glass pulmonary opacities, predominantly central in location.  Correlate for pulmonary edema or less likely pneumonia.  There are small bilateral pleural effusions.  Dependent atelectasis is noted at both lung bases. There are several indeterminate tiny noncalcified nodules in the right upper lobe, the largest measuring 4 mm (series 4, image 95.  4 mm nodule is also noted in the right middle lobe abutting the minor fissure. Images of the upper abdomen are limited, but demonstrate changes of apparent previous gastric bypass.  There is mild perihepatic free fluid. Diffuse body wall edema is noted.  No acute osseous abnormalities are identified.     1. Bilateral pulmonary thromboembolic disease. 2. Pulmonary arterial dilation and additional observations as above.  Correlate for possible right heart strain. 3. Left greater than right predominantly central ground-glass pulmonary opacities.  Correlate for edema versus pneumonia. 4. Small pleural effusions. 5. Nonspecific right hilar lymphadenopathy.  Scattered tiny right-sided noncalcified pulmonary nodules, possibly granulomas, but indeterminate. Findings of bilateral pulmonary thromboembolic disease were called to Dr. Ramos at 11:10 on November 12, 2024. Electronically signed by: Leif Jordan Date:    11/12/2024 Time:    11:22    X-Ray Chest AP Portable    Result Date:  11/12/2024  EXAMINATION: XR CHEST AP PORTABLE CLINICAL HISTORY: worsening oxygenation; FINDINGS: Portable chest with comparison chest x-ray 11/08/2024.  Normal cardiomediastinal silhouette.Enlarged central hilar vascular structures again noted.  There is bilateral perihilar haziness and interstitial thickening similar to previous exam.  Blunting of the left costophrenic angle again noted with increased hazy opacification of the left lung base.  Blunting of the right costophrenic angle with patchy opacities of the right lung base is similar to previous exam.  Pulmonary vasculature is normal. No acute osseous abnormality.     CHF lung pattern with probable small bilateral pleural effusions noted,  and is subtly increased from previous exam. Electronically signed by: Scotty Aceves Date:    11/12/2024 Time:    08:41    X-Ray Chest AP Portable    Result Date: 11/8/2024  EXAMINATION: XR CHEST AP PORTABLE CLINICAL HISTORY: Pulmonary hypertension; FINDINGS: Portable chest with comparison chest x-ray 11/06/2024.  Normal cardiomediastinal silhouette.Enlarged central hilar vascular structures again noted with mild perihilar peribronchial interstitial thickening.  Hazy opacities of the left lung base are mildly increased.  No pneumothorax.  Right PICC line is unchanged.  Pulmonary vasculature is normal. No acute osseous abnormality.     Mild increased hazy opacities of the left lung bases.  Otherwise no significant changes from prior exam. Electronically signed by: Scotty Aceves Date:    11/08/2024 Time:    08:50    Echo Saline Bubble? No; Ultrasound enhancing contrast? No    Addendum Date: 11/7/2024      Left Ventricle: The left ventricle is smaller than normal. Normal wall thickness. There is normal systolic function with a visually estimated ejection fraction of 65 - 70%. There is normal diastolic function.   Right Ventricle: Mild right ventricular enlargement. Wall thickness is normal. Systolic function is moderately  reduced.   Left Atrium: Left atrium is moderately dilated.   Right Atrium: Right atrium is mildly dilated.   IVC/SVC: Normal venous pressure at 3 mmHg.     Result Date: 11/7/2024    Left Ventricle: The left ventricle is normal in size. Normal wall thickness. There is normal systolic function with a visually estimated ejection fraction of 65 - 70%. There is normal diastolic function.   Right Ventricle: Normal right ventricular cavity size. Wall thickness is normal. Systolic function is normal.   Left Atrium: Left atrium is mildly dilated.   IVC/SVC: Normal venous pressure at 3 mmHg.     X-Ray Chest AP Portable    Result Date: 11/6/2024  EXAMINATION: XR CHEST AP PORTABLE CLINICAL HISTORY: pulmonary hypertension; TECHNIQUE: Single frontal view of the chest was performed. COMPARISON: 11/05/2024. FINDINGS: There is a right-sided PICC, unchanged in position.  The lungs are well expanded.  There is prominence of the central pulmonary vasculature, stable from prior.  There are small bilateral pleural effusions.  The lungs are otherwise clear.  The cardiac silhouette is enlarged.  Osseous structures are intact.  There are calcifications of the aortic arch.  There are surgical clips overlying the left upper quadrant.     No significant detrimental change from prior. Electronically signed by: Dawit Barrios Date:    11/06/2024 Time:    05:05    US Lower Extremity Veins Bilateral    Result Date: 11/5/2024  EXAMINATION: US LOWER EXTREMITY VEINS BILATERAL CLINICAL HISTORY: r/o DVT; TECHNIQUE: Grayscale, color and spectral Doppler analysis of the bilateral lower extremity deep venous system was performed. FINDINGS: No prior studies for comparison.  There is hypoechoic peripheral nonocclusive thrombus within the proximal right superficial femoral vein.  There is also hypoechoic nonocclusive thrombus within the right popliteal vein, with some preserved color Doppler vascular flow. There is normal compressibility, with normal color  and spectral Doppler vascular flow in the left lower extremity deep venous system, with no findings of left lower extremity deep venous thrombosis.     1. Positive for nonocclusive deep venous thrombosis of the right lower extremity. 2. Negative for left lower extremity deep venous thrombosis. 3. RESULT NOTIFICATION: Findings were discussed by Dr Mayo with, and acknowledged by LUZ MARINA SANCHEZ at 15:20 hours. Electronically signed by: Nitin Mayo Date:    11/05/2024 Time:    15:22    X-Ray Chest 1 View S/P PICC Line by Nursing    Result Date: 11/5/2024  EXAMINATION: XR CHEST 1 VIEW S/P PICC LINE BY NURSING CLINICAL HISTORY: picc line; FINDINGS: Portable chest radiograph at 10:54 hours compared to 05:20 hours shows interval insertion of a right PICC, with the distal tip overlying the SVC in satisfactory position.  There is no other significant interval change.     Interval insertion of a right PICC, in satisfactory position with distal tip overlying the SVC. Electronically signed by: Nitin Mayo Date:    11/05/2024 Time:    11:05    X-Ray Chest AP Portable    Result Date: 11/5/2024  EXAMINATION: XR CHEST AP PORTABLE CLINICAL HISTORY: Pulmonary HTN; COMPARISON: 10/31/2024. FINDINGS: The heart is enlarged but stable.  Prominence of the central pulmonary vasculature appears similar to prior examination.  There is mild mass effect observed upon the rightward aspect of the trachea. There are mild airspace opacities or volume loss within the lung bases.  There are mildly diminished lung volumes.  No significant effusion demonstrated. Multiple monitoring electrodes overlie the chest.     Diminished lung volumes. Mild basilar airspace opacities or volume loss. Cardiomegaly and marked prominence of the central pulmonary vasculature, stable. Mild mass effect upon the rightward aspect of the trachea. Electronically signed by: Waqas Leavitt Date:    11/05/2024 Time:    07:04    X-Ray Cervical Spine AP And Lateral    Result Date:  11/4/2024  CLINICAL HISTORY: ([id number])73 y/o  (1950) F C3-6 laminectomy and fusion; Central cord syndrome at unspecified level of cervical spinal cord, initial encounter TECHNIQUE: (MARIA#[id number], exam time 11/4/2024 5:48) XR CERVICAL SPINE AP LATERAL IMG56 2 view(s) obtained. COMPARISON: MRI from 11/02/2024. FINDINGS: C1 through C7 are visualized on the lateral radiograph.  There is straightening/reversal the normal lordotic curvature likely secondary to a combination of positioning/postoperative change, degenerative change and/or muscle spasm. There has been interval posterior laminectomy from C3-C6, with bilateral posterior trans-facet screw and saurabh fixation.  There is a midline posterior paraspinal drain in the laminectomy bed with scattered subcutaneous emphysema and edema over the dorsal aspect of the neck. Again noted is moderate to severe disc height loss at C2-C3, C3-C4 and C4-C5 with moderate disc height loss at C5-C6.  There is oblique lucency through the anterior inferior vertebral body of C2, and C5, suspicious for occult nondisplaced fractures, these could be better characterized with CT.  There is minimal prevertebral soft tissue swelling. There is mild retrolisthesis of C5 on C6 (3 mm).  The visualized lung apices are clear.  The patient is edentulous.     1.  Posterior postoperative fusion from C3-C6 as above. 2.  Suspected tiny nondisplaced oblique fractures through the anterior inferior endplate of C2 and C5. This report was flagged in Epic as abnormal. Electronically signed by: Asim Davis Date:    11/04/2024 Time:    06:42    SURG FL Surgery Fluoro Usage    Result Date: 11/3/2024  See OP Notes for results. IMPRESSION: See OP Notes for results. This procedure was auto-finalized by: Virtual Radiologist    MRI Cervical Spine Without Contrast    Result Date: 11/2/2024  EXAMINATION: MRI CERVICAL SPINE WITHOUT CONTRAST CLINICAL HISTORY: Myelopathy, acute, cervical spine; fall with head and  neck trauma TECHNIQUE: Routine MRI cervical spine without contrast. COMPARISON: Multiple prior exams. FINDINGS: Motion artifact limits the exam.  There is exaggeration of the normal cervical spinal curvature, with normal vertebral body alignment, and no acute fractures or destructive osseous lesions.  Chronic vertebral height loss involves C3 and C4, with degenerative loss of disc height and signal most pronounced at C3-C4 and C4-C5, and heterogeneous degenerative type marrow endplate signal alteration.  No findings of a diffuse marrow replacement process. There are stippled T2 hyperintensities in the sandy suggesting chronic ischemic changes, with the visualized posterior fossa and cervicomedullary junction otherwise unremarkable.  There is intramedullary non fluid like T2 hyperintense signal alteration in the cervical cord as detailed below. At C2-C3, there is broad-based disc bulging and spondylosis, without significant spinal canal stenosis.  There is bilateral foraminal narrowing. At C3-C4, there is broad-based disc bulging and spondylosis, contributing to moderate to severe spinal canal stenosis.  There is severe bilateral foraminal narrowing. At C4-C5, there is broad-based disc bulging and spondylosis, which produces severe spinal canal stenosis with cervical cord compression.  There is increased non fluid-like T2 signal in the cord suggesting edema and or myelopathy, with severe bilateral foraminal narrowing. At C5-C6, there is broad-based disc bulging and spondylosis, producing severe spinal canal stenosis with mass effect upon the cervical cord.  There is increased non fluid-like T2 signal in the cord suggesting edema and or myelopathy.  There is severe bilateral foraminal narrowing. At C6-C7, there is broad-based disc bulging, without significant spinal canal stenosis.  There is mild left neural foraminal narrowing. C7-T1 is unremarkable. There are no signal abnormalities of the paraspinal ligaments, with  nonspecific STIR hyperintense edema within the posterior paraspinal musculature and subcutaneous fat.  Multiple T2 hyperintensities in the thyroid gland are nonspecific.     1. Disc bulging and spondylosis at C4-C5 and C5-C6, with severe spinal canal stenosis, cervical cord compression and cord signal alteration suggesting edema and or myelopathy. 2. Broad-based disc bulging producing moderate spinal canal stenosis at C3-C4. Electronically signed by: Nitin Mayo Date:    11/02/2024 Time:    12:28    Echo    Result Date: 11/1/2024    Left Ventricle: The left ventricle is normal in size. Normal wall thickness. Unable to assess wall motion. There is normal systolic function with a visually estimated ejection fraction of 60 - 65%.   Right Ventricle: Right ventricle was not well visualized due to poor acoustic window.  Grossly appears to be dilated with reduced function.  There appears to be some concern of flattening of the interventricular septum which could indicate right ventricular pressure and volume overload.   Left Atrium: Left atrium is severely dilated.   IVC/SVC: Elevated venous pressure at 15 mmHg.     MRA Neck without contrast    Result Date: 11/1/2024  EXAMINATION: MRA NECK WITHOUT CONTRAST CLINICAL HISTORY: Stroke/TIA, determine embolic source; TECHNIQUE: Time of flight MRA of the carotid and vertebral arteries was performed with 3D reconstructions according to the standard neck MRA protocol. COMPARISON: Carotid ultrasound 10/31/2024.  MRI/MRA of the head 10/31/2024. FINDINGS: Somewhat motion limited exam. The right common carotid artery demonstrates no hemodynamically significant stenosis. The cervical right internal carotid artery demonstrates no hemodynamically significant stenosis. The left common carotid artery demonstrates no hemodynamically significant stenosis. The cervical left internal carotid artery demonstrates no hemodynamically significant stenosis. Extracranial vertebral arteries: Vertebral  arteries are codominant.  Vertebral arteries are normal to the level of the foramen magnum.  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     1. Negative limited noncontrast MRA of the neck.  No hemodynamically significant stenosis identified. Electronically signed by: Kendall Lee Date:    11/01/2024 Time:    12:35    US Carotid Bilateral    Result Date: 10/31/2024  EXAMINATION: US CAROTID BILATERAL CLINICAL HISTORY: syncope; TECHNIQUE: Grayscale and color Doppler ultrasound examination of the carotid and vertebral artery systems bilaterally.  Stenosis estimates are per the NASCET measurement criteria. COMPARISON: None. FINDINGS: Right: Internal Carotid Artery (ICA) peak systolic velocity 60.6 cm/sec ICA/CCA peak systolic velocity ratio: 0.9 Plaque formation: Heterogeneous Vertebral artery: Antegrade flow and normal waveform. Left: Internal Carotid Artery (ICA)  peak systolic velocity 98.1 cm/sec ICA/CCA peak systolic velocity ratio: 1.4 Plaque formation: Heterogeneous Vertebral artery: Antegrade flow and normal waveform.     No evidence of a hemodynamically significant carotid bifurcation stenosis. Electronically signed by: Dawit Barrios Date:    10/31/2024 Time:    23:55    MRI Lumbar Spine Without Contrast    Result Date: 10/31/2024  EXAMINATION: MRI LUMBAR SPINE WITHOUT CONTRAST CLINICAL HISTORY: Low back pain, trauma; TECHNIQUE: Multiplanar, multisequence MR images were acquired from the thoracolumbar junction to the sacrum without contrast. COMPARISON: CT total spine from 10/31/2024. MRI lumbar spine from 09/15/2016 FINDINGS: Alignment: Normal. Vertebrae: Normal marrow signal. No fracture. Discs: There is moderate disc height loss at L5-S1 and mild disc height loss and disc desiccation at L3-L4 and L4-L5.  Remaining disc heights are preserved. Cord: Normal.  Conus terminates at L2.  Degenerative findings: T12-L1: There is mild facet arthropathy bilaterally.  There is no spinal canal stenosis or neural foraminal narrowing. L1-L2: There is mild facet arthropathy and ligamentum flavum hypertrophy.  There is no spinal canal stenosis or neural foraminal narrowing. L2-L3: There is mild bilateral facet arthropathy and ligamentum flavum hypertrophy and a small circumferential disc bulge, resulting in mild spinal canal stenosis and moderate bilateral neural foraminal narrowing. L3-L4: There is moderate bilateral facet arthropathy and ligamentum flavum hypertrophy and a circumferential disc bulge, resulting in moderate spinal canal stenosis and moderate bilateral neural foraminal narrowing. L4-L5: There is a circumferential disc bulge with moderate bilateral facet arthropathy and ligamentum flavum hypertrophy resulting in moderate spinal canal stenosis and severe bilateral neural foraminal narrowing. L5-S1: There is bilateral facet arthropathy, moderate with a posterior disc bulge and an annular fissure.  There is no spinal canal stenosis.  There is moderate bilateral neural narrowing. Paraspinal muscles & soft tissues: Unremarkable.     Multilevel degenerative changes of the lumbar spine as detailed above.  There is moderate spinal canal stenosis at L3-L4 and L4-L5 and there is severe bilateral neural foraminal narrowing at L4-L5. Electronically signed by: Dawit Barrios Date:    10/31/2024 Time:    22:38    MRA Brain without contrast    Result Date: 10/31/2024  EXAMINATION: MRA BRAIN WITHOUT CONTRAST CLINICAL HISTORY: Syncope, recurrent; TECHNIQUE: Non-contrast 3-D time-of-flight intracranial MR angiography was performed through the [place] [facility] with MIP reformatting. COMPARISON: None FINDINGS: Anterior circulation: The bilateral intracranial ICAs, bilateral ACAs, and bilateral MCAs are patent and unremarkable without high-grade stenosis or occlusion. Posterior circulation: The bilateral  intracranial vertebral arteries, the basilar artery, and the bilateral PCAs are patent and unremarkable without high-grade stenosis or occlusion. There is no intracranial aneurysm visualized.     Unremarkable MRA brain. Electronically signed by: Dawit Barrios Date:    10/31/2024 Time:    22:29    MRI Brain Without Contrast    Result Date: 10/31/2024  EXAMINATION: MRI BRAIN WITHOUT CONTRAST CLINICAL HISTORY: Neuro deficit, acute, stroke suspected; TECHNIQUE: Multiplanar multisequence MR imaging of the brain was performed without intravenous contrast. COMPARISON: CT head from earlier the same date. FINDINGS: There is restricted diffusion in the right caudate head, compatible with an acute infarct.  There is associated T2/FLAIR hyperintense signal.  There is a mass centered within the left temporoparietal calvarium measuring approximately 5.0 x 2.9 x 4.0 cm, with extension into the left cerebral convexity extra-axial space and extension into the left temporal scalp.  There is abutment of the left temporal lobe, without evidence of vasogenic edema or evidence of significant mass effect.  There is no midline shift.  Ventricles are normal in size for age without evidence of hydrocephalus.  There is T2/FLAIR hyperintense signal throughout the supratentorial white matter, compatible with chronic microvascular ischemic changes.  There is a small focus of encephalomalacia within the right medial parietal lobe, likely related to a remote infarct.  There is no intracranial hemorrhage.  No extra-axial blood or fluid collections. Normal vascular flow voids. Left common rim mass as above.  The remaining bone marrow signal intensity is normal. Paranasal sinuses and mastoid air cells are clear.     1. Acute infarct in the right caudate head. 2. 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  The mass abuts the left temporal lobe, without resulting in significant mass effect or vasogenic  edema.  Differential includes a primary or metastatic osseous lesion, an atypical meningioma, or additional etiologies. 3. Chronic microvascular ischemic changes. This report was flagged in Epic as abnormal. Dr. Barrios discussed critical findings with TATIANNA Weaver by [facility] secure chat at 22:23 on 10/31/2024. Electronically signed by: Dawit Barrios Date:    10/31/2024 Time:    22:27    X-Ray Wrist Complete Left    Result Date: 10/31/2024  EXAMINATION: XR WRIST COMPLETE 3 VIEWS LEFT CLINICAL HISTORY: Syncope and collapse TECHNIQUE: PA, lateral, and oblique views of the left wrist were performed. COMPARISON: None FINDINGS: No displaced fracture or traumatic malalignment.  Mild carpal degenerative change.  Unremarkable soft tissues. Electronically signed by: Saeid Pérez Date:    10/31/2024 Time:    16:40    X-Ray Knee 1 or 2 View Left    Result Date: 10/31/2024  EXAMINATION: XR KNEE 1 OR 2 VIEW LEFT CLINICAL HISTORY: Syncope and Fall; TECHNIQUE: One or two views of the left knee were performed. COMPARISON: None FINDINGS: Left knee total arthroplasty hardware with no periprosthetic fracture or abnormal lucency to suggest loosening or infection.  No malalignment.  Trace knee joint fluid.  Subcutaneous soft tissue edema about the knee and proximal calf. Electronically signed by: Saeid Pérez Date:    10/31/2024 Time:    16:39    X-Ray Chest AP Portable    Result Date: 10/31/2024  EXAMINATION: XR CHEST AP PORTABLE CLINICAL HISTORY: Syncope and collapse TECHNIQUE: Single frontal view of the chest was performed. COMPARISON: Same-day CT FINDINGS: There is a nodular airspace opacity at the periphery of the right lung base.  Remaining lungs clear.  Borderline cardiac enlargement with metallic device projecting over the right heart border possibly an intra atrial septal occlusion device.  Prominence of the pulmonary arterial vasculature aortic arch atherosclerosis.  No pleural effusion or pneumothorax.  There are multiple  surgical clips and staple lines over the upper abdomen.     1. Pleuroparenchymal nodule in the right lung base and several other nodules in the right lung apex.  Findings better seen at CT.  These are nonspecific with neoplasm not excluded. 2. Borderline heart enlargement. 3. Prominence of the pulmonary vasculature raising the possibility for elevated pulmonary pressures. Electronically signed by: Saeid Pérez Date:    10/31/2024 Time:    16:38    CT Entire Spine Without Contrast    Result Date: 10/31/2024  EXAMINATION: CT ENTIRE SPINE WITHOUT CONTRAST (XPD) CLINICAL HISTORY: Fall, pan spinal tenderness; TECHNIQUE: Axial images were obtained through the entire spine.  Sagittal and coronal reformatted images were created. COMPARISON: Lumbar spine MRI dated 03/15/2016 FINDINGS: There is no recent study for comparison.  On the screening study obtained with a large field of view there is no obvious acute fracture.  There is extensive chronic change throughout the cervical spine.  The odontoid process is intact.  The anterior and posterior arches of C1 are intact as well.  There is severe disc space narrowing at the C2-3, C3-4, C4-5 levels with moderate to marked disc space narrowing at the C5-6 level.  There is trace retrolisthesis of C4 on C5.  There is chronic anterior wedging of C3 and C4.  There is multilevel foraminal stenosis throughout the cervical region due to uncovertebral spurring and/or facet joint arthropathy.  Also, there is spinal stenosis most apparent at the C4-5 level. In the thoracic spine, there is mild chronic anterior wedging of several midthoracic vertebral bodies but there is no obvious acute fracture or traumatic malalignment.  The facet joints maintain normal articulation. In the lumbar spine there is a vacuum disc at the L3-4 and L5-S1 levels.  There is severe disc space narrowing at L5-S1.  The lumbar vertebral bodies maintain normal height without obvious acute fracture.  Alignment is  grossly normal.  There is extensive facet joint arthropathy in the mid to lower lumbar spine. There is a dextrocurvature of the lower lumbar spine with gentle broad levocurvature at the thoracolumbar junction. There is no displaced or depressed rib fracture identified on this limited field of view.  There is no obvious spinous process fracture. The sacrum is intact. There is atherosclerosis.  There is no pneumothorax.  There are several scattered nodules in the lungs which are suboptimally evaluated.     There is degenerative change throughout the spine, most significant in the lower lumbar spine as well as in the cervical spine.  There is however no obvious acute fracture or traumatic malalignment.  The entire spine was obtained in the large field of view. Electronically signed by: Nomi Vasquez MD Date:    10/31/2024 Time:    16:26    CT Maxillofacial Without Contrast    Result Date: 10/31/2024  EXAMINATION: CT MAXILLOFACIAL WITHOUT CONTRAST CLINICAL HISTORY: Facial trauma, blunt; TECHNIQUE: Low dose axial images, sagittal and coronal reformations were obtained through the face.  Contrast was not administered. COMPARISON: None FINDINGS: There is suspected soft tissue swelling over the chin.  There is very subtle cortical irregularity involving the anterior paramedian mandible which could represent subtle acute fracture with minimal adjacent bone fragments.  There is beam hardening artifact related to hardware in the region of the left maxilla.  There is no dislocation at the TMJ.  There are changes of torus mandibularis. There is old deformity of the anterior nasal bones.  There is no acute displaced or depressed nasal bone or nasal septum fracture. There is no acute orbital fracture.     1. There is mild irregularity of the interior midline mandible with small adjacent bone fragments which could represent acute fracture with overlying soft tissue swelling.  There is no other acute maxillofacial or orbital  fracture.  There is suspected old deformities of the anterior nasal bones. Electronically signed by: Nomi Vasquez MD Date:    10/31/2024 Time:    16:12    CT Head Without Contrast    Result Date: 10/31/2024  EXAMINATION: CT HEAD WITHOUT CONTRAST CLINICAL HISTORY: Head trauma, minor (Age >= 65y); TECHNIQUE: Routine unenhanced axial images were obtained through the head.  Sagittal and coronal reformatted images were created.  The study is reviewed in bone and soft tissue windows. COMPARISON: None FINDINGS: Intracranial contents: There is no acute intracranial abnormality.  There is mild nonspecific white matter change.  There is no hemorrhage, parenchymal mass or mass effect.  The gray-white interface is preserved without acute infarction.  The basilar cisterns are open.  There is a remote lacunar infarction in the right caudate nucleus.  The cerebellar tonsils are normal position. Extracranial contents, calvarium, soft tissues: The included paranasal sinuses and mastoid air cells are clear.  There is no acute skull fracture.  There is soft tissue prominence of the left temporoparietal region.  There is demineralization of the underlying calvarium with in irregular inner bony margin.  Also, there is suggestion of possible extra-axial dural-based soft tissue mass in this region which could potentially represent an atypical meningioma and further evaluated with brain MRI without and with contrast.     There is left temporoparietal soft tissue prominence with demineralization of the underlying bone and suspected extra-axial mass in this region which is nonspecific and incompletely evaluated.  These could potentially represent an atypical meningioma but further evaluation with brain MRI without and with contrast could be obtained.  This is not acute.  There is no hemorrhage.  There is no acute skull fracture. Electronically signed by: Nomi Vasquez MD Date:    10/31/2024 Time:    16:07  - pulls last radiology  orders

## 2024-11-18 NOTE — ASSESSMENT & PLAN NOTE
S/p multilevel laminectomy (11/3/24)  C2-3, C3-4, C4-5, C5-6, C6-7 laminectomy  C3 through 6 posterior segmental instrumentation using [employer] system  C3-4, C4-5, C5-6 posterolateral arthrodesis using autograft harvested from the same incision and allograft DBM    Cervical collar in place  Neuro surgery follow up     PT OT when more stable    Cortisol added and normal     Remains quadriplegic after surgery.     Patient had acute CVA, remains quadriplegic after cervical cord decompression surgery, also developed pulmonary embolism/ acute DVT, patient developed urinary retention most likely secondary to neurogenic bladder, patient has progressive acute on chronic hypoxic respiratory failure due to pulmonary artery hypertension, patient remains on high-flow oxygen after surgery.   Patient and family decided to move forward to inpatient hospice 11/18.   Updated with nurse/ pulmonologist/ palliative care team.   Add on fludrocortisone, increase midodrine to 20 mg t.i.d..  Wean off Levophed drip.   Okay to move to step-down floor once off Levophed drip.   Discontinue IV fluid- patient looks very edematous.

## 2024-11-18 NOTE — PT/OT/SLP PROGRESS
Physical Therapy      Patient Name:  Shanell Mahmood   MRN:  [id number]    Patient not seen today secondary to transition to hospice care.

## 2024-11-18 NOTE — ASSESSMENT & PLAN NOTE
S/p multilevel laminectomy (11/3/24)  C2-3, C3-4, C4-5, C5-6, C6-7 laminectomy  C3 through 6 posterior segmental instrumentation using [employer] system  C3-4, C4-5, C5-6 posterolateral arthrodesis using autograft harvested from the same incision and allograft DBM    Cervical collar in place  Neuro surgery follow up     PT OT when more stable    Cortisol added and normal     Remains quadriplegic after surgery.     Patient had acute CVA, remains quadriplegic after cervical cord decompression surgery, also developed pulmonary embolism/ acute DVT, patient developed urinary retention most likely secondary to neurogenic bladder, patient has progressive acute on chronic hypoxic respiratory failure due to pulmonary artery hypertension, patient remains on high-flow oxygen after surgery.   Patient and family decided to move forward to inpatient hospice 11/18.   Updated with nurse/ pulmonologist/ palliative care team.   Add on fludrocortisone, increase midodrine to 20 mg t.i.d..  Wean off Levophed drip.   Okay to move to step-down floor once off Levophed drip.   Discontinue IV fluid- patient looks very edematous.

## 2024-11-18 NOTE — ASSESSMENT & PLAN NOTE
"Discovered after PET scan of lung  on 9/6/24 revealed abnormal uptake in brain. 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  Unclear if contributing to CVA  She will have her first appointment with Dr. Ray Mcintyre at  [facility]  on 11/4/24  -for comfort / hospice care soon   "

## 2024-11-18 NOTE — DISCHARGE SUMMARY
[facility] Medicine  Discharge Summary      Patient Name: Shanell Mahmood  MRN: [id number]  ADRI: [id number]  Patient Class: IP- Inpatient  Admission Date: 10/31/2024  Hospital Length of Stay: 17 days  Discharge Date and Time: 11/18/2024  1:49 PM  Attending Physician: Nicole att. providers found   Discharging Provider: nAdriy Kilgore MD  Primary Care Provider: Nicole, Primary Doctor    Primary Care Team: Networked reference to record PCT     HPI:   Shanell Mahmood is a 74 year old female with a previous medical history of anemia, Pulmonary hypertension on 4 liters continuous oxygen at home, arthritis, CKD V, Osteoporosis, secondary hyperprathyroidism, S/P closure of patent foramen ovale in 2011, CVA in 2011 with no residuals, chronic back pain, HLD, Gout, Brain Mass and thyroid diease who presented to the ED after unwitnessed syncopal episode. The patient was at her pain management office for re certification only. There were no procedures performed. She reports taking one hydrocodone 10mg today.  The last thing she remembers was walking down the hallway of the office and looking for something to cover her head from the rain and did not have any adverse symptoms or feelings at that time.  She has no recollection of the syncopal events. When she was initially evaluated by EMS, her blood pressure was in the 60s over 40s. She did require constant stimulation to remain awake. Fell and hit her head. Does not take any blood thinners. EMS evaluated her and gave her 1 mg of Narcan as well as 250 cc of fluid. Her pressure improved and she had become more alert. Initial ED workup was thorough and all imaging showing that included CT of neck, head and entire spine showed no acute processes. CBC showed anemia and CMP showed elevated creatinine consistent with history of CKD V. Drug screen positive for opioids but patient has a hydrocodone prescription. The patient is currently in the process of have a left sided brain mass  "visualized on MRI 10/8/24 evaluated that was an incidental finding after undergoing a PET scan for a pulmonary nodule on 9/6/24 with abnormal uptake noted in brain. She has her first appointment on 11/4/24. She is followed by nephrology who is currently monitoring her and there has been one attempt at AV fistual placement but it was unsuccessful due sclerotic changes. Patient reports she awoke this morning feeling well showered and dressed herself. She performs all of her own ADL's. She had one syncopal event in March 2024 and was evaluated by cardiology and informed it was an orthostatic episode. Patient was unable to complete orthostatic vital signs upon admission due to inability to stand stating " I feel as if I can't get my legs under me". When evaluated for admit exam the patient endorses that after the syncopal episode she endorses bilateral leg weakness with decreased sensation in both legs. She reports bilateral  weakness being unable to use her phone and difficulty raising both of her arms. NIH scale performed and total of 9 noted. Patient noted have 400ml of urine in bladder and unable to void. Patient reached a total of 528ml of urine without being able to void.  MRI/MRA of brain and MRI of lumbosacral spine ordered upon admit. MRA and MRI lower back showed no acute process. MRI brain showed Acute infarct in the right caudate head. Dr. Maldonado was called and he recommended MRA of neck in morning and load with aspirin and plavix. Initial EKG read as atrial fibrillation but upon review p waves were noted and this was discussed with the ED. Repeat EKG shows sinus arrhthymias with PACs. Patient admitted by hospital medicine for further evaluation and management.       Procedure(s) (LRB):  LAMINECTOMY, SPINE, CERVICAL, POSTERIOR APPROACH (N/A)      Hospital Course:   74-year-old female with history of brain mass, CKD, back pain, pulmonary hypertension on 4 L oxygen is admitted after syncope and collapse " found to have a acute CVA in the right caudate on MRI brain.   She Has global weakness and decreased sensation to the lower legs.  Multiple CT and x-rays done to rule out trauma ultimately negative other than the maxillofacial CT reporting mild irregularity of the interior midline mandible with small adjacent bone fragments which could represent acute fracture with overlying soft tissue swelling.    Carotid ultrasound and MRA brain and neck without acute finding.  Neurology is consulted.  Also with underlying brain mass.  Consult Neurosurgery and Oncology.  Given DAPT and statin.  Had hypotension given IVF and midodrine.  Echo shows right heart failure.  BNP is within normal.  Lactic acid pending.  No other sign of infection.  Placed in cervical collar as precaution and MRI of the cervical spine shows severe cord compression at C4-5 and C5-6 likely acute with edema.  Neurosurgery discussed with patient  and plan to proceed with surgical decompression  and  patient underwent surgery on : 11/3/24 multilevel C-spine laminectomy.  Later patient continued to have hypotension and needed vasopressor and another vasopressor vasopressin is added  Patient also her DVT of the the proximal right superficial femoral vein. And nonocclusive thrombus within the right popliteal vein,  Also patient developed new onset AFib and started amiodarone drip and in and out of a fib  and later changed to PO amiodarone   Later patient was more hypoxic and not able to wean vasopressor and PE study was done and found to have segmental PE with right heart strain . Patient was on full dose lovenox . Vascular surgery consulted and reviewed the film and did not recommend thrombectomy intervention .  Patient and the family decided move forward to inpatient hospice on 11/18.      Goals of Care Treatment Preferences:  Code Status: DNR          What is most important right now is to focus on comfort and QOL .  Accordingly, we have decided that the best  plan to meet the patient's goals includes enrolling in hospice care.         Consults:   Consults (From admission, onward)          Status Ordering Provider     Inpatient consult to   Once        Provider:  (Not yet assigned)    Completed ERMELINDA LAI     Inpatient consult to Palliative Care  Once        Provider:  José Miguel Retana MD    Completed ERMELINDA LAI     Inpatient consult to Vascular Surgery  Once        Provider:  Christopher Carlos MD    Completed NOLVIA RAZA     Inpatient consult to   Once        Provider:  (Not yet assigned)    Completed EM SMITH     Inpatient consult to Cardiology  Once        Provider:  Noah Hudson MD    Completed NOLVIA RAZA     Inpatient consult to PICC Line Nurse  Once        Provider:  (Not yet assigned)    Completed NOLVIA RAZA     Inpatient consult to Social Work/Case Management  Once        Provider:  (Not yet assigned)    Completed EM SMITH     Inpatient consult to Intensivist  Once        Provider:  Evangelista Gutierrez MD    Completed KOBI CORONA     Inpatient consult to   Once        Provider:  (Not yet assigned)    Completed KOBI CORONA     Inpatient consult to Hematology/Oncology  Once        Provider:  Nicholas Wyman MD    Completed MISTY STEIN     Inpatient consult to Neurosurgery  Once        Provider:  Kobi Corona MD    Completed MISTY STEIN     Inpatient consult to Registered Dietitian/Nutritionist  Once        Provider:  (Not yet assigned)    Completed MAIRA LEON     IP consult to case management/social work  Once        Provider:  (Not yet assigned)    Completed MAIRA LEON     Inpatient consult to Neurology  Once        Provider:  Diego David MD    Completed MAIRA LEON            Neuro  * Central cord syndrome  S/p multilevel laminectomy (11/3/24)  C2-3, C3-4, C4-5, C5-6, C6-7 laminectomy  C3 through 6 posterior segmental  "instrumentation using [employer] Symphony system  C3-4, C4-5, C5-6 posterolateral arthrodesis using autograft harvested from the same incision and allograft DBM    Cervical collar in place  Neuro surgery follow up     PT OT when more stable    Cortisol added and normal     Remains quadriplegic after surgery.     Patient had acute CVA, remains quadriplegic after cervical cord decompression surgery, also developed pulmonary embolism/ acute DVT, patient developed urinary retention most likely secondary to neurogenic bladder, patient has progressive acute on chronic hypoxic respiratory failure due to pulmonary artery hypertension, patient remains on high-flow oxygen after surgery.   Patient and family decided to move forward to inpatient hospice 11/18.   Updated with nurse/ pulmonologist/ palliative care team.   Add on fludrocortisone, increase midodrine to 20 mg t.i.d..  Wean off Levophed drip.   Okay to move to step-down floor once off Levophed drip.   Discontinue IV fluid- patient looks very edematous.           Brain mass  Discovered after PET scan of lung  on 9/6/24 revealed abnormal uptake in brain. 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  Unclear if contributing to CVA  She will have her first appointment with Dr. Ray Mcintyre at  [facility]  on 11/4/24  -for comfort / hospice care soon     Pulmonary  Chronic respiratory failure with hypoxia  Patient with Hypoxic Respiratory failure which is Chronic.  she is on home oxygen at 4 LPM.   Secondary to pulmonary embolism  Full-dose Lovenox    Cardiac/Vascular  Pulmonary artery hypertension  Acute on chronic issues  Macitentan, selexipag and tadalafil not available and sildenafil changed and continued   Patient's may be a candidate for IV  Epoprostenol however Patient refuse swan ramya catheter.   Patient breathing condition has been progressively " worsening after the surgery, remains on high-flow oxygen since her surgery.   Pulmonary is following.      Renal/  CKD (chronic kidney disease), stage IV  Creatine stable for now. BMP reviewed- noted Estimated Creatinine Clearance: 24.3 mL/min (A) (based on SCr of 2.3 mg/dL (H)). according to latest data. Based on current GFR, CKD stage is stage 4 - GFR 15-29.  Monitor UOP and serial BMP and adjust therapy as needed. Renally dose meds. Avoid nephrotoxic medications and procedures.    Close monitor     Acute urinary retention/ neurogenic bladder  Possibly neurogenic from the CVA or from cervical spine cord compression  Urinary catheter. Appear may need long term      Endocrine  Secondary hyperparathyroidism  Chronic condition that is stable.     Other  Syncope and collapse  See stroke        Final Active Diagnoses:    Diagnosis Date Noted POA    PRINCIPAL PROBLEM:  Central cord syndrome [S14.129A] 11/03/2024 Yes    Acute deep vein thrombosis (DVT) of popliteal vein of right lower extremity, nonocclusive and pulmonary embolism [I82.431] 11/05/2024 No    ACP (advance care planning) [Z71.89] 11/14/2024 Not Applicable    S/P cervical spinal fusion [Z98.1] 11/06/2024 Not Applicable    Paroxysmal atrial fibrillation [I48.0] 11/06/2024 No    Myelopathy [G95.9] 11/05/2024 Yes    Quadriparesis [G82.50] 11/05/2024 Yes    Status post cervical spinal fusion [Z98.1] 11/04/2024 Not Applicable    Anemia [D64.9] 11/01/2024 Yes    Thrombocytopenia [D69.6] 11/01/2024 Yes    Hypotension [I95.9] 11/01/2024 No    Acute urinary retention/ neurogenic bladder [R33.8] 10/31/2024 Yes    Syncope and collapse [R55] 10/31/2024 Yes    CKD (chronic kidney disease), stage IV [N18.4] 10/31/2024 Yes    Chronic respiratory failure with hypoxia [J96.11] 10/31/2024 Yes    Pulmonary artery hypertension [I27.21] 10/31/2024 Yes    Secondary hyperparathyroidism [N25.81] 10/31/2024 Yes    Brain mass [G93.89] 10/31/2024 Yes    Stroke [I63.9] 10/31/2024 Yes       Problems Resolved During this Admission:    Diagnosis Date Noted Date Resolved POA    Goals of care, counseling/discussion [Z71.89] 11/14/2024 11/15/2024 Not Applicable    Sinus arrhythmia seen on electrocardiogram [I49.8] 11/01/2024 11/09/2024 Yes    Weakness of both lower extremities [R29.898] 10/31/2024 11/10/2024 Yes       Discharged Condition: stable    Disposition: Hospice/Home    Follow Up:    Patient Instructions:   No discharge procedures on file.    Significant Diagnostic Studies: Labs: CMP   Recent Labs   Lab 11/17/24  0432   *   K 4.1   CL 99   CO2 23   GLU 78   BUN 43*   CREATININE 2.3*   CALCIUM 7.7*   PROT 4.8*   ALBUMIN 2.4*   BILITOT 0.5   ALKPHOS 107   AST 67*   ALT 39   ANIONGAP 6*    and CBC   Recent Labs   Lab 11/17/24 0432   WBC 6.02   HGB 8.9*   HCT 25.4*          Pending Diagnostic Studies:       None           Medications:  Reconciled Home Medications:      Medication List        ASK your doctor about these medications      allopurinoL 100 MG tablet  Commonly known as: ZYLOPRIM  Take 100 mg by mouth every evening.     clotrimazole-betamethasone 1-0.05% cream  Commonly known as: LOTRISONE  Apply 1 g topically Daily.     colchicine 0.6 mg tablet  Commonly known as: COLCRYS  Take 0.6 mg by mouth 2 (two) times daily.     diclofenac sodium 1 % Gel  Commonly known as: VOLTAREN  Apply 2 g topically 3 (three) times daily.     DULoxetine 60 MG capsule  Commonly known as: CYMBALTA  Take 60 mg by mouth once daily.     ergocalciferol 50,000 unit Cap  Commonly known as: ERGOCALCIFEROL  Take 50,000 Units by mouth every 7 days.     fluticasone propionate 50 mcg/actuation nasal spray  Commonly known as: FLONASE  1 spray by Each Nostril route once daily.     gabapentin 100 MG capsule  Commonly known as: NEURONTIN  Take 2 capsules by mouth every 12 (twelve) hours.     HYDROcodone-acetaminophen  mg per tablet  Commonly known as: NORCO  Take 1 tablet by mouth every 4 to 6 hours as  needed for Pain.     LINZESS 290 mcg Cap capsule  Generic drug: linaCLOtide  Take 290 mcg by mouth before breakfast.     montelukast 10 mg tablet  Commonly known as: SINGULAIR  Take 10 mg by mouth once daily.     omeprazole 40 MG capsule  Commonly known as: PRILOSEC  Take 40 mg by mouth Daily.     OPSUMIT 10 mg Tab  Generic drug: macitentan  Take 10 mg by mouth once daily.     simvastatin 40 MG tablet  Commonly known as: ZOCOR  Take 40 mg by mouth Daily.     tadalafil 20 mg Tab  Commonly known as: ADCIRCA  Take 20 mg by mouth every other day.     tiZANidine 4 MG tablet  Commonly known as: ZANAFLEX  Take 4 mg by mouth every 12 (twelve) hours as needed.     torsemide 20 MG Tab  Commonly known as: DEMADEX  Take 40 mg by mouth once daily.     UPTRAVI 1,600 mcg Tab  Generic drug: selexipag  Take 1 tablet by mouth 2 (two) times a day.              Indwelling Lines/Drains at time of discharge:   Lines/Drains/Airways       Peripherally Inserted Central Catheter Line  Duration             PICC Double Lumen 11/05/24 1050 right basilic 13 days              Drain  Duration                  Urethral Catheter 11/14/24 1930 3 days              Arterial Line  Duration             Arterial Line 11/03/24 0830 Right Radial 15 days                  X-Ray Chest AP Portable    Result Date: 11/16/2024  EXAMINATION: XR CHEST AP PORTABLE CLINICAL HISTORY: pulm edema, pleural effusions; COMPARISON: 11/15/2024, 11/14/2024 FINDINGS: Cardiac silhouette size is stable compared to prior.  Right PICC is in stable position with tip overlying the SVC.  Prominent central pulmonary vascularity.  Mild bilateral interstitial/ground-glass opacities suggestive of pulmonary edema.  Small bilateral pleural effusions.  No pneumothorax.  No acute osseous abnormality.     Pulmonary edema and small bilateral pleural effusions. Electronically signed by: Ashkan Broussard Date:    11/16/2024 Time:    07:01    X-Ray Chest AP Portable    Result Date:  11/15/2024  EXAMINATION: XR CHEST AP PORTABLE CLINICAL HISTORY: pulm edema, pleural effusions; COMPARISON: November 14 FINDINGS: Cardiac silhouette is unchanged.  The thoracic aorta is mildly elongated.  Masslike prominence of the bilateral bertha is stable. There is improved aeration of the lower lung zone on the left, with persistent small bilateral pleural effusions.  No pneumothorax is identified. Right-sided PICC line is unchanged in position.  No acute osseous abnormality is demonstrated.  Chronic right-sided rotator cuff tear is incidentally noted.     1. Slightly improved aeration of the left lung base.  No other significant changes compared to November 14. Electronically signed by: Leif Jordan Date:    11/15/2024 Time:    07:17    X-Ray Chest AP Portable    Result Date: 11/14/2024  EXAMINATION: XR CHEST AP PORTABLE CLINICAL HISTORY: pulm edema, pleural effusions; COMPARISON: November 13, 2024 FINDINGS: Heart size is normal.  The mediastinum is unremarkable.  Abnormal bilateral hilar prominence is unchanged. Hazy opacification of the lower left hemithorax consistent with a combination of volume loss or airspace disease and left-sided pleural effusion appears slightly improved.  Small right pleural effusion is noted.  There is no evidence of pneumothorax. Right-sided PICC line remains in place with tip at the superior vena cava.     Slightly improved aeration of the lower lung zone on the left.  No other significant changes. Electronically signed by: Leif Jordan Date:    11/14/2024 Time:    08:54    X-Ray Chest AP Portable    Result Date: 11/13/2024  EXAMINATION: XR CHEST AP PORTABLE CLINICAL HISTORY: hypoxemia; FINDINGS: Portable chest radiograph at 04:58 hours compared to prior exams including CT of the prior day show right PICC unchanged in position, with stable cardiomediastinal silhouette and stable enlarged central pulmonary vasculature. There are persistent bilateral perihilar and left lower  lung airspace opacities, with bilateral pleural effusions blunting the costophrenic angles.  No pneumothorax.     No significant interval change. Electronically signed by: Nitin Mayo Date:    11/13/2024 Time:    08:04    CTA Chest Non-Coronary (PE Studies)    Result Date: 11/12/2024  EXAMINATION: CTA CHEST NON CORONARY (PE STUDIES) CLINICAL HISTORY: Pulmonary embolism (PE) suspected, high prob;. TECHNIQUE: CT angiography targeted to the pulmonary arteries was performed. 100 cc nonionic contrast was administered and the bolus was timed for optimal opacification of the pulmonary arteries. 3-D reformatted images were obtained on an independent workstation and stored as a part of patient's permanent medical record. COMPARISON: Chest radiograph, November 12, 2024 FINDINGS: The pulmonary arteries are adequately opacified.  There are filling defects involving bilateral proximal segmental pulmonary arterial branches, extending to distal segmental and subsegmental branches.  There is a small amount of thrombus in the left main pulmonary artery.  The pulmonary arterial trunk is dilated at up to 4.6 cm.  The right pulmonary artery measures 3.2 cm transverse and the left 3.4 cm.  There is slight bowing of the interventricular septum and minimal reflux to the inferior vena cava.  Correlate for possible right heart strain. The thoracic aorta is normal in caliber.  There is no mediastinal mass or lymphadenopathy, however note is made of mild nonspecific right hilar lymphadenopathy. Images at lung windows demonstrate left greater than right bilateral ground-glass pulmonary opacities, predominantly central in location.  Correlate for pulmonary edema or less likely pneumonia.  There are small bilateral pleural effusions.  Dependent atelectasis is noted at both lung bases. There are several indeterminate tiny noncalcified nodules in the right upper lobe, the largest measuring 4 mm (series 4, image 95.  4 mm nodule is also noted in  the right middle lobe abutting the minor fissure. Images of the upper abdomen are limited, but demonstrate changes of apparent previous gastric bypass.  There is mild perihepatic free fluid. Diffuse body wall edema is noted.  No acute osseous abnormalities are identified.     1. Bilateral pulmonary thromboembolic disease. 2. Pulmonary arterial dilation and additional observations as above.  Correlate for possible right heart strain. 3. Left greater than right predominantly central ground-glass pulmonary opacities.  Correlate for edema versus pneumonia. 4. Small pleural effusions. 5. Nonspecific right hilar lymphadenopathy.  Scattered tiny right-sided noncalcified pulmonary nodules, possibly granulomas, but indeterminate. Findings of bilateral pulmonary thromboembolic disease were called to Dr. Ramos at 11:10 on November 12, 2024. Electronically signed by: Leif Jordan Date:    11/12/2024 Time:    11:22    X-Ray Chest AP Portable    Result Date: 11/12/2024  EXAMINATION: XR CHEST AP PORTABLE CLINICAL HISTORY: worsening oxygenation; FINDINGS: Portable chest with comparison chest x-ray 11/08/2024.  Normal cardiomediastinal silhouette.Enlarged central hilar vascular structures again noted.  There is bilateral perihilar haziness and interstitial thickening similar to previous exam.  Blunting of the left costophrenic angle again noted with increased hazy opacification of the left lung base.  Blunting of the right costophrenic angle with patchy opacities of the right lung base is similar to previous exam.  Pulmonary vasculature is normal. No acute osseous abnormality.     CHF lung pattern with probable small bilateral pleural effusions noted,  and is subtly increased from previous exam. Electronically signed by: Scotty Aceves Date:    11/12/2024 Time:    08:41    X-Ray Chest AP Portable    Result Date: 11/8/2024  EXAMINATION: XR CHEST AP PORTABLE CLINICAL HISTORY: Pulmonary hypertension; FINDINGS: Portable chest with  comparison chest x-ray 11/06/2024.  Normal cardiomediastinal silhouette.Enlarged central hilar vascular structures again noted with mild perihilar peribronchial interstitial thickening.  Hazy opacities of the left lung base are mildly increased.  No pneumothorax.  Right PICC line is unchanged.  Pulmonary vasculature is normal. No acute osseous abnormality.     Mild increased hazy opacities of the left lung bases.  Otherwise no significant changes from prior exam. Electronically signed by: Scotty Aceves Date:    11/08/2024 Time:    08:50    Echo Saline Bubble? No; Ultrasound enhancing contrast? No    Addendum Date: 11/7/2024      Left Ventricle: The left ventricle is smaller than normal. Normal wall thickness. There is normal systolic function with a visually estimated ejection fraction of 65 - 70%. There is normal diastolic function.   Right Ventricle: Mild right ventricular enlargement. Wall thickness is normal. Systolic function is moderately reduced.   Left Atrium: Left atrium is moderately dilated.   Right Atrium: Right atrium is mildly dilated.   IVC/SVC: Normal venous pressure at 3 mmHg.     Result Date: 11/7/2024    Left Ventricle: The left ventricle is normal in size. Normal wall thickness. There is normal systolic function with a visually estimated ejection fraction of 65 - 70%. There is normal diastolic function.   Right Ventricle: Normal right ventricular cavity size. Wall thickness is normal. Systolic function is normal.   Left Atrium: Left atrium is mildly dilated.   IVC/SVC: Normal venous pressure at 3 mmHg.     X-Ray Chest AP Portable    Result Date: 11/6/2024  EXAMINATION: XR CHEST AP PORTABLE CLINICAL HISTORY: pulmonary hypertension; TECHNIQUE: Single frontal view of the chest was performed. COMPARISON: 11/05/2024. FINDINGS: There is a right-sided PICC, unchanged in position.  The lungs are well expanded.  There is prominence of the central pulmonary vasculature, stable from prior.  There are small  bilateral pleural effusions.  The lungs are otherwise clear.  The cardiac silhouette is enlarged.  Osseous structures are intact.  There are calcifications of the aortic arch.  There are surgical clips overlying the left upper quadrant.     No significant detrimental change from prior. Electronically signed by: Dawit Barrios Date:    11/06/2024 Time:    05:05    US Lower Extremity Veins Bilateral    Result Date: 11/5/2024  EXAMINATION: US LOWER EXTREMITY VEINS BILATERAL CLINICAL HISTORY: r/o DVT; TECHNIQUE: Grayscale, color and spectral Doppler analysis of the bilateral lower extremity deep venous system was performed. FINDINGS: No prior studies for comparison.  There is hypoechoic peripheral nonocclusive thrombus within the proximal right superficial femoral vein.  There is also hypoechoic nonocclusive thrombus within the right popliteal vein, with some preserved color Doppler vascular flow. There is normal compressibility, with normal color and spectral Doppler vascular flow in the left lower extremity deep venous system, with no findings of left lower extremity deep venous thrombosis.     1. Positive for nonocclusive deep venous thrombosis of the right lower extremity. 2. Negative for left lower extremity deep venous thrombosis. 3. RESULT NOTIFICATION: Findings were discussed by Dr Mayo with, and acknowledged by LUZ MARINA SANCHEZ at 15:20 hours. Electronically signed by: Nitin Mayo Date:    11/05/2024 Time:    15:22    X-Ray Chest 1 View S/P PICC Line by Nursing    Result Date: 11/5/2024  EXAMINATION: XR CHEST 1 VIEW S/P PICC LINE BY NURSING CLINICAL HISTORY: picc line; FINDINGS: Portable chest radiograph at 10:54 hours compared to 05:20 hours shows interval insertion of a right PICC, with the distal tip overlying the SVC in satisfactory position.  There is no other significant interval change.     Interval insertion of a right PICC, in satisfactory position with distal tip overlying the SVC. Electronically signed  by: Nitin Mayo Date:    11/05/2024 Time:    11:05    X-Ray Chest AP Portable    Result Date: 11/5/2024  EXAMINATION: XR CHEST AP PORTABLE CLINICAL HISTORY: Pulmonary HTN; COMPARISON: 10/31/2024. FINDINGS: The heart is enlarged but stable.  Prominence of the central pulmonary vasculature appears similar to prior examination.  There is mild mass effect observed upon the rightward aspect of the trachea. There are mild airspace opacities or volume loss within the lung bases.  There are mildly diminished lung volumes.  No significant effusion demonstrated. Multiple monitoring electrodes overlie the chest.     Diminished lung volumes. Mild basilar airspace opacities or volume loss. Cardiomegaly and marked prominence of the central pulmonary vasculature, stable. Mild mass effect upon the rightward aspect of the trachea. Electronically signed by: Waqas Leavitt Date:    11/05/2024 Time:    07:04    X-Ray Cervical Spine AP And Lateral    Result Date: 11/4/2024  CLINICAL HISTORY: ([id number])73 y/o  (1950) F C3-6 laminectomy and fusion; Central cord syndrome at unspecified level of cervical spinal cord, initial encounter TECHNIQUE: (A#[id number], exam time 11/4/2024 5:48) XR CERVICAL SPINE AP LATERAL IMG56 2 view(s) obtained. COMPARISON: MRI from 11/02/2024. FINDINGS: C1 through C7 are visualized on the lateral radiograph.  There is straightening/reversal the normal lordotic curvature likely secondary to a combination of positioning/postoperative change, degenerative change and/or muscle spasm. There has been interval posterior laminectomy from C3-C6, with bilateral posterior trans-facet screw and saurabh fixation.  There is a midline posterior paraspinal drain in the laminectomy bed with scattered subcutaneous emphysema and edema over the dorsal aspect of the neck. Again noted is moderate to severe disc height loss at C2-C3, C3-C4 and C4-C5 with moderate disc height loss at C5-C6.  There is oblique lucency through the anterior  inferior vertebral body of C2, and C5, suspicious for occult nondisplaced fractures, these could be better characterized with CT.  There is minimal prevertebral soft tissue swelling. There is mild retrolisthesis of C5 on C6 (3 mm).  The visualized lung apices are clear.  The patient is edentulous.     1.  Posterior postoperative fusion from C3-C6 as above. 2.  Suspected tiny nondisplaced oblique fractures through the anterior inferior endplate of C2 and C5. This report was flagged in Epic as abnormal. Electronically signed by: Asim Davis Date:    11/04/2024 Time:    06:42    SURG FL Surgery Fluoro Usage    Result Date: 11/3/2024  See OP Notes for results. IMPRESSION: See OP Notes for results. This procedure was auto-finalized by: Virtual Radiologist    MRI Cervical Spine Without Contrast    Result Date: 11/2/2024  EXAMINATION: MRI CERVICAL SPINE WITHOUT CONTRAST CLINICAL HISTORY: Myelopathy, acute, cervical spine; fall with head and neck trauma TECHNIQUE: Routine MRI cervical spine without contrast. COMPARISON: Multiple prior exams. FINDINGS: Motion artifact limits the exam.  There is exaggeration of the normal cervical spinal curvature, with normal vertebral body alignment, and no acute fractures or destructive osseous lesions.  Chronic vertebral height loss involves C3 and C4, with degenerative loss of disc height and signal most pronounced at C3-C4 and C4-C5, and heterogeneous degenerative type marrow endplate signal alteration.  No findings of a diffuse marrow replacement process. There are stippled T2 hyperintensities in the sandy suggesting chronic ischemic changes, with the visualized posterior fossa and cervicomedullary junction otherwise unremarkable.  There is intramedullary non fluid like T2 hyperintense signal alteration in the cervical cord as detailed below. At C2-C3, there is broad-based disc bulging and spondylosis, without significant spinal canal stenosis.  There is bilateral foraminal  narrowing. At C3-C4, there is broad-based disc bulging and spondylosis, contributing to moderate to severe spinal canal stenosis.  There is severe bilateral foraminal narrowing. At C4-C5, there is broad-based disc bulging and spondylosis, which produces severe spinal canal stenosis with cervical cord compression.  There is increased non fluid-like T2 signal in the cord suggesting edema and or myelopathy, with severe bilateral foraminal narrowing. At C5-C6, there is broad-based disc bulging and spondylosis, producing severe spinal canal stenosis with mass effect upon the cervical cord.  There is increased non fluid-like T2 signal in the cord suggesting edema and or myelopathy.  There is severe bilateral foraminal narrowing. At C6-C7, there is broad-based disc bulging, without significant spinal canal stenosis.  There is mild left neural foraminal narrowing. C7-T1 is unremarkable. There are no signal abnormalities of the paraspinal ligaments, with nonspecific STIR hyperintense edema within the posterior paraspinal musculature and subcutaneous fat.  Multiple T2 hyperintensities in the thyroid gland are nonspecific.     1. Disc bulging and spondylosis at C4-C5 and C5-C6, with severe spinal canal stenosis, cervical cord compression and cord signal alteration suggesting edema and or myelopathy. 2. Broad-based disc bulging producing moderate spinal canal stenosis at C3-C4. Electronically signed by: Nitin Mayo Date:    11/02/2024 Time:    12:28    Echo    Result Date: 11/1/2024    Left Ventricle: The left ventricle is normal in size. Normal wall thickness. Unable to assess wall motion. There is normal systolic function with a visually estimated ejection fraction of 60 - 65%.   Right Ventricle: Right ventricle was not well visualized due to poor acoustic window.  Grossly appears to be dilated with reduced function.  There appears to be some concern of flattening of the interventricular septum which could indicate right  ventricular pressure and volume overload.   Left Atrium: Left atrium is severely dilated.   IVC/SVC: Elevated venous pressure at 15 mmHg.     MRA Neck without contrast    Result Date: 11/1/2024  EXAMINATION: MRA NECK WITHOUT CONTRAST CLINICAL HISTORY: Stroke/TIA, determine embolic source; TECHNIQUE: Time of flight MRA of the carotid and vertebral arteries was performed with 3D reconstructions according to the standard neck MRA protocol. COMPARISON: Carotid ultrasound 10/31/2024.  MRI/MRA of the head 10/31/2024. FINDINGS: Somewhat motion limited exam. The right common carotid artery demonstrates no hemodynamically significant stenosis. The cervical right internal carotid artery demonstrates no hemodynamically significant stenosis. The left common carotid artery demonstrates no hemodynamically significant stenosis. The cervical left internal carotid artery demonstrates no hemodynamically significant stenosis. Extracranial vertebral arteries: Vertebral arteries are codominant.  Vertebral arteries are normal to the level of the foramen magnum.  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     1. Negative limited noncontrast MRA of the neck.  No hemodynamically significant stenosis identified. Electronically signed by: Kendall Lee Date:    11/01/2024 Time:    12:35    US Carotid Bilateral    Result Date: 10/31/2024  EXAMINATION: US CAROTID BILATERAL CLINICAL HISTORY: syncope; TECHNIQUE: Grayscale and color Doppler ultrasound examination of the carotid and vertebral artery systems bilaterally.  Stenosis estimates are per the NASCET measurement criteria. COMPARISON: None. FINDINGS: Right: Internal Carotid Artery (ICA) peak systolic velocity 60.6 cm/sec ICA/CCA peak systolic velocity ratio: 0.9 Plaque formation: Heterogeneous Vertebral artery: Antegrade flow and normal waveform. Left: Internal  Carotid Artery (ICA)  peak systolic velocity 98.1 cm/sec ICA/CCA peak systolic velocity ratio: 1.4 Plaque formation: Heterogeneous Vertebral artery: Antegrade flow and normal waveform.     No evidence of a hemodynamically significant carotid bifurcation stenosis. Electronically signed by: Dawit Barrios Date:    10/31/2024 Time:    23:55    MRI Lumbar Spine Without Contrast    Result Date: 10/31/2024  EXAMINATION: MRI LUMBAR SPINE WITHOUT CONTRAST CLINICAL HISTORY: Low back pain, trauma; TECHNIQUE: Multiplanar, multisequence MR images were acquired from the thoracolumbar junction to the sacrum without contrast. COMPARISON: CT total spine from 10/31/2024. MRI lumbar spine from 09/15/2016 FINDINGS: Alignment: Normal. Vertebrae: Normal marrow signal. No fracture. Discs: There is moderate disc height loss at L5-S1 and mild disc height loss and disc desiccation at L3-L4 and L4-L5.  Remaining disc heights are preserved. Cord: Normal.  Conus terminates at L2. Degenerative findings: T12-L1: There is mild facet arthropathy bilaterally.  There is no spinal canal stenosis or neural foraminal narrowing. L1-L2: There is mild facet arthropathy and ligamentum flavum hypertrophy.  There is no spinal canal stenosis or neural foraminal narrowing. L2-L3: There is mild bilateral facet arthropathy and ligamentum flavum hypertrophy and a small circumferential disc bulge, resulting in mild spinal canal stenosis and moderate bilateral neural foraminal narrowing. L3-L4: There is moderate bilateral facet arthropathy and ligamentum flavum hypertrophy and a circumferential disc bulge, resulting in moderate spinal canal stenosis and moderate bilateral neural foraminal narrowing. L4-L5: There is a circumferential disc bulge with moderate bilateral facet arthropathy and ligamentum flavum hypertrophy resulting in moderate spinal canal stenosis and severe bilateral neural foraminal narrowing. L5-S1: There is bilateral facet arthropathy, moderate  with a posterior disc bulge and an annular fissure.  There is no spinal canal stenosis.  There is moderate bilateral neural narrowing. Paraspinal muscles & soft tissues: Unremarkable.     Multilevel degenerative changes of the lumbar spine as detailed above.  There is moderate spinal canal stenosis at L3-L4 and L4-L5 and there is severe bilateral neural foraminal narrowing at L4-L5. Electronically signed by: Dawit Barrios Date:    10/31/2024 Time:    22:38    MRA Brain without contrast    Result Date: 10/31/2024  EXAMINATION: MRA BRAIN WITHOUT CONTRAST CLINICAL HISTORY: Syncope, recurrent; TECHNIQUE: Non-contrast 3-D time-of-flight intracranial MR angiography was performed through the [place] with MIP reformatting. COMPARISON: None FINDINGS: Anterior circulation: The bilateral intracranial ICAs, bilateral ACAs, and bilateral MCAs are patent and unremarkable without high-grade stenosis or occlusion. Posterior circulation: The bilateral intracranial vertebral arteries, the basilar artery, and the bilateral PCAs are patent and unremarkable without high-grade stenosis or occlusion. There is no intracranial aneurysm visualized.     Unremarkable MRA brain. Electronically signed by: Dawit Barrios Date:    10/31/2024 Time:    22:29    MRI Brain Without Contrast    Result Date: 10/31/2024  EXAMINATION: MRI BRAIN WITHOUT CONTRAST CLINICAL HISTORY: Neuro deficit, acute, stroke suspected; TECHNIQUE: Multiplanar multisequence MR imaging of the brain was performed without intravenous contrast. COMPARISON: CT head from earlier the same date. FINDINGS: There is restricted diffusion in the right caudate head, compatible with an acute infarct.  There is associated T2/FLAIR hyperintense signal.  There is a mass centered within the left temporoparietal calvarium measuring approximately 5.0 x 2.9 x 4.0 cm, with extension into the left cerebral convexity extra-axial space and extension into the left temporal scalp.  There is  abutment of the left temporal lobe, without evidence of vasogenic edema or evidence of significant mass effect.  There is no midline shift.  Ventricles are normal in size for age without evidence of hydrocephalus.  There is T2/FLAIR hyperintense signal throughout the supratentorial white matter, compatible with chronic microvascular ischemic changes.  There is a small focus of encephalomalacia within the right medial parietal lobe, likely related to a remote infarct.  There is no intracranial hemorrhage.  No extra-axial blood or fluid collections. Normal vascular flow voids. Left common rim mass as above.  The remaining bone marrow signal intensity is normal. Paranasal sinuses and mastoid air cells are clear.     1. Acute infarct in the right caudate head. 2. 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  The mass abuts the left temporal lobe, without resulting in significant mass effect or vasogenic edema.  Differential includes a primary or metastatic osseous lesion, an atypical meningioma, or additional etiologies. 3. Chronic microvascular ischemic changes. This report was flagged in Epic as abnormal. Dr. Barrios discussed critical findings with TATIANNA Morales. by [facility] secure chat at 22:23 on 10/31/2024. Electronically signed by: Dawit Barrios Date:    10/31/2024 Time:    22:27    X-Ray Wrist Complete Left    Result Date: 10/31/2024  EXAMINATION: XR WRIST COMPLETE 3 VIEWS LEFT CLINICAL HISTORY: Syncope and collapse TECHNIQUE: PA, lateral, and oblique views of the left wrist were performed. COMPARISON: None FINDINGS: No displaced fracture or traumatic malalignment.  Mild carpal degenerative change.  Unremarkable soft tissues. Electronically signed by: Saeid Pérez Date:    10/31/2024 Time:    16:40    X-Ray Knee 1 or 2 View Left    Result Date: 10/31/2024  EXAMINATION: XR KNEE 1 OR 2 VIEW LEFT CLINICAL HISTORY: Syncope and Fall; TECHNIQUE: One or two views of the left  knee were performed. COMPARISON: None FINDINGS: Left knee total arthroplasty hardware with no periprosthetic fracture or abnormal lucency to suggest loosening or infection.  No malalignment.  Trace knee joint fluid.  Subcutaneous soft tissue edema about the knee and proximal calf. Electronically signed by: Saeid Pérez Date:    10/31/2024 Time:    16:39    X-Ray Chest AP Portable    Result Date: 10/31/2024  EXAMINATION: XR CHEST AP PORTABLE CLINICAL HISTORY: Syncope and collapse TECHNIQUE: Single frontal view of the chest was performed. COMPARISON: Same-day CT FINDINGS: There is a nodular airspace opacity at the periphery of the right lung base.  Remaining lungs clear.  Borderline cardiac enlargement with metallic device projecting over the right heart border possibly an intra atrial septal occlusion device.  Prominence of the pulmonary arterial vasculature aortic arch atherosclerosis.  No pleural effusion or pneumothorax.  There are multiple surgical clips and staple lines over the upper abdomen.     1. Pleuroparenchymal nodule in the right lung base and several other nodules in the right lung apex.  Findings better seen at CT.  These are nonspecific with neoplasm not excluded. 2. Borderline heart enlargement. 3. Prominence of the pulmonary vasculature raising the possibility for elevated pulmonary pressures. Electronically signed by: Saeid Pérez Date:    10/31/2024 Time:    16:38    CT Entire Spine Without Contrast    Result Date: 10/31/2024  EXAMINATION: CT ENTIRE SPINE WITHOUT CONTRAST (XPD) CLINICAL HISTORY: Fall, pan spinal tenderness; TECHNIQUE: Axial images were obtained through the entire spine.  Sagittal and coronal reformatted images were created. COMPARISON: Lumbar spine MRI dated 03/15/2016 FINDINGS: There is no recent study for comparison.  On the screening study obtained with a large field of view there is no obvious acute fracture.  There is extensive chronic change throughout the cervical  spine.  The odontoid process is intact.  The anterior and posterior arches of C1 are intact as well.  There is severe disc space narrowing at the C2-3, C3-4, C4-5 levels with moderate to marked disc space narrowing at the C5-6 level.  There is trace retrolisthesis of C4 on C5.  There is chronic anterior wedging of C3 and C4.  There is multilevel foraminal stenosis throughout the cervical region due to uncovertebral spurring and/or facet joint arthropathy.  Also, there is spinal stenosis most apparent at the C4-5 level. In the thoracic spine, there is mild chronic anterior wedging of several midthoracic vertebral bodies but there is no obvious acute fracture or traumatic malalignment.  The facet joints maintain normal articulation. In the lumbar spine there is a vacuum disc at the L3-4 and L5-S1 levels.  There is severe disc space narrowing at L5-S1.  The lumbar vertebral bodies maintain normal height without obvious acute fracture.  Alignment is grossly normal.  There is extensive facet joint arthropathy in the mid to lower lumbar spine. There is a dextrocurvature of the lower lumbar spine with gentle broad levocurvature at the thoracolumbar junction. There is no displaced or depressed rib fracture identified on this limited field of view.  There is no obvious spinous process fracture. The sacrum is intact. There is atherosclerosis.  There is no pneumothorax.  There are several scattered nodules in the lungs which are suboptimally evaluated.     There is degenerative change throughout the spine, most significant in the lower lumbar spine as well as in the cervical spine.  There is however no obvious acute fracture or traumatic malalignment.  The entire spine was obtained in the large field of view. Electronically signed by: Nomi Vasquez MD Date:    10/31/2024 Time:    16:26    CT Maxillofacial Without Contrast    Result Date: 10/31/2024  EXAMINATION: CT MAXILLOFACIAL WITHOUT CONTRAST CLINICAL HISTORY: Facial  trauma, blunt; TECHNIQUE: Low dose axial images, sagittal and coronal reformations were obtained through the face.  Contrast was not administered. COMPARISON: None FINDINGS: There is suspected soft tissue swelling over the chin.  There is very subtle cortical irregularity involving the anterior paramedian mandible which could represent subtle acute fracture with minimal adjacent bone fragments.  There is beam hardening artifact related to hardware in the region of the left maxilla.  There is no dislocation at the TMJ.  There are changes of torus mandibularis. There is old deformity of the anterior nasal bones.  There is no acute displaced or depressed nasal bone or nasal septum fracture. There is no acute orbital fracture.     1. There is mild irregularity of the interior midline mandible with small adjacent bone fragments which could represent acute fracture with overlying soft tissue swelling.  There is no other acute maxillofacial or orbital fracture.  There is suspected old deformities of the anterior nasal bones. Electronically signed by: Nomi Vasquez MD Date:    10/31/2024 Time:    16:12    CT Head Without Contrast    Result Date: 10/31/2024  EXAMINATION: CT HEAD WITHOUT CONTRAST CLINICAL HISTORY: Head trauma, minor (Age >= 65y); TECHNIQUE: Routine unenhanced axial images were obtained through the head.  Sagittal and coronal reformatted images were created.  The study is reviewed in bone and soft tissue windows. COMPARISON: None FINDINGS: Intracranial contents: There is no acute intracranial abnormality.  There is mild nonspecific white matter change.  There is no hemorrhage, parenchymal mass or mass effect.  The gray-white interface is preserved without acute infarction.  The basilar cisterns are open.  There is a remote lacunar infarction in the right caudate nucleus.  The cerebellar tonsils are normal position. Extracranial contents, calvarium, soft tissues: The included paranasal sinuses and mastoid  air cells are clear.  There is no acute skull fracture.  There is soft tissue prominence of the left temporoparietal region.  There is demineralization of the underlying calvarium with in irregular inner bony margin.  Also, there is suggestion of possible extra-axial dural-based soft tissue mass in this region which could potentially represent an atypical meningioma and further evaluated with brain MRI without and with contrast.     There is left temporoparietal soft tissue prominence with demineralization of the underlying bone and suspected extra-axial mass in this region which is nonspecific and incompletely evaluated.  These could potentially represent an atypical meningioma but further evaluation with brain MRI without and with contrast could be obtained.  This is not acute.  There is no hemorrhage.  There is no acute skull fracture. Electronically signed by: Nomi Vasquez MD Date:    10/31/2024 Time:    16:07  - pulls last radiology orders    Time spent on the discharge of patient: 35 minutes        Andriy Kilgore MD  Department of Hospital Medicine  [facility]

## 2024-11-18 NOTE — CARE UPDATE
Discussed with the patinet and daughter and family at bedside and hospitalist Dr Kilgore.     Family is planning for transition to hospice.     The patient looks comfortable. I will continue to follow peripherally. Please call if there is any change to the patient's goals.     Evangelista Gutierrez MD

## 2024-11-18 NOTE — ASSESSMENT & PLAN NOTE
Anemia is likely due to chronic disease due to Chronic Kidney Disease. Most recent hemoglobin and hematocrit are listed below.  Recent Labs     11/16/24  0406 11/17/24  0432   HGB 9.3* 8.9*   HCT 27.2* 25.4*       Plan  - Monitor serial CBC: Daily  - Transfuse PRBC if patient becomes hemodynamically unstable, symptomatic or H/H drops below 7/21.  - Patient has not received any PRBC transfusions to date  - Patient's anemia is currently improving

## 2024-11-18 NOTE — RESPIRATORY THERAPY
11/17/24 1954   Patient Assessment/Suction   Level of Consciousness (AVPU) alert   Respiratory Effort Normal;Unlabored   Expansion/Accessory Muscles/Retractions no use of accessory muscles   All Lung Fields Breath Sounds diminished   Rhythm/Pattern, Respiratory unlabored;pattern regular;depth regular;no shortness of breath reported   Skin Integrity   $ Wound Care Tech Time 15 min   Area Observed Left;Right;Behind ear;Cheek;Upper lip;Nares   Skin Appearance without discoloration   PRE-TX-O2   Device (Oxygen Therapy) high flow nasal cannula w/device   Humidification temp set 35   Humidification temp actual 35   Flow (L/min) (Oxygen Therapy) 20   Oxygen Concentration (%) 65   SpO2 97 %   Pulse Oximetry Type Continuous   $ Pulse Oximetry - Multiple Charge Pulse Oximetry - Multiple   Pulse 69   Resp (!) 8   Aerosol Therapy   $ Aerosol Therapy Charges PRN treatment not required   Daily Review of Necessity (SVN) completed   Respiratory Treatment Status (SVN) PRN treatment not required   Incentive Spirometer   $ Incentive Spirometer Charges unable to perform  (PT ASLEEP)   Vibratory PEP Therapy   $ Vibratory PEP Charges   (PT ASLEEP)   Ready to Wean/Extubation Screen   FIO2<=50 (chart decimal) (!) 0.65   Education   $ Education 15 min;PEP Therapy;Incentive Spirometry;Other (see comment);Oxygen

## 2024-11-18 NOTE — ASSESSMENT & PLAN NOTE
S/p multilevel laminectomy (11/3/24)  C2-3, C3-4, C4-5, C5-6, C6-7 laminectomy  C3 through 6 posterior segmental instrumentation using DeP[employer]entiohony system  C3-4, C4-5, C5-6 posterolateral arthrodesis using autograft harvested from the same incision and allograft DBM    Cervical collar in place  Neuro surgery follow up     PT OT when more stable    Cortisol added and normal     Remains quadriplegic after surgery.

## 2024-11-18 NOTE — H&P
"  [facility] Medicine  History & Physical    Patient Name: hSanell Mahmood  MRN: [id number]  Patient Class: IP- Inpatient  Admission Date: 10/31/2024  Attending Physician: Andriy Kilgore MD  Primary Care Provider: Nicole Primary Doctor         Patient information was obtained from patient and ER records.     Subjective:     Principal Problem:Encounter for admission to hospice care    Chief Complaint:   Chief Complaint   Patient presents with    Loss of Consciousness     Ems Reports patient "Passed out" and had an unwitnessed fall outside of the Pain management clinic, when fire arrived they stated she seemed sleepy and altered mental status , when ems arrived she was given 1mg narcan and patient is now aox4         HPI: 74-year-old female with history of brain mass, CKD, back pain, pulmonary artery hypertension on 4 L oxygen is admitted after syncope and collapse found to have a acute CVA in the right caudate on MRI brain.  She was found to have underlying brain mass.  MRI of the cervical spine shows severe cord compression at C4-5 and C5-6 likely acute with edema.  Patient had surgical decompression  and  patient underwent surgery on : 11/3/24 multilevel C-spine laminectomy.  Later patient continued to have hypotension and needed vasopressor and another vasopressor vasopressin is added  Patient also her DVT of the the proximal right superficial femoral vein. And nonocclusive thrombus within the right popliteal vein,  Also patient developed new onset AFib and started amiodarone drip and in and out of a fib  and later changed to PO amiodarone.   Later patient was more hypoxic and not able to wean vasopressor and PE study was done and found to have segmental PE with right heart strain - no surgical intervention as per vascular surgery.     Patient had acute CVA, remains quadriplegic after cervical cord decompression surgery, also developed pulmonary embolism/ acute DVT, patient developed urinary " retention most likely secondary to neurogenic bladder, patient has progressive acute on chronic hypoxic respiratory failure due to pulmonary artery hypertension, patient remains on high-flow oxygen after surgery.   Patient and family decided to move forward to inpatient hospice 11/18.      No past medical history on file.    Past Surgical History:   Procedure Laterality Date    POSTERIOR CERVICAL LAMINECTOMY N/A 11/3/2024    Procedure: LAMINECTOMY, SPINE, CERVICAL, POSTERIOR APPROACH;  Surgeon: Kobi Corona MD;  Location: [facility];  Service: Neurosurgery;  Laterality: N/A;  C3-6 laminectomy and posterior instrumented fusion, prone, Chest roll, Parada, neuromonitoring, DePuy rep       Review of patient's allergies indicates:   Allergen Reactions    Codeine Palpitations       No current facility-administered medications on file prior to encounter.     Current Outpatient Medications on File Prior to Encounter   Medication Sig    allopurinoL (ZYLOPRIM) 100 MG tablet Take 100 mg by mouth every evening.    clotrimazole-betamethasone 1-0.05% (LOTRISONE) cream Apply 1 g topically Daily.    colchicine (COLCRYS) 0.6 mg tablet Take 0.6 mg by mouth 2 (two) times daily.    diclofenac sodium (VOLTAREN) 1 % Gel Apply 2 g topically 3 (three) times daily.    DULoxetine (CYMBALTA) 60 MG capsule Take 60 mg by mouth once daily.    ergocalciferol (ERGOCALCIFEROL) 50,000 unit Cap Take 50,000 Units by mouth every 7 days.    fluticasone propionate (FLONASE) 50 mcg/actuation nasal spray 1 spray by Each Nostril route once daily.    gabapentin (NEURONTIN) 100 MG capsule Take 2 capsules by mouth every 12 (twelve) hours.    HYDROcodone-acetaminophen (NORCO)  mg per tablet Take 1 tablet by mouth every 4 to 6 hours as needed for Pain.    LINZESS 290 mcg Cap capsule Take 290 mcg by mouth before breakfast.    montelukast (SINGULAIR) 10 mg tablet Take 10 mg by mouth once daily.    omeprazole (PRILOSEC) 40 MG capsule Take 40 mg by mouth  Daily.    OPSUMIT 10 mg Tab Take 10 mg by mouth once daily.    simvastatin (ZOCOR) 40 MG tablet Take 40 mg by mouth Daily.    tiZANidine (ZANAFLEX) 4 MG tablet Take 4 mg by mouth every 12 (twelve) hours as needed.    torsemide (DEMADEX) 20 MG Tab Take 40 mg by mouth once daily.    UPTRAVI 1,600 mcg Tab Take 1 tablet by mouth 2 (two) times a day.    tadalafil (ADCIRCA) 20 mg Tab Take 20 mg by mouth every other day.     Family History    None       Tobacco Use    Smoking status: Not on file    Smokeless tobacco: Not on file   Substance and Sexual Activity    Alcohol use: Not on file    Drug use: Not on file    Sexual activity: Not on file     Review of Systems   Unable to perform ROS: Mental status change     Objective:     Vital Signs (Most Recent):  Temp: 98.4 °F (36.9 °C) (11/18/24 0901)  Pulse: 67 (11/18/24 0901)  Resp: (!) 8 (11/18/24 0901)  BP: (!) 85/48 (11/18/24 0901)  SpO2: 99 % (11/18/24 0901) Vital Signs (24h Range):  Temp:  [97.6 °F (36.4 °C)-98.4 °F (36.9 °C)] 98.4 °F (36.9 °C)  Pulse:  [67-78] 67  Resp:  [8-18] 8  SpO2:  [89 %-100 %] 99 %  BP: ()/(48-57) 85/48  Arterial Line BP: ()/(41-65) 132/58     Weight: 84.3 kg (185 lb 13.6 oz)  Body mass index is 29.11 kg/m².     Physical Exam  Vitals and nursing note reviewed.   Constitutional:       General: She is not in acute distress.     Appearance: She is ill-appearing.   Cardiovascular:      Rate and Rhythm: Normal rate and regular rhythm.      Heart sounds: No murmur heard.  Pulmonary:      Effort: Pulmonary effort is normal.      Breath sounds: Normal breath sounds.   Abdominal:      General: There is no distension.      Palpations: Abdomen is soft.      Tenderness: There is no abdominal tenderness.   Skin:     Coloration: Skin is not pale.      Findings: No rash.   Neurological:      Mental Status: She is alert.      Comments: Quadriplegic                Significant Labs: All pertinent labs within the past 24 hours have been reviewed.  CBC:    Recent Labs   Lab 11/17/24 0432   WBC 6.02   HGB 8.9*   HCT 25.4*        CMP:   Recent Labs   Lab 11/17/24 0432   *   K 4.1   CL 99   CO2 23   GLU 78   BUN 43*   CREATININE 2.3*   CALCIUM 7.7*   PROT 4.8*   ALBUMIN 2.4*   BILITOT 0.5   ALKPHOS 107   AST 67*   ALT 39   ANIONGAP 6*       Significant Imaging: I have reviewed all pertinent imaging results/findings within the past 24 hours.  I have reviewed and interpreted all pertinent imaging results/findings within the past 24 hours.    Scheduled Meds:  Continuous Infusions:  PRN Meds:.    X-Ray Chest AP Portable    Result Date: 11/16/2024  EXAMINATION: XR CHEST AP PORTABLE CLINICAL HISTORY: pulm edema, pleural effusions; COMPARISON: 11/15/2024, 11/14/2024 FINDINGS: Cardiac silhouette size is stable compared to prior.  Right PICC is in stable position with tip overlying the SVC.  Prominent central pulmonary vascularity.  Mild bilateral interstitial/ground-glass opacities suggestive of pulmonary edema.  Small bilateral pleural effusions.  No pneumothorax.  No acute osseous abnormality.     Pulmonary edema and small bilateral pleural effusions. Electronically signed by: Ashkan Broussard Date:    11/16/2024 Time:    07:01    X-Ray Chest AP Portable    Result Date: 11/15/2024  EXAMINATION: XR CHEST AP PORTABLE CLINICAL HISTORY: pulm edema, pleural effusions; COMPARISON: November 14 FINDINGS: Cardiac silhouette is unchanged.  The thoracic aorta is mildly elongated.  Masslike prominence of the bilateral bertha is stable. There is improved aeration of the lower lung zone on the left, with persistent small bilateral pleural effusions.  No pneumothorax is identified. Right-sided PICC line is unchanged in position.  No acute osseous abnormality is demonstrated.  Chronic right-sided rotator cuff tear is incidentally noted.     1. Slightly improved aeration of the left lung base.  No other significant changes compared to November 14. Electronically signed by: Leif  Nikki Date:    11/15/2024 Time:    07:17    X-Ray Chest AP Portable    Result Date: 11/14/2024  EXAMINATION: XR CHEST AP PORTABLE CLINICAL HISTORY: pulm edema, pleural effusions; COMPARISON: November 13, 2024 FINDINGS: Heart size is normal.  The mediastinum is unremarkable.  Abnormal bilateral hilar prominence is unchanged. Hazy opacification of the lower left hemithorax consistent with a combination of volume loss or airspace disease and left-sided pleural effusion appears slightly improved.  Small right pleural effusion is noted.  There is no evidence of pneumothorax. Right-sided PICC line remains in place with tip at the superior vena cava.     Slightly improved aeration of the lower lung zone on the left.  No other significant changes. Electronically signed by: Leif Jordan Date:    11/14/2024 Time:    08:54    X-Ray Chest AP Portable    Result Date: 11/13/2024  EXAMINATION: XR CHEST AP PORTABLE CLINICAL HISTORY: hypoxemia; FINDINGS: Portable chest radiograph at 04:58 hours compared to prior exams including CT of the prior day show right PICC unchanged in position, with stable cardiomediastinal silhouette and stable enlarged central pulmonary vasculature. There are persistent bilateral perihilar and left lower lung airspace opacities, with bilateral pleural effusions blunting the costophrenic angles.  No pneumothorax.     No significant interval change. Electronically signed by: Nitin Mayo Date:    11/13/2024 Time:    08:04    CTA Chest Non-Coronary (PE Studies)    Result Date: 11/12/2024  EXAMINATION: CTA CHEST NON CORONARY (PE STUDIES) CLINICAL HISTORY: Pulmonary embolism (PE) suspected, high prob;. TECHNIQUE: CT angiography targeted to the pulmonary arteries was performed. 100 cc nonionic contrast was administered and the bolus was timed for optimal opacification of the pulmonary arteries. 3-D reformatted images were obtained on an independent workstation and stored as a part of patient's  permanent medical record. COMPARISON: Chest radiograph, November 12, 2024 FINDINGS: The pulmonary arteries are adequately opacified.  There are filling defects involving bilateral proximal segmental pulmonary arterial branches, extending to distal segmental and subsegmental branches.  There is a small amount of thrombus in the left main pulmonary artery.  The pulmonary arterial trunk is dilated at up to 4.6 cm.  The right pulmonary artery measures 3.2 cm transverse and the left 3.4 cm.  There is slight bowing of the interventricular septum and minimal reflux to the inferior vena cava.  Correlate for possible right heart strain. The thoracic aorta is normal in caliber.  There is no mediastinal mass or lymphadenopathy, however note is made of mild nonspecific right hilar lymphadenopathy. Images at lung windows demonstrate left greater than right bilateral ground-glass pulmonary opacities, predominantly central in location.  Correlate for pulmonary edema or less likely pneumonia.  There are small bilateral pleural effusions.  Dependent atelectasis is noted at both lung bases. There are several indeterminate tiny noncalcified nodules in the right upper lobe, the largest measuring 4 mm (series 4, image 95.  4 mm nodule is also noted in the right middle lobe abutting the minor fissure. Images of the upper abdomen are limited, but demonstrate changes of apparent previous gastric bypass.  There is mild perihepatic free fluid. Diffuse body wall edema is noted.  No acute osseous abnormalities are identified.     1. Bilateral pulmonary thromboembolic disease. 2. Pulmonary arterial dilation and additional observations as above.  Correlate for possible right heart strain. 3. Left greater than right predominantly central ground-glass pulmonary opacities.  Correlate for edema versus pneumonia. 4. Small pleural effusions. 5. Nonspecific right hilar lymphadenopathy.  Scattered tiny right-sided noncalcified pulmonary nodules,  possibly granulomas, but indeterminate. Findings of bilateral pulmonary thromboembolic disease were called to Dr. Ramos at 11:10 on November 12, 2024. Electronically signed by: Leif Jordan Date:    11/12/2024 Time:    11:22    X-Ray Chest AP Portable    Result Date: 11/12/2024  EXAMINATION: XR CHEST AP PORTABLE CLINICAL HISTORY: worsening oxygenation; FINDINGS: Portable chest with comparison chest x-ray 11/08/2024.  Normal cardiomediastinal silhouette.Enlarged central hilar vascular structures again noted.  There is bilateral perihilar haziness and interstitial thickening similar to previous exam.  Blunting of the left costophrenic angle again noted with increased hazy opacification of the left lung base.  Blunting of the right costophrenic angle with patchy opacities of the right lung base is similar to previous exam.  Pulmonary vasculature is normal. No acute osseous abnormality.     CHF lung pattern with probable small bilateral pleural effusions noted,  and is subtly increased from previous exam. Electronically signed by: Scotty Aceves Date:    11/12/2024 Time:    08:41    X-Ray Chest AP Portable    Result Date: 11/8/2024  EXAMINATION: XR CHEST AP PORTABLE CLINICAL HISTORY: Pulmonary hypertension; FINDINGS: Portable chest with comparison chest x-ray 11/06/2024.  Normal cardiomediastinal silhouette.Enlarged central hilar vascular structures again noted with mild perihilar peribronchial interstitial thickening.  Hazy opacities of the left lung base are mildly increased.  No pneumothorax.  Right PICC line is unchanged.  Pulmonary vasculature is normal. No acute osseous abnormality.     Mild increased hazy opacities of the left lung bases.  Otherwise no significant changes from prior exam. Electronically signed by: Scotty Aceves Date:    11/08/2024 Time:    08:50    Echo Saline Bubble? No; Ultrasound enhancing contrast? No    Addendum Date: 11/7/2024      Left Ventricle: The left ventricle is smaller than  normal. Normal wall thickness. There is normal systolic function with a visually estimated ejection fraction of 65 - 70%. There is normal diastolic function.   Right Ventricle: Mild right ventricular enlargement. Wall thickness is normal. Systolic function is moderately reduced.   Left Atrium: Left atrium is moderately dilated.   Right Atrium: Right atrium is mildly dilated.   IVC/SVC: Normal venous pressure at 3 mmHg.     Result Date: 11/7/2024    Left Ventricle: The left ventricle is normal in size. Normal wall thickness. There is normal systolic function with a visually estimated ejection fraction of 65 - 70%. There is normal diastolic function.   Right Ventricle: Normal right ventricular cavity size. Wall thickness is normal. Systolic function is normal.   Left Atrium: Left atrium is mildly dilated.   IVC/SVC: Normal venous pressure at 3 mmHg.     X-Ray Chest AP Portable    Result Date: 11/6/2024  EXAMINATION: XR CHEST AP PORTABLE CLINICAL HISTORY: pulmonary hypertension; TECHNIQUE: Single frontal view of the chest was performed. COMPARISON: 11/05/2024. FINDINGS: There is a right-sided PICC, unchanged in position.  The lungs are well expanded.  There is prominence of the central pulmonary vasculature, stable from prior.  There are small bilateral pleural effusions.  The lungs are otherwise clear.  The cardiac silhouette is enlarged.  Osseous structures are intact.  There are calcifications of the aortic arch.  There are surgical clips overlying the left upper quadrant.     No significant detrimental change from prior. Electronically signed by: Dawit Barrios Date:    11/06/2024 Time:    05:05    US Lower Extremity Veins Bilateral    Result Date: 11/5/2024  EXAMINATION: US LOWER EXTREMITY VEINS BILATERAL CLINICAL HISTORY: r/o DVT; TECHNIQUE: Grayscale, color and spectral Doppler analysis of the bilateral lower extremity deep venous system was performed. FINDINGS: No prior studies for comparison.  There is  hypoechoic peripheral nonocclusive thrombus within the proximal right superficial femoral vein.  There is also hypoechoic nonocclusive thrombus within the right popliteal vein, with some preserved color Doppler vascular flow. There is normal compressibility, with normal color and spectral Doppler vascular flow in the left lower extremity deep venous system, with no findings of left lower extremity deep venous thrombosis.     1. Positive for nonocclusive deep venous thrombosis of the right lower extremity. 2. Negative for left lower extremity deep venous thrombosis. 3. RESULT NOTIFICATION: Findings were discussed by Dr Mayo with, and acknowledged by LUZ MARINA SANCHEZ at 15:20 hours. Electronically signed by: Nitin Mayo Date:    11/05/2024 Time:    15:22    X-Ray Chest 1 View S/P PICC Line by Nursing    Result Date: 11/5/2024  EXAMINATION: XR CHEST 1 VIEW S/P PICC LINE BY NURSING CLINICAL HISTORY: picc line; FINDINGS: Portable chest radiograph at 10:54 hours compared to 05:20 hours shows interval insertion of a right PICC, with the distal tip overlying the SVC in satisfactory position.  There is no other significant interval change.     Interval insertion of a right PICC, in satisfactory position with distal tip overlying the SVC. Electronically signed by: Nitin Mayo Date:    11/05/2024 Time:    11:05    X-Ray Chest AP Portable    Result Date: 11/5/2024  EXAMINATION: XR CHEST AP PORTABLE CLINICAL HISTORY: Pulmonary HTN; COMPARISON: 10/31/2024. FINDINGS: The heart is enlarged but stable.  Prominence of the central pulmonary vasculature appears similar to prior examination.  There is mild mass effect observed upon the rightward aspect of the trachea. There are mild airspace opacities or volume loss within the lung bases.  There are mildly diminished lung volumes.  No significant effusion demonstrated. Multiple monitoring electrodes overlie the chest.     Diminished lung volumes. Mild basilar airspace opacities or volume  loss. Cardiomegaly and marked prominence of the central pulmonary vasculature, stable. Mild mass effect upon the rightward aspect of the trachea. Electronically signed by: Waqas Leavitt Date:    11/05/2024 Time:    07:04    X-Ray Cervical Spine AP And Lateral    Result Date: 11/4/2024  CLINICAL HISTORY: ([id number])75 y/o  (1950) F C3-6 laminectomy and fusion; Central cord syndrome at unspecified level of cervical spinal cord, initial encounter TECHNIQUE: (A#[id number], exam time 11/4/2024 5:48) XR CERVICAL SPINE AP LATERAL IMG56 2 view(s) obtained. COMPARISON: MRI from 11/02/2024. FINDINGS: C1 through C7 are visualized on the lateral radiograph.  There is straightening/reversal the normal lordotic curvature likely secondary to a combination of positioning/postoperative change, degenerative change and/or muscle spasm. There has been interval posterior laminectomy from C3-C6, with bilateral posterior trans-facet screw and saurabh fixation.  There is a midline posterior paraspinal drain in the laminectomy bed with scattered subcutaneous emphysema and edema over the dorsal aspect of the neck. Again noted is moderate to severe disc height loss at C2-C3, C3-C4 and C4-C5 with moderate disc height loss at C5-C6.  There is oblique lucency through the anterior inferior vertebral body of C2, and C5, suspicious for occult nondisplaced fractures, these could be better characterized with CT.  There is minimal prevertebral soft tissue swelling. There is mild retrolisthesis of C5 on C6 (3 mm).  The visualized lung apices are clear.  The patient is edentulous.     1.  Posterior postoperative fusion from C3-C6 as above. 2.  Suspected tiny nondisplaced oblique fractures through the anterior inferior endplate of C2 and C5. This report was flagged in Epic as abnormal. Electronically signed by: Asim Davis Date:    11/04/2024 Time:    06:42    SURG FL Surgery Fluoro Usage    Result Date: 11/3/2024  See OP Notes for results.  IMPRESSION: See OP Notes for results. This procedure was auto-finalized by: Virtual Radiologist    MRI Cervical Spine Without Contrast    Result Date: 11/2/2024  EXAMINATION: MRI CERVICAL SPINE WITHOUT CONTRAST CLINICAL HISTORY: Myelopathy, acute, cervical spine; fall with head and neck trauma TECHNIQUE: Routine MRI cervical spine without contrast. COMPARISON: Multiple prior exams. FINDINGS: Motion artifact limits the exam.  There is exaggeration of the normal cervical spinal curvature, with normal vertebral body alignment, and no acute fractures or destructive osseous lesions.  Chronic vertebral height loss involves C3 and C4, with degenerative loss of disc height and signal most pronounced at C3-C4 and C4-C5, and heterogeneous degenerative type marrow endplate signal alteration.  No findings of a diffuse marrow replacement process. There are stippled T2 hyperintensities in the sandy suggesting chronic ischemic changes, with the visualized posterior fossa and cervicomedullary junction otherwise unremarkable.  There is intramedullary non fluid like T2 hyperintense signal alteration in the cervical cord as detailed below. At C2-C3, there is broad-based disc bulging and spondylosis, without significant spinal canal stenosis.  There is bilateral foraminal narrowing. At C3-C4, there is broad-based disc bulging and spondylosis, contributing to moderate to severe spinal canal stenosis.  There is severe bilateral foraminal narrowing. At C4-C5, there is broad-based disc bulging and spondylosis, which produces severe spinal canal stenosis with cervical cord compression.  There is increased non fluid-like T2 signal in the cord suggesting edema and or myelopathy, with severe bilateral foraminal narrowing. At C5-C6, there is broad-based disc bulging and spondylosis, producing severe spinal canal stenosis with mass effect upon the cervical cord.  There is increased non fluid-like T2 signal in the cord suggesting edema and or  myelopathy.  There is severe bilateral foraminal narrowing. At C6-C7, there is broad-based disc bulging, without significant spinal canal stenosis.  There is mild left neural foraminal narrowing. C7-T1 is unremarkable. There are no signal abnormalities of the paraspinal ligaments, with nonspecific STIR hyperintense edema within the posterior paraspinal musculature and subcutaneous fat.  Multiple T2 hyperintensities in the thyroid gland are nonspecific.     1. Disc bulging and spondylosis at C4-C5 and C5-C6, with severe spinal canal stenosis, cervical cord compression and cord signal alteration suggesting edema and or myelopathy. 2. Broad-based disc bulging producing moderate spinal canal stenosis at C3-C4. Electronically signed by: Nitin Mayo Date:    11/02/2024 Time:    12:28    Echo    Result Date: 11/1/2024    Left Ventricle: The left ventricle is normal in size. Normal wall thickness. Unable to assess wall motion. There is normal systolic function with a visually estimated ejection fraction of 60 - 65%.   Right Ventricle: Right ventricle was not well visualized due to poor acoustic window.  Grossly appears to be dilated with reduced function.  There appears to be some concern of flattening of the interventricular septum which could indicate right ventricular pressure and volume overload.   Left Atrium: Left atrium is severely dilated.   IVC/SVC: Elevated venous pressure at 15 mmHg.     MRA Neck without contrast    Result Date: 11/1/2024  EXAMINATION: MRA NECK WITHOUT CONTRAST CLINICAL HISTORY: Stroke/TIA, determine embolic source; TECHNIQUE: Time of flight MRA of the carotid and vertebral arteries was performed with 3D reconstructions according to the standard neck MRA protocol. COMPARISON: Carotid ultrasound 10/31/2024.  MRI/MRA of the head 10/31/2024. FINDINGS: Somewhat motion limited exam. The right common carotid artery demonstrates no hemodynamically significant stenosis. The cervical right internal  carotid artery demonstrates no hemodynamically significant stenosis. The left common carotid artery demonstrates no hemodynamically significant stenosis. The cervical left internal carotid artery demonstrates no hemodynamically significant stenosis. Extracranial vertebral arteries: Vertebral arteries are codominant.  Vertebral arteries are normal to the level of the foramen magnum.  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     1. Negative limited noncontrast MRA of the neck.  No hemodynamically significant stenosis identified. Electronically signed by: Kendall Lee Date:    11/01/2024 Time:    12:35    US Carotid Bilateral    Result Date: 10/31/2024  EXAMINATION: US CAROTID BILATERAL CLINICAL HISTORY: syncope; TECHNIQUE: Grayscale and color Doppler ultrasound examination of the carotid and vertebral artery systems bilaterally.  Stenosis estimates are per the NASCET measurement criteria. COMPARISON: None. FINDINGS: Right: Internal Carotid Artery (ICA) peak systolic velocity 60.6 cm/sec ICA/CCA peak systolic velocity ratio: 0.9 Plaque formation: Heterogeneous Vertebral artery: Antegrade flow and normal waveform. Left: Internal Carotid Artery (ICA)  peak systolic velocity 98.1 cm/sec ICA/CCA peak systolic velocity ratio: 1.4 Plaque formation: Heterogeneous Vertebral artery: Antegrade flow and normal waveform.     No evidence of a hemodynamically significant carotid bifurcation stenosis. Electronically signed by: Dawit Barrios Date:    10/31/2024 Time:    23:55    MRI Lumbar Spine Without Contrast    Result Date: 10/31/2024  EXAMINATION: MRI LUMBAR SPINE WITHOUT CONTRAST CLINICAL HISTORY: Low back pain, trauma; TECHNIQUE: Multiplanar, multisequence MR images were acquired from the thoracolumbar junction to the sacrum without contrast. COMPARISON: CT total spine from 10/31/2024. MRI lumbar  spine from 09/15/2016 FINDINGS: Alignment: Normal. Vertebrae: Normal marrow signal. No fracture. Discs: There is moderate disc height loss at L5-S1 and mild disc height loss and disc desiccation at L3-L4 and L4-L5.  Remaining disc heights are preserved. Cord: Normal.  Conus terminates at L2. Degenerative findings: T12-L1: There is mild facet arthropathy bilaterally.  There is no spinal canal stenosis or neural foraminal narrowing. L1-L2: There is mild facet arthropathy and ligamentum flavum hypertrophy.  There is no spinal canal stenosis or neural foraminal narrowing. L2-L3: There is mild bilateral facet arthropathy and ligamentum flavum hypertrophy and a small circumferential disc bulge, resulting in mild spinal canal stenosis and moderate bilateral neural foraminal narrowing. L3-L4: There is moderate bilateral facet arthropathy and ligamentum flavum hypertrophy and a circumferential disc bulge, resulting in moderate spinal canal stenosis and moderate bilateral neural foraminal narrowing. L4-L5: There is a circumferential disc bulge with moderate bilateral facet arthropathy and ligamentum flavum hypertrophy resulting in moderate spinal canal stenosis and severe bilateral neural foraminal narrowing. L5-S1: There is bilateral facet arthropathy, moderate with a posterior disc bulge and an annular fissure.  There is no spinal canal stenosis.  There is moderate bilateral neural narrowing. Paraspinal muscles & soft tissues: Unremarkable.     Multilevel degenerative changes of the lumbar spine as detailed above.  There is moderate spinal canal stenosis at L3-L4 and L4-L5 and there is severe bilateral neural foraminal narrowing at L4-L5. Electronically signed by: Dawit Barrios Date:    10/31/2024 Time:    22:38    MRA Brain without contrast    Result Date: 10/31/2024  EXAMINATION: MRA BRAIN WITHOUT CONTRAST CLINICAL HISTORY: Syncope, recurrent; TECHNIQUE: Non-contrast 3-D time-of-flight intracranial MR angiography was  performed through the [place] [facility] [place] with MIP reformatting. COMPARISON: None FINDINGS: Anterior circulation: The bilateral intracranial ICAs, bilateral ACAs, and bilateral MCAs are patent and unremarkable without high-grade stenosis or occlusion. Posterior circulation: The bilateral intracranial vertebral arteries, the basilar artery, and the bilateral PCAs are patent and unremarkable without high-grade stenosis or occlusion. There is no intracranial aneurysm visualized.     Unremarkable MRA brain. Electronically signed by: Dawit Barrios Date:    10/31/2024 Time:    22:29    MRI Brain Without Contrast    Result Date: 10/31/2024  EXAMINATION: MRI BRAIN WITHOUT CONTRAST CLINICAL HISTORY: Neuro deficit, acute, stroke suspected; TECHNIQUE: Multiplanar multisequence MR imaging of the brain was performed without intravenous contrast. COMPARISON: CT head from earlier the same date. FINDINGS: There is restricted diffusion in the right caudate head, compatible with an acute infarct.  There is associated T2/FLAIR hyperintense signal.  There is a mass centered within the left temporoparietal calvarium measuring approximately 5.0 x 2.9 x 4.0 cm, with extension into the left cerebral convexity extra-axial space and extension into the left temporal scalp.  There is abutment of the left temporal lobe, without evidence of vasogenic edema or evidence of significant mass effect.  There is no midline shift.  Ventricles are normal in size for age without evidence of hydrocephalus.  There is T2/FLAIR hyperintense signal throughout the supratentorial white matter, compatible with chronic microvascular ischemic changes.  There is a small focus of encephalomalacia within the right medial parietal lobe, likely related to a remote infarct.  There is no intracranial hemorrhage.  No extra-axial blood or fluid collections. Normal vascular flow voids. Left common rim mass as above.  The remaining bone marrow signal intensity is normal.  Paranasal sinuses and mastoid air cells are clear.     1. Acute infarct in the right caudate head. 2. 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  The mass abuts the left temporal lobe, without resulting in significant mass effect or vasogenic edema.  Differential includes a primary or metastatic osseous lesion, an atypical meningioma, or additional etiologies. 3. Chronic microvascular ischemic changes. This report was flagged in Epic as abnormal. Dr. Barrios discussed critical findings with TATIANNA Weaver by [facility] secure chat at 22:23 on 10/31/2024. Electronically signed by: Dawit Barrios Date:    10/31/2024 Time:    22:27    X-Ray Wrist Complete Left    Result Date: 10/31/2024  EXAMINATION: XR WRIST COMPLETE 3 VIEWS LEFT CLINICAL HISTORY: Syncope and collapse TECHNIQUE: PA, lateral, and oblique views of the left wrist were performed. COMPARISON: None FINDINGS: No displaced fracture or traumatic malalignment.  Mild carpal degenerative change.  Unremarkable soft tissues. Electronically signed by: Saeid Pérez Date:    10/31/2024 Time:    16:40    X-Ray Knee 1 or 2 View Left    Result Date: 10/31/2024  EXAMINATION: XR KNEE 1 OR 2 VIEW LEFT CLINICAL HISTORY: Syncope and Fall; TECHNIQUE: One or two views of the left knee were performed. COMPARISON: None FINDINGS: Left knee total arthroplasty hardware with no periprosthetic fracture or abnormal lucency to suggest loosening or infection.  No malalignment.  Trace knee joint fluid.  Subcutaneous soft tissue edema about the knee and proximal calf. Electronically signed by: Saeid Pérez Date:    10/31/2024 Time:    16:39    X-Ray Chest AP Portable    Result Date: 10/31/2024  EXAMINATION: XR CHEST AP PORTABLE CLINICAL HISTORY: Syncope and collapse TECHNIQUE: Single frontal view of the chest was performed. COMPARISON: Same-day CT FINDINGS: There is a nodular airspace opacity at the periphery of the right lung base.  Remaining  lungs clear.  Borderline cardiac enlargement with metallic device projecting over the right heart border possibly an intra atrial septal occlusion device.  Prominence of the pulmonary arterial vasculature aortic arch atherosclerosis.  No pleural effusion or pneumothorax.  There are multiple surgical clips and staple lines over the upper abdomen.     1. Pleuroparenchymal nodule in the right lung base and several other nodules in the right lung apex.  Findings better seen at CT.  These are nonspecific with neoplasm not excluded. 2. Borderline heart enlargement. 3. Prominence of the pulmonary vasculature raising the possibility for elevated pulmonary pressures. Electronically signed by: Saeid Pérez Date:    10/31/2024 Time:    16:38    CT Entire Spine Without Contrast    Result Date: 10/31/2024  EXAMINATION: CT ENTIRE SPINE WITHOUT CONTRAST (XPD) CLINICAL HISTORY: Fall, pan spinal tenderness; TECHNIQUE: Axial images were obtained through the entire spine.  Sagittal and coronal reformatted images were created. COMPARISON: Lumbar spine MRI dated 03/15/2016 FINDINGS: There is no recent study for comparison.  On the screening study obtained with a large field of view there is no obvious acute fracture.  There is extensive chronic change throughout the cervical spine.  The odontoid process is intact.  The anterior and posterior arches of C1 are intact as well.  There is severe disc space narrowing at the C2-3, C3-4, C4-5 levels with moderate to marked disc space narrowing at the C5-6 level.  There is trace retrolisthesis of C4 on C5.  There is chronic anterior wedging of C3 and C4.  There is multilevel foraminal stenosis throughout the cervical region due to uncovertebral spurring and/or facet joint arthropathy.  Also, there is spinal stenosis most apparent at the C4-5 level. In the thoracic spine, there is mild chronic anterior wedging of several midthoracic vertebral bodies but there is no obvious acute fracture or  traumatic malalignment.  The facet joints maintain normal articulation. In the lumbar spine there is a vacuum disc at the L3-4 and L5-S1 levels.  There is severe disc space narrowing at L5-S1.  The lumbar vertebral bodies maintain normal height without obvious acute fracture.  Alignment is grossly normal.  There is extensive facet joint arthropathy in the mid to lower lumbar spine. There is a dextrocurvature of the lower lumbar spine with gentle broad levocurvature at the thoracolumbar junction. There is no displaced or depressed rib fracture identified on this limited field of view.  There is no obvious spinous process fracture. The sacrum is intact. There is atherosclerosis.  There is no pneumothorax.  There are several scattered nodules in the lungs which are suboptimally evaluated.     There is degenerative change throughout the spine, most significant in the lower lumbar spine as well as in the cervical spine.  There is however no obvious acute fracture or traumatic malalignment.  The entire spine was obtained in the large field of view. Electronically signed by: Nomi Vasquez MD Date:    10/31/2024 Time:    16:26    CT Maxillofacial Without Contrast    Result Date: 10/31/2024  EXAMINATION: CT MAXILLOFACIAL WITHOUT CONTRAST CLINICAL HISTORY: Facial trauma, blunt; TECHNIQUE: Low dose axial images, sagittal and coronal reformations were obtained through the face.  Contrast was not administered. COMPARISON: None FINDINGS: There is suspected soft tissue swelling over the chin.  There is very subtle cortical irregularity involving the anterior paramedian mandible which could represent subtle acute fracture with minimal adjacent bone fragments.  There is beam hardening artifact related to hardware in the region of the left maxilla.  There is no dislocation at the TMJ.  There are changes of torus mandibularis. There is old deformity of the anterior nasal bones.  There is no acute displaced or depressed nasal bone  or nasal septum fracture. There is no acute orbital fracture.     1. There is mild irregularity of the interior midline mandible with small adjacent bone fragments which could represent acute fracture with overlying soft tissue swelling.  There is no other acute maxillofacial or orbital fracture.  There is suspected old deformities of the anterior nasal bones. Electronically signed by: Nomi Vasquez MD Date:    10/31/2024 Time:    16:12    CT Head Without Contrast    Result Date: 10/31/2024  EXAMINATION: CT HEAD WITHOUT CONTRAST CLINICAL HISTORY: Head trauma, minor (Age >= 65y); TECHNIQUE: Routine unenhanced axial images were obtained through the head.  Sagittal and coronal reformatted images were created.  The study is reviewed in bone and soft tissue windows. COMPARISON: None FINDINGS: Intracranial contents: There is no acute intracranial abnormality.  There is mild nonspecific white matter change.  There is no hemorrhage, parenchymal mass or mass effect.  The gray-white interface is preserved without acute infarction.  The basilar cisterns are open.  There is a remote lacunar infarction in the right caudate nucleus.  The cerebellar tonsils are normal position. Extracranial contents, calvarium, soft tissues: The included paranasal sinuses and mastoid air cells are clear.  There is no acute skull fracture.  There is soft tissue prominence of the left temporoparietal region.  There is demineralization of the underlying calvarium with in irregular inner bony margin.  Also, there is suggestion of possible extra-axial dural-based soft tissue mass in this region which could potentially represent an atypical meningioma and further evaluated with brain MRI without and with contrast.     There is left temporoparietal soft tissue prominence with demineralization of the underlying bone and suspected extra-axial mass in this region which is nonspecific and incompletely evaluated.  These could potentially represent an  atypical meningioma but further evaluation with brain MRI without and with contrast could be obtained.  This is not acute.  There is no hemorrhage.  There is no acute skull fracture. Electronically signed by: Nomi Vasquez MD Date:    10/31/2024 Time:    16:07  - pulls last radiology orders    Assessment/Plan:     * Encounter for admission to hospice care  Patient had acute CVA, remains quadriplegic after cervical cord decompression surgery, also developed pulmonary embolism/ acute DVT, patient developed urinary retention most likely secondary to neurogenic bladder, patient has progressive acute on chronic hypoxic respiratory failure due to pulmonary artery hypertension, patient remains on high-flow oxygen.     Patient and family decided to move forward to inpatient hospice 11/18.     Continue inpatient hospice  Continue rest of care, regarding pulmonary hypertension medications, we will defer to patient / family whether to continue or discontinue.                   Acute deep vein thrombosis (DVT) of popliteal vein of right lower extremity, nonocclusive and pulmonary embolism  Found on u/s 11/5/24 and CTA 11/12   hypoechoic peripheral nonocclusive thrombus within the proximal right superficial femoral vein    full dose lovenox   Close monitor  s/p spine surgery   Associated with hypotension /shock suspicious for PE  and CTA confirmed PE   Vascular did not recommend thrombectomy        Central cord syndrome  S/p multilevel laminectomy (11/3/24)  C2-3, C3-4, C4-5, C5-6, C6-7 laminectomy  C3 through 6 posterior segmental instrumentation using [employer] system  C3-4, C4-5, C5-6 posterolateral arthrodesis using autograft harvested from the same incision and allograft DBM    Cervical collar in place  Neuro surgery follow up     PT OT when more stable    Cortisol added and normal     Remains quadriplegic after surgery.     ACP (advance care planning)  Advance Care Planning    Date: 11/15/2024  Patient is DNR now   and going into comfort care / hospice possible tomorrow          Paroxysmal atrial fibrillation  Patient has paroxysmal (<7 days) atrial fibrillation. Patient is currently in atrial fibrillation. [id number] Score: 3. The patients heart rate in the last 24 hours is as follows:  Pulse  Min: 67  Max: 78     Antiarrhythmics       Anticoagulants       Plan  - Replete lytes with a goal of K>4, Mg >2  - Patient is anticoagulated, see medications listed above.  - Patient's afib is currently uncontrolled. Will continue current treatment  S/p amiodarone drip and changed to PO   In and out of A fib   On full dose lovenox           S/P cervical spinal fusion  Multi levels  See NSGY op note     Quadriparesis  PT/OT  Frequent turns   Air mattress        Myelopathy  aware      Status post cervical spinal fusion  PT/OT  Cervical collar in place   Pain Mx      Hypotension  Asymptomatic.  Etiology most likely from pulmonary embolism/pul HTN / A fib with RVR   Echo reviewed.    -keep hold any BP meds or diuretics  On midodrine 15 tid   Currently on 1 vasopressor  Pulmonary offered swan ramya but patient refused   Cortisol random normal     Thrombocytopenia  The patients 3 most recent labs are listed below.  Recent Labs     11/14/24  0449 11/15/24  0440 11/16/24  0406    198 187       Plan  - Will transfuse if platelet count is <100k (if undergoing neurosurgery), or otherwise less than 10 K  -trend daily    Anemia  Anemia is likely due to chronic disease due to Chronic Kidney Disease. Most recent hemoglobin and hematocrit are listed below.  Recent Labs     11/16/24  0406 11/17/24  0432   HGB 9.3* 8.9*   HCT 27.2* 25.4*       Plan  - Monitor serial CBC: Daily  - Transfuse PRBC if patient becomes hemodynamically unstable, symptomatic or H/H drops below 7/21.  - Patient has not received any PRBC transfusions to date  - Patient's anemia is currently improving    Stroke  Acute CVA right caudate  MRI, MRA, CT, carotid U/S reports  "reviewed  Plavix  and statin and patient also need full dose lovenox   -neurology following  -neurosurgery and oncology consulted for the brain mass and follow up as OP   -PT/OT/SLP when more stable   -echo bubble report is seem edited. Not mentioning of PFO  Patient going to hospice care and possible withdrawal of vasopressors over the weekend    Brain mass  Discovered after PET scan of lung  on 9/6/24 revealed abnormal uptake in brain. 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  Unclear if contributing to CVA  She will have her first appointment with Dr. Ray Mcintyre at  [facility]  on 11/4/24  -for comfort / hospice care soon     Secondary hyperparathyroidism  Chronic condition that is stable.     Pulmonary artery hypertension  Acute on chronic issues  Macitentan, selexipag and tadalafil not available and sildenafil changed and continued   Patient's may be a candidate for IV  Epoprostenol however Patient refuse swan ramya catheter.   Patient breathing condition has been progressively worsening after the surgery, remains on high-flow oxygen since her surgery.   Pulmonary is following.      Chronic respiratory failure with hypoxia  Patient with Hypoxic Respiratory failure which is Chronic.  she is on home oxygen at 4 LPM.   Secondary to pulmonary embolism  Full-dose Lovenox    CKD (chronic kidney disease), stage IV  Creatine stable for now. BMP reviewed- noted Estimated Creatinine Clearance: 24.3 mL/min (A) (based on SCr of 2.3 mg/dL (H)). according to latest data. Based on current GFR, CKD stage is stage 4 - GFR 15-29.  Monitor UOP and serial BMP and adjust therapy as needed. Renally dose meds. Avoid nephrotoxic medications and procedures.    Close monitor     Syncope and collapse  See stroke      Acute urinary retention/ neurogenic bladder  Possibly neurogenic from the CVA or from cervical spine " cord compression  Urinary catheter. Appear may need long term        VTE Risk Mitigation (From admission, onward)      None          Critical care time spent on the evaluation and treatment of severe organ dysfunction, review of pertinent labs and imaging studies, discussions with consulting providers and discussions with patient/family: 35 minutes.             [facility] Medicine  Progress Note    Patient Name: Shanell Mahmood  MRN: [id number]  Patient Class: IP- Inpatient   Admission Date: 10/31/2024  Length of Stay: 17 days  Attending Physician: Andriy Kilgore MD  Primary Care Provider: Nicole, Primary Doctor        Subjective:     Principal Problem:Central cord syndrome        HPI:  Shanell Mahmood is a 74 year old female with a previous medical history of anemia, Pulmonary hypertension on 4 liters continuous oxygen at home, arthritis, CKD V, Osteoporosis, secondary hyperprathyroidism, S/P closure of patent foramen ovale in 2011, CVA in 2011 with no residuals, chronic back pain, HLD, Gout, Brain Mass and thyroid diease who presented to the ED after unwitnessed syncopal episode. The patient was at her pain management office for re certification only. There were no procedures performed. She reports taking one hydrocodone 10mg today.  The last thing she remembers was walking down the hallway of the office and looking for something to cover her head from the rain and did not have any adverse symptoms or feelings at that time.  She has no recollection of the syncopal events. When she was initially evaluated by EMS, her blood pressure was in the 60s over 40s. She did require constant stimulation to remain awake. Fell and hit her head. Does not take any blood thinners. EMS evaluated her and gave her 1 mg of Narcan as well as 250 cc of fluid. Her pressure improved and she had become more alert. Initial ED workup was thorough and all imaging showing that included CT of neck, head and entire spine showed no acute  "processes. CBC showed anemia and CMP showed elevated creatinine consistent with history of CKD V. Drug screen positive for opioids but patient has a hydrocodone prescription. The patient is currently in the process of have a left sided brain mass visualized on MRI 10/8/24 evaluated that was an incidental finding after undergoing a PET scan for a pulmonary nodule on 9/6/24 with abnormal uptake noted in brain. She has her first appointment on 11/4/24. She is followed by nephrology who is currently monitoring her and there has been one attempt at AV fistual placement but it was unsuccessful due sclerotic changes. Patient reports she awoke this morning feeling well showered and dressed herself. She performs all of her own ADL's. She had one syncopal event in March 2024 and was evaluated by cardiology and informed it was an orthostatic episode. Patient was unable to complete orthostatic vital signs upon admission due to inability to stand stating " I feel as if I can't get my legs under me". When evaluated for admit exam the patient endorses that after the syncopal episode she endorses bilateral leg weakness with decreased sensation in both legs. She reports bilateral  weakness being unable to use her phone and difficulty raising both of her arms. NIH scale performed and total of 9 noted. Patient noted have 400ml of urine in bladder and unable to void. Patient reached a total of 528ml of urine without being able to void.  MRI/MRA of brain and MRI of lumbosacral spine ordered upon admit. MRA and MRI lower back showed no acute process. MRI brain showed Acute infarct in the right caudate head. Dr. Maldonado was called and he recommended MRA of neck in morning and load with aspirin and plavix. Initial EKG read as atrial fibrillation but upon review p waves were noted and this was discussed with the ED. Repeat EKG shows sinus arrhthymias with PACs. Patient admitted by hospital medicine for further evaluation and management. "       Overview/Hospital Course:  74-year-old female with history of brain mass, CKD, back pain, pulmonary hypertension on 4 L oxygen is admitted after syncope and collapse found to have a acute CVA in the right caudate on MRI brain.   She Has global weakness and decreased sensation to the lower legs.  Multiple CT and x-rays done to rule out trauma ultimately negative other than the maxillofacial CT reporting mild irregularity of the interior midline mandible with small adjacent bone fragments which could represent acute fracture with overlying soft tissue swelling.    Carotid ultrasound and MRA brain and neck without acute finding.  Neurology is consulted.  Also with underlying brain mass.  Consult Neurosurgery and Oncology.  Given DAPT and statin.  Had hypotension given IVF and midodrine.  Echo shows right heart failure.  BNP is within normal.  Lactic acid pending.  No other sign of infection.  Placed in cervical collar as precaution and MRI of the cervical spine shows severe cord compression at C4-5 and C5-6 likely acute with edema.  Neurosurgery discussed with patient  and plan to proceed with surgical decompression  and  patient underwent surgery on : 11/3/24 multilevel C-spine laminectomy.  Later patient continued to have hypotension and needed vasopressor and another vasopressor vasopressin is added  Patient also her DVT of the the proximal right superficial femoral vein. And nonocclusive thrombus within the right popliteal vein,  Also patient developed new onset AFib and started amiodarone drip and in and out of a fib  and later changed to PO amiodarone   Later patient was more hypoxic and not able to wean vasopressor and PE study was done and found to have segmental PE with right heart strain . Patient was on full dose lovenox . Vascular surgery consulted and reviewed the film and did not recommend thrombectomy intervention .    Interval History:   Seen and examined at multidisciplinary rounds, patient looks  better, still quadriplegic, patient is awake alert, following commands, daughter at bedside.  I confirmed with the patient and the family, they would like to move forward with inpatient hospice and down the road go home with home hospice with high-flow oxygen.         Review of Systems  Objective:     Vital Signs (Most Recent):  Temp: 98.4 °F (36.9 °C) (11/18/24 0901)  Pulse: 67 (11/18/24 0901)  Resp: (!) 8 (11/18/24 0901)  BP: (!) 85/48 (11/18/24 0901)  SpO2: 99 % (11/18/24 0901) Vital Signs (24h Range):  Temp:  [97.6 °F (36.4 °C)-98.4 °F (36.9 °C)] 98.4 °F (36.9 °C)  Pulse:  [67-78] 67  Resp:  [8-18] 8  SpO2:  [89 %-100 %] 99 %  BP: ()/(48-57) 85/48  Arterial Line BP: ()/(41-65) 132/58     Weight: 84.3 kg (185 lb 13.6 oz)  Body mass index is 29.11 kg/m².    Intake/Output Summary (Last 24 hours) at 11/18/2024 1215  Last data filed at 11/18/2024 0705  Gross per 24 hour   Intake 1062.05 ml   Output 1145 ml   Net -82.95 ml         Physical Exam  Vitals and nursing note reviewed.   HENT:      Head: Normocephalic and atraumatic.      Mouth/Throat:      Mouth: Mucous membranes are moist.   Eyes:      Conjunctiva/sclera: Conjunctivae normal.   Neck:      Vascular: No JVD.   Cardiovascular:      Rate and Rhythm: Normal rate.   Pulmonary:      Effort: Pulmonary effort is normal.      Breath sounds: Normal breath sounds.   Abdominal:      Palpations: Abdomen is soft.   Skin:     General: Skin is warm.   Neurological:      Mental Status: She is alert and oriented to person, place, and time.             Significant Labs: All pertinent labs within the past 24 hours have been reviewed.  CBC:   Recent Labs   Lab 11/17/24 0432   WBC 6.02   HGB 8.9*   HCT 25.4*        CMP:   Recent Labs   Lab 11/17/24 0432   *   K 4.1   CL 99   CO2 23   GLU 78   BUN 43*   CREATININE 2.3*   CALCIUM 7.7*   PROT 4.8*   ALBUMIN 2.4*   BILITOT 0.5   ALKPHOS 107   AST 67*   ALT 39   ANIONGAP 6*     Cardiac Markers: No results  "for input(s): "CKMB", "MYOGLOBIN", "BNP", "TROPISTAT" in the last 48 hours.    Significant Imaging: I have reviewed all pertinent imaging results/findings within the past 24 hours.    Scheduled Meds:   allopurinoL  100 mg Oral QHS    amiodarone  200 mg Oral BID    atorvastatin  40 mg Oral Daily    bisacodyL  10 mg Rectal Daily    clopidogreL  75 mg Oral Daily    colchicine  0.3 mg Oral Daily    enoxparin  1 mg/kg Subcutaneous Q24H (treatment, non-standard time)    fludrocortisone  100 mcg Oral Daily    macitentan  10 mg Oral Daily    methocarbamoL  750 mg Oral TID    midodrine  20 mg Oral TID WM    montelukast  10 mg Oral Daily    pantoprazole  40 mg Oral Daily    selexipag  1,600 mcg Oral BID    tadalafil  40 mg Oral Daily     Continuous Infusions:      PRN Meds:.  Current Facility-Administered Medications:     acetaminophen, 650 mg, Oral, Q4H PRN    albuterol sulfate, 2.5 mg, Nebulization, Q6H PRN    aluminum-magnesium hydroxide-simethicone, 30 mL, Oral, Q4H PRN    dextrose 50%, 12.5 g, Intravenous, PRN    dextrose 50%, 25 g, Intravenous, PRN    diphenhydrAMINE, 25 mg, Oral, Q6H PRN    diphenhydrAMINE-zinc acetate 1-0.1%, , Topical (Top), TID PRN    glucagon (human recombinant), 1 mg, Intramuscular, PRN    glucose, 16 g, Oral, PRN    glucose, 24 g, Oral, PRN    HYDROcodone-acetaminophen, 1 tablet, Oral, Q4H PRN    HYDROmorphone, 1 mg, Intravenous, Q6H PRN    HYDROmorphone, 2 mg, Oral, Q4H PRN    magnesium oxide, 800 mg, Oral, PRN    magnesium oxide, 800 mg, Oral, PRN    methocarbamoL, 500 mg, Oral, TID PRN    naloxone, 0.02 mg, Intravenous, PRN    ondansetron, 4 mg, Intravenous, Q8H PRN    potassium bicarbonate, 35 mEq, Oral, PRN    potassium bicarbonate, 50 mEq, Oral, PRN    potassium bicarbonate, 60 mEq, Oral, PRN    potassium, sodium phosphates, 2 packet, Oral, PRN    potassium, sodium phosphates, 2 packet, Oral, PRN    potassium, sodium phosphates, 2 packet, Oral, PRN    prochlorperazine, 5 mg, Intravenous, " Q6H PRN    senna-docusate 8.6-50 mg, 2 tablet, Oral, Nightly PRN    sodium chloride 0.9%, 500 mL, Intravenous, PRN    sodium chloride 0.9%, 10 mL, Intravenous, Q12H PRN    sodium chloride 0.9%, 10 mL, Intravenous, PRN    X-Ray Chest AP Portable    Result Date: 11/16/2024  EXAMINATION: XR CHEST AP PORTABLE CLINICAL HISTORY: pulm edema, pleural effusions; COMPARISON: 11/15/2024, 11/14/2024 FINDINGS: Cardiac silhouette size is stable compared to prior.  Right PICC is in stable position with tip overlying the SVC.  Prominent central pulmonary vascularity.  Mild bilateral interstitial/ground-glass opacities suggestive of pulmonary edema.  Small bilateral pleural effusions.  No pneumothorax.  No acute osseous abnormality.     Pulmonary edema and small bilateral pleural effusions. Electronically signed by: Ashkan Broussard Date:    11/16/2024 Time:    07:01    X-Ray Chest AP Portable    Result Date: 11/15/2024  EXAMINATION: XR CHEST AP PORTABLE CLINICAL HISTORY: pulm edema, pleural effusions; COMPARISON: November 14 FINDINGS: Cardiac silhouette is unchanged.  The thoracic aorta is mildly elongated.  Masslike prominence of the bilateral bertha is stable. There is improved aeration of the lower lung zone on the left, with persistent small bilateral pleural effusions.  No pneumothorax is identified. Right-sided PICC line is unchanged in position.  No acute osseous abnormality is demonstrated.  Chronic right-sided rotator cuff tear is incidentally noted.     1. Slightly improved aeration of the left lung base.  No other significant changes compared to November 14. Electronically signed by: Leif Jordan Date:    11/15/2024 Time:    07:17    X-Ray Chest AP Portable    Result Date: 11/14/2024  EXAMINATION: XR CHEST AP PORTABLE CLINICAL HISTORY: pulm edema, pleural effusions; COMPARISON: November 13, 2024 FINDINGS: Heart size is normal.  The mediastinum is unremarkable.  Abnormal bilateral hilar prominence is unchanged. Hazy  opacification of the lower left hemithorax consistent with a combination of volume loss or airspace disease and left-sided pleural effusion appears slightly improved.  Small right pleural effusion is noted.  There is no evidence of pneumothorax. Right-sided PICC line remains in place with tip at the superior vena cava.     Slightly improved aeration of the lower lung zone on the left.  No other significant changes. Electronically signed by: Leif Jordan Date:    11/14/2024 Time:    08:54    X-Ray Chest AP Portable    Result Date: 11/13/2024  EXAMINATION: XR CHEST AP PORTABLE CLINICAL HISTORY: hypoxemia; FINDINGS: Portable chest radiograph at 04:58 hours compared to prior exams including CT of the prior day show right PICC unchanged in position, with stable cardiomediastinal silhouette and stable enlarged central pulmonary vasculature. There are persistent bilateral perihilar and left lower lung airspace opacities, with bilateral pleural effusions blunting the costophrenic angles.  No pneumothorax.     No significant interval change. Electronically signed by: Nitin Mayo Date:    11/13/2024 Time:    08:04    CTA Chest Non-Coronary (PE Studies)    Result Date: 11/12/2024  EXAMINATION: CTA CHEST NON CORONARY (PE STUDIES) CLINICAL HISTORY: Pulmonary embolism (PE) suspected, high prob;. TECHNIQUE: CT angiography targeted to the pulmonary arteries was performed. 100 cc nonionic contrast was administered and the bolus was timed for optimal opacification of the pulmonary arteries. 3-D reformatted images were obtained on an independent workstation and stored as a part of patient's permanent medical record. COMPARISON: Chest radiograph, November 12, 2024 FINDINGS: The pulmonary arteries are adequately opacified.  There are filling defects involving bilateral proximal segmental pulmonary arterial branches, extending to distal segmental and subsegmental branches.  There is a small amount of thrombus in the left main  pulmonary artery.  The pulmonary arterial trunk is dilated at up to 4.6 cm.  The right pulmonary artery measures 3.2 cm transverse and the left 3.4 cm.  There is slight bowing of the interventricular septum and minimal reflux to the inferior vena cava.  Correlate for possible right heart strain. The thoracic aorta is normal in caliber.  There is no mediastinal mass or lymphadenopathy, however note is made of mild nonspecific right hilar lymphadenopathy. Images at lung windows demonstrate left greater than right bilateral ground-glass pulmonary opacities, predominantly central in location.  Correlate for pulmonary edema or less likely pneumonia.  There are small bilateral pleural effusions.  Dependent atelectasis is noted at both lung bases. There are several indeterminate tiny noncalcified nodules in the right upper lobe, the largest measuring 4 mm (series 4, image 95.  4 mm nodule is also noted in the right middle lobe abutting the minor fissure. Images of the upper abdomen are limited, but demonstrate changes of apparent previous gastric bypass.  There is mild perihepatic free fluid. Diffuse body wall edema is noted.  No acute osseous abnormalities are identified.     1. Bilateral pulmonary thromboembolic disease. 2. Pulmonary arterial dilation and additional observations as above.  Correlate for possible right heart strain. 3. Left greater than right predominantly central ground-glass pulmonary opacities.  Correlate for edema versus pneumonia. 4. Small pleural effusions. 5. Nonspecific right hilar lymphadenopathy.  Scattered tiny right-sided noncalcified pulmonary nodules, possibly granulomas, but indeterminate. Findings of bilateral pulmonary thromboembolic disease were called to Dr. Ramos at 11:10 on November 12, 2024. Electronically signed by: Leif Jordan Date:    11/12/2024 Time:    11:22    X-Ray Chest AP Portable    Result Date: 11/12/2024  EXAMINATION: XR CHEST AP PORTABLE CLINICAL HISTORY:  worsening oxygenation; FINDINGS: Portable chest with comparison chest x-ray 11/08/2024.  Normal cardiomediastinal silhouette.Enlarged central hilar vascular structures again noted.  There is bilateral perihilar haziness and interstitial thickening similar to previous exam.  Blunting of the left costophrenic angle again noted with increased hazy opacification of the left lung base.  Blunting of the right costophrenic angle with patchy opacities of the right lung base is similar to previous exam.  Pulmonary vasculature is normal. No acute osseous abnormality.     CHF lung pattern with probable small bilateral pleural effusions noted,  and is subtly increased from previous exam. Electronically signed by: Scotty Aceves Date:    11/12/2024 Time:    08:41    X-Ray Chest AP Portable    Result Date: 11/8/2024  EXAMINATION: XR CHEST AP PORTABLE CLINICAL HISTORY: Pulmonary hypertension; FINDINGS: Portable chest with comparison chest x-ray 11/06/2024.  Normal cardiomediastinal silhouette.Enlarged central hilar vascular structures again noted with mild perihilar peribronchial interstitial thickening.  Hazy opacities of the left lung base are mildly increased.  No pneumothorax.  Right PICC line is unchanged.  Pulmonary vasculature is normal. No acute osseous abnormality.     Mild increased hazy opacities of the left lung bases.  Otherwise no significant changes from prior exam. Electronically signed by: Scotty Aceves Date:    11/08/2024 Time:    08:50    Echo Saline Bubble? No; Ultrasound enhancing contrast? No    Addendum Date: 11/7/2024      Left Ventricle: The left ventricle is smaller than normal. Normal wall thickness. There is normal systolic function with a visually estimated ejection fraction of 65 - 70%. There is normal diastolic function.   Right Ventricle: Mild right ventricular enlargement. Wall thickness is normal. Systolic function is moderately reduced.   Left Atrium: Left atrium is moderately dilated.   Right  Atrium: Right atrium is mildly dilated.   IVC/SVC: Normal venous pressure at 3 mmHg.     Result Date: 11/7/2024    Left Ventricle: The left ventricle is normal in size. Normal wall thickness. There is normal systolic function with a visually estimated ejection fraction of 65 - 70%. There is normal diastolic function.   Right Ventricle: Normal right ventricular cavity size. Wall thickness is normal. Systolic function is normal.   Left Atrium: Left atrium is mildly dilated.   IVC/SVC: Normal venous pressure at 3 mmHg.     X-Ray Chest AP Portable    Result Date: 11/6/2024  EXAMINATION: XR CHEST AP PORTABLE CLINICAL HISTORY: pulmonary hypertension; TECHNIQUE: Single frontal view of the chest was performed. COMPARISON: 11/05/2024. FINDINGS: There is a right-sided PICC, unchanged in position.  The lungs are well expanded.  There is prominence of the central pulmonary vasculature, stable from prior.  There are small bilateral pleural effusions.  The lungs are otherwise clear.  The cardiac silhouette is enlarged.  Osseous structures are intact.  There are calcifications of the aortic arch.  There are surgical clips overlying the left upper quadrant.     No significant detrimental change from prior. Electronically signed by: Dawit Barrios Date:    11/06/2024 Time:    05:05    US Lower Extremity Veins Bilateral    Result Date: 11/5/2024  EXAMINATION: US LOWER EXTREMITY VEINS BILATERAL CLINICAL HISTORY: r/o DVT; TECHNIQUE: Grayscale, color and spectral Doppler analysis of the bilateral lower extremity deep venous system was performed. FINDINGS: No prior studies for comparison.  There is hypoechoic peripheral nonocclusive thrombus within the proximal right superficial femoral vein.  There is also hypoechoic nonocclusive thrombus within the right popliteal vein, with some preserved color Doppler vascular flow. There is normal compressibility, with normal color and spectral Doppler vascular flow in the left lower extremity deep  venous system, with no findings of left lower extremity deep venous thrombosis.     1. Positive for nonocclusive deep venous thrombosis of the right lower extremity. 2. Negative for left lower extremity deep venous thrombosis. 3. RESULT NOTIFICATION: Findings were discussed by Dr Mayo with, and acknowledged by LUZ MARINA SANCHEZ at 15:20 hours. Electronically signed by: Nitin Mayo Date:    11/05/2024 Time:    15:22    X-Ray Chest 1 View S/P PICC Line by Nursing    Result Date: 11/5/2024  EXAMINATION: XR CHEST 1 VIEW S/P PICC LINE BY NURSING CLINICAL HISTORY: picc line; FINDINGS: Portable chest radiograph at 10:54 hours compared to 05:20 hours shows interval insertion of a right PICC, with the distal tip overlying the SVC in satisfactory position.  There is no other significant interval change.     Interval insertion of a right PICC, in satisfactory position with distal tip overlying the SVC. Electronically signed by: Nitin Mayo Date:    11/05/2024 Time:    11:05    X-Ray Chest AP Portable    Result Date: 11/5/2024  EXAMINATION: XR CHEST AP PORTABLE CLINICAL HISTORY: Pulmonary HTN; COMPARISON: 10/31/2024. FINDINGS: The heart is enlarged but stable.  Prominence of the central pulmonary vasculature appears similar to prior examination.  There is mild mass effect observed upon the rightward aspect of the trachea. There are mild airspace opacities or volume loss within the lung bases.  There are mildly diminished lung volumes.  No significant effusion demonstrated. Multiple monitoring electrodes overlie the chest.     Diminished lung volumes. Mild basilar airspace opacities or volume loss. Cardiomegaly and marked prominence of the central pulmonary vasculature, stable. Mild mass effect upon the rightward aspect of the trachea. Electronically signed by: Waqas Leavitt Date:    11/05/2024 Time:    07:04    X-Ray Cervical Spine AP And Lateral    Result Date: 11/4/2024  CLINICAL HISTORY: ([id number])73 y/o  (1950) F C3-6  laminectomy and fusion; Central cord syndrome at unspecified level of cervical spinal cord, initial encounter TECHNIQUE: (A#[id number], exam time 11/4/2024 5:48) XR CERVICAL SPINE AP LATERAL IMG56 2 view(s) obtained. COMPARISON: MRI from 11/02/2024. FINDINGS: C1 through C7 are visualized on the lateral radiograph.  There is straightening/reversal the normal lordotic curvature likely secondary to a combination of positioning/postoperative change, degenerative change and/or muscle spasm. There has been interval posterior laminectomy from C3-C6, with bilateral posterior trans-facet screw and saurabh fixation.  There is a midline posterior paraspinal drain in the laminectomy bed with scattered subcutaneous emphysema and edema over the dorsal aspect of the neck. Again noted is moderate to severe disc height loss at C2-C3, C3-C4 and C4-C5 with moderate disc height loss at C5-C6.  There is oblique lucency through the anterior inferior vertebral body of C2, and C5, suspicious for occult nondisplaced fractures, these could be better characterized with CT.  There is minimal prevertebral soft tissue swelling. There is mild retrolisthesis of C5 on C6 (3 mm).  The visualized lung apices are clear.  The patient is edentulous.     1.  Posterior postoperative fusion from C3-C6 as above. 2.  Suspected tiny nondisplaced oblique fractures through the anterior inferior endplate of C2 and C5. This report was flagged in Epic as abnormal. Electronically signed by: Asim Davis Date:    11/04/2024 Time:    06:42    SURG FL Surgery Fluoro Usage    Result Date: 11/3/2024  See OP Notes for results. IMPRESSION: See OP Notes for results. This procedure was auto-finalized by: Virtual Radiologist    MRI Cervical Spine Without Contrast    Result Date: 11/2/2024  EXAMINATION: MRI CERVICAL SPINE WITHOUT CONTRAST CLINICAL HISTORY: Myelopathy, acute, cervical spine; fall with head and neck trauma TECHNIQUE: Routine MRI cervical spine without contrast.  COMPARISON: Multiple prior exams. FINDINGS: Motion artifact limits the exam.  There is exaggeration of the normal cervical spinal curvature, with normal vertebral body alignment, and no acute fractures or destructive osseous lesions.  Chronic vertebral height loss involves C3 and C4, with degenerative loss of disc height and signal most pronounced at C3-C4 and C4-C5, and heterogeneous degenerative type marrow endplate signal alteration.  No findings of a diffuse marrow replacement process. There are stippled T2 hyperintensities in the sandy suggesting chronic ischemic changes, with the visualized posterior fossa and cervicomedullary junction otherwise unremarkable.  There is intramedullary non fluid like T2 hyperintense signal alteration in the cervical cord as detailed below. At C2-C3, there is broad-based disc bulging and spondylosis, without significant spinal canal stenosis.  There is bilateral foraminal narrowing. At C3-C4, there is broad-based disc bulging and spondylosis, contributing to moderate to severe spinal canal stenosis.  There is severe bilateral foraminal narrowing. At C4-C5, there is broad-based disc bulging and spondylosis, which produces severe spinal canal stenosis with cervical cord compression.  There is increased non fluid-like T2 signal in the cord suggesting edema and or myelopathy, with severe bilateral foraminal narrowing. At C5-C6, there is broad-based disc bulging and spondylosis, producing severe spinal canal stenosis with mass effect upon the cervical cord.  There is increased non fluid-like T2 signal in the cord suggesting edema and or myelopathy.  There is severe bilateral foraminal narrowing. At C6-C7, there is broad-based disc bulging, without significant spinal canal stenosis.  There is mild left neural foraminal narrowing. C7-T1 is unremarkable. There are no signal abnormalities of the paraspinal ligaments, with nonspecific STIR hyperintense edema within the posterior paraspinal  musculature and subcutaneous fat.  Multiple T2 hyperintensities in the thyroid gland are nonspecific.     1. Disc bulging and spondylosis at C4-C5 and C5-C6, with severe spinal canal stenosis, cervical cord compression and cord signal alteration suggesting edema and or myelopathy. 2. Broad-based disc bulging producing moderate spinal canal stenosis at C3-C4. Electronically signed by: Nitin Mayo Date:    11/02/2024 Time:    12:28    Echo    Result Date: 11/1/2024    Left Ventricle: The left ventricle is normal in size. Normal wall thickness. Unable to assess wall motion. There is normal systolic function with a visually estimated ejection fraction of 60 - 65%.   Right Ventricle: Right ventricle was not well visualized due to poor acoustic window.  Grossly appears to be dilated with reduced function.  There appears to be some concern of flattening of the interventricular septum which could indicate right ventricular pressure and volume overload.   Left Atrium: Left atrium is severely dilated.   IVC/SVC: Elevated venous pressure at 15 mmHg.     MRA Neck without contrast    Result Date: 11/1/2024  EXAMINATION: MRA NECK WITHOUT CONTRAST CLINICAL HISTORY: Stroke/TIA, determine embolic source; TECHNIQUE: Time of flight MRA of the carotid and vertebral arteries was performed with 3D reconstructions according to the standard neck MRA protocol. COMPARISON: Carotid ultrasound 10/31/2024.  MRI/MRA of the head 10/31/2024. FINDINGS: Somewhat motion limited exam. The right common carotid artery demonstrates no hemodynamically significant stenosis. The cervical right internal carotid artery demonstrates no hemodynamically significant stenosis. The left common carotid artery demonstrates no hemodynamically significant stenosis. The cervical left internal carotid artery demonstrates no hemodynamically significant stenosis. Extracranial vertebral arteries: Vertebral arteries are codominant.  Vertebral arteries are normal to the level  of the foramen magnum.  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     1. Negative limited noncontrast MRA of the neck.  No hemodynamically significant stenosis identified. Electronically signed by: Kendall Lee Date:    11/01/2024 Time:    12:35    US Carotid Bilateral    Result Date: 10/31/2024  EXAMINATION: US CAROTID BILATERAL CLINICAL HISTORY: syncope; TECHNIQUE: Grayscale and color Doppler ultrasound examination of the carotid and vertebral artery systems bilaterally.  Stenosis estimates are per the NASCET measurement criteria. COMPARISON: None. FINDINGS: Right: Internal Carotid Artery (ICA) peak systolic velocity 60.6 cm/sec ICA/CCA peak systolic velocity ratio: 0.9 Plaque formation: Heterogeneous Vertebral artery: Antegrade flow and normal waveform. Left: Internal Carotid Artery (ICA)  peak systolic velocity 98.1 cm/sec ICA/CCA peak systolic velocity ratio: 1.4 Plaque formation: Heterogeneous Vertebral artery: Antegrade flow and normal waveform.     No evidence of a hemodynamically significant carotid bifurcation stenosis. Electronically signed by: Dawit Barrios Date:    10/31/2024 Time:    23:55    MRI Lumbar Spine Without Contrast    Result Date: 10/31/2024  EXAMINATION: MRI LUMBAR SPINE WITHOUT CONTRAST CLINICAL HISTORY: Low back pain, trauma; TECHNIQUE: Multiplanar, multisequence MR images were acquired from the thoracolumbar junction to the sacrum without contrast. COMPARISON: CT total spine from 10/31/2024. MRI lumbar spine from 09/15/2016 FINDINGS: Alignment: Normal. Vertebrae: Normal marrow signal. No fracture. Discs: There is moderate disc height loss at L5-S1 and mild disc height loss and disc desiccation at L3-L4 and L4-L5.  Remaining disc heights are preserved. Cord: Normal.  Conus terminates at L2. Degenerative findings: T12-L1: There is mild facet arthropathy  bilaterally.  There is no spinal canal stenosis or neural foraminal narrowing. L1-L2: There is mild facet arthropathy and ligamentum flavum hypertrophy.  There is no spinal canal stenosis or neural foraminal narrowing. L2-L3: There is mild bilateral facet arthropathy and ligamentum flavum hypertrophy and a small circumferential disc bulge, resulting in mild spinal canal stenosis and moderate bilateral neural foraminal narrowing. L3-L4: There is moderate bilateral facet arthropathy and ligamentum flavum hypertrophy and a circumferential disc bulge, resulting in moderate spinal canal stenosis and moderate bilateral neural foraminal narrowing. L4-L5: There is a circumferential disc bulge with moderate bilateral facet arthropathy and ligamentum flavum hypertrophy resulting in moderate spinal canal stenosis and severe bilateral neural foraminal narrowing. L5-S1: There is bilateral facet arthropathy, moderate with a posterior disc bulge and an annular fissure.  There is no spinal canal stenosis.  There is moderate bilateral neural narrowing. Paraspinal muscles & soft tissues: Unremarkable.     Multilevel degenerative changes of the lumbar spine as detailed above.  There is moderate spinal canal stenosis at L3-L4 and L4-L5 and there is severe bilateral neural foraminal narrowing at L4-L5. Electronically signed by: Dawit Barrios Date:    10/31/2024 Time:    22:38    MRA Brain without contrast    Result Date: 10/31/2024  EXAMINATION: MRA BRAIN WITHOUT CONTRAST CLINICAL HISTORY: Syncope, recurrent; TECHNIQUE: Non-contrast 3-D time-of-flight intracranial MR angiography was performed through the [place] [facility] [place] with MIP reformatting. COMPARISON: None FINDINGS: Anterior circulation: The bilateral intracranial ICAs, bilateral ACAs, and bilateral MCAs are patent and unremarkable without high-grade stenosis or occlusion. Posterior circulation: The bilateral intracranial vertebral arteries, the basilar artery, and the bilateral  PCAs are patent and unremarkable without high-grade stenosis or occlusion. There is no intracranial aneurysm visualized.     Unremarkable MRA brain. Electronically signed by: Dawit Barrios Date:    10/31/2024 Time:    22:29    MRI Brain Without Contrast    Result Date: 10/31/2024  EXAMINATION: MRI BRAIN WITHOUT CONTRAST CLINICAL HISTORY: Neuro deficit, acute, stroke suspected; TECHNIQUE: Multiplanar multisequence MR imaging of the brain was performed without intravenous contrast. COMPARISON: CT head from earlier the same date. FINDINGS: There is restricted diffusion in the right caudate head, compatible with an acute infarct.  There is associated T2/FLAIR hyperintense signal.  There is a mass centered within the left temporoparietal calvarium measuring approximately 5.0 x 2.9 x 4.0 cm, with extension into the left cerebral convexity extra-axial space and extension into the left temporal scalp.  There is abutment of the left temporal lobe, without evidence of vasogenic edema or evidence of significant mass effect.  There is no midline shift.  Ventricles are normal in size for age without evidence of hydrocephalus.  There is T2/FLAIR hyperintense signal throughout the supratentorial white matter, compatible with chronic microvascular ischemic changes.  There is a small focus of encephalomalacia within the right medial parietal lobe, likely related to a remote infarct.  There is no intracranial hemorrhage.  No extra-axial blood or fluid collections. Normal vascular flow voids. Left common rim mass as above.  The remaining bone marrow signal intensity is normal. Paranasal sinuses and mastoid air cells are clear.     1. Acute infarct in the right caudate head. 2. 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  The mass abuts the left temporal lobe, without resulting in significant mass effect or vasogenic edema.  Differential includes a primary or metastatic osseous lesion, an  atypical meningioma, or additional etiologies. 3. Chronic microvascular ischemic changes. This report was flagged in Epic as abnormal. Dr. Barrios discussed critical findings with TATIANNA Weaver by [facility] secure chat at 22:23 on 10/31/2024. Electronically signed by: Dawit Barrios Date:    10/31/2024 Time:    22:27    X-Ray Wrist Complete Left    Result Date: 10/31/2024  EXAMINATION: XR WRIST COMPLETE 3 VIEWS LEFT CLINICAL HISTORY: Syncope and collapse TECHNIQUE: PA, lateral, and oblique views of the left wrist were performed. COMPARISON: None FINDINGS: No displaced fracture or traumatic malalignment.  Mild carpal degenerative change.  Unremarkable soft tissues. Electronically signed by: Saeid Pérez Date:    10/31/2024 Time:    16:40    X-Ray Knee 1 or 2 View Left    Result Date: 10/31/2024  EXAMINATION: XR KNEE 1 OR 2 VIEW LEFT CLINICAL HISTORY: Syncope and Fall; TECHNIQUE: One or two views of the left knee were performed. COMPARISON: None FINDINGS: Left knee total arthroplasty hardware with no periprosthetic fracture or abnormal lucency to suggest loosening or infection.  No malalignment.  Trace knee joint fluid.  Subcutaneous soft tissue edema about the knee and proximal calf. Electronically signed by: Saeid Pérez Date:    10/31/2024 Time:    16:39    X-Ray Chest AP Portable    Result Date: 10/31/2024  EXAMINATION: XR CHEST AP PORTABLE CLINICAL HISTORY: Syncope and collapse TECHNIQUE: Single frontal view of the chest was performed. COMPARISON: Same-day CT FINDINGS: There is a nodular airspace opacity at the periphery of the right lung base.  Remaining lungs clear.  Borderline cardiac enlargement with metallic device projecting over the right heart border possibly an intra atrial septal occlusion device.  Prominence of the pulmonary arterial vasculature aortic arch atherosclerosis.  No pleural effusion or pneumothorax.  There are multiple surgical clips and staple lines over the upper abdomen.     1.  Pleuroparenchymal nodule in the right lung base and several other nodules in the right lung apex.  Findings better seen at CT.  These are nonspecific with neoplasm not excluded. 2. Borderline heart enlargement. 3. Prominence of the pulmonary vasculature raising the possibility for elevated pulmonary pressures. Electronically signed by: Saeid Pérez Date:    10/31/2024 Time:    16:38    CT Entire Spine Without Contrast    Result Date: 10/31/2024  EXAMINATION: CT ENTIRE SPINE WITHOUT CONTRAST (XPD) CLINICAL HISTORY: Fall, pan spinal tenderness; TECHNIQUE: Axial images were obtained through the entire spine.  Sagittal and coronal reformatted images were created. COMPARISON: Lumbar spine MRI dated 03/15/2016 FINDINGS: There is no recent study for comparison.  On the screening study obtained with a large field of view there is no obvious acute fracture.  There is extensive chronic change throughout the cervical spine.  The odontoid process is intact.  The anterior and posterior arches of C1 are intact as well.  There is severe disc space narrowing at the C2-3, C3-4, C4-5 levels with moderate to marked disc space narrowing at the C5-6 level.  There is trace retrolisthesis of C4 on C5.  There is chronic anterior wedging of C3 and C4.  There is multilevel foraminal stenosis throughout the cervical region due to uncovertebral spurring and/or facet joint arthropathy.  Also, there is spinal stenosis most apparent at the C4-5 level. In the thoracic spine, there is mild chronic anterior wedging of several midthoracic vertebral bodies but there is no obvious acute fracture or traumatic malalignment.  The facet joints maintain normal articulation. In the lumbar spine there is a vacuum disc at the L3-4 and L5-S1 levels.  There is severe disc space narrowing at L5-S1.  The lumbar vertebral bodies maintain normal height without obvious acute fracture.  Alignment is grossly normal.  There is extensive facet joint arthropathy in the  mid to lower lumbar spine. There is a dextrocurvature of the lower lumbar spine with gentle broad levocurvature at the thoracolumbar junction. There is no displaced or depressed rib fracture identified on this limited field of view.  There is no obvious spinous process fracture. The sacrum is intact. There is atherosclerosis.  There is no pneumothorax.  There are several scattered nodules in the lungs which are suboptimally evaluated.     There is degenerative change throughout the spine, most significant in the lower lumbar spine as well as in the cervical spine.  There is however no obvious acute fracture or traumatic malalignment.  The entire spine was obtained in the large field of view. Electronically signed by: Nomi Vasquez MD Date:    10/31/2024 Time:    16:26    CT Maxillofacial Without Contrast    Result Date: 10/31/2024  EXAMINATION: CT MAXILLOFACIAL WITHOUT CONTRAST CLINICAL HISTORY: Facial trauma, blunt; TECHNIQUE: Low dose axial images, sagittal and coronal reformations were obtained through the face.  Contrast was not administered. COMPARISON: None FINDINGS: There is suspected soft tissue swelling over the chin.  There is very subtle cortical irregularity involving the anterior paramedian mandible which could represent subtle acute fracture with minimal adjacent bone fragments.  There is beam hardening artifact related to hardware in the region of the left maxilla.  There is no dislocation at the TMJ.  There are changes of torus mandibularis. There is old deformity of the anterior nasal bones.  There is no acute displaced or depressed nasal bone or nasal septum fracture. There is no acute orbital fracture.     1. There is mild irregularity of the interior midline mandible with small adjacent bone fragments which could represent acute fracture with overlying soft tissue swelling.  There is no other acute maxillofacial or orbital fracture.  There is suspected old deformities of the anterior nasal  bones. Electronically signed by: Nomi Vasquez MD Date:    10/31/2024 Time:    16:12    CT Head Without Contrast    Result Date: 10/31/2024  EXAMINATION: CT HEAD WITHOUT CONTRAST CLINICAL HISTORY: Head trauma, minor (Age >= 65y); TECHNIQUE: Routine unenhanced axial images were obtained through the head.  Sagittal and coronal reformatted images were created.  The study is reviewed in bone and soft tissue windows. COMPARISON: None FINDINGS: Intracranial contents: There is no acute intracranial abnormality.  There is mild nonspecific white matter change.  There is no hemorrhage, parenchymal mass or mass effect.  The gray-white interface is preserved without acute infarction.  The basilar cisterns are open.  There is a remote lacunar infarction in the right caudate nucleus.  The cerebellar tonsils are normal position. Extracranial contents, calvarium, soft tissues: The included paranasal sinuses and mastoid air cells are clear.  There is no acute skull fracture.  There is soft tissue prominence of the left temporoparietal region.  There is demineralization of the underlying calvarium with in irregular inner bony margin.  Also, there is suggestion of possible extra-axial dural-based soft tissue mass in this region which could potentially represent an atypical meningioma and further evaluated with brain MRI without and with contrast.     There is left temporoparietal soft tissue prominence with demineralization of the underlying bone and suspected extra-axial mass in this region which is nonspecific and incompletely evaluated.  These could potentially represent an atypical meningioma but further evaluation with brain MRI without and with contrast could be obtained.  This is not acute.  There is no hemorrhage.  There is no acute skull fracture. Electronically signed by: Nomi Vasquez MD Date:    10/31/2024 Time:    16:07  - pulls last radiology orders      Assessment/Plan:      * Central cord syndrome  S/p  multilevel laminectomy (11/3/24)  C2-3, C3-4, C4-5, C5-6, C6-7 laminectomy  C3 through 6 posterior segmental instrumentation using [employer] system  C3-4, C4-5, C5-6 posterolateral arthrodesis using autograft harvested from the same incision and allograft DBM    Cervical collar in place  Neuro surgery follow up     PT OT when more stable    Cortisol added and normal     Remains quadriplegic after surgery.     Patient had acute CVA, remains quadriplegic after cervical cord decompression surgery, also developed pulmonary embolism/ acute DVT, patient developed urinary retention most likely secondary to neurogenic bladder, patient has progressive acute on chronic hypoxic respiratory failure due to pulmonary artery hypertension, patient remains on high-flow oxygen after surgery.   Patient and family decided to move forward to inpatient hospice 11/18.   Updated with nurse/ pulmonologist/ palliative care team.   Add on fludrocortisone, increase midodrine to 20 mg t.i.d..  Wean off Levophed drip.   Okay to move to step-down floor once off Levophed drip.   Discontinue IV fluid- patient looks very edematous.           Acute deep vein thrombosis (DVT) of popliteal vein of right lower extremity, nonocclusive and pulmonary embolism  Found on u/s 11/5/24 and CTA 11/12   hypoechoic peripheral nonocclusive thrombus within the proximal right superficial femoral vein    full dose lovenox   Close monitor  s/p spine surgery   Associated with hypotension /shock suspicious for PE  and CTA confirmed PE   Vascular did not recommend thrombectomy        ACP (advance care planning)  Advance Care Planning    Date: 11/15/2024  Patient is DNR now  and going into comfort care / hospice possible tomorrow           Paroxysmal atrial fibrillation  Patient has paroxysmal (<7 days) atrial fibrillation. Patient is currently in atrial fibrillation. [id number] Score: 3. The patients heart rate in the last 24 hours is as follows:  Pulse  Min: 67   Max: 85     Antiarrhythmics  amiodarone tablet 200 mg, 2 times daily, Oral    Anticoagulants  enoxaparin injection 70 mg, Every 24 hours, Subcutaneous    Plan  - Replete lytes with a goal of K>4, Mg >2  - Patient is anticoagulated, see medications listed above.  - Patient's afib is currently uncontrolled. Will continue current treatment  S/p amiodarone drip and changed to PO   In and out of A fib   On full dose lovenox           S/P cervical spinal fusion  Multi levels  See NSGY op note     Quadriparesis  PT/OT  Frequent turns   Air mattress        Myelopathy  aware      Status post cervical spinal fusion  PT/OT  Cervical collar in place   Pain Mx      Hypotension  Asymptomatic.  Etiology most likely from pulmonary embolism/pul HTN / A fib with RVR   Echo reviewed.    -keep hold any BP meds or diuretics  On midodrine 15 tid   Currently on 1 vasopressor  Pulmonary offered swan ramya but patient refused   Cortisol random normal     Thrombocytopenia  The patients 3 most recent labs are listed below.  Recent Labs     11/14/24  0449 11/15/24  0440 11/16/24  0406    198 187       Plan  - Will transfuse if platelet count is <100k (if undergoing neurosurgery), or otherwise less than 10 K  -trend daily    Anemia  Anemia is likely due to chronic disease due to Chronic Kidney Disease. Most recent hemoglobin and hematocrit are listed below.  Recent Labs     11/14/24  0449 11/15/24  0334 11/15/24  0440 11/16/24  0406   HGB 9.2*  --  9.2* 9.3*   HCT 26.8* 35* 27.0* 27.2*       Plan  - Monitor serial CBC: Daily  - Transfuse PRBC if patient becomes hemodynamically unstable, symptomatic or H/H drops below 7/21.  - Patient has not received any PRBC transfusions to date  - Patient's anemia is currently improving    Stroke  Acute CVA right caudate  MRI, MRA, CT, carotid U/S reports reviewed  Plavix  and statin and patient also need full dose lovenox   -neurology following  -neurosurgery and oncology consulted for the brain mass  "and follow up as OP   -PT/OT/SLP when more stable   -echo bubble report is seem edited. Not mentioning of PFO  Patient going to hospice care and possible withdrawal of vasopressors over the weekend    Brain mass  Discovered after PET scan of lung  on 9/6/24 revealed abnormal uptake in brain. 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  Unclear if contributing to CVA  She will have her first appointment with Dr. Ray Mcintyre at  [facility]  on 11/4/24  -for comfort / hospice care soon     Secondary hyperparathyroidism  Chronic condition that is stable.     Pulmonary artery hypertension  Acute on chronic issues  Macitentan, selexipag and tadalafil not available and sildenafil changed and continued   Patient's may be a candidate for IV  Epoprostenol however Patient refuse swan ramya catheter.   Patient breathing condition has been progressively worsening after the surgery, remains on high-flow oxygen since her surgery.   Pulmonary is following.      Chronic respiratory failure with hypoxia  Patient with Hypoxic Respiratory failure which is Chronic.  she is on home oxygen at 4 LPM.   Secondary to pulmonary embolism  Full-dose Lovenox    CKD (chronic kidney disease), stage IV  Creatine stable for now. BMP reviewed- noted Estimated Creatinine Clearance: 24.3 mL/min (A) (based on SCr of 2.3 mg/dL (H)). according to latest data. Based on current GFR, CKD stage is stage 4 - GFR 15-29.  Monitor UOP and serial BMP and adjust therapy as needed. Renally dose meds. Avoid nephrotoxic medications and procedures.    Close monitor     Syncope and collapse  See stroke      Acute urinary retention/ neurogenic bladder  Possibly neurogenic from the CVA or from cervical spine cord compression  Urinary catheter. Appear may need long term        VTE Risk Mitigation (From admission, onward)           Ordered     enoxaparin " injection 70 mg  Every 24 hours         11/15/24 0928     Reason for No Pharmacological VTE Prophylaxis  Once        Question:  Reasons:  Answer:  Risk of Bleeding    10/31/24 1849     IP VTE HIGH RISK PATIENT  Once         10/31/24 1849     Place sequential compression device  Until discontinued         10/31/24 1849                    Discharge Planning   ELBERT:      Code Status: DNR   Is the patient medically ready for discharge?:     Reason for patient still in hospital (select all that apply): Patient trending condition and Treatment  Discharge Plan A: Inpatient Hospice   Discharge Delays: None known at this time        Critical care time spent on the evaluation and treatment of severe organ dysfunction, review of pertinent labs and imaging studies, discussions with consulting providers and discussions with patient/family: 35 minutes.      Andriy Kilgore MD  Department of Hospital Medicine   [facility]         Andriy Kilgore MD  Department of Hospital Medicine  [facility]

## 2024-11-18 NOTE — SUBJECTIVE & OBJECTIVE
Interval History:   Seen and examined at multidisciplinary rounds, patient looks better, still quadriplegic, patient is awake alert, following commands, daughter at bedside.  I confirmed with the patient and the family, they would like to move forward with inpatient hospice and down the road go home with home hospice with high-flow oxygen.         Review of Systems  Objective:     Vital Signs (Most Recent):  Temp: 98.4 °F (36.9 °C) (11/18/24 0901)  Pulse: 67 (11/18/24 0901)  Resp: (!) 8 (11/18/24 0901)  BP: (!) 85/48 (11/18/24 0901)  SpO2: 99 % (11/18/24 0901) Vital Signs (24h Range):  Temp:  [97.6 °F (36.4 °C)-98.4 °F (36.9 °C)] 98.4 °F (36.9 °C)  Pulse:  [67-78] 67  Resp:  [8-18] 8  SpO2:  [89 %-100 %] 99 %  BP: ()/(48-57) 85/48  Arterial Line BP: ()/(41-65) 132/58     Weight: 84.3 kg (185 lb 13.6 oz)  Body mass index is 29.11 kg/m².    Intake/Output Summary (Last 24 hours) at 11/18/2024 1215  Last data filed at 11/18/2024 0705  Gross per 24 hour   Intake 1062.05 ml   Output 1145 ml   Net -82.95 ml         Physical Exam  Vitals and nursing note reviewed.   HENT:      Head: Normocephalic and atraumatic.      Mouth/Throat:      Mouth: Mucous membranes are moist.   Eyes:      Conjunctiva/sclera: Conjunctivae normal.   Neck:      Vascular: No JVD.   Cardiovascular:      Rate and Rhythm: Normal rate.   Pulmonary:      Effort: Pulmonary effort is normal.      Breath sounds: Normal breath sounds.   Abdominal:      Palpations: Abdomen is soft.   Skin:     General: Skin is warm.   Neurological:      Mental Status: She is alert and oriented to person, place, and time.             Significant Labs: All pertinent labs within the past 24 hours have been reviewed.  CBC:   Recent Labs   Lab 11/17/24  0432   WBC 6.02   HGB 8.9*   HCT 25.4*        CMP:   Recent Labs   Lab 11/17/24  0432   *   K 4.1   CL 99   CO2 23   GLU 78   BUN 43*   CREATININE 2.3*   CALCIUM 7.7*   PROT 4.8*   ALBUMIN 2.4*   BILITOT 0.5  "  ALKPHOS 107   AST 67*   ALT 39   ANIONGAP 6*     Cardiac Markers: No results for input(s): "CKMB", "MYOGLOBIN", "BNP", "TROPISTAT" in the last 48 hours.    Significant Imaging: I have reviewed all pertinent imaging results/findings within the past 24 hours.    Scheduled Meds:   allopurinoL  100 mg Oral QHS    amiodarone  200 mg Oral BID    atorvastatin  40 mg Oral Daily    bisacodyL  10 mg Rectal Daily    clopidogreL  75 mg Oral Daily    colchicine  0.3 mg Oral Daily    enoxparin  1 mg/kg Subcutaneous Q24H (treatment, non-standard time)    fludrocortisone  100 mcg Oral Daily    macitentan  10 mg Oral Daily    methocarbamoL  750 mg Oral TID    midodrine  20 mg Oral TID WM    montelukast  10 mg Oral Daily    pantoprazole  40 mg Oral Daily    selexipag  1,600 mcg Oral BID    tadalafil  40 mg Oral Daily     Continuous Infusions:      PRN Meds:.  Current Facility-Administered Medications:     acetaminophen, 650 mg, Oral, Q4H PRN    albuterol sulfate, 2.5 mg, Nebulization, Q6H PRN    aluminum-magnesium hydroxide-simethicone, 30 mL, Oral, Q4H PRN    dextrose 50%, 12.5 g, Intravenous, PRN    dextrose 50%, 25 g, Intravenous, PRN    diphenhydrAMINE, 25 mg, Oral, Q6H PRN    diphenhydrAMINE-zinc acetate 1-0.1%, , Topical (Top), TID PRN    glucagon (human recombinant), 1 mg, Intramuscular, PRN    glucose, 16 g, Oral, PRN    glucose, 24 g, Oral, PRN    HYDROcodone-acetaminophen, 1 tablet, Oral, Q4H PRN    HYDROmorphone, 1 mg, Intravenous, Q6H PRN    HYDROmorphone, 2 mg, Oral, Q4H PRN    magnesium oxide, 800 mg, Oral, PRN    magnesium oxide, 800 mg, Oral, PRN    methocarbamoL, 500 mg, Oral, TID PRN    naloxone, 0.02 mg, Intravenous, PRN    ondansetron, 4 mg, Intravenous, Q8H PRN    potassium bicarbonate, 35 mEq, Oral, PRN    potassium bicarbonate, 50 mEq, Oral, PRN    potassium bicarbonate, 60 mEq, Oral, PRN    potassium, sodium phosphates, 2 packet, Oral, PRN    potassium, sodium phosphates, 2 packet, Oral, PRN    potassium, " sodium phosphates, 2 packet, Oral, PRN    prochlorperazine, 5 mg, Intravenous, Q6H PRN    senna-docusate 8.6-50 mg, 2 tablet, Oral, Nightly PRN    sodium chloride 0.9%, 500 mL, Intravenous, PRN    sodium chloride 0.9%, 10 mL, Intravenous, Q12H PRN    sodium chloride 0.9%, 10 mL, Intravenous, PRN    X-Ray Chest AP Portable    Result Date: 11/16/2024  EXAMINATION: XR CHEST AP PORTABLE CLINICAL HISTORY: pulm edema, pleural effusions; COMPARISON: 11/15/2024, 11/14/2024 FINDINGS: Cardiac silhouette size is stable compared to prior.  Right PICC is in stable position with tip overlying the SVC.  Prominent central pulmonary vascularity.  Mild bilateral interstitial/ground-glass opacities suggestive of pulmonary edema.  Small bilateral pleural effusions.  No pneumothorax.  No acute osseous abnormality.     Pulmonary edema and small bilateral pleural effusions. Electronically signed by: Ashkan Broussard Date:    11/16/2024 Time:    07:01    X-Ray Chest AP Portable    Result Date: 11/15/2024  EXAMINATION: XR CHEST AP PORTABLE CLINICAL HISTORY: pulm edema, pleural effusions; COMPARISON: November 14 FINDINGS: Cardiac silhouette is unchanged.  The thoracic aorta is mildly elongated.  Masslike prominence of the bilateral bertha is stable. There is improved aeration of the lower lung zone on the left, with persistent small bilateral pleural effusions.  No pneumothorax is identified. Right-sided PICC line is unchanged in position.  No acute osseous abnormality is demonstrated.  Chronic right-sided rotator cuff tear is incidentally noted.     1. Slightly improved aeration of the left lung base.  No other significant changes compared to November 14. Electronically signed by: Leif Jordan Date:    11/15/2024 Time:    07:17    X-Ray Chest AP Portable    Result Date: 11/14/2024  EXAMINATION: XR CHEST AP PORTABLE CLINICAL HISTORY: pulm edema, pleural effusions; COMPARISON: November 13, 2024 FINDINGS: Heart size is normal.  The mediastinum  is unremarkable.  Abnormal bilateral hilar prominence is unchanged. Hazy opacification of the lower left hemithorax consistent with a combination of volume loss or airspace disease and left-sided pleural effusion appears slightly improved.  Small right pleural effusion is noted.  There is no evidence of pneumothorax. Right-sided PICC line remains in place with tip at the superior vena cava.     Slightly improved aeration of the lower lung zone on the left.  No other significant changes. Electronically signed by: Leif Jordan Date:    11/14/2024 Time:    08:54    X-Ray Chest AP Portable    Result Date: 11/13/2024  EXAMINATION: XR CHEST AP PORTABLE CLINICAL HISTORY: hypoxemia; FINDINGS: Portable chest radiograph at 04:58 hours compared to prior exams including CT of the prior day show right PICC unchanged in position, with stable cardiomediastinal silhouette and stable enlarged central pulmonary vasculature. There are persistent bilateral perihilar and left lower lung airspace opacities, with bilateral pleural effusions blunting the costophrenic angles.  No pneumothorax.     No significant interval change. Electronically signed by: Nitin Mayo Date:    11/13/2024 Time:    08:04    CTA Chest Non-Coronary (PE Studies)    Result Date: 11/12/2024  EXAMINATION: CTA CHEST NON CORONARY (PE STUDIES) CLINICAL HISTORY: Pulmonary embolism (PE) suspected, high prob;. TECHNIQUE: CT angiography targeted to the pulmonary arteries was performed. 100 cc nonionic contrast was administered and the bolus was timed for optimal opacification of the pulmonary arteries. 3-D reformatted images were obtained on an independent workstation and stored as a part of patient's permanent medical record. COMPARISON: Chest radiograph, November 12, 2024 FINDINGS: The pulmonary arteries are adequately opacified.  There are filling defects involving bilateral proximal segmental pulmonary arterial branches, extending to distal segmental and  subsegmental branches.  There is a small amount of thrombus in the left main pulmonary artery.  The pulmonary arterial trunk is dilated at up to 4.6 cm.  The right pulmonary artery measures 3.2 cm transverse and the left 3.4 cm.  There is slight bowing of the interventricular septum and minimal reflux to the inferior vena cava.  Correlate for possible right heart strain. The thoracic aorta is normal in caliber.  There is no mediastinal mass or lymphadenopathy, however note is made of mild nonspecific right hilar lymphadenopathy. Images at lung windows demonstrate left greater than right bilateral ground-glass pulmonary opacities, predominantly central in location.  Correlate for pulmonary edema or less likely pneumonia.  There are small bilateral pleural effusions.  Dependent atelectasis is noted at both lung bases. There are several indeterminate tiny noncalcified nodules in the right upper lobe, the largest measuring 4 mm (series 4, image 95.  4 mm nodule is also noted in the right middle lobe abutting the minor fissure. Images of the upper abdomen are limited, but demonstrate changes of apparent previous gastric bypass.  There is mild perihepatic free fluid. Diffuse body wall edema is noted.  No acute osseous abnormalities are identified.     1. Bilateral pulmonary thromboembolic disease. 2. Pulmonary arterial dilation and additional observations as above.  Correlate for possible right heart strain. 3. Left greater than right predominantly central ground-glass pulmonary opacities.  Correlate for edema versus pneumonia. 4. Small pleural effusions. 5. Nonspecific right hilar lymphadenopathy.  Scattered tiny right-sided noncalcified pulmonary nodules, possibly granulomas, but indeterminate. Findings of bilateral pulmonary thromboembolic disease were called to Dr. Ramos at 11:10 on November 12, 2024. Electronically signed by: Leif Jordan Date:    11/12/2024 Time:    11:22    X-Ray Chest AP Portable    Result  Date: 11/12/2024  EXAMINATION: XR CHEST AP PORTABLE CLINICAL HISTORY: worsening oxygenation; FINDINGS: Portable chest with comparison chest x-ray 11/08/2024.  Normal cardiomediastinal silhouette.Enlarged central hilar vascular structures again noted.  There is bilateral perihilar haziness and interstitial thickening similar to previous exam.  Blunting of the left costophrenic angle again noted with increased hazy opacification of the left lung base.  Blunting of the right costophrenic angle with patchy opacities of the right lung base is similar to previous exam.  Pulmonary vasculature is normal. No acute osseous abnormality.     CHF lung pattern with probable small bilateral pleural effusions noted,  and is subtly increased from previous exam. Electronically signed by: Scotty Aceves Date:    11/12/2024 Time:    08:41    X-Ray Chest AP Portable    Result Date: 11/8/2024  EXAMINATION: XR CHEST AP PORTABLE CLINICAL HISTORY: Pulmonary hypertension; FINDINGS: Portable chest with comparison chest x-ray 11/06/2024.  Normal cardiomediastinal silhouette.Enlarged central hilar vascular structures again noted with mild perihilar peribronchial interstitial thickening.  Hazy opacities of the left lung base are mildly increased.  No pneumothorax.  Right PICC line is unchanged.  Pulmonary vasculature is normal. No acute osseous abnormality.     Mild increased hazy opacities of the left lung bases.  Otherwise no significant changes from prior exam. Electronically signed by: Scotty Aceves Date:    11/08/2024 Time:    08:50    Echo Saline Bubble? No; Ultrasound enhancing contrast? No    Addendum Date: 11/7/2024      Left Ventricle: The left ventricle is smaller than normal. Normal wall thickness. There is normal systolic function with a visually estimated ejection fraction of 65 - 70%. There is normal diastolic function.   Right Ventricle: Mild right ventricular enlargement. Wall thickness is normal. Systolic function is  moderately reduced.   Left Atrium: Left atrium is moderately dilated.   Right Atrium: Right atrium is mildly dilated.   IVC/SVC: Normal venous pressure at 3 mmHg.     Result Date: 11/7/2024    Left Ventricle: The left ventricle is normal in size. Normal wall thickness. There is normal systolic function with a visually estimated ejection fraction of 65 - 70%. There is normal diastolic function.   Right Ventricle: Normal right ventricular cavity size. Wall thickness is normal. Systolic function is normal.   Left Atrium: Left atrium is mildly dilated.   IVC/SVC: Normal venous pressure at 3 mmHg.     X-Ray Chest AP Portable    Result Date: 11/6/2024  EXAMINATION: XR CHEST AP PORTABLE CLINICAL HISTORY: pulmonary hypertension; TECHNIQUE: Single frontal view of the chest was performed. COMPARISON: 11/05/2024. FINDINGS: There is a right-sided PICC, unchanged in position.  The lungs are well expanded.  There is prominence of the central pulmonary vasculature, stable from prior.  There are small bilateral pleural effusions.  The lungs are otherwise clear.  The cardiac silhouette is enlarged.  Osseous structures are intact.  There are calcifications of the aortic arch.  There are surgical clips overlying the left upper quadrant.     No significant detrimental change from prior. Electronically signed by: Dawit Barrios Date:    11/06/2024 Time:    05:05    US Lower Extremity Veins Bilateral    Result Date: 11/5/2024  EXAMINATION: US LOWER EXTREMITY VEINS BILATERAL CLINICAL HISTORY: r/o DVT; TECHNIQUE: Grayscale, color and spectral Doppler analysis of the bilateral lower extremity deep venous system was performed. FINDINGS: No prior studies for comparison.  There is hypoechoic peripheral nonocclusive thrombus within the proximal right superficial femoral vein.  There is also hypoechoic nonocclusive thrombus within the right popliteal vein, with some preserved color Doppler vascular flow. There is normal compressibility, with  normal color and spectral Doppler vascular flow in the left lower extremity deep venous system, with no findings of left lower extremity deep venous thrombosis.     1. Positive for nonocclusive deep venous thrombosis of the right lower extremity. 2. Negative for left lower extremity deep venous thrombosis. 3. RESULT NOTIFICATION: Findings were discussed by Dr Mayo with, and acknowledged by LUZ MARINA SANCHEZ at 15:20 hours. Electronically signed by: Nitin Mayo Date:    11/05/2024 Time:    15:22    X-Ray Chest 1 View S/P PICC Line by Nursing    Result Date: 11/5/2024  EXAMINATION: XR CHEST 1 VIEW S/P PICC LINE BY NURSING CLINICAL HISTORY: picc line; FINDINGS: Portable chest radiograph at 10:54 hours compared to 05:20 hours shows interval insertion of a right PICC, with the distal tip overlying the SVC in satisfactory position.  There is no other significant interval change.     Interval insertion of a right PICC, in satisfactory position with distal tip overlying the SVC. Electronically signed by: Nitin Mayo Date:    11/05/2024 Time:    11:05    X-Ray Chest AP Portable    Result Date: 11/5/2024  EXAMINATION: XR CHEST AP PORTABLE CLINICAL HISTORY: Pulmonary HTN; COMPARISON: 10/31/2024. FINDINGS: The heart is enlarged but stable.  Prominence of the central pulmonary vasculature appears similar to prior examination.  There is mild mass effect observed upon the rightward aspect of the trachea. There are mild airspace opacities or volume loss within the lung bases.  There are mildly diminished lung volumes.  No significant effusion demonstrated. Multiple monitoring electrodes overlie the chest.     Diminished lung volumes. Mild basilar airspace opacities or volume loss. Cardiomegaly and marked prominence of the central pulmonary vasculature, stable. Mild mass effect upon the rightward aspect of the trachea. Electronically signed by: Waqas Leavitt Date:    11/05/2024 Time:    07:04    X-Ray Cervical Spine AP And Lateral    Result  Date: 11/4/2024  CLINICAL HISTORY: ([id number])73 y/o  (1950) F C3-6 laminectomy and fusion; Central cord syndrome at unspecified level of cervical spinal cord, initial encounter TECHNIQUE: (MARIA#[id number], exam time 11/4/2024 5:48) XR CERVICAL SPINE AP LATERAL IMG56 2 view(s) obtained. COMPARISON: MRI from 11/02/2024. FINDINGS: C1 through C7 are visualized on the lateral radiograph.  There is straightening/reversal the normal lordotic curvature likely secondary to a combination of positioning/postoperative change, degenerative change and/or muscle spasm. There has been interval posterior laminectomy from C3-C6, with bilateral posterior trans-facet screw and saurabh fixation.  There is a midline posterior paraspinal drain in the laminectomy bed with scattered subcutaneous emphysema and edema over the dorsal aspect of the neck. Again noted is moderate to severe disc height loss at C2-C3, C3-C4 and C4-C5 with moderate disc height loss at C5-C6.  There is oblique lucency through the anterior inferior vertebral body of C2, and C5, suspicious for occult nondisplaced fractures, these could be better characterized with CT.  There is minimal prevertebral soft tissue swelling. There is mild retrolisthesis of C5 on C6 (3 mm).  The visualized lung apices are clear.  The patient is edentulous.     1.  Posterior postoperative fusion from C3-C6 as above. 2.  Suspected tiny nondisplaced oblique fractures through the anterior inferior endplate of C2 and C5. This report was flagged in Epic as abnormal. Electronically signed by: Asim Davis Date:    11/04/2024 Time:    06:42    SURG FL Surgery Fluoro Usage    Result Date: 11/3/2024  See OP Notes for results. IMPRESSION: See OP Notes for results. This procedure was auto-finalized by: Virtual Radiologist    MRI Cervical Spine Without Contrast    Result Date: 11/2/2024  EXAMINATION: MRI CERVICAL SPINE WITHOUT CONTRAST CLINICAL HISTORY: Myelopathy, acute, cervical spine; fall with head  and neck trauma TECHNIQUE: Routine MRI cervical spine without contrast. COMPARISON: Multiple prior exams. FINDINGS: Motion artifact limits the exam.  There is exaggeration of the normal cervical spinal curvature, with normal vertebral body alignment, and no acute fractures or destructive osseous lesions.  Chronic vertebral height loss involves C3 and C4, with degenerative loss of disc height and signal most pronounced at C3-C4 and C4-C5, and heterogeneous degenerative type marrow endplate signal alteration.  No findings of a diffuse marrow replacement process. There are stippled T2 hyperintensities in the sandy suggesting chronic ischemic changes, with the visualized posterior fossa and cervicomedullary junction otherwise unremarkable.  There is intramedullary non fluid like T2 hyperintense signal alteration in the cervical cord as detailed below. At C2-C3, there is broad-based disc bulging and spondylosis, without significant spinal canal stenosis.  There is bilateral foraminal narrowing. At C3-C4, there is broad-based disc bulging and spondylosis, contributing to moderate to severe spinal canal stenosis.  There is severe bilateral foraminal narrowing. At C4-C5, there is broad-based disc bulging and spondylosis, which produces severe spinal canal stenosis with cervical cord compression.  There is increased non fluid-like T2 signal in the cord suggesting edema and or myelopathy, with severe bilateral foraminal narrowing. At C5-C6, there is broad-based disc bulging and spondylosis, producing severe spinal canal stenosis with mass effect upon the cervical cord.  There is increased non fluid-like T2 signal in the cord suggesting edema and or myelopathy.  There is severe bilateral foraminal narrowing. At C6-C7, there is broad-based disc bulging, without significant spinal canal stenosis.  There is mild left neural foraminal narrowing. C7-T1 is unremarkable. There are no signal abnormalities of the paraspinal ligaments,  with nonspecific STIR hyperintense edema within the posterior paraspinal musculature and subcutaneous fat.  Multiple T2 hyperintensities in the thyroid gland are nonspecific.     1. Disc bulging and spondylosis at C4-C5 and C5-C6, with severe spinal canal stenosis, cervical cord compression and cord signal alteration suggesting edema and or myelopathy. 2. Broad-based disc bulging producing moderate spinal canal stenosis at C3-C4. Electronically signed by: Nitin Mayo Date:    11/02/2024 Time:    12:28    Echo    Result Date: 11/1/2024    Left Ventricle: The left ventricle is normal in size. Normal wall thickness. Unable to assess wall motion. There is normal systolic function with a visually estimated ejection fraction of 60 - 65%.   Right Ventricle: Right ventricle was not well visualized due to poor acoustic window.  Grossly appears to be dilated with reduced function.  There appears to be some concern of flattening of the interventricular septum which could indicate right ventricular pressure and volume overload.   Left Atrium: Left atrium is severely dilated.   IVC/SVC: Elevated venous pressure at 15 mmHg.     MRA Neck without contrast    Result Date: 11/1/2024  EXAMINATION: MRA NECK WITHOUT CONTRAST CLINICAL HISTORY: Stroke/TIA, determine embolic source; TECHNIQUE: Time of flight MRA of the carotid and vertebral arteries was performed with 3D reconstructions according to the standard neck MRA protocol. COMPARISON: Carotid ultrasound 10/31/2024.  MRI/MRA of the head 10/31/2024. FINDINGS: Somewhat motion limited exam. The right common carotid artery demonstrates no hemodynamically significant stenosis. The cervical right internal carotid artery demonstrates no hemodynamically significant stenosis. The left common carotid artery demonstrates no hemodynamically significant stenosis. The cervical left internal carotid artery demonstrates no hemodynamically significant stenosis. Extracranial vertebral arteries:  Vertebral arteries are codominant.  Vertebral arteries are normal to the level of the foramen magnum.  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     1. Negative limited noncontrast MRA of the neck.  No hemodynamically significant stenosis identified. Electronically signed by: Kendall Lee Date:    11/01/2024 Time:    12:35    US Carotid Bilateral    Result Date: 10/31/2024  EXAMINATION: US CAROTID BILATERAL CLINICAL HISTORY: syncope; TECHNIQUE: Grayscale and color Doppler ultrasound examination of the carotid and vertebral artery systems bilaterally.  Stenosis estimates are per the NASCET measurement criteria. COMPARISON: None. FINDINGS: Right: Internal Carotid Artery (ICA) peak systolic velocity 60.6 cm/sec ICA/CCA peak systolic velocity ratio: 0.9 Plaque formation: Heterogeneous Vertebral artery: Antegrade flow and normal waveform. Left: Internal Carotid Artery (ICA)  peak systolic velocity 98.1 cm/sec ICA/CCA peak systolic velocity ratio: 1.4 Plaque formation: Heterogeneous Vertebral artery: Antegrade flow and normal waveform.     No evidence of a hemodynamically significant carotid bifurcation stenosis. Electronically signed by: Dawit Barrios Date:    10/31/2024 Time:    23:55    MRI Lumbar Spine Without Contrast    Result Date: 10/31/2024  EXAMINATION: MRI LUMBAR SPINE WITHOUT CONTRAST CLINICAL HISTORY: Low back pain, trauma; TECHNIQUE: Multiplanar, multisequence MR images were acquired from the thoracolumbar junction to the sacrum without contrast. COMPARISON: CT total spine from 10/31/2024. MRI lumbar spine from 09/15/2016 FINDINGS: Alignment: Normal. Vertebrae: Normal marrow signal. No fracture. Discs: There is moderate disc height loss at L5-S1 and mild disc height loss and disc desiccation at L3-L4 and L4-L5.  Remaining disc heights are preserved. Cord: Normal.  Conus  terminates at L2. Degenerative findings: T12-L1: There is mild facet arthropathy bilaterally.  There is no spinal canal stenosis or neural foraminal narrowing. L1-L2: There is mild facet arthropathy and ligamentum flavum hypertrophy.  There is no spinal canal stenosis or neural foraminal narrowing. L2-L3: There is mild bilateral facet arthropathy and ligamentum flavum hypertrophy and a small circumferential disc bulge, resulting in mild spinal canal stenosis and moderate bilateral neural foraminal narrowing. L3-L4: There is moderate bilateral facet arthropathy and ligamentum flavum hypertrophy and a circumferential disc bulge, resulting in moderate spinal canal stenosis and moderate bilateral neural foraminal narrowing. L4-L5: There is a circumferential disc bulge with moderate bilateral facet arthropathy and ligamentum flavum hypertrophy resulting in moderate spinal canal stenosis and severe bilateral neural foraminal narrowing. L5-S1: There is bilateral facet arthropathy, moderate with a posterior disc bulge and an annular fissure.  There is no spinal canal stenosis.  There is moderate bilateral neural narrowing. Paraspinal muscles & soft tissues: Unremarkable.     Multilevel degenerative changes of the lumbar spine as detailed above.  There is moderate spinal canal stenosis at L3-L4 and L4-L5 and there is severe bilateral neural foraminal narrowing at L4-L5. Electronically signed by: Dawit Barrios Date:    10/31/2024 Time:    22:38    MRA Brain without contrast    Result Date: 10/31/2024  EXAMINATION: MRA BRAIN WITHOUT CONTRAST CLINICAL HISTORY: Syncope, recurrent; TECHNIQUE: Non-contrast 3-D time-of-flight intracranial MR angiography was performed through the [place] [facility] [place] with MIP reformatting. COMPARISON: None FINDINGS: Anterior circulation: The bilateral intracranial ICAs, bilateral ACAs, and bilateral MCAs are patent and unremarkable without high-grade stenosis or occlusion. Posterior circulation: The  bilateral intracranial vertebral arteries, the basilar artery, and the bilateral PCAs are patent and unremarkable without high-grade stenosis or occlusion. There is no intracranial aneurysm visualized.     Unremarkable MRA brain. Electronically signed by: Dawit Barrios Date:    10/31/2024 Time:    22:29    MRI Brain Without Contrast    Result Date: 10/31/2024  EXAMINATION: MRI BRAIN WITHOUT CONTRAST CLINICAL HISTORY: Neuro deficit, acute, stroke suspected; TECHNIQUE: Multiplanar multisequence MR imaging of the brain was performed without intravenous contrast. COMPARISON: CT head from earlier the same date. FINDINGS: There is restricted diffusion in the right caudate head, compatible with an acute infarct.  There is associated T2/FLAIR hyperintense signal.  There is a mass centered within the left temporoparietal calvarium measuring approximately 5.0 x 2.9 x 4.0 cm, with extension into the left cerebral convexity extra-axial space and extension into the left temporal scalp.  There is abutment of the left temporal lobe, without evidence of vasogenic edema or evidence of significant mass effect.  There is no midline shift.  Ventricles are normal in size for age without evidence of hydrocephalus.  There is T2/FLAIR hyperintense signal throughout the supratentorial white matter, compatible with chronic microvascular ischemic changes.  There is a small focus of encephalomalacia within the right medial parietal lobe, likely related to a remote infarct.  There is no intracranial hemorrhage.  No extra-axial blood or fluid collections. Normal vascular flow voids. Left common rim mass as above.  The remaining bone marrow signal intensity is normal. Paranasal sinuses and mastoid air cells are clear.     1. Acute infarct in the right caudate head. 2. 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  The mass abuts the left temporal lobe, without resulting in significant mass effect or  vasogenic edema.  Differential includes a primary or metastatic osseous lesion, an atypical meningioma, or additional etiologies. 3. Chronic microvascular ischemic changes. This report was flagged in Epic as abnormal. Dr. Barrios discussed critical findings with TATIANNA Morales. by [facility] secure chat at 22:23 on 10/31/2024. Electronically signed by: Dawit Barrios Date:    10/31/2024 Time:    22:27    X-Ray Wrist Complete Left    Result Date: 10/31/2024  EXAMINATION: XR WRIST COMPLETE 3 VIEWS LEFT CLINICAL HISTORY: Syncope and collapse TECHNIQUE: PA, lateral, and oblique views of the left wrist were performed. COMPARISON: None FINDINGS: No displaced fracture or traumatic malalignment.  Mild carpal degenerative change.  Unremarkable soft tissues. Electronically signed by: Saeid Pérez Date:    10/31/2024 Time:    16:40    X-Ray Knee 1 or 2 View Left    Result Date: 10/31/2024  EXAMINATION: XR KNEE 1 OR 2 VIEW LEFT CLINICAL HISTORY: Syncope and Fall; TECHNIQUE: One or two views of the left knee were performed. COMPARISON: None FINDINGS: Left knee total arthroplasty hardware with no periprosthetic fracture or abnormal lucency to suggest loosening or infection.  No malalignment.  Trace knee joint fluid.  Subcutaneous soft tissue edema about the knee and proximal calf. Electronically signed by: Saeid Pérze Date:    10/31/2024 Time:    16:39    X-Ray Chest AP Portable    Result Date: 10/31/2024  EXAMINATION: XR CHEST AP PORTABLE CLINICAL HISTORY: Syncope and collapse TECHNIQUE: Single frontal view of the chest was performed. COMPARISON: Same-day CT FINDINGS: There is a nodular airspace opacity at the periphery of the right lung base.  Remaining lungs clear.  Borderline cardiac enlargement with metallic device projecting over the right heart border possibly an intra atrial septal occlusion device.  Prominence of the pulmonary arterial vasculature aortic arch atherosclerosis.  No pleural effusion or pneumothorax.  There are  multiple surgical clips and staple lines over the upper abdomen.     1. Pleuroparenchymal nodule in the right lung base and several other nodules in the right lung apex.  Findings better seen at CT.  These are nonspecific with neoplasm not excluded. 2. Borderline heart enlargement. 3. Prominence of the pulmonary vasculature raising the possibility for elevated pulmonary pressures. Electronically signed by: Saeid Pérez Date:    10/31/2024 Time:    16:38    CT Entire Spine Without Contrast    Result Date: 10/31/2024  EXAMINATION: CT ENTIRE SPINE WITHOUT CONTRAST (XPD) CLINICAL HISTORY: Fall, pan spinal tenderness; TECHNIQUE: Axial images were obtained through the entire spine.  Sagittal and coronal reformatted images were created. COMPARISON: Lumbar spine MRI dated 03/15/2016 FINDINGS: There is no recent study for comparison.  On the screening study obtained with a large field of view there is no obvious acute fracture.  There is extensive chronic change throughout the cervical spine.  The odontoid process is intact.  The anterior and posterior arches of C1 are intact as well.  There is severe disc space narrowing at the C2-3, C3-4, C4-5 levels with moderate to marked disc space narrowing at the C5-6 level.  There is trace retrolisthesis of C4 on C5.  There is chronic anterior wedging of C3 and C4.  There is multilevel foraminal stenosis throughout the cervical region due to uncovertebral spurring and/or facet joint arthropathy.  Also, there is spinal stenosis most apparent at the C4-5 level. In the thoracic spine, there is mild chronic anterior wedging of several midthoracic vertebral bodies but there is no obvious acute fracture or traumatic malalignment.  The facet joints maintain normal articulation. In the lumbar spine there is a vacuum disc at the L3-4 and L5-S1 levels.  There is severe disc space narrowing at L5-S1.  The lumbar vertebral bodies maintain normal height without obvious acute fracture.   Alignment is grossly normal.  There is extensive facet joint arthropathy in the mid to lower lumbar spine. There is a dextrocurvature of the lower lumbar spine with gentle broad levocurvature at the thoracolumbar junction. There is no displaced or depressed rib fracture identified on this limited field of view.  There is no obvious spinous process fracture. The sacrum is intact. There is atherosclerosis.  There is no pneumothorax.  There are several scattered nodules in the lungs which are suboptimally evaluated.     There is degenerative change throughout the spine, most significant in the lower lumbar spine as well as in the cervical spine.  There is however no obvious acute fracture or traumatic malalignment.  The entire spine was obtained in the large field of view. Electronically signed by: Nomi Vasquez MD Date:    10/31/2024 Time:    16:26    CT Maxillofacial Without Contrast    Result Date: 10/31/2024  EXAMINATION: CT MAXILLOFACIAL WITHOUT CONTRAST CLINICAL HISTORY: Facial trauma, blunt; TECHNIQUE: Low dose axial images, sagittal and coronal reformations were obtained through the face.  Contrast was not administered. COMPARISON: None FINDINGS: There is suspected soft tissue swelling over the chin.  There is very subtle cortical irregularity involving the anterior paramedian mandible which could represent subtle acute fracture with minimal adjacent bone fragments.  There is beam hardening artifact related to hardware in the region of the left maxilla.  There is no dislocation at the TMJ.  There are changes of torus mandibularis. There is old deformity of the anterior nasal bones.  There is no acute displaced or depressed nasal bone or nasal septum fracture. There is no acute orbital fracture.     1. There is mild irregularity of the interior midline mandible with small adjacent bone fragments which could represent acute fracture with overlying soft tissue swelling.  There is no other acute maxillofacial  or orbital fracture.  There is suspected old deformities of the anterior nasal bones. Electronically signed by: Nomi Vasquez MD Date:    10/31/2024 Time:    16:12    CT Head Without Contrast    Result Date: 10/31/2024  EXAMINATION: CT HEAD WITHOUT CONTRAST CLINICAL HISTORY: Head trauma, minor (Age >= 65y); TECHNIQUE: Routine unenhanced axial images were obtained through the head.  Sagittal and coronal reformatted images were created.  The study is reviewed in bone and soft tissue windows. COMPARISON: None FINDINGS: Intracranial contents: There is no acute intracranial abnormality.  There is mild nonspecific white matter change.  There is no hemorrhage, parenchymal mass or mass effect.  The gray-white interface is preserved without acute infarction.  The basilar cisterns are open.  There is a remote lacunar infarction in the right caudate nucleus.  The cerebellar tonsils are normal position. Extracranial contents, calvarium, soft tissues: The included paranasal sinuses and mastoid air cells are clear.  There is no acute skull fracture.  There is soft tissue prominence of the left temporoparietal region.  There is demineralization of the underlying calvarium with in irregular inner bony margin.  Also, there is suggestion of possible extra-axial dural-based soft tissue mass in this region which could potentially represent an atypical meningioma and further evaluated with brain MRI without and with contrast.     There is left temporoparietal soft tissue prominence with demineralization of the underlying bone and suspected extra-axial mass in this region which is nonspecific and incompletely evaluated.  These could potentially represent an atypical meningioma but further evaluation with brain MRI without and with contrast could be obtained.  This is not acute.  There is no hemorrhage.  There is no acute skull fracture. Electronically signed by: Nomi Vasquez MD Date:    10/31/2024 Time:    16:07  - pulls last  radiology orders

## 2024-11-18 NOTE — NURSING
The patient has the same status. She was readmitted due to inpatient hospice. All remains the same.

## 2024-11-18 NOTE — PT/OT/SLP DISCHARGE
Physical Therapy Discharge Summary    Name: Shanell Mahmood  MRN: [id number]   Principal Problem: Central cord syndrome     Patient Discharged from acute Physical Therapy on 24.  Please refer to prior PT noted date on 24 for functional status.     Assessment:     Pt transitioning to Hospice Care.     Objective:     GOALS:   Multidisciplinary Problems       Physical Therapy Goals          Problem: Physical Therapy    Goal Priority Disciplines Outcome Interventions   Physical Therapy Goal     PT, PT/OT Progressing    Description: Goals to be met by: 24     Patient will increase functional independence with mobility by performin. Supine to sit with Moderate Assistance  2. Sit to supine with Moderate Assistance  3. Sit to stand transfer with Moderate Assistance  4. Bed to chair transfer with Moderate Assistance using Rolling Walker  5. Gait  x 25 feet with Moderate Assistance using Rolling Walker.                          Reasons for Discontinuation of Therapy Services  Patient declines to continue care. and Patient is unable to continue work toward goals because of medical or psychosocial complications.      Plan:     Patient Discharged to: Palliative Care/Hospice.      2024

## 2024-11-18 NOTE — PLAN OF CARE
11/18/24 0706   Patient Assessment/Suction   Level of Consciousness (AVPU) alert   Respiratory Effort Normal;Unlabored   Expansion/Accessory Muscles/Retractions no retractions;no use of accessory muscles   All Lung Fields Breath Sounds diminished   PRE-TX-O2   Device (Oxygen Therapy) high flow nasal cannula w/device   $ Is the patient on High Flow Oxygen? Yes   $ Noninvasive Daily Charge Noninvasive Daily   Humidification temp set 35   Humidification temp actual 35   Flow (L/min) (Oxygen Therapy) 20   Oxygen Concentration (%) 65   SpO2 97 %   Pulse Oximetry Type Continuous   $ Pulse Oximetry - Multiple Charge Pulse Oximetry - Multiple   Pulse 68   Resp 13   Aerosol Therapy   $ Aerosol Therapy Charges PRN treatment not required   Vibratory PEP Therapy   $ Vibratory PEP Charges Aerobika Therapy   $ Vibratory PEP Tech Time Charges 15 min   Daily Review of Necessity (PEP Therapy) completed   Type (PEP Therapy) vibratory/oscillatory   Device (PEP Therapy) flutter   Route (PEP Therapy) mouthpiece   Breaths per Cycle (PEP Therapy) 10   Cycles (PEP Therapy) 1   PEP Pressure (cm H2O) 5   PEP Duration (min) 5   Settings (PEP Therapy) PEP 5   Patient Position (PEP Therapy) HOB elevated   Post Treatment Assessment (PEP) breath sounds unchanged   Signs of Intolerance (PEP Therapy) none   Education   $ Education PEP Therapy;15 min

## 2024-11-18 NOTE — ASSESSMENT & PLAN NOTE
Patient had acute CVA, remains quadriplegic after cervical cord decompression surgery, also developed pulmonary embolism/ acute DVT, patient developed urinary retention most likely secondary to neurogenic bladder, patient has progressive acute on chronic hypoxic respiratory failure due to pulmonary artery hypertension, patient remains on high-flow oxygen.     Patient and family decided to move forward to inpatient hospice 11/18.     Continue inpatient hospice  Continue rest of care, regarding pulmonary hypertension medications, we will defer to patient / family whether to continue or discontinue.

## 2024-11-18 NOTE — PT/OT/SLP PROGRESS
Physical Therapy      Patient Name:  Shanell Mahmood   MRN:  [id number]    Patient not seen today secondary to Patient unwilling to participate, Other (Comment) (Pt transitioning to Hospice Care.). See PT Discharge Summary.

## 2024-11-19 PROBLEM — Z51.5 COMFORT MEASURES ONLY STATUS: Status: ACTIVE | Noted: 2024-01-01

## 2024-11-19 NOTE — RESPIRATORY THERAPY
11/18/24 2020   Patient Assessment/Suction   Level of Consciousness (AVPU) alert   Respiratory Effort Unlabored   Expansion/Accessory Muscles/Retractions no use of accessory muscles;no retractions   All Lung Fields Breath Sounds Anterior:;Posterior:;diminished   Rhythm/Pattern, Respiratory unlabored   PRE-TX-O2   Device (Oxygen Therapy) high flow nasal cannula w/device   Flow (L/min) (Oxygen Therapy) 15   Oxygen Concentration (%) 65   Pulse Oximetry Type Intermittent   $ Pulse Oximetry - Multiple Charge Pulse Oximetry - Multiple   Pulse 63   Resp 14   Aerosol Therapy   $ Aerosol Therapy Charges PRN treatment not required   Daily Review of Necessity (SVN) completed   Respiratory Treatment Status (SVN) PRN treatment not required   Ready to Wean/Extubation Screen   FIO2<=50 (chart decimal) (!) 0.65   Education   $ Education Oxygen;15 min

## 2024-11-19 NOTE — SUBJECTIVE & OBJECTIVE
Interval History: Patient sleeping comfortably    Review of Systems   All other systems reviewed and are negative.    Objective:     Vital Signs (Most Recent):  Temp: (!) 95.8 °F (35.4 °C) (11/19/24 0745)  Pulse: (!) 56 (11/19/24 0745)  Resp: 12 (11/19/24 1222)  BP: (!) 86/41 (11/19/24 0745)  SpO2: (!) 93 % (11/19/24 0745) Vital Signs (24h Range):  Temp:  [95.8 °F (35.4 °C)-97.6 °F (36.4 °C)] 95.8 °F (35.4 °C)  Pulse:  [56-63] 56  Resp:  [10-16] 12  SpO2:  [93 %-100 %] 93 %  BP: (72-86)/(40-41) 86/41        There is no height or weight on file to calculate BMI.    Intake/Output Summary (Last 24 hours) at 11/19/2024 1537  Last data filed at 11/19/2024 0549  Gross per 24 hour   Intake 240 ml   Output 200 ml   Net 40 ml         Physical Exam  Constitutional:       Comments: Comfortably sleeping             Significant Labs: All pertinent labs within the past 24 hours have been reviewed.    Significant Imaging: I have reviewed all pertinent imaging results/findings within the past 24 hours.   english

## 2024-11-19 NOTE — RESPIRATORY THERAPY
11/19/24 1030   Patient Assessment/Suction   Level of Consciousness (AVPU) alert   Respiratory Effort Unlabored;Normal   Expansion/Accessory Muscles/Retractions expansion symmetric;no retractions;no use of accessory muscles   Rhythm/Pattern, Respiratory depth regular;pattern regular;unlabored   Cough Frequency no cough   PRE-TX-O2   Device (Oxygen Therapy) nasal cannula   $ Is the patient on Low Flow Oxygen? Yes   Flow (L/min) (Oxygen Therapy) 4   Resp 14

## 2024-11-19 NOTE — ASSESSMENT & PLAN NOTE
Patient and family decided to move forward with inpatient hospice on 11/18.   Comfort care order set was placed .   Continue morphine and lorazepam drip.

## 2024-11-19 NOTE — HOSPITAL COURSE
74 year old female with hx of acute CVA, quadriplegic after cervical cord decompression surgery,  pulmonary embolism,  urinary retention most likely secondary to neurogenic bladder came in with progressive acute on chronic hypoxic respiratory failure due to pulmonary artery hypertension. Patient and family decided to move forward with inpatient hospice on . Comfort care order set was placed . Patient was started on morphine and lorazepam drip and patient  the night before assuming my duty .

## 2024-11-19 NOTE — PROGRESS NOTES
[facility]  Progress Note    Patient Name: Shanell Mahmood  MRN: [id number]  Patient Class: IP- Hospice   Admission Date: 11/18/2024  Length of Stay: 1 days  Attending Physician: Sharri Terry DO  Primary Care Provider: Nicole, Primary Doctor        Subjective:     Principal Problem:<principal problem not specified>        HPI:   74-year-old female with history of brain mass, CKD, back pain, pulmonary artery hypertension on 4 L oxygen is admitted after syncope and collapse found to have a acute CVA in the right caudate on MRI brain.  She was found to have underlying brain mass.  MRI of the cervical spine shows severe cord compression at C4-5 and C5-6 likely acute with edema.  Patient had surgical decompression  and  patient underwent surgery on : 11/3/24 multilevel C-spine laminectomy.  Later patient continued to have hypotension and needed vasopressor and another vasopressor vasopressin is added  Patient also her DVT of the the proximal right superficial femoral vein. And nonocclusive thrombus within the right popliteal vein,  Also patient developed new onset AFib and started amiodarone drip and in and out of a fib  and later changed to PO amiodarone.   Later patient was more hypoxic and not able to wean vasopressor and PE study was done and found to have segmental PE with right heart strain - no surgical intervention as per vascular surgery.      Patient had acute CVA, remains quadriplegic after cervical cord decompression surgery, also developed pulmonary embolism/ acute DVT, patient developed urinary retention most likely secondary to neurogenic bladder, patient has progressive acute on chronic hypoxic respiratory failure due to pulmonary artery hypertension, patient remains on high-flow oxygen after surgery.   Patient and family decided to move forward to inpatient hospice 11/18.    Overview/Hospital Course:  74 year old female with hx of acute CVA, quadriplegic after cervical cord  decompression surgery,  pulmonary embolism,  urinary retention most likely secondary to neurogenic bladder came in with progressive acute on chronic hypoxic respiratory failure due to pulmonary artery hypertension. Patient and family decided to move forward with inpatient hospice on 11/18. Comfort care order set was placed . Patient was started on morphine and lorazepam drip.     Interval History: Patient sleeping comfortably    Review of Systems   All other systems reviewed and are negative.    Objective:     Vital Signs (Most Recent):  Temp: (!) 95.8 °F (35.4 °C) (11/19/24 0745)  Pulse: (!) 56 (11/19/24 0745)  Resp: 12 (11/19/24 1222)  BP: (!) 86/41 (11/19/24 0745)  SpO2: (!) 93 % (11/19/24 0745) Vital Signs (24h Range):  Temp:  [95.8 °F (35.4 °C)-97.6 °F (36.4 °C)] 95.8 °F (35.4 °C)  Pulse:  [56-63] 56  Resp:  [10-16] 12  SpO2:  [93 %-100 %] 93 %  BP: (72-86)/(40-41) 86/41        There is no height or weight on file to calculate BMI.    Intake/Output Summary (Last 24 hours) at 11/19/2024 1537  Last data filed at 11/19/2024 0549  Gross per 24 hour   Intake 240 ml   Output 200 ml   Net 40 ml         Physical Exam  Constitutional:       Comments: Comfortably sleeping             Significant Labs: All pertinent labs within the past 24 hours have been reviewed.    Significant Imaging: I have reviewed all pertinent imaging results/findings within the past 24 hours.    Assessment/Plan:      Comfort measures only status  Patient and family decided to move forward with inpatient hospice on 11/18.   Comfort care order set was placed .   Continue morphine and lorazepam drip.         VTE Risk Mitigation (From admission, onward)           Ordered     IP VTE HIGH RISK PATIENT  Once         11/18/24 1400     Place sequential compression device  Until discontinued         11/18/24 1400     Reason for No Pharmacological VTE Prophylaxis  Once        Question:  Reasons:  Answer:  Risk of Bleeding    11/18/24 1400                     Discharge Planning   ELBERT:      Code Status: DNR   Is the patient medically ready for discharge?:     Reason for patient still in hospital (select all that apply): Patient trending condition  Discharge Plan A: Inpatient Hospice                  Sharri Terry DO  Department of Hospital Medicine   [facility]

## 2024-11-20 NOTE — PLAN OF CARE
Mary    The patient was a 74-year-old  female on Hospice.  Noctmiteshists were requested to come to perform her final examination in order to declare her as :    General: No spontaneous movement   HEENT: Pupils fixed and dilated   Neck:  No carotid pulse on palpation   Heart:  No heart tones auscultated in all 4 foci  Pulmonary:  No evident respirations on inspection and no breath sounds auscultated bilaterally    Patient declared as  at 5:41 AM on 2024.  Updated nursing staff and family at bedside.  All questions answered satisfactorily and support provided to family.

## 2024-11-20 NOTE — PLAN OF CARE
24 1033   Final Note   Assessment Type Final Discharge Note   Anticipated Discharge Disposition

## 2024-11-20 NOTE — DISCHARGE SUMMARY
[facility]  Discharge Summary      Patient Name: Shanell Mahmood  MRN: [id number]  [facility]: [id number]  Patient Class: IP- Hospice  Admission Date: 11/18/2024  Hospital Length of Stay: 2 days  Discharge Date and Time:  11/20/2024 2:51 PM  Attending Physician: Nicole att. providers found   Discharging Provider: Eric Bass MD  Primary Care Provider: Nicole, Primary Doctor    Primary Care Team: Networked reference to record PCT     HPI:    74-year-old female with history of brain mass, CKD, back pain, pulmonary artery hypertension on 4 L oxygen is admitted after syncope and collapse found to have a acute CVA in the right caudate on MRI brain.  She was found to have underlying brain mass.  MRI of the cervical spine shows severe cord compression at C4-5 and C5-6 likely acute with edema.  Patient had surgical decompression  and  patient underwent surgery on : 11/3/24 multilevel C-spine laminectomy.  Later patient continued to have hypotension and needed vasopressor and another vasopressor vasopressin is added  Patient also her DVT of the the proximal right superficial femoral vein. And nonocclusive thrombus within the right popliteal vein,  Also patient developed new onset AFib and started amiodarone drip and in and out of a fib  and later changed to PO amiodarone.   Later patient was more hypoxic and not able to wean vasopressor and PE study was done and found to have segmental PE with right heart strain - no surgical intervention as per vascular surgery.      Patient had acute CVA, remains quadriplegic after cervical cord decompression surgery, also developed pulmonary embolism/ acute DVT, patient developed urinary retention most likely secondary to neurogenic bladder, patient has progressive acute on chronic hypoxic respiratory failure due to pulmonary artery hypertension, patient remains on high-flow oxygen after surgery.   Patient and family decided to move forward to inpatient hospice 11/18.    *  "No surgery found *      Hospital Course:   74 year old female with hx of acute CVA, quadriplegic after cervical cord decompression surgery,  pulmonary embolism,  urinary retention most likely secondary to neurogenic bladder came in with progressive acute on chronic hypoxic respiratory failure due to pulmonary artery hypertension. Patient and family decided to move forward with inpatient hospice on . Comfort care order set was placed . Patient was started on morphine and lorazepam drip and patient  the night before assuming my duty .     Goals of Care Treatment Preferences:  Code Status: DNR          What is most important right now is to focus on comfort and QOL .  Accordingly, we have decided that the best plan to meet the patient's goals includes enrolling in hospice care.         Consults:     No new Assessment & Plan notes have been filed under this hospital service since the last note was generated.  Service: Hospital Medicine    Final Active Diagnoses:    Diagnosis Date Noted POA    Comfort measures only status [Z51.5] 2024 Not Applicable      Problems Resolved During this Admission:       Discharged Condition:     Disposition:     Follow Up:    Patient Instructions:   No discharge procedures on file.    Significant Diagnostic Studies: Labs: BMP: No results for input(s): "GLU", "NA", "K", "CL", "CO2", "BUN", "CREATININE", "CALCIUM", "MG" in the last 48 hours. and CMP No results for input(s): "NA", "K", "CL", "CO2", "GLU", "BUN", "CREATININE", "CALCIUM", "PROT", "ALBUMIN", "BILITOT", "ALKPHOS", "AST", "ALT", "ANIONGAP", "ESTGFRAFRICA", "EGFRNONAA" in the last 48 hours.    Pending Diagnostic Studies:       None           Medications:  Reconciled Home Medications:      Medication List        ASK your doctor about these medications      allopurinoL 100 MG tablet  Commonly known as: ZYLOPRIM  Take 100 mg by mouth every evening.     clotrimazole-betamethasone 1-0.05% cream  Commonly " known as: LOTRISONE  Apply 1 g topically Daily.     colchicine 0.6 mg tablet  Commonly known as: COLCRYS  Take 0.6 mg by mouth 2 (two) times daily.     diclofenac sodium 1 % Gel  Commonly known as: VOLTAREN  Apply 2 g topically 3 (three) times daily.     DULoxetine 60 MG capsule  Commonly known as: CYMBALTA  Take 60 mg by mouth once daily.     ergocalciferol 50,000 unit Cap  Commonly known as: ERGOCALCIFEROL  Take 50,000 Units by mouth every 7 days.     fluticasone propionate 50 mcg/actuation nasal spray  Commonly known as: FLONASE  1 spray by Each Nostril route once daily.     gabapentin 100 MG capsule  Commonly known as: NEURONTIN  Take 2 capsules by mouth every 12 (twelve) hours.     HYDROcodone-acetaminophen  mg per tablet  Commonly known as: NORCO  Take 1 tablet by mouth every 4 to 6 hours as needed for Pain.     LINZESS 290 mcg Cap capsule  Generic drug: linaCLOtide  Take 290 mcg by mouth before breakfast.     montelukast 10 mg tablet  Commonly known as: SINGULAIR  Take 10 mg by mouth once daily.     omeprazole 40 MG capsule  Commonly known as: PRILOSEC  Take 40 mg by mouth Daily.     OPSUMIT 10 mg Tab  Generic drug: macitentan  Take 10 mg by mouth once daily.     simvastatin 40 MG tablet  Commonly known as: ZOCOR  Take 40 mg by mouth Daily.     tadalafil 20 mg Tab  Commonly known as: ADCIRCA  Take 20 mg by mouth every other day.     tiZANidine 4 MG tablet  Commonly known as: ZANAFLEX  Take 4 mg by mouth every 12 (twelve) hours as needed.     torsemide 20 MG Tab  Commonly known as: DEMADEX  Take 40 mg by mouth once daily.     UPTRAVI 1,600 mcg Tab  Generic drug: selexipag  Take 1 tablet by mouth 2 (two) times a day.              Indwelling Lines/Drains at time of discharge:   Lines/Drains/Airways       None                 Patient  before start of my shift    Time spent on the discharge of patient: 22 minutes         Eric Bass MD  Department of Hospital Medicine  [facility]

## 2024-11-20 NOTE — RESPIRATORY THERAPY
11/19/24 1947   Patient Assessment/Suction   Level of Consciousness (AVPU)   (SLEEPING)   Respiratory Effort Unlabored   Expansion/Accessory Muscles/Retractions no use of accessory muscles;no retractions   All Lung Fields Breath Sounds Anterior:;Lateral:;diminished   Rhythm/Pattern, Respiratory unlabored   Cough Frequency no cough   PRE-TX-O2   Device (Oxygen Therapy) nasal cannula   Flow (L/min) (Oxygen Therapy) 4   Pulse Oximetry Type Intermittent   $ Pulse Oximetry - Multiple Charge Pulse Oximetry - Multiple   Aerosol Therapy   $ Aerosol Therapy Charges PRN treatment not required   Daily Review of Necessity (SVN) completed   Respiratory Treatment Status (SVN) PRN treatment not required   Education   $ Education 15 min;Oxygen

## 2024-11-20 NOTE — NURSING
Called to room by family member. Appears to be . Informed Dr. Louise to pronounce. Will call [facility] hospice.

## 2024-11-21 LAB
OHS QRS DURATION: 68 MS
OHS QTC CALCULATION: 415 MS

## 2024-11-23 LAB
OHS QRS DURATION: 72 MS
OHS QTC CALCULATION: 427 MS

## (undated) DEVICE — GOWN ORBIS LVL 4 XXL 49IN

## (undated) DEVICE — SPONGE PATTY SURGICAL .5X3IN

## (undated) DEVICE — GLOVE SENSICARE PI MICRO 8

## (undated) DEVICE — Device

## (undated) DEVICE — PENCIL SMK EVAC CONNECTOR 10FT

## (undated) DEVICE — GLOVE SENSICARE PI GRN 8.5

## (undated) DEVICE — DRILL BIT SYMPHONY 2.4X3.5MM

## (undated) DEVICE — HEMOSTAT SURGICEL PWD 3G

## (undated) DEVICE — COUNT NDL FOAM MAGNET 40COUNT

## (undated) DEVICE — NDL ECLIPSE SAFETY 18GX1-1/2IN

## (undated) DEVICE — BLADE 4IN EDGE INSULATED

## (undated) DEVICE — TRAY LAMINECTOMY SMH

## (undated) DEVICE — YANKAUER FLEX NO VENT REG CAP

## (undated) DEVICE — GOWN ECLIPSE REINF LVL4 TWL LG

## (undated) DEVICE — DRAPE STERI INSTRUMENT 1018

## (undated) DEVICE — SPONGE NEURO PATTIE 1/2X3IN

## (undated) DEVICE — ELECTRODE REM PLYHSV RETURN 9

## (undated) DEVICE — BLADE ELECTRO EDGE INSULATED

## (undated) DEVICE — PINS SKULL ADULT MAYFIELD
Type: IMPLANTABLE DEVICE | Site: SCALP | Status: NON-FUNCTIONAL
Removed: 2024-11-03

## (undated) DEVICE — DRAPE PED LAP II 100X72X124IN

## (undated) DEVICE — MATRIX HEMSTAT FLOSEAL 5ML

## (undated) DEVICE — TOOL MR8 MATCH HEAD 14CM 3MM

## (undated) DEVICE — STAPLER SKIN PROXIMATE WIDE

## (undated) DEVICE — TUBING SUC UNIV W/CONN 12FT

## (undated) DEVICE — BLADE MILL+ BONE MEDIUM DISP

## (undated) DEVICE — DRAPE THREE-QUARTER 53X77IN

## (undated) DEVICE — SYR ONLY LUER LOCK 20CC

## (undated) DEVICE — PROTECTOR ULNAR NERVE FOAM

## (undated) DEVICE — TAPE SILK 3IN